# Patient Record
Sex: MALE | Race: WHITE | NOT HISPANIC OR LATINO | Employment: OTHER | URBAN - METROPOLITAN AREA
[De-identification: names, ages, dates, MRNs, and addresses within clinical notes are randomized per-mention and may not be internally consistent; named-entity substitution may affect disease eponyms.]

---

## 2020-03-27 ENCOUNTER — HOSPITAL ENCOUNTER (OUTPATIENT)
Facility: HOSPITAL | Age: 65
Discharge: HOME | End: 2020-03-29
Attending: EMERGENCY MEDICINE | Admitting: STUDENT IN AN ORGANIZED HEALTH CARE EDUCATION/TRAINING PROGRAM
Payer: OTHER GOVERNMENT

## 2020-03-27 DIAGNOSIS — Z91.89 AT RISK FOR LONG QT SYNDROME: ICD-10-CM

## 2020-03-27 DIAGNOSIS — R06.02 SOB (SHORTNESS OF BREATH): ICD-10-CM

## 2020-03-27 DIAGNOSIS — R05.9 COUGH: ICD-10-CM

## 2020-03-27 DIAGNOSIS — Z20.822 SUSPECTED COVID-19 VIRUS INFECTION: Primary | ICD-10-CM

## 2020-03-27 PROBLEM — E11.9 TYPE 2 DIABETES MELLITUS: Status: ACTIVE | Noted: 2020-03-27

## 2020-03-27 PROBLEM — J44.1 COPD EXACERBATION: Status: ACTIVE | Noted: 2020-03-27

## 2020-03-27 PROBLEM — I50.9 CHF (CONGESTIVE HEART FAILURE): Status: ACTIVE | Noted: 2020-03-27

## 2020-03-27 PROBLEM — I10 BENIGN ESSENTIAL HTN: Status: ACTIVE | Noted: 2020-03-27

## 2020-03-27 LAB
ALBUMIN SERPL BCP-MCNC: 4.2 G/DL (ref 3.5–5.2)
ALLENS TEST: ABNORMAL
ALP SERPL-CCNC: 67 U/L (ref 55–135)
ALT SERPL W/O P-5'-P-CCNC: 33 U/L (ref 10–44)
ANION GAP SERPL CALC-SCNC: 15 MMOL/L (ref 8–16)
AST SERPL-CCNC: 36 U/L (ref 10–40)
BACTERIA #/AREA URNS HPF: NEGATIVE /HPF
BASOPHILS # BLD AUTO: 0.06 K/UL (ref 0–0.2)
BASOPHILS NFR BLD: 0.7 % (ref 0–1.9)
BILIRUB SERPL-MCNC: 0.9 MG/DL (ref 0.1–1)
BILIRUB UR QL STRIP: ABNORMAL
BNP SERPL-MCNC: 36 PG/ML (ref 0–99)
BUN SERPL-MCNC: 16 MG/DL (ref 8–23)
CALCIUM SERPL-MCNC: 9.6 MG/DL (ref 8.7–10.5)
CHLORIDE SERPL-SCNC: 101 MMOL/L (ref 95–110)
CK MB SERPL-MCNC: 1.5 NG/ML (ref 0.1–6.5)
CK SERPL-CCNC: 75 U/L (ref 20–200)
CLARITY UR: CLEAR
CO2 SERPL-SCNC: 21 MMOL/L (ref 23–29)
COLOR UR: YELLOW
CREAT SERPL-MCNC: 1.1 MG/DL (ref 0.5–1.4)
CRP SERPL-MCNC: 3.04 MG/DL (ref 0–0.75)
DELSYS: ABNORMAL
DEPRECATED S PYO AG THROAT QL EIA: NEGATIVE
DIFFERENTIAL METHOD: ABNORMAL
EOSINOPHIL # BLD AUTO: 0.1 K/UL (ref 0–0.5)
EOSINOPHIL NFR BLD: 0.8 % (ref 0–8)
ERYTHROCYTE [DISTWIDTH] IN BLOOD BY AUTOMATED COUNT: 12 % (ref 11.5–14.5)
ERYTHROCYTE [SEDIMENTATION RATE] IN BLOOD BY WESTERGREN METHOD: 12 MM/HR (ref 0–10)
ERYTHROCYTE [SEDIMENTATION RATE] IN BLOOD BY WESTERGREN METHOD: 28 MM/H
EST. GFR  (AFRICAN AMERICAN): >60 ML/MIN/1.73 M^2
EST. GFR  (NON AFRICAN AMERICAN): >60 ML/MIN/1.73 M^2
FERRITIN SERPL-MCNC: 186 NG/ML (ref 20–300)
FIO2: 21
GLUCOSE SERPL-MCNC: 228 MG/DL (ref 70–110)
GLUCOSE SERPL-MCNC: 256 MG/DL (ref 70–110)
GLUCOSE SERPL-MCNC: 297 MG/DL (ref 70–110)
GLUCOSE UR QL STRIP: ABNORMAL
HCO3 UR-SCNC: 23.7 MMOL/L (ref 24–28)
HCT VFR BLD AUTO: 45.9 % (ref 40–54)
HCT VFR BLD CALC: 44 %PCV (ref 36–54)
HGB BLD-MCNC: 15.8 G/DL (ref 14–18)
HGB UR QL STRIP: NEGATIVE
HYALINE CASTS #/AREA URNS LPF: 66 /LPF
IMM GRANULOCYTES # BLD AUTO: 0.06 K/UL (ref 0–0.04)
IMM GRANULOCYTES NFR BLD AUTO: 0.7 % (ref 0–0.5)
INFLUENZA A, MOLECULAR: NEGATIVE
INFLUENZA B, MOLECULAR: NEGATIVE
KETONES UR QL STRIP: ABNORMAL
LACTATE SERPL-SCNC: 3.3 MMOL/L (ref 0.5–1.9)
LDH SERPL L TO P-CCNC: 134 U/L (ref 110–260)
LEUKOCYTE ESTERASE UR QL STRIP: NEGATIVE
LYMPHOCYTES # BLD AUTO: 1.5 K/UL (ref 1–4.8)
LYMPHOCYTES NFR BLD: 16.8 % (ref 18–48)
MAGNESIUM SERPL-MCNC: 1.6 MG/DL (ref 1.6–2.6)
MCH RBC QN AUTO: 32 PG (ref 27–31)
MCHC RBC AUTO-ENTMCNC: 34.4 G/DL (ref 32–36)
MCV RBC AUTO: 93 FL (ref 82–98)
MICROSCOPIC COMMENT: ABNORMAL
MODE: ABNORMAL
MONOCYTES # BLD AUTO: 0.8 K/UL (ref 0.3–1)
MONOCYTES NFR BLD: 8.8 % (ref 4–15)
NEUTROPHILS # BLD AUTO: 6.3 K/UL (ref 1.8–7.7)
NEUTROPHILS NFR BLD: 72.2 % (ref 38–73)
NITRITE UR QL STRIP: NEGATIVE
NRBC BLD-RTO: 0 /100 WBC
PCO2 BLDA: 36.4 MMHG (ref 35–45)
PH SMN: 7.42 [PH] (ref 7.35–7.45)
PH UR STRIP: 6 [PH] (ref 5–8)
PLATELET # BLD AUTO: 183 K/UL (ref 150–350)
PMV BLD AUTO: 9.4 FL (ref 9.2–12.9)
PO2 BLDA: 74 MMHG (ref 80–100)
POC BE: -1 MMOL/L
POC IONIZED CALCIUM: 1.23 MMOL/L (ref 1.06–1.42)
POC SATURATED O2: 95 % (ref 95–100)
POC TCO2: 25 MMOL/L (ref 23–27)
POTASSIUM BLD-SCNC: 3.4 MMOL/L (ref 3.5–5.1)
POTASSIUM SERPL-SCNC: 3.5 MMOL/L (ref 3.5–5.1)
PROCALCITONIN SERPL IA-MCNC: 0.08 NG/ML (ref 0–0.5)
PROT SERPL-MCNC: 7.7 G/DL (ref 6–8.4)
PROT UR QL STRIP: ABNORMAL
RBC # BLD AUTO: 4.94 M/UL (ref 4.6–6.2)
RBC #/AREA URNS HPF: 4 /HPF (ref 0–4)
SAMPLE: ABNORMAL
SITE: ABNORMAL
SODIUM BLD-SCNC: 138 MMOL/L (ref 136–145)
SODIUM SERPL-SCNC: 137 MMOL/L (ref 136–145)
SP GR UR STRIP: 1.03 (ref 1–1.03)
SP02: 95
SPECIMEN SOURCE: NORMAL
SQUAMOUS #/AREA URNS HPF: 4 /HPF
TROPONIN I SERPL DL<=0.01 NG/ML-MCNC: <0.03 NG/ML
URN SPEC COLLECT METH UR: ABNORMAL
UROBILINOGEN UR STRIP-ACNC: ABNORMAL EU/DL
WBC # BLD AUTO: 8.71 K/UL (ref 3.9–12.7)
WBC #/AREA URNS HPF: 2 /HPF (ref 0–5)
YEAST URNS QL MICRO: ABNORMAL

## 2020-03-27 PROCEDURE — 84145 PROCALCITONIN (PCT): CPT

## 2020-03-27 PROCEDURE — 84484 ASSAY OF TROPONIN QUANT: CPT

## 2020-03-27 PROCEDURE — G0378 HOSPITAL OBSERVATION PER HR: HCPCS

## 2020-03-27 PROCEDURE — 85025 COMPLETE CBC W/AUTO DIFF WBC: CPT

## 2020-03-27 PROCEDURE — 99900035 HC TECH TIME PER 15 MIN (STAT)

## 2020-03-27 PROCEDURE — 63600175 PHARM REV CODE 636 W HCPCS: Performed by: EMERGENCY MEDICINE

## 2020-03-27 PROCEDURE — 85041 AUTOMATED RBC COUNT: CPT

## 2020-03-27 PROCEDURE — 87880 STREP A ASSAY W/OPTIC: CPT

## 2020-03-27 PROCEDURE — 93005 ELECTROCARDIOGRAM TRACING: CPT | Performed by: INTERNAL MEDICINE

## 2020-03-27 PROCEDURE — 84132 ASSAY OF SERUM POTASSIUM: CPT | Mod: 91

## 2020-03-27 PROCEDURE — 82803 BLOOD GASES ANY COMBINATION: CPT

## 2020-03-27 PROCEDURE — 82330 ASSAY OF CALCIUM: CPT

## 2020-03-27 PROCEDURE — 80053 COMPREHEN METABOLIC PANEL: CPT

## 2020-03-27 PROCEDURE — 83735 ASSAY OF MAGNESIUM: CPT

## 2020-03-27 PROCEDURE — 84295 ASSAY OF SERUM SODIUM: CPT | Mod: 59

## 2020-03-27 PROCEDURE — 83605 ASSAY OF LACTIC ACID: CPT | Mod: 91

## 2020-03-27 PROCEDURE — 82553 CREATINE MB FRACTION: CPT

## 2020-03-27 PROCEDURE — 36415 COLL VENOUS BLD VENIPUNCTURE: CPT

## 2020-03-27 PROCEDURE — 85651 RBC SED RATE NONAUTOMATED: CPT

## 2020-03-27 PROCEDURE — 96360 HYDRATION IV INFUSION INIT: CPT

## 2020-03-27 PROCEDURE — 87040 BLOOD CULTURE FOR BACTERIA: CPT | Mod: 59

## 2020-03-27 PROCEDURE — 86140 C-REACTIVE PROTEIN: CPT

## 2020-03-27 PROCEDURE — 96361 HYDRATE IV INFUSION ADD-ON: CPT

## 2020-03-27 PROCEDURE — 85014 HEMATOCRIT: CPT

## 2020-03-27 PROCEDURE — 87502 INFLUENZA DNA AMP PROBE: CPT

## 2020-03-27 PROCEDURE — 82728 ASSAY OF FERRITIN: CPT

## 2020-03-27 PROCEDURE — 83615 LACTATE (LD) (LDH) ENZYME: CPT

## 2020-03-27 PROCEDURE — 83880 ASSAY OF NATRIURETIC PEPTIDE: CPT

## 2020-03-27 PROCEDURE — 82550 ASSAY OF CK (CPK): CPT

## 2020-03-27 PROCEDURE — 83605 ASSAY OF LACTIC ACID: CPT

## 2020-03-27 PROCEDURE — 36600 WITHDRAWAL OF ARTERIAL BLOOD: CPT

## 2020-03-27 PROCEDURE — 87081 CULTURE SCREEN ONLY: CPT

## 2020-03-27 PROCEDURE — 81001 URINALYSIS AUTO W/SCOPE: CPT

## 2020-03-27 PROCEDURE — 99285 EMERGENCY DEPT VISIT HI MDM: CPT | Mod: 25

## 2020-03-27 RX ORDER — INSULIN ASPART 100 [IU]/ML
0-5 INJECTION, SOLUTION INTRAVENOUS; SUBCUTANEOUS
Status: DISCONTINUED | OUTPATIENT
Start: 2020-03-27 | End: 2020-03-29 | Stop reason: HOSPADM

## 2020-03-27 RX ORDER — SODIUM CHLORIDE 0.9 % (FLUSH) 0.9 %
10 SYRINGE (ML) INJECTION
Status: DISCONTINUED | OUTPATIENT
Start: 2020-03-27 | End: 2020-03-29 | Stop reason: HOSPADM

## 2020-03-27 RX ORDER — LANOLIN ALCOHOL/MO/W.PET/CERES
800 CREAM (GRAM) TOPICAL
Status: DISCONTINUED | OUTPATIENT
Start: 2020-03-27 | End: 2020-03-29 | Stop reason: HOSPADM

## 2020-03-27 RX ORDER — IBUPROFEN 200 MG
24 TABLET ORAL
Status: DISCONTINUED | OUTPATIENT
Start: 2020-03-27 | End: 2020-03-29 | Stop reason: HOSPADM

## 2020-03-27 RX ORDER — SODIUM CHLORIDE, SODIUM LACTATE, POTASSIUM CHLORIDE, CALCIUM CHLORIDE 600; 310; 30; 20 MG/100ML; MG/100ML; MG/100ML; MG/100ML
1000 INJECTION, SOLUTION INTRAVENOUS
Status: COMPLETED | OUTPATIENT
Start: 2020-03-27 | End: 2020-03-27

## 2020-03-27 RX ORDER — METFORMIN HYDROCHLORIDE 1000 MG/1
1000 TABLET ORAL 2 TIMES DAILY WITH MEALS
COMMUNITY
End: 2021-10-04

## 2020-03-27 RX ORDER — AMLODIPINE BESYLATE 5 MG/1
10 TABLET ORAL DAILY
Status: DISCONTINUED | OUTPATIENT
Start: 2020-03-28 | End: 2020-03-29 | Stop reason: HOSPADM

## 2020-03-27 RX ORDER — IBUPROFEN 200 MG
16 TABLET ORAL
Status: DISCONTINUED | OUTPATIENT
Start: 2020-03-27 | End: 2020-03-29 | Stop reason: HOSPADM

## 2020-03-27 RX ORDER — HYDROXYCHLOROQUINE SULFATE 200 MG/1
400 TABLET, FILM COATED ORAL DAILY
Status: DISCONTINUED | OUTPATIENT
Start: 2020-03-29 | End: 2020-03-29 | Stop reason: HOSPADM

## 2020-03-27 RX ORDER — GLUCAGON 1 MG
1 KIT INJECTION
Status: DISCONTINUED | OUTPATIENT
Start: 2020-03-27 | End: 2020-03-29 | Stop reason: HOSPADM

## 2020-03-27 RX ORDER — AMLODIPINE BESYLATE 10 MG/1
10 TABLET ORAL DAILY
Status: ON HOLD | COMMUNITY
End: 2023-05-13 | Stop reason: HOSPADM

## 2020-03-27 RX ORDER — SODIUM CHLORIDE, SODIUM LACTATE, POTASSIUM CHLORIDE, CALCIUM CHLORIDE 600; 310; 30; 20 MG/100ML; MG/100ML; MG/100ML; MG/100ML
INJECTION, SOLUTION INTRAVENOUS CONTINUOUS
Status: DISCONTINUED | OUTPATIENT
Start: 2020-03-27 | End: 2020-03-28

## 2020-03-27 RX ORDER — POTASSIUM CHLORIDE 20 MEQ/15ML
40 SOLUTION ORAL
Status: DISCONTINUED | OUTPATIENT
Start: 2020-03-27 | End: 2020-03-29 | Stop reason: HOSPADM

## 2020-03-27 RX ORDER — AZITHROMYCIN 250 MG/1
500 TABLET, FILM COATED ORAL DAILY
Status: DISCONTINUED | OUTPATIENT
Start: 2020-03-28 | End: 2020-03-29 | Stop reason: HOSPADM

## 2020-03-27 RX ORDER — BENZONATATE 100 MG/1
100 CAPSULE ORAL 3 TIMES DAILY PRN
Status: DISCONTINUED | OUTPATIENT
Start: 2020-03-27 | End: 2020-03-29 | Stop reason: HOSPADM

## 2020-03-27 RX ORDER — SODIUM,POTASSIUM PHOSPHATES 280-250MG
2 POWDER IN PACKET (EA) ORAL
Status: DISCONTINUED | OUTPATIENT
Start: 2020-03-27 | End: 2020-03-29 | Stop reason: HOSPADM

## 2020-03-27 RX ORDER — HYDROXYCHLOROQUINE SULFATE 200 MG/1
400 TABLET, FILM COATED ORAL 2 TIMES DAILY
Status: COMPLETED | OUTPATIENT
Start: 2020-03-27 | End: 2020-03-28

## 2020-03-27 RX ORDER — TALC
6 POWDER (GRAM) TOPICAL NIGHTLY PRN
Status: DISCONTINUED | OUTPATIENT
Start: 2020-03-27 | End: 2020-03-29 | Stop reason: HOSPADM

## 2020-03-27 RX ORDER — GUAIFENESIN 600 MG/1
600 TABLET, EXTENDED RELEASE ORAL 2 TIMES DAILY
Status: DISCONTINUED | OUTPATIENT
Start: 2020-03-27 | End: 2020-03-29 | Stop reason: HOSPADM

## 2020-03-27 RX ORDER — ACETAMINOPHEN 325 MG/1
650 TABLET ORAL EVERY 8 HOURS PRN
Status: DISCONTINUED | OUTPATIENT
Start: 2020-03-27 | End: 2020-03-29 | Stop reason: HOSPADM

## 2020-03-27 RX ORDER — ONDANSETRON 4 MG/1
8 TABLET, ORALLY DISINTEGRATING ORAL EVERY 8 HOURS PRN
Status: DISCONTINUED | OUTPATIENT
Start: 2020-03-27 | End: 2020-03-29 | Stop reason: HOSPADM

## 2020-03-27 RX ORDER — ENOXAPARIN SODIUM 100 MG/ML
40 INJECTION SUBCUTANEOUS EVERY 24 HOURS
Status: DISCONTINUED | OUTPATIENT
Start: 2020-03-27 | End: 2020-03-29 | Stop reason: HOSPADM

## 2020-03-27 RX ORDER — ATORVASTATIN CALCIUM 40 MG/1
40 TABLET, FILM COATED ORAL NIGHTLY
Status: DISCONTINUED | OUTPATIENT
Start: 2020-03-27 | End: 2020-03-29 | Stop reason: HOSPADM

## 2020-03-27 RX ORDER — ACETAMINOPHEN 325 MG/1
650 TABLET ORAL EVERY 4 HOURS PRN
Status: DISCONTINUED | OUTPATIENT
Start: 2020-03-27 | End: 2020-03-29 | Stop reason: HOSPADM

## 2020-03-27 RX ORDER — LOSARTAN POTASSIUM 50 MG/1
50 TABLET ORAL ONCE
Status: DISCONTINUED | OUTPATIENT
Start: 2020-03-27 | End: 2020-03-27

## 2020-03-27 RX ADMIN — SODIUM CHLORIDE, SODIUM LACTATE, POTASSIUM CHLORIDE, AND CALCIUM CHLORIDE 1000 ML: .6; .31; .03; .02 INJECTION, SOLUTION INTRAVENOUS at 07:03

## 2020-03-27 NOTE — ED PROVIDER NOTES
Encounter Date: 3/27/2020       History     Chief Complaint   Patient presents with    Shortness of Breath     Presents with complaint of cough and SOB with chills  Subj. Fever  [facility].  He was tested for COVID 19 at urgent care  They wanted him to come to the hospital yesterday but he did not have anyone to take care of his animals. He is a smoker         Review of patient's allergies indicates:  No Known Allergies  Past Medical History:   Diagnosis Date    CHF (congestive heart failure)     Diabetes mellitus     Emphysema lung     Endocarditis     Hypertension     Stroke      History reviewed. No pertinent surgical history.  History reviewed. No pertinent family history.  Social History     Tobacco Use    Smoking status: Former Smoker     Packs/day: 1.00     Types: Cigarettes     Last attempt to quit: 3/27/2020    Smokeless tobacco: Current User   Substance Use Topics    Alcohol use: Not on file    Drug use: Not on file     Review of Systems   Constitutional: Positive for chills. Negative for fever.   Respiratory: Positive for cough and shortness of breath. Negative for wheezing.    Cardiovascular: Negative for chest pain, palpitations and leg swelling.   Gastrointestinal: Negative for abdominal pain, diarrhea, nausea and vomiting.   Musculoskeletal: Negative for back pain.   Skin: Negative for rash.   Neurological: Negative for weakness.       Physical Exam     Initial Vitals [03/27/20 1840]   BP Pulse Resp Temp SpO2   (!) 194/77 (!) 122 (!) 24 98.9 °F (37.2 °C) (!) 94 %      MAP       --         Physical Exam    Constitutional: He appears well-developed and well-nourished.   HENT:   Mouth/Throat: Oropharynx is clear and moist.   Eyes: Conjunctivae are normal.   Neck: Normal range of motion. Neck supple.   Cardiovascular: Normal rate and regular rhythm.   Pulmonary/Chest: Breath sounds normal. No accessory muscle usage. No tachypnea. No  respiratory distress.   Abdominal: Soft. Bowel sounds are normal.   Musculoskeletal: Normal range of motion.        Right lower leg: He exhibits no edema.        Left lower leg: He exhibits no edema.   Neurological: He is alert and oriented to person, place, and time.   Skin: Skin is warm. Capillary refill takes less than 2 seconds.   Psychiatric: He has a normal mood and affect.         ED Course   Procedures  Labs Reviewed   THROAT SCREEN, RAPID   INFLUENZA A AND B ANTIGEN   POCT INFLUENZA A/B MOLECULAR          Imaging Results    None          Medical Decision Making:   Initial Assessment:   Cough and SOB  Had covid 19 test done yesterday at urgent care  [facility]  ED Management:  Pt was tested for COVID 19 yesterday  Have discussed this pt with DR Lilliam Mars has agreed to admit this pt                                 Clinical Impression:       ICD-10-CM ICD-9-CM   1. SOB (shortness of breath) R06.02 786.05   2. Cough R05 786.2   3. Suspected Covid-19 Virus Infection R68.89                                 Lupis Urrutia NP  03/27/20 6802

## 2020-03-28 LAB
ANION GAP SERPL CALC-SCNC: 8 MMOL/L (ref 8–16)
BASOPHILS # BLD AUTO: 0.05 K/UL (ref 0–0.2)
BASOPHILS NFR BLD: 0.9 % (ref 0–1.9)
BUN SERPL-MCNC: 17 MG/DL (ref 8–23)
CALCIUM SERPL-MCNC: 8.8 MG/DL (ref 8.7–10.5)
CHLORIDE SERPL-SCNC: 104 MMOL/L (ref 95–110)
CO2 SERPL-SCNC: 24 MMOL/L (ref 23–29)
CREAT SERPL-MCNC: 0.9 MG/DL (ref 0.5–1.4)
DIFFERENTIAL METHOD: ABNORMAL
EOSINOPHIL # BLD AUTO: 0.1 K/UL (ref 0–0.5)
EOSINOPHIL NFR BLD: 1.4 % (ref 0–8)
ERYTHROCYTE [DISTWIDTH] IN BLOOD BY AUTOMATED COUNT: 12.1 % (ref 11.5–14.5)
EST. GFR  (AFRICAN AMERICAN): >60 ML/MIN/1.73 M^2
EST. GFR  (NON AFRICAN AMERICAN): >60 ML/MIN/1.73 M^2
GLUCOSE SERPL-MCNC: 248 MG/DL (ref 70–110)
GLUCOSE SERPL-MCNC: 282 MG/DL (ref 70–110)
GLUCOSE SERPL-MCNC: 286 MG/DL (ref 70–110)
GLUCOSE SERPL-MCNC: 346 MG/DL (ref 70–110)
GLUCOSE SERPL-MCNC: 386 MG/DL (ref 70–110)
HCT VFR BLD AUTO: 42.8 % (ref 40–54)
HGB BLD-MCNC: 14.6 G/DL (ref 14–18)
IMM GRANULOCYTES # BLD AUTO: 0.03 K/UL (ref 0–0.04)
IMM GRANULOCYTES NFR BLD AUTO: 0.5 % (ref 0–0.5)
LACTATE SERPL-SCNC: 1.6 MMOL/L (ref 0.5–1.9)
LACTATE SERPL-SCNC: 2.7 MMOL/L (ref 0.5–1.9)
LACTATE SERPL-SCNC: 2.7 MMOL/L (ref 0.5–1.9)
LYMPHOCYTES # BLD AUTO: 1.7 K/UL (ref 1–4.8)
LYMPHOCYTES NFR BLD: 31 % (ref 18–48)
MAGNESIUM SERPL-MCNC: 1.7 MG/DL (ref 1.6–2.6)
MCH RBC QN AUTO: 31.9 PG (ref 27–31)
MCHC RBC AUTO-ENTMCNC: 34.1 G/DL (ref 32–36)
MCV RBC AUTO: 93 FL (ref 82–98)
MONOCYTES # BLD AUTO: 0.6 K/UL (ref 0.3–1)
MONOCYTES NFR BLD: 10.9 % (ref 4–15)
NEUTROPHILS # BLD AUTO: 3.1 K/UL (ref 1.8–7.7)
NEUTROPHILS NFR BLD: 55.3 % (ref 38–73)
NRBC BLD-RTO: 0 /100 WBC
PHOSPHATE SERPL-MCNC: 4.1 MG/DL (ref 2.7–4.5)
PLATELET # BLD AUTO: 140 K/UL (ref 150–350)
PMV BLD AUTO: 9.2 FL (ref 9.2–12.9)
POTASSIUM SERPL-SCNC: 3.2 MMOL/L (ref 3.5–5.1)
PROCALCITONIN SERPL IA-MCNC: <0.05 NG/ML (ref 0–0.5)
RBC # BLD AUTO: 4.58 M/UL (ref 4.6–6.2)
SODIUM SERPL-SCNC: 136 MMOL/L (ref 136–145)
TROPONIN I SERPL DL<=0.01 NG/ML-MCNC: <0.03 NG/ML
WBC # BLD AUTO: 5.52 K/UL (ref 3.9–12.7)

## 2020-03-28 PROCEDURE — 63600175 PHARM REV CODE 636 W HCPCS: Performed by: STUDENT IN AN ORGANIZED HEALTH CARE EDUCATION/TRAINING PROGRAM

## 2020-03-28 PROCEDURE — 25000003 PHARM REV CODE 250: Performed by: INTERNAL MEDICINE

## 2020-03-28 PROCEDURE — 82962 GLUCOSE BLOOD TEST: CPT

## 2020-03-28 PROCEDURE — 96372 THER/PROPH/DIAG INJ SC/IM: CPT | Mod: 59

## 2020-03-28 PROCEDURE — 83605 ASSAY OF LACTIC ACID: CPT

## 2020-03-28 PROCEDURE — 96361 HYDRATE IV INFUSION ADD-ON: CPT

## 2020-03-28 PROCEDURE — 84484 ASSAY OF TROPONIN QUANT: CPT

## 2020-03-28 PROCEDURE — 83735 ASSAY OF MAGNESIUM: CPT

## 2020-03-28 PROCEDURE — 80048 BASIC METABOLIC PNL TOTAL CA: CPT

## 2020-03-28 PROCEDURE — 36415 COLL VENOUS BLD VENIPUNCTURE: CPT

## 2020-03-28 PROCEDURE — 85025 COMPLETE CBC W/AUTO DIFF WBC: CPT

## 2020-03-28 PROCEDURE — 84100 ASSAY OF PHOSPHORUS: CPT

## 2020-03-28 PROCEDURE — 93005 ELECTROCARDIOGRAM TRACING: CPT | Performed by: INTERNAL MEDICINE

## 2020-03-28 PROCEDURE — 63700000 PHARM REV CODE 250 ALT 637 W/O HCPCS: Performed by: STUDENT IN AN ORGANIZED HEALTH CARE EDUCATION/TRAINING PROGRAM

## 2020-03-28 PROCEDURE — 84145 PROCALCITONIN (PCT): CPT

## 2020-03-28 PROCEDURE — 25000003 PHARM REV CODE 250: Performed by: STUDENT IN AN ORGANIZED HEALTH CARE EDUCATION/TRAINING PROGRAM

## 2020-03-28 PROCEDURE — G0378 HOSPITAL OBSERVATION PER HR: HCPCS

## 2020-03-28 RX ORDER — BUTALBITAL, ACETAMINOPHEN AND CAFFEINE 50; 325; 40 MG/1; MG/1; MG/1
1 TABLET ORAL EVERY 4 HOURS PRN
Status: DISCONTINUED | OUTPATIENT
Start: 2020-03-28 | End: 2020-03-29 | Stop reason: HOSPADM

## 2020-03-28 RX ADMIN — ENOXAPARIN SODIUM 40 MG: 100 INJECTION SUBCUTANEOUS at 12:03

## 2020-03-28 RX ADMIN — HYDROXYCHLOROQUINE SULFATE 400 MG: 200 TABLET, FILM COATED ORAL at 12:03

## 2020-03-28 RX ADMIN — GUAIFENESIN 600 MG: 600 TABLET, EXTENDED RELEASE ORAL at 12:03

## 2020-03-28 RX ADMIN — POTASSIUM CHLORIDE 40 MEQ: 20 SOLUTION ORAL at 03:03

## 2020-03-28 RX ADMIN — INSULIN ASPART 5 UNITS: 100 INJECTION, SOLUTION INTRAVENOUS; SUBCUTANEOUS at 09:03

## 2020-03-28 RX ADMIN — POTASSIUM CHLORIDE 40 MEQ: 20 SOLUTION ORAL at 07:03

## 2020-03-28 RX ADMIN — GUAIFENESIN 600 MG: 600 TABLET, EXTENDED RELEASE ORAL at 09:03

## 2020-03-28 RX ADMIN — AZITHROMYCIN 500 MG: 250 TABLET, FILM COATED ORAL at 09:03

## 2020-03-28 RX ADMIN — HYDROXYCHLOROQUINE SULFATE 400 MG: 200 TABLET, FILM COATED ORAL at 09:03

## 2020-03-28 RX ADMIN — ATORVASTATIN CALCIUM 40 MG: 40 TABLET, FILM COATED ORAL at 12:03

## 2020-03-28 RX ADMIN — ATORVASTATIN CALCIUM 40 MG: 40 TABLET, FILM COATED ORAL at 08:03

## 2020-03-28 RX ADMIN — INSULIN ASPART 3 UNITS: 100 INJECTION, SOLUTION INTRAVENOUS; SUBCUTANEOUS at 12:03

## 2020-03-28 RX ADMIN — GUAIFENESIN 600 MG: 600 TABLET, EXTENDED RELEASE ORAL at 08:03

## 2020-03-28 RX ADMIN — MAGNESIUM OXIDE 800 MG: 400 TABLET ORAL at 07:03

## 2020-03-28 RX ADMIN — INSULIN ASPART 1 UNITS: 100 INJECTION, SOLUTION INTRAVENOUS; SUBCUTANEOUS at 12:03

## 2020-03-28 RX ADMIN — AMLODIPINE BESYLATE 10 MG: 5 TABLET ORAL at 09:03

## 2020-03-28 RX ADMIN — POTASSIUM CHLORIDE 40 MEQ: 20 SOLUTION ORAL at 01:03

## 2020-03-28 RX ADMIN — BUTALBITAL, ACETAMINOPHEN AND CAFFEINE 1 TABLET: 50; 325; 40 TABLET ORAL at 09:03

## 2020-03-28 RX ADMIN — INSULIN ASPART 4 UNITS: 100 INJECTION, SOLUTION INTRAVENOUS; SUBCUTANEOUS at 04:03

## 2020-03-28 RX ADMIN — SODIUM CHLORIDE, SODIUM LACTATE, POTASSIUM CHLORIDE, AND CALCIUM CHLORIDE: .6; .31; .03; .02 INJECTION, SOLUTION INTRAVENOUS at 04:03

## 2020-03-28 RX ADMIN — ENOXAPARIN SODIUM 40 MG: 100 INJECTION SUBCUTANEOUS at 05:03

## 2020-03-28 NOTE — H&P
[facility] Medicine  History & Physical    Patient Name: Sampson Holt  MRN: [id number]  Admission Date: 3/27/2020  Attending Physician: Hill Vyas MD   Primary Care Provider: Primary Doctor No         Patient information was obtained from patient, past medical records and ER records.     Subjective:     Principal Problem:Suspected Covid-19 Virus Infection    Chief Complaint:   Chief Complaint   Patient presents with    Shortness of Breath        HPI: 63 yo M smoker with PMH of CHF, endocarditis, COPD presented to ED c/o flu-like symptoms since Sunday (6 days). Pt reports subjective fever/chills, cough, diffuse body aches and fatigue. [facility] Care where he was tested from Covid (yesterday) and sent home to self isolate. He states that today he had worsening symptoms and severe sob.       In the ED  Vital signs:  Tachycardic, tachypneic  Labs: flu negative, strep negative, CRP 3.04, LA 3.3  Troponin: Normal  CXR, personally reviewed, No obvious infiltrates or ground glass opacities      Past Medical History:   Diagnosis Date    CHF (congestive heart failure)     Diabetes mellitus     Emphysema lung     Endocarditis     Hypertension     Stroke        History reviewed. No pertinent surgical history.    Review of patient's allergies indicates:  No Known Allergies    No current facility-administered medications on file prior to encounter.      Current Outpatient Medications on File Prior to Encounter   Medication Sig    amLODIPine (NORVASC) 10 MG tablet Take 10 mg by mouth once daily.    metFORMIN (GLUCOPHAGE) 1000 MG tablet Take 1,000 mg by mouth 2 (two) times daily with meals.     Family History     Reviewed and non-contributory        Tobacco Use    Smoking status: Former Smoker     Packs/day: 1.00     Types: Cigarettes     Last attempt to quit: 3/27/2020    Smokeless tobacco: Current User   Substance and Sexual  Activity    Alcohol use: Not on file    Drug use: Prior IV drug use, stopped 10 years ago    Sexual activity: Not on file     Review of Systems   Constitutional: Positive for chills, fatigue and fever. Negative for activity change.   HENT: Negative for congestion, rhinorrhea and sore throat.    Eyes: Negative for visual disturbance.   Respiratory: Positive for cough and shortness of breath. Negative for chest tightness.    Cardiovascular: Negative for chest pain, palpitations and leg swelling.   Gastrointestinal: Negative for abdominal pain, constipation, diarrhea, nausea and vomiting.   Genitourinary: Negative for dysuria and hematuria.   Musculoskeletal: Positive for myalgias. Negative for arthralgias.   Skin: Negative for rash.   Neurological: Negative for dizziness, light-headedness and headaches.   Psychiatric/Behavioral: Negative for agitation. The patient is not nervous/anxious.      Objective:     Vital Signs (Most Recent):  Temp: 98.9 °F (37.2 °C) (03/27/20 1840)  Pulse: 89 (03/27/20 2201)  Resp: 18 (03/27/20 2204)  BP: (!) 114/57 (03/27/20 2201)  SpO2: (!) 94 % (03/27/20 2201) Vital Signs (24h Range):  Temp:  [98.9 °F (37.2 °C)] 98.9 °F (37.2 °C)  Pulse:  [] 89  Resp:  [15-58] 18  SpO2:  [94 %-97 %] 94 %  BP: (114-194)/() 114/57     Weight: 88.5 kg (195 lb)  Body mass index is 25.73 kg/m².    Physical Exam   Constitutional: He is oriented to person, place, and time. He appears well-developed and well-nourished. He appears distressed.   Ill appearing   HENT:   Head: Normocephalic and atraumatic.   Mouth/Throat: Oropharynx is clear and moist.   Eyes: Pupils are equal, round, and reactive to light. Conjunctivae and EOM are normal.   Neck: Normal range of motion. Neck supple.   Cardiovascular: Normal rate, regular rhythm, normal heart sounds and intact distal pulses.   Pulmonary/Chest: Effort normal. He has rales.   Abdominal: Soft. Bowel sounds are normal. He exhibits no distension. There is no  tenderness.   Musculoskeletal: Normal range of motion. He exhibits no edema, tenderness or deformity.   Neurological: He is alert and oriented to person, place, and time. He has normal reflexes.   Skin: Skin is warm and dry. He is not diaphoretic.   Psychiatric: He has a normal mood and affect. His behavior is normal. Judgment and thought content normal.   Nursing note and vitals reviewed.       Significant Labs:   CBC:   Recent Labs   Lab 03/27/20 1934 03/27/20 2015   WBC 8.71  --    HGB 15.8  --    HCT 45.9 44     --      CMP:   Recent Labs   Lab 03/27/20 1934      K 3.5      CO2 21*   *   BUN 16   CREATININE 1.1   CALCIUM 9.6   PROT 7.7   ALBUMIN 4.2   BILITOT 0.9   ALKPHOS 67   AST 36   ALT 33   ANIONGAP 15   EGFRNONAA >60.0     Lactic Acid:   Recent Labs   Lab 03/27/20 1934   LACTATE 3.3*     All pertinent labs within the past 24 hours have been reviewed.    Significant Imaging: I have reviewed all pertinent imaging results/findings within the past 24 hours.   Imaging Results          X-Ray Chest AP Portable (Final result)  Result time 03/27/20 19:05:44    Final result by Tomas Kan MD (03/27/20 19:05:44)                 Narrative:    REASON: Cough and fever.    TECHNIQUE: Portable chest radiograph.    COMPARISON: None.    FINDINGS:    The cardiomediastinal silhouette is within normal limits in size.The  pulmonary vascular structures are within normal limits. The lungs are  clear. No acute osseous abnormality.    IMPRESSION:    No acute cardiopulmonary process.    Electronically Signed by Tomas Kan on 3/27/2020 7:11 PM                                Assessment/Plan:     * Suspected Covid-19 Virus Infection  Labs: influenza negative, PCT wnl, ALC 1463, CRP 3.04, LA 3.3  COVID swab obtained at Outside urgent care. Follow up results.   Imaging: cxr: clear. Will repeat CXR in AM  Supportive care: supplemental O2 prn, tylenol for fever reduction/myalgia, tessalon perles,  mucinex  No steroids   isolation precautions  Daily ekg  hydroxychloroquine and azithromycin started.   Trending LA  Patient appears dry. Will gently hydrate with 50cc/hr x 10 hours.           Active Hospital Problems    Diagnosis  POA    *Suspected Covid-19 Virus Infection [R68.89]  Yes    Benign essential HTN [I10]  Yes    CHF (congestive heart failure) [I50.9]  Yes    COPD exacerbation [J44.1]  Yes    Type 2 diabetes mellitus [E11.9]  Yes      Resolved Hospital Problems   No resolved problems to display.       VTE Risk Mitigation (From admission, onward)    None             Nicolasa Kan DO  Department of Hospital Medicine   [facility]

## 2020-03-28 NOTE — ASSESSMENT & PLAN NOTE
Labs: influenza negative, PCT wnl, ALC 1463, CRP 3.04, LA 3.3  COVID swab obtained at Outside urgent care. Follow up results.   Imaging: cxr: clear. Will repeat CXR in AM  Supportive care: supplemental O2 prn, tylenol for fever reduction/myalgia, tessalon perles, mucinex  No steroids   isolation precautions  Daily ekg  hydroxychloroquine and azithromycin started.   Trending LA  Patient appears dry. Will gently hydrate with 50cc/hr x 10 hours.

## 2020-03-28 NOTE — PROGRESS NOTES
[facility] Medicine  Progress Note    Patient Name: Sampson Holt  MRN: [id number]  Patient Class: OP- Observation   Admission Date: 3/27/2020  Length of Stay: 0 days  Attending Physician: Inder Ashford DO  Primary Care Provider: Primary Doctor No        Subjective:     Principal Problem:Suspected Covid-19 Virus Infection        HPI:  65 yo M smoker with PMH of CHF, endocarditis, COPD presented to ED c/o flu-like symptoms since Sunday (6 days). Pt reports subjective fever/chills, cough, diffuse body aches and fatigue. [facility] where he was tested from Covid (yesterday) and sent home to self isolate. He states that today he had worsening symptoms and severe sob.       In the ED  Vital signs:  Tachycardic, tachypneic  Labs: flu negative, strep negative, CRP 3.04, LA 3.3  Troponin: Normal  CXR, personally reviewed, No obvious infiltrates or ground glass opacities      Overview/Hospital Course:  No notes on file    Interval History:  Feels better but still  not very good My sugar is high, still coughing, breathing is improved.  No chest pain abdominal pain nausea vomiting     Objective:     Vital Signs (Most Recent):  Temp: 98.4 °F (36.9 °C) (03/28/20 1202)  Pulse: 100 (03/28/20 1202)  Resp: 17 (03/28/20 1202)  BP: (!) 136/90 (03/28/20 1202)  SpO2: 95 % (03/28/20 0325) Vital Signs (24h Range):  Temp:  [97.8 °F (36.6 °C)-98.9 °F (37.2 °C)] 98.4 °F (36.9 °C)  Pulse:  [] 100  Resp:  [15-58] 17  SpO2:  [94 %-97 %] 95 %  BP: (114-194)/() 136/90     Weight: 94.5 kg (208 lb 5.4 oz)  Body mass index is 27.49 kg/m².    Intake/Output Summary (Last 24 hours) at 3/28/2020 1713  Last data filed at 3/28/2020 1300  Gross per 24 hour   Intake 480 ml   Output 700 ml   Net -220 ml      Physical Exam patient appears in no acute distress he is ambulatory in the room  HEENT sclerae nonicteric  Neck supple nontender  Lungs good air  movement  Heart regular tachycardic  Abdomen is nondistended  Extremities no edema  Neuro patient is alert oriented x3 motor exam is nonfocal   no Singh  Psych pleasant cooperative  Significant Labs:   BMP:   Recent Labs   Lab 03/28/20  0320   *      K 3.2*      CO2 24   BUN 17   CREATININE 0.9   CALCIUM 8.8   MG 1.7     CBC:   Recent Labs   Lab 03/27/20  1934 03/27/20 2015 03/28/20  0321   WBC 8.71  --  5.52   HGB 15.8  --  14.6   HCT 45.9 44 42.8     --  140*       Significant Imaging:     Chest x-ray read by me shows probable right lower lobe infiltrate.  Agree with report    Assessment/Plan:   #1 Suspected COVID-19   Swab obtained an outside urgent care  Continue supportive care  Isolation precautions, G6PD  Monitor QT  Continue HCQ, a Zithromaxin  #2 Diabetes type 2-follow sliding scale  #3 History COPD -appears stable  #4 Essential hypertension  #5 Hypokalemia-replace  Check labs in a.m.  #6 Elevated lactic acid now resolved    Will continue plan as outlined above  Possible discharge in the a.m.     CHF (congestive heart failure)      Diabetes mellitus      Emphysema lung      Endocarditis      Hypertension      Stroke        * Suspected Covid-19 Virus Infection  Labs: influenza negative, PCT wnl, ALC 1463, CRP 3.04, LA 3.3  COVID swab obtained at Outside urgent care. Follow up results.   Imaging: cxr: clear. Will repeat CXR in AM  Supportive care: supplemental O2 prn, tylenol for fever reduction/myalgia, tessalon perles, mucinex  No steroids   isolation precautions  Daily ekg  hydroxychloroquine and azithromycin started.   Trending LA  Patient appears dry. Will gently hydrate with 50cc/hr x 10 hours.           VTE Risk Mitigation (From admission, onward)         Ordered     enoxaparin injection 40 mg  Daily      03/27/20 2337     IP VTE HIGH RISK PATIENT  Once      03/27/20 2337                      Inder Ashford DO  Department of Hospital Medicine   [facility]  [facility]

## 2020-03-28 NOTE — HPI
65 yo M smoker with PMH of CHF, endocarditis, COPD presented to ED c/o flu-like symptoms since Sunday (6 days). Pt reports subjective fever/chills, cough, diffuse body aches and fatigue. [facility] Care where he was tested from Covid (yesterday) and sent home to self isolate. He states that today he had worsening symptoms and severe sob.       In the ED  Vital signs:  Tachycardic, tachypneic  Labs: flu negative, strep negative, CRP 3.04, LA 3.3  Troponin: Normal  CXR, personally reviewed, No obvious infiltrates or ground glass opacities

## 2020-03-28 NOTE — SUBJECTIVE & OBJECTIVE
Past Medical History:   Diagnosis Date    CHF (congestive heart failure)     Diabetes mellitus     Emphysema lung     Endocarditis     Hypertension     Stroke        History reviewed. No pertinent surgical history.    Review of patient's allergies indicates:  No Known Allergies    No current facility-administered medications on file prior to encounter.      Current Outpatient Medications on File Prior to Encounter   Medication Sig    amLODIPine (NORVASC) 10 MG tablet Take 10 mg by mouth once daily.    metFORMIN (GLUCOPHAGE) 1000 MG tablet Take 1,000 mg by mouth 2 (two) times daily with meals.     Family History     Reviewed and non-contributory        Tobacco Use    Smoking status: Former Smoker     Packs/day: 1.00     Types: Cigarettes     Last attempt to quit: 3/27/2020    Smokeless tobacco: Current User   Substance and Sexual Activity    Alcohol use: Not on file    Drug use: Prior IV drug use, stopped 10 years ago    Sexual activity: Not on file     Review of Systems   Constitutional: Positive for chills, fatigue and fever. Negative for activity change.   HENT: Negative for congestion, rhinorrhea and sore throat.    Eyes: Negative for visual disturbance.   Respiratory: Positive for cough and shortness of breath. Negative for chest tightness.    Cardiovascular: Negative for chest pain, palpitations and leg swelling.   Gastrointestinal: Negative for abdominal pain, constipation, diarrhea, nausea and vomiting.   Genitourinary: Negative for dysuria and hematuria.   Musculoskeletal: Positive for myalgias. Negative for arthralgias.   Skin: Negative for rash.   Neurological: Negative for dizziness, light-headedness and headaches.   Psychiatric/Behavioral: Negative for agitation. The patient is not nervous/anxious.      Objective:     Vital Signs (Most Recent):  Temp: 98.9 °F (37.2 °C) (03/27/20 1840)  Pulse: 89 (03/27/20 2201)  Resp: 18 (03/27/20 2204)  BP: (!) 114/57 (03/27/20 2201)  SpO2: (!) 94 %  (03/27/20 2201) Vital Signs (24h Range):  Temp:  [98.9 °F (37.2 °C)] 98.9 °F (37.2 °C)  Pulse:  [] 89  Resp:  [15-58] 18  SpO2:  [94 %-97 %] 94 %  BP: (114-194)/() 114/57     Weight: 88.5 kg (195 lb)  Body mass index is 25.73 kg/m².    Physical Exam   Constitutional: He is oriented to person, place, and time. He appears well-developed and well-nourished. He appears distressed.   Ill appearing   HENT:   Head: Normocephalic and atraumatic.   Mouth/Throat: Oropharynx is clear and moist.   Eyes: Pupils are equal, round, and reactive to light. Conjunctivae and EOM are normal.   Neck: Normal range of motion. Neck supple.   Cardiovascular: Normal rate, regular rhythm, normal heart sounds and intact distal pulses.   Pulmonary/Chest: Effort normal. He has rales.   Abdominal: Soft. Bowel sounds are normal. He exhibits no distension. There is no tenderness.   Musculoskeletal: Normal range of motion. He exhibits no edema, tenderness or deformity.   Neurological: He is alert and oriented to person, place, and time. He has normal reflexes.   Skin: Skin is warm and dry. He is not diaphoretic.   Psychiatric: He has a normal mood and affect. His behavior is normal. Judgment and thought content normal.   Nursing note and vitals reviewed.       Significant Labs:   CBC:   Recent Labs   Lab 03/27/20 1934 03/27/20 2015   WBC 8.71  --    HGB 15.8  --    HCT 45.9 44     --      CMP:   Recent Labs   Lab 03/27/20 1934      K 3.5      CO2 21*   *   BUN 16   CREATININE 1.1   CALCIUM 9.6   PROT 7.7   ALBUMIN 4.2   BILITOT 0.9   ALKPHOS 67   AST 36   ALT 33   ANIONGAP 15   EGFRNONAA >60.0     Lactic Acid:   Recent Labs   Lab 03/27/20 1934   LACTATE 3.3*     All pertinent labs within the past 24 hours have been reviewed.    Significant Imaging: I have reviewed all pertinent imaging results/findings within the past 24 hours.   Imaging Results          X-Ray Chest AP Portable (Final result)  Result time  03/27/20 19:05:44    Final result by Tomas Kan MD (03/27/20 19:05:44)                 Narrative:    REASON: Cough and fever.    TECHNIQUE: Portable chest radiograph.    COMPARISON: None.    FINDINGS:    The cardiomediastinal silhouette is within normal limits in size.The  pulmonary vascular structures are within normal limits. The lungs are  clear. No acute osseous abnormality.    IMPRESSION:    No acute cardiopulmonary process.    Electronically Signed by Tomas Kan on 3/27/2020 7:11 PM

## 2020-03-28 NOTE — SUBJECTIVE & OBJECTIVE
Interval History:  Feels better but still  not very good My sugar is high, still coughing, breathing is improved.  No chest pain abdominal pain nausea vomiting     Objective:     Vital Signs (Most Recent):  Temp: 98.4 °F (36.9 °C) (03/28/20 1202)  Pulse: 100 (03/28/20 1202)  Resp: 17 (03/28/20 1202)  BP: (!) 136/90 (03/28/20 1202)  SpO2: 95 % (03/28/20 0325) Vital Signs (24h Range):  Temp:  [97.8 °F (36.6 °C)-98.9 °F (37.2 °C)] 98.4 °F (36.9 °C)  Pulse:  [] 100  Resp:  [15-58] 17  SpO2:  [94 %-97 %] 95 %  BP: (114-194)/() 136/90     Weight: 94.5 kg (208 lb 5.4 oz)  Body mass index is 27.49 kg/m².    Intake/Output Summary (Last 24 hours) at 3/28/2020 1713  Last data filed at 3/28/2020 1300  Gross per 24 hour   Intake 480 ml   Output 700 ml   Net -220 ml      Physical Exam patient appears in no acute distress he is ambulatory in the room  HEENT sclerae nonicteric  Neck supple nontender  Lungs good air movement  Heart regular tachycardic  Abdomen is nondistended  Extremities no edema  Neuro patient is alert oriented x3 motor exam is nonfocal   no Singh  Psych pleasant cooperative  Significant Labs:   BMP:   Recent Labs   Lab 03/28/20  0320   *      K 3.2*      CO2 24   BUN 17   CREATININE 0.9   CALCIUM 8.8   MG 1.7     CBC:   Recent Labs   Lab 03/27/20  1934 03/27/20 2015 03/28/20  0321   WBC 8.71  --  5.52   HGB 15.8  --  14.6   HCT 45.9 44 42.8     --  140*       Significant Imaging:

## 2020-03-28 NOTE — NURSING
Patient stated that he took his home med Metformin 1000mg to get his blood sugar down. I explained to him that he should not take any home meds without us knowing.

## 2020-03-29 VITALS
TEMPERATURE: 98 F | WEIGHT: 210.13 LBS | HEART RATE: 60 BPM | DIASTOLIC BLOOD PRESSURE: 73 MMHG | BODY MASS INDEX: 27.85 KG/M2 | OXYGEN SATURATION: 95 % | SYSTOLIC BLOOD PRESSURE: 118 MMHG | HEIGHT: 73 IN | RESPIRATION RATE: 16 BRPM

## 2020-03-29 LAB
ANION GAP SERPL CALC-SCNC: 8 MMOL/L (ref 8–16)
BACTERIA THROAT CULT: NORMAL
BUN SERPL-MCNC: 15 MG/DL (ref 8–23)
CALCIUM SERPL-MCNC: 9 MG/DL (ref 8.7–10.5)
CHLORIDE SERPL-SCNC: 104 MMOL/L (ref 95–110)
CO2 SERPL-SCNC: 23 MMOL/L (ref 23–29)
CREAT SERPL-MCNC: 0.7 MG/DL (ref 0.5–1.4)
CRP SERPL-MCNC: 1.69 MG/DL (ref 0–0.75)
ERYTHROCYTE [DISTWIDTH] IN BLOOD BY AUTOMATED COUNT: 11.9 % (ref 11.5–14.5)
EST. GFR  (AFRICAN AMERICAN): >60 ML/MIN/1.73 M^2
EST. GFR  (NON AFRICAN AMERICAN): >60 ML/MIN/1.73 M^2
GLUCOSE SERPL-MCNC: 275 MG/DL (ref 70–110)
GLUCOSE SERPL-MCNC: 302 MG/DL (ref 70–110)
GLUCOSE SERPL-MCNC: 320 MG/DL (ref 70–110)
HCT VFR BLD AUTO: 43 % (ref 40–54)
HGB BLD-MCNC: 14.7 G/DL (ref 14–18)
MCH RBC QN AUTO: 31.9 PG (ref 27–31)
MCHC RBC AUTO-ENTMCNC: 34.2 G/DL (ref 32–36)
MCV RBC AUTO: 93 FL (ref 82–98)
PLATELET # BLD AUTO: 154 K/UL (ref 150–350)
PMV BLD AUTO: 9.6 FL (ref 9.2–12.9)
POTASSIUM SERPL-SCNC: 4.2 MMOL/L (ref 3.5–5.1)
RBC # BLD AUTO: 4.61 M/UL (ref 4.6–6.2)
SODIUM SERPL-SCNC: 135 MMOL/L (ref 136–145)
WBC # BLD AUTO: 5.15 K/UL (ref 3.9–12.7)

## 2020-03-29 PROCEDURE — 96372 THER/PROPH/DIAG INJ SC/IM: CPT | Mod: 59

## 2020-03-29 PROCEDURE — 86140 C-REACTIVE PROTEIN: CPT

## 2020-03-29 PROCEDURE — 85027 COMPLETE CBC AUTOMATED: CPT

## 2020-03-29 PROCEDURE — 63700000 PHARM REV CODE 250 ALT 637 W/O HCPCS: Performed by: STUDENT IN AN ORGANIZED HEALTH CARE EDUCATION/TRAINING PROGRAM

## 2020-03-29 PROCEDURE — G0378 HOSPITAL OBSERVATION PER HR: HCPCS

## 2020-03-29 PROCEDURE — 25000003 PHARM REV CODE 250: Performed by: STUDENT IN AN ORGANIZED HEALTH CARE EDUCATION/TRAINING PROGRAM

## 2020-03-29 PROCEDURE — 80048 BASIC METABOLIC PNL TOTAL CA: CPT

## 2020-03-29 PROCEDURE — 94761 N-INVAS EAR/PLS OXIMETRY MLT: CPT

## 2020-03-29 RX ORDER — ATORVASTATIN CALCIUM 40 MG/1
40 TABLET, FILM COATED ORAL NIGHTLY
Qty: 90 TABLET | Refills: 3 | Status: SHIPPED | OUTPATIENT
Start: 2020-03-29 | End: 2021-10-04

## 2020-03-29 RX ORDER — HYDROXYCHLOROQUINE SULFATE 200 MG/1
400 TABLET, FILM COATED ORAL DAILY
Qty: 8 TABLET | Refills: 0
Start: 2020-03-30 | End: 2020-04-03

## 2020-03-29 RX ADMIN — INSULIN ASPART 4 UNITS: 100 INJECTION, SOLUTION INTRAVENOUS; SUBCUTANEOUS at 08:03

## 2020-03-29 RX ADMIN — GUAIFENESIN 600 MG: 600 TABLET, EXTENDED RELEASE ORAL at 09:03

## 2020-03-29 RX ADMIN — AZITHROMYCIN 500 MG: 250 TABLET, FILM COATED ORAL at 09:03

## 2020-03-29 RX ADMIN — HYDROXYCHLOROQUINE SULFATE 400 MG: 200 TABLET, FILM COATED ORAL at 09:03

## 2020-03-29 RX ADMIN — AMLODIPINE BESYLATE 10 MG: 5 TABLET ORAL at 09:03

## 2020-03-29 NOTE — PLAN OF CARE
03/29/20 0755   PRE-TX-O2   O2 Device (Oxygen Therapy) room air   SpO2 95 %   Pulse Oximetry Type Intermittent   $ Pulse Oximetry - Multiple Charge Pulse Oximetry - Multiple   Pulse 71   Resp 15

## 2020-03-29 NOTE — PHYSICIAN QUERY
PT Name: Sampson Holt  MR #: [id number]     Physician Query Form - Documentation Clarification      CDS/: Yvonne Jiménez               Contact information:[phone]    This form is a permanent document in the medical record.     Query Date: March 29, 2020    By submitting this query, we are merely seeking further clarification of documentation. Please utilize your independent clinical judgment when addressing the question(s) below.    The Medical record reflects the following:    Supporting Clinical Findings Location in Medical Record   PO2- 74 L  HCO3- 23.7 L       ABG 3/27   See notes                                                                         Doctor, Please specify diagnosis or diagnoses associated with above clinical findings if applicable below.    Provider Use Only                                                                                                                                 [  ] Clinically Undetermined

## 2020-03-29 NOTE — DISCHARGE SUMMARY
[facility]  Discharge Summary      Patient Name: Sampson Holt  MRN: [id number]  Admission Date: 3/27/2020  Hospital Length of Stay: 0 days  Discharge Date and Time:  03/29/2020 4:07 PM  Attending Physician: Inder Ashford DO   Discharging Provider: Inder Ashford DO  Primary Care Provider: Primary Doctor No      HPI:   65 yo M smoker with PMH of CHF, endocarditis, COPD presented to ED c/o flu-like symptoms since Sunday (6 days). Pt reports subjective fever/chills, cough, diffuse body aches and fatigue. [facility] where he was tested from Covid (yesterday) and sent home to self isolate. He states that today he had worsening symptoms and severe sob.       In the ED  Vital signs:  Tachycardic, tachypneic  Labs: flu negative, strep negative, CRP 3.04, LA 3.3  Troponin: Normal  CXR, personally reviewed, No obvious infiltrates or ground glass opacities      * No surgery found *      Hospital Course:   Patient is a 64-year-old male who was admitted with suspected COVID-19 infection .  Initially he was hypoxic - ABG / PO2  74  Patient did well throughout his hospitalization and at time of discharge he was 95% on room air.  Patient remained afebrile and has had normal WBC.  His procalcitonin level was normal.  His SARS CoV2 test is still pending he will be treated empirically.  Chest x-ray showed atelectasis ? Infiltrate .  QT interval at time of discharge was 0.415  Patient appears medically stable at time of discharge  He was given instructions for suspected COVID-19 infection  Nursing called pharmacy to ensure the patient had hydroxychloroquine at time of discharge  Patient needs to follow-up with a PCP this week  Return to emergency department worse       Consults:   Consults (From admission, onward)        Status Ordering Provider     Inpatient consult to Hospitalist  Once     Provider:  Nicolasa Kan DO     Acknowledged CHIDI CHINO        Discharge diagnoses  #1 Suspected COVID-19   Swab obtained an outside urgent care  #2 Diabetes type 2-  #3 History COPD -appears stable  #4 Essential hypertension  #5 Hypokalemia-  #6 Elevated lactic acid  resolved      Discharged Condition:  Regular rate rhythm  Patient appears in no acute distress  Ambulatory in the room without difficulty  O2 sat 95% on room air  Lungs good air movement coarse  Heart regular rate rhythm  Abdomen nondistended  Neuro patient is alert oriented x3 motor exam is nonfocal    Disposition: Home or Self Care    Follow Up:  Follow-up Information     Primary Doctor No.    Contact information:  Follow-up PCP this week               Patient Instructions:      Diet diabetic     Activity as tolerated       Significant Diagnostic Studies: Labs:   BMP:   Recent Labs   Lab 03/27/20 1934 03/28/20 0320 03/29/20 0421   * 282* 320*    136 135*   K 3.5 3.2* 4.2    104 104   CO2 21* 24 23   BUN 16 17 15   CREATININE 1.1 0.9 0.7   CALCIUM 9.6 8.8 9.0   MG 1.6 1.7  --    , CMP   Recent Labs   Lab 03/27/20 1934 03/28/20 0320 03/29/20 0421    136 135*   K 3.5 3.2* 4.2    104 104   CO2 21* 24 23   * 282* 320*   BUN 16 17 15   CREATININE 1.1 0.9 0.7   CALCIUM 9.6 8.8 9.0   PROT 7.7  --   --    ALBUMIN 4.2  --   --    BILITOT 0.9  --   --    ALKPHOS 67  --   --    AST 36  --   --    ALT 33  --   --    ANIONGAP 15 8 8   ESTGFRAFRICA >60.0 >60.0 >60.0   EGFRNONAA >60.0 >60.0 >60.0    and CBC   Recent Labs   Lab 03/27/20 1934 03/28/20 0321 03/29/20 0421   WBC 8.71  --  5.52 5.15   HGB 15.8  --  14.6 14.7   HCT 45.9   < > 42.8 43.0     --  140* 154    < > = values in this interval not displayed.       Pending Diagnostic Studies:     Procedure Component Value Units Date/Time    G6PD,Quantitative [[id number]] Collected:  03/27/20 1934    Order Status:  Sent Lab Status:  In process Updated:  03/27/20 1950    Specimen:  Blood           Medications:  Reconciled Home Medications:      Medication List      START taking these medications    atorvastatin 40 MG tablet  Commonly known as:  LIPITOR  Take 1 tablet (40 mg total) by mouth every evening.     hydroxychloroquine 200 mg tablet  Commonly known as:  PLAQUENIL  Take 2 tablets (400 mg total) by mouth once daily. for 4 days  Start taking on:  March 30, 2020        CONTINUE taking these medications    amLODIPine 10 MG tablet  Commonly known as:  NORVASC  Take 10 mg by mouth once daily.     metFORMIN 1000 MG tablet  Commonly known as:  GLUCOPHAGE  Take 1,000 mg by mouth 2 (two) times daily with meals.            Indwelling Lines/Drains at time of discharge:   Lines/Drains/Airways     None                 Time spent on the discharge of patient: 24 minutes  Patient was seen and examined on the date of discharge and determined to be suitable for discharge.         Inder Ashford DO  Department of Hospital Medicine  [facility]

## 2020-03-29 NOTE — HOSPITAL COURSE
Patient is a 64-year-old male who was admitted with suspected COVID-19 infection .  Initially he was hypoxic - ABG / PO2  74  Patient did well throughout his hospitalization and at time of discharge he was 95% on room air.  Patient remained afebrile and has had normal WBC.  His procalcitonin level was normal.  His SARS CoV2 test is still pending he will be treated empirically.  Chest x-ray showed atelectasis ? Infiltrate .  QT interval at time of discharge was 0.415  Patient appears medically stable at time of discharge  He was given instructions for suspected COVID-19 infection  Nursing called pharmacy to ensure the patient had hydroxychloroquine at time of discharge  Patient needs to follow-up with a PCP this week  Return to emergency department worse

## 2020-03-29 NOTE — PLAN OF CARE
03/29/20 1626   Final Note   Assessment Type Final Discharge Note   Anticipated Discharge Disposition Home     Pt discharged home this date and discharge orders reviewed.  No SS / CM orders noted at this time.

## 2020-03-29 NOTE — PHYSICIAN QUERY
PT Name: Sampson Holt  MR #: [id number]     Physician Query Form - Documentation Clarification      CDS/: Yvonne Jiménez               Contact information:[phone] or through epic secure [employer] chat    This form is a permanent document in the medical record.     Query Date: March 29, 2020    By submitting this query, we are merely seeking further clarification of documentation. Please utilize your independent clinical judgment when addressing the question(s) below.    The Medical record reflects the following:    Supporting Clinical Findings Location in Medical Record   Small right basilar patchy opacity noted may reflect atelectasis or infiltrate.    THIS WAS NOT NOTED ON THE CHEST XRAY 3/27 Chest xray 3/28            See notes                                                                Doctor, Please specify diagnosis or diagnoses OR possible diagnosis associated with above clinical findings if applicable. Please link to any known or possible illness if applicable.    Provider Use Only                                                                                                                                 [  ] Clinically Undetermined

## 2020-03-30 LAB
G6PD BLD QN: 308 U/10E12 RBC (ref 146–376)
RBC # BLD AUTO: 4.91 X10E6/UL (ref 4.14–5.8)

## 2020-04-01 LAB
BACTERIA BLD CULT: NORMAL
BACTERIA BLD CULT: NORMAL

## 2021-10-04 ENCOUNTER — OFFICE VISIT (OUTPATIENT)
Dept: UROLOGY | Facility: CLINIC | Age: 66
End: 2021-10-04
Payer: OTHER GOVERNMENT

## 2021-10-04 VITALS — DIASTOLIC BLOOD PRESSURE: 83 MMHG | HEART RATE: 102 BPM | SYSTOLIC BLOOD PRESSURE: 136 MMHG

## 2021-10-04 DIAGNOSIS — E11.65 UNCONTROLLED TYPE 2 DIABETES MELLITUS WITH HYPERGLYCEMIA: ICD-10-CM

## 2021-10-04 DIAGNOSIS — R35.0 URINARY FREQUENCY: Primary | ICD-10-CM

## 2021-10-04 DIAGNOSIS — N50.811 PAIN IN RIGHT TESTICLE: ICD-10-CM

## 2021-10-04 LAB — POC RESIDUAL URINE VOLUME: 0 ML (ref 0–100)

## 2021-10-04 PROCEDURE — 51798 US URINE CAPACITY MEASURE: CPT | Mod: PBBFAC,PN | Performed by: NURSE PRACTITIONER

## 2021-10-04 PROCEDURE — 99204 OFFICE O/P NEW MOD 45 MIN: CPT | Mod: S$PBB,,, | Performed by: NURSE PRACTITIONER

## 2021-10-04 PROCEDURE — 99214 OFFICE O/P EST MOD 30 MIN: CPT | Mod: PBBFAC,PN | Performed by: NURSE PRACTITIONER

## 2021-10-04 PROCEDURE — 99999 PR PBB SHADOW E&M-EST. PATIENT-LVL IV: CPT | Mod: PBBFAC,,, | Performed by: NURSE PRACTITIONER

## 2021-10-04 PROCEDURE — 99999 PR PBB SHADOW E&M-EST. PATIENT-LVL IV: ICD-10-PCS | Mod: PBBFAC,,, | Performed by: NURSE PRACTITIONER

## 2021-10-04 PROCEDURE — 99204 PR OFFICE/OUTPT VISIT, NEW, LEVL IV, 45-59 MIN: ICD-10-PCS | Mod: S$PBB,,, | Performed by: NURSE PRACTITIONER

## 2022-07-06 ENCOUNTER — HOSPITAL ENCOUNTER (EMERGENCY)
Facility: HOSPITAL | Age: 67
Discharge: HOME | End: 2022-07-06
Attending: EMERGENCY MEDICINE
Payer: MEDICARE

## 2022-07-06 VITALS
HEIGHT: 73 IN | OXYGEN SATURATION: 93 % | DIASTOLIC BLOOD PRESSURE: 86 MMHG | BODY MASS INDEX: 27.3 KG/M2 | HEART RATE: 76 BPM | RESPIRATION RATE: 18 BRPM | SYSTOLIC BLOOD PRESSURE: 139 MMHG | TEMPERATURE: 99 F | WEIGHT: 206 LBS

## 2022-07-06 DIAGNOSIS — R42 DIZZINESS: ICD-10-CM

## 2022-07-06 DIAGNOSIS — R10.9 ABDOMINAL PAIN, UNSPECIFIED ABDOMINAL LOCATION: Primary | ICD-10-CM

## 2022-07-06 LAB
ALBUMIN SERPL BCP-MCNC: 4.4 G/DL (ref 3.5–5.2)
ALP SERPL-CCNC: 73 U/L (ref 55–135)
ALT SERPL W/O P-5'-P-CCNC: 23 U/L (ref 10–44)
AMYLASE SERPL-CCNC: 39 U/L (ref 20–110)
ANION GAP SERPL CALC-SCNC: 10 MMOL/L (ref 8–16)
APTT PPP: 28.7 SEC (ref 23.3–35.1)
AST SERPL-CCNC: 25 U/L (ref 10–40)
BACTERIA #/AREA URNS HPF: NEGATIVE /HPF
BASOPHILS # BLD AUTO: 0.06 K/UL (ref 0–0.2)
BASOPHILS NFR BLD: 0.9 % (ref 0–1.9)
BILIRUB SERPL-MCNC: 1 MG/DL (ref 0.1–1)
BILIRUB UR QL STRIP: NEGATIVE
BUN SERPL-MCNC: 16 MG/DL (ref 8–23)
CALCIUM SERPL-MCNC: 9.4 MG/DL (ref 8.7–10.5)
CHLORIDE SERPL-SCNC: 103 MMOL/L (ref 95–110)
CK MB SERPL-MCNC: 1.8 NG/ML (ref 0.1–6.5)
CK SERPL-CCNC: 71 U/L (ref 20–200)
CLARITY UR: CLEAR
CO2 SERPL-SCNC: 24 MMOL/L (ref 23–29)
COLOR UR: ABNORMAL
CREAT SERPL-MCNC: 1.2 MG/DL (ref 0.5–1.4)
DIFFERENTIAL METHOD: NORMAL
EOSINOPHIL # BLD AUTO: 0 K/UL (ref 0–0.5)
EOSINOPHIL NFR BLD: 0.6 % (ref 0–8)
ERYTHROCYTE [DISTWIDTH] IN BLOOD BY AUTOMATED COUNT: 12.2 % (ref 11.5–14.5)
EST. GFR  (AFRICAN AMERICAN): >60 ML/MIN/1.73 M^2
EST. GFR  (NON AFRICAN AMERICAN): >60 ML/MIN/1.73 M^2
GLUCOSE SERPL-MCNC: 212 MG/DL (ref 70–110)
GLUCOSE UR QL STRIP: NEGATIVE
HCT VFR BLD AUTO: 46 % (ref 40–54)
HGB BLD-MCNC: 15.7 G/DL (ref 14–18)
HGB UR QL STRIP: NEGATIVE
HYALINE CASTS #/AREA URNS LPF: 15 /LPF
IMM GRANULOCYTES # BLD AUTO: 0.03 K/UL (ref 0–0.04)
IMM GRANULOCYTES NFR BLD AUTO: 0.4 % (ref 0–0.5)
INR PPP: 1.1
KETONES UR QL STRIP: NEGATIVE
LEUKOCYTE ESTERASE UR QL STRIP: NEGATIVE
LIPASE SERPL-CCNC: 31 U/L (ref 4–60)
LYMPHOCYTES # BLD AUTO: 1.8 K/UL (ref 1–4.8)
LYMPHOCYTES NFR BLD: 25.9 % (ref 18–48)
MAGNESIUM SERPL-MCNC: 1.9 MG/DL (ref 1.6–2.6)
MCH RBC QN AUTO: 30.9 PG (ref 27–31)
MCHC RBC AUTO-ENTMCNC: 34.1 G/DL (ref 32–36)
MCV RBC AUTO: 91 FL (ref 82–98)
MICROSCOPIC COMMENT: ABNORMAL
MONOCYTES # BLD AUTO: 0.6 K/UL (ref 0.3–1)
MONOCYTES NFR BLD: 8.4 % (ref 4–15)
NEUTROPHILS # BLD AUTO: 4.5 K/UL (ref 1.8–7.7)
NEUTROPHILS NFR BLD: 63.8 % (ref 38–73)
NITRITE UR QL STRIP: NEGATIVE
NRBC BLD-RTO: 0 /100 WBC
PH UR STRIP: 7 [PH] (ref 5–8)
PLATELET # BLD AUTO: 195 K/UL (ref 150–450)
PMV BLD AUTO: 9.2 FL (ref 9.2–12.9)
POTASSIUM SERPL-SCNC: 3.6 MMOL/L (ref 3.5–5.1)
PROT SERPL-MCNC: 7.6 G/DL (ref 6–8.4)
PROT UR QL STRIP: ABNORMAL
PROTHROMBIN TIME: 13.3 SEC (ref 11.4–13.7)
RBC # BLD AUTO: 5.08 M/UL (ref 4.6–6.2)
RBC #/AREA URNS HPF: 1 /HPF (ref 0–4)
SARS-COV-2 RDRP RESP QL NAA+PROBE: NEGATIVE
SODIUM SERPL-SCNC: 137 MMOL/L (ref 136–145)
SP GR UR STRIP: >1.03 (ref 1–1.03)
SQUAMOUS #/AREA URNS HPF: 1 /HPF
TROPONIN I SERPL DL<=0.01 NG/ML-MCNC: <0.03 NG/ML
URN SPEC COLLECT METH UR: ABNORMAL
UROBILINOGEN UR STRIP-ACNC: NEGATIVE EU/DL
WBC # BLD AUTO: 7.04 K/UL (ref 3.9–12.7)
WBC #/AREA URNS HPF: 1 /HPF (ref 0–5)

## 2022-07-06 PROCEDURE — 85025 COMPLETE CBC W/AUTO DIFF WBC: CPT | Performed by: EMERGENCY MEDICINE

## 2022-07-06 PROCEDURE — 25500020 PHARM REV CODE 255: Performed by: EMERGENCY MEDICINE

## 2022-07-06 PROCEDURE — 87040 BLOOD CULTURE FOR BACTERIA: CPT | Performed by: EMERGENCY MEDICINE

## 2022-07-06 PROCEDURE — 84484 ASSAY OF TROPONIN QUANT: CPT | Performed by: EMERGENCY MEDICINE

## 2022-07-06 PROCEDURE — 83690 ASSAY OF LIPASE: CPT | Performed by: EMERGENCY MEDICINE

## 2022-07-06 PROCEDURE — 82550 ASSAY OF CK (CPK): CPT | Performed by: EMERGENCY MEDICINE

## 2022-07-06 PROCEDURE — 85730 THROMBOPLASTIN TIME PARTIAL: CPT | Performed by: EMERGENCY MEDICINE

## 2022-07-06 PROCEDURE — 83735 ASSAY OF MAGNESIUM: CPT | Performed by: EMERGENCY MEDICINE

## 2022-07-06 PROCEDURE — 85610 PROTHROMBIN TIME: CPT | Performed by: EMERGENCY MEDICINE

## 2022-07-06 PROCEDURE — 82150 ASSAY OF AMYLASE: CPT | Performed by: EMERGENCY MEDICINE

## 2022-07-06 PROCEDURE — 93010 ELECTROCARDIOGRAM REPORT: CPT | Mod: ,,, | Performed by: INTERNAL MEDICINE

## 2022-07-06 PROCEDURE — 93010 EKG 12-LEAD: ICD-10-PCS | Mod: ,,, | Performed by: INTERNAL MEDICINE

## 2022-07-06 PROCEDURE — U0002 COVID-19 LAB TEST NON-CDC: HCPCS | Performed by: EMERGENCY MEDICINE

## 2022-07-06 PROCEDURE — 82553 CREATINE MB FRACTION: CPT | Performed by: EMERGENCY MEDICINE

## 2022-07-06 PROCEDURE — 81001 URINALYSIS AUTO W/SCOPE: CPT | Performed by: EMERGENCY MEDICINE

## 2022-07-06 PROCEDURE — 80053 COMPREHEN METABOLIC PANEL: CPT | Performed by: EMERGENCY MEDICINE

## 2022-07-06 PROCEDURE — 93005 ELECTROCARDIOGRAM TRACING: CPT | Performed by: INTERNAL MEDICINE

## 2022-07-06 PROCEDURE — 99285 EMERGENCY DEPT VISIT HI MDM: CPT | Mod: 25

## 2022-07-06 RX ORDER — MECLIZINE HYDROCHLORIDE 25 MG/1
25 TABLET ORAL 3 TIMES DAILY PRN
Qty: 20 TABLET | Refills: 0 | Status: ON HOLD | OUTPATIENT
Start: 2022-07-06 | End: 2023-05-13 | Stop reason: HOSPADM

## 2022-07-06 RX ORDER — ALBUTEROL SULFATE 90 UG/1
1 AEROSOL, METERED RESPIRATORY (INHALATION) EVERY 4 HOURS PRN
COMMUNITY
Start: 2022-06-08 | End: 2023-10-07

## 2022-07-06 RX ORDER — OMEPRAZOLE 20 MG/1
20 CAPSULE, DELAYED RELEASE ORAL
COMMUNITY
Start: 2022-06-08 | End: 2023-01-09

## 2022-07-06 RX ORDER — ONDANSETRON 4 MG/1
4 TABLET, FILM COATED ORAL EVERY 8 HOURS PRN
Qty: 12 TABLET | Refills: 0 | Status: SHIPPED | OUTPATIENT
Start: 2022-07-06 | End: 2023-01-09

## 2022-07-06 RX ORDER — PREDNISONE 20 MG/1
40 TABLET ORAL
COMMUNITY
Start: 2022-06-08 | End: 2023-01-09

## 2022-07-06 RX ORDER — DOXYCYCLINE HYCLATE 100 MG
1 TABLET ORAL 2 TIMES DAILY
Status: ON HOLD | COMMUNITY
Start: 2022-06-08 | End: 2023-05-13 | Stop reason: HOSPADM

## 2022-07-06 RX ADMIN — IOHEXOL 100 ML: 350 INJECTION, SOLUTION INTRAVENOUS at 02:07

## 2022-07-06 NOTE — ED NOTES
pt d/c to home. ambulatory at d/c. no acute distress noted. verablized understanding of d/c instructions. f/u appts. no questions at this time. rx given x2

## 2022-07-06 NOTE — ED PROVIDER NOTES
Encounter Date: 2022       History     Chief Complaint   Patient presents with    Abdominal Pain     C/o dizziness and spreads from LLQ      Patient with history diabetes.  Patient reports super pubic and right-sided abdominal pain after given himself a and insulin injection 3 weeks ago.  He is unsure if needle broke off in the abdominal wall.  Patient also reports dizziness.  Described as a spinning sensation.  Symptoms are intermittent.  No fever chills.  No chest pain or shortness of breath.  Patient with nausea with no vomiting.        Review of patient's allergies indicates:  No Known Allergies  Past Medical History:   Diagnosis Date    CHF (congestive heart failure)     Diabetes mellitus     Emphysema lung     Endocarditis     Hypertension     Stroke      No past surgical history on file.  No family history on file.  Social History     Tobacco Use    Smoking status: Former Smoker     Packs/day: 1.00     Types: Cigarettes     Quit date: 3/27/2020     Years since quittin.2    Smokeless tobacco: Current User     Review of Systems   Constitutional: Negative for chills and fever.   HENT: Negative for sore throat.    Eyes: Negative for photophobia and visual disturbance.   Respiratory: Negative for shortness of breath.    Cardiovascular: Negative for chest pain.   Gastrointestinal: Positive for abdominal pain and nausea. Negative for vomiting.   Genitourinary: Negative for dysuria.   Musculoskeletal: Negative for joint swelling.   Skin: Negative for rash.   Neurological: Negative for seizures, syncope and headaches.   Psychiatric/Behavioral: Negative for confusion.       Physical Exam     Initial Vitals [22 1246]   BP Pulse Resp Temp SpO2   (!) 120/93 105 19 99.3 °F (37.4 °C) (!) 94 %      MAP       --         Physical Exam    Nursing note and vitals reviewed.  Constitutional: He is not diaphoretic. No distress.   HENT:   Head: Normocephalic and atraumatic.   Eyes: Conjunctivae and EOM are  normal. Pupils are equal, round, and reactive to light.   No nystagmus   Neck:   Normal range of motion.  Cardiovascular: Regular rhythm.   Pulmonary/Chest: Breath sounds normal.   Abdominal: Abdomen is soft. Bowel sounds are normal.   There is some mild suprapubic tenderness with no guarding or rebound.  There is a few small areas of ecchymoses consistent with previous insulin injections but no induration or erythema.   Musculoskeletal:         General: Normal range of motion.      Cervical back: Normal range of motion.     Neurological: He is alert and oriented to person, place, and time. He has normal strength. No cranial nerve deficit or sensory deficit.   Finger-to-nose normal   Skin: No rash noted.   Psychiatric: He has a normal mood and affect.         ED Course   Procedures  Labs Reviewed   COMPREHENSIVE METABOLIC PANEL - Abnormal; Notable for the following components:       Result Value    Glucose 212 (*)     All other components within normal limits   URINALYSIS, REFLEX TO URINE CULTURE - Abnormal; Notable for the following components:    Specific Gravity, UA >1.030 (*)     Protein, UA 1+ (*)     All other components within normal limits    Narrative:     Specimen Source->Urine   URINALYSIS MICROSCOPIC - Abnormal; Notable for the following components:    Hyaline Casts, UA 15 (*)     All other components within normal limits    Narrative:     Specimen Source->Urine   CULTURE, BLOOD   CULTURE, BLOOD   SARS-COV-2 RNA AMPLIFICATION, QUAL   APTT   AMYLASE   CBC W/ AUTO DIFFERENTIAL   LIPASE   MAGNESIUM   PROTIME-INR   TROPONIN I   CK-MB   CK   POCT GLUCOSE MONITORING CONTINUOUS        ECG Results          EKG 12-lead (In process)  Result time 07/06/22 15:08:11    In process by Interface, Lab In [facility] (07/06/22 15:08:11)                 Narrative:    Test Reason : R42,    Vent. Rate : 079 BPM     Atrial Rate : 079 BPM     P-R Int : 202 ms          QRS Dur : 090 ms      QT Int : 392 ms       P-R-T Axes : 000  -17 078 degrees     QTc Int : 449 ms    Normal sinus rhythm  Normal ECG  When compared with ECG of 28-MAR-2020 06:36,  Criteria for Septal infarct are no longer Present    Referred By: [email or web address]   SELF           Confirmed By:                             Imaging Results          CT Abdomen Pelvis With Contrast (Final result)  Result time 07/06/22 15:21:19    Final result by Ezra Gaffney MD (07/06/22 15:21:19)                 Narrative:    CMS MANDATED QUALITY DATA - CT RADIATION  436    All CT scans at this facility utilize dose modulation, iterative reconstruction, and/or weight based dosing when appropriate to reduce radiation dose to as low as reasonably achievable.    CT ABDOMEN PELVIS WITH IV CONTRAST    CLINICAL HISTORY:  66 years Male Abdominal abscess/infection suspected; Abdominal pain    COMPARISON: CT abdomen and pelvis May 20, 2016    FINDINGS: Images through the lower thorax demonstrate mild dependent atelectasis of the lower lobes bilaterally. Bone window images demonstrate no acute or aggressive osseous abnormality. Mild degenerative changes of the spine.    Hepatic steatosis. Several hypodense hepatic lesions, generally stable compared to prior, most likely representing cysts. Gallbladder and biliary tree are unremarkable. Spleen appears normal. Pancreas is unremarkable. No intracranial lesion. Right kidney is unremarkable. There is a cyst at the upper pole of kidney. Nonobstructing 3 mm left midpole renal calculus. Ureters are normal in caliber. Urinary bladder is unremarkable.    Stomach appears normal. No evidence of small bowel obstruction. Normal appendix. No evidence of colitis. Diverticulosis of the distal colon.    No free fluid or free air within the abdomen or pelvis. Aortoiliac atherosclerotic calcification.    IMPRESSION:    No CT evidence of acute pathology involving the abdomen or pelvis.    Diverticulosis of the distal colon.    Hepatic steatosis.    Atherosclerosis and other  incidental findings as above.    Electronically signed by:  Ezra Gaffney MD  7/6/2022 3:21 PM CDT Workstation: [id number]                             CT Head Without Contrast (Final result)  Result time 07/06/22 15:04:51    Final result by Ezra Gaffney MD (07/06/22 15:04:51)                 Narrative:    CMS MANDATED QUALITY DATA - CT RADIATION  436    All CT scans at this facility utilize dose modulation, iterative reconstruction, and/or weight based dosing when appropriate to reduce radiation dose to as low as reasonably achievable.    CT HEAD WITHOUT IV CONTRAST    CLINICAL HISTORY:  66 years Male Dizziness, persistent/recurrent, cardiac or vascular cause suspected    COMPARISON: None    FINDINGS: Negative for acute intracranial hemorrhage, midline shift, or mass effect. Areas of encephalomalacia with surrounding gliosis involving the posterior superior right parietal lobe and posterior superior right frontal lobe. Small area of encephalomalacia with surrounding gliosis and periventricular left parietal white matter. Mild periventricular white matter hypoattenuation consistent with microangiopathic change. Cerebellar hemispheres and brainstem are unremarkable. Atherosclerotic calcification of the intracranial carotid arteries.    No calvarial lesion or fracture. Mastoid air cells are clear. Left maxillary sinus mucosal thickening.    IMPRESSION:    No CT evidence of acute intracranial pathology.    Areas of encephalomalacia and gliosis involving bilateral parietal lobes and posterior superior right frontal lobe.    Left maxillary sinus mucosal thickening.    Electronically signed by:  Ezra Gaffney MD  7/6/2022 3:04 PM CDT Workstation: [id number]                             X-Ray Chest AP Portable (Final result)  Result time 07/06/22 13:41:51    Final result by Tomas Kan MD (07/06/22 13:41:51)                 Narrative:    Reason: Dizziness    FINDINGS:    PA and lateral chest with comparison chest  x-ray March 28, 2020 show normal cardiomediastinal silhouette.  Lungs are clear. Pulmonary vasculature is normal. No acute osseous abnormality.    IMPRESSION:    No acute cardiopulmonary abnormality.    Electronically signed by:  Tomas Kan DO  7/6/2022 1:41 PM CDT Workstation: [id number]                               Medications   iohexoL (OMNIPAQUE 350) injection 100 mL (100 mLs Intravenous Given 7/6/22 6872)     Medical Decision Making:   History:   Old Medical Records: I decided to obtain old medical records.  Clinical Tests:   Lab Tests: Reviewed  Radiological Study: Reviewed  Medical Tests: Reviewed  ED Management:  Patient presents with chief complaint of abdominal pain in area of insulin injection.  Pain is been persistent for 3 weeks.  There is no infectious etiology identified.  No foreign bodies.  Patient also complains of nonspecific dizziness.  Patient with no neurologic deficits.  Possible peripheral vertigo.  Will refer to ENT begin meclizine.                      Clinical Impression:   Final diagnoses:  [R42] Dizziness  [R10.9] Abdominal pain, unspecified abdominal location (Primary)                 Josh Morales MD  07/06/22 1536       Josh Morales MD  07/06/22 1652

## 2022-07-11 LAB
BACTERIA BLD CULT: NORMAL
BACTERIA BLD CULT: NORMAL

## 2022-08-24 ENCOUNTER — OFFICE VISIT (OUTPATIENT)
Dept: ORTHOPEDICS | Facility: CLINIC | Age: 67
End: 2022-08-24
Payer: MEDICARE

## 2022-08-24 VITALS — BODY MASS INDEX: 27.3 KG/M2 | HEIGHT: 73 IN | WEIGHT: 206 LBS

## 2022-08-24 DIAGNOSIS — M65.311 TRIGGER FINGER OF RIGHT THUMB: Primary | ICD-10-CM

## 2022-08-24 PROCEDURE — 1101F PR PT FALLS ASSESS DOC 0-1 FALLS W/OUT INJ PAST YR: ICD-10-PCS | Mod: CPTII,S$GLB,, | Performed by: PHYSICIAN ASSISTANT

## 2022-08-24 PROCEDURE — 3008F PR BODY MASS INDEX (BMI) DOCUMENTED: ICD-10-PCS | Mod: CPTII,S$GLB,, | Performed by: PHYSICIAN ASSISTANT

## 2022-08-24 PROCEDURE — 1159F MED LIST DOCD IN RCRD: CPT | Mod: CPTII,S$GLB,, | Performed by: PHYSICIAN ASSISTANT

## 2022-08-24 PROCEDURE — 99203 PR OFFICE/OUTPT VISIT, NEW, LEVL III, 30-44 MIN: ICD-10-PCS | Mod: 25,S$GLB,, | Performed by: PHYSICIAN ASSISTANT

## 2022-08-24 PROCEDURE — 20550 TENDON SHEATH: ICD-10-PCS | Mod: RT,S$GLB,, | Performed by: PHYSICIAN ASSISTANT

## 2022-08-24 PROCEDURE — 1159F PR MEDICATION LIST DOCUMENTED IN MEDICAL RECORD: ICD-10-PCS | Mod: CPTII,S$GLB,, | Performed by: PHYSICIAN ASSISTANT

## 2022-08-24 PROCEDURE — 1101F PT FALLS ASSESS-DOCD LE1/YR: CPT | Mod: CPTII,S$GLB,, | Performed by: PHYSICIAN ASSISTANT

## 2022-08-24 PROCEDURE — 1125F PR PAIN SEVERITY QUANTIFIED, PAIN PRESENT: ICD-10-PCS | Mod: CPTII,S$GLB,, | Performed by: PHYSICIAN ASSISTANT

## 2022-08-24 PROCEDURE — 1125F AMNT PAIN NOTED PAIN PRSNT: CPT | Mod: CPTII,S$GLB,, | Performed by: PHYSICIAN ASSISTANT

## 2022-08-24 PROCEDURE — 1160F RVW MEDS BY RX/DR IN RCRD: CPT | Mod: CPTII,S$GLB,, | Performed by: PHYSICIAN ASSISTANT

## 2022-08-24 PROCEDURE — 3008F BODY MASS INDEX DOCD: CPT | Mod: CPTII,S$GLB,, | Performed by: PHYSICIAN ASSISTANT

## 2022-08-24 PROCEDURE — 99203 OFFICE O/P NEW LOW 30 MIN: CPT | Mod: 25,S$GLB,, | Performed by: PHYSICIAN ASSISTANT

## 2022-08-24 PROCEDURE — 3288F PR FALLS RISK ASSESSMENT DOCUMENTED: ICD-10-PCS | Mod: CPTII,S$GLB,, | Performed by: PHYSICIAN ASSISTANT

## 2022-08-24 PROCEDURE — 3288F FALL RISK ASSESSMENT DOCD: CPT | Mod: CPTII,S$GLB,, | Performed by: PHYSICIAN ASSISTANT

## 2022-08-24 PROCEDURE — 1160F PR REVIEW ALL MEDS BY PRESCRIBER/CLIN PHARMACIST DOCUMENTED: ICD-10-PCS | Mod: CPTII,S$GLB,, | Performed by: PHYSICIAN ASSISTANT

## 2022-08-24 PROCEDURE — 20550 NJX 1 TENDON SHEATH/LIGAMENT: CPT | Mod: RT,S$GLB,, | Performed by: PHYSICIAN ASSISTANT

## 2022-08-24 RX ORDER — TRIAMCINOLONE ACETONIDE 40 MG/ML
40 INJECTION, SUSPENSION INTRA-ARTICULAR; INTRAMUSCULAR
Status: DISCONTINUED | OUTPATIENT
Start: 2022-08-24 | End: 2022-08-24 | Stop reason: HOSPADM

## 2022-08-24 RX ADMIN — TRIAMCINOLONE ACETONIDE 40 MG: 40 INJECTION, SUSPENSION INTRA-ARTICULAR; INTRAMUSCULAR at 12:08

## 2022-08-24 NOTE — PROCEDURES
Tendon Sheath    Date/Time: 8/24/2022 12:30 PM  Performed by: KEYON Lizarraga  Authorized by: KEYON Lizarraga     Consent Done?:  Yes (Verbal)  Indications:  Pain  Site marked: the procedure site was marked    Timeout: prior to procedure the correct patient, procedure, and site was verified    Prep: patient was prepped and draped in usual sterile fashion      Local anesthetic:  Lidocaine 1% without epinephrine  Location:  Thumb  Site:  R thumb flexor tendon sheath  Ultrasonic guidance for needle placement?: No    Needle size:  25 G  Medications:  40 mg triamcinolone acetonide 40 mg/mL  Patient tolerance:  Patient tolerated the procedure well with no immediate complications

## 2022-08-24 NOTE — PROGRESS NOTES
[facility]  [address]  [place] [address]  Phone: [phone]   [phone]    Patient's PCP: Primary Doctor No  Referring Provider: No ref. provider found    Subjective:      Chief Complaint:   Chief Complaint   Patient presents with    Right Hand - Pain     Right hand/thumb pain that started after an injury about 3 to 4 months ago. Stated that he twisted his thumb. Complains of swelling, burning and popping. Worse in the morning.He is a , and it is very painful to work       Past Medical History:   Diagnosis Date    CHF (congestive heart failure)     Diabetes mellitus     Emphysema lung     Endocarditis     Hypertension     Stroke        History reviewed. No pertinent surgical history.    Current Outpatient Medications   Medication Sig    amLODIPine (NORVASC) 10 MG tablet Take 10 mg by mouth once daily.    insulin glargine,hum.rec.anlog (LANTUS SUBQ) Inject 25 Units into the skin once daily.    meclizine (ANTIVERT) 25 mg tablet Take 1 tablet (25 mg total) by mouth 3 (three) times daily as needed for Dizziness.    omeprazole (PRILOSEC) 20 MG capsule Take 20 mg by mouth.    albuterol (PROVENTIL/VENTOLIN HFA) 90 mcg/actuation inhaler Take by mouth.    doxycycline (VIBRA-TABS) 100 MG tablet Take 1 tablet by mouth 2 (two) times daily.    ondansetron (ZOFRAN) 4 MG tablet Take 1 tablet (4 mg total) by mouth every 8 (eight) hours as needed for Nausea. (Patient not taking: Reported on 2022)    predniSONE (DELTASONE) 20 MG tablet Take 40 mg by mouth.     No current facility-administered medications for this visit.       Review of patient's allergies indicates:  No Known Allergies    History reviewed. No pertinent family history.    Social History     Socioeconomic History    Marital status:    Tobacco Use    Smoking status: Former Smoker     Packs/day: 1.00     Types: Cigarettes     Quit date: 3/27/2020     Years since quittin.4    Smokeless tobacco:  Current User       Prior to meeting with the patient I reviewed the medical chart in [facility]. This included reviewing the previous progress notes from our office, review of the patient's last appointment with their primary care provider, review of any visits to the emergency room, and review of any pain management appointments or procedures.    History of present illness:  66-year-old male who presents to clinic today for evaluation of right thumb pain.  He has a remote history of an injury 3-4 months ago while he was cleaning a big pot that his thumb got caught in the handle and this torqued his thumb.  However he does not complain of pain at the interphalangeal or metacarpophalangeal joint.  His primary pain is over the volar aspect, the area of the A1 pulley and he does have a visible triggering and pain of the right thumb.      Review of Systems:    Constitutional: Negative for chills, fever and weight loss.   HENT: Negative for congestion.    Eyes: Negative for discharge and redness.   Respiratory: Negative for cough and shortness of breath.    Cardiovascular: Negative for chest pain.   Gastrointestinal: Negative for nausea and vomiting.   Musculoskeletal: See HPI.   Skin: Negative for rash.   Neurological: Negative for headaches.   Endo/Heme/Allergies: Does not bruise/bleed easily.   Psychiatric/Behavioral: The patient is not nervous/anxious.    All other systems reviewed and are negative.       Objective:      Physical Examination:    Vital Signs:  There were no vitals filed for this visit.    Body mass index is 27.18 kg/m².    This a well-developed, well nourished patient in no acute distress.  They are alert and oriented and cooperative to examination.     Examination of the right hand, the patient has dry and intact skin.  No ecchymosis erythema is noted, no signs symptoms of infection.  The joints of the hand do not demonstrate any significant hypertrophy to suggest advanced osteoarthritic condition.  The  right thumb in particular, he does have visible triggering with his own range of motion at the IP joint.  He is tender to palpation over the metacarpophalangeal joint in the area of the A1 pulley.  He is able to make a fist, normal strength in the hand.  Able to oppose all the fingers.  No atrophy is noted.  Phalen's test is negative, Tinel's test is negative.  Finkelstein's test is negative.      Pertinent New Results:        XRAY Report / Interpretation:   Outside images of the right hand are reviewed today [facility].  Patient has mild arthritis in the lesser joints of the fingers.  No significant basilar arthritis.  No fractures or other osseous abnormalities.  Visualized soft tissues are normal.          Assessment:       1. Trigger finger of right thumb      Plan:     Trigger finger of right thumb  -     Cancel: X-Ray Hand 3 view Right  -     Tendon Sheath        Follow up if symptoms worsen or fail to improve.    Right trigger thumb, we injected the A1 pulley with lidocaine and triamcinolone.  Sterile technique, patient tolerated well.  Will see if this resolves his symptoms.  If not we discussed trigger finger release.  Follow-up in 6 weeks, or if improved as needed.    [unfilled]  JUSTYN Peterson PA-C    This note was created using Playto voice recognition software that occasionally misinterprets words or phrases.

## 2023-01-30 ENCOUNTER — HOSPITAL ENCOUNTER (EMERGENCY)
Facility: HOSPITAL | Age: 68
Discharge: HOME | End: 2023-01-31
Payer: OTHER GOVERNMENT

## 2023-01-30 VITALS
HEART RATE: 89 BPM | DIASTOLIC BLOOD PRESSURE: 82 MMHG | OXYGEN SATURATION: 94 % | RESPIRATION RATE: 18 BRPM | WEIGHT: 200 LBS | HEIGHT: 73 IN | SYSTOLIC BLOOD PRESSURE: 129 MMHG | TEMPERATURE: 99 F | BODY MASS INDEX: 26.51 KG/M2

## 2023-01-30 DIAGNOSIS — R10.9 ABDOMINAL PAIN, UNSPECIFIED ABDOMINAL LOCATION: ICD-10-CM

## 2023-01-30 DIAGNOSIS — K57.90 DIVERTICULOSIS: ICD-10-CM

## 2023-01-30 DIAGNOSIS — K76.89 HEPATIC CYST: Primary | ICD-10-CM

## 2023-01-30 LAB
ALBUMIN SERPL BCP-MCNC: 4.5 G/DL (ref 3.5–5.2)
ALP SERPL-CCNC: 67 U/L (ref 55–135)
ALT SERPL W/O P-5'-P-CCNC: 20 U/L (ref 10–44)
ANION GAP SERPL CALC-SCNC: 11 MMOL/L (ref 8–16)
AST SERPL-CCNC: 20 U/L (ref 10–40)
BASOPHILS # BLD AUTO: 0.07 K/UL (ref 0–0.2)
BASOPHILS NFR BLD: 0.8 % (ref 0–1.9)
BILIRUB SERPL-MCNC: 1 MG/DL (ref 0.1–1)
BILIRUB UR QL STRIP: NEGATIVE
BUN SERPL-MCNC: 17 MG/DL (ref 8–23)
CALCIUM SERPL-MCNC: 10.4 MG/DL (ref 8.7–10.5)
CHLORIDE SERPL-SCNC: 100 MMOL/L (ref 95–110)
CLARITY UR: CLEAR
CO2 SERPL-SCNC: 25 MMOL/L (ref 23–29)
COLOR UR: YELLOW
CREAT SERPL-MCNC: 0.9 MG/DL (ref 0.5–1.4)
DIFFERENTIAL METHOD: ABNORMAL
EOSINOPHIL # BLD AUTO: 0.1 K/UL (ref 0–0.5)
EOSINOPHIL NFR BLD: 0.6 % (ref 0–8)
ERYTHROCYTE [DISTWIDTH] IN BLOOD BY AUTOMATED COUNT: 12.3 % (ref 11.5–14.5)
EST. GFR  (NO RACE VARIABLE): >60 ML/MIN/1.73 M^2
GLUCOSE SERPL-MCNC: 167 MG/DL (ref 70–110)
GLUCOSE UR QL STRIP: NEGATIVE
HCT VFR BLD AUTO: 49.5 % (ref 40–54)
HGB BLD-MCNC: 16.8 G/DL (ref 14–18)
HGB UR QL STRIP: NEGATIVE
IMM GRANULOCYTES # BLD AUTO: 0.02 K/UL (ref 0–0.04)
IMM GRANULOCYTES NFR BLD AUTO: 0.2 % (ref 0–0.5)
INFLUENZA A, MOLECULAR: NEGATIVE
INFLUENZA B, MOLECULAR: NEGATIVE
KETONES UR QL STRIP: NEGATIVE
LEUKOCYTE ESTERASE UR QL STRIP: NEGATIVE
LIPASE SERPL-CCNC: 30 U/L (ref 4–60)
LYMPHOCYTES # BLD AUTO: 1.7 K/UL (ref 1–4.8)
LYMPHOCYTES NFR BLD: 20.7 % (ref 18–48)
MCH RBC QN AUTO: 32.2 PG (ref 27–31)
MCHC RBC AUTO-ENTMCNC: 33.9 G/DL (ref 32–36)
MCV RBC AUTO: 95 FL (ref 82–98)
MONOCYTES # BLD AUTO: 0.8 K/UL (ref 0.3–1)
MONOCYTES NFR BLD: 9 % (ref 4–15)
NEUTROPHILS # BLD AUTO: 5.7 K/UL (ref 1.8–7.7)
NEUTROPHILS NFR BLD: 68.7 % (ref 38–73)
NITRITE UR QL STRIP: NEGATIVE
NRBC BLD-RTO: 0 /100 WBC
PH UR STRIP: 7 [PH] (ref 5–8)
PLATELET # BLD AUTO: 196 K/UL (ref 150–450)
PMV BLD AUTO: 9 FL (ref 9.2–12.9)
POTASSIUM SERPL-SCNC: 3.8 MMOL/L (ref 3.5–5.1)
PROT SERPL-MCNC: 7.8 G/DL (ref 6–8.4)
PROT UR QL STRIP: ABNORMAL
RBC # BLD AUTO: 5.21 M/UL (ref 4.6–6.2)
SARS-COV-2 RDRP RESP QL NAA+PROBE: NEGATIVE
SODIUM SERPL-SCNC: 136 MMOL/L (ref 136–145)
SP GR UR STRIP: >1.03 (ref 1–1.03)
SPECIMEN SOURCE: NORMAL
URN SPEC COLLECT METH UR: ABNORMAL
UROBILINOGEN UR STRIP-ACNC: NEGATIVE EU/DL
WBC # BLD AUTO: 8.29 K/UL (ref 3.9–12.7)

## 2023-01-30 PROCEDURE — 81003 URINALYSIS AUTO W/O SCOPE: CPT | Performed by: EMERGENCY MEDICINE

## 2023-01-30 PROCEDURE — 99285 EMERGENCY DEPT VISIT HI MDM: CPT | Mod: 25

## 2023-01-30 PROCEDURE — 83690 ASSAY OF LIPASE: CPT | Performed by: EMERGENCY MEDICINE

## 2023-01-30 PROCEDURE — 63600175 PHARM REV CODE 636 W HCPCS: Performed by: STUDENT IN AN ORGANIZED HEALTH CARE EDUCATION/TRAINING PROGRAM

## 2023-01-30 PROCEDURE — 80053 COMPREHEN METABOLIC PANEL: CPT | Performed by: EMERGENCY MEDICINE

## 2023-01-30 PROCEDURE — 87502 INFLUENZA DNA AMP PROBE: CPT | Performed by: STUDENT IN AN ORGANIZED HEALTH CARE EDUCATION/TRAINING PROGRAM

## 2023-01-30 PROCEDURE — U0002 COVID-19 LAB TEST NON-CDC: HCPCS | Performed by: STUDENT IN AN ORGANIZED HEALTH CARE EDUCATION/TRAINING PROGRAM

## 2023-01-30 PROCEDURE — 96374 THER/PROPH/DIAG INJ IV PUSH: CPT

## 2023-01-30 PROCEDURE — 25500020 PHARM REV CODE 255: Performed by: STUDENT IN AN ORGANIZED HEALTH CARE EDUCATION/TRAINING PROGRAM

## 2023-01-30 PROCEDURE — 85025 COMPLETE CBC W/AUTO DIFF WBC: CPT | Performed by: EMERGENCY MEDICINE

## 2023-01-30 PROCEDURE — 96361 HYDRATE IV INFUSION ADD-ON: CPT

## 2023-01-30 PROCEDURE — 96375 TX/PRO/DX INJ NEW DRUG ADDON: CPT

## 2023-01-30 RX ORDER — MORPHINE SULFATE 4 MG/ML
4 INJECTION, SOLUTION INTRAMUSCULAR; INTRAVENOUS
Status: COMPLETED | OUTPATIENT
Start: 2023-01-30 | End: 2023-01-30

## 2023-01-30 RX ORDER — DICYCLOMINE HYDROCHLORIDE 20 MG/1
20 TABLET ORAL 2 TIMES DAILY
Qty: 60 TABLET | Refills: 0 | Status: SHIPPED | OUTPATIENT
Start: 2023-01-30 | End: 2023-03-01

## 2023-01-30 RX ORDER — ONDANSETRON 2 MG/ML
4 INJECTION INTRAMUSCULAR; INTRAVENOUS
Status: COMPLETED | OUTPATIENT
Start: 2023-01-30 | End: 2023-01-30

## 2023-01-30 RX ADMIN — ONDANSETRON 4 MG: 2 INJECTION INTRAMUSCULAR; INTRAVENOUS at 09:01

## 2023-01-30 RX ADMIN — SODIUM CHLORIDE, SODIUM LACTATE, POTASSIUM CHLORIDE, AND CALCIUM CHLORIDE 1000 ML: .6; .31; .03; .02 INJECTION, SOLUTION INTRAVENOUS at 09:01

## 2023-01-30 RX ADMIN — MORPHINE SULFATE 4 MG: 4 INJECTION, SOLUTION INTRAMUSCULAR; INTRAVENOUS at 09:01

## 2023-01-30 RX ADMIN — IOHEXOL 100 ML: 350 INJECTION, SOLUTION INTRAVENOUS at 09:01

## 2023-01-31 NOTE — DISCHARGE INSTRUCTIONS
Please follow-up with gastroenterology for further evaluation of abdominal pain. Return to the emergency department for worsening or concerning symptoms.

## 2023-01-31 NOTE — ED PROVIDER NOTES
Encounter Date: 2023       History     Chief Complaint   Patient presents with    Abdominal Pain     Pt believes an insulin needle is stuck in his stomach x6 months. He states he called senator Francisco Maravilla and he told him he wanted him in the hospital immediately. He states he wants to pain gone and that he's been told for 6 months that nothing is wrong.      HPI    66 yo M with PMH of IDDM, endocarditis, HTN presenting with abdominal pain for the last several months. Describes right sided abdominal pain and right flank pain. Recently he has had nausea and body aches. Endorses hematuria and known left sided kidney stone. Denies fever, chest pain, shortness of breath, vomiting, diarrhea.     He reports that this problem has been worked up multiple times and he has been told all results were negative. [facility]. His last CT A/P here in 2022 was negative. There is no other imaging since that time.     Review of patient's allergies indicates:  No Known Allergies  Past Medical History:   Diagnosis Date    CHF (congestive heart failure)     Diabetes mellitus     Emphysema lung     Endocarditis     Hypertension     Stroke      Past Surgical History:   Procedure Laterality Date    BACK SURGERY      EXCISION OF HYDROCELE      x2    INGUINAL HERNIA REPAIR  1960    INGUINAL HERNIA REPAIR  1960    RHINOPLASTY      SURGICAL REMOVAL OF NODULE OF VOCAL CORD       No family history on file.  Social History     Tobacco Use    Smoking status: Every Day     Packs/day: 1.00     Types: Cigarettes     Last attempt to quit: 3/27/2020     Years since quittin.8    Smokeless tobacco: Former   Substance Use Topics    Alcohol use: Yes     Alcohol/week: 14.0 standard drinks     Types: 14 Shots of liquor per week    Drug use: Yes     Review of Systems   All other systems reviewed and are negative.    Physical Exam     Initial Vitals [23 1813]   BP Pulse Resp Temp SpO2    (!) 138/99 104 16 98.5 °F (36.9 °C) 96 %      MAP       --         Physical Exam    Nursing note and vitals reviewed.  Constitutional: He appears well-developed and well-nourished.   HENT:   Head: Normocephalic and atraumatic.   Eyes: Conjunctivae are normal.   Neck: Neck supple.   Normal range of motion.  Cardiovascular:  Normal rate, regular rhythm, normal heart sounds and intact distal pulses.           No murmur heard.  Pulmonary/Chest: No respiratory distress. He has no wheezes. He has no rhonchi. He has no rales.   Abdominal: Abdomen is soft. He exhibits no distension. There is abdominal tenderness.   Right upper quadrant and suprapubic tenderness  There is no rebound and no guarding.   Musculoskeletal:         General: No edema.      Cervical back: Normal range of motion and neck supple.     Neurological: He is alert and oriented to person, place, and time.   Skin: Skin is warm and dry. Capillary refill takes less than 2 seconds.       ED Course   Procedures  Labs Reviewed   CBC W/ AUTO DIFFERENTIAL - Abnormal; Notable for the following components:       Result Value    MCH 32.2 (*)     MPV 9.0 (*)     All other components within normal limits   COMPREHENSIVE METABOLIC PANEL - Abnormal; Notable for the following components:    Glucose 167 (*)     All other components within normal limits   URINALYSIS, REFLEX TO URINE CULTURE - Abnormal; Notable for the following components:    Specific Gravity, UA >1.030 (*)     Protein, UA Trace (*)     All other components within normal limits    Narrative:     Specimen Source->Urine   LIPASE   SARS-COV-2 RNA AMPLIFICATION, QUAL   INFLUENZA A AND B ANTIGEN    Narrative:     Specimen Source->Nasopharyngeal Swab          Imaging Results              CT Abdomen Pelvis With Contrast (Final result)  Result time 01/30/23 22:18:49      Final result by Naeem Vazquez MD (01/30/23 22:18:49)                   Narrative:    EXAM DESCRIPTION: CT ABDOMEN PELVIS WITH CONTRAST  1/30/2023 10:15 PM CST    CLINICAL HISTORY: 67 years, Male, Abdominal pain, acute, nonlocalized    COMPARISON: None    PROCEDURE:    Contrast-enhanced images of the abdomen and pelvis were performed utilizing 2 mm slice thickness at 2 mm interval reconstruction from the lung bases to the ischial tuberosities after the administration of IV contrast.  In addition multiplanar reformats in the coronal and sagittal plane were obtained and reviewed.  This exam was performed according to our departmental dose-optimization protocol, which includes automated exposure control, adjustment of the mA and/or kV according to patient size and/or use of iterative reconstruction technique.    FINDINGS:    The lung bases demonstrate minimal subpleural chronic changes and/or atelectasis. Minimal coronary artery calcifications.    The liver demonstrated decreased attenuation with presence of a several hepatic cysts with the largest one subcapsular lateral segment left hepatic lobe cyst measuring 3.3 x 3.1 cm on image 52. Otherwise the liver, gallbladder, pancreas, spleen and adrenal glands demonstrate to be unremarkable, no focal lesions are noted.  The kidneys demonstrate normal uptake of contrast media. No evidence for nephrolithiasis and/or hydronephrosis. Small upper pole left renal cyst measuring 1.3 cm on image 70.  Grossly the unopacified stomach, small bowel and large bowel demonstrate to be within normal limits.  There is no evidence for bowel dilatation/or free air.  The appendix is normal. The left site colon is decompressed. There is diverticulosis within the sigmoid colon.  The urinary bladder was partially distended with no gross abnormalities.  The prostate gland is normal.  The aorta demonstrate minimal atheromatous plaque formation extending into the aortic bifurcation.  There is no retroperitoneal lymphadenopathy. There is no ascites. The rest of the soft tissue and bony structures are within normal  limits.    IMPRESSION:  No acute intra-abdominal process.    Sigmoid diverticulosis without evidence of acute diverticulitis.    Hepatic and left renal cysts.    Electronically signed by:  Naeem Vazquez MD  1/30/2023 10:18 PM CST Workstation: [id number]                                     Medications   morphine injection 4 mg (4 mg Intravenous Given 1/30/23 2130)   ondansetron injection 4 mg (4 mg Intravenous Given 1/30/23 2130)   lactated ringers bolus 1,000 mL (0 mLs Intravenous Stopped 1/30/23 2233)   iohexoL (OMNIPAQUE 350) injection 100 mL (100 mLs Intravenous Given 1/30/23 2113)                       HO-IV MDM:    67 y.o. male presenting with complaint of   Chief Complaint   Patient presents with    Abdominal Pain     Pt believes an insulin needle is stuck in his stomach x6 months. He states he called senator Francisco Maravilla and he told him he wanted him in the hospital immediately. He states he wants to pain gone and that he's been told for 6 months that nothing is wrong.         Significant exam findings of   Vitals:    01/30/23 2217   BP: (!) 154/94   Pulse: 81   Resp: 18   Temp:         Differential diagnosis includes nephrolithiasis, pyelonephritis, perinephric abscess, cholecystitis, choledocholithiasis, cholecystitis, electrolyte derangement, anemia, SBO, hepatic disease, appendicitis, hernia    Plan to obtain CBC, CMP, lipase, UA, CT A/P     Laboratory work-up unremarkable. Lipase normal. Hgb wnl without leukocytosis. Covid and flu negative. UA without signs of infection. CT shows diverticulosis and hepatic cysts. Patient to be discharged with bentyl prescription, instructions to follow up with GI, and return precautions.       Kimmy Park MD   [facility]- Emergency Medicine, PGY-4             Clinical Impression:   Final diagnoses:  [K76.89] Hepatic cyst (Primary)  [K57.90] Diverticulosis  [R10.9] Abdominal pain, unspecified abdominal location        ED Disposition Condition    Discharge Stable           ED Prescriptions       Medication Sig Dispense Start Date End Date Auth. Provider    dicyclomine (BENTYL) 20 mg tablet Take 1 tablet (20 mg total) by mouth 2 (two) times daily. 60 tablet 1/30/2023 3/1/2023 Kimmy Park MD          Follow-up Information       Follow up With Specialties Details Why Contact Info Additional Information    [facility] - Emergency Dept Emergency Medicine  As needed, If symptoms worsen [address] [facility] [address]  [phone] 1st floor             Kimmy Park MD  Resident  01/30/23 3097       Kimmy Park MD  Resident  01/30/23 5829

## 2023-02-17 ENCOUNTER — HOSPITAL ENCOUNTER (OUTPATIENT)
Dept: PREADMISSION TESTING | Facility: HOSPITAL | Age: 68
Discharge: HOME | End: 2023-02-17
Attending: INTERNAL MEDICINE
Payer: OTHER GOVERNMENT

## 2023-02-17 VITALS
BODY MASS INDEX: 25.64 KG/M2 | HEART RATE: 84 BPM | TEMPERATURE: 98 F | WEIGHT: 199.81 LBS | OXYGEN SATURATION: 98 % | HEIGHT: 74 IN | SYSTOLIC BLOOD PRESSURE: 149 MMHG | DIASTOLIC BLOOD PRESSURE: 90 MMHG | RESPIRATION RATE: 18 BRPM

## 2023-02-17 DIAGNOSIS — Z01.818 PRE-OP TESTING: Primary | ICD-10-CM

## 2023-02-17 PROCEDURE — 93010 EKG 12-LEAD: ICD-10-PCS | Mod: ,,, | Performed by: INTERNAL MEDICINE

## 2023-02-17 PROCEDURE — 93010 ELECTROCARDIOGRAM REPORT: CPT | Mod: ,,, | Performed by: INTERNAL MEDICINE

## 2023-02-17 PROCEDURE — 93005 ELECTROCARDIOGRAM TRACING: CPT | Performed by: INTERNAL MEDICINE

## 2023-02-17 NOTE — DISCHARGE INSTRUCTIONS
To confirm, Your doctor has instructed you that procedure is scheduled for: Feb. 23    1 Person can come with you the day of surgery     Endoscopy nurse will call the afternoon prior to surgery with your arrival time.      Please  Enter at Garage Park[employer] and come through front entrance.       Check in at registration.     After registration, proceed past gift shop and through glass door ( Outpatient [place]) Check in at the nurses station to the left.     FOLLOW CLEAR LIQUIDS AND COLON PREP AS DIRECTED BY PHYSICIAN      Do not eat or drink anything after midnight the night before your surgery - THIS INCLUDES  WATER, GUM, MINTS AND CANDY.  YOU MAY BRUSH YOUR TEETH BUT DO NOT SWALLOW       TAKE ONLY THESE MEDICATIONS WITH A SMALL SIP OF WATER THE MORNING OF YOUR PROCEDURE: Amlodipine     DO not smoke after midnight before procedure      ONLY if you are diabetic, check your sugar in the morning before your procedure.       Do not take any diabetic medicines or insulin the morning of surgery .     PLEASE NOTE:  The surgery schedule has many variables which may affect the time of your surgery case.  Family members should be available if your surgery time changes.  Plan to be here the day of your procedure between 4-6 hours.      DO NOT TAKE THESE MEDICATIONS 5-7 DAYS PRIOR to your procedure or per your surgeon's request: ASPIRIN, ALEVE, ADVIL, IBUPROFEN,  FRED SELTZER, BC , FISH OIL , VITAMIN E, HERBALS  (May take Tylenol)                                                      IMPORTANT INSTRUCTIONS      Do not smoke, vape or drink alcoholic beverages 24 hours prior to your procedure.  Shower the night before  and sleep in a bed with clean sheets.  Do not sleep with a pet in the bed.       If you wear contact lenses, dentures, hearing aids or glasses, bring a container to put them in during surgery and give to a family member for safe keeping.    Please leave all jewelry, piercing's and valuables at home.   Wear rubber  soled shoes (no flip flops).  ONLY for patients requiring bowel prep, written instructions will be given by your doctor's office.  If your doctor has scheduled you for an overnight stay, bring a small overnight bag with any personal items you need.    Make arrangements in advance for transportation home by a responsible adult.      You must make arrangements for transportation, TAXI'S, UBER'S OR LYFTS ARE NOT ALLOWED.        If you have any questions about these instructions, call (Monday - Friday)  Endoscopy [phone]

## 2023-02-17 NOTE — PRE-PROCEDURE INSTRUCTIONS
Pre op instructions given after review of history and medications. States has info for clear liquids and colon prep  Advised not to smoke DOS.  Advised not to take lantus am of procedure Advised that he can have amlodipine  with sip of water am of procedure

## 2023-02-23 ENCOUNTER — ANESTHESIA (OUTPATIENT)
Dept: SURGERY | Facility: HOSPITAL | Age: 68
End: 2023-02-23
Payer: MEDICARE

## 2023-02-23 ENCOUNTER — ANESTHESIA EVENT (OUTPATIENT)
Dept: SURGERY | Facility: HOSPITAL | Age: 68
End: 2023-02-23
Payer: MEDICARE

## 2023-02-23 ENCOUNTER — HOSPITAL ENCOUNTER (OUTPATIENT)
Facility: HOSPITAL | Age: 68
Discharge: HOME | End: 2023-02-23
Attending: INTERNAL MEDICINE | Admitting: INTERNAL MEDICINE
Payer: MEDICARE

## 2023-02-23 VITALS
SYSTOLIC BLOOD PRESSURE: 134 MMHG | DIASTOLIC BLOOD PRESSURE: 66 MMHG | OXYGEN SATURATION: 98 % | RESPIRATION RATE: 16 BRPM | TEMPERATURE: 99 F | HEART RATE: 76 BPM

## 2023-02-23 VITALS
SYSTOLIC BLOOD PRESSURE: 142 MMHG | RESPIRATION RATE: 16 BRPM | HEART RATE: 68 BPM | DIASTOLIC BLOOD PRESSURE: 82 MMHG | OXYGEN SATURATION: 99 %

## 2023-02-23 DIAGNOSIS — Z12.11 SCREEN FOR COLON CANCER: ICD-10-CM

## 2023-02-23 LAB — GLUCOSE SERPL-MCNC: 132 MG/DL (ref 70–110)

## 2023-02-23 PROCEDURE — 88305 TISSUE EXAM BY PATHOLOGIST: CPT | Mod: TC,59

## 2023-02-23 PROCEDURE — 25000003 PHARM REV CODE 250: Performed by: NURSE ANESTHETIST, CERTIFIED REGISTERED

## 2023-02-23 PROCEDURE — 37000009 HC ANESTHESIA EA ADD 15 MINS: Performed by: INTERNAL MEDICINE

## 2023-02-23 PROCEDURE — 45385 COLONOSCOPY W/LESION REMOVAL: CPT | Performed by: INTERNAL MEDICINE

## 2023-02-23 PROCEDURE — 27201089 HC SNARE, DISP (ANY): Performed by: INTERNAL MEDICINE

## 2023-02-23 PROCEDURE — 37000008 HC ANESTHESIA 1ST 15 MINUTES: Performed by: INTERNAL MEDICINE

## 2023-02-23 PROCEDURE — 27201114 HC TRAP (ANY): Performed by: INTERNAL MEDICINE

## 2023-02-23 PROCEDURE — 25000003 PHARM REV CODE 250: Performed by: INTERNAL MEDICINE

## 2023-02-23 PROCEDURE — 63600175 PHARM REV CODE 636 W HCPCS: Performed by: NURSE ANESTHETIST, CERTIFIED REGISTERED

## 2023-02-23 PROCEDURE — 82962 GLUCOSE BLOOD TEST: CPT | Performed by: INTERNAL MEDICINE

## 2023-02-23 RX ORDER — LIDOCAINE HCL/PF 100 MG/5ML
SYRINGE (ML) INTRAVENOUS
Status: DISCONTINUED | OUTPATIENT
Start: 2023-02-23 | End: 2023-02-23

## 2023-02-23 RX ORDER — PROPOFOL 10 MG/ML
VIAL (ML) INTRAVENOUS
Status: DISCONTINUED | OUTPATIENT
Start: 2023-02-23 | End: 2023-02-23

## 2023-02-23 RX ADMIN — PROPOFOL 20 MG: 10 INJECTION, EMULSION INTRAVENOUS at 07:02

## 2023-02-23 RX ADMIN — PROPOFOL 40 MG: 10 INJECTION, EMULSION INTRAVENOUS at 07:02

## 2023-02-23 RX ADMIN — PROPOFOL 150 MG: 10 INJECTION, EMULSION INTRAVENOUS at 07:02

## 2023-02-23 RX ADMIN — SODIUM CHLORIDE, SODIUM LACTATE, POTASSIUM CHLORIDE, AND CALCIUM CHLORIDE: .6; .31; .03; .02 INJECTION, SOLUTION INTRAVENOUS at 07:02

## 2023-02-23 RX ADMIN — LIDOCAINE HYDROCHLORIDE 20 MG: 20 INJECTION, SOLUTION INTRAVENOUS at 07:02

## 2023-02-23 NOTE — H&P
GASTROENTEROLOGY INPATIENT CONSULT NOTE  Patient Name: Sampson Holt  Patient MRN: [id number]  Patient : 1955    Admit Date: 2023  Service date: 2023    CC: anemia    PCP: Damian Vergara MD    No chief complaint on file.      HPI: Patient is a 67 y.o. male with PMHx  Sampson Hlot is a 67 year old male patient who is seen today for an initial visit. Pt presenting with complaints of chronic persistent moderate to mild right lower and left lower quadrant pain that wraps across his abdomen associated with bloating.  present for 7 months. 2 er visits as below.  labs unremarkable.      eats 1 meal every 2 days.  no desire to eat.  no pain with eating. no food cravings.  smokes marijuana few times per week.  no pain medications    reports taking something for reflux.      lost 6 lbs over 2 months.     drinks 1/5th every 3-4 days.  no h/o pancreatitis.      has one bowel movement daily.  characterized as normal.  did notice a mucus production with defecation at times.    Fam Hx- no ibd, crc, esophageal cancer.            Chart Review      PMH  CVA, COPD, endocarditis, CHF, HTN, DM, inguinal hernia repair, back surgery    ENDO  Colon 2016- poor prep- ischemic colitis.    Imaging  23 CT abd w/ con- normal, tics, liver cysts, kidney cysts   CT abd- normal    LABS  CBC - hgb 16, plt 196    Past Medical History:  Past Medical History:   Diagnosis Date    Abdominal pain     CHF (congestive heart failure)     Diabetes mellitus 2018    Emphysema lung     Endocarditis     Hypertension     Stroke 2011    denies residual        Past Surgical History:  Past Surgical History:   Procedure Laterality Date    BACK SURGERY  1981    EXCISION OF HYDROCELE      x2    INGUINAL HERNIA REPAIR  1960    RHINOPLASTY      SURGICAL REMOVAL OF NODULE OF VOCAL CORD          Home Medications:  Medications Prior to Admission   Medication Sig Dispense Refill Last Dose    albuterol (PROVENTIL/VENTOLIN HFA) 90  mcg/actuation inhaler Inhale 1 puff into the lungs every 4 (four) hours as needed.       amLODIPine (NORVASC) 10 MG tablet Take 10 mg by mouth once daily.       dicyclomine (BENTYL) 20 mg tablet Take 1 tablet (20 mg total) by mouth 2 (two) times daily. (Patient not taking: Reported on 2023) 60 tablet 0     doxycycline (VIBRA-TABS) 100 MG tablet Take 1 tablet by mouth 2 (two) times daily.       insulin glargine,hum.rec.anlog (LANTUS SUBQ) Inject 35 Units into the skin once daily.       meclizine (ANTIVERT) 25 mg tablet Take 1 tablet (25 mg total) by mouth 3 (three) times daily as needed for Dizziness. (Patient not taking: Reported on 2023) 20 tablet 0        Inpatient Medications:        Review of patient's allergies indicates:  No Known Allergies    Social History:   Social History     Occupational History    Not on file   Tobacco Use    Smoking status: Every Day     Packs/day: 1.00     Types: Cigarettes     Last attempt to quit: 3/27/2020     Years since quittin.9    Smokeless tobacco: Former    Tobacco comments:     Advised not to smoke DOS    Substance and Sexual Activity    Alcohol use: Yes     Alcohol/week: 14.0 standard drinks     Types: 14 Shots of liquor per week    Drug use: Yes     Types: Marijuana    Sexual activity: Not on file       Family History:   History reviewed. No pertinent family history.    Review of Systems:  A 10 point review of systems was performed and was normal, except as mentioned in the HPI, including constitutional, HEENT, heme, lymph, cardiovascular, respiratory, gastrointestinal, genitourinary, neurologic, endocrine, psychiatric and musculoskeletal.      OBJECTIVE:    Physical Exam:  24 Hour Vital Sign Ranges: Temp:  [98.1 °F (36.7 °C)] 98.1 °F (36.7 °C)  Pulse:  [77] 77  Resp:  [16] 16  SpO2:  [96 %] 96 %  BP: (139)/(86) 139/86  Most recent vitals: /86 (BP Location: Left arm, Patient Position: Sitting)   Pulse 77   Temp 98.1 °F (36.7 °C) (Oral)   Resp 16    SpO2 96%    GEN: well-developed, well-nourished, awake and alert, non-toxic appearing adult  HEENT: PERRL, sclera anicteric, oral mucosa pink and moist without lesion  NECK: trachea midline; Good ROM  CV: regular rate and rhythm, no murmurs or gallops  RESP: clear to auscultation bilaterally, no wheezes, rhonci or rales  ABD: soft, non-tender, non-distended, normal bowel sounds  EXT: no swelling or edema, 2+ pulses distally  SKIN: no rashes or jaundice  PSYCH: normal affect    Labs:   No results for input(s): WBC, MCV, PLT in the last 72 hours.    Invalid input(s): HGBAU  No results for input(s): NA, K, CL, CO2, BUN, GLU in the last 72 hours.    Invalid input(s): CREA  No results for input(s): ALB in the last 72 hours.    Invalid input(s): ALKP, SGOT, SGPT, TBIL, DBIL, TPRO  No results for input(s): PT, INR, PTT in the last 72 hours.      Radiology Review:  No orders to display         IMPRESSION / RECOMMENDATIONS:  Screening  pt with vague lower abd pain with negative ct x 2 and normal lab work  -avoid etoh  -cscope as next scope.    Cscope    Thank you for this consult.    Jonn Cruz  2/23/2023  7:12 AM

## 2023-02-23 NOTE — TRANSFER OF CARE
Anesthesia Transfer of Care Note    Patient: Sampson Holt    Procedure(s) Performed: Procedure(s) (LRB):  COLONOSCOPY (N/A)    Patient location: PACU    Anesthesia Type: general    Transport from OR: Transported from OR on room air with adequate spontaneous ventilation    Post pain: adequate analgesia    Post assessment: no apparent anesthetic complications and tolerated procedure well    Post vital signs: stable    Level of consciousness: responds to stimulation    Nausea/Vomiting: no nausea/vomiting    Complications: none    Transfer of care protocol was followed      Last vitals:   Visit Vitals  /86 (BP Location: Left arm, Patient Position: Sitting)   Pulse 77   Temp 36.7 °C (98.1 °F) (Oral)   Resp 16   SpO2 96%

## 2023-02-23 NOTE — [employer] PATIENT INSTRUCTIONS
Discharge Summary/Instructions after an Endoscopic Procedure  Patient Name: Sampson Holt  Patient MRN: [id number]  Patient YOB: 1955  Thursday, February 23, 2023  Jonn Cruz MD  RESTRICTIONS:  During your procedure today, you received medications for sedation.  These   medications may affect your judgment, balance and coordination.  Therefore,   for 24 hours, you have the following restrictions:   - DO NOT drive a car, operate machinery, make legal/financial decisions,   sign important papers or drink alcohol.    ACTIVITY:  Today: no heavy lifting, straining or running due to procedural   sedation/anesthesia.  The following day: return to full activity including work.  DIET:  Eat and drink normally unless instructed otherwise.     TREATMENT FOR COMMON SIDE EFFECTS:  - Mild abdominal pain, nausea, belching, bloating or excessive gas:  rest,   eat lightly and use a heating pad.  - Sore Throat: treat with throat lozenges and/or gargle with warm salt   water.  - Because air was used during the procedure, expelling large amounts of air   from your rectum or belching is normal.  - If a bowel prep was taken, you may not have a bowel movement for 1-3 days.    This is normal.  SYMPTOMS TO WATCH FOR AND REPORT TO YOUR PHYSICIAN:  1. Abdominal pain or bloating, other than gas cramps.  2. Chest pain.  3. Back pain.  4. Signs of infection such as: chills or fever occurring within 24 hours   after the procedure.  5. Rectal bleeding, which would show as bright red, maroon, or black stools.   (A tablespoon of blood from the rectum is not serious, especially if   hemorrhoids are present.)  6. Vomiting.  7. Weakness or dizziness.  GO DIRECTLY TO THE NEAREST EMERGENCY ROOM IF YOU HAVE ANY OF THE FOLLOWING:      Difficulty breathing              Chills and/or fever over 101 F   Persistent vomiting and/or vomiting blood   Severe abdominal pain   Severe chest pain   Black, tarry stools   Bleeding- more than one  tablespoon   Any other symptom or condition that you feel may need urgent attention  Your doctor recommends these additional instructions:  If any biopsies were taken, your doctors clinic will contact you in 1 to 2   weeks with any results.  - Patient has a contact number available for emergencies.  The signs and   symptoms of potential delayed complications were discussed with the   patient.  Return to normal activities tomorrow.  Written discharge   instructions were provided to the patient.   - Resume previous diet.   - Continue present medications.   - Await pathology results.   - Repeat colonoscopy in 3 years for surveillance.   - Return to GI clinic (date not yet determined).   - Discharge patient to home (with escort).  For questions, problems or results please call your physician - Jonn Cruz MD at Work:  [phone].  [facility], EMERGENCY ROOM PHONE NUMBER: [phone]  IF A COMPLICATION OR EMERGENCY SITUATION ARISES AND YOU ARE UNABLE TO REACH   YOUR PHYSICIAN - GO DIRECTLY TO THE EMERGENCY ROOM.  MD Jonn Tanner MD  2/23/2023 7:56:47 AM  This report has been verified and signed electronically.  Dear patient,  As a result of recent federal legislation (The Federal Cures Act), you may   receive lab or pathology results from your procedure in your [facility]   account before your physician is able to contact you. Your physician or   their representative will relay the results to you with their   recommendations at their soonest availability.  Thank you,  [employer]

## 2023-02-23 NOTE — ANESTHESIA POSTPROCEDURE EVALUATION
Anesthesia Post Evaluation    Patient: Sampson Holt    Procedure(s) Performed: Procedure(s) (LRB):  COLONOSCOPY (N/A)    Final Anesthesia Type: MAC      Patient location during evaluation: GI PACU  Patient participation: Yes- Able to Participate  Level of consciousness: awake and alert, oriented and awake  Post-procedure vital signs: reviewed and stable  Pain management: adequate  Airway patency: patent  ALEJO mitigation strategies: Extubation and recovery carried out in lateral, semiupright, or other nonsupine position  PONV status at discharge: No PONV  Anesthetic complications: no      Cardiovascular status: blood pressure returned to baseline, hemodynamically stable and stable  Respiratory status: unassisted, spontaneous ventilation and room air  Hydration status: euvolemic  Follow-up not needed.          Vitals Value Taken Time   /82 02/23/23 0748   Temp 36.7 °C (98.1 °F) 02/23/23 0701   Pulse 79 02/23/23 0807   Resp 16 02/23/23 0750   SpO2 97 % 02/23/23 0807   Vitals shown include unvalidated device data.      No case tracking events are documented in the log.      Pain/Tuyet Score: No data recorded

## 2023-03-08 DIAGNOSIS — R10.9 AP (ABDOMINAL PAIN): Primary | ICD-10-CM

## 2023-03-08 DIAGNOSIS — R10.9 ABDOMINAL PAIN: Primary | ICD-10-CM

## 2023-03-09 ENCOUNTER — TELEPHONE (OUTPATIENT)
Dept: PAIN MEDICINE | Facility: CLINIC | Age: 68
End: 2023-03-09
Payer: OTHER GOVERNMENT

## 2023-03-09 NOTE — TELEPHONE ENCOUNTER
----- Message from Cari Chase sent at 3/9/2023 11:06 AM CST -----  Regarding: appointment  Contact: patient  Patient want to speak with a nurse regarding scheduling appointment for Acupuncture, please call   Mobile          [phone]       Case number [id number]

## 2023-03-15 ENCOUNTER — CLINICAL SUPPORT (OUTPATIENT)
Dept: REHABILITATION | Facility: HOSPITAL | Age: 68
End: 2023-03-15
Payer: OTHER GOVERNMENT

## 2023-03-15 DIAGNOSIS — M54.50 CHRONIC BILATERAL LOW BACK PAIN WITHOUT SCIATICA: ICD-10-CM

## 2023-03-15 DIAGNOSIS — G89.29 CHRONIC BILATERAL LOW BACK PAIN WITHOUT SCIATICA: ICD-10-CM

## 2023-03-15 DIAGNOSIS — R10.9 ABDOMINAL PAIN, UNSPECIFIED ABDOMINAL LOCATION: ICD-10-CM

## 2023-03-15 PROCEDURE — 97811 ACUP 1/> W/O ESTIM EA ADD 15: CPT | Mod: PN

## 2023-03-15 PROCEDURE — 97810 ACUP 1/> WO ESTIM 1ST 15 MIN: CPT | Mod: PN

## 2023-03-15 NOTE — PROGRESS NOTES
Acupuncture Evaluation Note     Name: Sampson Holt  [facility] Number: [id number]    Traditional Chinese Medicine (TCM) Diagnosis: Qi Stagnation  Medical Diagnosis:   Encounter Diagnoses   Name Primary?    Chronic bilateral low back pain without sciatica     Abdominal pain, unspecified abdominal location         Evaluation Date: 3/15/2023    Visit #/Visits authorized: 1/ 12     Precautions: Standard / Diabetes    Subjective     Chief Concern: Abdominal pain;   He suspects a broken needle from diabetes self-injection July 2022; pain every since. Now the pain goes all the way around to the R side of the back (can feel like a stabbing pain). MDs have done CT scan and say no needle seen there, and liver and kidney look fine and so on. They believe it is possible two discs in back ruptured and that is causing the abdominal pain. (A disc did rupture in his back in '81. Now MD's suspect perforated discs causing potentially the abdm pain). Quit drinking 10 days ago and has had more energy since, but other symptoms have not yet improved.              Aggravating Factors:   Coughing, moving joy when lying in bed.    Relieving Factors:  rest    Pain Description:     Quality:  Shooting  Severity:  10  Frequency:  when active    Previous Treatments Tried:  Imaging    Very painful to  one place for a very long time, he's a  and when he stands in one place too long the center of back 'kills' with pain.    Quit drinking 10 days ago; prior to that would drink 1 pint of bourbon, vodka, tequila, 'rot gut' cheap alcohol daily    Objective       Tongue:      Body:  swollen   Color:  pink   Coating:  thin,     Pulse:       Left: wiry   Right: wiry    Treatment     Needle Set 1 -     Acupuncture points used:  K3, UB 62, UB 60, GB 43, LI 4, TB 5, Annabel.  [place] In: 13  Needles Out: 13  Needles W/O STIM placed: 12:35pm  Needles W/O STIM removed: 1:15pm      Assessment     After treatment, patient felt no pain of back except very  middle of back.      Patient prognosis is Fair.     Patient will continue to benefit from acupuncture treatment to address the deficits listed in the problem list box on initial evaluation, provide patient family education and to maximize pt's level of independence in the home and community environment.     Patient's spiritual, cultural and educational needs considered and pt agreeable to plan of care and goals.    Plan     Recommend 1 /week for 6 sessions then re-assess.      Recommendations:  Continue to work to R/O broken needle, as that could be the culprit; could also be gastritis from alcohol instead. He thinks it might be cellulitis from cat-scratch on belly (but unseen on inside of belly he thinks). Does have cysts on the liver. Four or five. This could be the culprit.  R quadrant of abdomen is noticeably swollen and he says at times it even becomes hot.     I think an occult blood check would be a good idea.    Education:  Patient is aware of cumulative benefits of acupuncture

## 2023-04-02 ENCOUNTER — NURSE TRIAGE (OUTPATIENT)
Dept: ADMINISTRATIVE | Facility: CLINIC | Age: 68
End: 2023-04-02
Payer: OTHER GOVERNMENT

## 2023-04-03 NOTE — TELEPHONE ENCOUNTER
"Pt states he has been experiencing right sided abdominal pain that wraps around to his back for the past 9 months. He describes the pain as severe and like "someone shot me." He states he woke up "crying and praying" because the pain is unbearable. He verbalizes being dissatisfied with his care and feels that nobody is taking his needs seriously. He states "if y'all don't do anything, then y'all are murderers." Pt repeatedly states he wants the best doctors [place] the [place] on his care team, and he does not feel that is the case at this time. Attempted to  patient and triage symptoms. Pt did not respond well to repeated attempts at counseling and refused to triage symptoms. Pt became verbally aggressive then started crying and ended the call.     Reason for Disposition   Caller hangs up    Additional Information   Negative: Violent behavior, or threatening to physically hurt or kill someone   Negative: [1] Caller is very confused AND [2] no other adult (e.g., friend or family member) available   Negative: Sounds like a life-threatening emergency to the triager   Negative: Patient sounds very sick or weak to the triager   Negative: [1] [place] worried caller AND [2] second call within 4 hours about the same medical problem   Negative: [1] [place] worried caller AND [2] third call within 48 hours about the same medical problem   Negative: [1] [place] worried caller AND [2] can't be reassured by triager   Negative: [1] Caller demands to speak with the PCP AND [2] about sick adult (or sick caller)   Negative: [1] Angry or rude caller AND [2] doesn't respond to 5 minutes of triager counseling AND [3] sick adult (or caller)   Negative: [1] Caller mainly has complaints about past medical care, billing, etc. AND [2] has mild symptoms or is well   Negative: Difficult caller responded to triager counseling   Negative: Caller is requesting health information that triager feels is unethical or illegal   Negative: [1] Caller " continues to be verbally abusive AND [2] after triager sets BOUNDARIES AND [3] Triager ends call    Protocols used: Difficult Call-A-AH

## 2023-04-03 NOTE — TELEPHONE ENCOUNTER
Pt calling with right flank pain that is severe, crying. [facility] but doesn't feel he can wait. He reports he doesn't want to go to the ED because he feels they will just give him pain medication and discharge him. Phone connection issues caused call to drop prior to triage. Another triage RN has picked up the call.    Reason for Disposition   Unable to complete triage due to phone connection issues    Protocols used: No Contact or Duplicate Contact Call-A-AH

## 2023-04-04 ENCOUNTER — CLINICAL SUPPORT (OUTPATIENT)
Dept: REHABILITATION | Facility: HOSPITAL | Age: 68
End: 2023-04-04
Payer: OTHER GOVERNMENT

## 2023-04-04 DIAGNOSIS — M54.50 CHRONIC BILATERAL LOW BACK PAIN WITHOUT SCIATICA: Primary | ICD-10-CM

## 2023-04-04 DIAGNOSIS — G89.29 CHRONIC BILATERAL LOW BACK PAIN WITHOUT SCIATICA: Primary | ICD-10-CM

## 2023-04-04 DIAGNOSIS — R10.30 LOWER ABDOMINAL PAIN: ICD-10-CM

## 2023-04-04 PROCEDURE — 97811 ACUP 1/> W/O ESTIM EA ADD 15: CPT | Mod: GP,PN

## 2023-04-04 PROCEDURE — 97810 ACUP 1/> WO ESTIM 1ST 15 MIN: CPT | Mod: GP,PN

## 2023-04-04 NOTE — PROGRESS NOTES
Acupuncture Evaluation Note     Name: Sampson Holt  [facility] Number: [id number]    Traditional Chinese Medicine (TCM) Diagnosis: Qi Stagnation  Medical Diagnosis:   Encounter Diagnoses   Name Primary?    Chronic bilateral low back pain without sciatica Yes    Lower abdominal pain         Evaluation Date: 4/4/2023    Visit #/Visits authorized: 2/ 12     Precautions: Standard / Diabetes    Subjective     Chief Concern: Losing weight perhaps due to reducing alcohol intake considerably this past month.     Abdominal pain still shooting across abdomen mostly beginning in R lower quadrant but moving laterally to the L lower quadrant; needs [facility]    -cat scratch fever  -divertulosis/diverticulitis  -bacterial or otherwise infection   -cellulitis (had that before years ago)  -appendicitis  -liver cysts (we know there are 5 on liver; diagnosed 4 months ago but he was told they generally don't remove them)  -hernia (has had that before)    Some of these should have shown up in recent CAT scans (last one occurred last week)      Previous: He suspects a broken needle from diabetes self-injection July 2022; pain every since. Now the pain goes all the way around to the R side of the back (can feel like a stabbing pain). MDs have done CT scan and say no needle seen there, and liver and kidney look fine and so on. They believe it is possible two discs in back ruptured and that is causing the abdominal pain. (A disc did rupture in his back in '81. Now MD's suspect perforated discs causing potentially the abdm pain). Quit drinking 10 days ago and has had more energy since, but other symptoms have not yet improved.              Aggravating Factors:   Coughing, moving joy when lying in bed.    Relieving Factors:  rest    Pain Description:     Quality:  Shooting  Severity:  10  Frequency:  when active    Previous Treatments Tried:  Imaging    Very painful to  one place for a very long time, he's a  and when he  stands in one place too long the center of back 'kills' with pain.    Quit drinking 10 days ago; prior to that would drink 1 pint of bourbon, vodka, tequila, 'rot gut' cheap alcohol daily    Objective       Tongue:      Body:  swollen   Color:  pink   Coating:  thin,     Pulse:       Left: wiry   Right: wiry    Treatment     Needle Set 1 -     Acupuncture points used: Sitting; GB 20, GB 20.5, LI 11, LI 11.5, Annabel near knee, UB 62  [place] In: 10  Needles Out: 0  Needles W/O STIM placed: 1:50pm  [place] W/O STIM removed: 2:30pm      Assessment     After treatment, patient felt no pain of back except very middle of back.      Patient prognosis is Fair.     Patient will continue to benefit from acupuncture treatment to address the deficits listed in the problem list box on initial evaluation, provide patient family education and to maximize pt's level of independence in the home and community environment.     Patient's spiritual, cultural and educational needs considered and pt agreeable to plan of care and goals.    Plan     Recommend weekly for 12 sessions with midway re-assess.      Recommendations:  Continue to Rule Out various diagnoses from above list.    I think an occult blood check would be a good idea.    Education:  Patient is aware of cumulative benefits of acupuncture

## 2023-04-18 ENCOUNTER — CLINICAL SUPPORT (OUTPATIENT)
Dept: REHABILITATION | Facility: HOSPITAL | Age: 68
End: 2023-04-18
Payer: OTHER GOVERNMENT

## 2023-04-18 DIAGNOSIS — G89.29 CHRONIC BILATERAL LOW BACK PAIN WITHOUT SCIATICA: ICD-10-CM

## 2023-04-18 DIAGNOSIS — R10.30 LOWER ABDOMINAL PAIN: Primary | ICD-10-CM

## 2023-04-18 DIAGNOSIS — M54.50 CHRONIC BILATERAL LOW BACK PAIN WITHOUT SCIATICA: ICD-10-CM

## 2023-04-18 PROCEDURE — 97811 ACUP 1/> W/O ESTIM EA ADD 15: CPT | Mod: PN

## 2023-04-18 PROCEDURE — 97810 ACUP 1/> WO ESTIM 1ST 15 MIN: CPT | Mod: PN

## 2023-04-20 NOTE — PROGRESS NOTES
Acupuncture Evaluation Note     Name: Sampson Holt  [facility] Number: [id number]    Traditional Chinese Medicine (TCM) Diagnosis: Qi Stagnation  Medical Diagnosis:   Encounter Diagnoses   Name Primary?    Lower abdominal pain Yes    Chronic bilateral low back pain without sciatica         Evaluation Date: 4/20/2023    Visit #/Visits authorized: 3/ 12     Precautions: Standard / Diabetes    Subjective     Chief Concern: Still having sharp internal pains in right abdominal area, including visibly swollen liver. He suspects liver cysts are the cause and I recommend he also rule out pancreatitis.     Losing weight perhaps due to reducing alcohol intake considerably this past month.     Abdominal pain still shooting across abdomen mostly beginning in R lower quadrant but moving laterally to the L lower quadrant; [facility]    -cat scratch fever  -divertulosis/diverticulitis  -bacterial or otherwise infection   -cellulitis (had that before years ago)  -appendicitis  -liver cysts (we know there are 5 on liver; diagnosed 4 months ago but he was told they generally don't remove them)  -hernia (has had that before)  -swollen liver from diabetes or alcohol use  -pancreatitis    Some of these should have shown up in recent CAT scans (last one occurred last week)      Previous: He suspects a broken needle from diabetes self-injection July 2022; pain every since. Now the pain goes all the way around to the R side of the back (can feel like a stabbing pain). MDs have done CT scan and say no needle seen there, and liver and kidney look fine and so on. They believe it is possible two discs in back ruptured and that is causing the abdominal pain. (A disc did rupture in his back in '81. Now MD's suspect perforated discs causing potentially the abdm pain). Quit drinking 10 days ago and has had more energy since, but other symptoms have not yet improved.              Aggravating Factors:   Coughing, moving joy when lying in  bed.    Relieving Factors:  rest    Pain Description:     Quality:  Shooting  Severity:  10  Frequency:  when active    Previous Treatments Tried:  Imaging    Very painful to  one place for a very long time, he's a  and when he stands in one place too long the center of back 'kills' with pain.    'Quit drinking' within the past month or so however he does say he has 1 or 2 drinks daily still.    Tongue:      Body:  swollen   Color:  pink   Coating:  thin,     Pulse:       Left: wiry   Right: wiry    Treatment     Needle Set 1 -     Acupuncture points used: Sitting; GB 20, GB 20.5, LI 11, LI 11.5, Annabel near knee, UB 62  [place] In: 10  Needles Out: 0  Needles W/O STIM placed: 2:30pm  [place] W/O STIM removed: 3pm      Assessment     After treatment, patient felt no pain of back except very middle of back.      Patient prognosis is Fair.     Patient will continue to benefit from acupuncture treatment to address the deficits listed in the problem list box on initial evaluation, provide patient family education and to maximize pt's level of independence in the home and community environment.     Patient's spiritual, cultural and educational needs considered and pt agreeable to plan of care and goals.    Plan     Recommend weekly for 12 sessions with midway re-assess.      Recommendations:  Continue to Rule Out various diagnoses from above list.    I think an occult blood check would be a good idea.    Education:  Patient is aware of cumulative benefits of acupuncture

## 2023-04-25 ENCOUNTER — CLINICAL SUPPORT (OUTPATIENT)
Dept: REHABILITATION | Facility: HOSPITAL | Age: 68
End: 2023-04-25
Payer: OTHER GOVERNMENT

## 2023-04-25 DIAGNOSIS — G89.29 CHRONIC BILATERAL LOW BACK PAIN WITHOUT SCIATICA: ICD-10-CM

## 2023-04-25 DIAGNOSIS — M54.50 CHRONIC BILATERAL LOW BACK PAIN WITHOUT SCIATICA: ICD-10-CM

## 2023-04-25 DIAGNOSIS — R10.30 LOWER ABDOMINAL PAIN: Primary | ICD-10-CM

## 2023-04-25 PROCEDURE — 97811 ACUP 1/> W/O ESTIM EA ADD 15: CPT | Mod: PN

## 2023-04-25 PROCEDURE — 97810 ACUP 1/> WO ESTIM 1ST 15 MIN: CPT | Mod: PN

## 2023-04-27 NOTE — PROGRESS NOTES
Acupuncture Evaluation Note     Name: Sampson Holt  [facility] Number: [id number]    Traditional Chinese Medicine (TCM) Diagnosis: Qi Stagnation  Medical Diagnosis:   Encounter Diagnoses   Name Primary?    Lower abdominal pain Yes    Chronic bilateral low back pain without sciatica         Evaluation Date: 4/25/23    Visit #/Visits authorized: 4/ 12     Precautions: Standard / Diabetes    Subjective     Chief Concern: Suspects pancreatitis and believes continuing to use alcohol is exacerbating it. He says when he quit drinking *entirely* now for 10 days the abdominal pain reduced by fifty percent.    Still having sharp internal pains in right abdominal area, including visibly swollen liver. He suspects liver cysts are the cause and I recommend he also rule out pancreatitis.     Losing weight perhaps due to reducing alcohol intake considerably this past month.     Abdominal pain still shooting across abdomen mostly beginning in R lower quadrant but moving laterally to the L lower quadrant; needs [facility]    -cat scratch fever  -divertulosis/diverticulitis  -bacterial or otherwise infection   -cellulitis (had that before years ago)  -appendicitis  -liver cysts (we know there are 5 on liver; diagnosed 4 months ago but he was told they generally don't remove them)  -hernia (has had that before)  -swollen liver from diabetes or alcohol use  -pancreatitis    Some of these should have shown up in recent CAT scans (last one occurred last week)      Previous: He suspects a broken needle from diabetes self-injection July 2022; pain every since. Now the pain goes all the way around to the R side of the back (can feel like a stabbing pain). MDs have done CT scan and say no needle seen there, and liver and kidney look fine and so on. They believe it is possible two discs in back ruptured and that is causing the abdominal pain. (A disc did rupture in his back in '81. Now MD's suspect perforated discs causing potentially the  abdm pain). Quit drinking 10 days ago and has had more energy since, but other symptoms have not yet improved.              Aggravating Factors:   Coughing, moving joy when lying in bed.    Relieving Factors:  rest    Pain Description:     Quality:  Shooting  Severity:  10  Frequency:  when active    Previous Treatments Tried:  Imaging    Very painful to  one place for a very long time, he's a  and when he stands in one place too long the center of back 'kills' with pain.    'Quit drinking' within the past month or so however he does say he has 1 or 2 drinks daily still.    Tongue:      Body:  swollen   Color:  pink   Coating:  thin,     Pulse:       Left: wiry   Right: wiry    Treatment     Needle Set 1 -     Acupuncture points used: Sitting; GB 20, GB 20.5, LI 11, LI 11.5, Annabel near knee, UB 62  [place] In: 10  Needles Out: 10  Needles W/O STIM placed: 2:30pm  [place] W/O STIM removed: 3pm      Assessment     After treatment, patient felt no pain of back except very middle of back.      Patient prognosis is Fair.     Patient will continue to benefit from acupuncture treatment to address the deficits listed in the problem list box on initial evaluation, provide patient family education and to maximize pt's level of independence in the home and community environment.     Patient's spiritual, cultural and educational needs considered and pt agreeable to plan of care and goals.    Plan     Recommend weekly for 12 sessions with midway re-assess.      Recommendations:  Continue to Rule Out various diagnoses from above list.    I think an occult blood check would be a good idea.    Education:  Patient is aware of cumulative benefits of acupuncture

## 2023-05-02 ENCOUNTER — CLINICAL SUPPORT (OUTPATIENT)
Dept: REHABILITATION | Facility: HOSPITAL | Age: 68
End: 2023-05-02
Payer: OTHER GOVERNMENT

## 2023-05-02 DIAGNOSIS — R10.30 LOWER ABDOMINAL PAIN: Primary | ICD-10-CM

## 2023-05-02 DIAGNOSIS — G89.29 CHRONIC BILATERAL LOW BACK PAIN WITHOUT SCIATICA: ICD-10-CM

## 2023-05-02 DIAGNOSIS — M54.50 CHRONIC BILATERAL LOW BACK PAIN WITHOUT SCIATICA: ICD-10-CM

## 2023-05-02 PROCEDURE — 97810 ACUP 1/> WO ESTIM 1ST 15 MIN: CPT | Mod: PN

## 2023-05-02 PROCEDURE — 97811 ACUP 1/> W/O ESTIM EA ADD 15: CPT | Mod: PN

## 2023-05-04 NOTE — PROGRESS NOTES
Acupuncture Evaluation Note     Name: Sampson OLGUIN [facility] Number: [id number]    Traditional Chinese Medicine (TCM) Diagnosis: Qi Stagnation  Medical Diagnosis:   Encounter Diagnoses   Name Primary?    Lower abdominal pain Yes    Chronic bilateral low back pain without sciatica         Evaluation Date: 5/2/23    Visit #/Visits authorized: 5/ 12     Precautions: Standard / Diabetes    Subjective     Chief Concern: Pain greatly reduced now that he's not drinking at ALL.    History:Suspects pancreatitis and believes continuing to use alcohol is exacerbating it. He says when he quit drinking *entirely* now for 10 days the abdominal pain reduced by fifty percent.    Still having sharp internal pains in right abdominal area, including visibly swollen liver. He suspects liver cysts are the cause and I recommend he also rule out pancreatitis.     Losing weight perhaps due to reducing alcohol intake considerably this past month.     Abdominal pain still shooting across abdomen mostly beginning in R lower quadrant but moving laterally to the L lower quadrant; needs [facility]    -cat scratch fever  -divertulosis/diverticulitis  -bacterial or otherwise infection   -cellulitis (had that before years ago)  -appendicitis  -liver cysts (we know there are 5 on liver; diagnosed 4 months ago but he was told they generally don't remove them)  -hernia (has had that before)  -swollen liver from diabetes or alcohol use  -pancreatitis    Some of these should have shown up in recent CAT scans (last one occurred last week)      Previous: He suspects a broken needle from diabetes self-injection July 2022; pain every since. Now the pain goes all the way around to the R side of the back (can feel like a stabbing pain). MDs have done CT scan and say no needle seen there, and liver and kidney look fine and so on. They believe it is possible two discs in back ruptured and that is causing the abdominal pain. (A disc did rupture in his back  in '81. Now MD's suspect perforated discs causing potentially the abdm pain). Quit drinking 10 days ago and has had more energy since, but other symptoms have not yet improved.              Aggravating Factors:   Coughing, moving joy when lying in bed.    Relieving Factors:  rest    Pain Description:     Quality:  Shooting  Severity:  5  Frequency:  when active    Previous Treatments Tried:  Imaging    Very painful to  one place for a very long time, he's a  and when he stands in one place too long the center of back 'kills' with pain.    'Quit drinking' within the past month or so however he does say he has 1 or 2 drinks daily still.    Tongue:      Body:  swollen   Color:  pink   Coating:  thin,     Pulse:       Left: wiry   Right: wiry    Treatment     Needle Set 1 -     Acupuncture points used: Sitting; GB 20, GB 20.5, LI 11, LI 11.5, Annabel near knee, UB 62  [place] In: 10  Needles Out: 10  Needles W/O STIM placed: 2:30pm  [place] W/O STIM removed: 3pm      Assessment     After treatment, patient felt no pain of back except very middle of back.      Patient prognosis is Fair.     Patient will continue to benefit from acupuncture treatment to address the deficits listed in the problem list box on initial evaluation, provide patient family education and to maximize pt's level of independence in the home and community environment.     Patient's spiritual, cultural and educational needs considered and pt agreeable to plan of care and goals.    Plan     Recommend weekly for 12 sessions with midway re-assess.      Recommendations:  Continue to Rule Out various diagnoses from above list.    I think an occult blood check would be a good idea.    Education:  Patient is aware of cumulative benefits of acupuncture

## 2023-05-09 ENCOUNTER — HOSPITAL ENCOUNTER (INPATIENT)
Facility: HOSPITAL | Age: 68
LOS: 5 days | Discharge: HOME | DRG: 247 | End: 2023-05-14
Attending: EMERGENCY MEDICINE | Admitting: INTERNAL MEDICINE
Payer: OTHER GOVERNMENT

## 2023-05-09 DIAGNOSIS — I21.19 ACUTE ST ELEVATION MYOCARDIAL INFARCTION (STEMI) OF INFERIOR WALL: ICD-10-CM

## 2023-05-09 DIAGNOSIS — R07.9 CHEST PAIN: ICD-10-CM

## 2023-05-09 DIAGNOSIS — I21.3 ST ELEVATION MYOCARDIAL INFARCTION (STEMI), UNSPECIFIED ARTERY: Primary | ICD-10-CM

## 2023-05-09 DIAGNOSIS — Z95.5 STATUS POST INSERTION OF DRUG ELUTING CORONARY ARTERY STENT: ICD-10-CM

## 2023-05-09 DIAGNOSIS — I20.89 ANGINA AT REST: ICD-10-CM

## 2023-05-09 DIAGNOSIS — I21.3 STEMI (ST ELEVATION MYOCARDIAL INFARCTION): ICD-10-CM

## 2023-05-09 DIAGNOSIS — I25.10 CAD (CORONARY ARTERY DISEASE): ICD-10-CM

## 2023-05-09 LAB
ALBUMIN SERPL BCP-MCNC: 4.6 G/DL (ref 3.5–5.2)
ALP SERPL-CCNC: 68 U/L (ref 55–135)
ALT SERPL W/O P-5'-P-CCNC: 13 U/L (ref 10–44)
ANION GAP SERPL CALC-SCNC: 12 MMOL/L (ref 8–16)
APTT PPP: >150 SEC (ref 21–32)
AST SERPL-CCNC: 13 U/L (ref 10–40)
BASOPHILS # BLD AUTO: 0.07 K/UL (ref 0–0.2)
BASOPHILS NFR BLD: 0.8 % (ref 0–1.9)
BILIRUB SERPL-MCNC: 0.7 MG/DL (ref 0.1–1)
BNP SERPL-MCNC: 39 PG/ML (ref 0–99)
BUN SERPL-MCNC: 24 MG/DL (ref 8–23)
CALCIUM SERPL-MCNC: 9.8 MG/DL (ref 8.7–10.5)
CHLORIDE SERPL-SCNC: 105 MMOL/L (ref 95–110)
CO2 SERPL-SCNC: 19 MMOL/L (ref 23–29)
CREAT SERPL-MCNC: 1.4 MG/DL (ref 0.5–1.4)
DIFFERENTIAL METHOD: ABNORMAL
EOSINOPHIL # BLD AUTO: 0.1 K/UL (ref 0–0.5)
EOSINOPHIL NFR BLD: 0.5 % (ref 0–8)
ERYTHROCYTE [DISTWIDTH] IN BLOOD BY AUTOMATED COUNT: 12.5 % (ref 11.5–14.5)
EST. GFR  (NO RACE VARIABLE): 54.7 ML/MIN/1.73 M^2
GLUCOSE SERPL-MCNC: 115 MG/DL (ref 70–110)
GLUCOSE SERPL-MCNC: 166 MG/DL (ref 70–110)
GLUCOSE SERPL-MCNC: 215 MG/DL (ref 70–110)
HCT VFR BLD AUTO: 43.8 % (ref 40–54)
HGB BLD-MCNC: 15.3 G/DL (ref 14–18)
IMM GRANULOCYTES # BLD AUTO: 0.03 K/UL (ref 0–0.04)
IMM GRANULOCYTES NFR BLD AUTO: 0.3 % (ref 0–0.5)
INR PPP: 1 (ref 0.8–1.2)
INR PPP: 1.1 (ref 0.8–1.2)
LYMPHOCYTES # BLD AUTO: 2 K/UL (ref 1–4.8)
LYMPHOCYTES NFR BLD: 21.7 % (ref 18–48)
MAGNESIUM SERPL-MCNC: 1.9 MG/DL (ref 1.6–2.6)
MCH RBC QN AUTO: 32.1 PG (ref 27–31)
MCHC RBC AUTO-ENTMCNC: 34.9 G/DL (ref 32–36)
MCV RBC AUTO: 92 FL (ref 82–98)
MONOCYTES # BLD AUTO: 0.6 K/UL (ref 0.3–1)
MONOCYTES NFR BLD: 6.9 % (ref 4–15)
NEUTROPHILS # BLD AUTO: 6.5 K/UL (ref 1.8–7.7)
NEUTROPHILS NFR BLD: 69.8 % (ref 38–73)
NRBC BLD-RTO: 0 /100 WBC
PLATELET # BLD AUTO: 271 K/UL (ref 150–450)
PMV BLD AUTO: 9.1 FL (ref 9.2–12.9)
POC ACTIVATED CLOTTING TIME K: 227 SEC (ref 74–137)
POC ACTIVATED CLOTTING TIME K: 251 SEC (ref 74–137)
POTASSIUM SERPL-SCNC: 3.9 MMOL/L (ref 3.5–5.1)
PROT SERPL-MCNC: 7.9 G/DL (ref 6–8.4)
PROTHROMBIN TIME: 10.5 SEC (ref 9–12.5)
PROTHROMBIN TIME: 11.4 SEC (ref 9–12.5)
RBC # BLD AUTO: 4.76 M/UL (ref 4.6–6.2)
SAMPLE: ABNORMAL
SAMPLE: ABNORMAL
SODIUM SERPL-SCNC: 136 MMOL/L (ref 136–145)
TROPONIN I SERPL HS-MCNC: 1756.1 PG/ML (ref 0–14.9)
TROPONIN I SERPL HS-MCNC: 22.1 PG/ML (ref 0–14.9)
TROPONIN I SERPL HS-MCNC: 4.2 PG/ML (ref 0–14.9)
WBC # BLD AUTO: 9.32 K/UL (ref 3.9–12.7)

## 2023-05-09 PROCEDURE — 36415 COLL VENOUS BLD VENIPUNCTURE: CPT | Performed by: INTERNAL MEDICINE

## 2023-05-09 PROCEDURE — C1725 CATH, TRANSLUMIN NON-LASER: HCPCS | Performed by: INTERNAL MEDICINE

## 2023-05-09 PROCEDURE — 25500020 PHARM REV CODE 255: Performed by: INTERNAL MEDICINE

## 2023-05-09 PROCEDURE — 93005 ELECTROCARDIOGRAM TRACING: CPT | Performed by: INTERNAL MEDICINE

## 2023-05-09 PROCEDURE — 63600175 PHARM REV CODE 636 W HCPCS: Performed by: INTERNAL MEDICINE

## 2023-05-09 PROCEDURE — 85025 COMPLETE CBC W/AUTO DIFF WBC: CPT | Performed by: EMERGENCY MEDICINE

## 2023-05-09 PROCEDURE — 85610 PROTHROMBIN TIME: CPT | Performed by: INTERNAL MEDICINE

## 2023-05-09 PROCEDURE — 93010 EKG 12-LEAD: ICD-10-PCS | Mod: 59,,, | Performed by: INTERNAL MEDICINE

## 2023-05-09 PROCEDURE — 83880 ASSAY OF NATRIURETIC PEPTIDE: CPT | Performed by: EMERGENCY MEDICINE

## 2023-05-09 PROCEDURE — 96374 THER/PROPH/DIAG INJ IV PUSH: CPT

## 2023-05-09 PROCEDURE — C1753 CATH, INTRAVAS ULTRASOUND: HCPCS | Performed by: INTERNAL MEDICINE

## 2023-05-09 PROCEDURE — C1894 INTRO/SHEATH, NON-LASER: HCPCS | Performed by: INTERNAL MEDICINE

## 2023-05-09 PROCEDURE — 25000003 PHARM REV CODE 250: Performed by: EMERGENCY MEDICINE

## 2023-05-09 PROCEDURE — 92941 PRQ TRLML REVSC TOT OCCL AMI: CPT | Mod: RC,,, | Performed by: INTERNAL MEDICINE

## 2023-05-09 PROCEDURE — 99223 PR INITIAL HOSPITAL CARE,LEVL III: ICD-10-PCS | Mod: 25,,, | Performed by: INTERNAL MEDICINE

## 2023-05-09 PROCEDURE — 20000000 HC ICU ROOM

## 2023-05-09 PROCEDURE — 93454 CORONARY ARTERY ANGIO S&I: CPT | Mod: 26,51,59, | Performed by: INTERNAL MEDICINE

## 2023-05-09 PROCEDURE — 93454 PR CATH PLACE/CORONARY ANGIO, IMG SUPER/INTERP: ICD-10-PCS | Mod: 26,51,59, | Performed by: INTERNAL MEDICINE

## 2023-05-09 PROCEDURE — 84484 ASSAY OF TROPONIN QUANT: CPT | Mod: 91 | Performed by: INTERNAL MEDICINE

## 2023-05-09 PROCEDURE — 99153 MOD SED SAME PHYS/QHP EA: CPT | Performed by: INTERNAL MEDICINE

## 2023-05-09 PROCEDURE — 92978 ENDOLUMINL IVUS OCT C 1ST: CPT | Mod: 26,RC,, | Performed by: INTERNAL MEDICINE

## 2023-05-09 PROCEDURE — 99900031 HC PATIENT EDUCATION (STAT)

## 2023-05-09 PROCEDURE — 25000003 PHARM REV CODE 250: Performed by: INTERNAL MEDICINE

## 2023-05-09 PROCEDURE — 27000221 HC OXYGEN, UP TO 24 HOURS

## 2023-05-09 PROCEDURE — 92941 PR AMI ANY METHOD: ICD-10-PCS | Mod: RC,,, | Performed by: INTERNAL MEDICINE

## 2023-05-09 PROCEDURE — C1769 GUIDE WIRE: HCPCS | Performed by: INTERNAL MEDICINE

## 2023-05-09 PROCEDURE — 99152 PR MOD CONSCIOUS SEDATION, SAME PHYS, 5+ YRS, FIRST 15 MIN: ICD-10-PCS | Mod: ,,, | Performed by: INTERNAL MEDICINE

## 2023-05-09 PROCEDURE — C1874 STENT, COATED/COV W/DEL SYS: HCPCS | Performed by: INTERNAL MEDICINE

## 2023-05-09 PROCEDURE — 99152 MOD SED SAME PHYS/QHP 5/>YRS: CPT | Mod: ,,, | Performed by: INTERNAL MEDICINE

## 2023-05-09 PROCEDURE — A4216 STERILE WATER/SALINE, 10 ML: HCPCS | Performed by: INTERNAL MEDICINE

## 2023-05-09 PROCEDURE — 84484 ASSAY OF TROPONIN QUANT: CPT | Performed by: INTERNAL MEDICINE

## 2023-05-09 PROCEDURE — 80053 COMPREHEN METABOLIC PANEL: CPT | Performed by: EMERGENCY MEDICINE

## 2023-05-09 PROCEDURE — 63600175 PHARM REV CODE 636 W HCPCS

## 2023-05-09 PROCEDURE — 96375 TX/PRO/DX INJ NEW DRUG ADDON: CPT

## 2023-05-09 PROCEDURE — 82962 GLUCOSE BLOOD TEST: CPT

## 2023-05-09 PROCEDURE — 99223 1ST HOSP IP/OBS HIGH 75: CPT | Mod: 25,,, | Performed by: INTERNAL MEDICINE

## 2023-05-09 PROCEDURE — 99291 CRITICAL CARE FIRST HOUR: CPT

## 2023-05-09 PROCEDURE — 84484 ASSAY OF TROPONIN QUANT: CPT | Mod: 91 | Performed by: EMERGENCY MEDICINE

## 2023-05-09 PROCEDURE — 93010 ELECTROCARDIOGRAM REPORT: CPT | Mod: 59,,, | Performed by: INTERNAL MEDICINE

## 2023-05-09 PROCEDURE — 85610 PROTHROMBIN TIME: CPT | Mod: 91 | Performed by: EMERGENCY MEDICINE

## 2023-05-09 PROCEDURE — 93454 CORONARY ARTERY ANGIO S&I: CPT | Mod: XU | Performed by: INTERNAL MEDICINE

## 2023-05-09 PROCEDURE — 85730 THROMBOPLASTIN TIME PARTIAL: CPT | Performed by: INTERNAL MEDICINE

## 2023-05-09 PROCEDURE — 99152 MOD SED SAME PHYS/QHP 5/>YRS: CPT | Performed by: INTERNAL MEDICINE

## 2023-05-09 PROCEDURE — C1887 CATHETER, GUIDING: HCPCS | Performed by: INTERNAL MEDICINE

## 2023-05-09 PROCEDURE — 94761 N-INVAS EAR/PLS OXIMETRY MLT: CPT

## 2023-05-09 PROCEDURE — 92978 PR IVUS, CORONARY, 1ST VESSEL: ICD-10-PCS | Mod: 26,RC,, | Performed by: INTERNAL MEDICINE

## 2023-05-09 PROCEDURE — 83735 ASSAY OF MAGNESIUM: CPT | Performed by: EMERGENCY MEDICINE

## 2023-05-09 PROCEDURE — 92978 ENDOLUMINL IVUS OCT C 1ST: CPT | Performed by: INTERNAL MEDICINE

## 2023-05-09 PROCEDURE — C9606 PERC D-E COR REVASC W AMI S: HCPCS | Mod: RC | Performed by: INTERNAL MEDICINE

## 2023-05-09 DEVICE — STENT ONYXNG35026UX ONYX 3.50X26RX
Type: IMPLANTABLE DEVICE | Site: CORONARY | Status: FUNCTIONAL
Brand: ONYX FRONTIER™

## 2023-05-09 RX ORDER — INSULIN ASPART 100 [IU]/ML
1-10 INJECTION, SOLUTION INTRAVENOUS; SUBCUTANEOUS
Status: DISCONTINUED | OUTPATIENT
Start: 2023-05-09 | End: 2023-05-14 | Stop reason: HOSPADM

## 2023-05-09 RX ORDER — CLOPIDOGREL 300 MG/1
600 TABLET, FILM COATED ORAL
Status: COMPLETED | OUTPATIENT
Start: 2023-05-09 | End: 2023-05-09

## 2023-05-09 RX ORDER — HEPARIN SODIUM 5000 [USP'U]/ML
5000 INJECTION, SOLUTION INTRAVENOUS; SUBCUTANEOUS ONCE
Status: DISCONTINUED | OUTPATIENT
Start: 2023-05-09 | End: 2023-05-13

## 2023-05-09 RX ORDER — TIROFIBAN HYDROCHLORIDE 50 UG/ML
0.15 INJECTION INTRAVENOUS CONTINUOUS
Status: DISCONTINUED | OUTPATIENT
Start: 2023-05-09 | End: 2023-05-09

## 2023-05-09 RX ORDER — IODIXANOL 320 MG/ML
INJECTION, SOLUTION INTRAVASCULAR
Status: DISCONTINUED | OUTPATIENT
Start: 2023-05-09 | End: 2023-05-09 | Stop reason: HOSPADM

## 2023-05-09 RX ORDER — HEPARIN SODIUM 1000 [USP'U]/ML
INJECTION, SOLUTION INTRAVENOUS; SUBCUTANEOUS
Status: DISCONTINUED | OUTPATIENT
Start: 2023-05-09 | End: 2023-05-09 | Stop reason: HOSPADM

## 2023-05-09 RX ORDER — MORPHINE SULFATE 2 MG/ML
INJECTION, SOLUTION INTRAMUSCULAR; INTRAVENOUS
Status: COMPLETED
Start: 2023-05-09 | End: 2023-05-09

## 2023-05-09 RX ORDER — ASPIRIN 81 MG/1
81 TABLET ORAL DAILY
Status: DISCONTINUED | OUTPATIENT
Start: 2023-05-10 | End: 2023-05-14 | Stop reason: HOSPADM

## 2023-05-09 RX ORDER — MORPHINE SULFATE 2 MG/ML
2 INJECTION, SOLUTION INTRAMUSCULAR; INTRAVENOUS
Status: COMPLETED | OUTPATIENT
Start: 2023-05-09 | End: 2023-05-09

## 2023-05-09 RX ORDER — ALBUTEROL SULFATE 90 UG/1
1 AEROSOL, METERED RESPIRATORY (INHALATION) EVERY 4 HOURS PRN
Status: DISCONTINUED | OUTPATIENT
Start: 2023-05-09 | End: 2023-05-09

## 2023-05-09 RX ORDER — CLOPIDOGREL 300 MG/1
TABLET, FILM COATED ORAL CODE/TRAUMA/SEDATION MEDICATION
Status: COMPLETED | OUTPATIENT
Start: 2023-05-09 | End: 2023-05-09

## 2023-05-09 RX ORDER — NITROGLYCERIN 20 MG/100ML
0-400 INJECTION INTRAVENOUS CONTINUOUS
Status: DISCONTINUED | OUTPATIENT
Start: 2023-05-09 | End: 2023-05-11

## 2023-05-09 RX ORDER — GLUCAGON 1 MG
1 KIT INJECTION
Status: DISCONTINUED | OUTPATIENT
Start: 2023-05-09 | End: 2023-05-14 | Stop reason: HOSPADM

## 2023-05-09 RX ORDER — ASPIRIN 325 MG
325 TABLET ORAL
Status: DISCONTINUED | OUTPATIENT
Start: 2023-05-09 | End: 2023-05-09

## 2023-05-09 RX ORDER — MIDAZOLAM HYDROCHLORIDE 1 MG/ML
INJECTION INTRAMUSCULAR; INTRAVENOUS
Status: DISCONTINUED | OUTPATIENT
Start: 2023-05-09 | End: 2023-05-09 | Stop reason: HOSPADM

## 2023-05-09 RX ORDER — IBUPROFEN 200 MG
24 TABLET ORAL
Status: DISCONTINUED | OUTPATIENT
Start: 2023-05-09 | End: 2023-05-14 | Stop reason: HOSPADM

## 2023-05-09 RX ORDER — NITROGLYCERIN 0.4 MG/1
0.4 TABLET SUBLINGUAL
Status: ACTIVE | OUTPATIENT
Start: 2023-05-09 | End: 2023-05-09

## 2023-05-09 RX ORDER — ASPIRIN 325 MG
TABLET ORAL CODE/TRAUMA/SEDATION MEDICATION
Status: COMPLETED | OUTPATIENT
Start: 2023-05-09 | End: 2023-05-09

## 2023-05-09 RX ORDER — ADENOSINE 3 MG/ML
INJECTION, SOLUTION INTRAVENOUS
Status: DISCONTINUED | OUTPATIENT
Start: 2023-05-09 | End: 2023-05-09 | Stop reason: HOSPADM

## 2023-05-09 RX ORDER — MECLIZINE HCL 12.5 MG 12.5 MG/1
25 TABLET ORAL 3 TIMES DAILY PRN
Status: DISCONTINUED | OUTPATIENT
Start: 2023-05-09 | End: 2023-05-14 | Stop reason: HOSPADM

## 2023-05-09 RX ORDER — CLOPIDOGREL BISULFATE 75 MG/1
75 TABLET ORAL DAILY
Status: DISCONTINUED | OUTPATIENT
Start: 2023-05-10 | End: 2023-05-14 | Stop reason: HOSPADM

## 2023-05-09 RX ORDER — ONDANSETRON 2 MG/ML
INJECTION INTRAMUSCULAR; INTRAVENOUS
Status: COMPLETED
Start: 2023-05-09 | End: 2023-05-09

## 2023-05-09 RX ORDER — NITROGLYCERIN 0.4 MG/1
TABLET SUBLINGUAL
Status: DISPENSED
Start: 2023-05-09 | End: 2023-05-09

## 2023-05-09 RX ORDER — SODIUM CHLORIDE 0.9 % (FLUSH) 0.9 %
10 SYRINGE (ML) INJECTION EVERY 12 HOURS
Status: DISCONTINUED | OUTPATIENT
Start: 2023-05-09 | End: 2023-05-14 | Stop reason: HOSPADM

## 2023-05-09 RX ORDER — CARVEDILOL 3.12 MG/1
3.12 TABLET ORAL 2 TIMES DAILY
Status: DISCONTINUED | OUTPATIENT
Start: 2023-05-09 | End: 2023-05-14 | Stop reason: HOSPADM

## 2023-05-09 RX ORDER — NITROGLYCERIN 5 MG/ML
INJECTION, SOLUTION INTRAVENOUS
Status: DISCONTINUED | OUTPATIENT
Start: 2023-05-09 | End: 2023-05-09 | Stop reason: HOSPADM

## 2023-05-09 RX ORDER — ATORVASTATIN CALCIUM 40 MG/1
40 TABLET, FILM COATED ORAL DAILY
Status: DISCONTINUED | OUTPATIENT
Start: 2023-05-09 | End: 2023-05-14 | Stop reason: HOSPADM

## 2023-05-09 RX ORDER — IBUPROFEN 200 MG
16 TABLET ORAL
Status: DISCONTINUED | OUTPATIENT
Start: 2023-05-09 | End: 2023-05-14 | Stop reason: HOSPADM

## 2023-05-09 RX ORDER — FENTANYL CITRATE 50 UG/ML
INJECTION, SOLUTION INTRAMUSCULAR; INTRAVENOUS
Status: DISCONTINUED | OUTPATIENT
Start: 2023-05-09 | End: 2023-05-09 | Stop reason: HOSPADM

## 2023-05-09 RX ORDER — ALBUTEROL SULFATE 0.83 MG/ML
2.5 SOLUTION RESPIRATORY (INHALATION) EVERY 4 HOURS PRN
Status: DISCONTINUED | OUTPATIENT
Start: 2023-05-09 | End: 2023-05-14 | Stop reason: HOSPADM

## 2023-05-09 RX ORDER — ONDANSETRON 2 MG/ML
4 INJECTION INTRAMUSCULAR; INTRAVENOUS
Status: COMPLETED | OUTPATIENT
Start: 2023-05-09 | End: 2023-05-09

## 2023-05-09 RX ORDER — INSULIN GLARGINE 100 [IU]/ML
30 INJECTION, SOLUTION SUBCUTANEOUS NIGHTLY
Status: DISCONTINUED | OUTPATIENT
Start: 2023-05-09 | End: 2023-05-09

## 2023-05-09 RX ORDER — LIDOCAINE HYDROCHLORIDE 10 MG/ML
INJECTION, SOLUTION EPIDURAL; INFILTRATION; INTRACAUDAL; PERINEURAL
Status: DISCONTINUED | OUTPATIENT
Start: 2023-05-09 | End: 2023-05-09 | Stop reason: HOSPADM

## 2023-05-09 RX ADMIN — SODIUM CHLORIDE, PRESERVATIVE FREE 10 ML: 5 INJECTION INTRAVENOUS at 09:05

## 2023-05-09 RX ADMIN — TIROFIBAN 0.15 MCG/KG/MIN: 5 INJECTION, SOLUTION INTRAVENOUS at 01:05

## 2023-05-09 RX ADMIN — ONDANSETRON 4 MG: 2 INJECTION INTRAMUSCULAR; INTRAVENOUS at 11:05

## 2023-05-09 RX ADMIN — CLOPIDOGREL BISULFATE 600 MG: 300 TABLET, FILM COATED ORAL at 11:05

## 2023-05-09 RX ADMIN — ASPIRIN 325 MG ORAL TABLET 325 MG: 325 PILL ORAL at 11:05

## 2023-05-09 RX ADMIN — MORPHINE SULFATE 2 MG: 2 INJECTION, SOLUTION INTRAMUSCULAR; INTRAVENOUS at 11:05

## 2023-05-09 RX ADMIN — INSULIN ASPART 4 UNITS: 100 INJECTION, SOLUTION INTRAVENOUS; SUBCUTANEOUS at 05:05

## 2023-05-09 RX ADMIN — ATORVASTATIN CALCIUM 40 MG: 40 TABLET, FILM COATED ORAL at 09:05

## 2023-05-09 RX ADMIN — CARVEDILOL 3.12 MG: 3.12 TABLET, FILM COATED ORAL at 09:05

## 2023-05-09 RX ADMIN — MORPHINE SULFATE 2 MG: 2 INJECTION, SOLUTION INTRAMUSCULAR; INTRAVENOUS at 12:05

## 2023-05-09 RX ADMIN — NITROGLYCERIN 10 MCG/MIN: 20 INJECTION INTRAVENOUS at 11:05

## 2023-05-09 RX ADMIN — INSULIN DETEMIR 30 UNITS: 100 INJECTION, SOLUTION SUBCUTANEOUS at 09:05

## 2023-05-09 NOTE — H&P
[facility] Medicine  History & Physical    Patient Name: Sampson Holt  MRN: [id number]  Patient Class: IP- Inpatient  Admission Date: 5/9/2023  Attending Physician: Louis Ruggiero MD   Primary Care Provider: Damian Vergara MD         Patient information was obtained from patient and ER records.     Subjective:     Principal Problem:STEMI (ST elevation myocardial infarction)    Chief Complaint:   Chief Complaint   Patient presents with    Chest Pain    Shortness of Breath        HPI: 67-year-old male with a history of CHF, diabetes, hypertension, CVA, endocarditis, COPD presents to the emergency department with chest pain and found to have dynamic EKG changes with STEMI and patient was brought to the cath lab and needed stent in the RCA.  Patient was seen and examined at post procedure in ICU.  And chest pain-free and no shortness of breath.  He also reported that he was losing about more than 10 lb weight loss in previous 3 months and a [facility] and he did [facility] and nothing showed .Also he did CT scan abdomen and did not show pathology except cyst in the liver.  But he was  told most likely the origin of the pain possible for pancreatitis with previous history of alcohol use.        Past Medical History:   Diagnosis Date    Abdominal pain 2022    CHF (congestive heart failure)     Diabetes mellitus 2018    Emphysema lung     Endocarditis 2011    Hypertension     Stroke 2011    denies residual       Past Surgical History:   Procedure Laterality Date    BACK SURGERY  1981    COLONOSCOPY N/A 2/23/2023    Procedure: COLONOSCOPY;  Surgeon: Jonn Cruz MD;  Location: [facility];  Service: Endoscopy;  Laterality: N/A;    EXCISION OF HYDROCELE      x2    INGUINAL HERNIA REPAIR  1960    RHINOPLASTY      SURGICAL REMOVAL OF NODULE OF VOCAL CORD         Review of patient's allergies indicates:  No Known Allergies    No current facility-administered medications on file  prior to encounter.     Current Outpatient Medications on File Prior to Encounter   Medication Sig    albuterol (PROVENTIL/VENTOLIN HFA) 90 mcg/actuation inhaler Inhale 1 puff into the lungs every 4 (four) hours as needed.    amLODIPine (NORVASC) 10 MG tablet Take 10 mg by mouth once daily.    doxycycline (VIBRA-TABS) 100 MG tablet Take 1 tablet by mouth 2 (two) times daily.    insulin glargine,hum.rec.anlog (LANTUS SUBQ) Inject 35 Units into the skin once daily.    meclizine (ANTIVERT) 25 mg tablet Take 1 tablet (25 mg total) by mouth 3 (three) times daily as needed for Dizziness. (Patient not taking: Reported on 1/9/2023)     Family History    None       Tobacco Use    Smoking status: Every Day     Packs/day: 1.00     Types: Cigarettes     Last attempt to quit: 3/27/2020     Years since quitting: 3.1    Smokeless tobacco: Former    Tobacco comments:     Advised not to smoke DOS    Substance and Sexual Activity    Alcohol use: Yes     Alcohol/week: 14.0 standard drinks     Types: 14 Shots of liquor per week    Drug use: Yes     Types: Marijuana    Sexual activity: Not on file     Review of Systems   Constitutional:  Negative for activity change, appetite change and diaphoresis.   HENT:  Negative for congestion, facial swelling and sinus pressure.    Respiratory:  Negative for shortness of breath and wheezing.    Cardiovascular:  Negative for chest pain and palpitations.   Gastrointestinal:  Negative for abdominal distention and abdominal pain.   Genitourinary:  Negative for dysuria.   Skin:  Negative for color change and pallor.   Neurological:  Negative for dizziness, facial asymmetry, speech difficulty and headaches.   Psychiatric/Behavioral:  Negative for agitation and confusion.    All other systems reviewed and are negative.  Objective:     Vital Signs (Most Recent):  Temp: 97.7 °F (36.5 °C) (05/09/23 1315)  Pulse: 75 (05/09/23 1600)  Resp: 17 (05/09/23 1600)  BP: (!) 156/81 (05/09/23 1600)  SpO2: 98 %  (05/09/23 1600) Vital Signs (24h Range):  Temp:  [97.7 °F (36.5 °C)-98.4 °F (36.9 °C)] 97.7 °F (36.5 °C)  Pulse:  [] 75  Resp:  [16-57] 17  SpO2:  [93 %-98 %] 98 %  BP: (130-157)/(69-90) 156/81     Weight: 93.8 kg (206 lb 12.7 oz)  Body mass index is 27.28 kg/m².     Physical Exam  Constitutional:       General: He is not in acute distress.     Appearance: He is well-developed.   HENT:      Head: Normocephalic.   Eyes:      Pupils: Pupils are equal, round, and reactive to light.   Cardiovascular:      Rate and Rhythm: Normal rate and regular rhythm.   Pulmonary:      Effort: Pulmonary effort is normal. No respiratory distress.   Abdominal:      General: Abdomen is flat. There is no distension.      Tenderness: There is no abdominal tenderness.   Musculoskeletal:      Cervical back: Neck supple.   Skin:     Findings: No rash.   Neurological:      Mental Status: He is alert and oriented to person, place, and time.   Psychiatric:         Mood and Affect: Mood normal.            CRANIAL NERVES     CN III, IV, VI   Pupils are equal, round, and reactive to light.     Significant Labs: All pertinent labs within the past 24 hours have been reviewed.  CBC:   Recent Labs   Lab 05/09/23  1107   WBC 9.32   HGB 15.3   HCT 43.8        CMP:   Recent Labs   Lab 05/09/23  1107      K 3.9      CO2 19*   *   BUN 24*   CREATININE 1.4   CALCIUM 9.8   PROT 7.9   ALBUMIN 4.6   BILITOT 0.7   ALKPHOS 68   AST 13   ALT 13   ANIONGAP 12     Cardiac Markers:   Recent Labs   Lab 05/09/23  1107   BNP 39       Significant Imaging: I have reviewed all pertinent imaging results/findings within the past 24 hours.    Assessment/Plan:     Active Hospital Problems    Diagnosis    *STEMI (ST elevation myocardial infarction)    Chronic bilateral low back pain without sciatica    Benign essential HTN    CHF (congestive heart failure)    COPD exacerbation    Type 2 diabetes mellitus       PLAN   S/p RCA stent   Post cath  care   [facility] . But he can follow up as OP   Asa , plavix and statin and BB  a1c and lipid panel   Follow cardiology   COPD appear stable .  Continue tirofiban infusion as per protocol      VTE Risk Mitigation (From admission, onward)           Ordered     heparin (porcine) injection 5,000 Units  Once         05/09/23 1139                               Louis Ruggiero MD  Department of Hospital Medicine  [facility]

## 2023-05-09 NOTE — HPI
67-year-old male with a history of CHF, diabetes, hypertension, CVA, endocarditis, COPD presents to the emergency department with chest pain and found to have dynamic EKG changes with STEMI and patient was brought to the cath lab and needed stent in the RCA.  Patient was seen and examined at post procedure in ICU.  And chest pain-free and no shortness of breath.  He also reported that he was losing about more than 10 lb weight loss in previous 3 months and a [facility] and he did [facility]oscopy and nothing showed .Also he did CT scan abdomen and did not show pathology except cyst in the liver.  But he was  told most likely the origin of the pain possible for pancreatitis with previous history of alcohol use.

## 2023-05-09 NOTE — NURSING
Nurses Note -- 4 Eyes      5/9/2023   2:26 PM      Skin assessed during: Admit      [x] No Altered Skin Integrity Present    []Prevention Measures Documented      [] Yes- Altered Skin Integrity Present or Discovered   [] LDA Added if Not in Epic (Describe Wound)   [] New Altered Skin Integrity was Present on Admit and Documented in LDA   [] Wound Image Taken    Wound Care Consulted? No    Attending Nurse:  Tiffany Holdsworth, RN     Second RN/Staff Member:  Mehnaz Sol

## 2023-05-09 NOTE — SUBJECTIVE & OBJECTIVE
Past Medical History:   Diagnosis Date    Abdominal pain 2022    CHF (congestive heart failure)     Diabetes mellitus 2018    Emphysema lung     Endocarditis 2011    Hypertension     Stroke 2011    denies residual       Past Surgical History:   Procedure Laterality Date    BACK SURGERY  1981    COLONOSCOPY N/A 2/23/2023    Procedure: COLONOSCOPY;  Surgeon: Jonn Cruz MD;  Location: [facility];  Service: Endoscopy;  Laterality: N/A;    EXCISION OF HYDROCELE      x2    INGUINAL HERNIA REPAIR  1960    RHINOPLASTY      SURGICAL REMOVAL OF NODULE OF VOCAL CORD         Review of patient's allergies indicates:  No Known Allergies    No current facility-administered medications on file prior to encounter.     Current Outpatient Medications on File Prior to Encounter   Medication Sig    albuterol (PROVENTIL/VENTOLIN HFA) 90 mcg/actuation inhaler Inhale 1 puff into the lungs every 4 (four) hours as needed.    amLODIPine (NORVASC) 10 MG tablet Take 10 mg by mouth once daily.    doxycycline (VIBRA-TABS) 100 MG tablet Take 1 tablet by mouth 2 (two) times daily.    insulin glargine,hum.rec.anlog (LANTUS SUBQ) Inject 35 Units into the skin once daily.    meclizine (ANTIVERT) 25 mg tablet Take 1 tablet (25 mg total) by mouth 3 (three) times daily as needed for Dizziness. (Patient not taking: Reported on 1/9/2023)     Family History    None       Tobacco Use    Smoking status: Every Day     Packs/day: 1.00     Types: Cigarettes     Last attempt to quit: 3/27/2020     Years since quitting: 3.1    Smokeless tobacco: Former    Tobacco comments:     Advised not to smoke DOS    Substance and Sexual Activity    Alcohol use: Yes     Alcohol/week: 14.0 standard drinks     Types: 14 Shots of liquor per week    Drug use: Yes     Types: Marijuana    Sexual activity: Not on file     Review of Systems   Constitutional:  Negative for activity change, appetite change and diaphoresis.   HENT:  Negative for congestion, facial swelling and sinus  pressure.    Respiratory:  Negative for shortness of breath and wheezing.    Cardiovascular:  Negative for chest pain and palpitations.   Gastrointestinal:  Negative for abdominal distention and abdominal pain.   Genitourinary:  Negative for dysuria.   Skin:  Negative for color change and pallor.   Neurological:  Negative for dizziness, facial asymmetry, speech difficulty and headaches.   Psychiatric/Behavioral:  Negative for agitation and confusion.    All other systems reviewed and are negative.  Objective:     Vital Signs (Most Recent):  Temp: 97.7 °F (36.5 °C) (05/09/23 1315)  Pulse: 75 (05/09/23 1600)  Resp: 17 (05/09/23 1600)  BP: (!) 156/81 (05/09/23 1600)  SpO2: 98 % (05/09/23 1600) Vital Signs (24h Range):  Temp:  [97.7 °F (36.5 °C)-98.4 °F (36.9 °C)] 97.7 °F (36.5 °C)  Pulse:  [] 75  Resp:  [16-57] 17  SpO2:  [93 %-98 %] 98 %  BP: (130-157)/(69-90) 156/81     Weight: 93.8 kg (206 lb 12.7 oz)  Body mass index is 27.28 kg/m².     Physical Exam  Constitutional:       General: He is not in acute distress.     Appearance: He is well-developed.   HENT:      Head: Normocephalic.   Eyes:      Pupils: Pupils are equal, round, and reactive to light.   Cardiovascular:      Rate and Rhythm: Normal rate and regular rhythm.   Pulmonary:      Effort: Pulmonary effort is normal. No respiratory distress.   Abdominal:      General: Abdomen is flat. There is no distension.      Tenderness: There is no abdominal tenderness.   Musculoskeletal:      Cervical back: Neck supple.   Skin:     Findings: No rash.   Neurological:      Mental Status: He is alert and oriented to person, place, and time.   Psychiatric:         Mood and Affect: Mood normal.            CRANIAL NERVES     CN III, IV, VI   Pupils are equal, round, and reactive to light.     Significant Labs: All pertinent labs within the past 24 hours have been reviewed.  CBC:   Recent Labs   Lab 05/09/23  1107   WBC 9.32   HGB 15.3   HCT 43.8        CMP:    Recent Labs   Lab 05/09/23  1107      K 3.9      CO2 19*   *   BUN 24*   CREATININE 1.4   CALCIUM 9.8   PROT 7.9   ALBUMIN 4.6   BILITOT 0.7   ALKPHOS 68   AST 13   ALT 13   ANIONGAP 12     Cardiac Markers:   Recent Labs   Lab 05/09/23  1107   BNP 39       Significant Imaging: I have reviewed all pertinent imaging results/findings within the past 24 hours.

## 2023-05-09 NOTE — PROGRESS NOTES
Automatic Inhaler to Nebulizer Interchange    albuterol (Ventolin, ProAir, or Proventil)  mcg given multiple times per day changed to albuterol 2.5 mg q4h prn SOB  per [facility] Automatic Therapeutic Substitutions Protocol.    Please contact pharmacy at extension [phone] with any questions.     Thank you,   Latesha Galicia

## 2023-05-09 NOTE — ED PROVIDER NOTES
Encounter Date: 5/9/2023       History     Chief Complaint   Patient presents with    Chest Pain    Shortness of Breath     67-year-old male with a history of CHF, diabetes, hypertension, CVA, endocarditis, COPD presents to the emergency department with chest pain.  The patient reports that he developed substernal chest pain that radiates to his jaw and into his back approximately 1 hour prior to arrival.  It is associated with shortness of breath and diaphoresis.  He is never had this pain in the past.  He does smoke cigarettes.  Denies current illicit drug use other than THC.    Review of patient's allergies indicates:  No Known Allergies  Past Medical History:   Diagnosis Date    Abdominal pain 2022    CHF (congestive heart failure)     Diabetes mellitus 2018    Emphysema lung     Endocarditis 2011    Hypertension     Stroke 2011    denies residual     Past Surgical History:   Procedure Laterality Date    BACK SURGERY  1981    COLONOSCOPY N/A 2/23/2023    Procedure: COLONOSCOPY;  Surgeon: Jonn Cruz MD;  Location: [facility];  Service: Endoscopy;  Laterality: N/A;    EXCISION OF HYDROCELE      x2    INGUINAL HERNIA REPAIR  1960    RHINOPLASTY      SURGICAL REMOVAL OF NODULE OF VOCAL CORD       No family history on file.  Social History     Tobacco Use    Smoking status: Every Day     Packs/day: 1.00     Types: Cigarettes     Last attempt to quit: 3/27/2020     Years since quitting: 3.1    Smokeless tobacco: Former    Tobacco comments:     Advised not to smoke DOS    Substance Use Topics    Alcohol use: Yes     Alcohol/week: 14.0 standard drinks     Types: 14 Shots of liquor per week    Drug use: Yes     Types: Marijuana     Review of Systems   Constitutional:  Positive for diaphoresis.   Respiratory:  Positive for shortness of breath.    Cardiovascular:  Positive for chest pain.     Physical Exam     Initial Vitals [05/09/23 1100]   BP Pulse Resp Temp SpO2   130/87 104 20 98.4 °F (36.9 °C) 96 %      MAP        --         Physical Exam    Nursing note and vitals reviewed.  Constitutional: He appears well-developed and well-nourished. He appears distressed.   HENT:   Head: Normocephalic and atraumatic.   Eyes: EOM are normal.   Neck:   Normal range of motion.  Cardiovascular:  Normal rate, regular rhythm, normal heart sounds and intact distal pulses.           No murmur heard.  Pulmonary/Chest: Breath sounds normal. No respiratory distress. He has no wheezes. He has no rales.   Abdominal: Abdomen is soft. He exhibits no distension.   Musculoskeletal:         General: No edema. Normal range of motion.      Cervical back: Normal range of motion.     Neurological: He is alert and oriented to person, place, and time.   Skin: Skin is warm and dry.   Psychiatric: He has a normal mood and affect.       ED Course   Critical Care    Date/Time: 5/9/2023 12:01 PM  Performed by: Beatriz Voss MD  Authorized by: Beatriz Voss MD   Direct patient critical care time: 20 minutes  Additional history critical care time: 5 minutes  Ordering / reviewing critical care time: 10 minutes  Documentation critical care time: 5 minutes  Consulting other physicians critical care time: 10 minutes  Total critical care time (exclusive of procedural time) : 50 minutes  Critical care time was exclusive of separately billable procedures and treating other patients and teaching time.  Critical care was necessary to treat or prevent imminent or life-threatening deterioration of the following conditions: cardiac failure (stemi).  Critical care was time spent personally by me on the following activities: development of treatment plan with patient or surrogate, discussions with consultants, interpretation of cardiac output measurements, evaluation of patient's response to treatment, examination of patient, obtaining history from patient or surrogate, ordering and performing treatments and interventions, ordering and review of laboratory studies,  ordering and review of radiographic studies, pulse oximetry, re-evaluation of patient's condition and review of old charts.      Labs Reviewed   CBC W/ AUTO DIFFERENTIAL - Abnormal; Notable for the following components:       Result Value    MCH 32.1 (*)     MPV 9.1 (*)     All other components within normal limits   COMPREHENSIVE METABOLIC PANEL - Abnormal; Notable for the following components:    CO2 19 (*)     Glucose 166 (*)     BUN 24 (*)     eGFR 54.7 (*)     All other components within normal limits   B-TYPE NATRIURETIC PEPTIDE   TROPONIN I HIGH SENSITIVITY   PROTIME-INR   MAGNESIUM   TROPONIN I HIGH SENSITIVITY   DRUG SCREEN PANEL, URINE EMERGENCY   PROTIME-INR   APTT     EKG Readings: (Independently Interpreted)   Initial Reading: STEMI. Previous EKG: Compared with most recent EKG   EKG is sinus tachycardia rate of 103 beats per minute with no ST elevation or depression, new left bundle-branch block which is a change from prior EKG    Repeat EKG is normal sinus rhythm at a rate of 88 beats per minute with first-degree AV block, ST elevation in inferior leads with reciprocal changes in lateral leads   ECG Results              EKG 12-lead (In process)  Result time 05/09/23 11:35:53      In process by Interface, Lab In [facility] (05/09/23 11:35:53)                   Narrative:    Test Reason : R07.9,    Vent. Rate : 088 BPM     Atrial Rate : 088 BPM     P-R Int : 222 ms          QRS Dur : 086 ms      QT Int : 360 ms       P-R-T Axes : 048 052 077 degrees     QTc Int : 435 ms    Sinus rhythm with 1st degree A-V block  ST elevation consider inferior injury or acute infarct    ACUTE MI / STEMI    Abnormal ECG  When compared with ECG of 09-MAY-2023 11:02,  Left bundle branch block is no longer Present    Referred By: [email or web address]   SELF           Confirmed By:                                      EKG 12-lead (In process)  Result time 05/09/23 11:04:05      In process by Interface, Lab In [facility] (05/09/23 11:04:05)                    Narrative:    Test Reason : R07.9,    Vent. Rate : 103 BPM     Atrial Rate : 103 BPM     P-R Int : 200 ms          QRS Dur : 134 ms      QT Int : 394 ms       P-R-T Axes : -14 -43 091 degrees     QTc Int : 516 ms    Sinus tachycardia  Left axis deviation  Left bundle branch block  Abnormal ECG  When compared with ECG of 17-FEB-2023 08:19,  Left bundle branch block is now Present    Referred By:             Confirmed By:                                   Imaging Results              X-Ray Chest AP Portable (Final result)  Result time 05/09/23 11:42:11      Final result by Sky James MD (05/09/23 11:42:11)                   Narrative:    Chest single view    CLINICAL DATA: Chest pain, shortness of breath    FINDINGS: AP view is compared to July 2022. Heart size is normal. The mediastinum is unremarkable. There is mild interstitial prominence at the lung bases, new compared to the prior exam. No alveolar infiltrates or effusions are identified. Osseous structures are unremarkable.    IMPRESSION:  1. Mild interstitial prominence at the lung bases. Correlate for possible mild interstitial edema.  2. No other significant findings.    Electronically signed by:  Sky James MD  5/9/2023 11:42 AM CDT Workstation: [id number]                                  X-Rays:   Independently Interpreted Readings:   Chest X-Ray: No cardiomegaly, mediastinum within normal limits, mild interstitial edema   Medications   nitroGLYCERIN SL tablet 0.4 mg (has no administration in time range)   nitroGLYCERIN (NITROSTAT) 0.4 MG SL tablet (has no administration in time range)   nitroGLYCERIN in 5 % dextrose 50 mg/250 mL (200 mcg/mL) infusion (has no administration in time range)   aspirin tablet 325 mg (has no administration in time range)   heparin (porcine) injection 5,000 Units (has no administration in time range)   clopidogreL tablet (600 mg Oral Given 5/9/23 1159)   aspirin tablet (325 mg Oral Given 5/9/23 1142)    heparin (PORCINE) bolus from bag (5,000 Units Intravenous Given 5/9/23 1201)   morphine injection 2 mg (2 mg Intravenous Given 5/9/23 1200)   ondansetron injection 4 mg (4 mg Intravenous Given 5/9/23 1145)   clopidogreL tablet 600 mg (600 mg Oral Given 5/9/23 1146)     Medical Decision Making:   ED Management:  67-year-old male presents to the emergency department chest pain. Ddx includes angina, ACS, PE, dissection, PNA, pneumothorax, MSK pain, rib fracture, anxiety, pericardial effusion. A cardiac monitor was ordered secondary to the patient's history of chest pain and to monitor for dysrhythmia.  The cardiac monitor revealed normal sinus rhythm as interpreted by me.    Initial EKG revealed a new left bundle-branch block but no Sgarbossa criteria.  The patient's pain intensified while in the emergency department.  Repeat EKG revealed dynamic changes including inferior ST-elevation MI. interventional Cardiology, Dr. Kessler, was emergently consulted and came to the emergency department.  Patient given aspirin, Plavix, heparin, nitroglycerin, morphine, Zofran and IV fluids with improvement in his pain.  Dr Kessler discussed risks and benefits of angiogram and the patient has consented to emergent catheterization. Cath lab team with Dr Kessler has taken patient to cath lab. [facility] Medicine to admit.      Beatriz Voss MD  Emergency Medicine  05/09/2023 12:02 PM                            Clinical Impression:   Final diagnoses:  [R07.9] Chest pain  [I21.3] ST elevation myocardial infarction (STEMI), unspecified artery (Primary)        ED Disposition Condition    Admit Stable                Beatriz Voss MD  05/09/23 1207

## 2023-05-10 ENCOUNTER — CLINICAL SUPPORT (OUTPATIENT)
Dept: CARDIOLOGY | Facility: HOSPITAL | Age: 68
DRG: 247 | End: 2023-05-10
Attending: INTERNAL MEDICINE
Payer: OTHER GOVERNMENT

## 2023-05-10 VITALS — HEIGHT: 73 IN | WEIGHT: 205 LBS | BODY MASS INDEX: 27.17 KG/M2

## 2023-05-10 PROBLEM — J44.9 COPD (CHRONIC OBSTRUCTIVE PULMONARY DISEASE): Status: ACTIVE | Noted: 2020-03-27

## 2023-05-10 PROBLEM — E11.59 TYPE 2 DIABETES MELLITUS WITH CIRCULATORY DISORDER, WITH LONG-TERM CURRENT USE OF INSULIN: Status: ACTIVE | Noted: 2020-03-27

## 2023-05-10 PROBLEM — F17.210 CIGARETTE SMOKER: Status: ACTIVE | Noted: 2023-05-10

## 2023-05-10 PROBLEM — Z79.4 TYPE 2 DIABETES MELLITUS WITH CIRCULATORY DISORDER, WITH LONG-TERM CURRENT USE OF INSULIN: Status: ACTIVE | Noted: 2020-03-27

## 2023-05-10 LAB
ANION GAP SERPL CALC-SCNC: 8 MMOL/L (ref 8–16)
AORTIC ROOT ANNULUS: 3.6 CM
AORTIC VALVE CUSP SEPERATION: 1.7 CM
AV INDEX (PROSTH): 0.7
AV MEAN GRADIENT: 4 MMHG
AV PEAK GRADIENT: 7 MMHG
AV VALVE AREA: 2.44 CM2
AV VELOCITY RATIO: 0.78
BSA FOR ECHO PROCEDURE: 2.19 M2
BUN SERPL-MCNC: 20 MG/DL (ref 8–23)
CALCIUM SERPL-MCNC: 9.1 MG/DL (ref 8.7–10.5)
CHLORIDE SERPL-SCNC: 107 MMOL/L (ref 95–110)
CHOLEST SERPL-MCNC: 188 MG/DL (ref 120–199)
CHOLEST/HDLC SERPL: 4.2 {RATIO} (ref 2–5)
CO2 SERPL-SCNC: 23 MMOL/L (ref 23–29)
CREAT SERPL-MCNC: 1 MG/DL (ref 0.5–1.4)
CV ECHO LV RWT: 0.59 CM
DOP CALC AO PEAK VEL: 1.28 M/S
DOP CALC AO VTI: 28.7 CM
DOP CALC LVOT AREA: 3.5 CM2
DOP CALC LVOT DIAMETER: 2.1 CM
DOP CALC LVOT PEAK VEL: 1 M/S
DOP CALC LVOT STROKE VOLUME: 69.93 CM3
DOP CALC MV VTI: 27.8 CM
DOP CALCLVOT PEAK VEL VTI: 20.2 CM
E WAVE DECELERATION TIME: 235 MSEC
E/A RATIO: 0.97
E/E' RATIO: 9.22 M/S
ECHO LV POSTERIOR WALL: 1.28 CM (ref 0.6–1.1)
EJECTION FRACTION: 55 %
ERYTHROCYTE [DISTWIDTH] IN BLOOD BY AUTOMATED COUNT: 12.4 % (ref 11.5–14.5)
EST. GFR  (NO RACE VARIABLE): >60 ML/MIN/1.73 M^2
ESTIMATED AVG GLUCOSE: 151 MG/DL (ref 68–131)
ESTIMATED AVG GLUCOSE: 151 MG/DL (ref 68–131)
FRACTIONAL SHORTENING: 30 % (ref 28–44)
GLUCOSE SERPL-MCNC: 188 MG/DL (ref 70–110)
HBA1C MFR BLD: 6.9 % (ref 4.5–6.2)
HBA1C MFR BLD: 6.9 % (ref 4.5–6.2)
HCT VFR BLD AUTO: 42.4 % (ref 40–54)
HDLC SERPL-MCNC: 45 MG/DL (ref 40–75)
HDLC SERPL: 23.9 % (ref 20–50)
HGB BLD-MCNC: 14.3 G/DL (ref 14–18)
INTERVENTRICULAR SEPTUM: 1.18 CM (ref 0.6–1.1)
LDLC SERPL CALC-MCNC: 118.2 MG/DL (ref 63–159)
LEFT ATRIUM VOLUME INDEX MOD: 29 ML/M2
LEFT ATRIUM VOLUME MOD: 63 CM3
LEFT INTERNAL DIMENSION IN SYSTOLE: 3.07 CM (ref 2.1–4)
LEFT VENTRICLE DIASTOLIC VOLUME INDEX: 39.54 ML/M2
LEFT VENTRICLE DIASTOLIC VOLUME: 85.8 ML
LEFT VENTRICLE MASS INDEX: 90 G/M2
LEFT VENTRICLE SYSTOLIC VOLUME INDEX: 17.1 ML/M2
LEFT VENTRICLE SYSTOLIC VOLUME: 37 ML
LEFT VENTRICULAR INTERNAL DIMENSION IN DIASTOLE: 4.36 CM (ref 3.5–6)
LEFT VENTRICULAR MASS: 195.56 G
LV LATERAL E/E' RATIO: 9.22 M/S
LV SEPTAL E/E' RATIO: 9.22 M/S
LVOT MG: 2 MMHG
LVOT MV: 0.64 CM/S
MAGNESIUM SERPL-MCNC: 2.1 MG/DL (ref 1.6–2.6)
MCH RBC QN AUTO: 31.9 PG (ref 27–31)
MCHC RBC AUTO-ENTMCNC: 33.7 G/DL (ref 32–36)
MCV RBC AUTO: 95 FL (ref 82–98)
MV MEAN GRADIENT: 2 MMHG
MV PEAK A VEL: 0.86 M/S
MV PEAK E VEL: 0.83 M/S
MV PEAK GRADIENT: 4 MMHG
MV STENOSIS PRESSURE HALF TIME: 72 MS
MV VALVE AREA BY CONTINUITY EQUATION: 2.52 CM2
MV VALVE AREA P 1/2 METHOD: 3.06 CM2
NONHDLC SERPL-MCNC: 143 MG/DL
OHS LV EJECTION FRACTION SIMPSONS BIPLANE MOD: 6 %
PLATELET # BLD AUTO: 249 K/UL (ref 150–450)
PMV BLD AUTO: 9.2 FL (ref 9.2–12.9)
POTASSIUM SERPL-SCNC: 4.2 MMOL/L (ref 3.5–5.1)
PV MV: 0.74 M/S
PV PEAK VELOCITY: 1 CM/S
RA PRESSURE: 3 MMHG
RBC # BLD AUTO: 4.48 M/UL (ref 4.6–6.2)
RV TISSUE DOPPLER FREE WALL SYSTOLIC VELOCITY 1 (APICAL 4 CHAMBER VIEW): 0.01 CM/S
SINUS: 3.74 CM
SODIUM SERPL-SCNC: 138 MMOL/L (ref 136–145)
TDI LATERAL: 0.09 M/S
TDI SEPTAL: 0.09 M/S
TDI: 0.09 M/S
TRICUSPID ANNULAR PLANE SYSTOLIC EXCURSION: 2.38 CM
TRIGL SERPL-MCNC: 124 MG/DL (ref 30–150)
TROPONIN I SERPL HS-MCNC: 1523.4 PG/ML (ref 0–14.9)
TROPONIN I SERPL HS-MCNC: 1970.7 PG/ML (ref 0–14.9)
TROPONIN I SERPL HS-MCNC: 2940.3 PG/ML (ref 0–14.9)
TSH SERPL DL<=0.005 MIU/L-ACNC: 2.16 UIU/ML (ref 0.34–5.6)
WBC # BLD AUTO: 7.48 K/UL (ref 3.9–12.7)

## 2023-05-10 PROCEDURE — 93010 ELECTROCARDIOGRAM REPORT: CPT | Mod: ,,, | Performed by: INTERNAL MEDICINE

## 2023-05-10 PROCEDURE — 99233 PR SUBSEQUENT HOSPITAL CARE,LEVL III: ICD-10-PCS | Mod: ,,, | Performed by: INTERNAL MEDICINE

## 2023-05-10 PROCEDURE — 99900035 HC TECH TIME PER 15 MIN (STAT)

## 2023-05-10 PROCEDURE — 25000003 PHARM REV CODE 250: Performed by: INTERNAL MEDICINE

## 2023-05-10 PROCEDURE — 93306 TTE W/DOPPLER COMPLETE: CPT | Mod: 26,,, | Performed by: INTERNAL MEDICINE

## 2023-05-10 PROCEDURE — 80048 BASIC METABOLIC PNL TOTAL CA: CPT | Performed by: INTERNAL MEDICINE

## 2023-05-10 PROCEDURE — 84443 ASSAY THYROID STIM HORMONE: CPT | Performed by: INTERNAL MEDICINE

## 2023-05-10 PROCEDURE — 25000003 PHARM REV CODE 250: Performed by: GENERAL PRACTICE

## 2023-05-10 PROCEDURE — 27000221 HC OXYGEN, UP TO 24 HOURS

## 2023-05-10 PROCEDURE — 93005 ELECTROCARDIOGRAM TRACING: CPT | Performed by: INTERNAL MEDICINE

## 2023-05-10 PROCEDURE — 93306 ECHO (CUPID ONLY): ICD-10-PCS | Mod: 26,,, | Performed by: INTERNAL MEDICINE

## 2023-05-10 PROCEDURE — 36415 COLL VENOUS BLD VENIPUNCTURE: CPT | Performed by: INTERNAL MEDICINE

## 2023-05-10 PROCEDURE — 25000003 PHARM REV CODE 250

## 2023-05-10 PROCEDURE — 83735 ASSAY OF MAGNESIUM: CPT | Performed by: INTERNAL MEDICINE

## 2023-05-10 PROCEDURE — 93010 EKG 12-LEAD: ICD-10-PCS | Mod: ,,, | Performed by: INTERNAL MEDICINE

## 2023-05-10 PROCEDURE — 83036 HEMOGLOBIN GLYCOSYLATED A1C: CPT | Performed by: INTERNAL MEDICINE

## 2023-05-10 PROCEDURE — 20000000 HC ICU ROOM

## 2023-05-10 PROCEDURE — 94761 N-INVAS EAR/PLS OXIMETRY MLT: CPT

## 2023-05-10 PROCEDURE — A4216 STERILE WATER/SALINE, 10 ML: HCPCS | Performed by: INTERNAL MEDICINE

## 2023-05-10 PROCEDURE — 99233 SBSQ HOSP IP/OBS HIGH 50: CPT | Mod: ,,, | Performed by: INTERNAL MEDICINE

## 2023-05-10 PROCEDURE — 80061 LIPID PANEL: CPT | Performed by: INTERNAL MEDICINE

## 2023-05-10 PROCEDURE — 93306 TTE W/DOPPLER COMPLETE: CPT

## 2023-05-10 PROCEDURE — 84484 ASSAY OF TROPONIN QUANT: CPT | Mod: 91 | Performed by: INTERNAL MEDICINE

## 2023-05-10 PROCEDURE — 85027 COMPLETE CBC AUTOMATED: CPT | Performed by: INTERNAL MEDICINE

## 2023-05-10 PROCEDURE — 94760 N-INVAS EAR/PLS OXIMETRY 1: CPT

## 2023-05-10 RX ORDER — MUPIROCIN 20 MG/G
OINTMENT TOPICAL 2 TIMES DAILY
Status: DISCONTINUED | OUTPATIENT
Start: 2023-05-10 | End: 2023-05-14 | Stop reason: HOSPADM

## 2023-05-10 RX ORDER — ACETAMINOPHEN 325 MG/1
650 TABLET ORAL EVERY 6 HOURS PRN
Status: DISCONTINUED | OUTPATIENT
Start: 2023-05-10 | End: 2023-05-14 | Stop reason: HOSPADM

## 2023-05-10 RX ORDER — SODIUM CHLORIDE 9 MG/ML
INJECTION, SOLUTION INTRAVENOUS ONCE
Status: CANCELLED | OUTPATIENT
Start: 2023-05-10 | End: 2023-05-10

## 2023-05-10 RX ORDER — DIPHENHYDRAMINE HCL 25 MG
50 CAPSULE ORAL
Status: CANCELLED | OUTPATIENT
Start: 2023-05-10

## 2023-05-10 RX ORDER — CHLORHEXIDINE GLUCONATE ORAL RINSE 1.2 MG/ML
15 SOLUTION DENTAL 2 TIMES DAILY
Status: DISCONTINUED | OUTPATIENT
Start: 2023-05-10 | End: 2023-05-14 | Stop reason: HOSPADM

## 2023-05-10 RX ADMIN — ASPIRIN 81 MG: 81 TABLET, COATED ORAL at 08:05

## 2023-05-10 RX ADMIN — INSULIN DETEMIR 30 UNITS: 100 INJECTION, SOLUTION SUBCUTANEOUS at 09:05

## 2023-05-10 RX ADMIN — CLOPIDOGREL BISULFATE 75 MG: 75 TABLET, FILM COATED ORAL at 08:05

## 2023-05-10 RX ADMIN — SODIUM CHLORIDE, PRESERVATIVE FREE 10 ML: 5 INJECTION INTRAVENOUS at 09:05

## 2023-05-10 RX ADMIN — INSULIN ASPART 4 UNITS: 100 INJECTION, SOLUTION INTRAVENOUS; SUBCUTANEOUS at 11:05

## 2023-05-10 RX ADMIN — CHLORHEXIDINE GLUCONATE 15 ML: 1.2 RINSE ORAL at 09:05

## 2023-05-10 RX ADMIN — MUPIROCIN 1 G: 20 OINTMENT TOPICAL at 08:05

## 2023-05-10 RX ADMIN — CARVEDILOL 3.12 MG: 3.12 TABLET, FILM COATED ORAL at 09:05

## 2023-05-10 RX ADMIN — ATORVASTATIN CALCIUM 40 MG: 40 TABLET, FILM COATED ORAL at 08:05

## 2023-05-10 RX ADMIN — INSULIN ASPART 1 UNITS: 100 INJECTION, SOLUTION INTRAVENOUS; SUBCUTANEOUS at 09:05

## 2023-05-10 RX ADMIN — ACETAMINOPHEN 650 MG: 325 TABLET ORAL at 10:05

## 2023-05-10 RX ADMIN — CHLORHEXIDINE GLUCONATE 15 ML: 1.2 RINSE ORAL at 08:05

## 2023-05-10 RX ADMIN — CARVEDILOL 3.12 MG: 3.12 TABLET, FILM COATED ORAL at 08:05

## 2023-05-10 RX ADMIN — MUPIROCIN 1 G: 20 OINTMENT TOPICAL at 09:05

## 2023-05-10 NOTE — PROGRESS NOTES
[facility]  Cardiology  Consult Note    Patient Name: Sampson Holt  MRN: [id number]  Admission Date: 5/9/2023  Hospital Length of Stay: 1 days  Code Status: Full Code   Attending Provider: Abdoulaye Thibodeaux MD   Consulting Provider: Jennifer Lang NP  Primary Care Physician: Damian Vergara MD  Principal Problem:STEMI (ST elevation myocardial infarction)    Patient information was obtained from patient and ER records.     Consults  Subjective:         5/10/2023    Denies CP  EKG still shows some st elevation In inferior leads.       68 yo male with h/o tobacco abuse, COPD, endocarditis here with acute onset chest pain. 1st EKG showed LBBB, 2nd showed inferior STEMI. Emergently taken to cath lab. PCI of RCA with 1 stent.     Past Medical History:   Diagnosis Date    Abdominal pain 2022    CHF (congestive heart failure)     Diabetes mellitus 2018    Emphysema lung     Endocarditis 2011    Hypertension     Stroke 2011    denies residual       Past Surgical History:   Procedure Laterality Date    BACK SURGERY  1981    COLONOSCOPY N/A 2/23/2023    Procedure: COLONOSCOPY;  Surgeon: Jonn Cruz MD;  Location: [facility];  Service: Endoscopy;  Laterality: N/A;    EXCISION OF HYDROCELE      x2    INGUINAL HERNIA REPAIR  1960    RHINOPLASTY      SURGICAL REMOVAL OF NODULE OF VOCAL CORD         Review of patient's allergies indicates:  No Known Allergies    No current facility-administered medications on file prior to encounter.     Current Outpatient Medications on File Prior to Encounter   Medication Sig    albuterol (PROVENTIL/VENTOLIN HFA) 90 mcg/actuation inhaler Inhale 1 puff into the lungs every 4 (four) hours as needed.    amLODIPine (NORVASC) 10 MG tablet Take 10 mg by mouth once daily.    doxycycline (VIBRA-TABS) 100 MG tablet Take 1 tablet by mouth 2 (two) times daily.    insulin glargine,hum.rec.anlog (LANTUS SUBQ) Inject 35 Units into the skin once daily.    meclizine (ANTIVERT) 25 mg tablet  Take 1 tablet (25 mg total) by mouth 3 (three) times daily as needed for Dizziness. (Patient not taking: Reported on 1/9/2023)     Family History    None       Tobacco Use    Smoking status: Every Day     Packs/day: 1.00     Types: Cigarettes     Last attempt to quit: 3/27/2020     Years since quitting: 3.1    Smokeless tobacco: Former    Tobacco comments:     Advised not to smoke DOS    Substance and Sexual Activity    Alcohol use: Yes     Alcohol/week: 14.0 standard drinks     Types: 14 Shots of liquor per week    Drug use: Yes     Types: Marijuana    Sexual activity: Not on file     Review of Systems   All other systems reviewed and are negative.  Objective:     Vital Signs (Most Recent):  Temp: 97.9 °F (36.6 °C) (05/10/23 0840)  Pulse: 71 (05/10/23 0840)  Resp: 12 (05/10/23 0840)  BP: 120/61 (05/10/23 0840)  SpO2: 97 % (05/10/23 0840) Vital Signs (24h Range):  Temp:  [97.7 °F (36.5 °C)-98.4 °F (36.9 °C)] 97.9 °F (36.6 °C)  Pulse:  [] 71  Resp:  [9-57] 12  SpO2:  [90 %-98 %] 97 %  BP: (112-157)/(55-90) 120/61     Weight: 93.2 kg (205 lb 7.5 oz)  Body mass index is 27.11 kg/m².    SpO2: 97 %         Intake/Output Summary (Last 24 hours) at 5/10/2023 0905  Last data filed at 5/10/2023 0707  Gross per 24 hour   Intake 1640.47 ml   Output 2150 ml   Net -509.53 ml         Lines/Drains/Airways       Airway  Duration                  Airway - Non-Surgical 02/23/23 0710 Other (Comment) 76 days              Peripheral Intravenous Line  Duration                  Peripheral IV - Single Lumen 05/09/23 1106 20 G Right Antecubital <1 day         Peripheral IV - Single Lumen 05/09/23 1140 18 G Anterior;Distal;Left Upper Arm <1 day                    Physical Exam  Vitals reviewed.   Constitutional:       Appearance: Normal appearance.   HENT:      Mouth/Throat:      Mouth: Mucous membranes are moist.   Eyes:      Extraocular Movements: Extraocular movements intact.      Pupils: Pupils are equal, round, and reactive to  light.   Cardiovascular:      Rate and Rhythm: Normal rate and regular rhythm.      Heart sounds: No murmur heard.    No gallop.   Pulmonary:      Effort: Pulmonary effort is normal.      Breath sounds: Normal breath sounds.   Abdominal:      General: Abdomen is flat.      Palpations: Abdomen is soft.   Musculoskeletal:      Cervical back: Normal range of motion.   Skin:     General: Skin is warm and dry.   Neurological:      General: No focal deficit present.      Mental Status: He is alert and oriented to person, place, and time.   Psychiatric:         Mood and Affect: Mood normal.       Significant Labs: BMP:   Recent Labs   Lab 05/09/23  1107 05/10/23  0336   * 188*    138   K 3.9 4.2    107   CO2 19* 23   BUN 24* 20   CREATININE 1.4 1.0   CALCIUM 9.8 9.1   MG 1.9 2.1     , CBC   Recent Labs   Lab 05/09/23  1107 05/10/23  0336   WBC 9.32 7.48   HGB 15.3 14.3   HCT 43.8 42.4    249     , and Troponin No results for input(s): TROPONINI in the last 48 hours.    Significant Imaging:   Assessment and Plan:     Inferior STEMI s/p PCI of mid RCA with 1 drug eluting stent  Severe distal Lcx and mid LAD stenosis  H/o endocarditis, details unknown  Tobacco abuse/ COPD    Recs:  - Aspirin, plavix  - Nitro for post PCI chest pain   - trend troponins  - echo  - Staged PCI of Lcx and LAD TOMORROW  depending upon clinical course/ renal function.   - tobacco cessation advised  - get records of endocarditis      Active Diagnoses:    Diagnosis Date Noted POA    PRINCIPAL PROBLEM:  STEMI (ST elevation myocardial infarction) [I21.3] 05/09/2023 Yes    Chronic bilateral low back pain without sciatica [M54.50, G89.29] 03/15/2023 Yes    Benign essential HTN [I10] 03/27/2020 Yes    CHF (congestive heart failure) [I50.9] 03/27/2020 Yes    COPD exacerbation [J44.1] 03/27/2020 Yes    Type 2 diabetes mellitus [E11.9] 03/27/2020 Yes      Problems Resolved During this Admission:       VTE Risk Mitigation (From  admission, onward)           Ordered     heparin (porcine) injection 5,000 Units  Once         05/09/23 2238                    Thank you for your consult. I will follow-up with patient. Please contact us if you have any additional questions.    Jennifer Lang NP  Cardiology   [facility]

## 2023-05-10 NOTE — CARE UPDATE
05/10/23 0833   Patient Assessment/Suction   Level of Consciousness (AVPU) alert   Respiratory Effort Unlabored   Expansion/Accessory Muscles/Retractions no use of accessory muscles   All Lung Fields Breath Sounds clear   Rhythm/Pattern, Respiratory pattern regular   Cough Frequency infrequent   Cough Type dry   PRE-TX-O2   Device (Oxygen Therapy) nasal cannula   $ Is the patient on Low Flow Oxygen? Yes   Flow (L/min) 2  (decreased from 3l for weaning)   Pulse Oximetry Type Continuous   $ Pulse Oximetry - Single Charge Pulse Oximetry - Single   Aerosol Therapy   $ Aerosol Therapy Charges PRN treatment not required

## 2023-05-10 NOTE — PLAN OF CARE
[facility]  Initial Discharge Assessment       Primary Care Provider: Damian Vergara MD    Admission Diagnosis: ST elevation myocardial infarction (STEMI), unspecified artery [I21.3]    Admission Date: 5/9/2023  Expected Discharge Date:      met with Pt at bedside to complete discharge assessment. Pt AAOx4s. Demographics, PCP, and insurance verified. No home health. No dialysis. Pt reports ability to complete ADLs with the assistance of a glucometer. Pt verbalized plan to discharge home via personal transport. Pt has no other needs to be addressed at this time.     Discharge Barriers Identified: None    Payor: [employer] / Plan: [facility] [employer] [employer] / Product Type: Government /     Extended Emergency Contact Information  Primary Emergency Contact: Angel Klein  Mobile Phone: [phone]  Relation: Friend  Preferred language: English   needed? No  Secondary Emergency Contact: Ben Ba  Mobile Phone: [phone]  Relation: Friend  Preferred language: English   needed? No    Discharge Plan A: Home  Discharge Plan B: Home      [facility] - [facility] [address]  [address] [facility] [address]  Phone: [phone] Fax: [phone]    [facility] [address] [facility] [address] [place] [address]  [address] [place] [address]  Phone: [phone] Fax: [phone]      Initial Assessment (most recent)       Adult Discharge Assessment - 05/10/23 1026          Discharge Assessment    Assessment Type Discharge Planning Assessment     Confirmed/corrected address, phone number and insurance Yes     Confirmed Demographics Correct on Facesheet     Source of Information patient     Does patient/caregiver understand observation status Yes     Communicated DAVID with patient/caregiver Yes     Reason For Admission STEMI (ST elevation myocardial infarction)     People in Home alone     Facility Arrived From: home     Do you expect  to return to your current living situation? Yes     Do you have help at home or someone to help you manage your care at home? No     Prior to hospitilization cognitive status: Alert/Oriented     Current cognitive status: Alert/Oriented     Home Accessibility wheelchair accessible     Home Layout Able to live on 1st floor     Equipment Currently Used at Home glucometer     Readmission within 30 days? No     Patient currently being followed by outpatient case management? No     Do you currently have service(s) that help you manage your care at home? No     Do you take prescription medications? Yes     Do you have prescription coverage? Yes     Coverage Payor:  [facility] CCN OPTUM     Do you have any problems affording any of your prescribed medications? No     Is the patient taking medications as prescribed? yes     Who is going to help you get home at discharge? Self     How do you get to doctors appointments? car, drives self     Are you on dialysis? No     Do you take coumadin? No     Discharge Plan A Home     Discharge Plan B Home     DME Needed Upon Discharge  none     Discharge Plan discussed with: Patient     Discharge Barriers Identified None        Transportation Needs    In the past 12 months, has lack of transportation kept you from medical appointments or from getting medications? No     In the past 12 months, has lack of transportation kept you from meetings, work, or from getting things needed for daily living? No

## 2023-05-10 NOTE — H&P (VIEW-ONLY)
[facility]  Cardiology  Consult Note    Patient Name: Sampson Holt  MRN: [id number]  Admission Date: 5/9/2023  Hospital Length of Stay: 0 days  Code Status: Full Code   Attending Provider: Louis Ruggiero MD   Consulting Provider: Antonio Kessler MD  Primary Care Physician: Damian Vergara MD  Principal Problem:STEMI (ST elevation myocardial infarction)    Patient information was obtained from patient and ER records.     Inpatient consult to Cardiology  Consult performed by: Antonio Kessler MD  Consult ordered by: Antonio Kessler MD  Reason for consult: Inferior STEMI      Subjective:     66 yo male with h/o tobacco abuse, COPD, endocarditis here with acute onset chest pain. 1st EKG showed LBBB, 2nd showed inferior STEMI. Emergently taken to cath lab. PCI of RCA with 1 stent.     Past Medical History:   Diagnosis Date    Abdominal pain 2022    CHF (congestive heart failure)     Diabetes mellitus 2018    Emphysema lung     Endocarditis 2011    Hypertension     Stroke 2011    denies residual       Past Surgical History:   Procedure Laterality Date    BACK SURGERY  1981    COLONOSCOPY N/A 2/23/2023    Procedure: COLONOSCOPY;  Surgeon: Jonn Cruz MD;  Location: [facility];  Service: Endoscopy;  Laterality: N/A;    EXCISION OF HYDROCELE      x2    INGUINAL HERNIA REPAIR  1960    RHINOPLASTY      SURGICAL REMOVAL OF NODULE OF VOCAL CORD         Review of patient's allergies indicates:  No Known Allergies    No current facility-administered medications on file prior to encounter.     Current Outpatient Medications on File Prior to Encounter   Medication Sig    albuterol (PROVENTIL/VENTOLIN HFA) 90 mcg/actuation inhaler Inhale 1 puff into the lungs every 4 (four) hours as needed.    amLODIPine (NORVASC) 10 MG tablet Take 10 mg by mouth once daily.    doxycycline (VIBRA-TABS) 100 MG tablet Take 1 tablet by mouth 2 (two) times daily.    insulin glargine,hum.rec.anlog (LANTUS SUBQ) Inject 35 Units into the skin  once daily.    meclizine (ANTIVERT) 25 mg tablet Take 1 tablet (25 mg total) by mouth 3 (three) times daily as needed for Dizziness. (Patient not taking: Reported on 1/9/2023)     Family History    None       Tobacco Use    Smoking status: Every Day     Packs/day: 1.00     Types: Cigarettes     Last attempt to quit: 3/27/2020     Years since quitting: 3.1    Smokeless tobacco: Former    Tobacco comments:     Advised not to smoke DOS    Substance and Sexual Activity    Alcohol use: Yes     Alcohol/week: 14.0 standard drinks     Types: 14 Shots of liquor per week    Drug use: Yes     Types: Marijuana    Sexual activity: Not on file     Review of Systems   All other systems reviewed and are negative.  Objective:     Vital Signs (Most Recent):  Temp: 97.9 °F (36.6 °C) (05/09/23 2001)  Pulse: 75 (05/09/23 2101)  Resp: 16 (05/09/23 2041)  BP: 129/77 (05/09/23 2101)  SpO2: 96 % (05/09/23 2041) Vital Signs (24h Range):  Temp:  [97.7 °F (36.5 °C)-98.4 °F (36.9 °C)] 97.9 °F (36.6 °C)  Pulse:  [] 75  Resp:  [15-57] 16  SpO2:  [93 %-98 %] 96 %  BP: (117-157)/(62-90) 129/77     Weight: 93.8 kg (206 lb 12.7 oz)  Body mass index is 27.28 kg/m².    SpO2: 96 %         Intake/Output Summary (Last 24 hours) at 5/9/2023 2123  Last data filed at 5/9/2023 1901  Gross per 24 hour   Intake 963.98 ml   Output 1550 ml   Net -586.02 ml       Lines/Drains/Airways       Airway  Duration                  Airway - Non-Surgical 02/23/23 0710 Other (Comment) 75 days              Peripheral Intravenous Line  Duration                  Peripheral IV - Single Lumen 05/09/23 1106 20 G Right Antecubital <1 day         Peripheral IV - Single Lumen 05/09/23 1140 18 G Anterior;Distal;Left Upper Arm <1 day                    Physical Exam  Vitals reviewed.   Constitutional:       Appearance: Normal appearance.   HENT:      Mouth/Throat:      Mouth: Mucous membranes are moist.   Eyes:      Extraocular Movements: Extraocular movements intact.       Pupils: Pupils are equal, round, and reactive to light.   Cardiovascular:      Rate and Rhythm: Normal rate and regular rhythm.      Heart sounds: No murmur heard.    No gallop.   Pulmonary:      Effort: Pulmonary effort is normal.      Breath sounds: Normal breath sounds.   Abdominal:      General: Abdomen is flat.      Palpations: Abdomen is soft.   Musculoskeletal:      Cervical back: Normal range of motion.   Skin:     General: Skin is warm and dry.   Neurological:      General: No focal deficit present.      Mental Status: He is alert and oriented to person, place, and time.   Psychiatric:         Mood and Affect: Mood normal.       Significant Labs: BMP:   Recent Labs   Lab 05/09/23  1107   *      K 3.9      CO2 19*   BUN 24*   CREATININE 1.4   CALCIUM 9.8   MG 1.9   , CBC   Recent Labs   Lab 05/09/23  1107   WBC 9.32   HGB 15.3   HCT 43.8      , and Troponin No results for input(s): TROPONINI in the last 48 hours.    Significant Imaging:   Assessment and Plan:     Inferior STEMI s/p PCI of mid RCA with 1 drug eluting stent  Severe distal Lcx and mid LAD stenosis  H/o endocarditis, details unknown  Tobacco abuse/ COPD    Recs:  - Aspirin, plavix  - Nitro for post PCI chest pain   - trend troponins  - echo  - Staged PCI of Lcx and LAD in 1-2 days depending upon clinical course/ renal function.   - tobacco cessation advised  - get records of endocarditis      Active Diagnoses:    Diagnosis Date Noted POA    PRINCIPAL PROBLEM:  STEMI (ST elevation myocardial infarction) [I21.3] 05/09/2023 Yes    Chronic bilateral low back pain without sciatica [M54.50, G89.29] 03/15/2023 Yes    Benign essential HTN [I10] 03/27/2020 Yes    CHF (congestive heart failure) [I50.9] 03/27/2020 Yes    COPD exacerbation [J44.1] 03/27/2020 Yes    Type 2 diabetes mellitus [E11.9] 03/27/2020 Yes      Problems Resolved During this Admission:       VTE Risk Mitigation (From admission, onward)           Ordered      heparin (porcine) injection 5,000 Units  Once         05/09/23 5548                    Thank you for your consult. I will follow-up with patient. Please contact us if you have any additional questions.    Antonio Kessler MD  Cardiology   [facility]

## 2023-05-10 NOTE — CARE UPDATE
05/09/23 2041   Patient Assessment/Suction   Level of Consciousness (AVPU) alert   Respiratory Effort Unlabored   Expansion/Accessory Muscles/Retractions expansion symmetric   All Lung Fields Breath Sounds clear   Rhythm/Pattern, Respiratory pattern regular;depth regular   Cough Frequency infrequent   Cough Type good;nonproductive   PRE-TX-O2   Device (Oxygen Therapy) nasal cannula   $ Is the patient on Low Flow Oxygen? Yes   Flow (L/min) 2   SpO2 96 %   Pulse Oximetry Type Continuous   $ Pulse Oximetry - Multiple Charge Pulse Oximetry - Multiple   Pulse 75   Resp 16   Education   $ Education DME Oxygen;15 min

## 2023-05-10 NOTE — PLAN OF CARE
Problem: Diabetes Comorbidity  Goal: Blood Glucose Level Within Targeted Range  Outcome: Ongoing, Progressing     Problem: Adult Inpatient Plan of Care  Goal: Plan of Care Review  Outcome: Ongoing, Progressing  Goal: Patient-Specific Goal (Individualized)  Outcome: Ongoing, Progressing  Goal: Absence of Hospital-Acquired Illness or Injury  Outcome: Ongoing, Progressing  Goal: Optimal Comfort and Wellbeing  Outcome: Ongoing, Progressing  Goal: Readiness for Transition of Care  Outcome: Ongoing, Progressing

## 2023-05-10 NOTE — CONSULTS
[facility] hospital  Cardiology  Consult Note    Patient Name: Sampson Holt  MRN: [id number]  Admission Date: 5/9/2023  Hospital Length of Stay: 0 days  Code Status: Full Code   Attending Provider: Louis Ruggiero MD   Consulting Provider: Antonio Kessler MD  Primary Care Physician: Damian Vergara MD  Principal Problem:STEMI (ST elevation myocardial infarction)    Patient information was obtained from patient and ER records.     Inpatient consult to Cardiology  Consult performed by: Antonio Kessler MD  Consult ordered by: Antonio Kessler MD  Reason for consult: Inferior STEMI      Subjective:     66 yo male with h/o tobacco abuse, COPD, endocarditis here with acute onset chest pain. 1st EKG showed LBBB, 2nd showed inferior STEMI. Emergently taken to cath lab. PCI of RCA with 1 stent.     Past Medical History:   Diagnosis Date    Abdominal pain 2022    CHF (congestive heart failure)     Diabetes mellitus 2018    Emphysema lung     Endocarditis 2011    Hypertension     Stroke 2011    denies residual       Past Surgical History:   Procedure Laterality Date    BACK SURGERY  1981    COLONOSCOPY N/A 2/23/2023    Procedure: COLONOSCOPY;  Surgeon: Jonn Cruz MD;  Location: [facility];  Service: Endoscopy;  Laterality: N/A;    EXCISION OF HYDROCELE      x2    INGUINAL HERNIA REPAIR  1960    RHINOPLASTY      SURGICAL REMOVAL OF NODULE OF VOCAL CORD         Review of patient's allergies indicates:  No Known Allergies    No current facility-administered medications on file prior to encounter.     Current Outpatient Medications on File Prior to Encounter   Medication Sig    albuterol (PROVENTIL/VENTOLIN HFA) 90 mcg/actuation inhaler Inhale 1 puff into the lungs every 4 (four) hours as needed.    amLODIPine (NORVASC) 10 MG tablet Take 10 mg by mouth once daily.    doxycycline (VIBRA-TABS) 100 MG tablet Take 1 tablet by mouth 2 (two) times daily.    insulin glargine,hum.rec.anlog (LANTUS SUBQ) Inject 35 Units into the skin  once daily.    meclizine (ANTIVERT) 25 mg tablet Take 1 tablet (25 mg total) by mouth 3 (three) times daily as needed for Dizziness. (Patient not taking: Reported on 1/9/2023)     Family History    None       Tobacco Use    Smoking status: Every Day     Packs/day: 1.00     Types: Cigarettes     Last attempt to quit: 3/27/2020     Years since quitting: 3.1    Smokeless tobacco: Former    Tobacco comments:     Advised not to smoke DOS    Substance and Sexual Activity    Alcohol use: Yes     Alcohol/week: 14.0 standard drinks     Types: 14 Shots of liquor per week    Drug use: Yes     Types: Marijuana    Sexual activity: Not on file     Review of Systems   All other systems reviewed and are negative.  Objective:     Vital Signs (Most Recent):  Temp: 97.9 °F (36.6 °C) (05/09/23 2001)  Pulse: 75 (05/09/23 2101)  Resp: 16 (05/09/23 2041)  BP: 129/77 (05/09/23 2101)  SpO2: 96 % (05/09/23 2041) Vital Signs (24h Range):  Temp:  [97.7 °F (36.5 °C)-98.4 °F (36.9 °C)] 97.9 °F (36.6 °C)  Pulse:  [] 75  Resp:  [15-57] 16  SpO2:  [93 %-98 %] 96 %  BP: (117-157)/(62-90) 129/77     Weight: 93.8 kg (206 lb 12.7 oz)  Body mass index is 27.28 kg/m².    SpO2: 96 %         Intake/Output Summary (Last 24 hours) at 5/9/2023 2123  Last data filed at 5/9/2023 1901  Gross per 24 hour   Intake 963.98 ml   Output 1550 ml   Net -586.02 ml       Lines/Drains/Airways       Airway  Duration                  Airway - Non-Surgical 02/23/23 0710 Other (Comment) 75 days              Peripheral Intravenous Line  Duration                  Peripheral IV - Single Lumen 05/09/23 1106 20 G Right Antecubital <1 day         Peripheral IV - Single Lumen 05/09/23 1140 18 G Anterior;Distal;Left Upper Arm <1 day                    Physical Exam  Vitals reviewed.   Constitutional:       Appearance: Normal appearance.   HENT:      Mouth/Throat:      Mouth: Mucous membranes are moist.   Eyes:      Extraocular Movements: Extraocular movements intact.       Pupils: Pupils are equal, round, and reactive to light.   Cardiovascular:      Rate and Rhythm: Normal rate and regular rhythm.      Heart sounds: No murmur heard.    No gallop.   Pulmonary:      Effort: Pulmonary effort is normal.      Breath sounds: Normal breath sounds.   Abdominal:      General: Abdomen is flat.      Palpations: Abdomen is soft.   Musculoskeletal:      Cervical back: Normal range of motion.   Skin:     General: Skin is warm and dry.   Neurological:      General: No focal deficit present.      Mental Status: He is alert and oriented to person, place, and time.   Psychiatric:         Mood and Affect: Mood normal.       Significant Labs: BMP:   Recent Labs   Lab 05/09/23  1107   *      K 3.9      CO2 19*   BUN 24*   CREATININE 1.4   CALCIUM 9.8   MG 1.9   , CBC   Recent Labs   Lab 05/09/23  1107   WBC 9.32   HGB 15.3   HCT 43.8      , and Troponin No results for input(s): TROPONINI in the last 48 hours.    Significant Imaging:   Assessment and Plan:     Inferior STEMI s/p PCI of mid RCA with 1 drug eluting stent  Severe distal Lcx and mid LAD stenosis  H/o endocarditis, details unknown  Tobacco abuse/ COPD    Recs:  - Aspirin, plavix  - Nitro for post PCI chest pain   - trend troponins  - echo  - Staged PCI of Lcx and LAD in 1-2 days depending upon clinical course/ renal function.   - tobacco cessation advised  - get records of endocarditis      Active Diagnoses:    Diagnosis Date Noted POA    PRINCIPAL PROBLEM:  STEMI (ST elevation myocardial infarction) [I21.3] 05/09/2023 Yes    Chronic bilateral low back pain without sciatica [M54.50, G89.29] 03/15/2023 Yes    Benign essential HTN [I10] 03/27/2020 Yes    CHF (congestive heart failure) [I50.9] 03/27/2020 Yes    COPD exacerbation [J44.1] 03/27/2020 Yes    Type 2 diabetes mellitus [E11.9] 03/27/2020 Yes      Problems Resolved During this Admission:       VTE Risk Mitigation (From admission, onward)           Ordered      heparin (porcine) injection 5,000 Units  Once         05/09/23 3672                    Thank you for your consult. I will follow-up with patient. Please contact us if you have any additional questions.    Antonio Kessler MD  Cardiology   [facility]

## 2023-05-11 DIAGNOSIS — Z95.5 STATUS POST INSERTION OF DRUG ELUTING CORONARY ARTERY STENT: Primary | ICD-10-CM

## 2023-05-11 LAB
ALBUMIN SERPL BCP-MCNC: 3.8 G/DL (ref 3.5–5.2)
ALP SERPL-CCNC: 56 U/L (ref 55–135)
ALT SERPL W/O P-5'-P-CCNC: 13 U/L (ref 10–44)
ANION GAP SERPL CALC-SCNC: 7 MMOL/L (ref 8–16)
AST SERPL-CCNC: 20 U/L (ref 10–40)
BILIRUB SERPL-MCNC: 0.6 MG/DL (ref 0.1–1)
BUN SERPL-MCNC: 18 MG/DL (ref 8–23)
CALCIUM SERPL-MCNC: 9.4 MG/DL (ref 8.7–10.5)
CHLORIDE SERPL-SCNC: 105 MMOL/L (ref 95–110)
CO2 SERPL-SCNC: 23 MMOL/L (ref 23–29)
CREAT SERPL-MCNC: 0.7 MG/DL (ref 0.5–1.4)
ERYTHROCYTE [DISTWIDTH] IN BLOOD BY AUTOMATED COUNT: 12 % (ref 11.5–14.5)
EST. GFR  (NO RACE VARIABLE): >60 ML/MIN/1.73 M^2
GLUCOSE SERPL-MCNC: 106 MG/DL (ref 70–110)
GLUCOSE SERPL-MCNC: 175 MG/DL (ref 70–110)
GLUCOSE SERPL-MCNC: 178 MG/DL (ref 70–110)
GLUCOSE SERPL-MCNC: 190 MG/DL (ref 70–110)
GLUCOSE SERPL-MCNC: 213 MG/DL (ref 70–110)
HCT VFR BLD AUTO: 40.3 % (ref 40–54)
HGB BLD-MCNC: 13.7 G/DL (ref 14–18)
MAGNESIUM SERPL-MCNC: 1.9 MG/DL (ref 1.6–2.6)
MCH RBC QN AUTO: 31.8 PG (ref 27–31)
MCHC RBC AUTO-ENTMCNC: 34 G/DL (ref 32–36)
MCV RBC AUTO: 94 FL (ref 82–98)
PLATELET # BLD AUTO: 225 K/UL (ref 150–450)
PMV BLD AUTO: 9.2 FL (ref 9.2–12.9)
POC ACTIVATED CLOTTING TIME K: 245 SEC (ref 74–137)
POC ACTIVATED CLOTTING TIME K: 269 SEC (ref 74–137)
POC ACTIVATED CLOTTING TIME K: 528 SEC (ref 74–137)
POTASSIUM SERPL-SCNC: 3.8 MMOL/L (ref 3.5–5.1)
PROT SERPL-MCNC: 6.8 G/DL (ref 6–8.4)
RBC # BLD AUTO: 4.31 M/UL (ref 4.6–6.2)
SAMPLE: ABNORMAL
SODIUM SERPL-SCNC: 135 MMOL/L (ref 136–145)
TROPONIN I SERPL HS-MCNC: 1119.7 PG/ML (ref 0–14.9)
WBC # BLD AUTO: 6.87 K/UL (ref 3.9–12.7)

## 2023-05-11 PROCEDURE — C1753 CATH, INTRAVAS ULTRASOUND: HCPCS | Performed by: INTERNAL MEDICINE

## 2023-05-11 PROCEDURE — A4216 STERILE WATER/SALINE, 10 ML: HCPCS | Performed by: INTERNAL MEDICINE

## 2023-05-11 PROCEDURE — 92978 ENDOLUMINL IVUS OCT C 1ST: CPT | Mod: LD | Performed by: INTERNAL MEDICINE

## 2023-05-11 PROCEDURE — 93454 CORONARY ARTERY ANGIO S&I: CPT | Mod: XU | Performed by: INTERNAL MEDICINE

## 2023-05-11 PROCEDURE — C1887 CATHETER, GUIDING: HCPCS | Performed by: INTERNAL MEDICINE

## 2023-05-11 PROCEDURE — 99152 PR MOD CONSCIOUS SEDATION, SAME PHYS, 5+ YRS, FIRST 15 MIN: ICD-10-PCS | Mod: ,,, | Performed by: INTERNAL MEDICINE

## 2023-05-11 PROCEDURE — 80053 COMPREHEN METABOLIC PANEL: CPT | Performed by: INTERNAL MEDICINE

## 2023-05-11 PROCEDURE — 93571 IV DOP VEL&/PRESS C FLO 1ST: CPT | Mod: 26,,, | Performed by: INTERNAL MEDICINE

## 2023-05-11 PROCEDURE — 25500020 PHARM REV CODE 255: Performed by: INTERNAL MEDICINE

## 2023-05-11 PROCEDURE — 63600175 PHARM REV CODE 636 W HCPCS: Performed by: INTERNAL MEDICINE

## 2023-05-11 PROCEDURE — 25000003 PHARM REV CODE 250: Performed by: STUDENT IN AN ORGANIZED HEALTH CARE EDUCATION/TRAINING PROGRAM

## 2023-05-11 PROCEDURE — 99152 MOD SED SAME PHYS/QHP 5/>YRS: CPT | Mod: ,,, | Performed by: INTERNAL MEDICINE

## 2023-05-11 PROCEDURE — 99900035 HC TECH TIME PER 15 MIN (STAT)

## 2023-05-11 PROCEDURE — 25000003 PHARM REV CODE 250: Performed by: INTERNAL MEDICINE

## 2023-05-11 PROCEDURE — C1894 INTRO/SHEATH, NON-LASER: HCPCS | Performed by: INTERNAL MEDICINE

## 2023-05-11 PROCEDURE — 25000003 PHARM REV CODE 250: Performed by: HOSPITALIST

## 2023-05-11 PROCEDURE — 93454 PR CATH PLACE/CORONARY ANGIO, IMG SUPER/INTERP: ICD-10-PCS | Mod: 26,59,51, | Performed by: INTERNAL MEDICINE

## 2023-05-11 PROCEDURE — C9600 PERC DRUG-EL COR STENT SING: HCPCS | Mod: LC | Performed by: INTERNAL MEDICINE

## 2023-05-11 PROCEDURE — 93005 ELECTROCARDIOGRAM TRACING: CPT | Performed by: GENERAL PRACTICE

## 2023-05-11 PROCEDURE — 92978 PR IVUS, CORONARY, 1ST VESSEL: ICD-10-PCS | Mod: 26,,, | Performed by: INTERNAL MEDICINE

## 2023-05-11 PROCEDURE — 83735 ASSAY OF MAGNESIUM: CPT | Performed by: INTERNAL MEDICINE

## 2023-05-11 PROCEDURE — 27000221 HC OXYGEN, UP TO 24 HOURS

## 2023-05-11 PROCEDURE — 92978 ENDOLUMINL IVUS OCT C 1ST: CPT | Mod: 26,,, | Performed by: INTERNAL MEDICINE

## 2023-05-11 PROCEDURE — 93010 ELECTROCARDIOGRAM REPORT: CPT | Mod: ,,, | Performed by: GENERAL PRACTICE

## 2023-05-11 PROCEDURE — 84484 ASSAY OF TROPONIN QUANT: CPT | Performed by: INTERNAL MEDICINE

## 2023-05-11 PROCEDURE — 36415 COLL VENOUS BLD VENIPUNCTURE: CPT | Performed by: INTERNAL MEDICINE

## 2023-05-11 PROCEDURE — C1725 CATH, TRANSLUMIN NON-LASER: HCPCS | Performed by: INTERNAL MEDICINE

## 2023-05-11 PROCEDURE — 94760 N-INVAS EAR/PLS OXIMETRY 1: CPT

## 2023-05-11 PROCEDURE — 93571 PR HEART FLOW RESERV MEASURE,INIT VESSL: ICD-10-PCS | Mod: 26,,, | Performed by: INTERNAL MEDICINE

## 2023-05-11 PROCEDURE — 93010 EKG 12-LEAD: ICD-10-PCS | Mod: ,,, | Performed by: GENERAL PRACTICE

## 2023-05-11 PROCEDURE — C1874 STENT, COATED/COV W/DEL SYS: HCPCS | Performed by: INTERNAL MEDICINE

## 2023-05-11 PROCEDURE — 99153 MOD SED SAME PHYS/QHP EA: CPT | Performed by: INTERNAL MEDICINE

## 2023-05-11 PROCEDURE — 27201423 OPTIME MED/SURG SUP & DEVICES STERILE SUPPLY: Performed by: INTERNAL MEDICINE

## 2023-05-11 PROCEDURE — 93454 CORONARY ARTERY ANGIO S&I: CPT | Mod: 26,59,51, | Performed by: INTERNAL MEDICINE

## 2023-05-11 PROCEDURE — 92928 PRQ TCAT PLMT NTRAC ST 1 LES: CPT | Mod: LC,,, | Performed by: INTERNAL MEDICINE

## 2023-05-11 PROCEDURE — C1769 GUIDE WIRE: HCPCS | Performed by: INTERNAL MEDICINE

## 2023-05-11 PROCEDURE — 25000003 PHARM REV CODE 250

## 2023-05-11 PROCEDURE — 99900031 HC PATIENT EDUCATION (STAT)

## 2023-05-11 PROCEDURE — 85027 COMPLETE CBC AUTOMATED: CPT | Performed by: INTERNAL MEDICINE

## 2023-05-11 PROCEDURE — 99152 MOD SED SAME PHYS/QHP 5/>YRS: CPT | Performed by: INTERNAL MEDICINE

## 2023-05-11 PROCEDURE — 94761 N-INVAS EAR/PLS OXIMETRY MLT: CPT

## 2023-05-11 PROCEDURE — 92928 PR STENT: ICD-10-PCS | Mod: LC,,, | Performed by: INTERNAL MEDICINE

## 2023-05-11 PROCEDURE — 93571 IV DOP VEL&/PRESS C FLO 1ST: CPT | Mod: LC | Performed by: INTERNAL MEDICINE

## 2023-05-11 PROCEDURE — 20000000 HC ICU ROOM

## 2023-05-11 DEVICE — STENT ONYXNG25015UX ONYX 2.50X15RX
Type: IMPLANTABLE DEVICE | Site: CHEST | Status: FUNCTIONAL
Brand: ONYX FRONTIER™

## 2023-05-11 RX ORDER — MIDAZOLAM HYDROCHLORIDE 1 MG/ML
INJECTION INTRAMUSCULAR; INTRAVENOUS
Status: DISCONTINUED | OUTPATIENT
Start: 2023-05-11 | End: 2023-05-11 | Stop reason: HOSPADM

## 2023-05-11 RX ORDER — SODIUM,POTASSIUM PHOSPHATES 280-250MG
2 POWDER IN PACKET (EA) ORAL
Status: DISCONTINUED | OUTPATIENT
Start: 2023-05-11 | End: 2023-05-13

## 2023-05-11 RX ORDER — FENTANYL CITRATE 50 UG/ML
INJECTION, SOLUTION INTRAMUSCULAR; INTRAVENOUS
Status: DISCONTINUED | OUTPATIENT
Start: 2023-05-11 | End: 2023-05-11 | Stop reason: HOSPADM

## 2023-05-11 RX ORDER — LANOLIN ALCOHOL/MO/W.PET/CERES
800 CREAM (GRAM) TOPICAL
Status: DISCONTINUED | OUTPATIENT
Start: 2023-05-11 | End: 2023-05-13

## 2023-05-11 RX ORDER — NITROGLYCERIN 5 MG/ML
INJECTION, SOLUTION INTRAVENOUS
Status: DISCONTINUED | OUTPATIENT
Start: 2023-05-11 | End: 2023-05-11 | Stop reason: HOSPADM

## 2023-05-11 RX ORDER — HEPARIN SODIUM 10000 [USP'U]/ML
INJECTION, SOLUTION INTRAVENOUS; SUBCUTANEOUS
Status: DISCONTINUED | OUTPATIENT
Start: 2023-05-11 | End: 2023-05-11 | Stop reason: HOSPADM

## 2023-05-11 RX ORDER — ADENOSINE 3 MG/ML
INJECTION, SOLUTION INTRAVENOUS
Status: DISCONTINUED | OUTPATIENT
Start: 2023-05-11 | End: 2023-05-11 | Stop reason: HOSPADM

## 2023-05-11 RX ORDER — CLOPIDOGREL BISULFATE 75 MG/1
75 TABLET ORAL DAILY
Qty: 90 TABLET | Refills: 3 | Status: SHIPPED | OUTPATIENT
Start: 2023-05-11 | End: 2023-05-13 | Stop reason: SDUPTHER

## 2023-05-11 RX ORDER — HYDROCODONE BITARTRATE AND ACETAMINOPHEN 10; 325 MG/1; MG/1
1 TABLET ORAL EVERY 6 HOURS PRN
Status: DISCONTINUED | OUTPATIENT
Start: 2023-05-11 | End: 2023-05-14 | Stop reason: HOSPADM

## 2023-05-11 RX ORDER — LOSARTAN POTASSIUM 25 MG/1
25 TABLET ORAL DAILY
Status: DISCONTINUED | OUTPATIENT
Start: 2023-05-11 | End: 2023-05-14 | Stop reason: HOSPADM

## 2023-05-11 RX ADMIN — MUPIROCIN 1 G: 20 OINTMENT TOPICAL at 09:05

## 2023-05-11 RX ADMIN — CARVEDILOL 3.12 MG: 3.12 TABLET, FILM COATED ORAL at 08:05

## 2023-05-11 RX ADMIN — LOSARTAN POTASSIUM 25 MG: 25 TABLET, FILM COATED ORAL at 02:05

## 2023-05-11 RX ADMIN — HYDROCODONE BITARTRATE AND ACETAMINOPHEN 1 TABLET: 10; 325 TABLET ORAL at 05:05

## 2023-05-11 RX ADMIN — CLOPIDOGREL BISULFATE 75 MG: 75 TABLET, FILM COATED ORAL at 08:05

## 2023-05-11 RX ADMIN — INSULIN DETEMIR 30 UNITS: 100 INJECTION, SOLUTION SUBCUTANEOUS at 09:05

## 2023-05-11 RX ADMIN — SODIUM CHLORIDE, PRESERVATIVE FREE 10 ML: 5 INJECTION INTRAVENOUS at 09:05

## 2023-05-11 RX ADMIN — MUPIROCIN 1 G: 20 OINTMENT TOPICAL at 08:05

## 2023-05-11 RX ADMIN — CHLORHEXIDINE GLUCONATE 15 ML: 1.2 RINSE ORAL at 08:05

## 2023-05-11 RX ADMIN — POTASSIUM BICARBONATE 50 MEQ: 977.5 TABLET, EFFERVESCENT ORAL at 09:05

## 2023-05-11 RX ADMIN — CHLORHEXIDINE GLUCONATE 15 ML: 1.2 RINSE ORAL at 09:05

## 2023-05-11 RX ADMIN — ATORVASTATIN CALCIUM 40 MG: 40 TABLET, FILM COATED ORAL at 08:05

## 2023-05-11 RX ADMIN — ASPIRIN 81 MG: 81 TABLET, COATED ORAL at 08:05

## 2023-05-11 RX ADMIN — INSULIN ASPART 1 UNITS: 100 INJECTION, SOLUTION INTRAVENOUS; SUBCUTANEOUS at 09:05

## 2023-05-11 RX ADMIN — CARVEDILOL 3.12 MG: 3.12 TABLET, FILM COATED ORAL at 09:05
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
## 2023-05-11 NOTE — ASSESSMENT & PLAN NOTE
Patient's FSGs are controlled on current medication regimen.  Last A1c reviewed-   Lab Results   Component Value Date    HGBA1C 6.9 (H) 05/10/2023    HGBA1C 6.9 (H) 05/10/2023     Most recent fingerstick glucose reviewed- No results for input(s): POCTGLUCOSE in the last 24 hours.  Current correctional scale  Medium  Maintain anti-hyperglycemic dose as follows-   Antihyperglycemics (From admission, onward)    Start     Stop Route Frequency Ordered    05/09/23 2100  insulin detemir U-100 (Levemir) pen 30 Units         -- SubQ Nightly 05/09/23 1703    05/09/23 1748  insulin aspart U-100 pen 1-10 Units         -- SubQ Before meals & nightly PRN 05/09/23 1648        Hold Oral hypoglycemics while patient is in the hospital.

## 2023-05-11 NOTE — ASSESSMENT & PLAN NOTE
Continue DAPT.  Continue statin.  Continue heart monitor.  Cardiologist performed LHC + PCI.  He plans to intervene on another coronary tomorrow.

## 2023-05-11 NOTE — PROGRESS NOTES
[facility] Medicine  Progress Note    Patient Name: Sampson Holt  MRN: [id number]  Patient Class: IP- Inpatient   Admission Date: 5/9/2023  Length of Stay: 1 days  Attending Physician: Abdoulaye Thibodeaux MD  Primary Care Provider: Damian Vergara MD        Subjective:     Principal Problem:STEMI (ST elevation myocardial infarction)        HPI:  67-year-old male with a history of CHF, diabetes, hypertension, CVA, endocarditis, COPD presents to the emergency department with chest pain and found to have dynamic EKG changes with STEMI and patient was brought to the cath lab and needed stent in the RCA.  Patient was seen and examined at post procedure in ICU.  And chest pain-free and no shortness of breath.  He also reported that he was losing about more than 10 lb weight loss in previous 3 months and a under  [facility] and he did colonoscopy and nothing showed .Also he did CT scan abdomen and did not show pathology except cyst in the liver.  But he was  told most likely the origin of the pain possible for pancreatitis with previous history of alcohol use.        Overview/Hospital Course:  No notes on file    Interval History:  no chest pain.  He underwent LHC yesterday with PCI.  Pt has no new complaints.    Review of Systems   Respiratory:  Negative for shortness of breath.    Cardiovascular:  Negative for leg swelling.   Objective:     Vital Signs (Most Recent):  Temp: 98.5 °F (36.9 °C) (05/10/23 1601)  Pulse: 67 (05/10/23 1701)  Resp: (!) 22 (05/10/23 1701)  BP: (!) 144/80 (05/10/23 1701)  SpO2: 96 % (05/10/23 1701) Vital Signs (24h Range):  Temp:  [97.9 °F (36.6 °C)-98.5 °F (36.9 °C)] 98.5 °F (36.9 °C)  Pulse:  [61-79] 67  Resp:  [9-22] 22  SpO2:  [90 %-98 %] 96 %  BP: (112-149)/(55-80) 144/80     Weight: 93.2 kg (205 lb 7.5 oz)  Body mass index is 27.11 kg/m².    Intake/Output Summary (Last 24 hours) at 5/10/2023 2057  Last data filed at 5/10/2023 1720  Gross per 24 hour    Intake 676.49 ml   Output 1370 ml   Net -693.51 ml         Physical Exam  Vitals reviewed.   Constitutional:       General: He is not in acute distress.     Appearance: He is not diaphoretic.   HENT:      Mouth/Throat:      Mouth: Mucous membranes are moist.   Eyes:      General: No scleral icterus.        Right eye: No discharge.         Left eye: No discharge.   Neck:      Vascular: No JVD.   Cardiovascular:      Rate and Rhythm: Normal rate and regular rhythm.   Pulmonary:      Effort: Pulmonary effort is normal.      Breath sounds: Normal breath sounds.   Abdominal:      General: Bowel sounds are normal. There is no distension.      Palpations: Abdomen is soft.      Tenderness: There is no abdominal tenderness.   Skin:     General: Skin is warm.      Findings: No rash.   Neurological:      Mental Status: He is alert.           Significant Labs: All pertinent labs within the past 24 hours have been reviewed.    Significant Imaging:  no new imaging      Assessment/Plan:      * STEMI (ST elevation myocardial infarction)  Continue DAPT.  Continue statin.  Continue heart monitor.  Cardiologist performed LHC + PCI.  He plans to intervene on another coronary tomorrow.      Cigarette smoker  Dangers of cigarette smoking were reviewed with patient in detail for 10 minutes and patient was encouraged to quit. Nicotine replacement options were discussed. Nicotine replacement options were discussed. Nicotine replacement was not prescribed per patient request.      Chronic bilateral low back pain without sciatica  Noted.  Continue PRN analgesics.    Type 2 diabetes mellitus with circulatory disorder, with long-term current use of insulin  Patient's FSGs are controlled on current medication regimen.  Last A1c reviewed-   Lab Results   Component Value Date    HGBA1C 6.9 (H) 05/10/2023    HGBA1C 6.9 (H) 05/10/2023     Most recent fingerstick glucose reviewed- No results for input(s): POCTGLUCOSE in the last 24 hours.  Current  correctional scale  Medium  Maintain anti-hyperglycemic dose as follows-   Antihyperglycemics (From admission, onward)    Start     Stop Route Frequency Ordered    05/09/23 2100  insulin detemir U-100 (Levemir) pen 30 Units         -- SubQ Nightly 05/09/23 1703    05/09/23 1748  insulin aspart U-100 pen 1-10 Units         -- SubQ Before meals & nightly PRN 05/09/23 1648        Hold Oral hypoglycemics while patient is in the hospital.    COPD (chronic obstructive pulmonary disease)  Stable.  Continue PRN albuterol nebulized.    CHF (congestive heart failure)  Ruled out.  Pt does not have CHF.  BNP low.  Echo report reviewed.    BNP  Recent Labs   Lab 05/09/23  1107   BNP 39         Benign essential HTN  Chronic, controlled.  Latest blood pressure and vitals reviewed-   Temp:  [97.9 °F (36.6 °C)-98.5 °F (36.9 °C)]   Pulse:  [61-79]   Resp:  [9-22]   BP: (112-149)/(55-80)   SpO2:  [91 %-98 %] .   Home meds for hypertension were reviewed and noted below.   Hypertension Medications             amLODIPine (NORVASC) 10 MG tablet Take 10 mg by mouth once daily.          While in the hospital, will manage blood pressure as follows:  Keep amlodipine on hold.  Started on Coreg 3.125 mg BID.    Will utilize p.r.n. blood pressure medication only if patient's blood pressure greater than  180/110 and he develops symptoms such as worsening chest pain or shortness of breath.        VTE Risk Mitigation (From admission, onward)         Ordered     heparin (porcine) injection 5,000 Units  Once         05/09/23 1139                Discharge Planning   DAVID: 5/11/2023     Code Status: Full Code   Is the patient medically ready for discharge?:     Reason for patient still in hospital (select all that apply): Patient trending condition, Treatment and Consult recommendations  Discharge Plan A: Home                  Abdoulaye Thibodeaux MD  Department of Hospital Medicine   [facility]

## 2023-05-11 NOTE — ASSESSMENT & PLAN NOTE
Chronic, controlled.  Latest blood pressure and vitals reviewed-   Temp:  [97.9 °F (36.6 °C)-98.5 °F (36.9 °C)]   Pulse:  [61-79]   Resp:  [9-22]   BP: (112-149)/(55-80)   SpO2:  [91 %-98 %] .   Home meds for hypertension were reviewed and noted below.   Hypertension Medications             amLODIPine (NORVASC) 10 MG tablet Take 10 mg by mouth once daily.          While in the hospital, will manage blood pressure as follows:  Keep amlodipine on hold.  Started on Coreg 3.125 mg BID.    Will utilize p.r.n. blood pressure medication only if patient's blood pressure greater than  180/110 and he develops symptoms such as worsening chest pain or shortness of breath.

## 2023-05-11 NOTE — INTERVAL H&P NOTE
The patient has been examined and the H&P has been reviewed:    I concur with the findings and no changes have occurred since H&P was written.    Procedure risks, benefits and alternative options discussed and understood by patient/family.    Discussed with patient.  Will proceed with staged PCI of the left circumflex and possibly LAD today.  Risks benefits discussed.  Patient verbalized understanding.  He ensure that he will take his dual antiplatelet therapy at home without any problems.      Active Hospital Problems    Diagnosis  POA    *STEMI (ST elevation myocardial infarction) [I21.3]  Yes     Priority: 1 - High    Cigarette smoker [F17.210]  Yes    Chronic bilateral low back pain without sciatica [M54.50, G89.29]  Yes    Benign essential HTN [I10]  Yes    CHF (congestive heart failure) [I50.9]  Yes    COPD (chronic obstructive pulmonary disease) [J44.9]  Yes    Type 2 diabetes mellitus with circulatory disorder, with long-term current use of insulin [E11.59, Z79.4]  Not Applicable      Resolved Hospital Problems   No resolved problems to display.

## 2023-05-11 NOTE — NURSING
Dr. Nielsen notified in regards to pt complaining of a headache. Nitro gtt currently infusing. Orders for tylenol given and to continue the Nitro gtt.

## 2023-05-11 NOTE — PLAN OF CARE
Problem: Diabetes Comorbidity  Goal: Blood Glucose Level Within Targeted Range  Outcome: Ongoing, Progressing     Problem: Adult Inpatient Plan of Care  Goal: Plan of Care Review  Outcome: Ongoing, Progressing  Goal: Patient-Specific Goal (Individualized)  Outcome: Ongoing, Progressing  Goal: Absence of Hospital-Acquired Illness or Injury  Outcome: Ongoing, Progressing  Goal: Optimal Comfort and Wellbeing  Outcome: Ongoing, Progressing

## 2023-05-11 NOTE — CARE UPDATE
05/11/23 0757   Patient Assessment/Suction   Level of Consciousness (AVPU) alert   Respiratory Effort Unlabored   Expansion/Accessory Muscles/Retractions no use of accessory muscles   All Lung Fields Breath Sounds clear   Rhythm/Pattern, Respiratory unlabored   Cough Frequency no cough   PRE-TX-O2   Device (Oxygen Therapy) room air  (2l nc on sb)   SpO2 95 %   Pulse Oximetry Type Continuous   $ Pulse Oximetry - Single Charge Pulse Oximetry - Single   Pulse 61   Resp 20   BP (!) 142/84   Aerosol Therapy   $ Aerosol Therapy Charges PRN treatment not required   Education   $ Education Bronchodilator;15 min

## 2023-05-11 NOTE — PLAN OF CARE
Problem: Oral Intake Inadequate  Goal: Improved Oral Intake  Outcome: Ongoing, Progressing  Intervention: Promote and Optimize Oral Intake  Flowsheets (Taken 5/11/2023 1421)  Oral Nutrition Promotion:   nutritional therapy counseling provided   calorie-dense liquids provided

## 2023-05-11 NOTE — SUBJECTIVE & OBJECTIVE
Interval History:  no chest pain.  He underwent LHC yesterday with PCI.  Pt has no new complaints.    Review of Systems   Respiratory:  Negative for shortness of breath.    Cardiovascular:  Negative for leg swelling.   Objective:     Vital Signs (Most Recent):  Temp: 98.5 °F (36.9 °C) (05/10/23 1601)  Pulse: 67 (05/10/23 1701)  Resp: (!) 22 (05/10/23 1701)  BP: (!) 144/80 (05/10/23 1701)  SpO2: 96 % (05/10/23 1701) Vital Signs (24h Range):  Temp:  [97.9 °F (36.6 °C)-98.5 °F (36.9 °C)] 98.5 °F (36.9 °C)  Pulse:  [61-79] 67  Resp:  [9-22] 22  SpO2:  [90 %-98 %] 96 %  BP: (112-149)/(55-80) 144/80     Weight: 93.2 kg (205 lb 7.5 oz)  Body mass index is 27.11 kg/m².    Intake/Output Summary (Last 24 hours) at 5/10/2023 2057  Last data filed at 5/10/2023 1720  Gross per 24 hour   Intake 676.49 ml   Output 1370 ml   Net -693.51 ml         Physical Exam  Vitals reviewed.   Constitutional:       General: He is not in acute distress.     Appearance: He is not diaphoretic.   HENT:      Mouth/Throat:      Mouth: Mucous membranes are moist.   Eyes:      General: No scleral icterus.        Right eye: No discharge.         Left eye: No discharge.   Neck:      Vascular: No JVD.   Cardiovascular:      Rate and Rhythm: Normal rate and regular rhythm.   Pulmonary:      Effort: Pulmonary effort is normal.      Breath sounds: Normal breath sounds.   Abdominal:      General: Bowel sounds are normal. There is no distension.      Palpations: Abdomen is soft.      Tenderness: There is no abdominal tenderness.   Skin:     General: Skin is warm.      Findings: No rash.   Neurological:      Mental Status: He is alert.           Significant Labs: All pertinent labs within the past 24 hours have been reviewed.    Significant Imaging:  no new imaging

## 2023-05-11 NOTE — ASSESSMENT & PLAN NOTE
Dangers of cigarette smoking were reviewed with patient in detail for 10 minutes and patient was encouraged to quit. Nicotine replacement options were discussed. Nicotine replacement options were discussed. Nicotine replacement was not prescribed per patient request.

## 2023-05-12 LAB
ANION GAP SERPL CALC-SCNC: 5 MMOL/L (ref 8–16)
BUN SERPL-MCNC: 22 MG/DL (ref 8–23)
CALCIUM SERPL-MCNC: 9.2 MG/DL (ref 8.7–10.5)
CHLORIDE SERPL-SCNC: 107 MMOL/L (ref 95–110)
CO2 SERPL-SCNC: 26 MMOL/L (ref 23–29)
CREAT SERPL-MCNC: 0.8 MG/DL (ref 0.5–1.4)
ERYTHROCYTE [DISTWIDTH] IN BLOOD BY AUTOMATED COUNT: 12.1 % (ref 11.5–14.5)
EST. GFR  (NO RACE VARIABLE): >60 ML/MIN/1.73 M^2
GLUCOSE SERPL-MCNC: 107 MG/DL (ref 70–110)
GLUCOSE SERPL-MCNC: 156 MG/DL (ref 70–110)
GLUCOSE SERPL-MCNC: 194 MG/DL (ref 70–110)
HCT VFR BLD AUTO: 41.3 % (ref 40–54)
HGB BLD-MCNC: 14.3 G/DL (ref 14–18)
MAGNESIUM SERPL-MCNC: 2.2 MG/DL (ref 1.6–2.6)
MCH RBC QN AUTO: 32 PG (ref 27–31)
MCHC RBC AUTO-ENTMCNC: 34.6 G/DL (ref 32–36)
MCV RBC AUTO: 92 FL (ref 82–98)
PLATELET # BLD AUTO: 224 K/UL (ref 150–450)
PMV BLD AUTO: 9.4 FL (ref 9.2–12.9)
POTASSIUM SERPL-SCNC: 4.2 MMOL/L (ref 3.5–5.1)
RBC # BLD AUTO: 4.47 M/UL (ref 4.6–6.2)
SODIUM SERPL-SCNC: 138 MMOL/L (ref 136–145)
WBC # BLD AUTO: 6.92 K/UL (ref 3.9–12.7)

## 2023-05-12 PROCEDURE — 21400001 HC TELEMETRY ROOM

## 2023-05-12 PROCEDURE — 93005 ELECTROCARDIOGRAM TRACING: CPT | Performed by: INTERNAL MEDICINE

## 2023-05-12 PROCEDURE — 25000003 PHARM REV CODE 250

## 2023-05-12 PROCEDURE — 94761 N-INVAS EAR/PLS OXIMETRY MLT: CPT

## 2023-05-12 PROCEDURE — 25000003 PHARM REV CODE 250: Performed by: INTERNAL MEDICINE

## 2023-05-12 PROCEDURE — 27000221 HC OXYGEN, UP TO 24 HOURS

## 2023-05-12 PROCEDURE — 99900031 HC PATIENT EDUCATION (STAT)

## 2023-05-12 PROCEDURE — 25000003 PHARM REV CODE 250: Performed by: HOSPITALIST

## 2023-05-12 PROCEDURE — 80048 BASIC METABOLIC PNL TOTAL CA: CPT | Performed by: HOSPITALIST

## 2023-05-12 PROCEDURE — 93010 EKG 12-LEAD: ICD-10-PCS | Mod: ,,, | Performed by: INTERNAL MEDICINE

## 2023-05-12 PROCEDURE — 93010 ELECTROCARDIOGRAM REPORT: CPT | Mod: ,,, | Performed by: INTERNAL MEDICINE

## 2023-05-12 PROCEDURE — 99900035 HC TECH TIME PER 15 MIN (STAT)

## 2023-05-12 PROCEDURE — 36415 COLL VENOUS BLD VENIPUNCTURE: CPT | Performed by: HOSPITALIST

## 2023-05-12 PROCEDURE — 83735 ASSAY OF MAGNESIUM: CPT | Performed by: HOSPITALIST

## 2023-05-12 PROCEDURE — A4216 STERILE WATER/SALINE, 10 ML: HCPCS | Performed by: INTERNAL MEDICINE

## 2023-05-12 PROCEDURE — 85027 COMPLETE CBC AUTOMATED: CPT | Performed by: HOSPITALIST

## 2023-05-12 RX ORDER — CLONAZEPAM 1 MG/1
1 TABLET ORAL NIGHTLY
Status: DISCONTINUED | OUTPATIENT
Start: 2023-05-12 | End: 2023-05-13

## 2023-05-12 RX ADMIN — INSULIN ASPART 1 UNITS: 100 INJECTION, SOLUTION INTRAVENOUS; SUBCUTANEOUS at 08:05

## 2023-05-12 RX ADMIN — MUPIROCIN 1 G: 20 OINTMENT TOPICAL at 08:05

## 2023-05-12 RX ADMIN — CLOPIDOGREL BISULFATE 75 MG: 75 TABLET, FILM COATED ORAL at 08:05

## 2023-05-12 RX ADMIN — SODIUM CHLORIDE, PRESERVATIVE FREE 10 ML: 5 INJECTION INTRAVENOUS at 09:05

## 2023-05-12 RX ADMIN — CARVEDILOL 3.12 MG: 3.12 TABLET, FILM COATED ORAL at 08:05

## 2023-05-12 RX ADMIN — ASPIRIN 81 MG: 81 TABLET, COATED ORAL at 08:05

## 2023-05-12 RX ADMIN — INSULIN DETEMIR 30 UNITS: 100 INJECTION, SOLUTION SUBCUTANEOUS at 08:05

## 2023-05-12 RX ADMIN — CHLORHEXIDINE GLUCONATE 15 ML: 1.2 RINSE ORAL at 08:05

## 2023-05-12 RX ADMIN — HYDROCODONE BITARTRATE AND ACETAMINOPHEN 1 TABLET: 10; 325 TABLET ORAL at 12:05

## 2023-05-12 RX ADMIN — HYDROCODONE BITARTRATE AND ACETAMINOPHEN 1 TABLET: 10; 325 TABLET ORAL at 06:05

## 2023-05-12 RX ADMIN — ATORVASTATIN CALCIUM 40 MG: 40 TABLET, FILM COATED ORAL at 08:05

## 2023-05-12 RX ADMIN — CLONAZEPAM 1 MG: 1 TABLET ORAL at 08:05

## 2023-05-12 RX ADMIN — LOSARTAN POTASSIUM 25 MG: 25 TABLET, FILM COATED ORAL at 08:05

## 2023-05-12 NOTE — SUBJECTIVE & OBJECTIVE
Interval History:  no chest pain.  He is to undergo C today.    Review of Systems   Respiratory:  Negative for shortness of breath.    Cardiovascular:  Negative for leg swelling.   Objective:     Vital Signs (Most Recent):  Temp: 98.4 °F (36.9 °C) (05/11/23 1901)  Pulse: 74 (05/11/23 2020)  Resp: 19 (05/11/23 2020)  BP: 135/71 (05/11/23 1901)  SpO2: 96 % (05/11/23 2020) Vital Signs (24h Range):  Temp:  [98 °F (36.7 °C)-98.9 °F (37.2 °C)] 98.4 °F (36.9 °C)  Pulse:  [56-74] 74  Resp:  [11-28] 19  SpO2:  [92 %-96 %] 96 %  BP: (119-165)/(56-85) 135/71     Weight: 93.2 kg (205 lb 7.5 oz)  Body mass index is 27.05 kg/m².    Intake/Output Summary (Last 24 hours) at 5/11/2023 2112  Last data filed at 5/11/2023 1813  Gross per 24 hour   Intake 870.23 ml   Output 1700 ml   Net -829.77 ml           Physical Exam  Vitals reviewed.   Constitutional:       General: He is not in acute distress.     Appearance: He is not diaphoretic.   HENT:      Mouth/Throat:      Mouth: Mucous membranes are moist.   Eyes:      General: No scleral icterus.        Right eye: No discharge.         Left eye: No discharge.   Neck:      Vascular: No JVD.   Cardiovascular:      Rate and Rhythm: Normal rate and regular rhythm.   Pulmonary:      Effort: Pulmonary effort is normal.      Breath sounds: Normal breath sounds.   Abdominal:      General: Bowel sounds are normal. There is no distension.      Palpations: Abdomen is soft.      Tenderness: There is no abdominal tenderness.   Skin:     General: Skin is warm.      Findings: No rash.   Neurological:      Mental Status: He is alert.           Significant Labs: All pertinent labs within the past 24 hours have been reviewed.    Significant Imaging: I have reviewed all pertinent imaging results/findings within the past 24 hours.

## 2023-05-12 NOTE — ASSESSMENT & PLAN NOTE
Ruled out.  Pt does not have CHF.  BNP low.  Echo report reviewed.    BNP  Recent Labs   Lab 05/09/23  1107   BNP 39

## 2023-05-12 NOTE — RESPIRATORY THERAPY
05/11/23 2020   Patient Assessment/Suction   Level of Consciousness (AVPU) alert   PRE-TX-O2   Device (Oxygen Therapy) nasal cannula   $ Is the patient on Low Flow Oxygen? Yes   Flow (L/min) 4   SpO2 96 %   Pulse Oximetry Type Continuous   $ Pulse Oximetry - Multiple Charge Pulse Oximetry - Multiple   Pulse 74   Resp 19   Education   $ Education 15 min   Respiratory Evaluation   $ Care Plan Tech Time 15 min

## 2023-05-12 NOTE — PROGRESS NOTES
[facility] Medicine  Progress Note    Patient Name: Sampson Holt  MRN: [id number]  Patient Class: IP- Inpatient   Admission Date: 5/9/2023  Length of Stay: 2 days  Attending Physician: Abdoulaye Thibodeaux MD  Primary Care Provider: Damian Vergara MD        Subjective:     Principal Problem:STEMI (ST elevation myocardial infarction)        HPI:  67-year-old male with a history of CHF, diabetes, hypertension, CVA, endocarditis, COPD presents to the emergency department with chest pain and found to have dynamic EKG changes with STEMI and patient was brought to the cath lab and needed stent in the RCA.  Patient was seen and examined at post procedure in ICU.  And chest pain-free and no shortness of breath.  He also reported that he was losing about more than 10 lb weight loss in previous 3 months and a [facility] and he did [facility] and nothing showed .Also he did CT scan abdomen and did not show pathology except cyst in the liver.  But he was  told most likely the origin of the pain possible for pancreatitis with previous history of alcohol use.        Overview/Hospital Course:  No notes on file    Interval History:  no chest pain.  He is to undergo LHC today.    Review of Systems   Respiratory:  Negative for shortness of breath.    Cardiovascular:  Negative for leg swelling.   Objective:     Vital Signs (Most Recent):  Temp: 98.4 °F (36.9 °C) (05/11/23 1901)  Pulse: 74 (05/11/23 2020)  Resp: 19 (05/11/23 2020)  BP: 135/71 (05/11/23 1901)  SpO2: 96 % (05/11/23 2020) Vital Signs (24h Range):  Temp:  [98 °F (36.7 °C)-98.9 °F (37.2 °C)] 98.4 °F (36.9 °C)  Pulse:  [56-74] 74  Resp:  [11-28] 19  SpO2:  [92 %-96 %] 96 %  BP: (119-165)/(56-85) 135/71     Weight: 93.2 kg (205 lb 7.5 oz)  Body mass index is 27.05 kg/m².    Intake/Output Summary (Last 24 hours) at 5/11/2023 2112  Last data filed at 5/11/2023 1813  Gross per 24 hour   Intake 870.23 ml   Output 1700 ml   Net -829.77  ml           Physical Exam  Vitals reviewed.   Constitutional:       General: He is not in acute distress.     Appearance: He is not diaphoretic.   HENT:      Mouth/Throat:      Mouth: Mucous membranes are moist.   Eyes:      General: No scleral icterus.        Right eye: No discharge.         Left eye: No discharge.   Neck:      Vascular: No JVD.   Cardiovascular:      Rate and Rhythm: Normal rate and regular rhythm.   Pulmonary:      Effort: Pulmonary effort is normal.      Breath sounds: Normal breath sounds.   Abdominal:      General: Bowel sounds are normal. There is no distension.      Palpations: Abdomen is soft.      Tenderness: There is no abdominal tenderness.   Skin:     General: Skin is warm.      Findings: No rash.   Neurological:      Mental Status: He is alert.           Significant Labs: All pertinent labs within the past 24 hours have been reviewed.    Significant Imaging: I have reviewed all pertinent imaging results/findings within the past 24 hours.      Assessment/Plan:      * STEMI (ST elevation myocardial infarction)  Continue DAPT.  Continue statin.  Continue heart monitor.  Cardiologist performed LHC + PCI two days ago.  He plans to intervene on another coronary today.      Cigarette smoker  Dangers of cigarette smoking were reviewed with patient in detail for 10 minutes and patient was encouraged to quit. Nicotine replacement options were discussed. Nicotine replacement options were discussed. Nicotine replacement was not prescribed per patient request.      Chronic bilateral low back pain without sciatica  Noted.  Continue PRN analgesics.    Type 2 diabetes mellitus with circulatory disorder, with long-term current use of insulin  Patient's FSGs are controlled on current medication regimen.  Last A1c reviewed-   Lab Results   Component Value Date    HGBA1C 6.9 (H) 05/10/2023    HGBA1C 6.9 (H) 05/10/2023     Most recent fingerstick glucose reviewed- No results for input(s): POCTGLUCOSE in  the last 24 hours.  Current correctional scale  Medium  Maintain anti-hyperglycemic dose as follows-   Antihyperglycemics (From admission, onward)    Start     Stop Route Frequency Ordered    05/09/23 2100  insulin detemir U-100 (Levemir) pen 30 Units         -- SubQ Nightly 05/09/23 1703    05/09/23 1748  insulin aspart U-100 pen 1-10 Units         -- SubQ Before meals & nightly PRN 05/09/23 1648        Hold Oral hypoglycemics while patient is in the hospital.    COPD (chronic obstructive pulmonary disease)  Stable.  Continue PRN albuterol nebulized.    Benign essential HTN  Chronic, controlled.  Latest blood pressure and vitals reviewed-   Temp:  [98 °F (36.7 °C)-98.9 °F (37.2 °C)]   Pulse:  [56-74]   Resp:  [11-28]   BP: (119-165)/(56-85)   SpO2:  [92 %-96 %] .   Home meds for hypertension were reviewed and noted below.   Hypertension Medications             amLODIPine (NORVASC) 10 MG tablet Take 10 mg by mouth once daily.          While in the hospital, will manage blood pressure as follows:  Keep amlodipine on hold.  Started on Coreg 3.125 mg BID and on losartan 25 daily..    Will utilize p.r.n. blood pressure medication only if patient's blood pressure greater than  180/110 and he develops symptoms such as worsening chest pain or shortness of breath.        VTE Risk Mitigation (From admission, onward)         Ordered     heparin (porcine) injection 5,000 Units  Once         05/09/23 1139                Discharge Planning   DAVID: 5/11/2023     Code Status: Full Code   Is the patient medically ready for discharge?:     Reason for patient still in hospital (select all that apply): Patient trending condition, Treatment and Consult recommendations  Discharge Plan A: Home                  Abdoulaye Thibodeaux MD  Department of Hospital Medicine   [facility]

## 2023-05-12 NOTE — ASSESSMENT & PLAN NOTE
Chronic, controlled.  Latest blood pressure and vitals reviewed-   Temp:  [98 °F (36.7 °C)-98.9 °F (37.2 °C)]   Pulse:  [56-74]   Resp:  [11-28]   BP: (119-165)/(56-85)   SpO2:  [92 %-96 %] .   Home meds for hypertension were reviewed and noted below.   Hypertension Medications             amLODIPine (NORVASC) 10 MG tablet Take 10 mg by mouth once daily.          While in the hospital, will manage blood pressure as follows:  Keep amlodipine on hold.  Started on Coreg 3.125 mg BID and on losartan 25 daily..    Will utilize p.r.n. blood pressure medication only if patient's blood pressure greater than  180/110 and he develops symptoms such as worsening chest pain or shortness of breath.

## 2023-05-12 NOTE — ASSESSMENT & PLAN NOTE
Continue DAPT.  Continue statin.  Continue heart monitor.  Cardiologist performed LHC + PCI two days ago.  He plans to intervene on another coronary today.

## 2023-05-12 NOTE — CARE UPDATE
05/12/23 0738   Patient Assessment/Suction   Level of Consciousness (AVPU) alert   Respiratory Effort Unlabored   PRE-TX-O2   Device (Oxygen Therapy) room air   SpO2 (!) 93 %   Pulse Oximetry Type Continuous   $ Pulse Oximetry - Multiple Charge Pulse Oximetry - Multiple   Pulse 72   Resp (!) 31   Aerosol Therapy   $ Aerosol Therapy Charges PRN treatment not required

## 2023-05-13 PROBLEM — I25.10 CAD (CORONARY ARTERY DISEASE): Status: ACTIVE | Noted: 2023-05-13

## 2023-05-13 PROBLEM — Z20.822 SUSPECTED COVID-19 VIRUS INFECTION: Status: RESOLVED | Noted: 2020-03-27 | Resolved: 2023-05-13

## 2023-05-13 PROBLEM — Q44.6 CYSTIC DISEASE OF LIVER: Status: ACTIVE | Noted: 2023-03-15

## 2023-05-13 LAB
ANION GAP SERPL CALC-SCNC: 6 MMOL/L (ref 8–16)
BUN SERPL-MCNC: 20 MG/DL (ref 8–23)
CALCIUM SERPL-MCNC: 9.3 MG/DL (ref 8.7–10.5)
CHLORIDE SERPL-SCNC: 106 MMOL/L (ref 95–110)
CO2 SERPL-SCNC: 25 MMOL/L (ref 23–29)
CREAT SERPL-MCNC: 0.8 MG/DL (ref 0.5–1.4)
ERYTHROCYTE [DISTWIDTH] IN BLOOD BY AUTOMATED COUNT: 12 % (ref 11.5–14.5)
EST. GFR  (NO RACE VARIABLE): >60 ML/MIN/1.73 M^2
GLUCOSE SERPL-MCNC: 181 MG/DL (ref 70–110)
GLUCOSE SERPL-MCNC: 187 MG/DL (ref 70–110)
GLUCOSE SERPL-MCNC: 195 MG/DL (ref 70–110)
GLUCOSE SERPL-MCNC: 309 MG/DL (ref 70–110)
HCT VFR BLD AUTO: 40.4 % (ref 40–54)
HGB BLD-MCNC: 14.1 G/DL (ref 14–18)
MCH RBC QN AUTO: 31.9 PG (ref 27–31)
MCHC RBC AUTO-ENTMCNC: 34.9 G/DL (ref 32–36)
MCV RBC AUTO: 91 FL (ref 82–98)
PLATELET # BLD AUTO: 208 K/UL (ref 150–450)
PMV BLD AUTO: 9.1 FL (ref 9.2–12.9)
POTASSIUM SERPL-SCNC: 4.3 MMOL/L (ref 3.5–5.1)
RBC # BLD AUTO: 4.42 M/UL (ref 4.6–6.2)
SODIUM SERPL-SCNC: 137 MMOL/L (ref 136–145)
WBC # BLD AUTO: 6.98 K/UL (ref 3.9–12.7)

## 2023-05-13 PROCEDURE — 25000003 PHARM REV CODE 250: Performed by: INTERNAL MEDICINE

## 2023-05-13 PROCEDURE — 94799 UNLISTED PULMONARY SVC/PX: CPT

## 2023-05-13 PROCEDURE — 93010 ELECTROCARDIOGRAM REPORT: CPT | Mod: 76,,, | Performed by: INTERNAL MEDICINE

## 2023-05-13 PROCEDURE — 82962 GLUCOSE BLOOD TEST: CPT

## 2023-05-13 PROCEDURE — 97535 SELF CARE MNGMENT TRAINING: CPT

## 2023-05-13 PROCEDURE — 93010 ELECTROCARDIOGRAM REPORT: CPT | Mod: ,,, | Performed by: INTERNAL MEDICINE

## 2023-05-13 PROCEDURE — A4216 STERILE WATER/SALINE, 10 ML: HCPCS | Performed by: INTERNAL MEDICINE

## 2023-05-13 PROCEDURE — 93005 ELECTROCARDIOGRAM TRACING: CPT | Performed by: INTERNAL MEDICINE

## 2023-05-13 PROCEDURE — 21400001 HC TELEMETRY ROOM

## 2023-05-13 PROCEDURE — 25000003 PHARM REV CODE 250

## 2023-05-13 PROCEDURE — 25000003 PHARM REV CODE 250: Performed by: STUDENT IN AN ORGANIZED HEALTH CARE EDUCATION/TRAINING PROGRAM

## 2023-05-13 PROCEDURE — S4991 NICOTINE PATCH NONLEGEND: HCPCS | Performed by: HOSPITALIST

## 2023-05-13 PROCEDURE — 99900035 HC TECH TIME PER 15 MIN (STAT)

## 2023-05-13 PROCEDURE — 25000003 PHARM REV CODE 250: Performed by: HOSPITALIST

## 2023-05-13 PROCEDURE — 94761 N-INVAS EAR/PLS OXIMETRY MLT: CPT

## 2023-05-13 PROCEDURE — 97161 PT EVAL LOW COMPLEX 20 MIN: CPT

## 2023-05-13 PROCEDURE — 97165 OT EVAL LOW COMPLEX 30 MIN: CPT

## 2023-05-13 PROCEDURE — 85027 COMPLETE CBC AUTOMATED: CPT | Performed by: HOSPITALIST

## 2023-05-13 PROCEDURE — 99900031 HC PATIENT EDUCATION (STAT)

## 2023-05-13 PROCEDURE — 36415 COLL VENOUS BLD VENIPUNCTURE: CPT | Performed by: HOSPITALIST

## 2023-05-13 PROCEDURE — 93010 EKG 12-LEAD: ICD-10-PCS | Mod: 76,,, | Performed by: INTERNAL MEDICINE

## 2023-05-13 PROCEDURE — 80048 BASIC METABOLIC PNL TOTAL CA: CPT | Performed by: HOSPITALIST

## 2023-05-13 RX ORDER — ASPIRIN 81 MG/1
81 TABLET ORAL DAILY
Qty: 90 TABLET | Refills: 3 | Status: ON HOLD | OUTPATIENT
Start: 2023-05-14 | End: 2024-03-13 | Stop reason: HOSPADM

## 2023-05-13 RX ORDER — LOSARTAN POTASSIUM 25 MG/1
25 TABLET ORAL DAILY
Qty: 90 TABLET | Refills: 3 | Status: SHIPPED | OUTPATIENT
Start: 2023-05-14 | End: 2023-07-03 | Stop reason: SDUPTHER

## 2023-05-13 RX ORDER — CLOPIDOGREL BISULFATE 75 MG/1
75 TABLET ORAL DAILY
Qty: 90 TABLET | Refills: 3 | Status: SHIPPED | OUTPATIENT
Start: 2023-05-13 | End: 2024-05-12

## 2023-05-13 RX ORDER — CARVEDILOL 3.12 MG/1
3.12 TABLET ORAL 2 TIMES DAILY
Qty: 60 TABLET | Refills: 3 | Status: SHIPPED | OUTPATIENT
Start: 2023-05-13 | End: 2023-07-03 | Stop reason: SDUPTHER

## 2023-05-13 RX ORDER — ATORVASTATIN CALCIUM 40 MG/1
40 TABLET, FILM COATED ORAL DAILY
Qty: 90 TABLET | Refills: 3 | Status: SHIPPED | OUTPATIENT
Start: 2023-05-14 | End: 2024-05-13

## 2023-05-13 RX ORDER — NICOTINE 7MG/24HR
1 PATCH, TRANSDERMAL 24 HOURS TRANSDERMAL DAILY
Status: DISCONTINUED | OUTPATIENT
Start: 2023-05-13 | End: 2023-05-14 | Stop reason: HOSPADM

## 2023-05-13 RX ORDER — CLONAZEPAM 1 MG/1
1 TABLET ORAL NIGHTLY PRN
Status: DISCONTINUED | OUTPATIENT
Start: 2023-05-13 | End: 2023-05-14 | Stop reason: HOSPADM

## 2023-05-13 RX ADMIN — LOSARTAN POTASSIUM 25 MG: 25 TABLET, FILM COATED ORAL at 08:05

## 2023-05-13 RX ADMIN — CHLORHEXIDINE GLUCONATE 15 ML: 1.2 RINSE ORAL at 08:05

## 2023-05-13 RX ADMIN — CARVEDILOL 3.12 MG: 3.12 TABLET, FILM COATED ORAL at 08:05

## 2023-05-13 RX ADMIN — INSULIN ASPART 4 UNITS: 100 INJECTION, SOLUTION INTRAVENOUS; SUBCUTANEOUS at 08:05

## 2023-05-13 RX ADMIN — ASPIRIN 81 MG: 81 TABLET, COATED ORAL at 08:05

## 2023-05-13 RX ADMIN — INSULIN DETEMIR 30 UNITS: 100 INJECTION, SOLUTION SUBCUTANEOUS at 08:05

## 2023-05-13 RX ADMIN — MUPIROCIN 1 G: 20 OINTMENT TOPICAL at 08:05

## 2023-05-13 RX ADMIN — CLOPIDOGREL BISULFATE 75 MG: 75 TABLET, FILM COATED ORAL at 08:05

## 2023-05-13 RX ADMIN — CLONAZEPAM 1 MG: 1 TABLET ORAL at 08:05

## 2023-05-13 RX ADMIN — HYDROCODONE BITARTRATE AND ACETAMINOPHEN 1 TABLET: 10; 325 TABLET ORAL at 02:05

## 2023-05-13 RX ADMIN — SODIUM CHLORIDE, PRESERVATIVE FREE 10 ML: 5 INJECTION INTRAVENOUS at 09:05

## 2023-05-13 RX ADMIN — SODIUM CHLORIDE, PRESERVATIVE FREE 10 ML: 5 INJECTION INTRAVENOUS at 08:05

## 2023-05-13 RX ADMIN — HYDROCODONE BITARTRATE AND ACETAMINOPHEN 1 TABLET: 10; 325 TABLET ORAL at 07:05

## 2023-05-13 RX ADMIN — ATORVASTATIN CALCIUM 40 MG: 40 TABLET, FILM COATED ORAL at 08:05

## 2023-05-13 RX ADMIN — NICOTINE 1 PATCH: 7 PATCH, EXTENDED RELEASE TRANSDERMAL at 08:05

## 2023-05-13 RX ADMIN — HYDROCODONE BITARTRATE AND ACETAMINOPHEN 1 TABLET: 10; 325 TABLET ORAL at 08:05

## 2023-05-13 NOTE — PROGRESS NOTES
[facility] Medicine  Progress Note    Patient Name: Sampson Holt  MRN: [id number]  Patient Class: IP- Inpatient   Admission Date: 5/9/2023  Length of Stay: 3 days  Attending Physician: Abdoulaye Thibodeaux MD  Primary Care Provider: Damian Vergara MD        Subjective:     Principal Problem:STEMI (ST elevation myocardial infarction)        HPI:  67-year-old male with a history of CHF, diabetes, hypertension, CVA, endocarditis, COPD presents to the emergency department with chest pain and found to have dynamic EKG changes with STEMI and patient was brought to the cath lab and needed stent in the RCA.  Patient was seen and examined at post procedure in ICU.  And chest pain-free and no shortness of breath.  He also reported that he was losing about more than 10 lb weight loss in previous 3 months and a [facility] and he did [facility]oscopy and nothing showed .Also he did CT scan abdomen and did not show pathology except cyst in the liver.  But he was  told most likely the origin of the pain possible for pancreatitis with previous history of alcohol use.        Overview/Hospital Course:  Patient was admitted.  He underwent LHC, in which a stent was placed in the RCA.  He was treated with two antiplatelet drugs, beta blocker, and statin.  He was monitored in ICU.  Cardiologist did PCI in a staged fashion.  He performed another LHC and placed a stent in the circumflex.  The LAD had 60% lesion, and no intervention was done on that one.  Pt continued to undergo monitoring.      Interval History:  having some abdominal pain today, which is a chronic problem for him.  He's concerned about a cyst in his liver.   EKG shows about 1 mm ST elevation in multiple leads.  No new complaints.    Review of Systems   Respiratory:  Negative for shortness of breath.    Cardiovascular:  Negative for leg swelling.   Objective:     Vital Signs (Most Recent):  Temp: 98.4 °F (36.9 °C) (05/12/23  1501)  Pulse: 68 (05/12/23 2039)  Resp: 20 (05/12/23 2039)  BP: 133/76 (05/12/23 2039)  SpO2: (!) 94 % (05/12/23 2039) Vital Signs (24h Range):  Temp:  [98.4 °F (36.9 °C)-98.6 °F (37 °C)] 98.4 °F (36.9 °C)  Pulse:  [57-81] 68  Resp:  [13-31] 20  SpO2:  [93 %-97 %] 94 %  BP: (124-154)/(63-85) 133/76     Weight: 93.2 kg (205 lb 7.5 oz)  Body mass index is 27.05 kg/m².    Intake/Output Summary (Last 24 hours) at 5/12/2023 2138  Last data filed at 5/12/2023 1630  Gross per 24 hour   Intake 800 ml   Output 850 ml   Net -50 ml           Physical Exam  Vitals reviewed.   Constitutional:       General: He is not in acute distress.     Appearance: He is not diaphoretic.   HENT:      Mouth/Throat:      Mouth: Mucous membranes are moist.   Eyes:      General: No scleral icterus.        Right eye: No discharge.         Left eye: No discharge.   Neck:      Vascular: No JVD.   Cardiovascular:      Rate and Rhythm: Normal rate and regular rhythm.   Pulmonary:      Effort: Pulmonary effort is normal.      Breath sounds: Normal breath sounds.   Abdominal:      General: Bowel sounds are normal. There is no distension.      Palpations: Abdomen is soft.      Tenderness: There is abdominal tenderness. There is no rebound.   Skin:     General: Skin is warm.      Findings: No rash.   Neurological:      Mental Status: He is alert.           Significant Labs: All pertinent labs within the past 24 hours have been reviewed.    Significant Imaging:  No new imaging      Assessment/Plan:      * STEMI (ST elevation myocardial infarction)  Continue DAPT.  Continue statin.  Continue heart monitor.  Cardiologist performed LHC + PCI in two stages.    First one was the RCA and the second was the circumflex.    Cigarette smoker  Dangers of cigarette smoking were reviewed with patient in detail for 10 minutes and patient was encouraged to quit. Nicotine replacement options were discussed. Nicotine replacement options were discussed. Nicotine replacement  was not prescribed per patient request.      Abdominal pain  Chronic problem.  Prior abdominal imaging showed cyst in left lobe of liver.  Will get new CT to check on its size.      Chronic bilateral low back pain without sciatica  Noted.  Continue PRN analgesics.    Type 2 diabetes mellitus with circulatory disorder, with long-term current use of insulin  Patient's FSGs are controlled on current medication regimen.  Last A1c reviewed-   Lab Results   Component Value Date    HGBA1C 6.9 (H) 05/10/2023    HGBA1C 6.9 (H) 05/10/2023     Current correctional scale  Medium  Maintain anti-hyperglycemic dose as follows-   Antihyperglycemics (From admission, onward)    Start     Stop Route Frequency Ordered    05/09/23 2100  insulin detemir U-100 (Levemir) pen 30 Units         -- SubQ Nightly 05/09/23 1703    05/09/23 1748  insulin aspart U-100 pen 1-10 Units         -- SubQ Before meals & nightly PRN 05/09/23 1648        Hold Oral hypoglycemics while patient is in the hospital.    COPD (chronic obstructive pulmonary disease)  Stable.  Continue PRN albuterol nebulized.    Benign essential HTN  Chronic, controlled.  Latest blood pressure and vitals reviewed-   Temp:  [98.4 °F (36.9 °C)-98.6 °F (37 °C)]   Pulse:  [57-81]   Resp:  [13-31]   BP: (124-154)/(63-85)   SpO2:  [93 %-97 %] .   Home meds for hypertension were reviewed and noted below.   Hypertension Medications             amLODIPine (NORVASC) 10 MG tablet Take 10 mg by mouth once daily.          While in the hospital, will manage blood pressure as follows:  Keep amlodipine on hold.  Started on Coreg 3.125 mg BID and on losartan 25 daily..    Will utilize p.r.n. blood pressure medication only if patient's blood pressure greater than  180/110 and he develops symptoms such as worsening chest pain or shortness of breath.        VTE Risk Mitigation (From admission, onward)         Ordered     heparin (porcine) injection 5,000 Units  Once         05/09/23 7337                 Discharge Planning   DAVID: 5/11/2023     Code Status: Full Code   Is the patient medically ready for discharge?:     Reason for patient still in hospital (select all that apply): Patient trending condition, Imaging and Consult recommendations  Discharge Plan A: Home                  Abdoulaye Thibodeaux MD  Department of Hospital Medicine   [facility]

## 2023-05-13 NOTE — PROGRESS NOTES
"[facility] Medicine  Progress Note    Patient Name: Sampson Holt  MRN: [id number]  Patient Class: IP- Inpatient   Admission Date: 5/9/2023  Length of Stay: 4 days  Attending Physician: Iron Pereira MD  Primary Care Provider: Damian Vergara MD        Subjective:     Principal Problem:STEMI (ST elevation myocardial infarction)        HPI:  67-year-old male with a history of CHF, diabetes, hypertension, CVA, endocarditis, COPD presents to the emergency department with chest pain and found to have dynamic EKG changes with STEMI and patient was brought to the cath lab and needed stent in the RCA.  Patient was seen and examined at post procedure in ICU.  And chest pain-free and no shortness of breath.  He also reported that he was losing about more than 10 lb weight loss in previous 3 months and a [facility] and he did [facility]oscopy and nothing showed .Also he did CT scan abdomen and did not show pathology except cyst in the liver.  But he was  told most likely the origin of the pain possible for pancreatitis with previous history of alcohol use.        Overview/Hospital Course:  Sampson Corona is a 67 year old male with a past medical history of HTN, DM, tobacco use, COPD, and CVA who presented with inferior STEMI as well as LAD and Lcx stenosis. PCI to RCA and Lcx per Cardiology. On DAPT, BB, statin and ARB. He was transferred out of the ICU 5/13. He is pending safe procurement of medications upon discharge as he states his [facility]      Interval History: see "Hospital Course"    Review of Systems   Respiratory:  Negative for shortness of breath.    Cardiovascular:  Negative for chest pain and palpitations.   Gastrointestinal:  Positive for nausea.   Objective:     Vital Signs (Most Recent):  Temp: 97.7 °F (36.5 °C) (05/13/23 1124)  Pulse: 72 (05/13/23 1124)  Resp: 18 (05/13/23 1124)  BP: 118/77 (05/13/23 " 1124)  SpO2: 95 % (05/13/23 1124) Vital Signs (24h Range):  Temp:  [97.7 °F (36.5 °C)-98.6 °F (37 °C)] 97.7 °F (36.5 °C)  Pulse:  [56-73] 72  Resp:  [12-33] 18  SpO2:  [90 %-97 %] 95 %  BP: (110-146)/(64-78) 118/77     Weight: 88.9 kg (196 lb)  Body mass index is 25.86 kg/m².    Intake/Output Summary (Last 24 hours) at 5/13/2023 1536  Last data filed at 5/13/2023 1230  Gross per 24 hour   Intake 420 ml   Output 1002 ml   Net -582 ml         Physical Exam  Vitals and nursing note reviewed.   Constitutional:       General: He is not in acute distress.  HENT:      Head: Normocephalic and atraumatic.      Right Ear: External ear normal.      Left Ear: External ear normal.      Nose: Nose normal.      Mouth/Throat:      Mouth: Mucous membranes are moist.      Pharynx: Oropharynx is clear.   Eyes:      Extraocular Movements: Extraocular movements intact.      Conjunctiva/sclera: Conjunctivae normal.   Cardiovascular:      Rate and Rhythm: Normal rate and regular rhythm.      Pulses: Normal pulses.      Heart sounds: Normal heart sounds.   Pulmonary:      Effort: Pulmonary effort is normal.      Breath sounds: Normal breath sounds.   Abdominal:      General: Bowel sounds are normal.      Palpations: Abdomen is soft.   Musculoskeletal:         General: Normal range of motion.      Cervical back: Normal range of motion and neck supple.   Skin:     General: Skin is warm and dry.   Neurological:      General: No focal deficit present.      Mental Status: He is alert and oriented to person, place, and time. Mental status is at baseline.   Psychiatric:         Mood and Affect: Mood normal.         Behavior: Behavior normal.           Significant Labs: All pertinent labs within the past 24 hours have been reviewed.    Significant Imaging: I have reviewed all pertinent imaging results/findings within the past 24 hours.      Assessment/Plan:      * STEMI (ST elevation myocardial infarction)  -Continue DAPT  -Continue statin  -Beta  blocker and ARB  -Follow up with Cardiology    CAD (coronary artery disease)  -DAPT  -Statin      Cigarette smoker  -Counseled on cessation      Cystic disease of liver  -Refer to IR on discharge for possible drainage    Chronic bilateral low back pain without sciatica  Chronic.    Type 2 diabetes mellitus with circulatory disorder, with long-term current use of insulin  Patient's FSGs are controlled on current medication regimen.  Last A1c reviewed-   Lab Results   Component Value Date    HGBA1C 6.9 (H) 05/10/2023    HGBA1C 6.9 (H) 05/10/2023     Current correctional scale  Medium  Maintain anti-hyperglycemic dose as follows-   Antihyperglycemics (From admission, onward)    Start     Stop Route Frequency Ordered    05/09/23 2100  insulin detemir U-100 (Levemir) pen 30 Units         -- SubQ Nightly 05/09/23 1703    05/09/23 1748  insulin aspart U-100 pen 1-10 Units         -- SubQ Before meals & nightly PRN 05/09/23 1648        Hold Oral hypoglycemics while patient is in the hospital.    COPD (chronic obstructive pulmonary disease)  -Continue home medications    Benign essential HTN  -Coreg  -Losartan        VTE Risk Mitigation (From admission, onward)    None          Discharge Planning   DAVID: 5/13/2023     Code Status: Full Code   Is the patient medically ready for discharge?:     Reason for patient still in hospital (select all that apply): Pending disposition  Discharge Plan A: Home   Discharge Delays: None known at this time              Iron Pereira MD  Department of Hospital Medicine   [facility]

## 2023-05-13 NOTE — NURSING
"Pt returned to room.     Small skin abrasion noted to R hand. Pt stated "I cut it on some glass" Cleansed and bandaged.     Bed alarm on, safety measures addressed. Call light within reach.   "

## 2023-05-13 NOTE — PLAN OF CARE
Problem: Diabetes Comorbidity  Goal: Blood Glucose Level Within Targeted Range  Outcome: Met     Problem: Adult Inpatient Plan of Care  Goal: Plan of Care Review  Outcome: Met  Goal: Patient-Specific Goal (Individualized)  Outcome: Met  Goal: Absence of Hospital-Acquired Illness or Injury  Outcome: Met  Goal: Optimal Comfort and Wellbeing  Outcome: Met  Goal: Readiness for Transition of Care  Outcome: Met     Problem: Oral Intake Inadequate  Goal: Improved Oral Intake  Outcome: Met

## 2023-05-13 NOTE — NURSING
"Pt arrived to room 2519. Pt AAOx4, sitting up in chair.  "sometimes I'm a little dizzy"  Pt safety education completed. Pt instructed to call staff for mobility. Call light within reach.   "

## 2023-05-13 NOTE — SUBJECTIVE & OBJECTIVE
Interval History:  having some abdominal pain today, which is a chronic problem for him.  He's concerned about a cyst in his liver.   EKG shows about 1 mm ST elevation in multiple leads.  No new complaints.    Review of Systems   Respiratory:  Negative for shortness of breath.    Cardiovascular:  Negative for leg swelling.   Objective:     Vital Signs (Most Recent):  Temp: 98.4 °F (36.9 °C) (05/12/23 1501)  Pulse: 68 (05/12/23 2039)  Resp: 20 (05/12/23 2039)  BP: 133/76 (05/12/23 2039)  SpO2: (!) 94 % (05/12/23 2039) Vital Signs (24h Range):  Temp:  [98.4 °F (36.9 °C)-98.6 °F (37 °C)] 98.4 °F (36.9 °C)  Pulse:  [57-81] 68  Resp:  [13-31] 20  SpO2:  [93 %-97 %] 94 %  BP: (124-154)/(63-85) 133/76     Weight: 93.2 kg (205 lb 7.5 oz)  Body mass index is 27.05 kg/m².    Intake/Output Summary (Last 24 hours) at 5/12/2023 2138  Last data filed at 5/12/2023 1630  Gross per 24 hour   Intake 800 ml   Output 850 ml   Net -50 ml           Physical Exam  Vitals reviewed.   Constitutional:       General: He is not in acute distress.     Appearance: He is not diaphoretic.   HENT:      Mouth/Throat:      Mouth: Mucous membranes are moist.   Eyes:      General: No scleral icterus.        Right eye: No discharge.         Left eye: No discharge.   Neck:      Vascular: No JVD.   Cardiovascular:      Rate and Rhythm: Normal rate and regular rhythm.   Pulmonary:      Effort: Pulmonary effort is normal.      Breath sounds: Normal breath sounds.   Abdominal:      General: Bowel sounds are normal. There is no distension.      Palpations: Abdomen is soft.      Tenderness: There is abdominal tenderness. There is no rebound.   Skin:     General: Skin is warm.      Findings: No rash.   Neurological:      Mental Status: He is alert.           Significant Labs: All pertinent labs within the past 24 hours have been reviewed.    Significant Imaging:  No new imaging

## 2023-05-13 NOTE — HOSPITAL COURSE
Sampson Corona is a 67 year old male with a past medical history of HTN, DM, tobacco use, COPD, and CVA who presented with inferior STEMI as well as LAD and Lcx stenosis. PCI to RCA and Lcx per Cardiology. On DAPT, BB, statin and ARB. He was transferred out of the ICU 5/13. He is pending safe procurement of medications upon discharge as he states his [facility]

## 2023-05-13 NOTE — ASSESSMENT & PLAN NOTE
Patient's FSGs are controlled on current medication regimen.  Last A1c reviewed-   Lab Results   Component Value Date    HGBA1C 6.9 (H) 05/10/2023    HGBA1C 6.9 (H) 05/10/2023     Current correctional scale  Medium  Maintain anti-hyperglycemic dose as follows-   Antihyperglycemics (From admission, onward)    Start     Stop Route Frequency Ordered    05/09/23 2100  insulin detemir U-100 (Levemir) pen 30 Units         -- SubQ Nightly 05/09/23 1703    05/09/23 1748  insulin aspart U-100 pen 1-10 Units         -- SubQ Before meals & nightly PRN 05/09/23 1648        Hold Oral hypoglycemics while patient is in the hospital.

## 2023-05-13 NOTE — PT/OT/SLP EVAL
Physical Therapy Evaluation and Discharge Note    Patient Name:  Sampson Holt   MRN:  [id number]    Recommendations:     Discharge Recommendations: home  Discharge Equipment Recommendations: none   Barriers to discharge: None    Assessment:     Sampson Holt is a 67 y.o. male admitted with a medical diagnosis of STEMI (ST elevation myocardial infarction). .  At this time, patient is functioning at their prior level of function and does not require further acute PT services.     Recent Surgery: Procedure(s) (LRB):  Left heart cath (Left)  IVUS, Coronary  Fractional Flow [employer] (FFR), Coronary 2 Days Post-Op    Plan:     During this hospitalization, patient does not require further acute PT services.  Please re-consult if situation changes.      Subjective     Chief Complaint: mild dizziness  Patient/Family Comments/goals: Patient claims she's doing good and being slightly dizzy  Pain/Comfort:  Pain Rating 1: 0/10    Patients cultural, spiritual, Sabianism conflicts given the current situation: no    Living Environment:  Lives alone with ramp  Prior to admission, patients level of function was independent.  Equipment used at home: none.  DME owned (not currently used): none.  Upon discharge, patient will have assistance from friends.    Objective:     Communicated with nurse Agudelo prior to session.  Patient found up in chair with   upon PT entry to room.    General Precautions: Standard,      Orthopedic Precautions:N/A   Braces: N/A  Respiratory Status: Room air    Exams:  Cognitive Exam:  Patient is oriented to Person, Place, Time, and Situation  RUE ROM: WFL  RUE Strength: WFL  LUE ROM: WFL  LUE Strength: WFL  RLE ROM: WFL  RLE Strength: WFL  LLE ROM: WFL  LLE Strength: WFL    Functional Mobility:  Bed Mobility:     Supine to Sit: independence  Sit to Supine: independence  Transfers:     Sit to Stand:  independence with no AD  Bed to Chair: independence with  no AD  using  Step Transfer  Gait: independent  no AD  Balance: Good    AM-PAC 6 CLICK MOBILITY  Total Score:24       Treatment and Education:  Patient seen for initial evaluation. No skilled therapy needed.    AM-PAC 6 CLICK MOBILITY  Total Score:24     Patient left up in chair with call button in reach and nurse Jammie notified.    GOALS:   Multidisciplinary Problems       Physical Therapy Goals       Not on file                    History:     Past Medical History:   Diagnosis Date    Abdominal pain 2022    CHF (congestive heart failure)     Diabetes mellitus 2018    Emphysema lung     Endocarditis 2011    Hypertension     Stroke 2011    denies residual       Past Surgical History:   Procedure Laterality Date    BACK SURGERY  1981    COLONOSCOPY N/A 2/23/2023    Procedure: COLONOSCOPY;  Surgeon: Jonn Cruz MD;  Location: [facility] ENDO;  Service: Endoscopy;  Laterality: N/A;    CORONARY ANGIOGRAPHY N/A 5/9/2023    Procedure: ANGIOGRAM, CORONARY ARTERY;  Surgeon: Antonio Kessler MD;  Location: [facility] CATH/EP LAB;  Service: Cardiology;  Laterality: N/A;    EXCISION OF HYDROCELE      x2    FRACTIONAL FLOW [place] (FFR), CORONARY  5/11/2023    Procedure: Fractional Flow [place] (FFR), Coronary;  Surgeon: Antonio Kessler MD;  Location: [facility] CATH/EP LAB;  Service: Cardiology;;    INGUINAL HERNIA REPAIR  1960    INTRAVASCULAR ULTRASOUND, CORONARY N/A 5/9/2023    Procedure: Ultrasound-coronary;  Surgeon: Antonio Kessler MD;  Location: [facility] CATH/EP LAB;  Service: Cardiology;  Laterality: N/A;    IVUS, CORONARY  5/11/2023    Procedure: IVUS, Coronary;  Surgeon: Antonio Kessler MD;  Location: [facility] CATH/EP LAB;  Service: Cardiology;;    LEFT HEART CATHETERIZATION Left 5/11/2023    Procedure: Left heart cath;  Surgeon: Antonio Kessler MD;  Location: [facility] CATH/EP LAB;  Service: Cardiology;  Laterality: Left;    PERCUTANEOUS TRANSLUMINAL BALLOON ANGIOPLASTY OF CORONARY ARTERY  5/9/2023    Procedure: Angioplasty-coronary;  Surgeon: Antonio Kessler MD;  Location: [facility] CATH/EP LAB;   Service: Cardiology;;    RHINOPLASTY      STENT, DRUG ELUTING, SINGLE VESSEL, CORONARY  5/9/2023    Procedure: Stent, Drug Eluting, Single Vessel, Coronary;  Surgeon: Antonio Kessler MD;  Location: [facility] CATH/EP LAB;  Service: Cardiology;;    SURGICAL REMOVAL OF NODULE OF VOCAL CORD         Time Tracking:     PT Received On: 05/13/23  PT Start Time: 1046     PT Stop Time: 1101  PT Total Time (min): 15 min     Billable Minutes: Evaluation 15      05/13/2023

## 2023-05-13 NOTE — PLAN OF CARE
05/13/23 1348   Final Note   Assessment Type Final Discharge Note   Anticipated Discharge Disposition Home   What phone number can be called within the next 1-3 days to see how you are doing after discharge? [phone]   Post-Acute Status   Discharge Delays None known at this time     Patient cleared for discharge from case management standpoint.      Chart and discharge orders reviewed.  Patient discharged home with no further case management needs.

## 2023-05-13 NOTE — ASSESSMENT & PLAN NOTE
Chronic, controlled.  Latest blood pressure and vitals reviewed-   Temp:  [98.4 °F (36.9 °C)-98.6 °F (37 °C)]   Pulse:  [57-81]   Resp:  [13-31]   BP: (124-154)/(63-85)   SpO2:  [93 %-97 %] .   Home meds for hypertension were reviewed and noted below.   Hypertension Medications             amLODIPine (NORVASC) 10 MG tablet Take 10 mg by mouth once daily.          While in the hospital, will manage blood pressure as follows:  Keep amlodipine on hold.  Started on Coreg 3.125 mg BID and on losartan 25 daily..    Will utilize p.r.n. blood pressure medication only if patient's blood pressure greater than  180/110 and he develops symptoms such as worsening chest pain or shortness of breath.

## 2023-05-13 NOTE — NURSING
Nurses Note -- 4 Eyes      5/13/2023   5:26 PM      Skin assessed during: Transfer      [x] No Altered Skin Integrity Present    [x]Prevention Measures Documented      [] Yes- Altered Skin Integrity Present or Discovered   [] LDA Added if Not in Epic (Describe Wound)   [] New Altered Skin Integrity was Present on Admit and Documented in LDA   [] Wound Image Taken    Wound Care Consulted? No    Attending Nurse:  Jammie Garland RN     Second RN/Staff Member:    Tiffanie Barrera [id number]

## 2023-05-13 NOTE — CARE UPDATE
05/12/23 2039   Patient Assessment/Suction   Level of Consciousness (AVPU) alert   Respiratory Effort Unlabored   Expansion/Accessory Muscles/Retractions expansion symmetric   All Lung Fields Breath Sounds clear   Rhythm/Pattern, Respiratory depth regular;pattern regular   Cough Frequency infrequent   Cough Type good;nonproductive   PRE-TX-O2   Device (Oxygen Therapy) room air   $ Is the patient on Low Flow Oxygen? Yes   Flow (L/min) 4   SpO2 (!) 94 %   Pulse Oximetry Type Continuous   $ Pulse Oximetry - Multiple Charge Pulse Oximetry - Multiple   Pulse 68  (Simultaneous filing. User may not have seen previous data.)   Resp 20   /76   Education   $ Education DME Oxygen;15 min

## 2023-05-13 NOTE — ASSESSMENT & PLAN NOTE
Chronic problem.  Prior abdominal imaging showed cyst in left lobe of liver.  Will get new CT to check on its size.

## 2023-05-13 NOTE — NURSING
Pt had episode of what appeared to be extreme anxiety regarding being unable to get his discharge medications today from [facility].    Pt vomiting,  complaining of chest pain and dizziness. MD notified. EKG unchanged. VSS.     MD advised pt discharge will be cancelled for now until plan can be made for pt to safely get his medications.

## 2023-05-13 NOTE — NURSING
Pt lying in bed in no acute distress. Repositioned pt. See ongoing assessment and charted vital signs in epic. See telemetry strip in chart. Right radial puncture site pink with no hematoma. Pt reports no chest pain. Call light within reach and bed alarm on. Will continue to monitor.

## 2023-05-13 NOTE — PLAN OF CARE
Problem: Diabetes Comorbidity  Goal: Blood Glucose Level Within Targeted Range  Outcome: Ongoing, Progressing     Problem: Adult Inpatient Plan of Care  Goal: Plan of Care Review  Outcome: Ongoing, Progressing  Goal: Patient-Specific Goal (Individualized)  Outcome: Ongoing, Progressing  Goal: Absence of Hospital-Acquired Illness or Injury  Outcome: Ongoing, Progressing  Goal: Optimal Comfort and Wellbeing  Outcome: Ongoing, Progressing  Goal: Readiness for Transition of Care  Outcome: Ongoing, Progressing     Problem: Oral Intake Inadequate  Goal: Improved Oral Intake  Outcome: Ongoing, Progressing

## 2023-05-13 NOTE — DISCHARGE SUMMARY
[facility]  Discharge Summary      Patient Name: Sampson Holt  MRN: [id number]  OSCAR: [id number]  Patient Class: IP- Inpatient  Admission Date: 5/9/2023  Hospital Length of Stay: 4 days  Discharge Date and Time: No discharge date for patient encounter.  Attending Physician: Iron Pereira MD   Discharging Provider: Iron Pereira MD  Primary Care Provider: Damian Vergara MD    Primary Care Team: Networked reference to record PCT     HPI:   67-year-old male with a history of CHF, diabetes, hypertension, CVA, endocarditis, COPD presents to the emergency department with chest pain and found to have dynamic EKG changes with STEMI and patient was brought to the cath lab and needed stent in the RCA.  Patient was seen and examined at post procedure in ICU.  And chest pain-free and no shortness of breath.  He also reported that he was losing about more than 10 lb weight loss in previous 3 months and a under  [facility] and he did colonoscopy and nothing showed .Also he did CT scan abdomen and did not show pathology except cyst in the liver.  But he was  told most likely the origin of the pain possible for pancreatitis with previous history of alcohol use.        Procedure(s) (LRB):  Left heart cath (Left)  IVUS, Coronary  Fractional Flow [place] (FFR), Coronary      Hospital Course:   Sampson Corona is a 67 year old male with a past medical history of HTN, DM, tobacco use, COPD, and CVA who presented with inferior STEMI as well as LAD and Lcx stenosis. PCI to RCA and Lcx per Cardiology. On DAPT, BB, statin and ARB. He was transferred out of the ICU 5/13 and was also discharged that day. He will follow up with his PCP and with Cardiology in the outpatient setting.       Goals of Care Treatment Preferences:  Code Status: Full Code      Consults:   Consults (From admission, onward)        Status Ordering Provider     Inpatient consult to Hospitalist  Once        Provider:  Louis  BRYNN Ruggiero MD    Acknowledged ANTONIO KESSLER     Inpatient consult to Cardiology  Once        Provider:  Antonio Kessler MD    Completed ANTONIO KESSLER          Pulmonary  COPD (chronic obstructive pulmonary disease)  -Continue home medications    Cardiac/Vascular  * STEMI (ST elevation myocardial infarction)  -Continue DAPT  -Continue statin  -Beta blocker and ARB  -Follow up with Cardiology    CAD (coronary artery disease)  -DAPT  -Statin      Benign essential HTN  -Coreg  -Losartan      Endocrine  Type 2 diabetes mellitus with circulatory disorder, with long-term current use of insulin  Patient's FSGs are controlled on current medication regimen.  Last A1c reviewed-   Lab Results   Component Value Date    HGBA1C 6.9 (H) 05/10/2023    HGBA1C 6.9 (H) 05/10/2023     Current correctional scale  Medium  Maintain anti-hyperglycemic dose as follows-   Antihyperglycemics (From admission, onward)    Start     Stop Route Frequency Ordered    05/09/23 2100  insulin detemir U-100 (Levemir) pen 30 Units         -- SubQ Nightly 05/09/23 1703    05/09/23 1748  insulin aspart U-100 pen 1-10 Units         -- SubQ Before meals & nightly PRN 05/09/23 1648        Hold Oral hypoglycemics while patient is in the hospital.    GI  Cystic disease of liver  -Refer to IR for possible drainage    Orthopedic  Chronic bilateral low back pain without sciatica  Chronic.    Other  Cigarette smoker  -Counseled on cessation        Final Active Diagnoses:    Diagnosis Date Noted POA    PRINCIPAL PROBLEM:  STEMI (ST elevation myocardial infarction) [I21.3] 05/09/2023 Yes    CAD (coronary artery disease) [I25.10] 05/13/2023 Yes    Cigarette smoker [F17.210] 05/10/2023 Yes    Chronic bilateral low back pain without sciatica [M54.50, G89.29] 03/15/2023 Yes    Cystic disease of liver [Q44.6] 03/15/2023 Yes    Benign essential HTN [I10] 03/27/2020 Yes    COPD (chronic obstructive pulmonary disease) [J44.9] 03/27/2020 Yes    Type 2 diabetes mellitus  with circulatory disorder, with long-term current use of insulin [E11.59, Z79.4] 03/27/2020 Not Applicable      Problems Resolved During this Admission:       Discharged Condition: stable    Disposition: Home or Self Care    Follow Up:   Follow-up Information     Damian Vergara MD Follow up in 2 week(s).    Specialty: Internal Medicine  Contact information:  [address] [place] [address] [address]  [place] [address]  [phone]             Antonio Kessler MD Follow up in 2 week(s).    Specialties: Interventional Cardiology, Cardiology  Contact information:  [address] [place] [address]  [address]  [place] [address]  [phone]                       Patient Instructions:      Diet diabetic     Notify your health care provider if you experience any of the following:  increased confusion or weakness     Notify your health care provider if you experience any of the following:  persistent dizziness, light-headedness, or visual disturbances     Notify your health care provider if you experience any of the following:  worsening rash     Notify your health care provider if you experience any of the following:  severe persistent headache     Notify your health care provider if you experience any of the following:  difficulty breathing or increased cough     Notify your health care provider if you experience any of the following:  redness, tenderness, or signs of infection (pain, swelling, redness, odor or green/yellow discharge around incision site)     Notify your health care provider if you experience any of the following:  severe uncontrolled pain     Notify your health care provider if you experience any of the following:  persistent nausea and vomiting or diarrhea     Notify your health care provider if you experience any of the following:  temperature >100.4     Activity as tolerated       Significant Diagnostic Studies: Labs: All labs within the past 24 hours have been reviewed    Pending Diagnostic Studies:     None          Medications:  Reconciled Home Medications:      Medication List      START taking these medications    aspirin 81 MG EC tablet  Commonly known as: ECOTRIN  Take 1 tablet (81 mg total) by mouth once daily.  Start taking on: May 14, 2023     atorvastatin 40 MG tablet  Commonly known as: LIPITOR  Take 1 tablet (40 mg total) by mouth once daily.  Start taking on: May 14, 2023     carvediloL 3.125 MG tablet  Commonly known as: COREG  Take 1 tablet (3.125 mg total) by mouth 2 (two) times daily.     clopidogreL 75 mg tablet  Commonly known as: PLAVIX  Take 1 tablet (75 mg total) by mouth once daily.     losartan 25 MG tablet  Commonly known as: COZAAR  Take 1 tablet (25 mg total) by mouth once daily.  Start taking on: May 14, 2023        CONTINUE taking these medications    albuterol 90 mcg/actuation inhaler  Commonly known as: PROVENTIL/VENTOLIN HFA  Inhale 1 puff into the lungs every 4 (four) hours as needed.     LANTUS SUBQ  Inject 35 Units into the skin once daily.        STOP taking these medications    amLODIPine 10 MG tablet  Commonly known as: NORVASC     doxycycline 100 MG tablet  Commonly known as: VIBRA-TABS     meclizine 25 mg tablet  Commonly known as: ANTIVERT            Indwelling Lines/Drains at time of discharge:   Lines/Drains/Airways     Airway  Duration                Airway - Non-Surgical 02/23/23 0710 Other (Comment) 79 days                Time spent on the discharge of patient: 32 minutes         Iron Pereira MD  Department of Hospital Medicine  [facility]

## 2023-05-13 NOTE — ASSESSMENT & PLAN NOTE
Continue DAPT.  Continue statin.  Continue heart monitor.  Cardiologist performed LHC + PCI in two stages.    First one was the RCA and the second was the circumflex.

## 2023-05-13 NOTE — PT/OT/SLP EVAL
Occupational Therapy   Evaluation and Discharge Note    Name: Sampson Holt  MRN: [id number]  Admitting Diagnosis: STEMI (ST elevation myocardial infarction)  Recent Surgery: Procedure(s) (LRB):  Left heart cath (Left)  IVUS, Coronary  Fractional Flow [place] (FFR), Coronary 2 Days Post-Op    Recommendations:     Discharge Recommendations: home  Discharge Equipment Recommendations: none  Barriers to discharge:  None    Assessment:     Sampson Holt is a 67 y.o. male with a medical diagnosis of STEMI (ST elevation myocardial infarction). At this time, patient is functioning at their prior level of function and does not require further acute OT services.     Plan:     During this hospitalization, patient does not require further acute OT services.  Please re-consult if situation changes.    Plan of Care Reviewed with: patient    Subjective     Chief Complaint: None  Patient/Family Comments/goals: To go home    Occupational Profile:  Living Environment: lives in a trailer with ramp to enter.  Previous level of function: independent with ADLs, IADLs, working and driving.  Roles and Routines: primary homemaker  Equipment Used at home: none  Assistance upon Discharge: None    Pain/Comfort:  Pain Rating 1: 0/10  Pain Rating Post-Intervention 1: 0/10    Patients cultural, spiritual, Orthodox conflicts given the current situation: no    Objective:     Communicated with: nurse prior to session.  Patient found up in chair with telemetry, peripheral IV upon OT entry to room.    General Precautions: Standard,  (None)  Orthopedic Precautions: N/A  Braces: N/A  Respiratory Status: Room air     Occupational Performance:    Functional Mobility/Transfers:  Patient completed Sit <> Stand Transfer with independence  with  no assistive device   Patient completed Toilet Transfer Step Transfer technique with independence with  no AD  Functional Mobility: ambulated 25 feet in the hospital room and bathroom independently with no  AD.    Activities of Daily Living:  Grooming: independence to wash hands standing at sink.  Lower Body Dressing: independence to don/doff socks sitting in chair.    Cognitive/Visual Perceptual:  Cognitive/Psychosocial Skills:     -       Oriented to: Person, Place, Time, and Situation   -       Follows Commands/attention:Follows multistep  commands  -       Communication: clear/fluent  -       Memory: No Deficits noted  -       Safety awareness/insight to disability: intact   -       Mood/Affect/Coping skills/emotional control: Cooperative and Pleasant  Visual/Perceptual:      -Intact Acuity    Physical Exam:  Balance:    -       Sitting/Standing: WFL  Upper Extremity Range of Motion:     -       Right Upper Extremity: WFL  -       Left Upper Extremity: WFL  Upper Extremity Strength:    -       Right Upper Extremity: WFL  -       Left Upper Extremity: WFL   Strength:    -       Right Upper Extremity: WFL  -       Left Upper Extremity: WFL  Fine Motor Coordination:    -       Intact    AMPAC 6 Click ADL:  AMPAC Total Score: 24    Treatment & Education:  Patient currently independent with sitting/standing ADL activity with no safety concerns.    Patient left up in chair with all lines intact and call button in reach    GOALS:   Multidisciplinary Problems       Occupational Therapy Goals       Not on file                    History:     Past Medical History:   Diagnosis Date    Abdominal pain 2022    CHF (congestive heart failure)     Diabetes mellitus 2018    Emphysema lung     Endocarditis 2011    Hypertension     Stroke 2011    denies residual         Past Surgical History:   Procedure Laterality Date    BACK SURGERY  1981    COLONOSCOPY N/A 2/23/2023    Procedure: COLONOSCOPY;  Surgeon: Jonn Cruz MD;  Location: [facility] ENDO;  Service: Endoscopy;  Laterality: N/A;    CORONARY ANGIOGRAPHY N/A 5/9/2023    Procedure: ANGIOGRAM, CORONARY ARTERY;  Surgeon: Antonio Kessler MD;  Location: [facility] CATH/EP LAB;  Service:  Cardiology;  Laterality: N/A;    EXCISION OF HYDROCELE      x2    FRACTIONAL FLOW RESERVE (FFR), CORONARY  5/11/2023    Procedure: Fractional Flow [place] (FFR), Coronary;  Surgeon: Antonio Kessler MD;  Location: [facility] CATH/EP LAB;  Service: Cardiology;;    INGUINAL HERNIA REPAIR  1960    INTRAVASCULAR ULTRASOUND, CORONARY N/A 5/9/2023    Procedure: Ultrasound-coronary;  Surgeon: Antonio Kessler MD;  Location: [facility] CATH/EP LAB;  Service: Cardiology;  Laterality: N/A;    IVUS, CORONARY  5/11/2023    Procedure: IVUS, Coronary;  Surgeon: Antonio Kessler MD;  Location: [facility] CATH/EP LAB;  Service: Cardiology;;    LEFT HEART CATHETERIZATION Left 5/11/2023    Procedure: Left heart cath;  Surgeon: Antonio Kessler MD;  Location: [facility] CATH/EP LAB;  Service: Cardiology;  Laterality: Left;    PERCUTANEOUS TRANSLUMINAL BALLOON ANGIOPLASTY OF CORONARY ARTERY  5/9/2023    Procedure: Angioplasty-coronary;  Surgeon: Antonio Kessler MD;  Location: [facility] CATH/EP LAB;  Service: Cardiology;;    RHINOPLASTY      STENT, DRUG ELUTING, SINGLE VESSEL, CORONARY  5/9/2023    Procedure: Stent, Drug Eluting, Single Vessel, Coronary;  Surgeon: Antonio Kessler MD;  Location: [facility] CATH/EP LAB;  Service: Cardiology;;    SURGICAL REMOVAL OF NODULE OF VOCAL CORD         Time Tracking:     OT Date of Treatment: 05/13/23  OT Start Time: 1102  OT Stop Time: 1114  OT Total Time (min): 12 min    Billable Minutes:Evaluation 2  Self Care/Home Management 10    5/13/2023

## 2023-05-13 NOTE — DISCHARGE INSTRUCTIONS
Post Radial Cath Discharge Instructions        Do not take a tub bath or submerge the puncture site in water for 72 hours.   You may cover the site with a Band-Aid. Keep Band-Aid clean and dry at all times.    No lifting over 5 pounds with the arm you puncture site is on for 72 hours.  No strenuous activity such as bowling, tennis, etc for 72 hours  Do not operate lawnmower, motorcycle, chainsaw, or all terrain vehicle for 72 hours.  No blood pressure or tourniquets on affected arm for 72 hours.   The puncture site may be slightly bruised and sore following your procedure.   .     If Bleeding Occurs, DO NOT PANIC  Place 1 or 2 fingers over the puncture site and hold firm pressure to stop bleeding. You may be able to feel your pulse as you hold pressure  Lift you fingers after 5 minutes to see if the bleeding stopped.  Once has stopped, gently wipe the wrist area clean and cover with a bandage.  If the bleeding does not stop after 30 minutes, or if there is a large amount of bleeding or spurting, call 911! Do not drive yourself to the hospital.     Should any of the following occur, Contact your Physician Immediately:  Redness, inflamation, swelling, chills or fever, or discolred drainage at procedure site   Coldness, discoloration, ongoing numbness, severe pain, or swelling. Expect mild tingling of hand and tenderness at the puncture site for up to 3 days. If this persists or other symptoms develop, notify your physician

## 2023-05-13 NOTE — SUBJECTIVE & OBJECTIVE
"Interval History: see "Hospital Course"    Review of Systems   Respiratory:  Negative for shortness of breath.    Cardiovascular:  Negative for chest pain and palpitations.   Gastrointestinal:  Positive for nausea.   Objective:     Vital Signs (Most Recent):  Temp: 97.7 °F (36.5 °C) (05/13/23 1124)  Pulse: 72 (05/13/23 1124)  Resp: 18 (05/13/23 1124)  BP: 118/77 (05/13/23 1124)  SpO2: 95 % (05/13/23 1124) Vital Signs (24h Range):  Temp:  [97.7 °F (36.5 °C)-98.6 °F (37 °C)] 97.7 °F (36.5 °C)  Pulse:  [56-73] 72  Resp:  [12-33] 18  SpO2:  [90 %-97 %] 95 %  BP: (110-146)/(64-78) 118/77     Weight: 88.9 kg (196 lb)  Body mass index is 25.86 kg/m².    Intake/Output Summary (Last 24 hours) at 5/13/2023 1536  Last data filed at 5/13/2023 1230  Gross per 24 hour   Intake 420 ml   Output 1002 ml   Net -582 ml         Physical Exam  Vitals and nursing note reviewed.   Constitutional:       General: He is not in acute distress.  HENT:      Head: Normocephalic and atraumatic.      Right Ear: External ear normal.      Left Ear: External ear normal.      Nose: Nose normal.      Mouth/Throat:      Mouth: Mucous membranes are moist.      Pharynx: Oropharynx is clear.   Eyes:      Extraocular Movements: Extraocular movements intact.      Conjunctiva/sclera: Conjunctivae normal.   Cardiovascular:      Rate and Rhythm: Normal rate and regular rhythm.      Pulses: Normal pulses.      Heart sounds: Normal heart sounds.   Pulmonary:      Effort: Pulmonary effort is normal.      Breath sounds: Normal breath sounds.   Abdominal:      General: Bowel sounds are normal.      Palpations: Abdomen is soft.   Musculoskeletal:         General: Normal range of motion.      Cervical back: Normal range of motion and neck supple.   Skin:     General: Skin is warm and dry.   Neurological:      General: No focal deficit present.      Mental Status: He is alert and oriented to person, place, and time. Mental status is at baseline.   Psychiatric:         " Mood and Affect: Mood normal.         Behavior: Behavior normal.           Significant Labs: All pertinent labs within the past 24 hours have been reviewed.    Significant Imaging: I have reviewed all pertinent imaging results/findings within the past 24 hours.

## 2023-05-14 VITALS
HEART RATE: 73 BPM | TEMPERATURE: 98 F | HEIGHT: 73 IN | BODY MASS INDEX: 26.76 KG/M2 | WEIGHT: 201.94 LBS | SYSTOLIC BLOOD PRESSURE: 118 MMHG | DIASTOLIC BLOOD PRESSURE: 63 MMHG | RESPIRATION RATE: 18 BRPM | OXYGEN SATURATION: 94 %

## 2023-05-14 LAB
ALBUMIN SERPL BCP-MCNC: 3.9 G/DL (ref 3.5–5.2)
ALP SERPL-CCNC: 70 U/L (ref 55–135)
ALT SERPL W/O P-5'-P-CCNC: 16 U/L (ref 10–44)
ANION GAP SERPL CALC-SCNC: 9 MMOL/L (ref 8–16)
AST SERPL-CCNC: 15 U/L (ref 10–40)
BASOPHILS # BLD AUTO: 0.05 K/UL (ref 0–0.2)
BASOPHILS NFR BLD: 0.7 % (ref 0–1.9)
BILIRUB SERPL-MCNC: 0.5 MG/DL (ref 0.1–1)
BUN SERPL-MCNC: 24 MG/DL (ref 8–23)
CALCIUM SERPL-MCNC: 9.5 MG/DL (ref 8.7–10.5)
CHLORIDE SERPL-SCNC: 105 MMOL/L (ref 95–110)
CO2 SERPL-SCNC: 25 MMOL/L (ref 23–29)
CREAT SERPL-MCNC: 0.7 MG/DL (ref 0.5–1.4)
DIFFERENTIAL METHOD: ABNORMAL
EOSINOPHIL # BLD AUTO: 0.2 K/UL (ref 0–0.5)
EOSINOPHIL NFR BLD: 2.4 % (ref 0–8)
ERYTHROCYTE [DISTWIDTH] IN BLOOD BY AUTOMATED COUNT: 12.2 % (ref 11.5–14.5)
EST. GFR  (NO RACE VARIABLE): >60 ML/MIN/1.73 M^2
GLUCOSE SERPL-MCNC: 187 MG/DL (ref 70–110)
GLUCOSE SERPL-MCNC: 213 MG/DL (ref 70–110)
GLUCOSE SERPL-MCNC: 220 MG/DL (ref 70–110)
HCT VFR BLD AUTO: 39.5 % (ref 40–54)
HGB BLD-MCNC: 13.3 G/DL (ref 14–18)
IMM GRANULOCYTES # BLD AUTO: 0.02 K/UL (ref 0–0.04)
IMM GRANULOCYTES NFR BLD AUTO: 0.3 % (ref 0–0.5)
LYMPHOCYTES # BLD AUTO: 2.1 K/UL (ref 1–4.8)
LYMPHOCYTES NFR BLD: 29.1 % (ref 18–48)
MAGNESIUM SERPL-MCNC: 1.9 MG/DL (ref 1.6–2.6)
MCH RBC QN AUTO: 31.5 PG (ref 27–31)
MCHC RBC AUTO-ENTMCNC: 33.7 G/DL (ref 32–36)
MCV RBC AUTO: 94 FL (ref 82–98)
MONOCYTES # BLD AUTO: 0.8 K/UL (ref 0.3–1)
MONOCYTES NFR BLD: 11.3 % (ref 4–15)
NEUTROPHILS # BLD AUTO: 4.1 K/UL (ref 1.8–7.7)
NEUTROPHILS NFR BLD: 56.2 % (ref 38–73)
NRBC BLD-RTO: 0 /100 WBC
PLATELET # BLD AUTO: 220 K/UL (ref 150–450)
PMV BLD AUTO: 9.6 FL (ref 9.2–12.9)
POTASSIUM SERPL-SCNC: 4.2 MMOL/L (ref 3.5–5.1)
PROT SERPL-MCNC: 6.7 G/DL (ref 6–8.4)
RBC # BLD AUTO: 4.22 M/UL (ref 4.6–6.2)
SODIUM SERPL-SCNC: 139 MMOL/L (ref 136–145)
WBC # BLD AUTO: 7.36 K/UL (ref 3.9–12.7)

## 2023-05-14 PROCEDURE — A4216 STERILE WATER/SALINE, 10 ML: HCPCS | Performed by: INTERNAL MEDICINE

## 2023-05-14 PROCEDURE — 85025 COMPLETE CBC W/AUTO DIFF WBC: CPT | Performed by: STUDENT IN AN ORGANIZED HEALTH CARE EDUCATION/TRAINING PROGRAM

## 2023-05-14 PROCEDURE — 25000003 PHARM REV CODE 250

## 2023-05-14 PROCEDURE — 83735 ASSAY OF MAGNESIUM: CPT | Performed by: STUDENT IN AN ORGANIZED HEALTH CARE EDUCATION/TRAINING PROGRAM

## 2023-05-14 PROCEDURE — 25000003 PHARM REV CODE 250: Performed by: HOSPITALIST

## 2023-05-14 PROCEDURE — 80053 COMPREHEN METABOLIC PANEL: CPT | Performed by: STUDENT IN AN ORGANIZED HEALTH CARE EDUCATION/TRAINING PROGRAM

## 2023-05-14 PROCEDURE — 25000003 PHARM REV CODE 250: Performed by: INTERNAL MEDICINE

## 2023-05-14 PROCEDURE — 36415 COLL VENOUS BLD VENIPUNCTURE: CPT | Performed by: STUDENT IN AN ORGANIZED HEALTH CARE EDUCATION/TRAINING PROGRAM

## 2023-05-14 PROCEDURE — S4991 NICOTINE PATCH NONLEGEND: HCPCS | Performed by: HOSPITALIST

## 2023-05-14 RX ORDER — NICOTINE 7MG/24HR
1 PATCH, TRANSDERMAL 24 HOURS TRANSDERMAL DAILY
Qty: 30 PATCH | Refills: 0 | Status: SHIPPED | OUTPATIENT
Start: 2023-05-15 | End: 2023-06-14

## 2023-05-14 RX ADMIN — NICOTINE 1 PATCH: 7 PATCH, EXTENDED RELEASE TRANSDERMAL at 10:05

## 2023-05-14 RX ADMIN — CLOPIDOGREL BISULFATE 75 MG: 75 TABLET, FILM COATED ORAL at 10:05

## 2023-05-14 RX ADMIN — ASPIRIN 81 MG: 81 TABLET, COATED ORAL at 10:05

## 2023-05-14 RX ADMIN — ATORVASTATIN CALCIUM 40 MG: 40 TABLET, FILM COATED ORAL at 10:05

## 2023-05-14 RX ADMIN — SODIUM CHLORIDE, PRESERVATIVE FREE 10 ML: 5 INJECTION INTRAVENOUS at 09:05

## 2023-05-14 RX ADMIN — CARVEDILOL 3.12 MG: 3.12 TABLET, FILM COATED ORAL at 10:05

## 2023-05-14 RX ADMIN — CHLORHEXIDINE GLUCONATE 15 ML: 1.2 RINSE ORAL at 10:05

## 2023-05-14 RX ADMIN — MUPIROCIN 1 G: 20 OINTMENT TOPICAL at 10:05

## 2023-05-14 RX ADMIN — HYDROCODONE BITARTRATE AND ACETAMINOPHEN 1 TABLET: 10; 325 TABLET ORAL at 03:05

## 2023-05-14 RX ADMIN — INSULIN ASPART 4 UNITS: 100 INJECTION, SOLUTION INTRAVENOUS; SUBCUTANEOUS at 10:05

## 2023-05-14 NOTE — NURSING
Pt discharged to home. Pt discharge education completed. Smoking cessation stressed to pt repeatedly as pt has continued smoking throughout stay.  Pt brought downstairs via wheelchair with all belongings.

## 2023-05-14 NOTE — RESPIRATORY THERAPY
05/13/23 1955   Patient Assessment/Suction   Level of Consciousness (AVPU) alert   Respiratory Effort Unlabored   Expansion/Accessory Muscles/Retractions no use of accessory muscles   All Lung Fields Breath Sounds clear   PRE-TX-O2   Device (Oxygen Therapy) room air   Pulse Oximetry Type Intermittent   $ Pulse Oximetry - Multiple Charge Pulse Oximetry - Multiple   Aerosol Therapy   $ Aerosol Therapy Charges PRN treatment not required   Education   $ Education Bronchodilator;15 min   Respiratory Evaluation   $ Care Plan Tech Time 15 min   $ Eval/Re-eval Charges Evaluation

## 2023-05-14 NOTE — PLAN OF CARE
Met with patient at bedside, [facility]. [facility] [place] and is directed to present his prescriptions along with his d/c instructions to the clinic to get his prescriptions filled and schedule follow up with his doctor.     Discharge orders and chart reviewed with no further post-acute discharge needs identified at this time.  At this time, patient is cleared for discharge from Case Management standpoint.        05/14/23 1600   Final Note   Assessment Type Final Discharge Note   Anticipated Discharge Disposition Home   Hospital Resources/Appts/Education Provided   (Patient instructed to make post hospital follow up appointment with their PCP in 7 to 10 days from discharge)   Post-Acute Status   Coverage [facility]   Discharge Delays None known at this time

## 2023-05-19 NOTE — DISCHARGE SUMMARY
[facility]  Discharge Summary  Patient Name: Sampson Holt MRN: [id number]   Patient Class: IP- Inpatient  Length of Stay: 5   Admission Date: 5/9/2023 10:56 AM Attending Physician: Phi Dudley MD   Primary Care Provider: Damian Vergara MD Face-to-Face encounter date: 5/14/23   Chief Complaint: Chest Pain and Shortness of Breath    Date of Discharge: 5/14/23  Discharge Disposition:Home or Self Care   Condition: Stable       Reason for Hospitalization     Active Hospital Problems    Diagnosis    *STEMI (ST elevation myocardial infarction)    CAD (coronary artery disease)    Cigarette smoker    Chronic bilateral low back pain without sciatica    Cystic disease of liver    Benign essential HTN    COPD (chronic obstructive pulmonary disease)    Type 2 diabetes mellitus with circulatory disorder, with long-term current use of insulin         Brief History of Present Illness       HPI:  67-year-old male with a history of CHF, diabetes, hypertension, CVA, endocarditis, COPD presents to the emergency department with chest pain and found to have dynamic EKG changes with STEMI and patient was brought to the cath lab and needed stent in the RCA.  Patient was seen and examined at post procedure in ICU.  And chest pain-free and no shortness of breath.  He also reported that he was losing about more than 10 lb weight loss in previous 3 months and a under  [facility] with [facility] and he did colonoscopy and nothing showed .Also he did CT scan abdomen and did not show pathology except cyst in the liver.  But he was  told most likely the origin of the pain possible for pancreatitis with previous history of alcohol use.           Overview/Hospital Course:  Sampson Corona is a 67 year old male with a past medical history of HTN, DM, tobacco use, COPD, and CVA who presented with inferior STEMI as well as LAD and Lcx stenosis. PCI to RCA and Lcx per Cardiology. On DAPT, BB, statin and ARB. He was transferred out  "of the ICU 5/13. He is pending safe procurement of medications upon discharge as he states his medications are [facility] which will be open 5/15.     For the full HPI please refer to the History & Physical from this admission.    Hospital Course By Problem with Pertinent Findings       STEMI (ST elevation myocardial infarction)  -Continue DAPT  -Continue statin  -Beta blocker and ARB  -Follow up with Cardiology     CAD (coronary artery disease)  -DAPT  -Statin        Cigarette smoker  -Counseled on cessation        Cystic disease of liver  -Refer to IR on discharge for possible drainage     Chronic bilateral low back pain without sciatica  Chronic.     Type 2 diabetes mellitus with circulatory disorder, with long-term current use of insulin  Patient's FSGs are controlled on current medication regimen.  Last A1c reviewed-         Lab Results   Component Value Date     HGBA1C 6.9 (H) 05/10/2023     HGBA1C 6.9 (H) 05/10/2023      Current correctional scale  Medium  Maintain anti-hyperglycemic dose as follows-             Antihyperglycemics (From admission, onward)     Start     Stop Route Frequency Ordered     05/09/23 2100   insulin detemir U-100 (Levemir) pen 30 Units         -- SubQ Nightly 05/09/23 1703     05/09/23 1748   insulin aspart U-100 pen 1-10 Units         -- SubQ Before meals & nightly PRN 05/09/23 1648          Hold Oral hypoglycemics while patient is in the hospital.     COPD (chronic obstructive pulmonary disease)  -Continue home medications     Benign essential HTN  -Coreg  -Losartan         Patient was seen and examined on the date of discharge and determined to be suitable for discharge.    Physical Exam  /63 (BP Location: Right arm, Patient Position: Sitting)   Pulse 73   Temp 97.7 °F (36.5 °C) (Oral)   Resp 18   Ht 6' 1" (1.854 m)   Wt 91.6 kg (201 lb 15.1 oz)   SpO2 (!) 94%   BMI 26.64 kg/m²   Vitals reviewed.    Constitutional: No distress.   HENT: Atraumatic. "   Cardiovascular: Normal rate, regular rhythm.  S1 S2.    Pulmonary/Chest: Effort normal. Clear to auscultation bilaterally. No wheezes.   Abdominal: Soft. Bowel sounds are normal. Exhibits no distension and no mass. No tenderness  Neurological: Alert.   Skin: Skin is warm and dry.     Following labs were Reviewed   No results for input(s): WBC, HGB, HCT, PLT, GLUCOSE, CALCIUM, ALBUMIN, PROT, NA, K, CO2, CL, BUN, CREATININE, ALKPHOS, ALT, AST, BILITOT in the last 24 hours.  No results found for: POCTGLUCOSE     All labs within the past 24 hours have been reviewed    Microbiology Results (last 7 days)       ** No results found for the last 168 hours. **          CT Abdomen Without Contrast   Final Result      X-Ray Chest AP Portable   Final Result      X-Ray Chest AP Portable   Final Result          No results found for this or any previous visit.      Consultants and Procedures   Consultants:  Consults (From admission, onward)          Status Ordering Provider     Inpatient consult to Cardiology  Once        Provider:  Antonio Kessler MD    Completed ANTONIO KESSLER            Procedures:   Procedure(s) (LRB):  Left heart cath (Left)  IVUS, Coronary  Fractional Flow [place] (FFR), Coronary     Discharge Information:   Diet:  Resume Cardiac diet/Diabetic Diet    Physical Activity:  Activity as tolerated    Instructions:  1. Take all medications as prescribed  2. Keep all follow-up appointments  3. Return to the hospital or call your primary care physicians if any worsening symptoms such as chest pain, shortness of breath, bleeding,  intractable pain, fever >101 occur.      Follow-Up Appointments:  Please call your primary care physician to schedule an appointment in 1 week time.     Follow-up Information       Damian Vergara MD Follow up in 2 week(s).    Specialty: Internal Medicine  Contact information:  [address] [place] [address] [address] Melinda [place] [address]  [phone]               Antonio Kessler MD Follow up in 2  week(s).    Specialties: Interventional Cardiology, Cardiology  Contact information:  [address]  [address]  [facility] [address]  [phone]                               Pending laboratory work/Tests to be performed/followed by the Primary care Physician:    The patient was discharged with discharge instructions reviewed in written and verbal form. All questions were answered and prescriptions were provided. The importance of making follow up appointments and compliance of medications has been emphasized. The patient will follow up in 1 week or sooner as needed with the PCP. Tthe patient understands and agrees with the plan. Upon discharge, patient needs to be on following medications.    Discharge Medications:     Medication List        START taking these medications      aspirin 81 MG EC tablet  Commonly known as: ECOTRIN  Take 1 tablet (81 mg total) by mouth once daily.     atorvastatin 40 MG tablet  Commonly known as: LIPITOR  Take 1 tablet (40 mg total) by mouth once daily.     carvediloL 3.125 MG tablet  Commonly known as: COREG  Take 1 tablet (3.125 mg total) by mouth 2 (two) times daily.     clopidogreL 75 mg tablet  Commonly known as: PLAVIX  Take 1 tablet (75 mg total) by mouth once daily.     losartan 25 MG tablet  Commonly known as: COZAAR  Take 1 tablet (25 mg total) by mouth once daily.     nicotine 7 mg/24 hr  Commonly known as: NICODERM CQ  Place 1 patch onto the skin once daily.            CONTINUE taking these medications      albuterol 90 mcg/actuation inhaler  Commonly known as: PROVENTIL/VENTOLIN HFA     LANTUS SUBQ            STOP taking these medications      amLODIPine 10 MG tablet  Commonly known as: NORVASC     doxycycline 100 MG tablet  Commonly known as: VIBRA-TABS     meclizine 25 mg tablet  Commonly known as: ANTIVERT               Where to Get Your Medications        These medications were sent to [facility]  [address], [facility] [address]       Hours: Mon-Fri, 8a-5:30p Phone: [phone]   aspirin 81 MG EC tablet  atorvastatin 40 MG tablet  carvediloL 3.125 MG tablet  clopidogreL 75 mg tablet  losartan 25 MG tablet  nicotine 7 mg/24 hr           I spent 32 minutes preparing the discharge for this patient including reviewing records from previous encounters, preparation of discharge summary, assessing and final examination of the patient, discharge medicine reconciliation, discussing plan of care, follow up and education and prescriptions.       Phi Dudley  [facility] Hospitalist

## 2023-05-22 ENCOUNTER — OFFICE VISIT (OUTPATIENT)
Dept: CARDIOLOGY | Facility: CLINIC | Age: 68
End: 2023-05-22
Payer: OTHER GOVERNMENT

## 2023-05-22 VITALS
HEIGHT: 73 IN | WEIGHT: 195 LBS | OXYGEN SATURATION: 96 % | BODY MASS INDEX: 25.84 KG/M2 | HEART RATE: 77 BPM | DIASTOLIC BLOOD PRESSURE: 86 MMHG | SYSTOLIC BLOOD PRESSURE: 152 MMHG

## 2023-05-22 DIAGNOSIS — I10 BENIGN ESSENTIAL HTN: ICD-10-CM

## 2023-05-22 DIAGNOSIS — I25.10 CORONARY ARTERY DISEASE, UNSPECIFIED VESSEL OR LESION TYPE, UNSPECIFIED WHETHER ANGINA PRESENT, UNSPECIFIED WHETHER NATIVE OR TRANSPLANTED HEART: ICD-10-CM

## 2023-05-22 DIAGNOSIS — I21.11 ST ELEVATION MYOCARDIAL INFARCTION INVOLVING RIGHT CORONARY ARTERY: Primary | ICD-10-CM

## 2023-05-22 DIAGNOSIS — E11.59 TYPE 2 DIABETES MELLITUS WITH OTHER CIRCULATORY COMPLICATION, WITH LONG-TERM CURRENT USE OF INSULIN: ICD-10-CM

## 2023-05-22 DIAGNOSIS — Z79.4 TYPE 2 DIABETES MELLITUS WITH OTHER CIRCULATORY COMPLICATION, WITH LONG-TERM CURRENT USE OF INSULIN: ICD-10-CM

## 2023-05-22 DIAGNOSIS — F17.210 CIGARETTE SMOKER: ICD-10-CM

## 2023-05-22 PROCEDURE — 99214 PR OFFICE/OUTPT VISIT, EST, LEVL IV, 30-39 MIN: ICD-10-PCS | Mod: S$PBB,,,

## 2023-05-22 PROCEDURE — 99214 OFFICE O/P EST MOD 30 MIN: CPT | Mod: S$PBB,,,

## 2023-05-22 PROCEDURE — 99999 PR PBB SHADOW E&M-EST. PATIENT-LVL V: CPT | Mod: PBBFAC,,,

## 2023-05-22 PROCEDURE — 93010 ELECTROCARDIOGRAM REPORT: CPT | Mod: S$PBB,,, | Performed by: GENERAL PRACTICE

## 2023-05-22 PROCEDURE — 99999 PR PBB SHADOW E&M-EST. PATIENT-LVL V: ICD-10-PCS | Mod: PBBFAC,,,

## 2023-05-22 PROCEDURE — 93010 EKG 12-LEAD: ICD-10-PCS | Mod: S$PBB,,, | Performed by: GENERAL PRACTICE

## 2023-05-22 PROCEDURE — 99215 OFFICE O/P EST HI 40 MIN: CPT | Mod: PBBFAC,PN

## 2023-05-22 PROCEDURE — 93005 ELECTROCARDIOGRAM TRACING: CPT | Mod: PBBFAC,PN | Performed by: GENERAL PRACTICE

## 2023-05-22 RX ORDER — ISOSORBIDE MONONITRATE 30 MG/1
30 TABLET, EXTENDED RELEASE ORAL DAILY
Qty: 30 TABLET | Refills: 11 | Status: SHIPPED | OUTPATIENT
Start: 2023-05-22 | End: 2023-05-22 | Stop reason: ALTCHOICE

## 2023-05-22 RX ORDER — ISOSORBIDE MONONITRATE 30 MG/1
30 TABLET, EXTENDED RELEASE ORAL DAILY
Qty: 30 TABLET | Refills: 11 | Status: SHIPPED | OUTPATIENT
Start: 2023-05-22 | End: 2023-07-03 | Stop reason: ALTCHOICE

## 2023-05-22 RX ORDER — ISOSORBIDE MONONITRATE 30 MG/1
30 TABLET, EXTENDED RELEASE ORAL DAILY
Qty: 30 TABLET | Refills: 11 | Status: SHIPPED | OUTPATIENT
Start: 2023-05-22 | End: 2023-05-22 | Stop reason: SDUPTHER

## 2023-05-22 NOTE — ASSESSMENT & PLAN NOTE
Continue DAPT aspirin and plavix daily. Continue atorvastatin 40 mg daily. Low sodium heart healthy diet.

## 2023-05-22 NOTE — PROGRESS NOTES
Subjective:    Patient ID:  Sampson Holt is a 67 y.o. male patient here for evaluation Hospital Follow Up      History of Present Illness:    Patient is here for hospital follow up.  Patient presented to ED with CP and found to have STEMI.  Patient received stent to RCA.  Patient c/o occasional L flank sharp pain when he lays down at night, not associated with exertion.  Not reproducible.  NAD. Denies palpitations, shortness of breath, lightheadedness, dizziness, edema or bleeding.  He is taking his medications as prescribed.          FROM DISCHARGE SUMMARY    HPI:  67-year-old male with a history of CHF, diabetes, hypertension, CVA, endocarditis, COPD presents to the emergency department with chest pain and found to have dynamic EKG changes with STEMI and patient was brought to the cath lab and needed stent in the RCA.  Patient was seen and examined at post procedure in ICU.  And chest pain-free and no shortness of breath.  He also reported that he was losing about more than 10 lb weight loss in previous 3 months and a under  [facility] and he did colonoscopy and nothing showed .Also he did CT scan abdomen and did not show pathology except cyst in the liver.  But he was  told most likely the origin of the pain possible for pancreatitis with previous history of alcohol use.           Overview/Hospital Course:  Sampson Corona is a 67 year old male with a past medical history of HTN, DM, tobacco use, COPD, and CVA who presented with inferior STEMI as well as LAD and Lcx stenosis. PCI to RCA and Lcx per Cardiology. On DAPT, BB, statin and ARB. He was transferred out of the ICU 5/13. He is pending safe procurement of medications upon discharge as he states his medications are [facility] which will be open 5/15.     For the full HPI please refer to the History & Physical from this admission.       STEMI (ST elevation myocardial infarction)  -Continue DAPT  -Continue statin  -Beta blocker  and ARB  -Follow up with Cardiology     CAD (coronary artery disease)  -DAPT  -Statin        Cigarette smoker  -Counseled on cessation        Cystic disease of liver  -Refer to IR on discharge for possible drainage     Chronic bilateral low back pain without sciatica  Chronic.     Type 2 diabetes mellitus with circulatory disorder, with long-term current use of insulin  Patient's FSGs are controlled on current medication regimen.  Last A1c reviewed-          Instructions:  1. Take all medications as prescribed  2. Keep all follow-up appointments  3. Return to the hospital or call your primary care physicians if any worsening symptoms such as chest pain, shortness of breath, bleeding,  intractable pain, fever >101 occur.       Review of patient's allergies indicates:  No Known Allergies    Past Medical History:   Diagnosis Date    Abdominal pain 2022    CHF (congestive heart failure)     Diabetes mellitus 2018    Emphysema lung     Endocarditis 2011    Hypertension     Stroke 2011    denies residual     Past Surgical History:   Procedure Laterality Date    BACK SURGERY  1981    COLONOSCOPY N/A 2/23/2023    Procedure: COLONOSCOPY;  Surgeon: Jonn Cruz MD;  Location: [facility] ENDO;  Service: Endoscopy;  Laterality: N/A;    CORONARY ANGIOGRAPHY N/A 5/9/2023    Procedure: ANGIOGRAM, CORONARY ARTERY;  Surgeon: Antonio Kessler MD;  Location: [facility] CATH/EP LAB;  Service: Cardiology;  Laterality: N/A;    EXCISION OF HYDROCELE      x2    FRACTIONAL FLOW RESERVE (FFR), CORONARY  5/11/2023    Procedure: Fractional Flow [place] (FFR), Coronary;  Surgeon: Antonio Kessler MD;  Location: [facility] CATH/EP LAB;  Service: Cardiology;;    INGUINAL HERNIA REPAIR  1960    INTRAVASCULAR ULTRASOUND, CORONARY N/A 5/9/2023    Procedure: Ultrasound-coronary;  Surgeon: Antonio Kessler MD;  Location: [facility] CATH/EP LAB;  Service: Cardiology;  Laterality: N/A;    IVUS, CORONARY  5/11/2023    Procedure: IVUS, Coronary;  Surgeon: Antonio Kessler MD;   Location: [facility] CATH/EP LAB;  Service: Cardiology;;    LEFT HEART CATHETERIZATION Left 5/11/2023    Procedure: Left heart cath;  Surgeon: Antonio Kessler MD;  Location: [facility] CATH/EP LAB;  Service: Cardiology;  Laterality: Left;    PERCUTANEOUS TRANSLUMINAL BALLOON ANGIOPLASTY OF CORONARY ARTERY  5/9/2023    Procedure: Angioplasty-coronary;  Surgeon: Antonio Kessler MD;  Location: [facility] CATH/EP LAB;  Service: Cardiology;;    RHINOPLASTY      STENT, DRUG ELUTING, SINGLE VESSEL, CORONARY  5/9/2023    Procedure: Stent, Drug Eluting, Single Vessel, Coronary;  Surgeon: Antonio Kessler MD;  Location: [facility] CATH/EP LAB;  Service: Cardiology;;    SURGICAL REMOVAL OF NODULE OF VOCAL CORD       Social History     Tobacco Use    Smoking status: Every Day     Packs/day: 1.00     Types: Cigarettes     Last attempt to quit: 3/27/2020     Years since quitting: 3.1    Smokeless tobacco: Former    Tobacco comments:     Advised not to smoke DOS    Substance Use Topics    Alcohol use: Yes     Alcohol/week: 14.0 standard drinks     Types: 14 Shots of liquor per week    Drug use: Yes     Types: Marijuana        Review of Systems:    As noted in HPI in addition      REVIEW OF SYSTEMS  CARDIOVASCULAR: No recent chest pain, palpitations, arm, neck, or jaw pain  RESPIRATORY: No recent fever, cough chills, SOB or congestion  : No blood in the urine  GI: No Nausea, vomiting, constipation, diarrhea, blood, or reflux.  MUSCULOSKELETAL: No myalgias  NEURO: No lightheadedness or dizziness  EYES: No Double vision, blurry, vision or headache              Objective        Vitals:    05/22/23 1133   BP: (!) 152/86   Pulse: 77       LIPIDS - LAST 2   Lab Results   Component Value Date    CHOL 188 05/10/2023    HDL 45 05/10/2023    LDLCALC 118.2 05/10/2023    TRIG 124 05/10/2023    CHOLHDL 23.9 05/10/2023       CBC - LAST 2  Lab Results   Component Value Date    WBC 7.36 05/14/2023    WBC 6.98 05/13/2023    RBC 4.22 (L) 05/14/2023    RBC 4.42 (L) 05/13/2023     HGB 13.3 (L) 05/14/2023    HGB 14.1 05/13/2023    HCT 39.5 (L) 05/14/2023    HCT 40.4 05/13/2023    MCV 94 05/14/2023    MCV 91 05/13/2023    MCH 31.5 (H) 05/14/2023    MCH 31.9 (H) 05/13/2023    MCHC 33.7 05/14/2023    MCHC 34.9 05/13/2023    RDW 12.2 05/14/2023    RDW 12.0 05/13/2023     05/14/2023     05/13/2023    MPV 9.6 05/14/2023    MPV 9.1 (L) 05/13/2023    GRAN 4.1 05/14/2023    GRAN 56.2 05/14/2023    LYMPH 2.1 05/14/2023    LYMPH 29.1 05/14/2023    MONO 0.8 05/14/2023    MONO 11.3 05/14/2023    BASO 0.05 05/14/2023    BASO 0.07 05/09/2023    NRBC 0 05/14/2023    NRBC 0 05/09/2023       CHEMISTRY & LIVER FUNCTION - LAST 2  Lab Results   Component Value Date     05/14/2023     05/13/2023    K 4.2 05/14/2023    K 4.3 05/13/2023     05/14/2023     05/13/2023    CO2 25 05/14/2023    CO2 25 05/13/2023    ANIONGAP 9 05/14/2023    ANIONGAP 6 (L) 05/13/2023    BUN 24 (H) 05/14/2023    BUN 20 05/13/2023    CREATININE 0.7 05/14/2023    CREATININE 0.8 05/13/2023     (H) 05/14/2023     (H) 05/13/2023    CALCIUM 9.5 05/14/2023    CALCIUM 9.3 05/13/2023    PH 7.422 03/27/2020    MG 1.9 05/14/2023    MG 2.2 05/12/2023    ALBUMIN 3.9 05/14/2023    ALBUMIN 3.8 05/11/2023    PROT 6.7 05/14/2023    PROT 6.8 05/11/2023    ALKPHOS 70 05/14/2023    ALKPHOS 56 05/11/2023    ALT 16 05/14/2023    ALT 13 05/11/2023    AST 15 05/14/2023    AST 20 05/11/2023    BILITOT 0.5 05/14/2023    BILITOT 0.6 05/11/2023        CARDIAC PROFILE - LAST 2  Lab Results   Component Value Date    BNP 39 05/09/2023    BNP 36 03/27/2020    CPK 71 07/06/2022    CPK 75 03/27/2020    CPKMB 1.8 07/06/2022    CPKMB 1.5 03/27/2020     03/27/2020    TROPONINI <0.030 07/06/2022    TROPONINI <0.030 03/28/2020    TROPONINIHS 1119.7 () 05/11/2023    TROPONINIHS 1523.4 () 05/10/2023        COAGULATION - LAST 2  Lab Results   Component Value Date    LABPT 13.3 07/06/2022    INR 1.1 05/09/2023    INR  1.0 05/09/2023    APTT >150.0 (AA) 05/09/2023    APTT 28.7 07/06/2022       ENDOCRINE & PSA - LAST 2  Lab Results   Component Value Date    HGBA1C 6.9 (H) 05/10/2023    HGBA1C 6.9 (H) 05/10/2023    TSH 2.160 05/10/2023    PROCAL <0.05 03/28/2020    PROCAL 0.08 03/27/2020        ECHOCARDIOGRAM RESULTS  Results for orders placed during the hospital encounter of 05/09/23    Echo    Interpretation Summary  · The left ventricle is normal in size with mild concentric hypertrophy and normal systolic function.  · The estimated ejection fraction is 55%.  · Normal left ventricular diastolic function.  · Normal right ventricular size with normal right ventricular systolic function.  · Normal central venous pressure (3 mmHg).      CURRENT/PREVIOUS VISIT EKG  Results for orders placed or performed in visit on 05/22/23   IN OFFICE EKG 12-LEAD (to [facility])    Collection Time: 05/22/23 11:35 AM    Narrative    Test Reason : I21.11,    Vent. Rate : 074 BPM     Atrial Rate : 074 BPM     P-R Int : 218 ms          QRS Dur : 078 ms      QT Int : 388 ms       P-R-T Axes : 006 033 054 degrees     QTc Int : 430 ms    Sinus rhythm with 1st degree A-V block  Low voltage QRS  Cannot rule out Anterior infarct ,age undetermined  Abnormal ECG  When compared with ECG of 13-MAY-2023 21:41,  No significant change was found    Referred By: [email or web address]   SELF           Confirmed By:      No valid procedures specified.   No results found for this or any previous visit.    No valid procedures specified.    PHYSICAL EXAM  CONSTITUTIONAL: Well built, well nourished in no apparent distress  NECK: no carotid bruit, no JVD  LUNGS: CTA  CHEST WALL: no tenderness  HEART: regular rate and rhythm, S1, S2 normal, no murmur, click, rub or gallop   ABDOMEN: soft, non-tender; bowel sounds normal  EXTREMITIES: Extremities normal, no edema, no calf tenderness noted  NEURO: AAO X 3    I HAVE REVIEWED :    The vital signs, nurses notes, and all the pertinent radiology and  labs.        Current Outpatient Medications   Medication Instructions    albuterol (PROVENTIL/VENTOLIN HFA) 90 mcg/actuation inhaler 1 puff, Inhalation, Every 4 hours PRN    aspirin (ECOTRIN) 81 mg, Oral, Daily    atorvastatin (LIPITOR) 40 mg, Oral, Daily    carvediloL (COREG) 3.125 mg, Oral, 2 times daily    clopidogreL (PLAVIX) 75 mg, Oral, Daily    insulin glargine,hum.rec.anlog (LANTUS SUBQ) 35 Units, Subcutaneous, Daily    isosorbide mononitrate (IMDUR) 30 mg, Oral, Daily    losartan (COZAAR) 25 mg, Oral, Daily    nicotine (NICODERM CQ) 7 mg/24 hr 1 patch, Transdermal, Daily          Assessment & Plan     STEMI (ST elevation myocardial infarction)  Continue DAPT aspirin and plavix daily. Continue atorvastatin 40 mg daily. Low sodium heart healthy diet.  Cardiac rehab referral placed.    CAD (coronary artery disease)  Recent STEMI.  Continue low-sodium heart healthy diet.  Continue aspirin 81 mg daily, Plavix 75 mg daily, and atorvastatin 40 mg daily.  We will start on Imdur 30 mg daily.  Continue losartan 25 mg daily and carvedilol 3.125 mg b.i.d..    Type 2 diabetes mellitus with circulatory disorder, with long-term current use of insulin  Managed by PCP.  Last hemoglobin A1c was 6.9.    Cigarette smoker  Strongly encouraged smoking cessation.    Benign essential HTN  Continue carvedilol 3.125 mg b.i.d., losartan 25 mg daily.  We will start patient on Imdur 30 mg daily.  Continue to monitor blood pressure at home daily.  Goal for blood pressure to be 130/80 or less.  2 g low-sodium heart healthy diet.          No follow-ups on file.

## 2023-05-22 NOTE — ASSESSMENT & PLAN NOTE
Recent STEMI.  Continue low-sodium heart healthy diet.  Continue aspirin 81 mg daily, Plavix 75 mg daily, and atorvastatin 40 mg daily.  We will start on Imdur 30 mg daily.  Continue losartan 25 mg daily and carvedilol 3.125 mg b.i.d..

## 2023-05-22 NOTE — ASSESSMENT & PLAN NOTE
Continue carvedilol 3.125 mg b.i.d., losartan 25 mg daily.  We will start patient on Imdur 30 mg daily.  Continue to monitor blood pressure at home daily.  Goal for blood pressure to be 130/80 or less.  2 g low-sodium heart healthy diet.

## 2023-05-23 ENCOUNTER — TELEPHONE (OUTPATIENT)
Dept: CARDIOLOGY | Facility: CLINIC | Age: 68
End: 2023-05-23
Payer: OTHER GOVERNMENT

## 2023-05-23 NOTE — TELEPHONE ENCOUNTER
----- Message from Elmo Perez sent at 5/23/2023  3:46 PM CDT -----  Regarding: Return Call  Contact: Patient  Type:  Patient Returning Call    Who Called:Patient  Who Left Message for Patient:office nurse  Does the patient know what this is regarding?:medication/ bad headaches  Would the patient rather a call back or a response via [facility]? call  Best Call Back Number:[phone]  Additional Information: Please call patient has called three times. Thanks

## 2023-05-23 NOTE — TELEPHONE ENCOUNTER
----- Message from Gwen Mcelroy, Patient Care Assistant sent at 5/23/2023  8:31 AM CDT -----  Regarding: advice  Contact: pt  Type: Needs Medical Advice  Who Called:  pt  Symptoms (please be specific):  headaches   How long has patient had these symptoms:  1 day     Best Call Back Number: [phone]    Additional Information: please call pt to advise. Thanks!

## 2023-05-25 ENCOUNTER — TELEPHONE (OUTPATIENT)
Dept: CARDIOLOGY | Facility: CLINIC | Age: 68
End: 2023-05-25
Payer: OTHER GOVERNMENT

## 2023-05-25 NOTE — TELEPHONE ENCOUNTER
----- Message from George Hammond sent at 5/25/2023  8:21 AM CDT -----  Contact: Self  Type: Needs Medical Advice  Who Called:  Patient  Best Call Back Number: [phone]   Additional Information: Called to speak with office regarding medications. Wanted to follow up with 5/23 call. States he did not getting message

## 2023-06-02 ENCOUNTER — TELEPHONE (OUTPATIENT)
Dept: CARDIAC REHAB | Facility: HOSPITAL | Age: 68
End: 2023-06-02

## 2023-06-02 ENCOUNTER — TELEPHONE (OUTPATIENT)
Dept: CARDIOLOGY | Facility: CLINIC | Age: 68
End: 2023-06-02
Payer: OTHER GOVERNMENT

## 2023-06-02 NOTE — TELEPHONE ENCOUNTER
----- Message from Elmo Perez sent at 6/2/2023  8:50 AM CDT -----  Regarding: Return Call  Contact: Kanwal with [facility]  Type:  Patient Returning Call    Who Called:ADALBERTO Schofield  Who Left Message for Patient:office nurse  Does the patient know what this is regarding?:[facility] instructions  Would the patient rather a call back or a response via [facility]? Call  Best Call Back Number:[phone]  Additional Information: Please call

## 2023-06-02 NOTE — TELEPHONE ENCOUNTER
I called lorna she said she is working on Cardiac rehab for this patient. She states to [facility] might actually [facility] it.

## 2023-06-08 ENCOUNTER — TELEPHONE (OUTPATIENT)
Dept: CARDIOLOGY | Facility: CLINIC | Age: 68
End: 2023-06-08
Payer: OTHER GOVERNMENT

## 2023-06-08 DIAGNOSIS — Z95.5 STATUS POST INSERTION OF DRUG ELUTING CORONARY ARTERY STENT: Primary | ICD-10-CM

## 2023-06-08 DIAGNOSIS — R07.9 CHEST PAIN, UNSPECIFIED TYPE: ICD-10-CM

## 2023-06-08 DIAGNOSIS — R06.09 DOE (DYSPNEA ON EXERTION): ICD-10-CM

## 2023-06-08 NOTE — TELEPHONE ENCOUNTER
----- Message from Elmo Perez sent at 6/8/2023  4:00 PM CDT -----  Regarding: Return Call  Contact: patient  Type:  Patient Returning Call    Who Called:Patient  Who Left Message for Patient:office staff  Does the patient know what this is regarding?:Problem/Concern/congestion  Would the patient rather a call back or a response via [facility]? call  Best Call Back Number:[phone]  Additional Information: Please call patient to advise/schedule

## 2023-06-09 DIAGNOSIS — R07.9 CHEST PAIN, UNSPECIFIED TYPE: Primary | ICD-10-CM

## 2023-06-09 DIAGNOSIS — R06.09 DOE (DYSPNEA ON EXERTION): ICD-10-CM

## 2023-06-09 DIAGNOSIS — I25.10 CORONARY ARTERY DISEASE, UNSPECIFIED VESSEL OR LESION TYPE, UNSPECIFIED WHETHER ANGINA PRESENT, UNSPECIFIED WHETHER NATIVE OR TRANSPLANTED HEART: ICD-10-CM

## 2023-06-12 ENCOUNTER — TELEPHONE (OUTPATIENT)
Dept: CARDIAC REHAB | Facility: HOSPITAL | Age: 68
End: 2023-06-12

## 2023-06-12 ENCOUNTER — TELEPHONE (OUTPATIENT)
Dept: CARDIOLOGY | Facility: CLINIC | Age: 68
End: 2023-06-12
Payer: OTHER GOVERNMENT

## 2023-06-12 NOTE — TELEPHONE ENCOUNTER
----- Message from Matthias Hines sent at 6/12/2023  3:49 PM CDT -----  Type: Need Medical Advice   Who Called: [facility] home health nurse  Best callback number: [phone]  Additional Information: [facility] called with concerns about the above patient, she stated someone should call the patient about the medications he is taking, so he can understand and not over take same medicines Patient may be taking same medication prescribe by [facility] and the [facility], just different dosages. She also stated he seemed a bit confused. Please reach out to patient   Please call to further assist, Thanks

## 2023-06-13 DIAGNOSIS — I25.10 CORONARY ATHEROSCLEROSIS OF NATIVE CORONARY ARTERY: ICD-10-CM

## 2023-06-13 DIAGNOSIS — R07.9 CHEST PAIN, UNSPECIFIED: Primary | ICD-10-CM

## 2023-06-13 DIAGNOSIS — R06.09 DYSPNEA ON EXERTION: ICD-10-CM

## 2023-06-21 ENCOUNTER — TELEPHONE (OUTPATIENT)
Dept: CARDIOLOGY | Facility: HOSPITAL | Age: 68
End: 2023-06-21

## 2023-06-21 NOTE — TELEPHONE ENCOUNTER
Left message on voicemail.     Patient advised, test will be at [facility] ([address]   Will need to register on the first floor at the main entrance.   Patient advised that arrival time is 6:30am.  Patient advised that he may be here about 3.5-4 hours, and may want to bring something to occupy their time, as there will be periods of waiting.    Patient advised, may take his medications prior to testing if you need to.  Patient should HOLD Isosorbide. Advised if he needs to eat to take his medications, please keep it light, like toast and juice.    Patient advised to avoid all caffeine 12 hours prior to testing.  This includes decaf tea and coffee.    Will provide peanut butter crackers for a snack after stress test.  If patient would prefer something else, please bring a snack from home.    Wear comfortable clothing.   No lotions, oils, or powders to the upper chest area. May wear deodorant.    No metal jewelry, buttons, or zippers to the upper body.  Advised to call the office if any questions.     Will send instructions via [employer] as well.

## 2023-06-22 ENCOUNTER — TELEPHONE (OUTPATIENT)
Dept: CARDIAC REHAB | Facility: HOSPITAL | Age: 68
End: 2023-06-22

## 2023-06-22 ENCOUNTER — HOSPITAL ENCOUNTER (OUTPATIENT)
Dept: RADIOLOGY | Facility: HOSPITAL | Age: 68
Discharge: HOME | End: 2023-06-22
Payer: OTHER GOVERNMENT

## 2023-06-22 ENCOUNTER — HOSPITAL ENCOUNTER (OUTPATIENT)
Dept: CARDIOLOGY | Facility: HOSPITAL | Age: 68
Discharge: HOME | End: 2023-06-22
Payer: OTHER GOVERNMENT

## 2023-06-22 DIAGNOSIS — I25.10 CORONARY ATHEROSCLEROSIS OF NATIVE CORONARY ARTERY: ICD-10-CM

## 2023-06-22 DIAGNOSIS — R06.09 DYSPNEA ON EXERTION: ICD-10-CM

## 2023-06-22 DIAGNOSIS — R07.9 CHEST PAIN, UNSPECIFIED: ICD-10-CM

## 2023-06-22 LAB
CV PHARM DOSE: 0.4 MG
CV STRESS BASE HR: 62 BPM
DIASTOLIC BLOOD PRESSURE: 90 MMHG
EJECTION FRACTION- HIGH: 65 %
END DIASTOLIC INDEX-HIGH: 153 ML/M2
END DIASTOLIC INDEX-LOW: 93 ML/M2
END SYSTOLIC INDEX-HIGH: 71 ML/M2
END SYSTOLIC INDEX-LOW: 31 ML/M2
NUC REST DIASTOLIC VOLUME INDEX: 106
NUC REST EJECTION FRACTION: 70
NUC REST SYSTOLIC VOLUME INDEX: 32
NUC STRESS DIASTOLIC VOLUME INDEX: 108
NUC STRESS EJECTION FRACTION: 73 %
NUC STRESS SYSTOLIC VOLUME INDEX: 29
OHS CV CPX 1 MINUTE RECOVERY HEART RATE: 76 BPM
OHS CV CPX 85 PERCENT MAX PREDICTED HEART RATE MALE: 129
OHS CV CPX MAX PREDICTED HEART RATE: 152
OHS CV CPX PATIENT IS FEMALE: 0
OHS CV CPX PATIENT IS MALE: 1
OHS CV CPX PEAK DIASTOLIC BLOOD PRESSURE: 82 MMHG
OHS CV CPX PEAK HEAR RATE: 76 BPM
OHS CV CPX PEAK RATE PRESSURE PRODUCT: NORMAL
OHS CV CPX PEAK SYSTOLIC BLOOD PRESSURE: 155 MMHG
OHS CV CPX PERCENT MAX PREDICTED HEART RATE ACHIEVED: 50
OHS CV CPX RATE PRESSURE PRODUCT PRESENTING: 9796
RETIRED EF AND QEF - SEE NOTES: 53 %
SYSTOLIC BLOOD PRESSURE: 158 MMHG

## 2023-06-22 PROCEDURE — 93017 CV STRESS TEST TRACING ONLY: CPT

## 2023-06-22 PROCEDURE — A9502 TC99M TETROFOSMIN: HCPCS

## 2023-06-22 PROCEDURE — 78452 HT MUSCLE IMAGE SPECT MULT: CPT | Mod: 26,,, | Performed by: INTERNAL MEDICINE

## 2023-06-22 PROCEDURE — 93016 CV STRESS TEST SUPVJ ONLY: CPT | Mod: ,,, | Performed by: INTERNAL MEDICINE

## 2023-06-22 PROCEDURE — 93016 NUCLEAR STRESS - CARDIOLOGY INTERPRETED (CUPID ONLY): ICD-10-PCS | Mod: ,,, | Performed by: INTERNAL MEDICINE

## 2023-06-22 PROCEDURE — 78452 NUCLEAR STRESS - CARDIOLOGY INTERPRETED (CUPID ONLY): ICD-10-PCS | Mod: 26,,, | Performed by: INTERNAL MEDICINE

## 2023-06-22 PROCEDURE — 93018 NUCLEAR STRESS - CARDIOLOGY INTERPRETED (CUPID ONLY): ICD-10-PCS | Mod: ,,, | Performed by: INTERNAL MEDICINE

## 2023-06-22 PROCEDURE — 93018 CV STRESS TEST I&R ONLY: CPT | Mod: ,,, | Performed by: INTERNAL MEDICINE

## 2023-06-22 RX ORDER — REGADENOSON 0.08 MG/ML
0.4 INJECTION, SOLUTION INTRAVENOUS ONCE
Status: COMPLETED | OUTPATIENT
Start: 2023-06-22 | End: 2023-06-22

## 2023-07-02 PROBLEM — E78.5 HYPERLIPIDEMIA: Status: ACTIVE | Noted: 2023-07-02

## 2023-07-02 NOTE — ASSESSMENT & PLAN NOTE
Managed by PCP.  Recommend weight loss.  Continue Lantus.  Closely monitor blood sugars at home.  Last hemoglobin A1c was 6.9.

## 2023-07-02 NOTE — PROGRESS NOTES
Subjective:    Patient ID:  Sampson Holt is a 67 y.o. male patient here for evaluation Follow-up      History of Present Illness:       Patient is here for routine checkup.  Patient had recent STEMI in May.  Mid LAD lesion was 60% stenosed, PCI deferred with recommendations for medical management.  Distal circumflex lesion was 90% and stent was placed.  Patient had recent stent placed two days prior to the RCA without any significant InStent restenosis.  He denies chest pain, palpitations, shortness of breath, lightheadedness, dizziness, jaw neck or arm pain, edema or bleeding.  Euvolemic today in office.  No further anginal equivalent symptoms.  He was compliant with taking his medications as prescribed.  Occasional atypical chest discomfort when he lays on his L side or takes a deep breath.   CP/L arm pain not worsened with exertion.  No anginal equivalent symptoms.  Repeat stress test was performed 6/22/23. Negative for reversible ischemia.  Patient could not tolerate IMDUR due to HA.          Normal myocardial perfusion scan. There is no evidence of myocardial ischemia or infarction.    The gated perfusion images showed an ejection fraction of 70% at rest. The gated perfusion images showed an ejection fraction of 73% post stress. Normal ejection fraction is greater than 53%.    The ECG portion of the study is negative for ischemia.    The patient reported no chest pain during the stress test.    There were no arrhythmias during stress.    CARDIAC CATH     The estimated blood loss was <50 mL.    The Mid LAD lesion was 60% stenosed. The diagnostic FFR was measured at 0.85.  PCI deferred.  Recommend medical management.    The Dist Cx lesion was 90% stenosed with 10% stenosis post-intervention.    A 2.50 x 15MM MARNI FRONTIER RX CORONARY STENT stent was successfully placed at 12 BOGDAN for 12 sec.    Patent recently placed stent in the RCA without any significant InStent restenoses      Review of patient's  allergies indicates:  No Known Allergies    Past Medical History:   Diagnosis Date    Abdominal pain 2022    CHF (congestive heart failure)     Diabetes mellitus 2018    Emphysema lung     Endocarditis 2011    Hypertension     Stroke 2011    denies residual     Past Surgical History:   Procedure Laterality Date    BACK SURGERY  1981    COLONOSCOPY N/A 2/23/2023    Procedure: COLONOSCOPY;  Surgeon: Jonn Cruz MD;  Location: [facility] ENDO;  Service: Endoscopy;  Laterality: N/A;    CORONARY ANGIOGRAPHY N/A 5/9/2023    Procedure: ANGIOGRAM, CORONARY ARTERY;  Surgeon: Antonio Kessler MD;  Location: [facility] CATH/EP LAB;  Service: Cardiology;  Laterality: N/A;    EXCISION OF HYDROCELE      x2    FRACTIONAL FLOW RESERVE (FFR), CORONARY  5/11/2023    Procedure: Fractional Flow Conrad (FFR), Coronary;  Surgeon: Antonio Kessler MD;  Location: [facility] CATH/EP LAB;  Service: Cardiology;;    INGUINAL HERNIA REPAIR  1960    INTRAVASCULAR ULTRASOUND, CORONARY N/A 5/9/2023    Procedure: Ultrasound-coronary;  Surgeon: Antonio Kessler MD;  Location: [facility] CATH/EP LAB;  Service: Cardiology;  Laterality: N/A;    IVUS, CORONARY  5/11/2023    Procedure: IVUS, Coronary;  Surgeon: Antonio Kessler MD;  Location: [facility] CATH/EP LAB;  Service: Cardiology;;    LEFT HEART CATHETERIZATION Left 5/11/2023    Procedure: Left heart cath;  Surgeon: Antonio Kessler MD;  Location: [facility] CATH/EP LAB;  Service: Cardiology;  Laterality: Left;    PERCUTANEOUS TRANSLUMINAL BALLOON ANGIOPLASTY OF CORONARY ARTERY  5/9/2023    Procedure: Angioplasty-coronary;  Surgeon: Antonio Kessler MD;  Location: [facility] CATH/EP LAB;  Service: Cardiology;;    RHINOPLASTY      STENT, DRUG ELUTING, SINGLE VESSEL, CORONARY  5/9/2023    Procedure: Stent, Drug Eluting, Single Vessel, Coronary;  Surgeon: Antonio Kessler MD;  Location: [facility] CATH/EP LAB;  Service: Cardiology;;    SURGICAL REMOVAL OF NODULE OF VOCAL CORD       Social History     Tobacco Use    Smoking status: Every Day     Packs/day:  1.00     Types: Cigarettes     Last attempt to quit: 3/27/2020     Years since quitting: 3.2    Smokeless tobacco: Former    Tobacco comments:     Advised not to smoke DOS    Substance Use Topics    Alcohol use: Yes     Alcohol/week: 14.0 standard drinks     Types: 14 Shots of liquor per week    Drug use: Yes     Types: Marijuana        Review of Systems:    As noted in HPI in addition      REVIEW OF SYSTEMS  CARDIOVASCULAR: No recent chest pain, palpitations, arm, neck, or jaw pain.  + atypical chest pain and L arm pain   RESPIRATORY: No recent fever, cough chills, SOB or congestion  : No blood in the urine  GI: No Nausea, vomiting, constipation, diarrhea, blood, or reflux.  MUSCULOSKELETAL: No myalgias  NEURO: No lightheadedness or dizziness  EYES: No Double vision, blurry, vision or headache              Objective        Vitals:    07/03/23 1058   BP: (!) 150/82   Pulse: 65       LIPIDS - LAST 2   Lab Results   Component Value Date    CHOL 188 05/10/2023    HDL 45 05/10/2023    LDLCALC 118.2 05/10/2023    TRIG 124 05/10/2023    CHOLHDL 23.9 05/10/2023       CBC - LAST 2  Lab Results   Component Value Date    WBC 7.36 05/14/2023    WBC 6.98 05/13/2023    RBC 4.22 (L) 05/14/2023    RBC 4.42 (L) 05/13/2023    HGB 13.3 (L) 05/14/2023    HGB 14.1 05/13/2023    HCT 39.5 (L) 05/14/2023    HCT 40.4 05/13/2023    MCV 94 05/14/2023    MCV 91 05/13/2023    MCH 31.5 (H) 05/14/2023    MCH 31.9 (H) 05/13/2023    MCHC 33.7 05/14/2023    MCHC 34.9 05/13/2023    RDW 12.2 05/14/2023    RDW 12.0 05/13/2023     05/14/2023     05/13/2023    MPV 9.6 05/14/2023    MPV 9.1 (L) 05/13/2023    GRAN 4.1 05/14/2023    GRAN 56.2 05/14/2023    LYMPH 2.1 05/14/2023    LYMPH 29.1 05/14/2023    MONO 0.8 05/14/2023    MONO 11.3 05/14/2023    BASO 0.05 05/14/2023    BASO 0.07 05/09/2023    NRBC 0 05/14/2023    NRBC 0 05/09/2023       CHEMISTRY & LIVER FUNCTION - LAST 2  Lab Results   Component Value Date     05/14/2023    NA  137 05/13/2023    K 4.2 05/14/2023    K 4.3 05/13/2023     05/14/2023     05/13/2023    CO2 25 05/14/2023    CO2 25 05/13/2023    ANIONGAP 9 05/14/2023    ANIONGAP 6 (L) 05/13/2023    BUN 24 (H) 05/14/2023    BUN 20 05/13/2023    CREATININE 0.7 05/14/2023    CREATININE 0.8 05/13/2023     (H) 05/14/2023     (H) 05/13/2023    CALCIUM 9.5 05/14/2023    CALCIUM 9.3 05/13/2023    PH 7.422 03/27/2020    MG 1.9 05/14/2023    MG 2.2 05/12/2023    ALBUMIN 3.9 05/14/2023    ALBUMIN 3.8 05/11/2023    PROT 6.7 05/14/2023    PROT 6.8 05/11/2023    ALKPHOS 70 05/14/2023    ALKPHOS 56 05/11/2023    ALT 16 05/14/2023    ALT 13 05/11/2023    AST 15 05/14/2023    AST 20 05/11/2023    BILITOT 0.5 05/14/2023    BILITOT 0.6 05/11/2023        CARDIAC PROFILE - LAST 2  Lab Results   Component Value Date    BNP 39 05/09/2023    BNP 36 03/27/2020    CPK 71 07/06/2022    CPK 75 03/27/2020    CPKMB 1.8 07/06/2022    CPKMB 1.5 03/27/2020     03/27/2020    TROPONINI <0.030 07/06/2022    TROPONINI <0.030 03/28/2020    TROPONINIHS 1119.7 (HH) 05/11/2023    TROPONINIHS 1523.4 (HH) 05/10/2023        COAGULATION - LAST 2  Lab Results   Component Value Date    LABPT 13.3 07/06/2022    INR 1.1 05/09/2023    INR 1.0 05/09/2023    APTT >150.0 (AA) 05/09/2023    APTT 28.7 07/06/2022       ENDOCRINE & PSA - LAST 2  Lab Results   Component Value Date    HGBA1C 6.9 (H) 05/10/2023    HGBA1C 6.9 (H) 05/10/2023    TSH 2.160 05/10/2023    PROCAL <0.05 03/28/2020    PROCAL 0.08 03/27/2020        ECHOCARDIOGRAM RESULTS  Results for orders placed during the hospital encounter of 05/09/23    Echo    Interpretation Summary  · The left ventricle is normal in size with mild concentric hypertrophy and normal systolic function.  · The estimated ejection fraction is 55%.  · Normal left ventricular diastolic function.  · Normal right ventricular size with normal right ventricular systolic function.  · Normal central venous pressure (3  mmHg).      CURRENT/PREVIOUS VISIT EKG  Results for orders placed or performed in visit on 05/22/23   IN OFFICE EKG 12-LEAD (to [place])    Collection Time: 05/22/23 11:35 AM    Narrative    Test Reason : I21.11,    Vent. Rate : 074 BPM     Atrial Rate : 074 BPM     P-R Int : 218 ms          QRS Dur : 078 ms      QT Int : 388 ms       P-R-T Axes : 006 033 054 degrees     QTc Int : 430 ms    Sinus rhythm with 1st degree A-V block  Low voltage QRS  Cannot rule out Anterior infarct ,age undetermined  Abnormal ECG  When compared with ECG of 13-MAY-2023 21:41,  No significant change was found  Confirmed by Gia TRIMBLE, Harris SWAIN ([id number]) on 5/29/2023 9:48:40 PM    Referred By: [email or web address]   SELF           Confirmed By:Harris Nielsen MD     No valid procedures specified.   Results for orders placed during the hospital encounter of 06/22/23    Nuclear Stress - Cardiology Interpreted    Interpretation Summary    Normal myocardial perfusion scan. There is no evidence of myocardial ischemia or infarction.    The gated perfusion images showed an ejection fraction of 70% at rest. The gated perfusion images showed an ejection fraction of 73% post stress. Normal ejection fraction is greater than 53%.    The ECG portion of the study is negative for ischemia.    The patient reported no chest pain during the stress test.    There were no arrhythmias during stress.    No valid procedures specified.    PHYSICAL EXAM  CONSTITUTIONAL: Well built, well nourished in no apparent distress  NECK: no carotid bruit, no JVD  LUNGS: CTA  CHEST WALL: no tenderness  HEART: regular rate and rhythm, S1, S2 normal, no murmur, click, rub or gallop   ABDOMEN: soft, non-tender; bowel sounds normal;  EXTREMITIES: Extremities normal, no edema, no calf tenderness noted  NEURO: AAO X 3    I HAVE REVIEWED :    The vital signs, nurses notes, and all the pertinent radiology and labs.        Current Outpatient Medications   Medication Instructions    albuterol  (PROVENTIL/VENTOLIN HFA) 90 mcg/actuation inhaler 1 puff, Inhalation, Every 4 hours PRN    aspirin (ECOTRIN) 81 mg, Oral, Daily    atorvastatin (LIPITOR) 40 mg, Oral, Daily    carvediloL (COREG) 3.125 mg, Oral, 2 times daily    clopidogreL (PLAVIX) 75 mg, Oral, Daily    insulin glargine,hum.rec.anlog (LANTUS SUBQ) 35 Units, Subcutaneous, Daily    losartan (COZAAR) 50 mg, Oral, Daily          Assessment & Plan     CAD (coronary artery disease)  Recent STEMI with PCI to distal circumflex and recent stent to the RCA was noted to be patent .  Continue low-sodium heart healthy diet.  Continue aspirin 81 mg daily, Plavix 75 mg daily, and atorvastatin 40 mg daily. Continue losartan 25 mg daily and carvedilol 3.125 mg b.i.d..  Recent stress test negative for RI.       The estimated blood loss was <50 mL.    The Mid LAD lesion was 60% stenosed. The diagnostic FFR was measured at 0.85.  PCI deferred.  Recommend medical management.    The Dist Cx lesion was 90% stenosed with 10% stenosis post-intervention.    A 2.50 x 15MM MARNI FRONTIER RX CORONARY STENT stent was successfully placed at 12 BOGDAN for 12 sec.    Patent recently placed stent in the RCA without any significant InStent restenoses         Benign essential HTN  /82.  Continue carvedilol 3.125 mg daily, and increase losartan 50 mg daily.  Systolic BP less than 140 goal.  Recommend low-sodium heart healthy diet.  Recommend weight loss.  Hold antihypertensives for SBP <100.    COPD (chronic obstructive pulmonary disease)  Stable.  Continue albuterol inhaler as needed.  Patient denies wheezing or any dyspnea.    Type 2 diabetes mellitus with circulatory disorder, with long-term current use of insulin  Managed by PCP.  Recommend weight loss.  Continue Lantus.  Closely monitor blood sugars at home.  Last hemoglobin A1c was 6.9.    Hyperlipidemia  Continue atorvastatin 40 mg daily.  Continue low-sodium heart healthy diet.  Reduce saturated fats.  Recommend weight loss.   Goal for LDL less than 100.  Patient was started on statin therapy after recent discharge from hospital s/p STEMI and PCI.  Recommend repeating lipid panel in approximately 3 months.     Latest Reference Range & Units Most Recent   Cholesterol 120 - 199 mg/dL 188  5/10/23 03:36   HDL 40 - 75 mg/dL 45  5/10/23 03:36   HDL/Cholesterol Ratio 20.0 - 50.0 % 23.9  5/10/23 03:36   LDL Cholesterol External 63.0 - 159.0 mg/dL 118.2  5/10/23 03:36   Non-HDL Cholesterol mg/dL 143  5/10/23 03:36   Total Cholesterol/HDL Ratio 2.0 - 5.0  4.2  5/10/23 03:36   Triglycerides 30 - 150 mg/dL 124  5/10/23 03:36       STEMI (ST elevation myocardial infarction)  Recent STEMI with PCI to RCA & distal Cx.   Continue aspirin 81 mg daily, Plavix 75 mg daily, and atorvastatin 40 mg daily.  Continue carvedilol 3.125 mg b.i.d. and losartan 25 mg daily.  Continue low-sodium heart healthy diet.  We will repeat lipid panel in 3 months.  Goal for LDL to be less than 70.  Normal systolic and diastolic function on recent echocardiogram.  Euvolemic in office.      Atypical CP.  Recent stress test negative for RI.  Discussed with patient that his uncontrolled HTN could also be causing chest discomfort.  Increased losartan to 50 mg.          Cigarette smoker  Strongly encouraged smoking cessation.          Follow up in about 3 months (around 10/3/2023).

## 2023-07-02 NOTE — ASSESSMENT & PLAN NOTE
Recent STEMI with PCI to RCA & distal Cx.   Continue aspirin 81 mg daily, Plavix 75 mg daily, and atorvastatin 40 mg daily.  Continue carvedilol 3.125 mg b.i.d. and losartan 25 mg daily.  Continue low-sodium heart healthy diet.  We will repeat lipid panel in 3 months.  Goal for LDL to be less than 70.  Normal systolic and diastolic function on recent echocardiogram.  Euvolemic in office.      Atypical CP.  Recent stress test negative for RI.  Discussed with patient that his uncontrolled HTN could also be causing chest discomfort.  Increased losartan to 50 mg.

## 2023-07-02 NOTE — ASSESSMENT & PLAN NOTE
/82.  Continue carvedilol 3.125 mg daily, and increase losartan 50 mg daily.  Systolic BP less than 140 goal.  Recommend low-sodium heart healthy diet.  Recommend weight loss.  Hold antihypertensives for SBP <100.

## 2023-07-02 NOTE — ASSESSMENT & PLAN NOTE
Continue atorvastatin 40 mg daily.  Continue low-sodium heart healthy diet.  Reduce saturated fats.  Recommend weight loss.  Goal for LDL less than 100.  Patient was started on statin therapy after recent discharge from hospital s/p STEMI and PCI.  Recommend repeating lipid panel in approximately 3 months.     Latest Reference Range & Units Most Recent   Cholesterol 120 - 199 mg/dL 188  5/10/23 03:36   HDL 40 - 75 mg/dL 45  5/10/23 03:36   HDL/Cholesterol Ratio 20.0 - 50.0 % 23.9  5/10/23 03:36   LDL Cholesterol External 63.0 - 159.0 mg/dL 118.2  5/10/23 03:36   Non-HDL Cholesterol mg/dL 143  5/10/23 03:36   Total Cholesterol/HDL Ratio 2.0 - 5.0  4.2  5/10/23 03:36   Triglycerides 30 - 150 mg/dL 124  5/10/23 03:36

## 2023-07-02 NOTE — ASSESSMENT & PLAN NOTE
Recent STEMI with PCI to distal circumflex and recent stent to the RCA was noted to be patent .  Continue low-sodium heart healthy diet.  Continue aspirin 81 mg daily, Plavix 75 mg daily, and atorvastatin 40 mg daily. Continue losartan 25 mg daily and carvedilol 3.125 mg b.i.d..  Recent stress test negative for RI.       The estimated blood loss was <50 mL.    The Mid LAD lesion was 60% stenosed. The diagnostic FFR was measured at 0.85.  PCI deferred.  Recommend medical management.    The Dist Cx lesion was 90% stenosed with 10% stenosis post-intervention.    A 2.50 x 15MM MARNI FRONTIER RX CORONARY STENT stent was successfully placed at 12 BOGDAN for 12 sec.    Patent recently placed stent in the RCA without any significant InStent restenoses

## 2023-07-03 ENCOUNTER — OFFICE VISIT (OUTPATIENT)
Dept: CARDIOLOGY | Facility: CLINIC | Age: 68
End: 2023-07-03
Payer: OTHER GOVERNMENT

## 2023-07-03 VITALS
BODY MASS INDEX: 26.11 KG/M2 | DIASTOLIC BLOOD PRESSURE: 82 MMHG | WEIGHT: 197 LBS | HEIGHT: 73 IN | SYSTOLIC BLOOD PRESSURE: 150 MMHG | HEART RATE: 65 BPM | OXYGEN SATURATION: 98 %

## 2023-07-03 DIAGNOSIS — Z79.4 TYPE 2 DIABETES MELLITUS WITH OTHER CIRCULATORY COMPLICATION, WITH LONG-TERM CURRENT USE OF INSULIN: ICD-10-CM

## 2023-07-03 DIAGNOSIS — F17.210 CIGARETTE SMOKER: ICD-10-CM

## 2023-07-03 DIAGNOSIS — I25.10 CORONARY ARTERY DISEASE, UNSPECIFIED VESSEL OR LESION TYPE, UNSPECIFIED WHETHER ANGINA PRESENT, UNSPECIFIED WHETHER NATIVE OR TRANSPLANTED HEART: ICD-10-CM

## 2023-07-03 DIAGNOSIS — I21.3 ST ELEVATION MYOCARDIAL INFARCTION (STEMI), UNSPECIFIED ARTERY: ICD-10-CM

## 2023-07-03 DIAGNOSIS — E78.5 HYPERLIPIDEMIA, UNSPECIFIED HYPERLIPIDEMIA TYPE: Primary | ICD-10-CM

## 2023-07-03 DIAGNOSIS — R07.89 ATYPICAL CHEST PAIN: ICD-10-CM

## 2023-07-03 DIAGNOSIS — E11.59 TYPE 2 DIABETES MELLITUS WITH OTHER CIRCULATORY COMPLICATION, WITH LONG-TERM CURRENT USE OF INSULIN: ICD-10-CM

## 2023-07-03 DIAGNOSIS — J44.9 CHRONIC OBSTRUCTIVE PULMONARY DISEASE, UNSPECIFIED COPD TYPE: ICD-10-CM

## 2023-07-03 DIAGNOSIS — I10 BENIGN ESSENTIAL HTN: ICD-10-CM

## 2023-07-03 PROCEDURE — 99214 OFFICE O/P EST MOD 30 MIN: CPT | Mod: S$PBB,,,

## 2023-07-03 PROCEDURE — 99999 PR PBB SHADOW E&M-EST. PATIENT-LVL III: CPT | Mod: PBBFAC,,,

## 2023-07-03 PROCEDURE — 99999 PR PBB SHADOW E&M-EST. PATIENT-LVL III: ICD-10-PCS | Mod: PBBFAC,,,

## 2023-07-03 PROCEDURE — 99213 OFFICE O/P EST LOW 20 MIN: CPT | Mod: PBBFAC,PN

## 2023-07-03 PROCEDURE — 99214 PR OFFICE/OUTPT VISIT, EST, LEVL IV, 30-39 MIN: ICD-10-PCS | Mod: S$PBB,,,

## 2023-07-03 RX ORDER — LOSARTAN POTASSIUM 50 MG/1
50 TABLET ORAL DAILY
Qty: 90 TABLET | Refills: 3 | Status: SHIPPED | OUTPATIENT
Start: 2023-07-03 | End: 2023-07-11

## 2023-07-03 RX ORDER — CARVEDILOL 3.12 MG/1
3.12 TABLET ORAL 2 TIMES DAILY
Qty: 60 TABLET | Refills: 9 | Status: SHIPPED | OUTPATIENT
Start: 2023-07-03 | End: 2024-01-08

## 2023-07-10 ENCOUNTER — HOSPITAL ENCOUNTER (EMERGENCY)
Facility: HOSPITAL | Age: 68
Discharge: HOME | End: 2023-07-10
Attending: EMERGENCY MEDICINE
Payer: MEDICARE

## 2023-07-10 ENCOUNTER — TELEPHONE (OUTPATIENT)
Dept: CARDIOLOGY | Facility: CLINIC | Age: 68
End: 2023-07-10
Payer: OTHER GOVERNMENT

## 2023-07-10 VITALS
DIASTOLIC BLOOD PRESSURE: 100 MMHG | RESPIRATION RATE: 19 BRPM | SYSTOLIC BLOOD PRESSURE: 199 MMHG | OXYGEN SATURATION: 96 % | HEART RATE: 78 BPM | HEIGHT: 73 IN | WEIGHT: 196 LBS | TEMPERATURE: 78 F | BODY MASS INDEX: 25.98 KG/M2

## 2023-07-10 DIAGNOSIS — R07.9 CHEST PAIN: ICD-10-CM

## 2023-07-10 DIAGNOSIS — R31.9 HEMATURIA, UNSPECIFIED TYPE: Primary | ICD-10-CM

## 2023-07-10 DIAGNOSIS — I10 HYPERTENSION, UNSPECIFIED TYPE: ICD-10-CM

## 2023-07-10 LAB
ALBUMIN SERPL BCP-MCNC: 5 G/DL (ref 3.5–5.2)
ALBUMIN SERPL BCP-MCNC: 5 G/DL (ref 3.5–5.2)
ALP SERPL-CCNC: 86 U/L (ref 55–135)
ALP SERPL-CCNC: 86 U/L (ref 55–135)
ALT SERPL W/O P-5'-P-CCNC: 15 U/L (ref 10–44)
ALT SERPL W/O P-5'-P-CCNC: 15 U/L (ref 10–44)
ANION GAP SERPL CALC-SCNC: 8 MMOL/L (ref 8–16)
AST SERPL-CCNC: 21 U/L (ref 10–40)
AST SERPL-CCNC: 21 U/L (ref 10–40)
BASOPHILS # BLD AUTO: 0.06 K/UL (ref 0–0.2)
BASOPHILS NFR BLD: 0.8 % (ref 0–1.9)
BILIRUB DIRECT SERPL-MCNC: 0.3 MG/DL (ref 0.1–0.3)
BILIRUB SERPL-MCNC: 1 MG/DL (ref 0.1–1)
BILIRUB SERPL-MCNC: 1 MG/DL (ref 0.1–1)
BILIRUB UR QL STRIP: NEGATIVE
BNP SERPL-MCNC: 98 PG/ML (ref 0–99)
BUN SERPL-MCNC: 12 MG/DL (ref 8–23)
CALCIUM SERPL-MCNC: 9.7 MG/DL (ref 8.7–10.5)
CHLORIDE SERPL-SCNC: 106 MMOL/L (ref 95–110)
CLARITY UR: CLEAR
CO2 SERPL-SCNC: 25 MMOL/L (ref 23–29)
COLOR UR: YELLOW
CREAT SERPL-MCNC: 0.9 MG/DL (ref 0.5–1.4)
DIFFERENTIAL METHOD: ABNORMAL
EOSINOPHIL # BLD AUTO: 0.1 K/UL (ref 0–0.5)
EOSINOPHIL NFR BLD: 1.2 % (ref 0–8)
ERYTHROCYTE [DISTWIDTH] IN BLOOD BY AUTOMATED COUNT: 13.7 % (ref 11.5–14.5)
EST. GFR  (NO RACE VARIABLE): >60 ML/MIN/1.73 M^2
GLUCOSE SERPL-MCNC: 120 MG/DL (ref 70–110)
GLUCOSE UR QL STRIP: NEGATIVE
HCT VFR BLD AUTO: 44.4 % (ref 40–54)
HGB BLD-MCNC: 15.4 G/DL (ref 14–18)
HGB UR QL STRIP: NEGATIVE
IMM GRANULOCYTES # BLD AUTO: 0.01 K/UL (ref 0–0.04)
IMM GRANULOCYTES NFR BLD AUTO: 0.1 % (ref 0–0.5)
KETONES UR QL STRIP: NEGATIVE
LEUKOCYTE ESTERASE UR QL STRIP: NEGATIVE
LYMPHOCYTES # BLD AUTO: 1.8 K/UL (ref 1–4.8)
LYMPHOCYTES NFR BLD: 23.3 % (ref 18–48)
MAGNESIUM SERPL-MCNC: 2.1 MG/DL (ref 1.6–2.6)
MCH RBC QN AUTO: 32.4 PG (ref 27–31)
MCHC RBC AUTO-ENTMCNC: 34.7 G/DL (ref 32–36)
MCV RBC AUTO: 94 FL (ref 82–98)
MONOCYTES # BLD AUTO: 0.6 K/UL (ref 0.3–1)
MONOCYTES NFR BLD: 8.2 % (ref 4–15)
NEUTROPHILS # BLD AUTO: 5.2 K/UL (ref 1.8–7.7)
NEUTROPHILS NFR BLD: 66.4 % (ref 38–73)
NITRITE UR QL STRIP: NEGATIVE
NRBC BLD-RTO: 0 /100 WBC
PH UR STRIP: 6 [PH] (ref 5–8)
PLATELET # BLD AUTO: 197 K/UL (ref 150–450)
PMV BLD AUTO: 9.2 FL (ref 9.2–12.9)
POTASSIUM SERPL-SCNC: 4.3 MMOL/L (ref 3.5–5.1)
PROT SERPL-MCNC: 8.1 G/DL (ref 6–8.4)
PROT SERPL-MCNC: 8.1 G/DL (ref 6–8.4)
PROT UR QL STRIP: ABNORMAL
RBC # BLD AUTO: 4.75 M/UL (ref 4.6–6.2)
SODIUM SERPL-SCNC: 139 MMOL/L (ref 136–145)
SP GR UR STRIP: 1.02 (ref 1–1.03)
TROPONIN I SERPL HS-MCNC: 5.4 PG/ML (ref 0–14.9)
URN SPEC COLLECT METH UR: ABNORMAL
UROBILINOGEN UR STRIP-ACNC: NEGATIVE EU/DL
WBC # BLD AUTO: 7.8 K/UL (ref 3.9–12.7)

## 2023-07-10 PROCEDURE — 85025 COMPLETE CBC W/AUTO DIFF WBC: CPT

## 2023-07-10 PROCEDURE — 84484 ASSAY OF TROPONIN QUANT: CPT

## 2023-07-10 PROCEDURE — 99284 EMERGENCY DEPT VISIT MOD MDM: CPT | Mod: 25

## 2023-07-10 PROCEDURE — 80053 COMPREHEN METABOLIC PANEL: CPT

## 2023-07-10 PROCEDURE — 81003 URINALYSIS AUTO W/O SCOPE: CPT

## 2023-07-10 PROCEDURE — 83880 ASSAY OF NATRIURETIC PEPTIDE: CPT

## 2023-07-10 PROCEDURE — 83735 ASSAY OF MAGNESIUM: CPT

## 2023-07-10 NOTE — TELEPHONE ENCOUNTER
----- Message from Matthias Hines sent at 7/10/2023 11:35 AM CDT -----  Type: Need Medical Advice   Who Called: Patient  Best callback number: [phone]  Additional Information: Patient called to let the Dr know he is at an appointment and his blood pressure reads 190/90, patient is concerned and would like a callback  Please call to further assist, Thanks

## 2023-07-10 NOTE — TELEPHONE ENCOUNTER
----- Message from Juan Antonio Ruiz sent at 7/10/2023 12:16 PM CDT -----  Type: Needs Medical Advice  Who Called:  Patient  Symptoms (please be specific):  154/92-- BP    Best Call Back Number: [phone]  Additional Information: Pt states that he wanted the office to know that he is going to the ER.    Pt also states that his PCP has increased his Losartan dosage.    Please call to advise

## 2023-07-10 NOTE — TELEPHONE ENCOUNTER
S/w patient and he stated that they checked his BP with the automatic machine patient will have them recheck it manually and let us know. Patient also stated that he is also bleeding internally, I told patient if BP is not down manually that it would be best to go to ED.

## 2023-07-10 NOTE — FIRST PROVIDER EVALUATION
"Medical screening examination initiated.  I have conducted a focused provider triage encounter, findings are as follows:    Brief history of present illness:  hematuria, hypertension. On plavix    Vitals:    07/10/23 1556   BP: (!) 194/113   BP Location: Left arm   Patient Position: Sitting   Pulse: 82   Resp: 19   Temp: 98.1 °F (36.7 °C)   TempSrc: Oral   SpO2: 96%   Weight: 88.9 kg (196 lb)   Height: 6' 1" (1.854 m)       Pertinent physical exam:  hypertension.     Brief workup plan:  cardiac workup    Preliminary workup initiated; this workup will be continued and followed by the physician or advanced practice provider that is assigned to the patient when roomed.  "

## 2023-07-11 ENCOUNTER — TELEPHONE (OUTPATIENT)
Dept: CARDIOLOGY | Facility: CLINIC | Age: 68
End: 2023-07-11
Payer: OTHER GOVERNMENT

## 2023-07-11 DIAGNOSIS — I10 BENIGN ESSENTIAL HTN: Primary | ICD-10-CM

## 2023-07-11 RX ORDER — LOSARTAN POTASSIUM 50 MG/1
100 TABLET ORAL DAILY
Qty: 180 TABLET | Refills: 3 | Status: ON HOLD
Start: 2023-07-11 | End: 2024-03-13 | Stop reason: HOSPADM

## 2023-07-11 NOTE — TELEPHONE ENCOUNTER
----- Message from Rachel Davis, Patient Care Assistant sent at 7/11/2023  9:38 AM CDT -----  Contact: Pt  Type: Needs Medical Advice    Who Called: Pt  Best Call Back Number: [phone]  Inquiry/Question: Pt is calling to advise he was in ER on yesterday due to elevated blood pressures. Pt states he was concerned about changing his medications. Please advise. Thank you~

## 2023-07-11 NOTE — ED PROVIDER NOTES
Encounter Date: 7/10/2023       History     Chief Complaint   Patient presents with    Hematuria     C/o hematuria. Onset x 4 mths. Pt states intermittent     Emergent eval a 67-year-old male with history of CAD with MI CHF endocarditis prior stroke emphysema diabetes hypertension who reports that he has been having hematuria for more than a year and bilateral lower quadrant abdominal pain he also reports history of kidney stones [facility] reports he is never had a cystoscopy performed.  He reports he would not have a cause for why he has this intermittent hematuria.  He reports no pain at the penis but he will have bright red blood from the penis occasionally and last night he woke up with blood from his penis.  He reports abdominal pain is actually improved from usual.  He was seen by his primary today and was told to come to the ER due to blood on a paper towel that he showed his primary.  He was seen by his cardiologist yesterday after having a heart attack last month and is on aspirin and Plavix they report that they are working to get his blood pressure under control they have increased his Cozaar and Coreg yesterday and today he has no chest pain currently but reports he has been having intermittent shortness of breath and intermittent chest pain since the MI.  He reports no difficulty urinating or no urgency frequency dysuria no flank pain    Review of patient's allergies indicates:  No Known Allergies  Past Medical History:   Diagnosis Date    Abdominal pain 2022    CHF (congestive heart failure)     Diabetes mellitus 2018    Emphysema lung     Endocarditis 2011    Hypertension     Stroke 2011    denies residual     Past Surgical History:   Procedure Laterality Date    BACK SURGERY  1981    COLONOSCOPY N/A 2/23/2023    Procedure: COLONOSCOPY;  Surgeon: Jonn Cruz MD;  Location: [facility];  Service: Endoscopy;  Laterality: N/A;    CORONARY ANGIOGRAPHY N/A  5/9/2023    Procedure: ANGIOGRAM, CORONARY ARTERY;  Surgeon: Antonio Kessler MD;  Location: [facility] CATH/EP LAB;  Service: Cardiology;  Laterality: N/A;    EXCISION OF HYDROCELE      x2    FRACTIONAL FLOW RESERVE (FFR), CORONARY  5/11/2023    Procedure: Fractional Flow [place] (FFR), Coronary;  Surgeon: Antonio Kessler MD;  Location: [facility] CATH/EP LAB;  Service: Cardiology;;    INGUINAL HERNIA REPAIR  1960    INTRAVASCULAR ULTRASOUND, CORONARY N/A 5/9/2023    Procedure: Ultrasound-coronary;  Surgeon: Antonio Kessler MD;  Location: [facility] CATH/EP LAB;  Service: Cardiology;  Laterality: N/A;    IVUS, CORONARY  5/11/2023    Procedure: IVUS, Coronary;  Surgeon: Antonio Kessler MD;  Location: [facility] CATH/EP LAB;  Service: Cardiology;;    LEFT HEART CATHETERIZATION Left 5/11/2023    Procedure: Left heart cath;  Surgeon: Antonio Kessler MD;  Location: [facility] CATH/EP LAB;  Service: Cardiology;  Laterality: Left;    PERCUTANEOUS TRANSLUMINAL BALLOON ANGIOPLASTY OF CORONARY ARTERY  5/9/2023    Procedure: Angioplasty-coronary;  Surgeon: Antonio Kessler MD;  Location: [facility] CATH/EP LAB;  Service: Cardiology;;    RHINOPLASTY      STENT, DRUG ELUTING, SINGLE VESSEL, CORONARY  5/9/2023    Procedure: Stent, Drug Eluting, Single Vessel, Coronary;  Surgeon: Antonio Kessler MD;  Location: [facility] CATH/EP LAB;  Service: Cardiology;;    SURGICAL REMOVAL OF NODULE OF VOCAL CORD       History reviewed. No pertinent family history.  Social History     Tobacco Use    Smoking status: Every Day     Packs/day: 1.00     Types: Cigarettes     Last attempt to quit: 3/27/2020     Years since quitting: 3.2    Smokeless tobacco: Former    Tobacco comments:     Advised not to smoke DOS    Substance Use Topics    Alcohol use: Yes     Alcohol/week: 14.0 standard drinks     Types: 14 Shots of liquor per week    Drug use: Yes     Types: Marijuana     Review of Systems   Constitutional:  Negative for activity change, appetite change, chills, diaphoresis, fatigue and fever.    HENT:  Negative for congestion, postnasal drip and rhinorrhea.    Respiratory:  Positive for shortness of breath. Negative for cough, chest tightness and wheezing.    Cardiovascular:  Negative for chest pain and palpitations.   Gastrointestinal:  Positive for abdominal pain. Negative for constipation, diarrhea, nausea and vomiting.   Genitourinary:  Positive for hematuria. Negative for dysuria, frequency and urgency.   Musculoskeletal:  Negative for neck pain and neck stiffness.   Neurological:  Negative for dizziness, weakness, light-headedness, numbness and headaches.   All other systems reviewed and are negative.    Physical Exam     Initial Vitals [07/10/23 1556]   BP Pulse Resp Temp SpO2   (!) 194/113 82 19 98.1 °F (36.7 °C) 96 %      MAP       --         Physical Exam    Nursing note and vitals reviewed.  Constitutional: He appears well-developed and well-nourished. He is not diaphoretic. No distress.   HENT:   Head: Normocephalic and atraumatic.   Right Ear: External ear normal.   Left Ear: External ear normal.   Nose: Nose normal.   Mouth/Throat: Oropharynx is clear and moist.   Eyes: Conjunctivae and EOM are normal. Pupils are equal, round, and reactive to light.   Neck: Neck supple. No tracheal deviation present.   Normal range of motion.  Cardiovascular:  Normal rate, regular rhythm, normal heart sounds and intact distal pulses.     Exam reveals no gallop and no friction rub.       No murmur heard.  Pulmonary/Chest: Breath sounds normal. No stridor. No respiratory distress. He has no wheezes. He has no rhonchi. He has no rales. He exhibits no tenderness.   Abdominal: Abdomen is soft. Bowel sounds are normal. He exhibits no distension and no mass. There is abdominal tenderness.   Mild tenderness There is no rebound and no guarding.   Musculoskeletal:         General: No edema. Normal range of motion.      Cervical back: Normal range of motion and neck supple.     Neurological: He is alert and oriented to  person, place, and time. He has normal strength. No cranial nerve deficit or sensory deficit.   Skin: Skin is warm and dry. No rash noted. No erythema. No pallor.   Psychiatric: He has a normal mood and affect. His behavior is normal. Judgment and thought content normal.       ED Course   Procedures  Labs Reviewed   CBC W/ AUTO DIFFERENTIAL - Abnormal; Notable for the following components:       Result Value    MCH 32.4 (*)     All other components within normal limits   COMPREHENSIVE METABOLIC PANEL - Abnormal; Notable for the following components:    Glucose 120 (*)     All other components within normal limits   URINALYSIS, REFLEX TO URINE CULTURE - Abnormal; Notable for the following components:    Protein, UA Trace (*)     All other components within normal limits    Narrative:     Specimen Source->Urine   MAGNESIUM   TROPONIN I HIGH SENSITIVITY   B-TYPE NATRIURETIC PEPTIDE   HEPATIC FUNCTION PANEL          Imaging Results              X-Ray Chest PA And Lateral (Final result)  Result time 07/10/23 16:30:56   Procedure changed from X-Ray Chest AP Portable     Final result by Amol Salgado Jr., MD (07/10/23 16:30:56)                   Narrative:    CHEST X-RAY 2 VIEWS (AP AND LATERAL)    HISTORY: Chest pain. Hypertension.    COMPARISON: None    FINDINGS:   PA and lateral views the chest were performed without the benefit of previous comparison.  The heart size and pulmonary vascularity are within the range of normal.  There is no significant hilar nor mediastinal process.  The aerated lungs are well expanded and clear.  The right and left CP angles are rather sharp.  The osseous structures show nothing unusual.    IMPRESSION:   Negative chest.      Electronically signed by:  Amol Salgado MD  7/10/2023 4:30 PM CDT Workstation: [id number]                                     Medications - No data to display  Medical Decision Making:   Independently Interpreted Test(s):   I have ordered and independently  interpreted X-rays - see prior notes.  Clinical Tests:   Lab Tests: Ordered and Reviewed  The following lab test(s) were unremarkable: CBC and CMP       <> Summary of Lab: Urine with trace protein no blood  Mag 2.1  Trope negative BNP 98    Radiological Study: Ordered and Reviewed  ED Management:  Emergent eval a 67-year-old male with history of CAD with MI CHF endocarditis prior stroke emphysema diabetes hypertension who reports that he has been having hematuria for more than a year and bilateral lower quadrant abdominal pain he also reports history of kidney stones [facility] reports he is never had a cystoscopy performed.  He reports he would not have a cause for why he has this intermittent hematuria.  He reports no pain at the penis but he will have bright red blood from the penis occasionally and last night he woke up with blood from his penis.  He reports abdominal pain is actually improved from usual.  He was seen by his primary today and was told to come to the ER due to blood on a paper towel that he showed his primary.  He was seen by his cardiologist yesterday after having a heart attack last month and is on aspirin and Plavix they report that they are working to get his blood pressure under control they have increased his Cozaar and Coreg yesterday and today he has no chest pain currently but reports he has been having intermittent shortness of breath and intermittent chest pain since the MI.  He reports no difficulty urinating or no urgency frequency dysuria no flank pain  On physical exam soft abdomen mild tenderness suprapubically no CVA tenderness.  No signs of blood in his underwear or at the penis.  Patient is able to ambulate down the cagle without difficulty is in no distress  MDM    Patient presents for emergent evaluation of acute hematuria and abdominal pain that poses a threat to life and/or bodily function.   Differential diagnosis includes but was  not limited to kidney stone pyelo interstitial cystitis malignancy trauma BPH prostatitis.   In the ED patient found to have acute hematuria chronic abdominal pain.    I ordered labs and personally reviewed them.  Labs significant for see above.      Discharge MDM     Patient was managed in the ED with no medications here  The response to treatment was good patient will be referred to a private urologist since he was not been seen recently and may need a cystoscopy.    Patient was discharged in stable condition.  Detailed return precautions discussed.  Patient was told to follow up with primary care physician or specialist based on their diagnosis  Fay Kaur MD                          Clinical Impression:   Final diagnoses:  [R07.9] Chest pain  [R31.9] Hematuria, unspecified type (Primary)  [I10] Hypertension, unspecified type        ED Disposition Condition    Discharge Stable          ED Prescriptions    None       Follow-up Information       Follow up With Specialties Details Why Contact Info Additional Information    Mamie Sanchez Jr., MD Urology Schedule an appointment as soon as possible for a visit  for further eval of recurrent hematuria [address] [place] [address]   [address]  [facility] [address]  [phone]       [facility] - Emergency Dept Emergency Medicine Go to  If symptoms worsen [address] [facility] [address]  [phone] 1st floor    Damian Vergara MD Internal Medicine Schedule an appointment as soon as possible for a visit   [address] [place] [address] [address]  [facility] [address]  [phone]       Michelle Kelsey NP Cardiology Call  to discuss further BP managment and reeval of chest pain [address] [facility]  [address]  [facility] [address]  [phone]                Fay Kaur MD  07/10/23 8166

## 2023-07-13 NOTE — TELEPHONE ENCOUNTER
Lm for Mr Yanet. That Michelle wanted him to increase Lorsartan to 100mg and to keep a bp pressure log and send us the readings

## 2023-07-28 ENCOUNTER — TELEPHONE (OUTPATIENT)
Dept: CARDIAC REHAB | Facility: HOSPITAL | Age: 68
End: 2023-07-28

## 2023-07-28 NOTE — TELEPHONE ENCOUNTER
CALLED PT 3X TO SCHEDULE ORIENTATION, HE HAS NOT CALLED BACK TO SCHEDULE.  CLOSING REFERRAL, MOVING FILE TO ADVERSE.

## 2023-08-11 ENCOUNTER — NURSE TRIAGE (OUTPATIENT)
Dept: ADMINISTRATIVE | Facility: CLINIC | Age: 68
End: 2023-08-11
Payer: OTHER GOVERNMENT

## 2023-08-11 ENCOUNTER — HOSPITAL ENCOUNTER (OUTPATIENT)
Facility: HOSPITAL | Age: 68
Discharge: HOME | End: 2023-08-13
Attending: EMERGENCY MEDICINE | Admitting: FAMILY MEDICINE
Payer: MEDICARE

## 2023-08-11 DIAGNOSIS — R55 SYNCOPE: ICD-10-CM

## 2023-08-11 DIAGNOSIS — R07.9 CHEST PAIN: ICD-10-CM

## 2023-08-11 DIAGNOSIS — I25.10 CORONARY ARTERY DISEASE, UNSPECIFIED VESSEL OR LESION TYPE, UNSPECIFIED WHETHER ANGINA PRESENT, UNSPECIFIED WHETHER NATIVE OR TRANSPLANTED HEART: Primary | ICD-10-CM

## 2023-08-11 LAB
ALBUMIN SERPL BCP-MCNC: 4.4 G/DL (ref 3.5–5.2)
ALP SERPL-CCNC: 72 U/L (ref 55–135)
ALT SERPL W/O P-5'-P-CCNC: 19 U/L (ref 10–44)
ANION GAP SERPL CALC-SCNC: 9 MMOL/L (ref 8–16)
AST SERPL-CCNC: 20 U/L (ref 10–40)
BACTERIA #/AREA URNS HPF: NEGATIVE /HPF
BASOPHILS # BLD AUTO: 0.07 K/UL (ref 0–0.2)
BASOPHILS NFR BLD: 1.1 % (ref 0–1.9)
BILIRUB SERPL-MCNC: 0.7 MG/DL (ref 0.1–1)
BILIRUB UR QL STRIP: NEGATIVE
BNP SERPL-MCNC: 60 PG/ML (ref 0–99)
BUN SERPL-MCNC: 13 MG/DL (ref 8–23)
CALCIUM SERPL-MCNC: 9.2 MG/DL (ref 8.7–10.5)
CHLORIDE SERPL-SCNC: 108 MMOL/L (ref 95–110)
CLARITY UR: CLEAR
CO2 SERPL-SCNC: 24 MMOL/L (ref 23–29)
COLOR UR: YELLOW
CREAT SERPL-MCNC: 0.7 MG/DL (ref 0.5–1.4)
DIFFERENTIAL METHOD: ABNORMAL
EOSINOPHIL # BLD AUTO: 0.1 K/UL (ref 0–0.5)
EOSINOPHIL NFR BLD: 1.1 % (ref 0–8)
ERYTHROCYTE [DISTWIDTH] IN BLOOD BY AUTOMATED COUNT: 13.4 % (ref 11.5–14.5)
EST. GFR  (NO RACE VARIABLE): >60 ML/MIN/1.73 M^2
ETHANOL SERPL-MCNC: <5 MG/DL
GLUCOSE SERPL-MCNC: 75 MG/DL (ref 70–110)
GLUCOSE UR QL STRIP: NEGATIVE
HCT VFR BLD AUTO: 42.2 % (ref 40–54)
HGB BLD-MCNC: 14.4 G/DL (ref 14–18)
HGB UR QL STRIP: NEGATIVE
HYALINE CASTS #/AREA URNS LPF: 2 /LPF
IMM GRANULOCYTES # BLD AUTO: 0.01 K/UL (ref 0–0.04)
IMM GRANULOCYTES NFR BLD AUTO: 0.2 % (ref 0–0.5)
INFLUENZA A, MOLECULAR: NEGATIVE
INFLUENZA B, MOLECULAR: NEGATIVE
INR PPP: 1 (ref 0.8–1.2)
KETONES UR QL STRIP: NEGATIVE
LEUKOCYTE ESTERASE UR QL STRIP: NEGATIVE
LYMPHOCYTES # BLD AUTO: 1.8 K/UL (ref 1–4.8)
LYMPHOCYTES NFR BLD: 28.3 % (ref 18–48)
MCH RBC QN AUTO: 31.9 PG (ref 27–31)
MCHC RBC AUTO-ENTMCNC: 34.1 G/DL (ref 32–36)
MCV RBC AUTO: 93 FL (ref 82–98)
MICROSCOPIC COMMENT: ABNORMAL
MONOCYTES # BLD AUTO: 0.6 K/UL (ref 0.3–1)
MONOCYTES NFR BLD: 8.5 % (ref 4–15)
NEUTROPHILS # BLD AUTO: 3.9 K/UL (ref 1.8–7.7)
NEUTROPHILS NFR BLD: 60.8 % (ref 38–73)
NITRITE UR QL STRIP: NEGATIVE
NRBC BLD-RTO: 0 /100 WBC
PH UR STRIP: 6 [PH] (ref 5–8)
PLATELET # BLD AUTO: 194 K/UL (ref 150–450)
PMV BLD AUTO: 8.7 FL (ref 9.2–12.9)
POTASSIUM SERPL-SCNC: 3.6 MMOL/L (ref 3.5–5.1)
PROT SERPL-MCNC: 7.5 G/DL (ref 6–8.4)
PROT UR QL STRIP: ABNORMAL
PROTHROMBIN TIME: 11.2 SEC (ref 9–12.5)
RBC # BLD AUTO: 4.52 M/UL (ref 4.6–6.2)
RBC #/AREA URNS HPF: 1 /HPF (ref 0–4)
SARS-COV-2 RDRP RESP QL NAA+PROBE: NEGATIVE
SODIUM SERPL-SCNC: 141 MMOL/L (ref 136–145)
SP GR UR STRIP: 1.02 (ref 1–1.03)
SPECIMEN SOURCE: NORMAL
SQUAMOUS #/AREA URNS HPF: 1 /HPF
TROPONIN I SERPL HS-MCNC: 5.2 PG/ML (ref 0–14.9)
TROPONIN I SERPL HS-MCNC: 6.1 PG/ML (ref 0–14.9)
URN SPEC COLLECT METH UR: ABNORMAL
UROBILINOGEN UR STRIP-ACNC: NEGATIVE EU/DL
WBC # BLD AUTO: 6.44 K/UL (ref 3.9–12.7)
WBC #/AREA URNS HPF: 2 /HPF (ref 0–5)

## 2023-08-11 PROCEDURE — 85610 PROTHROMBIN TIME: CPT

## 2023-08-11 PROCEDURE — G0378 HOSPITAL OBSERVATION PER HR: HCPCS

## 2023-08-11 PROCEDURE — 85025 COMPLETE CBC W/AUTO DIFF WBC: CPT

## 2023-08-11 PROCEDURE — 84484 ASSAY OF TROPONIN QUANT: CPT | Mod: 91 | Performed by: FAMILY MEDICINE

## 2023-08-11 PROCEDURE — 96375 TX/PRO/DX INJ NEW DRUG ADDON: CPT

## 2023-08-11 PROCEDURE — 84484 ASSAY OF TROPONIN QUANT: CPT

## 2023-08-11 PROCEDURE — 84145 PROCALCITONIN (PCT): CPT | Performed by: FAMILY MEDICINE

## 2023-08-11 PROCEDURE — 93010 EKG 12-LEAD: ICD-10-PCS | Mod: ,,, | Performed by: INTERNAL MEDICINE

## 2023-08-11 PROCEDURE — 25000003 PHARM REV CODE 250: Performed by: FAMILY MEDICINE

## 2023-08-11 PROCEDURE — 83880 ASSAY OF NATRIURETIC PEPTIDE: CPT

## 2023-08-11 PROCEDURE — 99285 EMERGENCY DEPT VISIT HI MDM: CPT | Mod: 25

## 2023-08-11 PROCEDURE — 93010 ELECTROCARDIOGRAM REPORT: CPT | Mod: ,,, | Performed by: INTERNAL MEDICINE

## 2023-08-11 PROCEDURE — 93005 ELECTROCARDIOGRAM TRACING: CPT | Performed by: INTERNAL MEDICINE

## 2023-08-11 PROCEDURE — 83036 HEMOGLOBIN GLYCOSYLATED A1C: CPT | Performed by: FAMILY MEDICINE

## 2023-08-11 PROCEDURE — 81001 URINALYSIS AUTO W/SCOPE: CPT | Performed by: FAMILY MEDICINE

## 2023-08-11 PROCEDURE — 25000003 PHARM REV CODE 250: Performed by: EMERGENCY MEDICINE

## 2023-08-11 PROCEDURE — 82077 ASSAY SPEC XCP UR&BREATH IA: CPT | Performed by: FAMILY MEDICINE

## 2023-08-11 PROCEDURE — 87040 BLOOD CULTURE FOR BACTERIA: CPT | Performed by: FAMILY MEDICINE

## 2023-08-11 PROCEDURE — 80053 COMPREHEN METABOLIC PANEL: CPT

## 2023-08-11 PROCEDURE — 63600175 PHARM REV CODE 636 W HCPCS: Performed by: EMERGENCY MEDICINE

## 2023-08-11 PROCEDURE — U0002 COVID-19 LAB TEST NON-CDC: HCPCS | Performed by: FAMILY MEDICINE

## 2023-08-11 PROCEDURE — 36415 COLL VENOUS BLD VENIPUNCTURE: CPT | Performed by: FAMILY MEDICINE

## 2023-08-11 PROCEDURE — 87502 INFLUENZA DNA AMP PROBE: CPT | Performed by: FAMILY MEDICINE

## 2023-08-11 RX ORDER — IPRATROPIUM BROMIDE AND ALBUTEROL SULFATE 2.5; .5 MG/3ML; MG/3ML
3 SOLUTION RESPIRATORY (INHALATION) EVERY 4 HOURS PRN
Status: DISCONTINUED | OUTPATIENT
Start: 2023-08-11 | End: 2023-08-13 | Stop reason: HOSPADM

## 2023-08-11 RX ORDER — ACETAMINOPHEN 325 MG/1
650 TABLET ORAL EVERY 8 HOURS PRN
Status: DISCONTINUED | OUTPATIENT
Start: 2023-08-11 | End: 2023-08-13 | Stop reason: HOSPADM

## 2023-08-11 RX ORDER — CARVEDILOL 3.12 MG/1
3.12 TABLET ORAL 2 TIMES DAILY WITH MEALS
Status: DISCONTINUED | OUTPATIENT
Start: 2023-08-12 | End: 2023-08-13 | Stop reason: HOSPADM

## 2023-08-11 RX ORDER — HYDROCODONE BITARTRATE AND ACETAMINOPHEN 5; 325 MG/1; MG/1
1 TABLET ORAL EVERY 4 HOURS PRN
Status: DISCONTINUED | OUTPATIENT
Start: 2023-08-11 | End: 2023-08-13 | Stop reason: HOSPADM

## 2023-08-11 RX ORDER — SODIUM CHLORIDE 0.9 % (FLUSH) 0.9 %
10 SYRINGE (ML) INJECTION
Status: DISCONTINUED | OUTPATIENT
Start: 2023-08-11 | End: 2023-08-13 | Stop reason: HOSPADM

## 2023-08-11 RX ORDER — TALC
6 POWDER (GRAM) TOPICAL NIGHTLY PRN
Status: DISCONTINUED | OUTPATIENT
Start: 2023-08-11 | End: 2023-08-13 | Stop reason: HOSPADM

## 2023-08-11 RX ORDER — HYDRALAZINE HYDROCHLORIDE 20 MG/ML
10 INJECTION INTRAMUSCULAR; INTRAVENOUS
Status: COMPLETED | OUTPATIENT
Start: 2023-08-11 | End: 2023-08-11

## 2023-08-11 RX ORDER — ASPIRIN 81 MG/1
81 TABLET ORAL DAILY
Status: DISCONTINUED | OUTPATIENT
Start: 2023-08-12 | End: 2023-08-13 | Stop reason: HOSPADM

## 2023-08-11 RX ORDER — LOSARTAN POTASSIUM 50 MG/1
100 TABLET ORAL NIGHTLY
Status: DISCONTINUED | OUTPATIENT
Start: 2023-08-11 | End: 2023-08-13 | Stop reason: HOSPADM

## 2023-08-11 RX ORDER — SIMETHICONE 80 MG
1 TABLET,CHEWABLE ORAL 4 TIMES DAILY PRN
Status: DISCONTINUED | OUTPATIENT
Start: 2023-08-11 | End: 2023-08-13 | Stop reason: HOSPADM

## 2023-08-11 RX ORDER — IBUPROFEN 200 MG
24 TABLET ORAL
Status: DISCONTINUED | OUTPATIENT
Start: 2023-08-11 | End: 2023-08-13 | Stop reason: HOSPADM

## 2023-08-11 RX ORDER — GLUCAGON 1 MG
1 KIT INJECTION
Status: DISCONTINUED | OUTPATIENT
Start: 2023-08-11 | End: 2023-08-13 | Stop reason: HOSPADM

## 2023-08-11 RX ORDER — IBUPROFEN 200 MG
16 TABLET ORAL
Status: DISCONTINUED | OUTPATIENT
Start: 2023-08-11 | End: 2023-08-13 | Stop reason: HOSPADM

## 2023-08-11 RX ORDER — MECLIZINE HCL 12.5 MG 12.5 MG/1
25 TABLET ORAL 3 TIMES DAILY PRN
Status: DISCONTINUED | OUTPATIENT
Start: 2023-08-11 | End: 2023-08-12

## 2023-08-11 RX ORDER — AMOXICILLIN 250 MG
1 CAPSULE ORAL 2 TIMES DAILY PRN
Status: DISCONTINUED | OUTPATIENT
Start: 2023-08-11 | End: 2023-08-13 | Stop reason: HOSPADM

## 2023-08-11 RX ORDER — HYDRALAZINE HYDROCHLORIDE 20 MG/ML
10 INJECTION INTRAMUSCULAR; INTRAVENOUS EVERY 4 HOURS PRN
Status: DISCONTINUED | OUTPATIENT
Start: 2023-08-11 | End: 2023-08-13 | Stop reason: HOSPADM

## 2023-08-11 RX ORDER — POLYETHYLENE GLYCOL 3350 17 G/17G
17 POWDER, FOR SOLUTION ORAL 2 TIMES DAILY PRN
Status: DISCONTINUED | OUTPATIENT
Start: 2023-08-11 | End: 2023-08-13 | Stop reason: HOSPADM

## 2023-08-11 RX ORDER — INSULIN GLARGINE 100 [IU]/ML
15 INJECTION, SOLUTION SUBCUTANEOUS DAILY
Status: DISCONTINUED | OUTPATIENT
Start: 2023-08-12 | End: 2023-08-11

## 2023-08-11 RX ORDER — ONDANSETRON 2 MG/ML
4 INJECTION INTRAMUSCULAR; INTRAVENOUS EVERY 6 HOURS PRN
Status: DISCONTINUED | OUTPATIENT
Start: 2023-08-11 | End: 2023-08-13 | Stop reason: HOSPADM

## 2023-08-11 RX ORDER — NALOXONE HCL 0.4 MG/ML
0.02 VIAL (ML) INJECTION
Status: DISCONTINUED | OUTPATIENT
Start: 2023-08-11 | End: 2023-08-13 | Stop reason: HOSPADM

## 2023-08-11 RX ORDER — MORPHINE SULFATE 4 MG/ML
4 INJECTION, SOLUTION INTRAMUSCULAR; INTRAVENOUS EVERY 4 HOURS PRN
Status: DISCONTINUED | OUTPATIENT
Start: 2023-08-11 | End: 2023-08-13 | Stop reason: HOSPADM

## 2023-08-11 RX ORDER — HYDRALAZINE HYDROCHLORIDE 20 MG/ML
5 INJECTION INTRAMUSCULAR; INTRAVENOUS EVERY 4 HOURS PRN
Status: DISCONTINUED | OUTPATIENT
Start: 2023-08-11 | End: 2023-08-13 | Stop reason: HOSPADM

## 2023-08-11 RX ORDER — CLOPIDOGREL BISULFATE 75 MG/1
75 TABLET ORAL NIGHTLY
Status: DISCONTINUED | OUTPATIENT
Start: 2023-08-11 | End: 2023-08-13 | Stop reason: HOSPADM

## 2023-08-11 RX ORDER — INSULIN ASPART 100 [IU]/ML
1-10 INJECTION, SOLUTION INTRAVENOUS; SUBCUTANEOUS
Status: DISCONTINUED | OUTPATIENT
Start: 2023-08-11 | End: 2023-08-13 | Stop reason: HOSPADM

## 2023-08-11 RX ORDER — ATORVASTATIN CALCIUM 40 MG/1
40 TABLET, FILM COATED ORAL NIGHTLY
Status: DISCONTINUED | OUTPATIENT
Start: 2023-08-12 | End: 2023-08-13

## 2023-08-11 RX ORDER — MECLIZINE HCL 12.5 MG 12.5 MG/1
25 TABLET ORAL
Status: COMPLETED | OUTPATIENT
Start: 2023-08-11 | End: 2023-08-11

## 2023-08-11 RX ADMIN — POTASSIUM BICARBONATE 50 MEQ: 977.5 TABLET, EFFERVESCENT ORAL at 09:08

## 2023-08-11 RX ADMIN — HYDRALAZINE HYDROCHLORIDE 10 MG: 20 INJECTION, SOLUTION INTRAMUSCULAR; INTRAVENOUS at 08:08

## 2023-08-11 RX ADMIN — CLOPIDOGREL BISULFATE 75 MG: 75 TABLET, FILM COATED ORAL at 09:08

## 2023-08-11 RX ADMIN — MECLIZINE HYDROCHLORIDE 25 MG: 12.5 TABLET ORAL at 09:08

## 2023-08-11 RX ADMIN — LOSARTAN POTASSIUM 100 MG: 50 TABLET, FILM COATED ORAL at 09:08

## 2023-08-11 NOTE — TELEPHONE ENCOUNTER
Sampson c/o feeling faint, dizziness &  fatigue.  Reports he began with a headache last week. Reports left arm pain & chest pain earlier today but no longer present. Pt states he is an insulin dependant diabetic, able to stand but feels dizzy when standing & bending. Instructed pt to remain sitting. Advised pt to call  now. Pt v/u and states he would have a family member bring him to the hospital. Triager offered to call 911 for pt but pt refused. Triager advised against pt decision to delay care, informed caller of risk involved & reiterated care advice to pt to call  now. V/u.  Reason for Disposition   Sounds like a life-threatening emergency to the triager    Additional Information   Negative: SEVERE difficulty breathing (e.g., struggling for each breath, speaks in single words)   Negative: Shock suspected (e.g., cold/pale/clammy skin, too weak to stand, low BP, rapid pulse)   Negative: Difficult to awaken or acting confused (e.g., disoriented, slurred speech)    Protocols used: Dizziness-A-[place]

## 2023-08-11 NOTE — PHARMACY MED REC
"    Admission Medication History     The home medication history was taken by Elayne Schaeffer.    You may go to "Admission" then "Reconcile Home Medications" tabs to review and/or act upon these items.     The home medication list has been updated by the Pharmacy department.   Please read ALL comments highlighted in yellow.   Please address this information as you see fit.    Feel free to contact us if you have any questions or require assistance.          Medications listed below were obtained from: Patient/family and Analytic software- [email or web address]  No current facility-administered medications on file prior to encounter.     Current Outpatient Medications on File Prior to Encounter   Medication Sig Dispense Refill    aspirin (ECOTRIN) 81 MG EC tablet Take 1 tablet (81 mg total) by mouth once daily. 90 tablet 3    atorvastatin (LIPITOR) 40 MG tablet Take 1 tablet (40 mg total) by mouth once daily. 90 tablet 3    carvediloL (COREG) 3.125 MG tablet Take 1 tablet (3.125 mg total) by mouth 2 (two) times daily. 60 tablet 9    clopidogreL (PLAVIX) 75 mg tablet Take 1 tablet (75 mg total) by mouth once daily. 90 tablet 3    insulin glargine,hum.rec.anlog (LANTUS SUBQ) Inject 35 Units into the skin once daily.      losartan (COZAAR) 50 MG tablet Take 2 tablets (100 mg total) by mouth once daily. 180 tablet 3    albuterol (PROVENTIL/VENTOLIN HFA) 90 mcg/actuation inhaler Inhale 1 puff into the lungs every 4 (four) hours as needed.           Elayne Schaeffer  [id number]              .          "

## 2023-08-12 ENCOUNTER — CLINICAL SUPPORT (OUTPATIENT)
Dept: CARDIOLOGY | Facility: HOSPITAL | Age: 68
End: 2023-08-12
Attending: FAMILY MEDICINE
Payer: OTHER GOVERNMENT

## 2023-08-12 VITALS — HEIGHT: 73 IN | WEIGHT: 194 LBS | BODY MASS INDEX: 25.71 KG/M2

## 2023-08-12 PROBLEM — R07.9 CHEST PAIN: Status: ACTIVE | Noted: 2023-08-12

## 2023-08-12 LAB
ANION GAP SERPL CALC-SCNC: 9 MMOL/L (ref 8–16)
AORTIC ROOT ANNULUS: 4.3 CM
AORTIC VALVE CUSP SEPERATION: 2.1 CM
ASCENDING AORTA: 3.2 CM
AV INDEX (PROSTH): 0.95
AV MEAN GRADIENT: 3 MMHG
AV PEAK GRADIENT: 6 MMHG
AV VALVE AREA BY VELOCITY RATIO: 3.06 CM²
AV VALVE AREA: 2.97 CM²
AV VELOCITY RATIO: 0.97
BASOPHILS # BLD AUTO: 0.06 K/UL (ref 0–0.2)
BASOPHILS NFR BLD: 1.2 % (ref 0–1.9)
BSA FOR ECHO PROCEDURE: 2.13 M2
BUN SERPL-MCNC: 13 MG/DL (ref 8–23)
CALCIUM SERPL-MCNC: 8.8 MG/DL (ref 8.7–10.5)
CHLORIDE SERPL-SCNC: 104 MMOL/L (ref 95–110)
CO2 SERPL-SCNC: 24 MMOL/L (ref 23–29)
CREAT SERPL-MCNC: 1 MG/DL (ref 0.5–1.4)
CV ECHO LV RWT: 0.38 CM
DIFFERENTIAL METHOD: ABNORMAL
DOP CALC AO PEAK VEL: 1.19 M/S
DOP CALC AO VTI: 24.6 CM
DOP CALC LVOT AREA: 3.1 CM2
DOP CALC LVOT DIAMETER: 2 CM
DOP CALC LVOT PEAK VEL: 1.16 M/S
DOP CALC LVOT STROKE VOLUME: 73.16 CM3
DOP CALC MV VTI: 42.4 CM
DOP CALCLVOT PEAK VEL VTI: 23.3 CM
E WAVE DECELERATION TIME: 299 MSEC
E/A RATIO: 0.92
E/E' RATIO: 12.8 M/S
ECHO LV POSTERIOR WALL: 0.84 CM (ref 0.6–1.1)
EOSINOPHIL # BLD AUTO: 0.1 K/UL (ref 0–0.5)
EOSINOPHIL NFR BLD: 1.8 % (ref 0–8)
ERYTHROCYTE [DISTWIDTH] IN BLOOD BY AUTOMATED COUNT: 13.2 % (ref 11.5–14.5)
EST. GFR  (NO RACE VARIABLE): >60 ML/MIN/1.73 M^2
ESTIMATED AVG GLUCOSE: 126 MG/DL (ref 68–131)
FRACTIONAL SHORTENING: 35 % (ref 28–44)
GLUCOSE SERPL-MCNC: 103 MG/DL (ref 70–110)
GLUCOSE SERPL-MCNC: 108 MG/DL (ref 70–110)
GLUCOSE SERPL-MCNC: 122 MG/DL (ref 70–110)
GLUCOSE SERPL-MCNC: 192 MG/DL (ref 70–110)
GLUCOSE SERPL-MCNC: 208 MG/DL (ref 70–110)
HBA1C MFR BLD: 6 % (ref 4.5–6.2)
HCT VFR BLD AUTO: 40.4 % (ref 40–54)
HGB BLD-MCNC: 13.9 G/DL (ref 14–18)
IMM GRANULOCYTES # BLD AUTO: 0.01 K/UL (ref 0–0.04)
IMM GRANULOCYTES NFR BLD AUTO: 0.2 % (ref 0–0.5)
INTERVENTRICULAR SEPTUM: 1.6 CM (ref 0.6–1.1)
LEFT INTERNAL DIMENSION IN SYSTOLE: 2.92 CM (ref 2.1–4)
LEFT VENTRICLE DIASTOLIC VOLUME INDEX: 42.69 ML/M2
LEFT VENTRICLE DIASTOLIC VOLUME: 90.5 ML
LEFT VENTRICLE MASS INDEX: 94 G/M2
LEFT VENTRICLE SYSTOLIC VOLUME INDEX: 15.5 ML/M2
LEFT VENTRICLE SYSTOLIC VOLUME: 32.8 ML
LEFT VENTRICULAR INTERNAL DIMENSION IN DIASTOLE: 4.46 CM (ref 3.5–6)
LEFT VENTRICULAR MASS: 200.11 G
LV LATERAL E/E' RATIO: 16 M/S
LV SEPTAL E/E' RATIO: 10.67 M/S
LVOT MG: 3 MMHG
LVOT MV: 0.71 CM/S
LYMPHOCYTES # BLD AUTO: 1.5 K/UL (ref 1–4.8)
LYMPHOCYTES NFR BLD: 29.7 % (ref 18–48)
MAGNESIUM SERPL-MCNC: 1.9 MG/DL (ref 1.6–2.6)
MCH RBC QN AUTO: 32.3 PG (ref 27–31)
MCHC RBC AUTO-ENTMCNC: 34.4 G/DL (ref 32–36)
MCV RBC AUTO: 94 FL (ref 82–98)
MONOCYTES # BLD AUTO: 0.5 K/UL (ref 0.3–1)
MONOCYTES NFR BLD: 10.7 % (ref 4–15)
MV MEAN GRADIENT: 3 MMHG
MV PEAK A VEL: 1.04 M/S
MV PEAK E VEL: 0.96 M/S
MV PEAK GRADIENT: 6 MMHG
MV STENOSIS PRESSURE HALF TIME: 107 MS
MV VALVE AREA BY CONTINUITY EQUATION: 1.73 CM2
MV VALVE AREA P 1/2 METHOD: 2.06 CM2
NEUTROPHILS # BLD AUTO: 2.8 K/UL (ref 1.8–7.7)
NEUTROPHILS NFR BLD: 56.4 % (ref 38–73)
NRBC BLD-RTO: 0 /100 WBC
PISA TR MAX VEL: 2.18 M/S
PLATELET # BLD AUTO: 165 K/UL (ref 150–450)
PMV BLD AUTO: 8.9 FL (ref 9.2–12.9)
POTASSIUM SERPL-SCNC: 3.9 MMOL/L (ref 3.5–5.1)
PROCALCITONIN SERPL IA-MCNC: <0.05 NG/ML (ref 0–0.5)
PV MV: 0.74 M/S
PV PEAK GRADIENT: 4 MMHG
PV PEAK VELOCITY: 1.04 M/S
RA PRESSURE ESTIMATED: 3 MMHG
RBC # BLD AUTO: 4.31 M/UL (ref 4.6–6.2)
RV TB RVSP: 5 MMHG
RV TISSUE DOPPLER FREE WALL SYSTOLIC VELOCITY 1 (APICAL 4 CHAMBER VIEW): 15.8 CM/S
SINUS: 4.11 CM
SODIUM SERPL-SCNC: 137 MMOL/L (ref 136–145)
STJ: 3.19 CM
TDI LATERAL: 0.06 M/S
TDI SEPTAL: 0.09 M/S
TDI: 0.08 M/S
TR MAX PG: 19 MMHG
TRICUSPID ANNULAR PLANE SYSTOLIC EXCURSION: 2.79 CM
TROPONIN I SERPL HS-MCNC: 4.5 PG/ML (ref 0–14.9)
TROPONIN I SERPL HS-MCNC: 5.4 PG/ML (ref 0–14.9)
TROPONIN I SERPL HS-MCNC: 5.4 PG/ML (ref 0–14.9)
TROPONIN I SERPL HS-MCNC: 5.8 PG/ML (ref 0–14.9)
TV REST PULMONARY ARTERY PRESSURE: 22 MMHG
WBC # BLD AUTO: 4.88 K/UL (ref 3.9–12.7)
Z-SCORE OF LEFT VENTRICULAR DIMENSION IN END DIASTOLE: -4.07
Z-SCORE OF LEFT VENTRICULAR DIMENSION IN END SYSTOLE: -2.66

## 2023-08-12 PROCEDURE — 94799 UNLISTED PULMONARY SVC/PX: CPT

## 2023-08-12 PROCEDURE — 36415 COLL VENOUS BLD VENIPUNCTURE: CPT | Performed by: FAMILY MEDICINE

## 2023-08-12 PROCEDURE — 96376 TX/PRO/DX INJ SAME DRUG ADON: CPT

## 2023-08-12 PROCEDURE — 80048 BASIC METABOLIC PNL TOTAL CA: CPT | Performed by: FAMILY MEDICINE

## 2023-08-12 PROCEDURE — 93306 TTE W/DOPPLER COMPLETE: CPT

## 2023-08-12 PROCEDURE — 99900031 HC PATIENT EDUCATION (STAT)

## 2023-08-12 PROCEDURE — 63600175 PHARM REV CODE 636 W HCPCS: Performed by: FAMILY MEDICINE

## 2023-08-12 PROCEDURE — 85025 COMPLETE CBC W/AUTO DIFF WBC: CPT | Performed by: FAMILY MEDICINE

## 2023-08-12 PROCEDURE — 25000003 PHARM REV CODE 250: Performed by: FAMILY MEDICINE

## 2023-08-12 PROCEDURE — G0378 HOSPITAL OBSERVATION PER HR: HCPCS

## 2023-08-12 PROCEDURE — 96366 THER/PROPH/DIAG IV INF ADDON: CPT

## 2023-08-12 PROCEDURE — 83735 ASSAY OF MAGNESIUM: CPT | Performed by: FAMILY MEDICINE

## 2023-08-12 PROCEDURE — 25000003 PHARM REV CODE 250: Performed by: HOSPITALIST

## 2023-08-12 PROCEDURE — 84484 ASSAY OF TROPONIN QUANT: CPT | Mod: 91 | Performed by: FAMILY MEDICINE

## 2023-08-12 PROCEDURE — 99900035 HC TECH TIME PER 15 MIN (STAT)

## 2023-08-12 PROCEDURE — 96365 THER/PROPH/DIAG IV INF INIT: CPT | Mod: 59

## 2023-08-12 PROCEDURE — 93306 ECHO (CUPID ONLY): ICD-10-PCS | Mod: 26,,, | Performed by: INTERNAL MEDICINE

## 2023-08-12 PROCEDURE — 93306 TTE W/DOPPLER COMPLETE: CPT | Mod: 26,,, | Performed by: INTERNAL MEDICINE

## 2023-08-12 PROCEDURE — 96372 THER/PROPH/DIAG INJ SC/IM: CPT | Performed by: FAMILY MEDICINE

## 2023-08-12 PROCEDURE — 94761 N-INVAS EAR/PLS OXIMETRY MLT: CPT

## 2023-08-12 RX ORDER — MECLIZINE HCL 12.5 MG 12.5 MG/1
25 TABLET ORAL EVERY 6 HOURS
Status: DISCONTINUED | OUTPATIENT
Start: 2023-08-12 | End: 2023-08-13 | Stop reason: HOSPADM

## 2023-08-12 RX ADMIN — HYDRALAZINE HYDROCHLORIDE 5 MG: 20 INJECTION, SOLUTION INTRAMUSCULAR; INTRAVENOUS at 08:08

## 2023-08-12 RX ADMIN — PIPERACILLIN SODIUM AND TAZOBACTAM SODIUM 3.38 G: 3; .375 INJECTION, POWDER, LYOPHILIZED, FOR SOLUTION INTRAVENOUS at 08:08

## 2023-08-12 RX ADMIN — INSULIN DETEMIR 15 UNITS: 100 INJECTION, SOLUTION SUBCUTANEOUS at 08:08

## 2023-08-12 RX ADMIN — INSULIN ASPART 4 UNITS: 100 INJECTION, SOLUTION INTRAVENOUS; SUBCUTANEOUS at 04:08

## 2023-08-12 RX ADMIN — ATORVASTATIN CALCIUM 40 MG: 40 TABLET, FILM COATED ORAL at 08:08

## 2023-08-12 RX ADMIN — MECLIZINE HYDROCHLORIDE 25 MG: 12.5 TABLET ORAL at 06:08

## 2023-08-12 RX ADMIN — PIPERACILLIN SODIUM AND TAZOBACTAM SODIUM 3.38 G: 3; .375 INJECTION, POWDER, LYOPHILIZED, FOR SOLUTION INTRAVENOUS at 04:08

## 2023-08-12 RX ADMIN — HYDROCODONE BITARTRATE AND ACETAMINOPHEN 1 TABLET: 5; 325 TABLET ORAL at 08:08

## 2023-08-12 RX ADMIN — PIPERACILLIN SODIUM AND TAZOBACTAM SODIUM 3.38 G: 3; .375 INJECTION, POWDER, LYOPHILIZED, FOR SOLUTION INTRAVENOUS at 12:08

## 2023-08-12 RX ADMIN — HYDROCODONE BITARTRATE AND ACETAMINOPHEN 1 TABLET: 5; 325 TABLET ORAL at 10:08

## 2023-08-12 RX ADMIN — LOSARTAN POTASSIUM 100 MG: 50 TABLET, FILM COATED ORAL at 08:08

## 2023-08-12 RX ADMIN — CARVEDILOL 3.12 MG: 3.12 TABLET, FILM COATED ORAL at 04:08

## 2023-08-12 RX ADMIN — MECLIZINE HYDROCHLORIDE 25 MG: 12.5 TABLET ORAL at 12:08

## 2023-08-12 RX ADMIN — HYDRALAZINE HYDROCHLORIDE 5 MG: 20 INJECTION, SOLUTION INTRAMUSCULAR; INTRAVENOUS at 04:08

## 2023-08-12 RX ADMIN — ASPIRIN 81 MG: 81 TABLET, COATED ORAL at 08:08

## 2023-08-12 RX ADMIN — PIPERACILLIN SODIUM AND TAZOBACTAM SODIUM 3.38 G: 3; .375 INJECTION, POWDER, LYOPHILIZED, FOR SOLUTION INTRAVENOUS at 10:08

## 2023-08-12 RX ADMIN — CARVEDILOL 3.12 MG: 3.12 TABLET, FILM COATED ORAL at 08:08

## 2023-08-12 RX ADMIN — CLOPIDOGREL BISULFATE 75 MG: 75 TABLET, FILM COATED ORAL at 08:08

## 2023-08-12 NOTE — H&P
"[facility]   History & Physical     Patient Name: Sampson Holt  MRN: [id number]  Admission Date: 8/11/2023  4:23 PM  Attending Physician: Abby Nieto MD  Face-to-Face encounter date: 08/11/2023 9:29 PM    Patient information was obtained from patient, past medical records, ER physician, and ER records.     HISTORY OF PRESENT ILLNESS:     Sampson Holt is a 67 y.o. White male   With PMH of CAD, HTN,   previous IVDU, previous EtOH abuse,  who presents with syncope.  Occurred in the ER    Pt presented with dizziness  Onset a few days ago  Occurring intermittently  Worsens when he lifts or turns his head  Reports room spinning when this occurs  Currently taking unknown antibiotics for a "sinus infection"  +headache x a few days  +clamminess  +malaise  +nausea, no vomiting  +fatigue  +generalized weakness  Intermittent LLQ abdominal pain  No diarrhea  No constipation  +polyuria / +increased urinary frequency  No dysuria    ER reports pt had syncopal episode in waiting room  Pt lifted his head  Became diaphoretic  Had LOC for approx 20 seconds  No post-ictal confusion  No focal weakness  No tingling or numbness  No CP while in ER    Reports CP earlier today  Located L chest, no radiation  Reports it is NOT similar to previous CP resulting in cardiac stent placements    REVIEW OF SYSTEMS:     All systems reviewed and are negative except as noted per above.    PAST MEDICAL HISTORY:     Past Medical History:   Diagnosis Date    Abdominal pain 2022    CHF (congestive heart failure)     Diabetes mellitus 2018    Emphysema lung     Endocarditis 2011    Hypertension     Stroke 2011    denies residual       PAST SURGICAL HISTORY:     Past Surgical History:   Procedure Laterality Date    BACK SURGERY  1981    COLONOSCOPY N/A 2/23/2023    Procedure: COLONOSCOPY;  Surgeon: Jonn Cruz MD;  Location: [facility];  Service: Endoscopy;  Laterality: N/A;    CORONARY ANGIOGRAPHY N/A 5/9/2023    " Procedure: ANGIOGRAM, CORONARY ARTERY;  Surgeon: Antonio Kessler MD;  Location: [facility] CATH/EP LAB;  Service: Cardiology;  Laterality: N/A;    EXCISION OF HYDROCELE      x2    FRACTIONAL FLOW RESERVE (FFR), CORONARY  5/11/2023    Procedure: Fractional Flow [place] (FFR), Coronary;  Surgeon: Antonio Kessler MD;  Location: [facility] CATH/EP LAB;  Service: Cardiology;;    INGUINAL HERNIA REPAIR  1960    INTRAVASCULAR ULTRASOUND, CORONARY N/A 5/9/2023    Procedure: Ultrasound-coronary;  Surgeon: Antonio Kessler MD;  Location: [facility] CATH/EP LAB;  Service: Cardiology;  Laterality: N/A;    IVUS, CORONARY  5/11/2023    Procedure: IVUS, Coronary;  Surgeon: Antonio Kessler MD;  Location: [facility] CATH/EP LAB;  Service: Cardiology;;    LEFT HEART CATHETERIZATION Left 5/11/2023    Procedure: Left heart cath;  Surgeon: Antonio Kessler MD;  Location: [facility] CATH/EP LAB;  Service: Cardiology;  Laterality: Left;    PERCUTANEOUS TRANSLUMINAL BALLOON ANGIOPLASTY OF CORONARY ARTERY  5/9/2023    Procedure: Angioplasty-coronary;  Surgeon: Antonio Kessler MD;  Location: [facility] CATH/EP LAB;  Service: Cardiology;;    RHINOPLASTY      STENT, DRUG ELUTING, SINGLE VESSEL, CORONARY  5/9/2023    Procedure: Stent, Drug Eluting, Single Vessel, Coronary;  Surgeon: Antonio Kessler MD;  Location: [facility] CATH/EP LAB;  Service: Cardiology;;    SURGICAL REMOVAL OF NODULE OF VOCAL CORD         ALLERGIES:   Patient has no known allergies.    FAMILY HISTORY:   HTN    SOCIAL HISTORY:     Social History     Tobacco Use    Smoking status: Every Day     Current packs/day: 0.00     Types: Cigarettes     Last attempt to quit: 3/27/2020     Years since quitting: 3.3    Smokeless tobacco: Former    Tobacco comments:     Advised not to smoke DOS    Substance Use Topics    Alcohol use: Yes     Alcohol/week: 14.0 standard drinks of alcohol     Types: 14 Shots of liquor per week        Social History     Substance and Sexual Activity   Sexual Activity Not on file        HOME MEDICATIONS:  "    Prior to Admission medications    Medication Sig Start Date End Date Taking? Authorizing Provider   aspirin (ECOTRIN) 81 MG EC tablet Take 1 tablet (81 mg total) by mouth once daily. 5/14/23 5/13/24 Yes Iron Pereira MD   atorvastatin (LIPITOR) 40 MG tablet Take 1 tablet (40 mg total) by mouth once daily. 5/14/23 5/13/24 Yes Iron Pereira MD   carvediloL (COREG) 3.125 MG tablet Take 1 tablet (3.125 mg total) by mouth 2 (two) times daily. 7/3/23 7/2/24 Yes Michelle Kelsey NP   clopidogreL (PLAVIX) 75 mg tablet Take 1 tablet (75 mg total) by mouth once daily. 5/13/23 5/12/24 Yes Iron Pereira MD   insulin glargine,hum.rec.anlog (LANTUS SUBQ) Inject 35 Units into the skin once daily.   Yes Provider, Historical   losartan (COZAAR) 50 MG tablet Take 2 tablets (100 mg total) by mouth once daily. 7/11/23 7/10/24 Yes Michelle Kelsey NP   albuterol (PROVENTIL/VENTOLIN HFA) 90 mcg/actuation inhaler Inhale 1 puff into the lungs every 4 (four) hours as needed. 6/8/22   Provider, Historical       PHYSICAL EXAM:     BP (!) 153/70   Pulse 69   Temp 98.9 °F (37.2 °C) (Oral)   Resp 19   Ht 6' 1" (1.854 m)   Wt 90.7 kg (200 lb)   SpO2 96%   BMI 26.39 kg/m²     Gen: alert, responsive  HEENT:  Eyes - no pallor  External ears with no lesions  Nares patent  Mouth/Throat:  trachea midline  CV: RRR  Lungs: COARSE, (SMOKER)  Abd: +BS, soft, +LLQ TTP WITHOUT REBOUND OR GUARDING, ND  Ext: no atrophy or edema  Skin: warm, dry  Neuro: ALERT, +NYSTAGMUS, NO OTHER FOCAL NEUROLOGICAL DEFICITS   Psych: pleasant     LABS AND IMAGING:     Labs Reviewed   CBC W/ AUTO DIFFERENTIAL - Abnormal; Notable for the following components:       Result Value    RBC 4.52 (*)     MCH 31.9 (*)     MPV 8.7 (*)     All other components within normal limits   COMPREHENSIVE METABOLIC PANEL   B-TYPE NATRIURETIC PEPTIDE   TROPONIN I HIGH SENSITIVITY   TROPONIN I HIGH SENSITIVITY   PROTIME-INR   DRUG SCREEN PANEL, URINE EMERGENCY   HEMOGLOBIN A1C "   ALCOHOL,MEDICAL (ETHANOL)   URINALYSIS MICROSCOPIC   URINALYSIS, REFLEX TO URINE CULTURE       Imaging Results              US Carotid Bilateral (In process)                      CT Head Without Contrast (Final result)  Result time 08/11/23 20:26:53      Final result by Obie Gomez DO (08/11/23 20:26:53)                   Narrative:      EXAM DESCRIPTION: CT HEAD WITHOUT CONTRAST    CLINICAL HISTORY: 67 years Male Headache, chronic, new features or increased frequency    COMPARISON: CT head without contrast dated 7/6/2022    Technique:  Contiguous axial images of the brain were obtained without the administration of intravenous contrast.This exam was performed according to our departmental dose-optimization program which includes use of Automated Exposure Control, adjustment of the mA and/or kV according to patient size and/or use of iterative reconstruction technique.    DLP: 908 mGycm    FINDINGS:    Brain: Multifocal prior infarct in the right frontal and bilateral parietal lobes. No acute intracranial hemorrhage. No extra-axial collection. No mass effect or herniation.  Prominence of the sulci and cisterns. Confluent periventricular and subcortical white matter hypodensity is noted.    Ventricles: Within normal limits in size.    Globes and orbits: No acute abnormality.    Bones: No acute osseous finding    Paranasal sinuses: Paranasal sinuses are clear.    Mastoid air cells: Well pneumatized.    Soft tissues: Within normal limits      IMPRESSION:    1. No acute hemorrhage, hydrocephalus or herniation.    2. Cerebral volume loss, chronic small vessel ischemic changes and multifocal prior infarcts. Consider MRI brain for further evaluation.          Electronically signed by:  Obie Gomez DO  8/11/2023 8:26 PM CDT Workstation: [id number]                                     X-Ray Chest AP Portable (Final result)  Result time 08/11/23 17:25:31      Final result by Ezra Hart MD (08/11/23  17:25:31)                   Narrative:    EXAM DESCRIPTION: XR CHEST AP PORTABLE    CLINICAL HISTORY: 67 years Male, chest pain    COMPARISON: July 10, 2023.    FINDINGS: The heart size is within normal limits and appears to be unchanged. Mild increased interstitial markings are present, predominantly in the lower lobes. No pleural fluid is seen. The osseous structures are unremarkable.    IMPRESSION:  Increased interstitial markings are noted.    Electronically signed by:  Ezra Hart MD  8/11/2023 5:25 PM CDT Workstation: [id number]                                    Labs and images reviewed personally by me.  See my personal assessment/interpretation of results below:    ASSESSMENT & PLAN:   Sampson Holt is a 67 y.o. male admitted for    Active Hospital Problems    Diagnosis  POA    *Syncope [R55]  Yes    CAD (coronary artery disease) [I25.10]  Yes    Benign essential HTN [I10]  Yes    COPD (chronic obstructive pulmonary disease) [J44.9]  Yes    Type 2 diabetes mellitus with circulatory disorder, with long-term current use of insulin [E11.59, Z79.4]  Not Applicable      Resolved Hospital Problems   No resolved problems to display.        Plan    Syncope  Concern for PNA  Concern for BPPV  Uncontrolled HTN  CAD, recent PCI, known LAD disease  - CT head as noted above  - MRI Brain  - neuro checks  - echo, CUS, telemetry, EKG, trending troponin  - infection workup in progress  - empiric zosyn  - follow cultures  - home asa, plavix, statin, BB  - further plan per clinical course    DM2  - SSI    Chronic conditions as noted above/below; home medications reviewed personally by me and restarted as appropriate  Electrolyte derangement:  Trending BMP; Mg; replacement prn  DVT ppx: lovenox   FULL CODE      - The above conditions include an acute and/or chronic illness that poses a threat to life (or bodily function).  - Previous notes/encounters/external records reviewed personally by me.  - Pt's case personally  discussed with another physician:  ER physician    Abby Nieto MD  [facility] Hospitalist  08/11/2023

## 2023-08-12 NOTE — NURSING
Nurses Note -- 4 Eyes      8/12/2023   1:42 AM      Skin assessed during: Admit      [x] No Altered Skin Integrity Present    []Prevention Measures Documented      [] Yes- Altered Skin Integrity Present or Discovered   [] LDA Added if Not in Epic (Describe Wound)   [] New Altered Skin Integrity was Present on Admit and Documented in LDA   [] Wound Image Taken    Wound Care Consulted? No    Attending Nurse:  Jacquelyn Navarro RN/Staff Member:   ALF Larson

## 2023-08-12 NOTE — PROGRESS NOTES
MRI of the brain was done. Patient came in with dizziness and unsteady gait. Symptoms began a few weeks ago. Patient had a syncope episode on yesterday.

## 2023-08-12 NOTE — PLAN OF CARE
[facility]  Initial Discharge Assessment       Primary Care Provider: Damian Vergara MD    Admission Diagnosis: Syncope [R55]    Admission Date: 8/11/2023  Expected Discharge Date:   Assessement completed with pt at bedside. Pt AAOx4s. Demographics, PCP, and insurance verified. No home health. No dialysis. Pt reports ability to complete ADLs with the assistance of a glucometer. Pt verbalized plan to discharge home via personal transport. Pt has no other needs to be addressed at this time.      Transition of Care Barriers: None    Payor: [employer] / Plan: [facility] [employer] / Product Type: Government /     Extended Emergency Contact Information  Primary Emergency Contact: Angel Klein  Mobile Phone: [phone]  Relation: Friend  Preferred language: English   needed? No  Secondary Emergency Contact: Jonn Kraft  Mobile Phone: [phone]  Relation: Brother  Preferred language: English   needed? No    Discharge Plan A: Home  Discharge Plan B: Home      [facility]  [address]  [place] [facility] [address]  Phone: [phone] Fax: [phone]    [facility] - [place], [place] - [address]  [address] [address]  Phone: [phone] Fax: [phone]    [facility] [address] [address]  [address] [place] [address]  Phone: [phone] Fax: [phone]      Initial Assessment (most recent)       Adult Discharge Assessment - 08/12/23 1028          Discharge Assessment    Assessment Type Discharge Planning Assessment     Confirmed/corrected address, phone number and insurance Yes     Confirmed Demographics Correct on Facesheet     Source of Information patient;health record     Reason For Admission Syncope     People in Home alone     Facility Arrived From: Home     Do you expect to return to your current living situation? Yes     Do you have help at home or someone to help you  manage your care at home? Yes     Who are your caregiver(s) and their phone number(s)? Jonn Kraft(Brother) [phone] ,Angel Klein( Friend) [phone]     Prior to hospitilization cognitive status: Alert/Oriented     Current cognitive status: Alert/Oriented     Home Accessibility wheelchair accessible     Home Layout Able to live on 1st floor     Equipment Currently Used at Home none     Readmission within 30 days? No     Patient currently being followed by outpatient case management? No     Do you currently have service(s) that help you manage your care at home? No     Do you take prescription medications? Yes     Do you have prescription coverage? Yes     Coverage [employer] and [employer]     Do you have any problems affording any of your prescribed medications? No     Is the patient taking medications as prescribed? yes     How do you get to doctors appointments? car, drives self     Are you on dialysis? No     Do you take coumadin? No     Discharge Plan A Home     Discharge Plan B Home     DME Needed Upon Discharge  none     Discharge Plan discussed with: Patient     Transition of Care Barriers None

## 2023-08-12 NOTE — ED PROVIDER NOTES
Encounter Date: 8/11/2023       History     Chief Complaint   Patient presents with    Dizziness     Clammy, Dizziness, Headache xseveral days. Cardiac stents placed x5 weeks ago. 2 episodes of chest pains today     This is a 67-year-old male with history of diabetes, previous CVA, CAD with coronary stent placement 2 months ago, presenting with chief complaint of lightheadedness/dizziness.  He says this has been present the last few days.  It was intermittent.  He is associated diaphoresis.  He is had 2 episodes of chest pain today as well.  While sitting awaiting an ED bed nursing witnessed a brief syncopal episode.  She states he became diaphoretic and fell forward.  She caught him and he did not fall.  He was unconscious for less than 20 seconds.  Patient denies any chest pain currently, says he just feels dizzy.        Review of patient's allergies indicates:  No Known Allergies  Past Medical History:   Diagnosis Date    Abdominal pain 2022    CHF (congestive heart failure)     Diabetes mellitus 2018    Emphysema lung     Endocarditis 2011    Hypertension     Stroke 2011    denies residual     Past Surgical History:   Procedure Laterality Date    BACK SURGERY  1981    COLONOSCOPY N/A 2/23/2023    Procedure: COLONOSCOPY;  Surgeon: Jonn Cruz MD;  Location: [facility] ENDO;  Service: Endoscopy;  Laterality: N/A;    CORONARY ANGIOGRAPHY N/A 5/9/2023    Procedure: ANGIOGRAM, CORONARY ARTERY;  Surgeon: Antonio Kessler MD;  Location: [facility] CATH/EP LAB;  Service: Cardiology;  Laterality: N/A;    EXCISION OF HYDROCELE      x2    FRACTIONAL FLOW RESERVE (FFR), CORONARY  5/11/2023    Procedure: Fractional Flow [place] (FFR), Coronary;  Surgeon: Antonio Kessler MD;  Location: [facility] CATH/EP LAB;  Service: Cardiology;;    INGUINAL HERNIA REPAIR  1960    INTRAVASCULAR ULTRASOUND, CORONARY N/A 5/9/2023    Procedure: Ultrasound-coronary;  Surgeon: Antonio Kessler MD;  Location: [facility] CATH/EP LAB;  Service: Cardiology;  Laterality:  N/A;    IVUS, CORONARY  5/11/2023    Procedure: IVUS, Coronary;  Surgeon: Antonio Kessler MD;  Location: [facility] CATH/EP LAB;  Service: Cardiology;;    LEFT HEART CATHETERIZATION Left 5/11/2023    Procedure: Left heart cath;  Surgeon: Antonio Kessler MD;  Location: [facility] CATH/EP LAB;  Service: Cardiology;  Laterality: Left;    PERCUTANEOUS TRANSLUMINAL BALLOON ANGIOPLASTY OF CORONARY ARTERY  5/9/2023    Procedure: Angioplasty-coronary;  Surgeon: Antonio Kessler MD;  Location: [facility] CATH/EP LAB;  Service: Cardiology;;    RHINOPLASTY      STENT, DRUG ELUTING, SINGLE VESSEL, CORONARY  5/9/2023    Procedure: Stent, Drug Eluting, Single Vessel, Coronary;  Surgeon: Antonio Kessler MD;  Location: [facility] CATH/EP LAB;  Service: Cardiology;;    SURGICAL REMOVAL OF NODULE OF VOCAL CORD       No family history on file.  Social History     Tobacco Use    Smoking status: Every Day     Current packs/day: 0.00     Types: Cigarettes     Last attempt to quit: 3/27/2020     Years since quitting: 3.3    Smokeless tobacco: Former    Tobacco comments:     Advised not to smoke DOS    Substance Use Topics    Alcohol use: Yes     Alcohol/week: 14.0 standard drinks of alcohol     Types: 14 Shots of liquor per week    Drug use: Yes     Types: Marijuana     Review of Systems   Cardiovascular:  Positive for chest pain.   Neurological:  Positive for dizziness and syncope.   All other systems reviewed and are negative.      Physical Exam     Initial Vitals [08/11/23 1626]   BP Pulse Resp Temp SpO2   (!) 173/92 66 20 98.9 °F (37.2 °C) 100 %      MAP       --         Physical Exam    Nursing note and vitals reviewed.  Constitutional: He appears well-developed and well-nourished. He is not diaphoretic. No distress.   HENT:   Head: Normocephalic and atraumatic.   Eyes: Conjunctivae are normal.   Neck: Neck supple.   Normal range of motion.  Cardiovascular:  Normal rate.           Pulmonary/Chest: No respiratory distress.   Abdominal: He exhibits no distension.    Musculoskeletal:         General: No edema.      Cervical back: Normal range of motion and neck supple.     Neurological: He is alert. He has normal strength.   Skin: Skin is warm and dry. No rash noted. No erythema.   Psychiatric: He has a normal mood and affect.         ED Course   Procedures  Labs Reviewed   CBC W/ AUTO DIFFERENTIAL - Abnormal; Notable for the following components:       Result Value    RBC 4.52 (*)     MCH 31.9 (*)     MPV 8.7 (*)     All other components within normal limits   COMPREHENSIVE METABOLIC PANEL   B-TYPE NATRIURETIC PEPTIDE   TROPONIN I HIGH SENSITIVITY   TROPONIN I HIGH SENSITIVITY   PROTIME-INR          Imaging Results              CT Head Without Contrast (Final result)  Result time 08/11/23 20:26:53      Final result by Obie Gomez DO (08/11/23 20:26:53)                   Narrative:      EXAM DESCRIPTION: CT HEAD WITHOUT CONTRAST    CLINICAL HISTORY: 67 years Male Headache, chronic, new features or increased frequency    COMPARISON: CT head without contrast dated 7/6/2022    Technique:  Contiguous axial images of the brain were obtained without the administration of intravenous contrast.This exam was performed according to our departmental dose-optimization program which includes use of Automated Exposure Control, adjustment of the mA and/or kV according to patient size and/or use of iterative reconstruction technique.    DLP: 908 mGycm    FINDINGS:    Brain: Multifocal prior infarct in the right frontal and bilateral parietal lobes. No acute intracranial hemorrhage. No extra-axial collection. No mass effect or herniation.  Prominence of the sulci and cisterns. Confluent periventricular and subcortical white matter hypodensity is noted.    Ventricles: Within normal limits in size.    Globes and orbits: No acute abnormality.    Bones: No acute osseous finding    Paranasal sinuses: Paranasal sinuses are clear.    Mastoid air cells: Well pneumatized.    Soft tissues:  Within normal limits      IMPRESSION:    1. No acute hemorrhage, hydrocephalus or herniation.    2. Cerebral volume loss, chronic small vessel ischemic changes and multifocal prior infarcts. Consider MRI brain for further evaluation.          Electronically signed by:  Obiejewel Abrahamemanuel GAMBOA  8/11/2023 8:26 PM CDT Workstation: [id number]                                     X-Ray Chest AP Portable (Final result)  Result time 08/11/23 17:25:31      Final result by Ezra Hart MD (08/11/23 17:25:31)                   Narrative:    EXAM DESCRIPTION: XR CHEST AP PORTABLE    CLINICAL HISTORY: 67 years Male, chest pain    COMPARISON: July 10, 2023.    FINDINGS: The heart size is within normal limits and appears to be unchanged. Mild increased interstitial markings are present, predominantly in the lower lobes. No pleural fluid is seen. The osseous structures are unremarkable.    IMPRESSION:  Increased interstitial markings are noted.    Electronically signed by:  Ezra Hart MD  8/11/2023 5:25 PM CDT Workstation: [id number]                                     Medications   hydrALAZINE injection 10 mg (has no administration in time range)   hydrALAZINE injection 5 mg (has no administration in time range)   potassium bicarbonate disintegrating tablet 50 mEq (has no administration in time range)   hydrALAZINE injection 10 mg (10 mg Intravenous Given 8/11/23 2036)   meclizine tablet 25 mg (25 mg Oral Given 8/11/23 2117)     Medical Decision Making:   Initial Assessment:   67-year-old male with dizziness, intermittent chest pain, syncopal episode.  He has no focal neurologic deficits.  His EKG shows sinus rhythm, no ST elevation.  He does have T-wave inversions which appear new.  His initial troponins normal.  Blood counts, electrolytes, renal function are unremarkable.  CT head was obtained which shows old infarcts but no acute process.  He was given 10 mg hydralazine for his elevated BP.  Repeat troponin remains normal.  I  have discussed with hospitalist, Dr. Nieto, who agrees to admit the patient for further management.                          Clinical Impression:   Final diagnoses:  [R07.9] Chest pain  [R55] Syncope        ED Disposition Condition    Observation                 Eddy López MD  08/11/23 5638

## 2023-08-13 VITALS
BODY MASS INDEX: 25.78 KG/M2 | OXYGEN SATURATION: 98 % | DIASTOLIC BLOOD PRESSURE: 79 MMHG | TEMPERATURE: 98 F | HEART RATE: 75 BPM | SYSTOLIC BLOOD PRESSURE: 158 MMHG | RESPIRATION RATE: 20 BRPM | HEIGHT: 73 IN | WEIGHT: 194.5 LBS

## 2023-08-13 LAB
ANION GAP SERPL CALC-SCNC: 8 MMOL/L (ref 8–16)
BASOPHILS # BLD AUTO: 0.05 K/UL (ref 0–0.2)
BASOPHILS NFR BLD: 1 % (ref 0–1.9)
BUN SERPL-MCNC: 17 MG/DL (ref 8–23)
CALCIUM SERPL-MCNC: 9 MG/DL (ref 8.7–10.5)
CHLORIDE SERPL-SCNC: 104 MMOL/L (ref 95–110)
CO2 SERPL-SCNC: 23 MMOL/L (ref 23–29)
CREAT SERPL-MCNC: 0.8 MG/DL (ref 0.5–1.4)
DIFFERENTIAL METHOD: ABNORMAL
EOSINOPHIL # BLD AUTO: 0.1 K/UL (ref 0–0.5)
EOSINOPHIL NFR BLD: 1.8 % (ref 0–8)
ERYTHROCYTE [DISTWIDTH] IN BLOOD BY AUTOMATED COUNT: 13.2 % (ref 11.5–14.5)
EST. GFR  (NO RACE VARIABLE): >60 ML/MIN/1.73 M^2
GLUCOSE SERPL-MCNC: 106 MG/DL (ref 70–110)
GLUCOSE SERPL-MCNC: 143 MG/DL (ref 70–110)
GLUCOSE SERPL-MCNC: 166 MG/DL (ref 70–110)
GLUCOSE SERPL-MCNC: 171 MG/DL (ref 70–110)
HCT VFR BLD AUTO: 42.2 % (ref 40–54)
HGB BLD-MCNC: 14.3 G/DL (ref 14–18)
IMM GRANULOCYTES # BLD AUTO: 0.01 K/UL (ref 0–0.04)
IMM GRANULOCYTES NFR BLD AUTO: 0.2 % (ref 0–0.5)
LYMPHOCYTES # BLD AUTO: 2.1 K/UL (ref 1–4.8)
LYMPHOCYTES NFR BLD: 41 % (ref 18–48)
MCH RBC QN AUTO: 31.8 PG (ref 27–31)
MCHC RBC AUTO-ENTMCNC: 33.9 G/DL (ref 32–36)
MCV RBC AUTO: 94 FL (ref 82–98)
MONOCYTES # BLD AUTO: 0.5 K/UL (ref 0.3–1)
MONOCYTES NFR BLD: 10.2 % (ref 4–15)
NEUTROPHILS # BLD AUTO: 2.3 K/UL (ref 1.8–7.7)
NEUTROPHILS NFR BLD: 45.8 % (ref 38–73)
NRBC BLD-RTO: 0 /100 WBC
PLATELET # BLD AUTO: 165 K/UL (ref 150–450)
PMV BLD AUTO: 9.1 FL (ref 9.2–12.9)
POTASSIUM SERPL-SCNC: 4.1 MMOL/L (ref 3.5–5.1)
RBC # BLD AUTO: 4.49 M/UL (ref 4.6–6.2)
SODIUM SERPL-SCNC: 135 MMOL/L (ref 136–145)
WBC # BLD AUTO: 5.02 K/UL (ref 3.9–12.7)

## 2023-08-13 PROCEDURE — 99900035 HC TECH TIME PER 15 MIN (STAT)

## 2023-08-13 PROCEDURE — 63600175 PHARM REV CODE 636 W HCPCS: Performed by: FAMILY MEDICINE

## 2023-08-13 PROCEDURE — 96366 THER/PROPH/DIAG IV INF ADDON: CPT

## 2023-08-13 PROCEDURE — 99900031 HC PATIENT EDUCATION (STAT)

## 2023-08-13 PROCEDURE — 80048 BASIC METABOLIC PNL TOTAL CA: CPT | Performed by: FAMILY MEDICINE

## 2023-08-13 PROCEDURE — 99213 OFFICE O/P EST LOW 20 MIN: CPT | Mod: ,,, | Performed by: INTERNAL MEDICINE

## 2023-08-13 PROCEDURE — 94761 N-INVAS EAR/PLS OXIMETRY MLT: CPT

## 2023-08-13 PROCEDURE — 85025 COMPLETE CBC W/AUTO DIFF WBC: CPT | Performed by: FAMILY MEDICINE

## 2023-08-13 PROCEDURE — G0378 HOSPITAL OBSERVATION PER HR: HCPCS

## 2023-08-13 PROCEDURE — 36415 COLL VENOUS BLD VENIPUNCTURE: CPT | Performed by: FAMILY MEDICINE

## 2023-08-13 PROCEDURE — 93010 ELECTROCARDIOGRAM REPORT: CPT | Mod: ,,, | Performed by: INTERNAL MEDICINE

## 2023-08-13 PROCEDURE — 93005 ELECTROCARDIOGRAM TRACING: CPT | Performed by: INTERNAL MEDICINE

## 2023-08-13 PROCEDURE — 93010 EKG 12-LEAD: ICD-10-PCS | Mod: ,,, | Performed by: INTERNAL MEDICINE

## 2023-08-13 PROCEDURE — 99213 PR OFFICE/OUTPT VISIT, EST, LEVL III, 20-29 MIN: ICD-10-PCS | Mod: ,,, | Performed by: INTERNAL MEDICINE

## 2023-08-13 PROCEDURE — 96376 TX/PRO/DX INJ SAME DRUG ADON: CPT

## 2023-08-13 PROCEDURE — 25000003 PHARM REV CODE 250: Performed by: FAMILY MEDICINE

## 2023-08-13 RX ORDER — SODIUM CHLORIDE 9 MG/ML
INJECTION, SOLUTION INTRAVENOUS ONCE
Status: DISCONTINUED | OUTPATIENT
Start: 2023-08-14 | End: 2023-08-13 | Stop reason: HOSPADM

## 2023-08-13 RX ORDER — DIPHENHYDRAMINE HCL 25 MG
50 CAPSULE ORAL
Status: DISCONTINUED | OUTPATIENT
Start: 2023-08-13 | End: 2023-08-13 | Stop reason: HOSPADM

## 2023-08-13 RX ADMIN — ASPIRIN 81 MG: 81 TABLET, COATED ORAL at 08:08

## 2023-08-13 RX ADMIN — CARVEDILOL 3.12 MG: 3.12 TABLET, FILM COATED ORAL at 07:08

## 2023-08-13 RX ADMIN — PIPERACILLIN SODIUM AND TAZOBACTAM SODIUM 3.38 G: 3; .375 INJECTION, POWDER, LYOPHILIZED, FOR SOLUTION INTRAVENOUS at 07:08

## 2023-08-13 RX ADMIN — PIPERACILLIN SODIUM AND TAZOBACTAM SODIUM 3.38 G: 3; .375 INJECTION, POWDER, LYOPHILIZED, FOR SOLUTION INTRAVENOUS at 03:08

## 2023-08-13 RX ADMIN — CARVEDILOL 3.12 MG: 3.12 TABLET, FILM COATED ORAL at 05:08

## 2023-08-13 RX ADMIN — HYDRALAZINE HYDROCHLORIDE 5 MG: 20 INJECTION, SOLUTION INTRAMUSCULAR; INTRAVENOUS at 03:08

## 2023-08-13 RX ADMIN — INSULIN ASPART 2 UNITS: 100 INJECTION, SOLUTION INTRAVENOUS; SUBCUTANEOUS at 11:08

## 2023-08-13 RX ADMIN — HYDROCODONE BITARTRATE AND ACETAMINOPHEN 1 TABLET: 5; 325 TABLET ORAL at 10:08

## 2023-08-13 NOTE — ASSESSMENT & PLAN NOTE
Occurred while waiting for exam room in ED.  Lasted about 20 seconds.  He regained consciousness on his own.  He didn't have chest pain at that time.  However, he's been having intermittent CP at home.  Also has been having dizziness for past few days.  Carotid US negative for significant stenoses.  Echo shows normal LVEF and no aortic stenosis.  Will consult with cardiologist, given that patient had a recent coronary stent and a 60% LAD stenosis which was left alone due to good flow.

## 2023-08-13 NOTE — ASSESSMENT & PLAN NOTE
Continue losartan 100 daily and Coreg 3.125 BID.  Monitor blood pressure.  Adjust treatment if needed.

## 2023-08-13 NOTE — ASSESSMENT & PLAN NOTE
Patient's FSGs are controlled on current medication regimen.  Last A1c reviewed-   Lab Results   Component Value Date    HGBA1C 6.0 08/11/2023     POC Glucose   Date Value Ref Range Status   08/12/2023 208 (H) 70 - 110 Final   08/12/2023 192 (H) 70 - 110 Final   08/12/2023 103 70 - 110 Final     Current correctional scale  Medium  Maintain anti-hyperglycemic dose as follows-   Antihyperglycemics (From admission, onward)    Start     Stop Route Frequency Ordered    08/12/23 2100  insulin detemir U-100 (Levemir) pen 15 Units         -- SubQ Nightly 08/11/23 2301    08/11/23 2222  insulin aspart U-100 pen 1-10 Units         -- SubQ Before meals & nightly PRN 08/11/23 2124        Hold Oral hypoglycemics while patient is in the hospital.

## 2023-08-13 NOTE — PLAN OF CARE
08/13/23 0826   VIEIRA Message   Medicare Outpatient and Observation Notification regarding financial responsibility Given to patient/caregiver;Explained to patient/caregiver;Signed/date by patient/caregiver   Date VIEIRA was signed 08/12/23   Time VIEIRA was signed 1035

## 2023-08-13 NOTE — ASSESSMENT & PLAN NOTE
Possibly from CAD.  There's a 60% mid-LAD stenosis that is being treated pharmacologically.  There was good flow on a recent PTCA.  Will consult cardiology, though, to make sure the pain he's having is not cardiac in nature.

## 2023-08-13 NOTE — HPI
"67 y.o. White male   With PMH of CAD, HTN,   previous IVDU, previous EtOH abuse,  who presents with syncope.  Occurred in the ER     Pt presented with dizziness  Onset a few days ago  Occurring intermittently  Worsens when he lifts or turns his head  Reports room spinning when this occurs  Currently taking unknown antibiotics for a "sinus infection"  +headache x a few days  +clamminess  +malaise  +nausea, no vomiting  +fatigue  +generalized weakness  Intermittent LLQ abdominal pain  No diarrhea  No constipation  +polyuria / +increased urinary frequency  No dysuria     ER reports pt had syncopal episode in waiting room  Pt lifted his head  Became diaphoretic  Had LOC for approx 20 seconds  No post-ictal confusion  No focal weakness  No tingling or numbness  No CP while in ER     Reports CP earlier today  Located L chest, no radiation  Reports it is NOT similar to previous CP resulting in cardiac stent placements  "

## 2023-08-13 NOTE — CARE UPDATE
08/13/23 0750   Patient Assessment/Suction   All Lung Fields Breath Sounds diminished;clear   Rhythm/Pattern, Respiratory unlabored;pattern regular;depth regular   PRE-TX-O2   Device (Oxygen Therapy) room air   SpO2 96 %   Pulse Oximetry Type Intermittent   $ Pulse Oximetry - Multiple Charge Pulse Oximetry - Multiple   Pulse 62   Resp 16   Aerosol Therapy   $ Aerosol Therapy Charges PRN treatment not required   Daily Review of Necessity (SVN) completed   Education   $ Education Bronchodilator;15 min;DME Oxygen  (PFTS /)   Respiratory Evaluation   $ Care Plan Tech Time 15 min

## 2023-08-13 NOTE — NURSING
Pt has been adamant about being discharged all day. Pt refused to do an angiogram with another doctor when given the option do it or get discharged. Pt was seen by cardio Checo and attending Carlos Eduardo. All agreed to discharge pt. When pt received discharged pt stated he needs a work excuse for a week. Notified Dr and an excuse was given until 8/15/2023. Pt got upset and stated he does not feel ready to go home once pt was already wheel chaired downstairs. Nurse gave patient an option to come back upstairs and talk with doctor but pt refused. Pt got up out the wheelchair and walked outside.

## 2023-08-13 NOTE — PROGRESS NOTES
"[facility]  Progress Note    Patient Name: Sampson Holt  MRN: [id number]  Patient Class: OP- Observation   Admission Date: 8/11/2023  Length of Stay: 0 days  Attending Physician: Abdoulaye Thibodeaux MD  Primary Care Provider: Damian Vergara MD        Subjective:     Principal Problem:Syncope        HPI:  67 y.o. White male   With PMH of CAD, HTN,   previous IVDU, previous EtOH abuse,  who presents with syncope.  Occurred in the ER     Pt presented with dizziness  Onset a few days ago  Occurring intermittently  Worsens when he lifts or turns his head  Reports room spinning when this occurs  Currently taking unknown antibiotics for a "sinus infection"  +headache x a few days  +clamminess  +malaise  +nausea, no vomiting  +fatigue  +generalized weakness  Intermittent LLQ abdominal pain  No diarrhea  No constipation  +polyuria / +increased urinary frequency  No dysuria     ER reports pt had syncopal episode in waiting room  Pt lifted his head  Became diaphoretic  Had LOC for approx 20 seconds  No post-ictal confusion  No focal weakness  No tingling or numbness  No CP while in ER     Reports CP earlier today  Located L chest, no radiation  Reports it is NOT similar to previous CP resulting in cardiac stent placements      Overview/Hospital Course:  No notes on file    Interval History:  continues to feel weak and clammy at times.  No chest pain today.  As I spoke to him, he sat on EOB and became dizzy.  Patient brought my attention to the recent coronary stent insertion, which I had noted when reviewing records.    Review of Systems   Constitutional:  Positive for fatigue. Negative for chills and fever.   Respiratory:  Negative for cough, shortness of breath and wheezing.    Cardiovascular:  Negative for chest pain and leg swelling.   Gastrointestinal:  Negative for abdominal pain and nausea.     Objective:     Vital Signs (Most Recent):  Temp: 97.7 °F (36.5 °C) (08/12/23 " 1907)  Pulse: 67 (08/12/23 1907)  Resp: 20 (08/12/23 1907)  BP: (!) 165/98 (08/12/23 1907)  SpO2: 97 % (08/12/23 1907) Vital Signs (24h Range):  Temp:  [97.7 °F (36.5 °C)-98.6 °F (37 °C)] 97.7 °F (36.5 °C)  Pulse:  [56-73] 67  Resp:  [18-20] 20  SpO2:  [93 %-99 %] 97 %  BP: (128-193)/(67-99) 165/98     Weight: 88.2 kg (194 lb 8 oz)  Body mass index is 25.66 kg/m².    Intake/Output Summary (Last 24 hours) at 8/12/2023 2005  Last data filed at 8/12/2023 1839  Gross per 24 hour   Intake 1330 ml   Output 850 ml   Net 480 ml         Physical Exam  Vitals reviewed.   Constitutional:       General: He is not in acute distress.     Appearance: He is ill-appearing. He is not diaphoretic.   HENT:      Mouth/Throat:      Mouth: Mucous membranes are moist.   Eyes:      General: No scleral icterus.        Right eye: No discharge.         Left eye: No discharge.   Neck:      Vascular: No JVD.   Cardiovascular:      Rate and Rhythm: Normal rate and regular rhythm.   Pulmonary:      Effort: Pulmonary effort is normal.      Breath sounds: Normal breath sounds.   Abdominal:      General: Bowel sounds are normal. There is no distension.      Palpations: Abdomen is soft.      Tenderness: There is no abdominal tenderness.   Skin:     General: Skin is warm.      Findings: No rash.   Neurological:      Mental Status: He is alert.             Significant Labs: All pertinent labs within the past 24 hours have been reviewed.    Significant Imaging: I have reviewed all pertinent imaging results/findings within the past 24 hours.      Assessment/Plan:      * Syncope  Occurred while waiting for exam room in ED.  Lasted about 20 seconds.  He regained consciousness on his own.  He didn't have chest pain at that time.  However, he's been having intermittent CP at home.  Also has been having dizziness for past few days.  Carotid US negative for significant stenoses.  Echo shows normal LVEF and no aortic stenosis.  Will consult with cardiologist,  "given that patient had a recent coronary stent and a 60% LAD stenosis which was left alone due to good flow.      Chest pain  Possibly from CAD.  There's a 60% mid-LAD stenosis that is being treated pharmacologically.  There was good flow on a recent PTCA.  Will consult cardiology, though, to make sure the pain he's having is not cardiac in nature.      CAD (coronary artery disease)  See "chest pain"      Type 2 diabetes mellitus with circulatory disorder, with long-term current use of insulin  Patient's FSGs are controlled on current medication regimen.  Last A1c reviewed-   Lab Results   Component Value Date    HGBA1C 6.0 08/11/2023     POC Glucose   Date Value Ref Range Status   08/12/2023 208 (H) 70 - 110 Final   08/12/2023 192 (H) 70 - 110 Final   08/12/2023 103 70 - 110 Final     Current correctional scale  Medium  Maintain anti-hyperglycemic dose as follows-   Antihyperglycemics (From admission, onward)    Start     Stop Route Frequency Ordered    08/12/23 2100  insulin detemir U-100 (Levemir) pen 15 Units         -- SubQ Nightly 08/11/23 2301    08/11/23 2222  insulin aspart U-100 pen 1-10 Units         -- SubQ Before meals & nightly PRN 08/11/23 2124        Hold Oral hypoglycemics while patient is in the hospital.    COPD (chronic obstructive pulmonary disease)  Continue AUBREY/DEMAR PRN.  Stable.    Benign essential HTN  Continue losartan 100 daily and Coreg 3.125 BID.  Monitor blood pressure.  Adjust treatment if needed.      VTE Risk Mitigation (From admission, onward)         Ordered     IP VTE LOW RISK PATIENT  Once         08/11/23 2124     Place sequential compression device  Until discontinued         08/11/23 2124                Discharge Planning   DAVID:      Code Status: Full Code   Is the patient medically ready for discharge?:     Reason for patient still in hospital (select all that apply): Patient trending condition and Consult recommendations  Discharge Plan A: Home                  Abdoulaye F " MD Carlos Eduardo  Department of Hospital Medicine   [facility]

## 2023-08-13 NOTE — SUBJECTIVE & OBJECTIVE
Interval History:  continues to feel weak and clammy at times.  No chest pain today.  As I spoke to him, he sat on EOB and became dizzy.  Patient brought my attention to the recent coronary stent insertion, which I had noted when reviewing records.    Review of Systems   Constitutional:  Positive for fatigue. Negative for chills and fever.   Respiratory:  Negative for cough, shortness of breath and wheezing.    Cardiovascular:  Negative for chest pain and leg swelling.   Gastrointestinal:  Negative for abdominal pain and nausea.     Objective:     Vital Signs (Most Recent):  Temp: 97.7 °F (36.5 °C) (08/12/23 1907)  Pulse: 67 (08/12/23 1907)  Resp: 20 (08/12/23 1907)  BP: (!) 165/98 (08/12/23 1907)  SpO2: 97 % (08/12/23 1907) Vital Signs (24h Range):  Temp:  [97.7 °F (36.5 °C)-98.6 °F (37 °C)] 97.7 °F (36.5 °C)  Pulse:  [56-73] 67  Resp:  [18-20] 20  SpO2:  [93 %-99 %] 97 %  BP: (128-193)/(67-99) 165/98     Weight: 88.2 kg (194 lb 8 oz)  Body mass index is 25.66 kg/m².    Intake/Output Summary (Last 24 hours) at 8/12/2023 2005  Last data filed at 8/12/2023 1839  Gross per 24 hour   Intake 1330 ml   Output 850 ml   Net 480 ml         Physical Exam  Vitals reviewed.   Constitutional:       General: He is not in acute distress.     Appearance: He is ill-appearing. He is not diaphoretic.   HENT:      Mouth/Throat:      Mouth: Mucous membranes are moist.   Eyes:      General: No scleral icterus.        Right eye: No discharge.         Left eye: No discharge.   Neck:      Vascular: No JVD.   Cardiovascular:      Rate and Rhythm: Normal rate and regular rhythm.   Pulmonary:      Effort: Pulmonary effort is normal.      Breath sounds: Normal breath sounds.   Abdominal:      General: Bowel sounds are normal. There is no distension.      Palpations: Abdomen is soft.      Tenderness: There is no abdominal tenderness.   Skin:     General: Skin is warm.      Findings: No rash.   Neurological:      Mental Status: He is alert.              Significant Labs: All pertinent labs within the past 24 hours have been reviewed.    Significant Imaging: I have reviewed all pertinent imaging results/findings within the past 24 hours.

## 2023-08-13 NOTE — RESPIRATORY THERAPY
08/12/23 1950   Patient Assessment/Suction   Level of Consciousness (AVPU) alert   Respiratory Effort Unlabored   Expansion/Accessory Muscles/Retractions no use of accessory muscles   All Lung Fields Breath Sounds clear;diminished   PRE-TX-O2   Device (Oxygen Therapy) room air   SpO2 95 %   Pulse Oximetry Type Intermittent   $ Pulse Oximetry - Multiple Charge Pulse Oximetry - Multiple   Aerosol Therapy   Respiratory Treatment Status (SVN) PRN treatment not required   Education   $ Education Bronchodilator;15 min   Respiratory Evaluation   $ Care Plan Tech Time 15 min   $ Eval/Re-eval Charges Evaluation

## 2023-08-13 NOTE — CONSULTS
[facility]  Cardiology  Consult Note    Patient Name: Sampson Holt  MRN: [id number]  Admission Date: 8/11/2023  Hospital Length of Stay: 0 days  Code Status: Full Code   Attending Provider: Abdoulaye Thibodeaux MD   Consulting Provider: Antonio Kessler MD  Primary Care Physician: Damian Vergara MD  Principal Problem:Syncope    Patient information was obtained from patient and ER records.     Inpatient consult to Cardiology  Consult performed by: Antonio Kessler MD  Consult ordered by: Abduolaye Thibodeaux MD  Reason for consult: Chest pain and dizziness        Subjective:     67-year-old male with coronary artery disease status post PCI to RCA and distal left circumflex earlier this year in May admitted with atypical chest pain, dizziness and an episode of syncope.  Patient reports that for the past few days to weeks he has been tired and feeling dizzy.  Does not report any significant positional dizziness.  Does not provide clear details of the dizziness either.  Reports sometimes dizziness lasts for a long time.  No associated chest pain, shortness of breath or palpitations with the dizziness.  Does report some palpitations couple of days ago.  Also reports moderate shortness of breath which is chronic in nature.  She reports sharp chest pain in the left side of the chest lasting a few minutes and resolves on its own, mostly appears to be at rest.  Troponins negative.  EKG shows anterior T-wave changes.  Recently had a nuclear stress test with normal perfusion imaging.  Reports compliance with medications.    Past Medical History:   Diagnosis Date    Abdominal pain 2022    CHF (congestive heart failure)     Diabetes mellitus 2018    Emphysema lung     Endocarditis 2011    Hypertension     Stroke 2011    denies residual       Past Surgical History:   Procedure Laterality Date    BACK SURGERY  1981    COLONOSCOPY N/A 2/23/2023    Procedure: COLONOSCOPY;  Surgeon: Jonn Cruz MD;  Location: [facility]  yes ENDO;  Service: Endoscopy;  Laterality: N/A;    CORONARY ANGIOGRAPHY N/A 5/9/2023    Procedure: ANGIOGRAM, CORONARY ARTERY;  Surgeon: Antonio Kessler MD;  Location: [facility] CATH/EP LAB;  Service: Cardiology;  Laterality: N/A;    EXCISION OF HYDROCELE      x2    FRACTIONAL FLOW RESERVE (FFR), CORONARY  5/11/2023    Procedure: Fractional Flow [place] (FFR), Coronary;  Surgeon: Antonio Kessler MD;  Location: [facility] CATH/EP LAB;  Service: Cardiology;;    INGUINAL HERNIA REPAIR  1960    INTRAVASCULAR ULTRASOUND, CORONARY N/A 5/9/2023    Procedure: Ultrasound-coronary;  Surgeon: Antonio Kessler MD;  Location: [facility] CATH/EP LAB;  Service: Cardiology;  Laterality: N/A;    IVUS, CORONARY  5/11/2023    Procedure: IVUS, Coronary;  Surgeon: Antonio Kessler MD;  Location: [facility] CATH/EP LAB;  Service: Cardiology;;    LEFT HEART CATHETERIZATION Left 5/11/2023    Procedure: Left heart cath;  Surgeon: Antonio Kessler MD;  Location: [facility] CATH/EP LAB;  Service: Cardiology;  Laterality: Left;    PERCUTANEOUS TRANSLUMINAL BALLOON ANGIOPLASTY OF CORONARY ARTERY  5/9/2023    Procedure: Angioplasty-coronary;  Surgeon: Antonio Kessler MD;  Location: [facility] CATH/EP LAB;  Service: Cardiology;;    RHINOPLASTY      STENT, DRUG ELUTING, SINGLE VESSEL, CORONARY  5/9/2023    Procedure: Stent, Drug Eluting, Single Vessel, Coronary;  Surgeon: Antonio Kessler MD;  Location: [facility] CATH/EP LAB;  Service: Cardiology;;    SURGICAL REMOVAL OF NODULE OF VOCAL CORD         Review of patient's allergies indicates:  No Known Allergies    No current facility-administered medications on file prior to encounter.     Current Outpatient Medications on File Prior to Encounter   Medication Sig    aspirin (ECOTRIN) 81 MG EC tablet Take 1 tablet (81 mg total) by mouth once daily.    atorvastatin (LIPITOR) 40 MG tablet Take 1 tablet (40 mg total) by mouth once daily.    carvediloL (COREG) 3.125 MG tablet Take 1 tablet (3.125 mg total) by mouth 2 (two) times daily.    clopidogreL  (PLAVIX) 75 mg tablet Take 1 tablet (75 mg total) by mouth once daily.    insulin glargine,hum.rec.anlog (LANTUS SUBQ) Inject 35 Units into the skin once daily.    losartan (COZAAR) 50 MG tablet Take 2 tablets (100 mg total) by mouth once daily.    albuterol (PROVENTIL/VENTOLIN HFA) 90 mcg/actuation inhaler Inhale 1 puff into the lungs every 4 (four) hours as needed.     Family History    None       Tobacco Use    Smoking status: Every Day     Current packs/day: 0.00     Types: Cigarettes     Last attempt to quit: 3/27/2020     Years since quitting: 3.3    Smokeless tobacco: Former    Tobacco comments:     Advised not to smoke DOS    Substance and Sexual Activity    Alcohol use: Yes     Alcohol/week: 14.0 standard drinks of alcohol     Types: 14 Shots of liquor per week    Drug use: Yes     Types: Marijuana    Sexual activity: Not on file     Review of Systems   All other systems reviewed and are negative.    Objective:     Vital Signs (Most Recent):  Temp: 97.3 °F (36.3 °C) (08/13/23 0655)  Pulse: 62 (08/13/23 0750)  Resp: 16 (08/13/23 0750)  BP: (!) 150/96 (08/13/23 0725)  SpO2: 96 % (08/13/23 0750) Vital Signs (24h Range):  Temp:  [97.3 °F (36.3 °C)-98.6 °F (37 °C)] 97.3 °F (36.3 °C)  Pulse:  [58-69] 62  Resp:  [16-20] 16  SpO2:  [93 %-97 %] 96 %  BP: (121-178)/(68-99) 150/96     Weight: 88.2 kg (194 lb 8 oz)  Body mass index is 25.66 kg/m².    SpO2: 96 %         Intake/Output Summary (Last 24 hours) at 8/13/2023 0946  Last data filed at 8/13/2023 0800  Gross per 24 hour   Intake 1330 ml   Output 1250 ml   Net 80 ml       Lines/Drains/Airways       Peripheral Intravenous Line  Duration                  Peripheral IV - Single Lumen 08/11/23 1657 18 G Anterior;Right Forearm 1 day                    Physical Exam  Vitals reviewed.   Constitutional:       Appearance: Normal appearance.   HENT:      Mouth/Throat:      Mouth: Mucous membranes are moist.   Eyes:      Extraocular Movements: Extraocular movements intact.  "     Pupils: Pupils are equal, round, and reactive to light.   Cardiovascular:      Rate and Rhythm: Normal rate and regular rhythm.      Heart sounds: No murmur heard.     No gallop.   Pulmonary:      Effort: Pulmonary effort is normal.      Breath sounds: Normal breath sounds.   Abdominal:      General: Abdomen is flat.      Palpations: Abdomen is soft.   Musculoskeletal:      Cervical back: Normal range of motion.   Skin:     General: Skin is warm and dry.   Neurological:      General: No focal deficit present.      Mental Status: He is alert and oriented to person, place, and time.   Psychiatric:         Mood and Affect: Mood normal.         Significant Labs: BMP:   Recent Labs   Lab 08/11/23 1659 08/12/23 0617 08/13/23 0327   GLU 75 108 166*    137 135*   K 3.6 3.9 4.1    104 104   CO2 24 24 23   BUN 13 13 17   CREATININE 0.7 1.0 0.8   CALCIUM 9.2 8.8 9.0   MG  --  1.9  --    , CMP   Recent Labs   Lab 08/11/23 1659 08/12/23 0617 08/13/23 0327    137 135*   K 3.6 3.9 4.1    104 104   CO2 24 24 23   GLU 75 108 166*   BUN 13 13 17   CREATININE 0.7 1.0 0.8   CALCIUM 9.2 8.8 9.0   PROT 7.5  --   --    ALBUMIN 4.4  --   --    BILITOT 0.7  --   --    ALKPHOS 72  --   --    AST 20  --   --    ALT 19  --   --    ANIONGAP 9 9 8   , and Troponin No results for input(s): "TROPONINI" in the last 48 hours.    Significant Imaging: Echocardiogram: 2D echo with color flow doppler: No results found for this or any previous visit. and Transthoracic echo (TTE) complete (Cupid Only):   Results for orders placed or performed during the hospital encounter of 08/11/23   Echo   Result Value Ref Range    BSA 2.13 m2    LVOT stroke volume 73.16 cm3    LVIDd 4.46 3.5 - 6.0 cm    LV Systolic Volume 32.80 mL    LV Systolic Volume Index 15.5 mL/m2    LVIDs 2.92 2.1 - 4.0 cm    LV Diastolic Volume 90.50 mL    LV Diastolic Volume Index 42.69 mL/m2    IVS 1.60 (A) 0.6 - 1.1 cm    LVOT diameter 2.00 cm    LVOT area " 3.1 cm2    FS 35 28 - 44 %    Left Ventricle Relative Wall Thickness 0.38 cm    Posterior Wall 0.84 0.6 - 1.1 cm    LV mass 200.11 g    LV Mass Index 94 g/m2    MV Peak E Edgardo 0.96 m/s    TDI LATERAL 0.06 m/s    TDI SEPTAL 0.09 m/s    E/E' ratio 12.80 m/s    MV Peak A Edgardo 1.04 m/s    TR Max Edgardo 2.18 m/s    E/A ratio 0.92     E wave deceleration time 299.00 msec    LV SEPTAL E/E' RATIO 10.67 m/s    LV LATERAL E/E' RATIO 16.00 m/s    LVOT peak edgardo 1.16 m/s    Left Ventricular Outflow Tract Mean Velocity 0.71 cm/s    Left Ventricular Outflow Tract Mean Gradient 3.00 mmHg    RV S' 15.80 cm/s    TAPSE 2.79 cm    AV mean gradient 3 mmHg    AV peak gradient 6 mmHg    Ao peak edgardo 1.19 m/s    Ao VTI 24.60 cm    LVOT peak VTI 23.30 cm    AV valve area 2.97 cm²    AV Velocity Ratio 0.97     AV index (prosthetic) 0.95     PITER by Velocity Ratio 3.06 cm²    MV mean gradient 3 mmHg    MV peak gradient 6 mmHg    MV stenosis pressure 1/2 time 107.00 ms    MV valve area p 1/2 method 2.06 cm2    MV valve area by continuity eq 1.73 cm2    MV VTI 42.4 cm    Triscuspid Valve Regurgitation Peak Gradient 19 mmHg    PV PEAK VELOCITY 1.04 m/s    PV peak gradient 4 mmHg    Pulmonary Valve Mean Velocity 0.74 m/s    Ao root annulus 4.30 cm    Sinus 4.11 cm    STJ 3.19 cm    Ascending aorta 3.20 cm    Mean e' 0.08 m/s    ZLVIDS -2.66     ZLVIDD -4.07     AORTIC VALVE CUSP SEPERATION 2.10 cm    TV resting pulmonary artery pressure 22 mmHg    RV TB RVSP 5 mmHg    Est. RA pres 3 mmHg    Narrative      Left Ventricle: The left ventricle is normal in size. Mildly increased   wall thickness. Normal wall motion. There is normal systolic function with   a visually estimated ejection fraction of 55 - 60%. There is normal   diastolic function.    Right Ventricle: Normal right ventricular cavity size. Systolic   function is normal.    Aortic Valve: The aortic valve is a trileaflet valve.    Mitral Valve: There is no stenosis. The mean pressure gradient  across   the mitral valve is 3 mmHg at a heart rate of  bpm.    IVC/SVC: Normal venous pressure at 3 mmHg.       Assessment and Plan:     Atypical chest pain  Anterior T-wave changes on EKG  Moderate LAD disease on angiogram in May, FFR negative  Status post PCI of RCA and left circumflex in May 2023  Dizziness  Syncope in the ER  Preserved EF  Hypertension    Plan:  - check orthostatics  - repeat EKG.  Ambulate the patient.  If repeat EKG appears more normal and patient does not have any significant symptoms on ambulation, he can likely be discharged from a cardiovascular standpoint with follow-up in the clinic.  Given EKG changes and he known history of moderate LAD disease in the past, would recommend further outpatient evaluation with an angiogram if patient has no symptoms with ambulation while admitted.  Will schedule the angiogram for August 23rd.  Will also place a request for event monitor to be done in the clinic for further evaluation of the same.  Please call Cardiology once orthostatics, repeat EKG has been done and patient has ambulated for further recommendations.    Active Diagnoses:    Diagnosis Date Noted POA    PRINCIPAL PROBLEM:  Syncope [R55] 08/11/2023 Yes    Chest pain [R07.9] 08/12/2023 Yes    CAD (coronary artery disease) [I25.10] 05/13/2023 Yes    Benign essential HTN [I10] 03/27/2020 Yes    COPD (chronic obstructive pulmonary disease) [J44.9] 03/27/2020 Yes    Type 2 diabetes mellitus with circulatory disorder, with long-term current use of insulin [E11.59, Z79.4] 03/27/2020 Not Applicable      Problems Resolved During this Admission:       VTE Risk Mitigation (From admission, onward)           Ordered     IP VTE LOW RISK PATIENT  Once         08/11/23 2124     Place sequential compression device  Until discontinued         08/11/23 2124                    Thank you for your consult. I will follow-up with patient. Please contact us if you have any additional questions.    Antonio Kessler  MD  Cardiology   [facility]

## 2023-08-14 PROBLEM — I21.3 STEMI (ST ELEVATION MYOCARDIAL INFARCTION): Status: RESOLVED | Noted: 2023-05-09 | Resolved: 2023-08-14

## 2023-08-14 NOTE — HOSPITAL COURSE
Had underwent carotid ultrasound, which showed no significant stenosis.  MRI brain showed no infarction.  Echo showed normal LVEF and normal aortic valve.  Cardiology was asked to evaluate him.  Dr. Kessler stated that it was possible the mid-LAD stenosis, which had been treated medically, was causing him continued chest discomfort and the dizziness.  He offered patient an inpatient PTCA, but pt declined.  Pt was stable for discharge home.  Dr. Kessler said he would set patient up for an outpatient PTCA and a wearable monitor.      Physical Exam  Vitals reviewed.   Constitutional:       General: He is not in acute distress.     Appearance: He is ill-appearing. He is not diaphoretic.   HENT:      Mouth/Throat:      Mouth: Mucous membranes are moist.   Eyes:      General: No scleral icterus.        Right eye: No discharge.         Left eye: No discharge.   Neck:      Vascular: No JVD.   Cardiovascular:      Rate and Rhythm: Normal rate and regular rhythm.   Pulmonary:      Effort: Pulmonary effort is normal.      Breath sounds: Normal breath sounds.

## 2023-08-14 NOTE — DISCHARGE SUMMARY
"[facility]  Discharge Summary      Patient Name: Sampson Holt  MRN: [id number]  OSCAR: [id number]  Patient Class: OP- Observation  Admission Date: 8/11/2023  Hospital Length of Stay: 0 days  Discharge Date and Time: 8/13/2023  5:45 PM  Attending Physician: No att. providers found   Discharging Provider: Abdoulaye Thibodeaux MD  Primary Care Provider: Damian Vergara MD    Primary Care Team: Networked reference to record PCT     HPI:   67 y.o. White male   With PMH of CAD, HTN,   previous IVDU, previous EtOH abuse,  who presents with syncope.  Occurred in the ER     Pt presented with dizziness  Onset a few days ago  Occurring intermittently  Worsens when he lifts or turns his head  Reports room spinning when this occurs  Currently taking unknown antibiotics for a "sinus infection"  +headache x a few days  +clamminess  +malaise  +nausea, no vomiting  +fatigue  +generalized weakness  Intermittent LLQ abdominal pain  No diarrhea  No constipation  +polyuria / +increased urinary frequency  No dysuria     ER reports pt had syncopal episode in waiting room  Pt lifted his head  Became diaphoretic  Had LOC for approx 20 seconds  No post-ictal confusion  No focal weakness  No tingling or numbness  No CP while in ER     Reports CP earlier today  Located L chest, no radiation  Reports it is NOT similar to previous CP resulting in cardiac stent placements      * No surgery found *      Hospital Course:   Had underwent carotid ultrasound, which showed no significant stenosis.  MRI brain showed no infarction.  Echo showed normal LVEF and normal aortic valve.  Cardiology was asked to evaluate him.  Dr. Kessler stated that it was possible the mid-LAD stenosis, which had been treated medically, was causing him continued chest discomfort and the dizziness.  He offered patient an inpatient PTCA, but pt declined.  Pt was stable for discharge home.  Dr. Kessler said he would set patient up for an outpatient PTCA " and a wearable monitor.      Physical Exam  Vitals reviewed.   Constitutional:       General: He is not in acute distress.     Appearance: He is ill-appearing. He is not diaphoretic.   HENT:      Mouth/Throat:      Mouth: Mucous membranes are moist.   Eyes:      General: No scleral icterus.        Right eye: No discharge.         Left eye: No discharge.   Neck:      Vascular: No JVD.   Cardiovascular:      Rate and Rhythm: Normal rate and regular rhythm.   Pulmonary:      Effort: Pulmonary effort is normal.      Breath sounds: Normal breath sounds.        Goals of Care Treatment Preferences:  Code Status: Full Code      Consults:   Consults (From admission, onward)        Status Ordering Provider     Inpatient consult to Cardiology  Once        Provider:  Antonio Kessler MD    Completed GORDON SCHILLING          No new Assessment & Plan notes have been filed under this hospital service since the last note was generated.  Service: Hospital Medicine    Final Active Diagnoses:    Diagnosis Date Noted POA    PRINCIPAL PROBLEM:  Syncope [R55] 08/11/2023 Yes    Chest pain [R07.9] 08/12/2023 Yes    CAD (coronary artery disease) [I25.10] 05/13/2023 Yes    Benign essential HTN [I10] 03/27/2020 Yes    COPD (chronic obstructive pulmonary disease) [J44.9] 03/27/2020 Yes    Type 2 diabetes mellitus with circulatory disorder, with long-term current use of insulin [E11.59, Z79.4] 03/27/2020 Not Applicable      Problems Resolved During this Admission:       Discharged Condition: good    Disposition: Home or Self Care    Follow Up:   Follow-up Information     Antonio Kessler MD Follow up.    Specialties: Interventional Cardiology, Cardiology  Why: his office will call you about going in for the angiogram next week  Contact information:  [address] [place] [address]  [address]  [facility] [address]  [phone]                       Patient Instructions:      Diet Adult Regular     Order Specific Question Answer Comments   Additional  restrictions: Low Chol/Sat Fat      Activity as tolerated       Significant Diagnostic Studies:   See HPI.    Troponin I High Sensitivity   Date Value Ref Range Status   08/12/2023 5.4 0.0 - 14.9 pg/mL Final     Comment:     Troponin results differ between methods. Do not use   results between Troponin methods interchangeably.    Alkaline Phospatase levels above 400 U/L may   cause false positive results.    Access hsTnI should not be used for patients taking   Asfotase hilary (Strensiq).     08/12/2023 4.5 0.0 - 14.9 pg/mL Final     Comment:     Troponin results differ between methods. Do not use   results between Troponin methods interchangeably.    Alkaline Phospatase levels above 400 U/L may   cause false positive results.    Access hsTnI should not be used for patients taking   Asfotase hilary (Strensiq).       BMP  Lab Results   Component Value Date     (L) 08/13/2023    K 4.1 08/13/2023     08/13/2023    CO2 23 08/13/2023    BUN 17 08/13/2023    CREATININE 0.8 08/13/2023    CALCIUM 9.0 08/13/2023    ANIONGAP 8 08/13/2023    EGFRNORACEVR >60.0 08/13/2023         Pending Diagnostic Studies:     None         Medications:  Reconciled Home Medications:      Medication List      CONTINUE taking these medications    albuterol 90 mcg/actuation inhaler  Commonly known as: PROVENTIL/VENTOLIN HFA  Inhale 1 puff into the lungs every 4 (four) hours as needed.     aspirin 81 MG EC tablet  Commonly known as: ECOTRIN  Take 1 tablet (81 mg total) by mouth once daily.     atorvastatin 40 MG tablet  Commonly known as: LIPITOR  Take 1 tablet (40 mg total) by mouth once daily.     carvediloL 3.125 MG tablet  Commonly known as: COREG  Take 1 tablet (3.125 mg total) by mouth 2 (two) times daily.     clopidogreL 75 mg tablet  Commonly known as: PLAVIX  Take 1 tablet (75 mg total) by mouth once daily.     LANTUS SUBQ  Inject 35 Units into the skin once daily.     losartan 50 MG tablet  Commonly known as: COZAAR  Take 2  tablets (100 mg total) by mouth once daily.            Indwelling Lines/Drains at time of discharge:   Lines/Drains/Airways     None                 Time spent on the discharge of patient: 21 minutes         Abdoulaye Thibodeaux MD  Department of Hospital Medicine  [facility]

## 2023-08-17 LAB
BACTERIA BLD CULT: NORMAL
BACTERIA BLD CULT: NORMAL

## 2023-08-28 ENCOUNTER — OFFICE VISIT (OUTPATIENT)
Dept: UROLOGY | Facility: CLINIC | Age: 68
End: 2023-08-28
Payer: OTHER GOVERNMENT

## 2023-08-28 DIAGNOSIS — N50.89 LUMP IN THE TESTICLE: ICD-10-CM

## 2023-08-28 DIAGNOSIS — N40.1 BENIGN PROSTATIC HYPERPLASIA WITH LOWER URINARY TRACT SYMPTOMS, SYMPTOM DETAILS UNSPECIFIED: ICD-10-CM

## 2023-08-28 DIAGNOSIS — R36.1 BLOOD IN SEMEN: Primary | ICD-10-CM

## 2023-08-28 DIAGNOSIS — R31.29 MICROSCOPIC HEMATURIA: ICD-10-CM

## 2023-08-28 LAB
CAOX CRY UR QL COMP ASSIST: NORMAL
MICROSCOPIC COMMENT: NORMAL
POC RESIDUAL URINE VOLUME: 23 ML (ref 0–100)
RBC #/AREA URNS AUTO: 2 /HPF (ref 0–4)
SQUAMOUS #/AREA URNS AUTO: 0 /HPF
WBC #/AREA URNS AUTO: 5 /HPF (ref 0–5)

## 2023-08-28 PROCEDURE — 99999PBSHW PR PBB SHADOW TECHNICAL ONLY FILED TO HB: Mod: PBBFAC,,,

## 2023-08-28 PROCEDURE — 87086 URINE CULTURE/COLONY COUNT: CPT | Performed by: NURSE PRACTITIONER

## 2023-08-28 PROCEDURE — 99999PBSHW POCT BLADDER SCAN: Mod: PBBFAC,,,

## 2023-08-28 PROCEDURE — 99999 PR PBB SHADOW E&M-EST. PATIENT-LVL III: CPT | Mod: PBBFAC,,, | Performed by: NURSE PRACTITIONER

## 2023-08-28 PROCEDURE — 99999PBSHW PR PBB SHADOW TECHNICAL ONLY FILED TO HB: ICD-10-PCS | Mod: PBBFAC,,,

## 2023-08-28 PROCEDURE — 99214 PR OFFICE/OUTPT VISIT, EST, LEVL IV, 30-39 MIN: ICD-10-PCS | Mod: S$PBB,,, | Performed by: NURSE PRACTITIONER

## 2023-08-28 PROCEDURE — 51798 US URINE CAPACITY MEASURE: CPT | Mod: PBBFAC,PO | Performed by: NURSE PRACTITIONER

## 2023-08-28 PROCEDURE — 99214 OFFICE O/P EST MOD 30 MIN: CPT | Mod: S$PBB,,, | Performed by: NURSE PRACTITIONER

## 2023-08-28 PROCEDURE — 99999 PR PBB SHADOW E&M-EST. PATIENT-LVL III: ICD-10-PCS | Mod: PBBFAC,,, | Performed by: NURSE PRACTITIONER

## 2023-08-28 PROCEDURE — 99213 OFFICE O/P EST LOW 20 MIN: CPT | Mod: PBBFAC,PO | Performed by: NURSE PRACTITIONER

## 2023-08-28 PROCEDURE — 81001 URINALYSIS AUTO W/SCOPE: CPT | Performed by: NURSE PRACTITIONER

## 2023-08-28 RX ORDER — TAMSULOSIN HYDROCHLORIDE 0.4 MG/1
0.4 CAPSULE ORAL DAILY
Qty: 90 CAPSULE | Refills: 4 | Status: ON HOLD | OUTPATIENT
Start: 2023-08-28 | End: 2024-03-13 | Stop reason: HOSPADM

## 2023-08-28 NOTE — PROGRESS NOTES
[facility]  Staff: LARRY Cummings    Referring PCP: [facility]    Chief Complaint:  Blood in Semen, Hx of Microhematuria    Subjective:        HPI: Sampson Holt is a 67 y.o. 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    Pt was last evaluated by me in 2021 for right testicle pain and urinary frequency and was lost to f/up at that time.  OV 10/2021:  evaluation of right sided testicle pain x 3-4 years now and increased urinary frequency, more prominently at night time.  Therefore he is here today for further evaluation of his symptoms.     No dysuria  No bladder pain  No gross hematuria  Pt does have hx of kidney stone.     Nocturia-10-15x per night.  Rare occasions where he would have urinary leakage.  +Diabetes-poorly managed, pt states his glucose ranges in 250s-300s.  Pt has never been seen by a Urology group in the past.  But, pt does have hx of hydroceles and hernias    TODAY:  Pt states he has had blood within his semen x one year or longer.  UA in office today showed--pt was unable to urinate upon arrival today.  PVR by bladder scan is 23 mL  No family hx of  cancers  +Tobacco use-cigarettes  [facility] Flomax 0.4 mg daily in the past but pt has not been taking the medication at this time.     Fam Hx:   malignancies: No        Soc Hx:  ++tobacco use;    REVIEW OF SYSTEMS:  A comprehensive 10 system review was performed and is negative except as noted above in HPI    Pt states he recently had +MI 8 weeks ago  PMHx:  Past Medical History:   Diagnosis Date    Abdominal pain 2022    CHF (congestive heart failure)     Diabetes mellitus 2018    Emphysema lung     Endocarditis 2011    Hypertension     Stroke 2011    denies residual     PSHx:  Past Surgical History:   Procedure Laterality Date    BACK SURGERY  1981    COLONOSCOPY N/A  2/23/2023    Procedure: COLONOSCOPY;  Surgeon: Jonn Cruz MD;  Location: [facility] ENDO;  Service: Endoscopy;  Laterality: N/A;    CORONARY ANGIOGRAPHY N/A 5/9/2023    Procedure: ANGIOGRAM, CORONARY ARTERY;  Surgeon: Antonio Kessler MD;  Location: [facility] CATH/EP LAB;  Service: Cardiology;  Laterality: N/A;    EXCISION OF HYDROCELE      x2    FRACTIONAL FLOW RESERVE (FFR), CORONARY  5/11/2023    Procedure: Fractional Flow [place] (FFR), Coronary;  Surgeon: Antonio Kessler MD;  Location: [facility] CATH/EP LAB;  Service: Cardiology;;    INGUINAL HERNIA REPAIR  1960    INTRAVASCULAR ULTRASOUND, CORONARY N/A 5/9/2023    Procedure: Ultrasound-coronary;  Surgeon: Antonio Kessler MD;  Location: [facility] CATH/EP LAB;  Service: Cardiology;  Laterality: N/A;    IVUS, CORONARY  5/11/2023    Procedure: IVUS, Coronary;  Surgeon: Antonio Kessler MD;  Location: [facility] CATH/EP LAB;  Service: Cardiology;;    LEFT HEART CATHETERIZATION Left 5/11/2023    Procedure: Left heart cath;  Surgeon: Antonio Kessler MD;  Location: [facility] CATH/EP LAB;  Service: Cardiology;  Laterality: Left;    PERCUTANEOUS TRANSLUMINAL BALLOON ANGIOPLASTY OF CORONARY ARTERY  5/9/2023    Procedure: Angioplasty-coronary;  Surgeon: Antonio Kessler MD;  Location: [facility] CATH/EP LAB;  Service: Cardiology;;    RHINOPLASTY      STENT, DRUG ELUTING, SINGLE VESSEL, CORONARY  5/9/2023    Procedure: Stent, Drug Eluting, Single Vessel, Coronary;  Surgeon: Antonio Kessler MD;  Location: [facility] CATH/EP LAB;  Service: Cardiology;;    SURGICAL REMOVAL OF NODULE OF VOCAL CORD       Allergies:  Patient has no known allergies.    Medications: reviewed   Objective:   There were no vitals filed for this visit.    General:WDWN in NAD  Eyes: PERRLA, normal conjunctiva  Respiratory: no increased work on breathing, clear to auscultation  Cardiovascular: regular rate and rhythm. No obvious extremity edema.  GI: palpation of masses. No tenderness. No hepatosplenomegaly to palpation.  Musculoskeletal: normal range  of motion of bilateral upper extremities. Normal muscle strength and tone.  Skin: no obvious rashes or lesions. No tightening of skin noted.  Neurologic: CN grossly normal. Normal sensation.   Psychiatric: awake, alert and oriented x 3. Mood and affect normal. Cooperative.     Exam performed by me during OV today:  Inspection of anus normal  No scrotal rashes, cysts or lesions  Epididymis normal in size, no tenderness  Testes normal and size, equal size bilaterally, no masses  Urethral meatus normal without discharge  BRODIE: 35g smooth prostate gland without masses, tenderness. SV not palpable. Normal sphincter tone. No hemhorroids.  Pea size skin tag on right side of outer right buttock.    Assessment:       1. Blood in semen    2. Microscopic hematuria    3. Benign prostatic hyperplasia with lower urinary tract symptoms, symptom details unspecified    4. Lump in the testicle          Plan:     UA Micro and Urine Culture to be processed today due to hx of microhematuria.  Pt needs to begin Flomax 0.4 mg daily if not already at this time.  PSA, Total and Free to be scheduled for annual check.  [facility] do not [facility] that [facility].    Ultrasound of the Scrotum/Testicles to be scheduled.    The following instructions were given to the patient to assist with discomfort:  -Use jockstrap or the best supportive underwear he can get for relief of pressure.  -Alternate ice packs/heating pad to areas.  -Take OTC anti-inflammatories  -Sit in a warm tub of water greater than 15 minutes  -Elevate Scrotal Sac     F/u We will contact this pt after we receive and review pending lab/imaging results in the near future.  Pt verbalized understanding at this time.    Rodrigoner: Active    Robyn Ibarra, LARRY

## 2023-08-29 LAB — BACTERIA UR CULT: NO GROWTH

## 2023-09-11 ENCOUNTER — HOSPITAL ENCOUNTER (OUTPATIENT)
Dept: RADIOLOGY | Facility: HOSPITAL | Age: 68
Discharge: HOME | End: 2023-09-11
Attending: NURSE PRACTITIONER
Payer: MEDICARE

## 2023-09-11 DIAGNOSIS — R36.1 BLOOD IN SEMEN: ICD-10-CM

## 2023-09-11 DIAGNOSIS — N50.89 LUMP IN THE TESTICLE: ICD-10-CM

## 2023-09-11 PROCEDURE — 76870 US EXAM SCROTUM: CPT | Mod: TC

## 2023-10-07 ENCOUNTER — HOSPITAL ENCOUNTER (INPATIENT)
Facility: HOSPITAL | Age: 68
LOS: 2 days | Discharge: HOME | DRG: 287 | End: 2023-10-11
Attending: EMERGENCY MEDICINE | Admitting: INTERNAL MEDICINE
Payer: OTHER GOVERNMENT

## 2023-10-07 DIAGNOSIS — R07.9 CHEST PAIN: ICD-10-CM

## 2023-10-07 DIAGNOSIS — I25.10 CORONARY ARTERY DISEASE, UNSPECIFIED VESSEL OR LESION TYPE, UNSPECIFIED WHETHER ANGINA PRESENT, UNSPECIFIED WHETHER NATIVE OR TRANSPLANTED HEART: Primary | ICD-10-CM

## 2023-10-07 PROBLEM — I44.7 LBBB (LEFT BUNDLE BRANCH BLOCK): Status: ACTIVE | Noted: 2023-10-07

## 2023-10-07 LAB
ALBUMIN SERPL BCP-MCNC: 4.4 G/DL (ref 3.5–5.2)
ALP SERPL-CCNC: 82 U/L (ref 55–135)
ALT SERPL W/O P-5'-P-CCNC: 16 U/L (ref 10–44)
ANION GAP SERPL CALC-SCNC: 14 MMOL/L (ref 8–16)
AST SERPL-CCNC: 20 U/L (ref 10–40)
BASOPHILS # BLD AUTO: 0.05 K/UL (ref 0–0.2)
BASOPHILS NFR BLD: 0.8 % (ref 0–1.9)
BILIRUB SERPL-MCNC: 0.5 MG/DL (ref 0.1–1)
BNP SERPL-MCNC: 49 PG/ML (ref 0–99)
BUN SERPL-MCNC: 15 MG/DL (ref 8–23)
CALCIUM SERPL-MCNC: 9.6 MG/DL (ref 8.7–10.5)
CHLORIDE SERPL-SCNC: 110 MMOL/L (ref 95–110)
CO2 SERPL-SCNC: 20 MMOL/L (ref 23–29)
CREAT SERPL-MCNC: 1 MG/DL (ref 0.5–1.4)
DIFFERENTIAL METHOD: ABNORMAL
EOSINOPHIL # BLD AUTO: 0.1 K/UL (ref 0–0.5)
EOSINOPHIL NFR BLD: 1.2 % (ref 0–8)
ERYTHROCYTE [DISTWIDTH] IN BLOOD BY AUTOMATED COUNT: 13.1 % (ref 11.5–14.5)
EST. GFR  (NO RACE VARIABLE): >60 ML/MIN/1.73 M^2
GLUCOSE SERPL-MCNC: 109 MG/DL (ref 70–110)
GLUCOSE SERPL-MCNC: 153 MG/DL (ref 70–110)
GLUCOSE SERPL-MCNC: 93 MG/DL (ref 70–110)
HCT VFR BLD AUTO: 43.1 % (ref 40–54)
HGB BLD-MCNC: 14.6 G/DL (ref 14–18)
IMM GRANULOCYTES # BLD AUTO: 0.01 K/UL (ref 0–0.04)
IMM GRANULOCYTES NFR BLD AUTO: 0.2 % (ref 0–0.5)
LYMPHOCYTES # BLD AUTO: 1.7 K/UL (ref 1–4.8)
LYMPHOCYTES NFR BLD: 28.3 % (ref 18–48)
MAGNESIUM SERPL-MCNC: 1.9 MG/DL (ref 1.6–2.6)
MCH RBC QN AUTO: 32.2 PG (ref 27–31)
MCHC RBC AUTO-ENTMCNC: 33.9 G/DL (ref 32–36)
MCV RBC AUTO: 95 FL (ref 82–98)
MONOCYTES # BLD AUTO: 0.5 K/UL (ref 0.3–1)
MONOCYTES NFR BLD: 7.9 % (ref 4–15)
NEUTROPHILS # BLD AUTO: 3.7 K/UL (ref 1.8–7.7)
NEUTROPHILS NFR BLD: 61.6 % (ref 38–73)
NRBC BLD-RTO: 0 /100 WBC
PLATELET # BLD AUTO: 171 K/UL (ref 150–450)
PMV BLD AUTO: 9 FL (ref 9.2–12.9)
POTASSIUM SERPL-SCNC: 4.1 MMOL/L (ref 3.5–5.1)
PROT SERPL-MCNC: 7.6 G/DL (ref 6–8.4)
RBC # BLD AUTO: 4.54 M/UL (ref 4.6–6.2)
SODIUM SERPL-SCNC: 144 MMOL/L (ref 136–145)
TROPONIN I SERPL HS-MCNC: 5.1 PG/ML (ref 0–14.9)
TROPONIN I SERPL HS-MCNC: 5.3 PG/ML (ref 0–14.9)
TROPONIN I SERPL HS-MCNC: 5.4 PG/ML (ref 0–14.9)
TROPONIN I SERPL HS-MCNC: 5.4 PG/ML (ref 0–14.9)
WBC # BLD AUTO: 5.94 K/UL (ref 3.9–12.7)

## 2023-10-07 PROCEDURE — 93005 ELECTROCARDIOGRAM TRACING: CPT | Performed by: INTERNAL MEDICINE

## 2023-10-07 PROCEDURE — 25000003 PHARM REV CODE 250: Performed by: EMERGENCY MEDICINE

## 2023-10-07 PROCEDURE — 84484 ASSAY OF TROPONIN QUANT: CPT | Mod: 91 | Performed by: EMERGENCY MEDICINE

## 2023-10-07 PROCEDURE — G0378 HOSPITAL OBSERVATION PER HR: HCPCS

## 2023-10-07 PROCEDURE — 25000242 PHARM REV CODE 250 ALT 637 W/ HCPCS: Performed by: INTERNAL MEDICINE

## 2023-10-07 PROCEDURE — 83880 ASSAY OF NATRIURETIC PEPTIDE: CPT | Performed by: EMERGENCY MEDICINE

## 2023-10-07 PROCEDURE — 93010 EKG 12-LEAD: ICD-10-PCS | Mod: ,,, | Performed by: INTERNAL MEDICINE

## 2023-10-07 PROCEDURE — 93010 ELECTROCARDIOGRAM REPORT: CPT | Mod: ,,, | Performed by: INTERNAL MEDICINE

## 2023-10-07 PROCEDURE — 63600175 PHARM REV CODE 636 W HCPCS: Performed by: EMERGENCY MEDICINE

## 2023-10-07 PROCEDURE — 80053 COMPREHEN METABOLIC PANEL: CPT | Performed by: EMERGENCY MEDICINE

## 2023-10-07 PROCEDURE — 96374 THER/PROPH/DIAG INJ IV PUSH: CPT

## 2023-10-07 PROCEDURE — 25000003 PHARM REV CODE 250: Performed by: INTERNAL MEDICINE

## 2023-10-07 PROCEDURE — 85025 COMPLETE CBC W/AUTO DIFF WBC: CPT | Performed by: EMERGENCY MEDICINE

## 2023-10-07 PROCEDURE — 36415 COLL VENOUS BLD VENIPUNCTURE: CPT | Performed by: INTERNAL MEDICINE

## 2023-10-07 PROCEDURE — 83735 ASSAY OF MAGNESIUM: CPT | Performed by: EMERGENCY MEDICINE

## 2023-10-07 PROCEDURE — 82962 GLUCOSE BLOOD TEST: CPT

## 2023-10-07 PROCEDURE — 84484 ASSAY OF TROPONIN QUANT: CPT | Mod: 91 | Performed by: INTERNAL MEDICINE

## 2023-10-07 PROCEDURE — 99285 EMERGENCY DEPT VISIT HI MDM: CPT | Mod: 25

## 2023-10-07 RX ORDER — CLOPIDOGREL BISULFATE 75 MG/1
75 TABLET ORAL DAILY
Status: DISCONTINUED | OUTPATIENT
Start: 2023-10-07 | End: 2023-10-11 | Stop reason: HOSPADM

## 2023-10-07 RX ORDER — IBUPROFEN 200 MG
16 TABLET ORAL
Status: DISCONTINUED | OUTPATIENT
Start: 2023-10-07 | End: 2023-10-11 | Stop reason: HOSPADM

## 2023-10-07 RX ORDER — GLUCAGON 1 MG
1 KIT INJECTION
Status: DISCONTINUED | OUTPATIENT
Start: 2023-10-07 | End: 2023-10-11 | Stop reason: HOSPADM

## 2023-10-07 RX ORDER — ASPIRIN 325 MG
325 TABLET ORAL
Status: COMPLETED | OUTPATIENT
Start: 2023-10-07 | End: 2023-10-07

## 2023-10-07 RX ORDER — NITROGLYCERIN 0.4 MG/1
0.4 TABLET SUBLINGUAL EVERY 5 MIN PRN
Status: DISCONTINUED | OUTPATIENT
Start: 2023-10-07 | End: 2023-10-11 | Stop reason: HOSPADM

## 2023-10-07 RX ORDER — ASPIRIN 81 MG/1
81 TABLET ORAL DAILY
Status: DISCONTINUED | OUTPATIENT
Start: 2023-10-07 | End: 2023-10-11 | Stop reason: HOSPADM

## 2023-10-07 RX ORDER — SODIUM CHLORIDE 0.9 % (FLUSH) 0.9 %
10 SYRINGE (ML) INJECTION EVERY 12 HOURS PRN
Status: DISCONTINUED | OUTPATIENT
Start: 2023-10-07 | End: 2023-10-11 | Stop reason: HOSPADM

## 2023-10-07 RX ORDER — CARVEDILOL 3.12 MG/1
3.12 TABLET ORAL 2 TIMES DAILY
Status: DISCONTINUED | OUTPATIENT
Start: 2023-10-07 | End: 2023-10-11 | Stop reason: HOSPADM

## 2023-10-07 RX ORDER — MORPHINE SULFATE 2 MG/ML
2 INJECTION, SOLUTION INTRAMUSCULAR; INTRAVENOUS
Status: COMPLETED | OUTPATIENT
Start: 2023-10-07 | End: 2023-10-07

## 2023-10-07 RX ORDER — IBUPROFEN 200 MG
24 TABLET ORAL
Status: DISCONTINUED | OUTPATIENT
Start: 2023-10-07 | End: 2023-10-11 | Stop reason: HOSPADM

## 2023-10-07 RX ORDER — NALOXONE HCL 0.4 MG/ML
0.02 VIAL (ML) INJECTION
Status: DISCONTINUED | OUTPATIENT
Start: 2023-10-07 | End: 2023-10-11 | Stop reason: HOSPADM

## 2023-10-07 RX ORDER — ISOSORBIDE MONONITRATE 30 MG/1
30 TABLET, EXTENDED RELEASE ORAL DAILY
Status: DISCONTINUED | OUTPATIENT
Start: 2023-10-07 | End: 2023-10-10

## 2023-10-07 RX ORDER — ATORVASTATIN CALCIUM 40 MG/1
40 TABLET, FILM COATED ORAL DAILY
Status: DISCONTINUED | OUTPATIENT
Start: 2023-10-07 | End: 2023-10-11 | Stop reason: HOSPADM

## 2023-10-07 RX ORDER — LOSARTAN POTASSIUM 50 MG/1
100 TABLET ORAL DAILY
Status: DISCONTINUED | OUTPATIENT
Start: 2023-10-07 | End: 2023-10-11 | Stop reason: HOSPADM

## 2023-10-07 RX ORDER — TAMSULOSIN HYDROCHLORIDE 0.4 MG/1
0.4 CAPSULE ORAL DAILY
Status: DISCONTINUED | OUTPATIENT
Start: 2023-10-07 | End: 2023-10-11 | Stop reason: HOSPADM

## 2023-10-07 RX ADMIN — ATORVASTATIN CALCIUM 40 MG: 40 TABLET, FILM COATED ORAL at 03:10

## 2023-10-07 RX ADMIN — MORPHINE SULFATE 2 MG: 2 INJECTION, SOLUTION INTRAMUSCULAR; INTRAVENOUS at 10:10

## 2023-10-07 RX ADMIN — CLOPIDOGREL BISULFATE 75 MG: 75 TABLET, FILM COATED ORAL at 03:10

## 2023-10-07 RX ADMIN — TAMSULOSIN HYDROCHLORIDE 0.4 MG: 0.4 CAPSULE ORAL at 03:10

## 2023-10-07 RX ADMIN — NITROGLYCERIN 1 INCH: 20 OINTMENT TOPICAL at 10:10

## 2023-10-07 RX ADMIN — ISOSORBIDE MONONITRATE 30 MG: 30 TABLET, EXTENDED RELEASE ORAL at 03:10

## 2023-10-07 RX ADMIN — LOSARTAN POTASSIUM 100 MG: 50 TABLET, FILM COATED ORAL at 03:10

## 2023-10-07 RX ADMIN — NITROGLYCERIN 0.4 MG: 0.4 TABLET SUBLINGUAL at 05:10

## 2023-10-07 RX ADMIN — ASPIRIN 325 MG ORAL TABLET 325 MG: 325 PILL ORAL at 10:10

## 2023-10-07 NOTE — SUBJECTIVE & OBJECTIVE
Past Medical History:   Diagnosis Date    Abdominal pain 2022    CHF (congestive heart failure)     Diabetes mellitus 2018    Emphysema lung     Endocarditis 2011    Hypertension     Stroke 2011    denies residual       Past Surgical History:   Procedure Laterality Date    BACK SURGERY  1981    COLONOSCOPY N/A 2/23/2023    Procedure: COLONOSCOPY;  Surgeon: Jonn Cruz MD;  Location: [facility] ENDO;  Service: Endoscopy;  Laterality: N/A;    CORONARY ANGIOGRAPHY N/A 5/9/2023    Procedure: ANGIOGRAM, CORONARY ARTERY;  Surgeon: Antonio Kessler MD;  Location: [facility] CATH/EP LAB;  Service: Cardiology;  Laterality: N/A;    EXCISION OF HYDROCELE      x2    FRACTIONAL FLOW RESERVE (FFR), CORONARY  5/11/2023    Procedure: Fractional Flow [place] (FFR), Coronary;  Surgeon: Antonio Kessler MD;  Location: [facility] CATH/EP LAB;  Service: Cardiology;;    INGUINAL HERNIA REPAIR  1960    INTRAVASCULAR ULTRASOUND, CORONARY N/A 5/9/2023    Procedure: Ultrasound-coronary;  Surgeon: Antonio Kessler MD;  Location: [facility] CATH/EP LAB;  Service: Cardiology;  Laterality: N/A;    IVUS, CORONARY  5/11/2023    Procedure: IVUS, Coronary;  Surgeon: Antonio Kessler MD;  Location: [facility] CATH/EP LAB;  Service: Cardiology;;    LEFT HEART CATHETERIZATION Left 5/11/2023    Procedure: Left heart cath;  Surgeon: Antonio Kessler MD;  Location: [facility] CATH/EP LAB;  Service: Cardiology;  Laterality: Left;    PERCUTANEOUS TRANSLUMINAL BALLOON ANGIOPLASTY OF CORONARY ARTERY  5/9/2023    Procedure: Angioplasty-coronary;  Surgeon: Antonio Kessler MD;  Location: [facility] CATH/EP LAB;  Service: Cardiology;;    RHINOPLASTY      STENT, DRUG ELUTING, SINGLE VESSEL, CORONARY  5/9/2023    Procedure: Stent, Drug Eluting, Single Vessel, Coronary;  Surgeon: Antonio Kessler MD;  Location: [facility] CATH/EP LAB;  Service: Cardiology;;    SURGICAL REMOVAL OF NODULE OF VOCAL CORD         Review of patient's allergies indicates:  No Known Allergies    No current facility-administered medications on  file prior to encounter.     Current Outpatient Medications on File Prior to Encounter   Medication Sig    aspirin (ECOTRIN) 81 MG EC tablet Take 1 tablet (81 mg total) by mouth once daily.    atorvastatin (LIPITOR) 40 MG tablet Take 1 tablet (40 mg total) by mouth once daily.    carvediloL (COREG) 3.125 MG tablet Take 1 tablet (3.125 mg total) by mouth 2 (two) times daily.    clopidogreL (PLAVIX) 75 mg tablet Take 1 tablet (75 mg total) by mouth once daily.    insulin glargine,hum.rec.anlog (LANTUS SUBQ) Inject 35 Units into the skin once daily.    losartan (COZAAR) 50 MG tablet Take 2 tablets (100 mg total) by mouth once daily.    tamsulosin (FLOMAX) 0.4 mg Cap Take 1 capsule (0.4 mg total) by mouth once daily. Take in evening.    [DISCONTINUED] albuterol (PROVENTIL/VENTOLIN HFA) 90 mcg/actuation inhaler Inhale 1 puff into the lungs every 4 (four) hours as needed.     Family History    None       Tobacco Use    Smoking status: Every Day     Current packs/day: 0.00     Types: Cigarettes     Last attempt to quit: 3/27/2020     Years since quitting: 3.5    Smokeless tobacco: Former    Tobacco comments:     Advised not to smoke DOS    Substance and Sexual Activity    Alcohol use: Yes     Alcohol/week: 14.0 standard drinks of alcohol     Types: 14 Shots of liquor per week    Drug use: Yes     Types: Marijuana    Sexual activity: Not on file     Review of Systems   Constitutional:  Negative for activity change, appetite change and diaphoresis.   HENT:  Negative for congestion, facial swelling and sinus pressure.    Respiratory:  Negative for shortness of breath and wheezing.    Cardiovascular:  Positive for chest pain. Negative for palpitations.   Gastrointestinal:  Negative for abdominal distention and abdominal pain.   Genitourinary:  Negative for dysuria.   Skin:  Negative for color change and pallor.   Neurological:  Negative for dizziness, facial asymmetry, speech difficulty and headaches.   Psychiatric/Behavioral:   Negative for agitation and confusion.      Objective:     Vital Signs (Most Recent):  Temp: 97.5 °F (36.4 °C) (10/07/23 1031)  Pulse: 64 (10/07/23 1500)  Resp: 15 (10/07/23 1500)  BP: (!) 176/85 (10/07/23 1500)  SpO2: 96 % (10/07/23 1403) Vital Signs (24h Range):  Temp:  [97.5 °F (36.4 °C)] 97.5 °F (36.4 °C)  Pulse:  [64-92] 64  Resp:  [10-24] 15  SpO2:  [94 %-96 %] 96 %  BP: (176-204)/() 176/85     Weight: 88.5 kg (195 lb)  Body mass index is 25.73 kg/m².     Physical Exam  Constitutional:       General: He is not in acute distress.     Appearance: He is well-developed.   HENT:      Head: Normocephalic.   Eyes:      Pupils: Pupils are equal, round, and reactive to light.   Cardiovascular:      Rate and Rhythm: Normal rate and regular rhythm.   Pulmonary:      Effort: Pulmonary effort is normal. No respiratory distress.   Abdominal:      General: Abdomen is flat. There is no distension.      Tenderness: There is no abdominal tenderness.   Musculoskeletal:      Cervical back: Neck supple.   Skin:     General: Skin is warm.      Findings: No rash.   Neurological:      Mental Status: He is alert and oriented to person, place, and time.   Psychiatric:         Mood and Affect: Mood normal.              CRANIAL NERVES     CN III, IV, VI   Pupils are equal, round, and reactive to light.       Significant Labs: All pertinent labs within the past 24 hours have been reviewed.  CBC:   Recent Labs   Lab 10/07/23  1048   WBC 5.94   HGB 14.6   HCT 43.1        CMP:   Recent Labs   Lab 10/07/23  1048      K 4.1      CO2 20*      BUN 15   CREATININE 1.0   CALCIUM 9.6   PROT 7.6   ALBUMIN 4.4   BILITOT 0.5   ALKPHOS 82   AST 20   ALT 16   ANIONGAP 14     Cardiac Markers:   Recent Labs   Lab 10/07/23  1048   BNP 49       Significant Imaging: I have reviewed all pertinent imaging results/findings within the past 24 hours.

## 2023-10-07 NOTE — HPI
67-year-old male with coronary artery disease status post PCI to RCA and distal left circumflex earlier this year in May history of CHF, diabetes, hypertension, CVA, endocarditis, COPD came with CP.  Sarted this morning proximally 30 minutes prior to arrival states he was walking through the kitchen at work when the pain began describes shortness of breath associated nausea.  He had LHC on may and recently admitted with CP and cardiology cleared and planned for outpatient angiogram again and patient report he had done 8 weeks ago  but no report seen in Epic.  He denies any dysuria urgency or frequency , fever , SOB , CP , leg pain currently . EKG with new LBBB obviously . Previous EKG did not show LBBB. He said he had few minor falls in between too.  First troponin neg and CXR neg . Labs OK .

## 2023-10-07 NOTE — ED PROVIDER NOTES
Encounter Date: 10/7/2023       History     Chief Complaint   Patient presents with    Chest Pain     Patient presents emergency department with reported chest pain started this morning proximally 30 minutes prior to arrival states he was walking through the kitchen at work when the pain began describes shortness of breath associated nausea he denies any dysuria urgency or frequency does report some abdominal pain that has been ongoing he has been told that he had diverticulitis that was not treated with antibiotics patient reports that he has been compliant with medications states that he had stents placed 8 weeks ago by Dr. Kessler review of patient's chart reveals central placed in May        Review of patient's allergies indicates:  No Known Allergies  Past Medical History:   Diagnosis Date    Abdominal pain 2022    CHF (congestive heart failure)     Diabetes mellitus 2018    Emphysema lung     Endocarditis 2011    Hypertension     Stroke 2011    denies residual     Past Surgical History:   Procedure Laterality Date    BACK SURGERY  1981    COLONOSCOPY N/A 2/23/2023    Procedure: COLONOSCOPY;  Surgeon: Jonn Cruz MD;  Location: [facility] ENDO;  Service: Endoscopy;  Laterality: N/A;    CORONARY ANGIOGRAPHY N/A 5/9/2023    Procedure: ANGIOGRAM, CORONARY ARTERY;  Surgeon: Antonio Kessler MD;  Location: [facility] CATH/EP LAB;  Service: Cardiology;  Laterality: N/A;    EXCISION OF HYDROCELE      x2    FRACTIONAL FLOW RESERVE (FFR), CORONARY  5/11/2023    Procedure: Fractional Flow [place] (FFR), Coronary;  Surgeon: Antonio Kessler MD;  Location: [facility] CATH/EP LAB;  Service: Cardiology;;    INGUINAL HERNIA REPAIR  1960    INTRAVASCULAR ULTRASOUND, CORONARY N/A 5/9/2023    Procedure: Ultrasound-coronary;  Surgeon: Antonio Kessler MD;  Location: [facility] CATH/EP LAB;  Service: Cardiology;  Laterality: N/A;    IVUS, CORONARY  5/11/2023    Procedure: IVUS, Coronary;  Surgeon: Antonio Kessler MD;  Location: [facility] CATH/EP LAB;  Service:  Cardiology;;    LEFT HEART CATHETERIZATION Left 5/11/2023    Procedure: Left heart cath;  Surgeon: Antonio Kessler MD;  Location: [facility] CATH/EP LAB;  Service: Cardiology;  Laterality: Left;    PERCUTANEOUS TRANSLUMINAL BALLOON ANGIOPLASTY OF CORONARY ARTERY  5/9/2023    Procedure: Angioplasty-coronary;  Surgeon: Antonio Kessler MD;  Location: [facility] CATH/EP LAB;  Service: Cardiology;;    RHINOPLASTY      STENT, DRUG ELUTING, SINGLE VESSEL, CORONARY  5/9/2023    Procedure: Stent, Drug Eluting, Single Vessel, Coronary;  Surgeon: Antonio Kessler MD;  Location: [facility] CATH/EP LAB;  Service: Cardiology;;    SURGICAL REMOVAL OF NODULE OF VOCAL CORD       No family history on file.  Social History     Tobacco Use    Smoking status: Every Day     Current packs/day: 0.00     Types: Cigarettes     Last attempt to quit: 3/27/2020     Years since quitting: 3.5    Smokeless tobacco: Former    Tobacco comments:     Advised not to smoke DOS    Substance Use Topics    Alcohol use: Yes     Alcohol/week: 14.0 standard drinks of alcohol     Types: 14 Shots of liquor per week    Drug use: Yes     Types: Marijuana     Review of Systems   Constitutional:  Negative for chills, diaphoresis and fever.   HENT:  Negative for congestion and rhinorrhea.    Respiratory:  Positive for cough and shortness of breath.    Cardiovascular:  Positive for chest pain.   Gastrointestinal:  Positive for nausea and vomiting.   Genitourinary:  Negative for dysuria.       Physical Exam     Initial Vitals [10/07/23 1031]   BP Pulse Resp Temp SpO2   (!) 177/117 92 (!) 24 97.5 °F (36.4 °C) (!) 94 %      MAP       --         Physical Exam    Constitutional: He appears well-developed and well-nourished. No distress.   HENT:   Head: Normocephalic and atraumatic.   Right Ear: External ear normal.   Left Ear: External ear normal.   Mouth/Throat: Oropharynx is clear and moist.   Eyes: EOM are normal. Pupils are equal, round, and reactive to light.   Neck: Neck supple.   Normal  range of motion.  Cardiovascular:  Normal rate, regular rhythm, S1 normal, S2 normal, normal heart sounds and intact distal pulses.           Pulmonary/Chest: He has rales.   Abdominal: Abdomen is soft. Bowel sounds are normal. There is no abdominal tenderness.   Musculoskeletal:         General: Normal range of motion.      Cervical back: Normal range of motion and neck supple.     Neurological: He is alert and oriented to person, place, and time. GCS score is 15. GCS eye subscore is 4. GCS verbal subscore is 5. GCS motor subscore is 6.   Skin: Skin is warm and dry. Capillary refill takes less than 2 seconds. No rash noted.   Psychiatric: He has a normal mood and affect. His behavior is normal.         ED Course   Procedures  Labs Reviewed   CBC W/ AUTO DIFFERENTIAL - Abnormal; Notable for the following components:       Result Value    RBC 4.54 (*)     MCH 32.2 (*)     MPV 9.0 (*)     All other components within normal limits   COMPREHENSIVE METABOLIC PANEL - Abnormal; Notable for the following components:    CO2 20 (*)     All other components within normal limits   MAGNESIUM   TROPONIN I HIGH SENSITIVITY   TROPONIN I HIGH SENSITIVITY   B-TYPE NATRIURETIC PEPTIDE   TROPONIN I HIGH SENSITIVITY   POCT GLUCOSE   POCT GLUCOSE MONITORING CONTINUOUS        ECG Results              EKG 12-lead (In process)  Result time 10/07/23 12:12:48      In process by Interface, Lab In [place] (10/07/23 12:12:48)                   Narrative:    Test Reason : R07.9,    Vent. Rate : 070 BPM     Atrial Rate : 070 BPM     P-R Int : 216 ms          QRS Dur : 148 ms      QT Int : 448 ms       P-R-T Axes : 013 -40 084 degrees     QTc Int : 483 ms    Sinus rhythm with 1st degree A-V block  Left axis deviation  Left bundle branch block  Abnormal ECG  When compared with ECG of 13-AUG-2023 09:51,  Left bundle branch block is now Present  Borderline criteria for Inferior infarct are no longer Present    Referred By: [email or web address]   SELF            Confirmed By:                                   Imaging Results              X-Ray Chest AP Portable (Final result)  Result time 10/07/23 11:12:57      Final result by Amol Worrell MD (10/07/23 11:12:57)                   Narrative:    CLINICAL HISTORY:  67 years (1955) Male Chest Pain CHEST PAIN    TECHNIQUE:  Portable AP radiograph the chest. One view.    COMPARISON:  Radiographs from August 11, 2023.    FINDINGS:  Unchanged minimal scattered reticular lung markings are seen at both lung bases suggestive of minimal atelectasis/scarring.  No pneumothorax is identified. The heart is normal in size. The aorta appears tortuous, a finding usually associated with either atherosclerosis or systemic hypertension.  Osseous structures show degenerative changes in the spine. The visualized upper abdomen is unremarkable.    IMPRESSION:  No acute cardiac or pulmonary process.                  .            Electronically signed by:  Amol Worrell MD  10/07/2023 11:12 AM CDT Workstation: [id number]                                     Medications   aspirin EC tablet 81 mg (81 mg Oral Not Given 10/7/23 1545)   atorvastatin tablet 40 mg (40 mg Oral Given 10/7/23 1513)   carvediloL tablet 3.125 mg (has no administration in time range)   clopidogreL tablet 75 mg (75 mg Oral Given 10/7/23 1514)   insulin detemir U-100 (Levemir) pen 35 Units (has no administration in time range)   losartan tablet 100 mg (100 mg Oral Given 10/7/23 1513)   tamsulosin 24 hr capsule 0.4 mg (0.4 mg Oral Given 10/7/23 1514)   sodium chloride 0.9% flush 10 mL (has no administration in time range)   naloxone 0.4 mg/mL injection 0.02 mg (has no administration in time range)   glucose chewable tablet 16 g (has no administration in time range)   glucose chewable tablet 24 g (has no administration in time range)   dextrose 50% injection 12.5 g (has no administration in time range)   dextrose 50% injection 25 g (has no administration in time  range)   glucagon (human recombinant) injection 1 mg (has no administration in time range)   nitroGLYCERIN SL tablet 0.4 mg (has no administration in time range)   isosorbide mononitrate 24 hr tablet 30 mg (30 mg Oral Given 10/7/23 1514)   aspirin tablet 325 mg (325 mg Oral Given 10/7/23 1056)   nitroGLYCERIN 2% TD oint ointment 1 inch (1 inch Topical (Top) Given 10/7/23 1056)   morphine injection 2 mg (2 mg Intravenous Given 10/7/23 1056)     Medical Decision Making  Patient's EKG shows left bundle-branch box which appears new from previous EKGs most recent in August I discussed patient's findings with  asked that we obtain troponin troponins x2 were negative BNP is normal patient received aspirin nitroglycerin and morphine in the emergency department with improvement in his pain I have discussed patient's findings with Dr. Ruggiero who will evaluate patient in the emergency department for admission    Amount and/or Complexity of Data Reviewed  Labs: ordered.  Radiology: ordered.    Risk  OTC drugs.  Prescription drug management.                               Clinical Impression:   Final diagnoses:  [R07.9] Chest pain        ED Disposition Condition    Observation                 Antolin Wagner MD  10/07/23 3109

## 2023-10-07 NOTE — H&P
[facility] - Emergency Dept  Hospital Medicine  History & Physical    Patient Name: Sampson Holt  MRN: [id number]  Patient Class: OP- Observation  Admission Date: 10/7/2023  Attending Physician: Louis Ruggiero MD   Primary Care Provider: Damian Vergara MD         Patient information was obtained from patient and ER records.     Subjective:     Principal Problem:Chest pain    Chief Complaint:   Chief Complaint   Patient presents with    Chest Pain        HPI: 67-year-old male with coronary artery disease status post PCI to RCA and distal left circumflex earlier this year in May history of CHF, diabetes, hypertension, CVA, endocarditis, COPD came with CP.  Sarted this morning proximally 30 minutes prior to arrival states he was walking through the kitchen at work when the pain began describes shortness of breath associated nausea.  He had LHC on may and recently admitted with CP and cardiology cleared and planned for outpatient angiogram again and patient report he had done 8 weeks ago  but no report seen in Epic.  He denies any dysuria urgency or frequency , fever , SOB , CP , leg pain currently . EKG with new LBBB obviously . Previous EKG did not show LBBB. He said he had few minor falls in between too.  First troponin neg and CXR neg . Labs OK .      Past Medical History:   Diagnosis Date    Abdominal pain 2022    CHF (congestive heart failure)     Diabetes mellitus 2018    Emphysema lung     Endocarditis 2011    Hypertension     Stroke 2011    denies residual       Past Surgical History:   Procedure Laterality Date    BACK SURGERY  1981    COLONOSCOPY N/A 2/23/2023    Procedure: COLONOSCOPY;  Surgeon: Jonn Cruz MD;  Location: [facility] ENDO;  Service: Endoscopy;  Laterality: N/A;    CORONARY ANGIOGRAPHY N/A 5/9/2023    Procedure: ANGIOGRAM, CORONARY ARTERY;  Surgeon: Antonio Kessler MD;  Location: [facility] CATH/EP LAB;  Service: Cardiology;  Laterality: N/A;    EXCISION OF HYDROCELE       x2    FRACTIONAL FLOW RESERVE (FFR), CORONARY  5/11/2023    Procedure: Fractional Flow [place] (FFR), Coronary;  Surgeon: Antonio Kessler MD;  Location: [facility] CATH/EP LAB;  Service: Cardiology;;    INGUINAL HERNIA REPAIR  1960    INTRAVASCULAR ULTRASOUND, CORONARY N/A 5/9/2023    Procedure: Ultrasound-coronary;  Surgeon: Antonio Kessler MD;  Location: [facility] CATH/EP LAB;  Service: Cardiology;  Laterality: N/A;    IVUS, CORONARY  5/11/2023    Procedure: IVUS, Coronary;  Surgeon: Antonio Kessler MD;  Location: [facility] CATH/EP LAB;  Service: Cardiology;;    LEFT HEART CATHETERIZATION Left 5/11/2023    Procedure: Left heart cath;  Surgeon: Antonio Kessler MD;  Location: [facility] CATH/EP LAB;  Service: Cardiology;  Laterality: Left;    PERCUTANEOUS TRANSLUMINAL BALLOON ANGIOPLASTY OF CORONARY ARTERY  5/9/2023    Procedure: Angioplasty-coronary;  Surgeon: Antonio Kessler MD;  Location: [facility] CATH/EP LAB;  Service: Cardiology;;    RHINOPLASTY      STENT, DRUG ELUTING, SINGLE VESSEL, CORONARY  5/9/2023    Procedure: Stent, Drug Eluting, Single Vessel, Coronary;  Surgeon: Antonio Kessler MD;  Location: [facility] CATH/EP LAB;  Service: Cardiology;;    SURGICAL REMOVAL OF NODULE OF VOCAL CORD         Review of patient's allergies indicates:  No Known Allergies    No current facility-administered medications on file prior to encounter.     Current Outpatient Medications on File Prior to Encounter   Medication Sig    aspirin (ECOTRIN) 81 MG EC tablet Take 1 tablet (81 mg total) by mouth once daily.    atorvastatin (LIPITOR) 40 MG tablet Take 1 tablet (40 mg total) by mouth once daily.    carvediloL (COREG) 3.125 MG tablet Take 1 tablet (3.125 mg total) by mouth 2 (two) times daily.    clopidogreL (PLAVIX) 75 mg tablet Take 1 tablet (75 mg total) by mouth once daily.    insulin glargine,hum.rec.anlog (LANTUS SUBQ) Inject 35 Units into the skin once daily.    losartan (COZAAR) 50 MG tablet Take 2 tablets (100 mg total) by mouth once  daily.    tamsulosin (FLOMAX) 0.4 mg Cap Take 1 capsule (0.4 mg total) by mouth once daily. Take in evening.    [DISCONTINUED] albuterol (PROVENTIL/VENTOLIN HFA) 90 mcg/actuation inhaler Inhale 1 puff into the lungs every 4 (four) hours as needed.     Family History    None       Tobacco Use    Smoking status: Every Day     Current packs/day: 0.00     Types: Cigarettes     Last attempt to quit: 3/27/2020     Years since quitting: 3.5    Smokeless tobacco: Former    Tobacco comments:     Advised not to smoke DOS    Substance and Sexual Activity    Alcohol use: Yes     Alcohol/week: 14.0 standard drinks of alcohol     Types: 14 Shots of liquor per week    Drug use: Yes     Types: Marijuana    Sexual activity: Not on file     Review of Systems   Constitutional:  Negative for activity change, appetite change and diaphoresis.   HENT:  Negative for congestion, facial swelling and sinus pressure.    Respiratory:  Negative for shortness of breath and wheezing.    Cardiovascular:  Positive for chest pain. Negative for palpitations.   Gastrointestinal:  Negative for abdominal distention and abdominal pain.   Genitourinary:  Negative for dysuria.   Skin:  Negative for color change and pallor.   Neurological:  Negative for dizziness, facial asymmetry, speech difficulty and headaches.   Psychiatric/Behavioral:  Negative for agitation and confusion.      Objective:     Vital Signs (Most Recent):  Temp: 97.5 °F (36.4 °C) (10/07/23 1031)  Pulse: 64 (10/07/23 1500)  Resp: 15 (10/07/23 1500)  BP: (!) 176/85 (10/07/23 1500)  SpO2: 96 % (10/07/23 1403) Vital Signs (24h Range):  Temp:  [97.5 °F (36.4 °C)] 97.5 °F (36.4 °C)  Pulse:  [64-92] 64  Resp:  [10-24] 15  SpO2:  [94 %-96 %] 96 %  BP: (176-204)/() 176/85     Weight: 88.5 kg (195 lb)  Body mass index is 25.73 kg/m².     Physical Exam  Constitutional:       General: He is not in acute distress.     Appearance: He is well-developed.   HENT:      Head: Normocephalic.    Eyes:      Pupils: Pupils are equal, round, and reactive to light.   Cardiovascular:      Rate and Rhythm: Normal rate and regular rhythm.   Pulmonary:      Effort: Pulmonary effort is normal. No respiratory distress.   Abdominal:      General: Abdomen is flat. There is no distension.      Tenderness: There is no abdominal tenderness.   Musculoskeletal:      Cervical back: Neck supple.   Skin:     General: Skin is warm.      Findings: No rash.   Neurological:      Mental Status: He is alert and oriented to person, place, and time.   Psychiatric:         Mood and Affect: Mood normal.              CRANIAL NERVES     CN III, IV, VI   Pupils are equal, round, and reactive to light.       Significant Labs: All pertinent labs within the past 24 hours have been reviewed.  CBC:   Recent Labs   Lab 10/07/23  1048   WBC 5.94   HGB 14.6   HCT 43.1        CMP:   Recent Labs   Lab 10/07/23  1048      K 4.1      CO2 20*      BUN 15   CREATININE 1.0   CALCIUM 9.6   PROT 7.6   ALBUMIN 4.4   BILITOT 0.5   ALKPHOS 82   AST 20   ALT 16   ANIONGAP 14     Cardiac Markers:   Recent Labs   Lab 10/07/23  1048   BNP 49       Significant Imaging: I have reviewed all pertinent imaging results/findings within the past 24 hours.    Assessment/Plan:     Active Hospital Problems    Diagnosis    *Chest pain    LBBB (left bundle branch block)    Hyperlipidemia    CAD (coronary artery disease)    Cigarette smoker    Chronic bilateral low back pain without sciatica    Cystic disease of liver    Benign essential HTN    COPD (chronic obstructive pulmonary disease)    Type 2 diabetes mellitus with circulatory disorder, with long-term current use of insulin     PLAN  Repeat ECHO for wall motion since new LBBB and cannot see ischemic changes   Add imdur   NGT PRN . Patient felt better nitropatch in ER   Cardiology consult given complex story . May need repeat angio? Or it was done ?  ACS medications , asa , plavix ,  statin , BB , and patient report compliance   Trend cardiac enz  May need ranexa .        VTE Risk Mitigation (From admission, onward)         Ordered     IP VTE LOW RISK PATIENT  Once         10/07/23 1442     Place sequential compression device  Until discontinued         10/07/23 1442                   On 10/07/2023, patient should be placed in hospital observation services under my care.        Louis Ruggiero MD  Department of Hospital Medicine  [facility] - Emergency Dept

## 2023-10-07 NOTE — Clinical Note
The site was marked. The site was prepped with ChloraPrep. The site was clipped. The patient was draped.

## 2023-10-08 ENCOUNTER — CLINICAL SUPPORT (OUTPATIENT)
Dept: CARDIOLOGY | Facility: HOSPITAL | Age: 68
DRG: 287 | End: 2023-10-08
Attending: INTERNAL MEDICINE
Payer: OTHER GOVERNMENT

## 2023-10-08 LAB
ANION GAP SERPL CALC-SCNC: 8 MMOL/L (ref 8–16)
BSA FOR ECHO PROCEDURE: 2.16 M2
BUN SERPL-MCNC: 19 MG/DL (ref 8–23)
CALCIUM SERPL-MCNC: 9.3 MG/DL (ref 8.7–10.5)
CHLORIDE SERPL-SCNC: 110 MMOL/L (ref 95–110)
CO2 SERPL-SCNC: 22 MMOL/L (ref 23–29)
CREAT SERPL-MCNC: 0.8 MG/DL (ref 0.5–1.4)
CV ECHO LV RWT: 0.45 CM
ECHO LV POSTERIOR WALL: 1.08 CM (ref 0.6–1.1)
EJECTION FRACTION: 60 %
EST. GFR  (NO RACE VARIABLE): >60 ML/MIN/1.73 M^2
FRACTIONAL SHORTENING: 33 % (ref 28–44)
GLUCOSE SERPL-MCNC: 155 MG/DL (ref 70–110)
GLUCOSE SERPL-MCNC: 155 MG/DL (ref 70–110)
GLUCOSE SERPL-MCNC: 165 MG/DL (ref 70–110)
GLUCOSE SERPL-MCNC: 166 MG/DL (ref 70–110)
GLUCOSE SERPL-MCNC: 186 MG/DL (ref 70–110)
GLUCOSE SERPL-MCNC: 197 MG/DL (ref 70–110)
GLUCOSE SERPL-MCNC: 199 MG/DL (ref 70–110)
INTERVENTRICULAR SEPTUM: 1.52 CM (ref 0.6–1.1)
LEFT INTERNAL DIMENSION IN SYSTOLE: 3.22 CM (ref 2.1–4)
LEFT VENTRICLE DIASTOLIC VOLUME INDEX: 50.23 ML/M2
LEFT VENTRICLE DIASTOLIC VOLUME: 108 ML
LEFT VENTRICLE MASS INDEX: 115 G/M2
LEFT VENTRICLE SYSTOLIC VOLUME INDEX: 19.3 ML/M2
LEFT VENTRICLE SYSTOLIC VOLUME: 41.6 ML
LEFT VENTRICULAR INTERNAL DIMENSION IN DIASTOLE: 4.81 CM (ref 3.5–6)
LEFT VENTRICULAR MASS: 246.53 G
OHS LV EJECTION FRACTION SIMPSONS BIPLANE MOD: 58 %
POTASSIUM SERPL-SCNC: 4.2 MMOL/L (ref 3.5–5.1)
RA PRESSURE ESTIMATED: 3 MMHG
SODIUM SERPL-SCNC: 140 MMOL/L (ref 136–145)
Z-SCORE OF LEFT VENTRICULAR DIMENSION IN END DIASTOLE: -3.72
Z-SCORE OF LEFT VENTRICULAR DIMENSION IN END SYSTOLE: -2.2

## 2023-10-08 PROCEDURE — 25000003 PHARM REV CODE 250

## 2023-10-08 PROCEDURE — G0378 HOSPITAL OBSERVATION PER HR: HCPCS

## 2023-10-08 PROCEDURE — 36415 COLL VENOUS BLD VENIPUNCTURE: CPT | Performed by: INTERNAL MEDICINE

## 2023-10-08 PROCEDURE — 94761 N-INVAS EAR/PLS OXIMETRY MLT: CPT

## 2023-10-08 PROCEDURE — 80048 BASIC METABOLIC PNL TOTAL CA: CPT | Performed by: INTERNAL MEDICINE

## 2023-10-08 PROCEDURE — 82962 GLUCOSE BLOOD TEST: CPT

## 2023-10-08 PROCEDURE — 25000003 PHARM REV CODE 250: Performed by: INTERNAL MEDICINE

## 2023-10-08 PROCEDURE — 25000003 PHARM REV CODE 250: Performed by: STUDENT IN AN ORGANIZED HEALTH CARE EDUCATION/TRAINING PROGRAM

## 2023-10-08 PROCEDURE — 93308 TTE F-UP OR LMTD: CPT | Mod: 26,,, | Performed by: INTERNAL MEDICINE

## 2023-10-08 PROCEDURE — 99213 OFFICE O/P EST LOW 20 MIN: CPT | Mod: ,,, | Performed by: INTERNAL MEDICINE

## 2023-10-08 PROCEDURE — 93308 TTE F-UP OR LMTD: CPT

## 2023-10-08 PROCEDURE — 96372 THER/PROPH/DIAG INJ SC/IM: CPT | Performed by: INTERNAL MEDICINE

## 2023-10-08 PROCEDURE — 93308 ECHO (CUPID ONLY): ICD-10-PCS | Mod: 26,,, | Performed by: INTERNAL MEDICINE

## 2023-10-08 PROCEDURE — 99213 PR OFFICE/OUTPT VISIT, EST, LEVL III, 20-29 MIN: ICD-10-PCS | Mod: ,,, | Performed by: INTERNAL MEDICINE

## 2023-10-08 RX ORDER — SODIUM CHLORIDE 0.9 % (FLUSH) 0.9 %
2 SYRINGE (ML) INJECTION
Status: DISCONTINUED | OUTPATIENT
Start: 2023-10-08 | End: 2023-10-11 | Stop reason: HOSPADM

## 2023-10-08 RX ORDER — ACETAMINOPHEN 325 MG/1
650 TABLET ORAL EVERY 4 HOURS PRN
Status: DISCONTINUED | OUTPATIENT
Start: 2023-10-08 | End: 2023-10-11 | Stop reason: HOSPADM

## 2023-10-08 RX ORDER — DIPHENHYDRAMINE HCL 25 MG
50 CAPSULE ORAL
Status: DISCONTINUED | OUTPATIENT
Start: 2023-10-08 | End: 2023-10-10 | Stop reason: HOSPADM

## 2023-10-08 RX ORDER — BUTALBITAL, ACETAMINOPHEN AND CAFFEINE 50; 325; 40 MG/1; MG/1; MG/1
1 TABLET ORAL EVERY 4 HOURS PRN
Status: DISCONTINUED | OUTPATIENT
Start: 2023-10-08 | End: 2023-10-11 | Stop reason: HOSPADM

## 2023-10-08 RX ORDER — SODIUM CHLORIDE 9 MG/ML
INJECTION, SOLUTION INTRAVENOUS ONCE
Status: COMPLETED | OUTPATIENT
Start: 2023-10-08 | End: 2023-10-08

## 2023-10-08 RX ADMIN — CARVEDILOL 3.12 MG: 3.12 TABLET, FILM COATED ORAL at 09:10

## 2023-10-08 RX ADMIN — ACETAMINOPHEN 650 MG: 325 TABLET ORAL at 03:10

## 2023-10-08 RX ADMIN — CLOPIDOGREL BISULFATE 75 MG: 75 TABLET, FILM COATED ORAL at 09:10

## 2023-10-08 RX ADMIN — SODIUM CHLORIDE: 0.9 INJECTION, SOLUTION INTRAVENOUS at 09:10

## 2023-10-08 RX ADMIN — TAMSULOSIN HYDROCHLORIDE 0.4 MG: 0.4 CAPSULE ORAL at 09:10

## 2023-10-08 RX ADMIN — ATORVASTATIN CALCIUM 40 MG: 40 TABLET, FILM COATED ORAL at 09:10

## 2023-10-08 RX ADMIN — INSULIN DETEMIR 35 UNITS: 100 INJECTION, SOLUTION SUBCUTANEOUS at 09:10

## 2023-10-08 RX ADMIN — CARVEDILOL 3.12 MG: 3.12 TABLET, FILM COATED ORAL at 08:10

## 2023-10-08 RX ADMIN — ASPIRIN 81 MG: 81 TABLET, COATED ORAL at 09:10

## 2023-10-08 RX ADMIN — BUTALBITAL, ACETAMINOPHEN AND CAFFEINE 1 TABLET: 325; 50; 40 TABLET ORAL at 07:10

## 2023-10-08 RX ADMIN — ISOSORBIDE MONONITRATE 30 MG: 30 TABLET, EXTENDED RELEASE ORAL at 09:10

## 2023-10-08 RX ADMIN — LOSARTAN POTASSIUM 100 MG: 50 TABLET, FILM COATED ORAL at 09:10

## 2023-10-08 NOTE — PLAN OF CARE
10/08/23 1345   Patient Assessment/Suction   Level of Consciousness (AVPU) alert   Respiratory Effort Unlabored   PRE-TX-O2   Device (Oxygen Therapy) room air   SpO2 95 %   Pulse Oximetry Type Intermittent   $ Pulse Oximetry - Multiple Charge Pulse Oximetry - Multiple   Pulse 67   Resp 18

## 2023-10-08 NOTE — PROGRESS NOTES
[facility] Medicine  Progress Note    Patient Name: Sampson Holt  MRN: [id number]  Patient Class: OP- Observation   Admission Date: 10/7/2023  Length of Stay: 0 days  Attending Physician: Louis Ruggiero MD  Primary Care Provider: Damian Vergara MD        Subjective:     Principal Problem:Chest pain        HPI:  67-year-old male with coronary artery disease status post PCI to RCA and distal left circumflex earlier this year in May history of CHF, diabetes, hypertension, CVA, endocarditis, COPD came with CP.  Sarted this morning proximally 30 minutes prior to arrival states he was walking through the kitchen at work when the pain began describes shortness of breath associated nausea.  He had C on may and recently admitted with CP and cardiology cleared and planned for outpatient angiogram again and patient report he had done 8 weeks ago  but no report seen in Epic.  He denies any dysuria urgency or frequency , fever , SOB , CP , leg pain currently . EKG with new LBBB obviously . Previous EKG did not show LBBB. He said he had few minor falls in between too.  First troponin neg and CXR neg . Labs OK .      Overview/Hospital Course:  He is CP free . But had some last night . Trop neg. Mentioned that he recently had diverticulitis . Abdomen is benign .  Repeat ECHO pending . Cardiac eval pending         Interval History:   As per subjective     Review of Systems  Objective:     Vital Signs (Most Recent):  Temp: 98.3 °F (36.8 °C) (10/08/23 1117)  Pulse: 67 (10/08/23 1345)  Resp: 18 (10/08/23 1345)  BP: (!) 134/95 (10/08/23 1117)  SpO2: 95 % (10/08/23 1345) Vital Signs (24h Range):  Temp:  [97.6 °F (36.4 °C)-98.3 °F (36.8 °C)] 98.3 °F (36.8 °C)  Pulse:  [62-86] 67  Resp:  [14-18] 18  SpO2:  [92 %-95 %] 95 %  BP: (108-153)/(56-95) 134/95     Weight: 90.2 kg (198 lb 14.4 oz)  Body mass index is 26.24 kg/m².    Intake/Output Summary (Last 24 hours) at 10/8/2023 1533  Last data filed at 10/8/2023  1117  Gross per 24 hour   Intake 240 ml   Output 250 ml   Net -10 ml         Physical Exam  Constitutional:       General: He is not in acute distress.     Appearance: He is well-developed.   HENT:      Head: Normocephalic.   Eyes:      Pupils: Pupils are equal, round, and reactive to light.   Cardiovascular:      Rate and Rhythm: Normal rate and regular rhythm.   Pulmonary:      Effort: Pulmonary effort is normal. No respiratory distress.   Abdominal:      General: Abdomen is flat. There is no distension.   Musculoskeletal:         General: Normal range of motion.      Cervical back: Neck supple.   Skin:     General: Skin is warm.      Findings: No rash.   Neurological:      Mental Status: He is alert and oriented to person, place, and time.   Psychiatric:         Mood and Affect: Mood normal.             Significant Labs: All pertinent labs within the past 24 hours have been reviewed.  BMP:   Recent Labs   Lab 10/07/23  1048 10/08/23  0634    165*    140   K 4.1 4.2    110   CO2 20* 22*   BUN 15 19   CREATININE 1.0 0.8   CALCIUM 9.6 9.3   MG 1.9  --      CBC:   Recent Labs   Lab 10/07/23  1048   WBC 5.94   HGB 14.6   HCT 43.1        CMP:   Recent Labs   Lab 10/07/23  1048 10/08/23  0634    140   K 4.1 4.2    110   CO2 20* 22*    165*   BUN 15 19   CREATININE 1.0 0.8   CALCIUM 9.6 9.3   PROT 7.6  --    ALBUMIN 4.4  --    BILITOT 0.5  --    ALKPHOS 82  --    AST 20  --    ALT 16  --    ANIONGAP 14 8       Significant Imaging: I have reviewed all pertinent imaging results/findings within the past 24 hours.      Assessment/Plan:     Active Hospital Problems    Diagnosis    *Chest pain    LBBB (left bundle branch block)    Hyperlipidemia    CAD (coronary artery disease)    Cigarette smoker    Chronic bilateral low back pain without sciatica    Cystic disease of liver    Benign essential HTN    COPD (chronic obstructive pulmonary disease)    Type 2 diabetes mellitus  with circulatory disorder, with long-term current use of insulin     PLAN  Follow  ECHO for wall motion abnormalitis    imdur   NGT PRN . Patient felt better nitropatch in ER   Cardiology consult given complex story . May need repeat angio? Or it was done ?  Cannot find in the system . Patient was saying they went through the wrist   ACS medications , asa , plavix , statin , BB , and patient report compliance   Trend cardiac enz  May need ranexa .  Pending cardiac eval and possible DC today Vs tomorrow      VTE Risk Mitigation (From admission, onward)         Ordered     IP VTE LOW RISK PATIENT  Once         10/07/23 1442     Place sequential compression device  Until discontinued         10/07/23 1442                Discharge Planning   DAVID:      Code Status: Full Code   Is the patient medically ready for discharge?:     Reason for patient still in hospital (select all that apply): Treatment  Discharge Plan A: Home                  Louis Ruggiero MD  Department of Hospital Medicine   [facility]

## 2023-10-08 NOTE — CONSULTS
[facility]  Department of Cardiology  Consult Note      PATIENT NAME: Sampson Holt  MRN: [id number]  TODAY'S DATE: 10/08/2023  ADMIT DATE: 10/7/2023                          CONSULT REQUESTED BY: Louis Ruggiero MD    SUBJECTIVE     PRINCIPAL PROBLEM: Chest pain      REASON FOR CONSULT:    Chest Pain      HPI:    Patient is a 67-year-old male with past medical history of CAD s/p PCI to RCA and distal left circumflex earlier this year in May, CHF, hypertension, diabetes, CVA, endocarditis, COPD who presented to the ED with complaints of chest pain with associated shortness of breath and nausea that started yesterday morning 30 minutes prior to arrival.  H&H 14.6/43.1, K 4.2, creatinine 0.8, magnesium 1.9, BNP 49, troponin HS 5.1.  EKG sinus rhythm with first-degree AV block, left axis deviation, left bundle-branch block (new).  Patient had a recent nuclear stress test on 06/22/2023 that was negative for reversible ischemia.  Last echocardiogram was on 08/12/2023 which showed EF of 55-60% and normal diastolic function.  Repeat echocardiogram this admission is pending.  Patient was acutely hypertensive upon admission with systolic BP is in the 170s to 200s.  Blood pressures have stabilized this a.m..      Patient was just discharged on 08/13/2023 for atypical chest pain, dizziness and episode of syncope.  Patient was seen by Cardiology and cleared for discharge with close follow up in the clinic and possible angiogram outpatient on August 23rd.  However this was not performed.  Patient states that nobody called him to set this up.        FROM H&P  HPI: 67-year-old male with coronary artery disease status post PCI to RCA and distal left circumflex earlier this year in May history of CHF, diabetes, hypertension, CVA, endocarditis, COPD came with CP.  Sarted this morning proximally 30 minutes prior to arrival states he was walking through the kitchen at work when the pain began describes shortness of breath  associated nausea.  He had LHC on may and recently admitted with CP and cardiology cleared and planned for outpatient angiogram again and patient report he had done 8 weeks ago  but no report seen in Epic.  He denies any dysuria urgency or frequency , fever , SOB , CP , leg pain currently . EKG with new LBBB obviously . Previous EKG did not show LBBB. He said he had few minor falls in between too.  First troponin neg and CXR neg . Labs OK .        Review of patient's allergies indicates:  No Known Allergies    Past Medical History:   Diagnosis Date    Abdominal pain 2022    CHF (congestive heart failure)     Diabetes mellitus 2018    Emphysema lung     Endocarditis 2011    Hypertension     Stroke 2011    denies residual     Past Surgical History:   Procedure Laterality Date    BACK SURGERY  1981    COLONOSCOPY N/A 2/23/2023    Procedure: COLONOSCOPY;  Surgeon: Jonn Cruz MD;  Location: [facility] ENDO;  Service: Endoscopy;  Laterality: N/A;    CORONARY ANGIOGRAPHY N/A 5/9/2023    Procedure: ANGIOGRAM, CORONARY ARTERY;  Surgeon: Antonio Kessler MD;  Location: [facility] CATH/EP LAB;  Service: Cardiology;  Laterality: N/A;    EXCISION OF HYDROCELE      x2    FRACTIONAL FLOW RESERVE (FFR), CORONARY  5/11/2023    Procedure: Fractional Flow Hubbell (FFR), Coronary;  Surgeon: Antonio Kessler MD;  Location: [facility] CATH/EP LAB;  Service: Cardiology;;    INGUINAL HERNIA REPAIR  1960    INTRAVASCULAR ULTRASOUND, CORONARY N/A 5/9/2023    Procedure: Ultrasound-coronary;  Surgeon: Antonio Kessler MD;  Location: [facility] CATH/EP LAB;  Service: Cardiology;  Laterality: N/A;    IVUS, CORONARY  5/11/2023    Procedure: IVUS, Coronary;  Surgeon: Antonio Kessler MD;  Location: [facility] CATH/EP LAB;  Service: Cardiology;;    LEFT HEART CATHETERIZATION Left 5/11/2023    Procedure: Left heart cath;  Surgeon: Antonio Kessler MD;  Location: [facility] CATH/EP LAB;  Service: Cardiology;  Laterality: Left;    PERCUTANEOUS TRANSLUMINAL BALLOON ANGIOPLASTY OF CORONARY  ARTERY  5/9/2023    Procedure: Angioplasty-coronary;  Surgeon: Antonio Kessler MD;  Location: [facility] CATH/EP LAB;  Service: Cardiology;;    RHINOPLASTY      STENT, DRUG ELUTING, SINGLE VESSEL, CORONARY  5/9/2023    Procedure: Stent, Drug Eluting, Single Vessel, Coronary;  Surgeon: Antonio Kessler MD;  Location: [facility] CATH/EP LAB;  Service: Cardiology;;    SURGICAL REMOVAL OF NODULE OF VOCAL CORD       Social History     Tobacco Use    Smoking status: Every Day     Current packs/day: 0.00     Types: Cigarettes     Last attempt to quit: 3/27/2020     Years since quitting: 3.5    Smokeless tobacco: Former    Tobacco comments:     Advised not to smoke DOS    Substance Use Topics    Alcohol use: Yes     Alcohol/week: 14.0 standard drinks of alcohol     Types: 14 Shots of liquor per week    Drug use: Yes     Types: Marijuana        REVIEW OF SYSTEMS    As mentioned in HPI    OBJECTIVE     VITAL SIGNS (Most Recent)  Temp: 98.1 °F (36.7 °C) (10/08/23 0710)  Pulse: 62 (10/08/23 0710)  Resp: 18 (10/08/23 0710)  BP: (!) 153/83 (10/08/23 0710)  SpO2: (!) 93 % (10/08/23 0710)    VENTILATION STATUS  Resp: 18 (10/08/23 0710)  SpO2: (!) 93 % (10/08/23 0710)           I & O (Last 24H):  Intake/Output Summary (Last 24 hours) at 10/8/2023 1009  Last data filed at 10/8/2023 0421  Gross per 24 hour   Intake --   Output 250 ml   Net -250 ml       WEIGHTS  Wt Readings from Last 3 Encounters:   10/08/23 0421 90.2 kg (198 lb 14.4 oz)   10/07/23 1948 90.2 kg (198 lb 14.4 oz)   10/07/23 1031 88.5 kg (195 lb)   08/11/23 2300 88.2 kg (194 lb 8 oz)   08/11/23 1626 90.7 kg (200 lb)   08/12/23 1113 88 kg (194 lb)       PHYSICAL EXAM    CONSTITUTIONAL: NAD  HEENT: Normocephalic. No pallor  NECK: no JVD  LUNGS: CTA b/l  HEART: regular rate and rhythm, S1, S2 normal, no murmur   ABDOMEN: soft, non-tender, bowel sounds normal  EXTREMITIES: No edema  SKIN: No rash  NEURO: AAO X 3  PSYCH: normal affect      HOME MEDICATIONS:  No current facility-administered  "medications on file prior to encounter.     Current Outpatient Medications on File Prior to Encounter   Medication Sig Dispense Refill    aspirin (ECOTRIN) 81 MG EC tablet Take 1 tablet (81 mg total) by mouth once daily. 90 tablet 3    atorvastatin (LIPITOR) 40 MG tablet Take 1 tablet (40 mg total) by mouth once daily. 90 tablet 3    carvediloL (COREG) 3.125 MG tablet Take 1 tablet (3.125 mg total) by mouth 2 (two) times daily. 60 tablet 9    clopidogreL (PLAVIX) 75 mg tablet Take 1 tablet (75 mg total) by mouth once daily. 90 tablet 3    insulin glargine,hum.rec.anlog (LANTUS SUBQ) Inject 35 Units into the skin once daily.      losartan (COZAAR) 50 MG tablet Take 2 tablets (100 mg total) by mouth once daily. 180 tablet 3    tamsulosin (FLOMAX) 0.4 mg Cap Take 1 capsule (0.4 mg total) by mouth once daily. Take in evening. 90 capsule 4       SCHEDULED MEDS:   aspirin  81 mg Oral Daily    atorvastatin  40 mg Oral Daily    carvediloL  3.125 mg Oral BID    clopidogreL  75 mg Oral Daily    insulin detemir U-100 (Levemir)  35 Units Subcutaneous QHS    isosorbide mononitrate  30 mg Oral Daily    losartan  100 mg Oral Daily    tamsulosin  0.4 mg Oral Daily       CONTINUOUS INFUSIONS:    PRN MEDS:dextrose 50%, dextrose 50%, glucagon (human recombinant), glucose, glucose, naloxone, nitroGLYCERIN, sodium chloride 0.9%    LABS AND DIAGNOSTICS     CBC LAST 3 DAYS  Recent Labs   Lab 10/07/23  1048   WBC 5.94   RBC 4.54*   HGB 14.6   HCT 43.1   MCV 95   MCH 32.2*   MCHC 33.9   RDW 13.1      MPV 9.0*   GRAN 61.6  3.7   LYMPH 28.3  1.7   MONO 7.9  0.5   BASO 0.05   NRBC 0       COAGULATION LAST 3 DAYS  No results for input(s): "LABPT", "INR", "APTT" in the last 168 hours.    CHEMISTRY LAST 3 DAYS  Recent Labs   Lab 10/07/23  1048 10/08/23  0634    140   K 4.1 4.2    110   CO2 20* 22*   ANIONGAP 14 8   BUN 15 19   CREATININE 1.0 0.8    165*   CALCIUM 9.6 9.3   MG 1.9  --    ALBUMIN 4.4  --    PROT 7.6  -- " "   ALKPHOS 82  --    ALT 16  --    AST 20  --    BILITOT 0.5  --        CARDIAC PROFILE LAST 3 DAYS  Recent Labs   Lab 10/07/23  1048 10/07/23  1248 10/07/23  1650 10/07/23  2014   BNP 49  --   --   --    TROPONINIHS 5.4 5.4 5.3 5.1       ENDOCRINE LAST 3 DAYS  No results for input(s): "TSH", "PROCAL" in the last 168 hours.    LAST ARTERIAL BLOOD GAS  ABG  No results for input(s): "PH", "PO2", "PCO2", "HCO3", "BE" in the last 168 hours.    LAST 7 DAYS MICROBIOLOGY   Microbiology Results (last 7 days)       ** No results found for the last 168 hours. **            MOST RECENT IMAGING  X-Ray Chest AP Portable  CLINICAL HISTORY:  67 years (1955) Male Chest Pain CHEST PAIN    TECHNIQUE:  Portable AP radiograph the chest. One view.    COMPARISON:  Radiographs from August 11, 2023.    FINDINGS:  Unchanged minimal scattered reticular lung markings are seen at both lung bases suggestive of minimal atelectasis/scarring.  No pneumothorax is identified. The heart is normal in size. The aorta appears tortuous, a finding usually associated with either atherosclerosis or systemic hypertension.  Osseous structures show degenerative changes in the spine. The visualized upper abdomen is unremarkable.    IMPRESSION:  No acute cardiac or pulmonary process.    .    Electronically signed by:  Amol Worrell MD  10/07/2023 11:12 AM CDT Workstation: [id number]      ECHOCARDIOGRAM RESULTS (last 5)  Results for orders placed during the hospital encounter of 08/11/23    Echo    Interpretation Summary    Left Ventricle: The left ventricle is normal in size. Mildly increased wall thickness. Normal wall motion. There is normal systolic function with a visually estimated ejection fraction of 55 - 60%. There is normal diastolic function.    Right Ventricle: Normal right ventricular cavity size. Systolic function is normal.    Aortic Valve: The aortic valve is a trileaflet valve.    Mitral Valve: There is no stenosis. The mean pressure " gradient across the mitral valve is 3 mmHg at a heart rate of  bpm.    IVC/SVC: Normal venous pressure at 3 mmHg.      Results for orders placed during the hospital encounter of 05/09/23    Echo    Interpretation Summary  · The left ventricle is normal in size with mild concentric hypertrophy and normal systolic function.  · The estimated ejection fraction is 55%.  · Normal left ventricular diastolic function.  · Normal right ventricular size with normal right ventricular systolic function.  · Normal central venous pressure (3 mmHg).      CURRENT/PREVIOUS VISIT EKG  Results for orders placed or performed during the hospital encounter of 10/07/23   EKG 12-lead    Collection Time: 10/07/23 10:33 AM    Narrative    Test Reason : R07.9,    Vent. Rate : 070 BPM     Atrial Rate : 070 BPM     P-R Int : 216 ms          QRS Dur : 148 ms      QT Int : 448 ms       P-R-T Axes : 013 -40 084 degrees     QTc Int : 483 ms    Sinus rhythm with 1st degree A-V block  Left axis deviation  Left bundle branch block  Abnormal ECG  When compared with ECG of 13-AUG-2023 09:51,  Left bundle branch block is now Present  Borderline criteria for Inferior infarct are no longer Present    Referred By: [email or web address]   SELF           Confirmed By:            ASSESSMENT/PLAN:     Active Hospital Problems    Diagnosis    *Chest pain    LBBB (left bundle branch block)    Hyperlipidemia    CAD (coronary artery disease)    Cigarette smoker    Chronic bilateral low back pain without sciatica    Cystic disease of liver    Benign essential HTN    COPD (chronic obstructive pulmonary disease)    Type 2 diabetes mellitus with circulatory disorder, with long-term current use of insulin       ASSESSMENT & PLAN:     Chest pain  Dyspnea  New left bundle-branch block  CAD s/p PCI  Hypertension  Hyperlipidemia  Diabetes mellitus  COPD      RECOMMENDATIONS:    Patient presented to the ED with complaints chest pain, shortness of breath and associated nausea that started  approximately 30 minutes prior to arrival.  New left bundle-branch block noted on EKG.  No acute ischemic changes.  Troponin HS negative.  Patient had a recent admission in August where he was supposed to have a outpatient angiogram on 08/23/2023.  This was not performed.    Recommend further evaluation of coronary blockages with angiogram.  We will plan for angiogram next a.m. with Dr. Ryan.  Discussed with patient the risks and benefits of the procedure including, but not limited to, the following 1:1000 risk of Heart attack, stroke and death with 3-5% Risk of bleeding, vessel damage, and the need for emergent CABG Surgery. All questions have been answered to patient's satisfaction. Informed consent obtained.  Will keep nothing by mouth post midnight and proceed with the coronary angiography possible PCI in the morning.   Continue aspirin Plavix and statin therapy.  Continue carvedilol 3.125 mg b.i.d., Imdur 30 mg daily, losartan 100 mg daily.  Repeat echocardiogram pending.  Further recommendations to follow.  We will continue to follow.      Michelle Kelsey NP  Department of Cardiology  Date of Service: 10/08/2023      I have personally interviewed and examined the patient, I have reviewed the Nurse Practitioner's history and physical, assessment, and plan. I have personally evaluated the patient at bedside and agree with the findings and made appropriate changes as necessary in recommendations.  Patient had nonobstructive mid-LAD lesion on the previous catheterization.  Now presented with chest pain.  Has left bundle-branch block on EKG which is new since prior EKG.  NPO after midnight for cardiac catheterization in a.m.    Dieudonne Ryan MD  Department of Cardiology  [facility]  10/8/23

## 2023-10-08 NOTE — HOSPITAL COURSE
67-year-old male with coronary artery disease status post PCI to RCA and distal left circumflex earlier this year in May history of CHF, diabetes, hypertension, CVA, endocarditis, COPD came with CP.  He had LHC on may and recently admitted with CP and cardiology cleared and planned for outpatient angiogram again but it was never arranged .  No back again with same CP and LHC was done and no new intervention needed ( please reference the cath report ) and later CP free and DC in stable condition .  He was coughing and cough up small amount of one time and stopped. He was DC with doxy and advised to hold plavix if another episode or large amount of blood .  Cardio appointment given .

## 2023-10-08 NOTE — NURSING
.Nurses Note -- 4 Eyes      10/8/2023   1:11 AM      Skin assessed during: Admit      [x] No Altered Skin Integrity Present    []Prevention Measures Documented      [] Yes- Altered Skin Integrity Present or Discovered   [] LDA Added if Not in Epic (Describe Wound)   [] New Altered Skin Integrity was Present on Admit and Documented in LDA   [] Wound Image Taken    Wound Care Consulted? No    Attending Nurse:  Nathalie Navarro RN/Staff Member:  [id number]
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
## 2023-10-08 NOTE — SUBJECTIVE & OBJECTIVE
Interval History:   As per subjective     Review of Systems  Objective:     Vital Signs (Most Recent):  Temp: 98.3 °F (36.8 °C) (10/08/23 1117)  Pulse: 67 (10/08/23 1345)  Resp: 18 (10/08/23 1345)  BP: (!) 134/95 (10/08/23 1117)  SpO2: 95 % (10/08/23 1345) Vital Signs (24h Range):  Temp:  [97.6 °F (36.4 °C)-98.3 °F (36.8 °C)] 98.3 °F (36.8 °C)  Pulse:  [62-86] 67  Resp:  [14-18] 18  SpO2:  [92 %-95 %] 95 %  BP: (108-153)/(56-95) 134/95     Weight: 90.2 kg (198 lb 14.4 oz)  Body mass index is 26.24 kg/m².    Intake/Output Summary (Last 24 hours) at 10/8/2023 1533  Last data filed at 10/8/2023 1117  Gross per 24 hour   Intake 240 ml   Output 250 ml   Net -10 ml         Physical Exam  Constitutional:       General: He is not in acute distress.     Appearance: He is well-developed.   HENT:      Head: Normocephalic.   Eyes:      Pupils: Pupils are equal, round, and reactive to light.   Cardiovascular:      Rate and Rhythm: Normal rate and regular rhythm.   Pulmonary:      Effort: Pulmonary effort is normal. No respiratory distress.   Abdominal:      General: Abdomen is flat. There is no distension.   Musculoskeletal:         General: Normal range of motion.      Cervical back: Neck supple.   Skin:     General: Skin is warm.      Findings: No rash.   Neurological:      Mental Status: He is alert and oriented to person, place, and time.   Psychiatric:         Mood and Affect: Mood normal.             Significant Labs: All pertinent labs within the past 24 hours have been reviewed.  BMP:   Recent Labs   Lab 10/07/23  1048 10/08/23  0634    165*    140   K 4.1 4.2    110   CO2 20* 22*   BUN 15 19   CREATININE 1.0 0.8   CALCIUM 9.6 9.3   MG 1.9  --      CBC:   Recent Labs   Lab 10/07/23  1048   WBC 5.94   HGB 14.6   HCT 43.1        CMP:   Recent Labs   Lab 10/07/23  1048 10/08/23  0634    140   K 4.1 4.2    110   CO2 20* 22*    165*   BUN 15 19   CREATININE 1.0 0.8   CALCIUM 9.6  9.3   PROT 7.6  --    ALBUMIN 4.4  --    BILITOT 0.5  --    ALKPHOS 82  --    AST 20  --    ALT 16  --    ANIONGAP 14 8       Significant Imaging: I have reviewed all pertinent imaging results/findings within the past 24 hours.

## 2023-10-09 ENCOUNTER — CLINICAL SUPPORT (OUTPATIENT)
Dept: SMOKING CESSATION | Facility: CLINIC | Age: 68
End: 2023-10-09
Payer: COMMERCIAL

## 2023-10-09 DIAGNOSIS — F17.200 NICOTINE DEPENDENCE: Primary | ICD-10-CM

## 2023-10-09 LAB
ANION GAP SERPL CALC-SCNC: 5 MMOL/L (ref 8–16)
APTT PPP: 28.1 SEC (ref 21–32)
BASOPHILS # BLD AUTO: 0.04 K/UL (ref 0–0.2)
BASOPHILS NFR BLD: 0.8 % (ref 0–1.9)
BUN SERPL-MCNC: 16 MG/DL (ref 8–23)
CALCIUM SERPL-MCNC: 9 MG/DL (ref 8.7–10.5)
CHLORIDE SERPL-SCNC: 108 MMOL/L (ref 95–110)
CO2 SERPL-SCNC: 26 MMOL/L (ref 23–29)
CREAT SERPL-MCNC: 0.7 MG/DL (ref 0.5–1.4)
DIFFERENTIAL METHOD: ABNORMAL
EOSINOPHIL # BLD AUTO: 0.1 K/UL (ref 0–0.5)
EOSINOPHIL NFR BLD: 1.9 % (ref 0–8)
ERYTHROCYTE [DISTWIDTH] IN BLOOD BY AUTOMATED COUNT: 12.9 % (ref 11.5–14.5)
EST. GFR  (NO RACE VARIABLE): >60 ML/MIN/1.73 M^2
GLUCOSE SERPL-MCNC: 148 MG/DL (ref 70–110)
GLUCOSE SERPL-MCNC: 168 MG/DL (ref 70–110)
GLUCOSE SERPL-MCNC: 194 MG/DL (ref 70–110)
GLUCOSE SERPL-MCNC: 232 MG/DL (ref 70–110)
HCT VFR BLD AUTO: 38 % (ref 40–54)
HGB BLD-MCNC: 12.8 G/DL (ref 14–18)
IMM GRANULOCYTES # BLD AUTO: 0.01 K/UL (ref 0–0.04)
IMM GRANULOCYTES NFR BLD AUTO: 0.2 % (ref 0–0.5)
INR PPP: 1 (ref 0.8–1.2)
LYMPHOCYTES # BLD AUTO: 1.7 K/UL (ref 1–4.8)
LYMPHOCYTES NFR BLD: 36 % (ref 18–48)
MCH RBC QN AUTO: 32.2 PG (ref 27–31)
MCHC RBC AUTO-ENTMCNC: 33.7 G/DL (ref 32–36)
MCV RBC AUTO: 96 FL (ref 82–98)
MONOCYTES # BLD AUTO: 0.4 K/UL (ref 0.3–1)
MONOCYTES NFR BLD: 8.8 % (ref 4–15)
NEUTROPHILS # BLD AUTO: 2.5 K/UL (ref 1.8–7.7)
NEUTROPHILS NFR BLD: 52.3 % (ref 38–73)
NRBC BLD-RTO: 0 /100 WBC
PLATELET # BLD AUTO: 161 K/UL (ref 150–450)
PMV BLD AUTO: 9.8 FL (ref 9.2–12.9)
POTASSIUM SERPL-SCNC: 3.8 MMOL/L (ref 3.5–5.1)
PROTHROMBIN TIME: 11 SEC (ref 9–12.5)
RBC # BLD AUTO: 3.98 M/UL (ref 4.6–6.2)
SODIUM SERPL-SCNC: 139 MMOL/L (ref 136–145)
WBC # BLD AUTO: 4.75 K/UL (ref 3.9–12.7)

## 2023-10-09 PROCEDURE — 99406 BEHAV CHNG SMOKING 3-10 MIN: CPT

## 2023-10-09 PROCEDURE — 25000003 PHARM REV CODE 250: Performed by: STUDENT IN AN ORGANIZED HEALTH CARE EDUCATION/TRAINING PROGRAM

## 2023-10-09 PROCEDURE — 85610 PROTHROMBIN TIME: CPT

## 2023-10-09 PROCEDURE — 94799 UNLISTED PULMONARY SVC/PX: CPT

## 2023-10-09 PROCEDURE — 25000003 PHARM REV CODE 250: Performed by: NURSE PRACTITIONER

## 2023-10-09 PROCEDURE — 25000003 PHARM REV CODE 250: Performed by: INTERNAL MEDICINE

## 2023-10-09 PROCEDURE — 94761 N-INVAS EAR/PLS OXIMETRY MLT: CPT

## 2023-10-09 PROCEDURE — 99232 PR SUBSEQUENT HOSPITAL CARE,LEVL II: ICD-10-PCS | Mod: ,,, | Performed by: INTERNAL MEDICINE

## 2023-10-09 PROCEDURE — 99407 BEHAV CHNG SMOKING > 10 MIN: CPT | Mod: S$GLB,,, | Performed by: INTERNAL MEDICINE

## 2023-10-09 PROCEDURE — 99232 SBSQ HOSP IP/OBS MODERATE 35: CPT | Mod: ,,, | Performed by: INTERNAL MEDICINE

## 2023-10-09 PROCEDURE — 21400001 HC TELEMETRY ROOM

## 2023-10-09 PROCEDURE — 85730 THROMBOPLASTIN TIME PARTIAL: CPT

## 2023-10-09 PROCEDURE — 99407 PR TOBACCO USE CESSATION INTENSIVE >10 MINUTES: ICD-10-PCS | Mod: S$GLB,,, | Performed by: INTERNAL MEDICINE

## 2023-10-09 PROCEDURE — 80048 BASIC METABOLIC PNL TOTAL CA: CPT | Performed by: INTERNAL MEDICINE

## 2023-10-09 PROCEDURE — 85025 COMPLETE CBC W/AUTO DIFF WBC: CPT

## 2023-10-09 PROCEDURE — 99900035 HC TECH TIME PER 15 MIN (STAT)

## 2023-10-09 RX ORDER — SODIUM CHLORIDE 9 MG/ML
INJECTION, SOLUTION INTRAVENOUS CONTINUOUS
Status: DISCONTINUED | OUTPATIENT
Start: 2023-10-10 | End: 2023-10-11

## 2023-10-09 RX ADMIN — ATORVASTATIN CALCIUM 40 MG: 40 TABLET, FILM COATED ORAL at 08:10

## 2023-10-09 RX ADMIN — CLOPIDOGREL BISULFATE 75 MG: 75 TABLET, FILM COATED ORAL at 08:10

## 2023-10-09 RX ADMIN — CARVEDILOL 3.12 MG: 3.12 TABLET, FILM COATED ORAL at 09:10

## 2023-10-09 RX ADMIN — INSULIN DETEMIR 35 UNITS: 100 INJECTION, SOLUTION SUBCUTANEOUS at 09:10

## 2023-10-09 RX ADMIN — BUTALBITAL, ACETAMINOPHEN AND CAFFEINE 1 TABLET: 325; 50; 40 TABLET ORAL at 05:10

## 2023-10-09 RX ADMIN — SODIUM CHLORIDE: 0.9 INJECTION, SOLUTION INTRAVENOUS at 11:10

## 2023-10-09 RX ADMIN — TAMSULOSIN HYDROCHLORIDE 0.4 MG: 0.4 CAPSULE ORAL at 08:10

## 2023-10-09 RX ADMIN — ASPIRIN 81 MG: 81 TABLET, COATED ORAL at 08:10

## 2023-10-09 RX ADMIN — ISOSORBIDE MONONITRATE 30 MG: 30 TABLET, EXTENDED RELEASE ORAL at 08:10

## 2023-10-09 RX ADMIN — CARVEDILOL 3.12 MG: 3.12 TABLET, FILM COATED ORAL at 08:10

## 2023-10-09 RX ADMIN — LOSARTAN POTASSIUM 100 MG: 50 TABLET, FILM COATED ORAL at 08:10

## 2023-10-09 NOTE — SUBJECTIVE & OBJECTIVE
"Interval History:     Review of Systems  Objective:     Vital Signs (Most Recent):  Temp: 97.7 °F (36.5 °C) (10/09/23 1057)  Pulse: (!) 52 (10/09/23 1057)  Resp: 18 (10/09/23 1057)  BP: (!) 141/82 (10/09/23 1057)  SpO2: 95 % (10/09/23 1057) Vital Signs (24h Range):  Temp:  [97.7 °F (36.5 °C)-98.6 °F (37 °C)] 97.7 °F (36.5 °C)  Pulse:  [52-66] 52  Resp:  [17-18] 18  SpO2:  [93 %-96 %] 95 %  BP: (133-197)/(82-98) 141/82     Weight: 90.9 kg (200 lb 6.4 oz)  Body mass index is 26.44 kg/m².    Intake/Output Summary (Last 24 hours) at 10/9/2023 1447  Last data filed at 10/9/2023 1425  Gross per 24 hour   Intake 600 ml   Output 1550 ml   Net -950 ml         Physical Exam  Constitutional:       General: He is not in acute distress.     Appearance: He is well-developed.   HENT:      Head: Normocephalic.   Eyes:      Pupils: Pupils are equal, round, and reactive to light.   Cardiovascular:      Rate and Rhythm: Normal rate and regular rhythm.      Pulses: Normal pulses.   Pulmonary:      Effort: Pulmonary effort is normal. No respiratory distress.   Abdominal:      General: Abdomen is flat. There is no distension.      Tenderness: There is no abdominal tenderness.   Musculoskeletal:         General: Normal range of motion.      Cervical back: Neck supple.   Skin:     General: Skin is warm.      Findings: No rash.   Neurological:      Mental Status: He is alert and oriented to person, place, and time.   Psychiatric:         Mood and Affect: Mood normal.             Significant Labs: All pertinent labs within the past 24 hours have been reviewed.  CMP:   Recent Labs   Lab 10/08/23  0634 10/09/23  0320    139   K 4.2 3.8    108   CO2 22* 26   * 168*   BUN 19 16   CREATININE 0.8 0.7   CALCIUM 9.3 9.0   ANIONGAP 8 5*     Cardiac Markers: No results for input(s): "CKMB", "MYOGLOBIN", "BNP", "TROPISTAT" in the last 48 hours.  Coagulation:   Recent Labs   Lab 10/09/23  0320   INR 1.0   APTT 28.1       Significant " Imaging: I have reviewed all pertinent imaging results/findings within the past 24 hours.

## 2023-10-09 NOTE — PROGRESS NOTES
[facility]  Department of Cardiology  Progress Note      PATIENT NAME: Sampson Holt  MRN: [id number]  TODAY'S DATE: 10/09/2023  ADMIT DATE: 10/7/2023                          CONSULT REQUESTED BY: Louis Ruggiero MD    SUBJECTIVE     PRINCIPAL PROBLEM: Chest pain      REASON FOR CONSULT:    Chest Pain    INTERVAL HISTORY:  10/9/23  Ambulatory in room  NPO for cath  Had episode of CP today  SR to Sinus hakan on tele      HPI:    Patient is a 67-year-old male with past medical history of CAD s/p PCI to RCA and distal left circumflex earlier this year in May, CHF, hypertension, diabetes, CVA, endocarditis, COPD who presented to the ED with complaints of chest pain with associated shortness of breath and nausea that started yesterday morning 30 minutes prior to arrival.  H&H 14.6/43.1, K 4.2, creatinine 0.8, magnesium 1.9, BNP 49, troponin HS 5.1.  EKG sinus rhythm with first-degree AV block, left axis deviation, left bundle-branch block (new).  Patient had a recent nuclear stress test on 06/22/2023 that was negative for reversible ischemia.  Last echocardiogram was on 08/12/2023 which showed EF of 55-60% and normal diastolic function.  Repeat echocardiogram this admission is pending.  Patient was acutely hypertensive upon admission with systolic BP is in the 170s to 200s.  Blood pressures have stabilized this a.m..      Patient was just discharged on 08/13/2023 for atypical chest pain, dizziness and episode of syncope.  Patient was seen by Cardiology and cleared for discharge with close follow up in the clinic and possible angiogram outpatient on August 23rd.  However this was not performed.  Patient states that nobody called him to set this up.        FROM H&P  HPI: 67-year-old male with coronary artery disease status post PCI to RCA and distal left circumflex earlier this year in May history of CHF, diabetes, hypertension, CVA, endocarditis, COPD came with CP.  Sarted this morning proximally 30 minutes  prior to arrival states he was walking through the kitchen at work when the pain began describes shortness of breath associated nausea.  He had LHC on may and recently admitted with CP and cardiology cleared and planned for outpatient angiogram again and patient report he had done 8 weeks ago  but no report seen in Epic.  He denies any dysuria urgency or frequency , fever , SOB , CP , leg pain currently . EKG with new LBBB obviously . Previous EKG did not show LBBB. He said he had few minor falls in between too.  First troponin neg and CXR neg . Labs OK .        Review of patient's allergies indicates:  No Known Allergies    Past Medical History:   Diagnosis Date    Abdominal pain 2022    CHF (congestive heart failure)     Diabetes mellitus 2018    Emphysema lung     Endocarditis 2011    Hypertension     Stroke 2011    denies residual     Past Surgical History:   Procedure Laterality Date    BACK SURGERY  1981    COLONOSCOPY N/A 2/23/2023    Procedure: COLONOSCOPY;  Surgeon: Jonn Cruz MD;  Location: [facility] ENDO;  Service: Endoscopy;  Laterality: N/A;    CORONARY ANGIOGRAPHY N/A 5/9/2023    Procedure: ANGIOGRAM, CORONARY ARTERY;  Surgeon: Antonio Kessler MD;  Location: [facility] CATH/EP LAB;  Service: Cardiology;  Laterality: N/A;    EXCISION OF HYDROCELE      x2    FRACTIONAL FLOW RESERVE (FFR), CORONARY  5/11/2023    Procedure: Fractional Flow [place] (FFR), Coronary;  Surgeon: Antonio Kessler MD;  Location: [facility] CATH/EP LAB;  Service: Cardiology;;    INGUINAL HERNIA REPAIR  1960    INTRAVASCULAR ULTRASOUND, CORONARY N/A 5/9/2023    Procedure: Ultrasound-coronary;  Surgeon: Antonio Kessler MD;  Location: [facility] CATH/EP LAB;  Service: Cardiology;  Laterality: N/A;    IVUS, CORONARY  5/11/2023    Procedure: IVUS, Coronary;  Surgeon: Antonio Kessler MD;  Location: [facility] CATH/EP LAB;  Service: Cardiology;;    LEFT HEART CATHETERIZATION Left 5/11/2023    Procedure: Left heart cath;  Surgeon: Antonio Kessler MD;  Location: [facility]  CATH/EP LAB;  Service: Cardiology;  Laterality: Left;    PERCUTANEOUS TRANSLUMINAL BALLOON ANGIOPLASTY OF CORONARY ARTERY  5/9/2023    Procedure: Angioplasty-coronary;  Surgeon: Antonio Kessler MD;  Location: [facility] CATH/EP LAB;  Service: Cardiology;;    RHINOPLASTY      STENT, DRUG ELUTING, SINGLE VESSEL, CORONARY  5/9/2023    Procedure: Stent, Drug Eluting, Single Vessel, Coronary;  Surgeon: Antonio Kessler MD;  Location: [facility] CATH/EP LAB;  Service: Cardiology;;    SURGICAL REMOVAL OF NODULE OF VOCAL CORD       Social History     Tobacco Use    Smoking status: Every Day     Types: Cigarettes    Smokeless tobacco: Former    Tobacco comments:     Pt ready to quit smoking and states he can on his own. Seen 10/9/23 for inpatient smoking cessation. [employer].   Substance Use Topics    Alcohol use: Yes     Alcohol/week: 14.0 standard drinks of alcohol     Types: 14 Shots of liquor per week    Drug use: Yes     Types: Marijuana        REVIEW OF SYSTEMS    As mentioned per interval history    OBJECTIVE     VITAL SIGNS (Most Recent)  Temp: 97.7 °F (36.5 °C) (10/09/23 1057)  Pulse: (!) 52 (10/09/23 1057)  Resp: 18 (10/09/23 1057)  BP: (!) 141/82 (10/09/23 1057)  SpO2: 95 % (10/09/23 1057)    VENTILATION STATUS  Resp: 18 (10/09/23 1057)  SpO2: 95 % (10/09/23 1057)           I & O (Last 24H):  Intake/Output Summary (Last 24 hours) at 10/9/2023 1510  Last data filed at 10/9/2023 1425  Gross per 24 hour   Intake 600 ml   Output 1550 ml   Net -950 ml       WEIGHTS  Wt Readings from Last 3 Encounters:   10/09/23 0333 90.9 kg (200 lb 6.4 oz)   10/08/23 0421 90.2 kg (198 lb 14.4 oz)   10/07/23 1948 90.2 kg (198 lb 14.4 oz)   10/07/23 1031 88.5 kg (195 lb)   08/11/23 2300 88.2 kg (194 lb 8 oz)   08/11/23 1626 90.7 kg (200 lb)   08/12/23 1113 88 kg (194 lb)       PHYSICAL EXAM    CONSTITUTIONAL: NAD  HEENT: Normocephalic. No pallor  NECK: no JVD  LUNGS: CTA b/l  HEART: regular rate and rhythm, S1, S2 normal, no murmur    ABDOMEN: soft, non-tender, bowel sounds normal  EXTREMITIES: No edema  SKIN: No rash  NEURO: AAO X 3  PSYCH: normal affect      HOME MEDICATIONS:  No current facility-administered medications on file prior to encounter.     Current Outpatient Medications on File Prior to Encounter   Medication Sig Dispense Refill    aspirin (ECOTRIN) 81 MG EC tablet Take 1 tablet (81 mg total) by mouth once daily. 90 tablet 3    atorvastatin (LIPITOR) 40 MG tablet Take 1 tablet (40 mg total) by mouth once daily. 90 tablet 3    carvediloL (COREG) 3.125 MG tablet Take 1 tablet (3.125 mg total) by mouth 2 (two) times daily. 60 tablet 9    clopidogreL (PLAVIX) 75 mg tablet Take 1 tablet (75 mg total) by mouth once daily. 90 tablet 3    insulin glargine,hum.rec.anlog (LANTUS SUBQ) Inject 35 Units into the skin once daily.      losartan (COZAAR) 50 MG tablet Take 2 tablets (100 mg total) by mouth once daily. 180 tablet 3    tamsulosin (FLOMAX) 0.4 mg Cap Take 1 capsule (0.4 mg total) by mouth once daily. Take in evening. 90 capsule 4       SCHEDULED MEDS:   aspirin  81 mg Oral Daily    atorvastatin  40 mg Oral Daily    carvediloL  3.125 mg Oral BID    clopidogreL  75 mg Oral Daily    insulin detemir U-100 (Levemir)  35 Units Subcutaneous QHS    isosorbide mononitrate  30 mg Oral Daily    losartan  100 mg Oral Daily    tamsulosin  0.4 mg Oral Daily       CONTINUOUS INFUSIONS:    PRN MEDS:acetaminophen, butalbital-acetaminophen-caffeine -40 mg, dextrose 50%, dextrose 50%, diphenhydrAMINE, glucagon (human recombinant), glucose, glucose, naloxone, nitroGLYCERIN, sodium chloride 0.9%, sodium chloride 0.9%    LABS AND DIAGNOSTICS     CBC LAST 3 DAYS  Recent Labs   Lab 10/07/23  1048 10/09/23  0321   WBC 5.94 4.75   RBC 4.54* 3.98*   HGB 14.6 12.8*   HCT 43.1 38.0*   MCV 95 96   MCH 32.2* 32.2*   MCHC 33.9 33.7   RDW 13.1 12.9    161   MPV 9.0* 9.8   GRAN 61.6  3.7 52.3  2.5   LYMPH 28.3  1.7 36.0  1.7   MONO 7.9  0.5 8.8   "0.4   BASO 0.05 0.04   NRBC 0 0       COAGULATION LAST 3 DAYS  Recent Labs   Lab 10/09/23  0320   INR 1.0   APTT 28.1       CHEMISTRY LAST 3 DAYS  Recent Labs   Lab 10/07/23  1048 10/08/23  0634 10/09/23  0320    140 139   K 4.1 4.2 3.8    110 108   CO2 20* 22* 26   ANIONGAP 14 8 5*   BUN 15 19 16   CREATININE 1.0 0.8 0.7    165* 168*   CALCIUM 9.6 9.3 9.0   MG 1.9  --   --    ALBUMIN 4.4  --   --    PROT 7.6  --   --    ALKPHOS 82  --   --    ALT 16  --   --    AST 20  --   --    BILITOT 0.5  --   --        CARDIAC PROFILE LAST 3 DAYS  Recent Labs   Lab 10/07/23  1048 10/07/23  1248 10/07/23  1650 10/07/23  2014   BNP 49  --   --   --    TROPONINIHS 5.4 5.4 5.3 5.1       ENDOCRINE LAST 3 DAYS  No results for input(s): "TSH", "PROCAL" in the last 168 hours.    LAST ARTERIAL BLOOD GAS  ABG  No results for input(s): "PH", "PO2", "PCO2", "HCO3", "BE" in the last 168 hours.    LAST 7 DAYS MICROBIOLOGY   Microbiology Results (last 7 days)       ** No results found for the last 168 hours. **            MOST RECENT IMAGING  Echo    Left Ventricle: The left ventricle is normal in size. Mildly increased   wall thickness. There is concentric remodeling. Septal motion is normal.   There is normal systolic function. Ejection fraction by visual   approximation is 60%.    Right Ventricle: Normal right ventricular cavity size. Wall thickness   is normal. Right ventricle wall motion  is normal. Systolic function is   normal.    Mitral Valve: There is mild posterior mitral annular calcification   present.    IVC/SVC: Normal venous pressure at 3 mmHg.    A limited echo was performed using limited 2D and color flow Doppler      ECHOCARDIOGRAM RESULTS (last 5)  Results for orders placed during the hospital encounter of 08/11/23    Echo    Interpretation Summary    Left Ventricle: The left ventricle is normal in size. Mildly increased wall thickness. Normal wall motion. There is normal systolic function with a " visually estimated ejection fraction of 55 - 60%. There is normal diastolic function.    Right Ventricle: Normal right ventricular cavity size. Systolic function is normal.    Aortic Valve: The aortic valve is a trileaflet valve.    Mitral Valve: There is no stenosis. The mean pressure gradient across the mitral valve is 3 mmHg at a heart rate of  bpm.    IVC/SVC: Normal venous pressure at 3 mmHg.      Results for orders placed during the hospital encounter of 05/09/23    Echo    Interpretation Summary  · The left ventricle is normal in size with mild concentric hypertrophy and normal systolic function.  · The estimated ejection fraction is 55%.  · Normal left ventricular diastolic function.  · Normal right ventricular size with normal right ventricular systolic function.  · Normal central venous pressure (3 mmHg).      CURRENT/PREVIOUS VISIT EKG  Results for orders placed or performed during the hospital encounter of 10/07/23   EKG 12-lead    Collection Time: 10/07/23 10:33 AM    Narrative    Test Reason : R07.9,    Vent. Rate : 070 BPM     Atrial Rate : 070 BPM     P-R Int : 216 ms          QRS Dur : 148 ms      QT Int : 448 ms       P-R-T Axes : 013 -40 084 degrees     QTc Int : 483 ms    Sinus rhythm with 1st degree A-V block  Left axis deviation  Left bundle branch block  Abnormal ECG  When compared with ECG of 13-AUG-2023 09:51,  Left bundle branch block is now Present  Borderline criteria for Inferior infarct are no longer Present  Confirmed by Kevin Cruz MD ([id number]) on 10/9/2023 5:35:18 AM    Referred By: [email or web address]   SELF           Confirmed By:Kevin Cruz MD           ASSESSMENT/PLAN:     Active Hospital Problems    Diagnosis    *Chest pain    LBBB (left bundle branch block)    Hyperlipidemia    CAD (coronary artery disease)    Cigarette smoker    Chronic bilateral low back pain without sciatica    Cystic disease of liver    Benign essential HTN    COPD (chronic obstructive pulmonary disease)     Type 2 diabetes mellitus with circulatory disorder, with long-term current use of insulin       ASSESSMENT & PLAN:     Chest pain  Dyspnea  New left bundle-branch block  CAD s/p PCI  Hypertension  Hyperlipidemia  Diabetes mellitus  COPD      RECOMMENDATIONS:    Patient presented to the ED with complaints chest pain, shortness of breath and associated nausea that started approximately 30 minutes prior to arrival.  New left bundle-branch block noted on EKG.  No acute ischemic changes.  Troponin HS negative.  Patient had a recent admission in August where he was supposed to have a outpatient angiogram on 08/23/2023.  This was not performed.    Recommend further evaluation of coronary blockages with angiogram.  We will plan for angiogram in a.m. with Dr. Ryan.  Discussed with patient the risks and benefits of the procedure including, but not limited to, the following 1:1000 risk of Heart attack, stroke and death with 3-5% Risk of bleeding, vessel damage, and the need for emergent CABG Surgery. All questions have been answered to patient's satisfaction. Informed consent obtained.  Will keep nothing by mouth post midnight and proceed with the coronary angiography possible PCI in the morning.   Continue aspirin Plavix and statin therapy.  Continue carvedilol 3.125 mg b.i.d., Imdur 30 mg daily, losartan 100 mg daily.  Repeat echocardiogram normal with 60% efx  Stop IVFs for now and resume at midnight      Lauren Alford NP  Department of Cardiology  Date of Service: 10/09/2023      I have personally interviewed and examined the patient, I have reviewed the Nurse Practitioner's history and physical, assessment, and plan. I have personally evaluated the patient at bedside and agree with the findings and made appropriate changes as necessary in recommendations.      Dieudonne Ryan MD  Department of Cardiology  [facility]  10/9/23

## 2023-10-09 NOTE — PROGRESS NOTES
[facility] Medicine  Progress Note    Patient Name: Sampson Holt  MRN: [id number]  Patient Class: OP- Observation   Admission Date: 10/7/2023  Length of Stay: 0 days  Attending Physician: Louis Ruggiero MD  Primary Care Provider: Damian Vergara MD        Subjective:     Principal Problem:Chest pain        HPI:  67-year-old male with coronary artery disease status post PCI to RCA and distal left circumflex earlier this year in May history of CHF, diabetes, hypertension, CVA, endocarditis, COPD came with CP.  Sarted this morning proximally 30 minutes prior to arrival states he was walking through the kitchen at work when the pain began describes shortness of breath associated nausea.  He had C on may and recently admitted with CP and cardiology cleared and planned for outpatient angiogram again and patient report he had done 8 weeks ago  but no report seen in Epic.  He denies any dysuria urgency or frequency , fever , SOB , CP , leg pain currently . EKG with new LBBB obviously . Previous EKG did not show LBBB. He said he had few minor falls in between too.  First troponin neg and CXR neg . Labs OK .      Overview/Hospital Course:  He is CP free . But had some last night . Trop neg. Mentioned that he recently had diverticulitis . Abdomen is benign .  Repeat ECHO pending . Cardiac eval pending     10/9  Seen in am . Cardio decided [facility] and going today   Had CP last night       Interval History:     Review of Systems  Objective:     Vital Signs (Most Recent):  Temp: 97.7 °F (36.5 °C) (10/09/23 1057)  Pulse: (!) 52 (10/09/23 1057)  Resp: 18 (10/09/23 1057)  BP: (!) 141/82 (10/09/23 1057)  SpO2: 95 % (10/09/23 1057) Vital Signs (24h Range):  Temp:  [97.7 °F (36.5 °C)-98.6 °F (37 °C)] 97.7 °F (36.5 °C)  Pulse:  [52-66] 52  Resp:  [17-18] 18  SpO2:  [93 %-96 %] 95 %  BP: (133-197)/(82-98) 141/82     Weight: 90.9 kg (200 lb 6.4 oz)  Body mass index is 26.44 kg/m².    Intake/Output Summary (Last  "24 hours) at 10/9/2023 1447  Last data filed at 10/9/2023 1425  Gross per 24 hour   Intake 600 ml   Output 1550 ml   Net -950 ml         Physical Exam  Constitutional:       General: He is not in acute distress.     Appearance: He is well-developed.   HENT:      Head: Normocephalic.   Eyes:      Pupils: Pupils are equal, round, and reactive to light.   Cardiovascular:      Rate and Rhythm: Normal rate and regular rhythm.      Pulses: Normal pulses.   Pulmonary:      Effort: Pulmonary effort is normal. No respiratory distress.   Abdominal:      General: Abdomen is flat. There is no distension.      Tenderness: There is no abdominal tenderness.   Musculoskeletal:         General: Normal range of motion.      Cervical back: Neck supple.   Skin:     General: Skin is warm.      Findings: No rash.   Neurological:      Mental Status: He is alert and oriented to person, place, and time.   Psychiatric:         Mood and Affect: Mood normal.             Significant Labs: All pertinent labs within the past 24 hours have been reviewed.  CMP:   Recent Labs   Lab 10/08/23  0634 10/09/23  0320    139   K 4.2 3.8    108   CO2 22* 26   * 168*   BUN 19 16   CREATININE 0.8 0.7   CALCIUM 9.3 9.0   ANIONGAP 8 5*     Cardiac Markers: No results for input(s): "CKMB", "MYOGLOBIN", "BNP", "TROPISTAT" in the last 48 hours.  Coagulation:   Recent Labs   Lab 10/09/23  0320   INR 1.0   APTT 28.1       Significant Imaging: I have reviewed all pertinent imaging results/findings within the past 24 hours.      Assessment/Plan:      Active Hospital Problems    Diagnosis    *Chest pain    LBBB (left bundle branch block)    Hyperlipidemia    CAD (coronary artery disease)    Cigarette smoker    Chronic bilateral low back pain without sciatica    Cystic disease of liver    Benign essential HTN    COPD (chronic obstructive pulmonary disease)    Type 2 diabetes mellitus with circulatory disorder, with long-term current use of " insulin     PLAN    Noted   ECHO and no  wall motion abnormalitis    imdur   NGT PRN .  Follow [facility] report   ACS medications , asa , plavix , statin , BB , and patient report compliance   Trend cardiac enz  May need ranexa .  Pending disposition pending [facility] report         VTE Risk Mitigation (From admission, onward)         Ordered     IP VTE LOW RISK PATIENT  Once         10/07/23 1442     Place sequential compression device  Until discontinued         10/07/23 1442                Discharge Planning   DAVID: 10/9/2023     Code Status: Full Code   Is the patient medically ready for discharge?:     Reason for patient still in hospital (select all that apply): Treatment  Discharge Plan A: Home                  Louis Ruggiero MD  Department of Hospital Medicine   [facility]

## 2023-10-09 NOTE — PROGRESS NOTES
10/09/23 0900   Tobacco Cessation Intervention   Do you use any type of tobacco product? Yes   Are you interested in quitting use of tobacco products? Ready to quit   Are you interested in Nicotine Replacement for symptom relief? No   $ Smoking Cessation Charges Smoking Cessation - Intermediate (Non-CTTS)

## 2023-10-10 LAB
ANION GAP SERPL CALC-SCNC: 6 MMOL/L (ref 8–16)
BUN SERPL-MCNC: 13 MG/DL (ref 8–23)
CALCIUM SERPL-MCNC: 9.1 MG/DL (ref 8.7–10.5)
CHLORIDE SERPL-SCNC: 107 MMOL/L (ref 95–110)
CO2 SERPL-SCNC: 26 MMOL/L (ref 23–29)
CREAT SERPL-MCNC: 0.7 MG/DL (ref 0.5–1.4)
EST. GFR  (NO RACE VARIABLE): >60 ML/MIN/1.73 M^2
GLUCOSE SERPL-MCNC: 135 MG/DL (ref 70–110)
GLUCOSE SERPL-MCNC: 139 MG/DL (ref 70–110)
GLUCOSE SERPL-MCNC: 172 MG/DL (ref 70–110)
GLUCOSE SERPL-MCNC: 195 MG/DL (ref 70–110)
POC ACTIVATED CLOTTING TIME K: 275 SEC (ref 74–137)
POTASSIUM SERPL-SCNC: 3.8 MMOL/L (ref 3.5–5.1)
SAMPLE: ABNORMAL
SODIUM SERPL-SCNC: 139 MMOL/L (ref 136–145)

## 2023-10-10 PROCEDURE — C1894 INTRO/SHEATH, NON-LASER: HCPCS | Performed by: INTERNAL MEDICINE

## 2023-10-10 PROCEDURE — 25000003 PHARM REV CODE 250: Performed by: INTERNAL MEDICINE

## 2023-10-10 PROCEDURE — 63600175 PHARM REV CODE 636 W HCPCS: Performed by: INTERNAL MEDICINE

## 2023-10-10 PROCEDURE — 93458 PR CATH PLACE/CORON ANGIO, IMG SUPER/INTERP,W LEFT HEART VENTRICULOGRAPHY: ICD-10-PCS | Mod: 26,,, | Performed by: INTERNAL MEDICINE

## 2023-10-10 PROCEDURE — 25000003 PHARM REV CODE 250: Performed by: NURSE PRACTITIONER

## 2023-10-10 PROCEDURE — C1769 GUIDE WIRE: HCPCS | Performed by: INTERNAL MEDICINE

## 2023-10-10 PROCEDURE — 93458 L HRT ARTERY/VENTRICLE ANGIO: CPT | Mod: 26,,, | Performed by: INTERNAL MEDICINE

## 2023-10-10 PROCEDURE — 36415 COLL VENOUS BLD VENIPUNCTURE: CPT | Performed by: INTERNAL MEDICINE

## 2023-10-10 PROCEDURE — 93571 PR HEART FLOW RESERV MEASURE,INIT VESSL: ICD-10-PCS | Mod: 26,52,LD, | Performed by: INTERNAL MEDICINE

## 2023-10-10 PROCEDURE — 99153 MOD SED SAME PHYS/QHP EA: CPT | Performed by: INTERNAL MEDICINE

## 2023-10-10 PROCEDURE — 27201423 OPTIME MED/SURG SUP & DEVICES STERILE SUPPLY: Performed by: INTERNAL MEDICINE

## 2023-10-10 PROCEDURE — 25500020 PHARM REV CODE 255: Performed by: INTERNAL MEDICINE

## 2023-10-10 PROCEDURE — C1887 CATHETER, GUIDING: HCPCS | Performed by: INTERNAL MEDICINE

## 2023-10-10 PROCEDURE — 99152 MOD SED SAME PHYS/QHP 5/>YRS: CPT | Mod: ,,, | Performed by: INTERNAL MEDICINE

## 2023-10-10 PROCEDURE — 99152 MOD SED SAME PHYS/QHP 5/>YRS: CPT | Performed by: INTERNAL MEDICINE

## 2023-10-10 PROCEDURE — 80048 BASIC METABOLIC PNL TOTAL CA: CPT | Performed by: INTERNAL MEDICINE

## 2023-10-10 PROCEDURE — 82962 GLUCOSE BLOOD TEST: CPT

## 2023-10-10 PROCEDURE — 99152 PR MOD CONSCIOUS SEDATION, SAME PHYS, 5+ YRS, FIRST 15 MIN: ICD-10-PCS | Mod: ,,, | Performed by: INTERNAL MEDICINE

## 2023-10-10 PROCEDURE — 93458 L HRT ARTERY/VENTRICLE ANGIO: CPT | Performed by: INTERNAL MEDICINE

## 2023-10-10 PROCEDURE — 21400001 HC TELEMETRY ROOM

## 2023-10-10 PROCEDURE — 93571 IV DOP VEL&/PRESS C FLO 1ST: CPT | Mod: 26,52,LD, | Performed by: INTERNAL MEDICINE

## 2023-10-10 PROCEDURE — 93799 UNLISTED CV SVC/PROCEDURE: CPT | Performed by: INTERNAL MEDICINE

## 2023-10-10 PROCEDURE — 63600175 PHARM REV CODE 636 W HCPCS: Performed by: STUDENT IN AN ORGANIZED HEALTH CARE EDUCATION/TRAINING PROGRAM

## 2023-10-10 RX ORDER — HEPARIN SODIUM 10000 [USP'U]/ML
INJECTION, SOLUTION INTRAVENOUS; SUBCUTANEOUS CODE/TRAUMA/SEDATION MEDICATION
Status: DISCONTINUED | OUTPATIENT
Start: 2023-10-10 | End: 2023-10-11 | Stop reason: HOSPADM

## 2023-10-10 RX ORDER — LIDOCAINE HYDROCHLORIDE 10 MG/ML
INJECTION, SOLUTION EPIDURAL; INFILTRATION; INTRACAUDAL; PERINEURAL CODE/TRAUMA/SEDATION MEDICATION
Status: DISCONTINUED | OUTPATIENT
Start: 2023-10-10 | End: 2023-10-11 | Stop reason: HOSPADM

## 2023-10-10 RX ORDER — MIDAZOLAM HYDROCHLORIDE 1 MG/ML
INJECTION INTRAMUSCULAR; INTRAVENOUS CODE/TRAUMA/SEDATION MEDICATION
Status: DISCONTINUED | OUTPATIENT
Start: 2023-10-10 | End: 2023-10-11 | Stop reason: HOSPADM

## 2023-10-10 RX ORDER — SODIUM CHLORIDE 9 MG/ML
INJECTION, SOLUTION INTRAVENOUS CONTINUOUS
Status: ACTIVE | OUTPATIENT
Start: 2023-10-10 | End: 2023-10-10

## 2023-10-10 RX ORDER — ISOSORBIDE MONONITRATE 60 MG/1
60 TABLET, EXTENDED RELEASE ORAL DAILY
Status: DISCONTINUED | OUTPATIENT
Start: 2023-10-11 | End: 2023-10-11 | Stop reason: HOSPADM

## 2023-10-10 RX ORDER — HYDRALAZINE HYDROCHLORIDE 20 MG/ML
10 INJECTION INTRAMUSCULAR; INTRAVENOUS EVERY 4 HOURS PRN
Status: DISCONTINUED | OUTPATIENT
Start: 2023-10-10 | End: 2023-10-11 | Stop reason: HOSPADM

## 2023-10-10 RX ORDER — FENTANYL CITRATE 50 UG/ML
INJECTION, SOLUTION INTRAMUSCULAR; INTRAVENOUS CODE/TRAUMA/SEDATION MEDICATION
Status: DISCONTINUED | OUTPATIENT
Start: 2023-10-10 | End: 2023-10-11 | Stop reason: HOSPADM

## 2023-10-10 RX ADMIN — ISOSORBIDE MONONITRATE 30 MG: 30 TABLET, EXTENDED RELEASE ORAL at 08:10

## 2023-10-10 RX ADMIN — ACETAMINOPHEN 650 MG: 325 TABLET ORAL at 08:10

## 2023-10-10 RX ADMIN — HYDRALAZINE HYDROCHLORIDE 10 MG: 20 INJECTION INTRAMUSCULAR; INTRAVENOUS at 04:10

## 2023-10-10 RX ADMIN — ATORVASTATIN CALCIUM 40 MG: 40 TABLET, FILM COATED ORAL at 08:10

## 2023-10-10 RX ADMIN — TAMSULOSIN HYDROCHLORIDE 0.4 MG: 0.4 CAPSULE ORAL at 08:10

## 2023-10-10 RX ADMIN — HYDRALAZINE HYDROCHLORIDE 10 MG: 20 INJECTION INTRAMUSCULAR; INTRAVENOUS at 11:10

## 2023-10-10 RX ADMIN — ASPIRIN 81 MG: 81 TABLET, COATED ORAL at 08:10

## 2023-10-10 RX ADMIN — CLOPIDOGREL BISULFATE 75 MG: 75 TABLET, FILM COATED ORAL at 08:10

## 2023-10-10 RX ADMIN — LOSARTAN POTASSIUM 100 MG: 50 TABLET, FILM COATED ORAL at 08:10

## 2023-10-10 RX ADMIN — SODIUM CHLORIDE: 0.9 INJECTION, SOLUTION INTRAVENOUS at 09:10

## 2023-10-10 RX ADMIN — INSULIN DETEMIR 35 UNITS: 100 INJECTION, SOLUTION SUBCUTANEOUS at 08:10

## 2023-10-10 RX ADMIN — CARVEDILOL 3.12 MG: 3.12 TABLET, FILM COATED ORAL at 08:10

## 2023-10-10 NOTE — NURSING
0938 patient off the floor for procedure.     1351 report received from [facility]. Awaiting patient.     1408 patient arrived back to room. Patient awake and alert. No acute distress noted. Cath site soft and benign without any complications noted. Post cath instructions provided. Safety precautions in place. Will continue to monitor.

## 2023-10-10 NOTE — SUBJECTIVE & OBJECTIVE
"Interval History:     Review of Systems  Objective:     Vital Signs (Most Recent):  Temp: 97.7 °F (36.5 °C) (10/10/23 0703)  Pulse: (!) 59 (10/10/23 1200)  Resp: 16 (10/10/23 1200)  BP: 137/72 (10/10/23 1200)  SpO2: (!) 93 % (10/10/23 1200) Vital Signs (24h Range):  Temp:  [97.7 °F (36.5 °C)-98.1 °F (36.7 °C)] 97.7 °F (36.5 °C)  Pulse:  [57-82] 59  Resp:  [11-20] 16  SpO2:  [91 %-97 %] 93 %  BP: (108-201)/() 137/72     Weight: 90.9 kg (200 lb 6.4 oz)  Body mass index is 26.44 kg/m².    Intake/Output Summary (Last 24 hours) at 10/10/2023 1333  Last data filed at 10/10/2023 0926  Gross per 24 hour   Intake --   Output 3250 ml   Net -3250 ml         Physical Exam  Constitutional:       General: He is not in acute distress.     Appearance: He is well-developed.   HENT:      Head: Normocephalic.   Eyes:      Pupils: Pupils are equal, round, and reactive to light.   Cardiovascular:      Rate and Rhythm: Normal rate and regular rhythm.   Pulmonary:      Effort: Pulmonary effort is normal. No respiratory distress.   Abdominal:      General: There is no distension.      Tenderness: There is no abdominal tenderness.   Musculoskeletal:         General: Normal range of motion.      Cervical back: Neck supple.   Skin:     Findings: No rash.   Neurological:      Mental Status: He is alert and oriented to person, place, and time.   Psychiatric:         Mood and Affect: Mood normal.             Significant Labs: All pertinent labs within the past 24 hours have been reviewed.  CMP:   Recent Labs   Lab 10/09/23  0320 10/10/23  0320    139   K 3.8 3.8    107   CO2 26 26   * 139*   BUN 16 13   CREATININE 0.7 0.7   CALCIUM 9.0 9.1   ANIONGAP 5* 6*     Cardiac Markers: No results for input(s): "CKMB", "MYOGLOBIN", "BNP", "TROPISTAT" in the last 48 hours.    Significant Imaging: I have reviewed all pertinent imaging results/findings within the past 24 hours.  "

## 2023-10-10 NOTE — PLAN OF CARE
Problem: Adult Inpatient Plan of Care  Goal: Plan of Care Review  10/10/2023 1555 by Marisa Garza RN  Outcome: Ongoing, Progressing  10/10/2023 1554 by Marisa Garza RN  Outcome: Ongoing, Progressing  Goal: Patient-Specific Goal (Individualized)  10/10/2023 1555 by Marisa Garza RN  Outcome: Ongoing, Progressing  10/10/2023 1554 by Marisa Garza RN  Outcome: Ongoing, Progressing  Goal: Absence of Hospital-Acquired Illness or Injury  10/10/2023 1555 by Marisa Garza RN  Outcome: Ongoing, Progressing  10/10/2023 1554 by Marisa Garza RN  Outcome: Ongoing, Progressing  Goal: Optimal Comfort and Wellbeing  10/10/2023 1555 by Marisa Garza RN  Outcome: Ongoing, Progressing  10/10/2023 1554 by Marisa Garza RN  Outcome: Ongoing, Progressing  Goal: Readiness for Transition of Care  10/10/2023 1555 by Marisa Garza RN  Outcome: Ongoing, Progressing  10/10/2023 1554 by Marisa Garza RN  Outcome: Ongoing, Progressing     Problem: Diabetes Comorbidity  Goal: Blood Glucose Level Within Targeted Range  10/10/2023 1555 by Marisa Garza RN  Outcome: Ongoing, Progressing  10/10/2023 1554 by Marisa Garza RN  Outcome: Ongoing, Progressing     Problem: Fall Injury Risk  Goal: Absence of Fall and Fall-Related Injury  Outcome: Ongoing, Progressing     Problem: Arrhythmia/Dysrhythmia (Cardiac Catheterization)  Goal: Stable Heart Rate and Rhythm  Outcome: Ongoing, Progressing     Problem: Bleeding (Cardiac Catheterization)  Goal: Absence of Bleeding  Outcome: Ongoing, Progressing     Problem: Pain (Cardiac Catheterization)  Goal: Acceptable Pain Control  Outcome: Ongoing, Progressing     Problem: Vascular Access Protection (Cardiac Catheterization)  Goal: Absence of Vascular Access Complication  Outcome: Ongoing, Progressing

## 2023-10-10 NOTE — PROGRESS NOTES
[facility] Medicine  Progress Note    Patient Name: Sampson Holt  MRN: [id number]  Patient Class: IP- Inpatient   Admission Date: 10/7/2023  Length of Stay: 1 days  Attending Physician: Louis Ruggiero MD  Primary Care Provider: Damian Vergara MD        Subjective:     Principal Problem:Chest pain        HPI:  67-year-old male with coronary artery disease status post PCI to RCA and distal left circumflex earlier this year in May history of CHF, diabetes, hypertension, CVA, endocarditis, COPD came with CP.  Sarted this morning proximally 30 minutes prior to arrival states he was walking through the kitchen at work when the pain began describes shortness of breath associated nausea.  He had LHC on may and recently admitted with CP and cardiology cleared and planned for outpatient angiogram again and patient report he had done 8 weeks ago  but no report seen in Epic.  He denies any dysuria urgency or frequency , fever , SOB , CP , leg pain currently . EKG with new LBBB obviously . Previous EKG did not show LBBB. He said he had few minor falls in between too.  First troponin neg and CXR neg . Labs OK .      Overview/Hospital Course:  He is CP free . But had some last night . Trop neg. Mentioned that he recently had diverticulitis . Abdomen is benign .  Repeat ECHO pending . Cardiac eval pending     10/9  Seen in am . Cardio decided LHC and going today   Had CP last night     10/10  Seen in am and  later gone for [facility]. Findings d/w dr Ryan. No stent needed . LAD lesion       Interval History:     Review of Systems  Objective:     Vital Signs (Most Recent):  Temp: 97.7 °F (36.5 °C) (10/10/23 0703)  Pulse: (!) 59 (10/10/23 1200)  Resp: 16 (10/10/23 1200)  BP: 137/72 (10/10/23 1200)  SpO2: (!) 93 % (10/10/23 1200) Vital Signs (24h Range):  Temp:  [97.7 °F (36.5 °C)-98.1 °F (36.7 °C)] 97.7 °F (36.5 °C)  Pulse:  [57-82] 59  Resp:  [11-20] 16  SpO2:  [91 %-97 %] 93 %  BP: (108-201)/() 137/72  "    Weight: 90.9 kg (200 lb 6.4 oz)  Body mass index is 26.44 kg/m².    Intake/Output Summary (Last 24 hours) at 10/10/2023 1333  Last data filed at 10/10/2023 0926  Gross per 24 hour   Intake --   Output 3250 ml   Net -3250 ml         Physical Exam  Constitutional:       General: He is not in acute distress.     Appearance: He is well-developed.   HENT:      Head: Normocephalic.   Eyes:      Pupils: Pupils are equal, round, and reactive to light.   Cardiovascular:      Rate and Rhythm: Normal rate and regular rhythm.   Pulmonary:      Effort: Pulmonary effort is normal. No respiratory distress.   Abdominal:      General: There is no distension.      Tenderness: There is no abdominal tenderness.   Musculoskeletal:         General: Normal range of motion.      Cervical back: Neck supple.   Skin:     Findings: No rash.   Neurological:      Mental Status: He is alert and oriented to person, place, and time.   Psychiatric:         Mood and Affect: Mood normal.             Significant Labs: All pertinent labs within the past 24 hours have been reviewed.  CMP:   Recent Labs   Lab 10/09/23  0320 10/10/23  0320    139   K 3.8 3.8    107   CO2 26 26   * 139*   BUN 16 13   CREATININE 0.7 0.7   CALCIUM 9.0 9.1   ANIONGAP 5* 6*     Cardiac Markers: No results for input(s): "CKMB", "MYOGLOBIN", "BNP", "TROPISTAT" in the last 48 hours.    Significant Imaging: I have reviewed all pertinent imaging results/findings within the past 24 hours.      Assessment/Plan:      Active Hospital Problems    Diagnosis    *Chest pain    LBBB (left bundle branch block)    Hyperlipidemia    CAD (coronary artery disease)    Cigarette smoker    Chronic bilateral low back pain without sciatica    Cystic disease of liver    Benign essential HTN    COPD (chronic obstructive pulmonary disease)    Type 2 diabetes mellitus with circulatory disorder, with long-term current use of insulin     PLAN     S/p LHC and no stent needed " . Appear pain from residual lesion from LAD     imdur dose increased   NGT PRN .  Follow  [facility] full report   ACS medications , asa , plavix , statin , BB , and patient report compliance   Trend cardiac enz  May need ranexa .  DC today or tomorrow           VTE Risk Mitigation (From admission, onward)         Ordered     heparin (porcine) injection  Code/trauma/sedation medication         10/10/23 1036     IP VTE LOW RISK PATIENT  Once         10/07/23 1442     Place sequential compression device  Until discontinued         10/07/23 1442                Discharge Planning   DAVID: 10/11/2023     Code Status: Full Code   Is the patient medically ready for discharge?:     Reason for patient still in hospital (select all that apply): Treatment  Discharge Plan A: Home                  Louis Ruggiero MD  Department of Hospital Medicine   [facility]

## 2023-10-11 VITALS
TEMPERATURE: 98 F | DIASTOLIC BLOOD PRESSURE: 82 MMHG | WEIGHT: 200.38 LBS | HEART RATE: 68 BPM | OXYGEN SATURATION: 95 % | BODY MASS INDEX: 26.56 KG/M2 | RESPIRATION RATE: 18 BRPM | HEIGHT: 73 IN | SYSTOLIC BLOOD PRESSURE: 126 MMHG

## 2023-10-11 LAB
ANION GAP SERPL CALC-SCNC: 8 MMOL/L (ref 8–16)
BUN SERPL-MCNC: 13 MG/DL (ref 8–23)
CALCIUM SERPL-MCNC: 9.2 MG/DL (ref 8.7–10.5)
CHLORIDE SERPL-SCNC: 107 MMOL/L (ref 95–110)
CO2 SERPL-SCNC: 24 MMOL/L (ref 23–29)
CREAT SERPL-MCNC: 0.8 MG/DL (ref 0.5–1.4)
EST. GFR  (NO RACE VARIABLE): >60 ML/MIN/1.73 M^2
GLUCOSE SERPL-MCNC: 121 MG/DL (ref 70–110)
GLUCOSE SERPL-MCNC: 163 MG/DL (ref 70–110)
GLUCOSE SERPL-MCNC: 168 MG/DL (ref 70–110)
GLUCOSE SERPL-MCNC: 171 MG/DL (ref 70–110)
POTASSIUM SERPL-SCNC: 3.7 MMOL/L (ref 3.5–5.1)
SODIUM SERPL-SCNC: 139 MMOL/L (ref 136–145)

## 2023-10-11 PROCEDURE — 25000003 PHARM REV CODE 250: Performed by: INTERNAL MEDICINE

## 2023-10-11 PROCEDURE — 99232 PR SUBSEQUENT HOSPITAL CARE,LEVL II: ICD-10-PCS | Mod: ,,, | Performed by: INTERNAL MEDICINE

## 2023-10-11 PROCEDURE — 25000003 PHARM REV CODE 250: Performed by: STUDENT IN AN ORGANIZED HEALTH CARE EDUCATION/TRAINING PROGRAM

## 2023-10-11 PROCEDURE — 36415 COLL VENOUS BLD VENIPUNCTURE: CPT | Performed by: INTERNAL MEDICINE

## 2023-10-11 PROCEDURE — 94761 N-INVAS EAR/PLS OXIMETRY MLT: CPT

## 2023-10-11 PROCEDURE — 80048 BASIC METABOLIC PNL TOTAL CA: CPT | Performed by: INTERNAL MEDICINE

## 2023-10-11 PROCEDURE — 99232 SBSQ HOSP IP/OBS MODERATE 35: CPT | Mod: ,,, | Performed by: INTERNAL MEDICINE

## 2023-10-11 RX ORDER — DOXYCYCLINE 100 MG/1
100 CAPSULE ORAL EVERY 12 HOURS
Qty: 14 CAPSULE | Refills: 0 | Status: SHIPPED | OUTPATIENT
Start: 2023-10-11 | End: 2023-12-04

## 2023-10-11 RX ORDER — DOXYCYCLINE 100 MG/1
100 CAPSULE ORAL EVERY 12 HOURS
Status: DISCONTINUED | OUTPATIENT
Start: 2023-10-11 | End: 2023-10-11 | Stop reason: HOSPADM

## 2023-10-11 RX ADMIN — ATORVASTATIN CALCIUM 40 MG: 40 TABLET, FILM COATED ORAL at 10:10

## 2023-10-11 RX ADMIN — DOXYCYCLINE HYCLATE 100 MG: 100 CAPSULE ORAL at 03:10

## 2023-10-11 RX ADMIN — ISOSORBIDE MONONITRATE 60 MG: 60 TABLET, EXTENDED RELEASE ORAL at 10:10

## 2023-10-11 RX ADMIN — BUTALBITAL, ACETAMINOPHEN AND CAFFEINE 1 TABLET: 325; 50; 40 TABLET ORAL at 10:10

## 2023-10-11 RX ADMIN — CARVEDILOL 3.12 MG: 3.12 TABLET, FILM COATED ORAL at 10:10

## 2023-10-11 RX ADMIN — CLOPIDOGREL BISULFATE 75 MG: 75 TABLET, FILM COATED ORAL at 09:10

## 2023-10-11 RX ADMIN — LOSARTAN POTASSIUM 100 MG: 50 TABLET, FILM COATED ORAL at 10:10

## 2023-10-11 RX ADMIN — ASPIRIN 81 MG: 81 TABLET, COATED ORAL at 09:10

## 2023-10-11 RX ADMIN — TAMSULOSIN HYDROCHLORIDE 0.4 MG: 0.4 CAPSULE ORAL at 10:10

## 2023-10-11 NOTE — DISCHARGE INSTRUCTIONS
Follow up with Primary care Post Radial Cath Discharge Instructions  Keep puncture site covered with current bandage for 24 hours.  You may shower in 24 hours. Do not take a tub bath or submerge the puncture site in water for 72 hours.   You may cover the site with a Band-Aid. Keep Band-Aid clean and dry at all times.    No lifting over 5 pounds with the arm you puncture site is on for 72 hours.  No strenuous activity such as bowling, tennis, etc for 72 hours  Do not operate lawnmower, motorcycle, chainsaw, or all terrain vehicle for 72 hours.  No blood pressure or tourniquets on affected arm for 72 hours.   The puncture site may be slightly bruised and sore following your procedure.   Drink 6-8 glasses of clear liquids during the next 24 hours to flush the dye out.     If Bleeding Occurs, DO NOT PANIC  Place 1 or 2 fingers over the puncture site and hold firm pressure to stop bleeding. You may be able to feel your pulse as you hold pressure  Lift you fingers after 5 minutes to see if the bleeding stopped.  Once has stopped, gently wipe the wrist area clean and cover with a bandage.  If the bleeding does not stop after 30 minutes, or if there is a large amount of bleeding or spurting, call 911! Do not drive yourself to the hospital.     Should any of the following occur, Contact your Physician Immediately:  Redness, inflamation, swelling, chills or fever, or discolred drainage at procedure site   Coldness, discoloration, ongoing numbness, severe pain, or swelling. Expect mild tingling of hand and tenderness at the puncture site for up to 3 days. If this persists or other symptoms develop, notify your physician

## 2023-10-11 NOTE — PROGRESS NOTES
[facility] Medicine  Progress Note    Patient Name: Sampson Holt  MRN: [id number]  Patient Class: IP- Inpatient   Admission Date: 10/7/2023  Length of Stay: 2 days  Attending Physician: Louis Ruggiero MD  Primary Care Provider: Damian Vergara MD        Subjective:     Principal Problem:Chest pain        HPI:  67-year-old male with coronary artery disease status post PCI to RCA and distal left circumflex earlier this year in May history of CHF, diabetes, hypertension, CVA, endocarditis, COPD came with CP.  Sarted this morning proximally 30 minutes prior to arrival states he was walking through the kitchen at work when the pain began describes shortness of breath associated nausea.  He had LHC on may and recently admitted with CP and cardiology cleared and planned for outpatient angiogram again and patient report he had done 8 weeks ago  but no report seen in Epic.  He denies any dysuria urgency or frequency , fever , SOB , CP , leg pain currently . EKG with new LBBB obviously . Previous EKG did not show LBBB. He said he had few minor falls in between too.  First troponin neg and CXR neg . Labs OK .      Overview/Hospital Course:  He is CP free . But had some last night . Trop neg. Mentioned that he recently had diverticulitis . Abdomen is benign .  Repeat ECHO pending . Cardiac eval pending     10/9  Seen in am . Cardio decided LHC and going today   Had CP last night     10/10  Seen in am and  later gone for LHC. Findings d/w dr Rosenberg. No stent needed . LAD lesion     10/11  Had small amount of blood comes out with cough . Patient said not feeling good and want to stay .  LHC finding d/w dr rosenberg      Interval History:     Review of Systems  Objective:     Vital Signs (Most Recent):  Temp: 99 °F (37.2 °C) (10/11/23 1119)  Pulse: 69 (10/11/23 1119)  Resp: 18 (10/11/23 1119)  BP: (!) 170/80 (10/11/23 1119)  SpO2: (!) 94 % (10/11/23 1119) Vital Signs (24h Range):  Temp:  [97.6 °F (36.4  "°C)-99 °F (37.2 °C)] 99 °F (37.2 °C)  Pulse:  [63-82] 69  Resp:  [16-21] 18  SpO2:  [92 %-98 %] 94 %  BP: (128-196)/(66-95) 170/80     Weight: 90.9 kg (200 lb 6.4 oz)  Body mass index is 26.44 kg/m².    Intake/Output Summary (Last 24 hours) at 10/11/2023 1328  Last data filed at 10/11/2023 1045  Gross per 24 hour   Intake 1320 ml   Output 2150 ml   Net -830 ml         Physical Exam  Constitutional:       General: He is not in acute distress.     Appearance: He is well-developed.   HENT:      Head: Normocephalic.   Eyes:      Pupils: Pupils are equal, round, and reactive to light.   Cardiovascular:      Rate and Rhythm: Normal rate and regular rhythm.      Pulses: Normal pulses.   Pulmonary:      Effort: No respiratory distress.   Abdominal:      General: Abdomen is flat. There is no distension.      Tenderness: There is no abdominal tenderness.   Musculoskeletal:      Cervical back: Neck supple.   Skin:     General: Skin is warm.      Findings: No rash.   Neurological:      Mental Status: He is alert and oriented to person, place, and time.   Psychiatric:         Mood and Affect: Mood normal.             Significant Labs: All pertinent labs within the past 24 hours have been reviewed.  CMP:   Recent Labs   Lab 10/10/23  0320 10/11/23  0320    139   K 3.8 3.7    107   CO2 26 24   * 163*   BUN 13 13   CREATININE 0.7 0.8   CALCIUM 9.1 9.2   ANIONGAP 6* 8     Cardiac Markers: No results for input(s): "CKMB", "MYOGLOBIN", "BNP", "TROPISTAT" in the last 48 hours.  Coagulation: No results for input(s): "PT", "INR", "APTT" in the last 48 hours.    Significant Imaging: I have reviewed all pertinent imaging results/findings within the past 24 hours.      Assessment/Plan:      Active Hospital Problems    Diagnosis    *Chest pain    LBBB (left bundle branch block)    Hyperlipidemia    CAD (coronary artery disease)    Cigarette smoker    Chronic bilateral low back pain without sciatica    Cystic disease " of liver    Benign essential HTN    COPD (chronic obstructive pulmonary disease)    Type 2 diabetes mellitus with circulatory disorder, with long-term current use of insulin         PLAN     S/p LHC and no stent needed . Appear pain from residual lesion from LAD    Small amount of blood clot comes out and observation   May need to hold plavix if more episode    imdur dose increased   NGT PRN .  Follow  [facility] full report   ACS medications , asa , plavix , statin , BB , and patient report compliance   Trend cardiac enz  DC today or tomorrow     VTE Risk Mitigation (From admission, onward)         Ordered     heparin (porcine) injection  Code/trauma/sedation medication         10/10/23 1036     IP VTE LOW RISK PATIENT  Once         10/07/23 1442     Place sequential compression device  Until discontinued         10/07/23 1442                Discharge Planning   DAVID: 10/11/2023     Code Status: Full Code   Is the patient medically ready for discharge?:     Reason for patient still in hospital (select all that apply): Treatment  Discharge Plan A: Home                  Louis Ruggiero MD  Department of Hospital Medicine   [facility]

## 2023-10-11 NOTE — PROGRESS NOTES
[facility]  Department of Cardiology  Progress Note      PATIENT NAME: Sampson Holt  MRN: [id number]  TODAY'S DATE: 10/11/2023  ADMIT DATE: 10/7/2023                          CONSULT REQUESTED BY: Louis Ruggiero MD    SUBJECTIVE     PRINCIPAL PROBLEM: Chest pain      REASON FOR CONSULT:    Chest Pain    INTERVAL HISTORY:  10/11/23  Pt resting in bed comfortably in flat position  Coughed up small blood tinged mucus  Denies chest pain or shortness of breath  Rt radial cath site w/o complications        10/9/23  Ambulatory in room  NPO for cath  Had episode of CP today  SR to Sinus hakan on tele      HPI:    Patient is a 67-year-old male with past medical history of CAD s/p PCI to RCA and distal left circumflex earlier this year in May, CHF, hypertension, diabetes, CVA, endocarditis, COPD who presented to the ED with complaints of chest pain with associated shortness of breath and nausea that started yesterday morning 30 minutes prior to arrival.  H&H 14.6/43.1, K 4.2, creatinine 0.8, magnesium 1.9, BNP 49, troponin HS 5.1.  EKG sinus rhythm with first-degree AV block, left axis deviation, left bundle-branch block (new).  Patient had a recent nuclear stress test on 06/22/2023 that was negative for reversible ischemia.  Last echocardiogram was on 08/12/2023 which showed EF of 55-60% and normal diastolic function.  Repeat echocardiogram this admission is pending.  Patient was acutely hypertensive upon admission with systolic BP is in the 170s to 200s.  Blood pressures have stabilized this a.m..      Patient was just discharged on 08/13/2023 for atypical chest pain, dizziness and episode of syncope.  Patient was seen by Cardiology and cleared for discharge with close follow up in the clinic and possible angiogram outpatient on August 23rd.  However this was not performed.  Patient states that nobody called him to set this up.        FROM H&P  HPI: 67-year-old male with coronary artery disease status post  PCI to RCA and distal left circumflex earlier this year in May history of CHF, diabetes, hypertension, CVA, endocarditis, COPD came with CP.  Sarted this morning proximally 30 minutes prior to arrival states he was walking through the kitchen at work when the pain began describes shortness of breath associated nausea.  He had LHC on may and recently admitted with CP and cardiology cleared and planned for outpatient angiogram again and patient report he had done 8 weeks ago  but no report seen in Epic.  He denies any dysuria urgency or frequency , fever , SOB , CP , leg pain currently . EKG with new LBBB obviously . Previous EKG did not show LBBB. He said he had few minor falls in between too.  First troponin neg and CXR neg . Labs OK .        Review of patient's allergies indicates:  No Known Allergies    Past Medical History:   Diagnosis Date    Abdominal pain 2022    CHF (congestive heart failure)     Diabetes mellitus 2018    Emphysema lung     Endocarditis 2011    Hypertension     Stroke 2011    denies residual     Past Surgical History:   Procedure Laterality Date    BACK SURGERY  1981    COLONOSCOPY N/A 2/23/2023    Procedure: COLONOSCOPY;  Surgeon: Jonn Cruz MD;  Location: [facility] ENDO;  Service: Endoscopy;  Laterality: N/A;    CORONARY ANGIOGRAPHY N/A 5/9/2023    Procedure: ANGIOGRAM, CORONARY ARTERY;  Surgeon: Antonio Kessler MD;  Location: [facility] CATH/EP LAB;  Service: Cardiology;  Laterality: N/A;    EXCISION OF HYDROCELE      x2    FRACTIONAL FLOW RESERVE (FFR), CORONARY  5/11/2023    Procedure: Fractional Flow [place] (FFR), Coronary;  Surgeon: Antonio Kessler MD;  Location: [facility] CATH/EP LAB;  Service: Cardiology;;    INGUINAL HERNIA REPAIR  1960    INTRAVASCULAR ULTRASOUND, CORONARY N/A 5/9/2023    Procedure: Ultrasound-coronary;  Surgeon: Antonio Kessler MD;  Location: [facility] CATH/EP LAB;  Service: Cardiology;  Laterality: N/A;    IVUS, CORONARY  5/11/2023    Procedure: IVUS, Coronary;  Surgeon: Antonio  MD Checo;  Location: [facility] CATH/EP LAB;  Service: Cardiology;;    LEFT HEART CATHETERIZATION Left 5/11/2023    Procedure: Left heart cath;  Surgeon: Antonio Kessler MD;  Location: [facility] CATH/EP LAB;  Service: Cardiology;  Laterality: Left;    PERCUTANEOUS TRANSLUMINAL BALLOON ANGIOPLASTY OF CORONARY ARTERY  5/9/2023    Procedure: Angioplasty-coronary;  Surgeon: Antonio Kessler MD;  Location: [facility] CATH/EP LAB;  Service: Cardiology;;    RHINOPLASTY      STENT, DRUG ELUTING, SINGLE VESSEL, CORONARY  5/9/2023    Procedure: Stent, Drug Eluting, Single Vessel, Coronary;  Surgeon: Antonio Kessler MD;  Location: [facility] CATH/EP LAB;  Service: Cardiology;;    SURGICAL REMOVAL OF NODULE OF VOCAL CORD       Social History     Tobacco Use    Smoking status: Every Day     Types: Cigarettes    Smokeless tobacco: Former    Tobacco comments:     Pt ready to quit smoking and states he can on his own. Seen 10/9/23 for inpatient smoking cessation. [employer].   Substance Use Topics    Alcohol use: Yes     Alcohol/week: 14.0 standard drinks of alcohol     Types: 14 Shots of liquor per week    Drug use: Yes     Types: Marijuana        REVIEW OF SYSTEMS    As mentioned per interval history    OBJECTIVE     VITAL SIGNS (Most Recent)  Temp: 99 °F (37.2 °C) (10/11/23 1119)  Pulse: 69 (10/11/23 1119)  Resp: 18 (10/11/23 1119)  BP: (!) 170/80 (10/11/23 1119)  SpO2: (!) 94 % (10/11/23 1119)    VENTILATION STATUS  Resp: 18 (10/11/23 1119)  SpO2: (!) 94 % (10/11/23 1119)           I & O (Last 24H):  Intake/Output Summary (Last 24 hours) at 10/11/2023 1240  Last data filed at 10/11/2023 1045  Gross per 24 hour   Intake 1320 ml   Output 2150 ml   Net -830 ml         WEIGHTS  Wt Readings from Last 3 Encounters:   10/09/23 0333 90.9 kg (200 lb 6.4 oz)   10/08/23 0421 90.2 kg (198 lb 14.4 oz)   10/07/23 1948 90.2 kg (198 lb 14.4 oz)   10/07/23 1031 88.5 kg (195 lb)   08/11/23 2300 88.2 kg (194 lb 8 oz)   08/11/23 1626 90.7 kg (200 lb)   08/12/23  1113 88 kg (194 lb)       PHYSICAL EXAM    CONSTITUTIONAL: NAD  LUNGS: productive cough w/ blood tinged mucus  EXTREMITIES right radial cath site soft, no swelling or bleeding  NEURO: AAO X 3  PSYCH: normal affect     HOME MEDICATIONS:  No current facility-administered medications on file prior to encounter.     Current Outpatient Medications on File Prior to Encounter   Medication Sig Dispense Refill    aspirin (ECOTRIN) 81 MG EC tablet Take 1 tablet (81 mg total) by mouth once daily. 90 tablet 3    atorvastatin (LIPITOR) 40 MG tablet Take 1 tablet (40 mg total) by mouth once daily. 90 tablet 3    carvediloL (COREG) 3.125 MG tablet Take 1 tablet (3.125 mg total) by mouth 2 (two) times daily. 60 tablet 9    clopidogreL (PLAVIX) 75 mg tablet Take 1 tablet (75 mg total) by mouth once daily. 90 tablet 3    insulin glargine,hum.rec.anlog (LANTUS SUBQ) Inject 35 Units into the skin once daily.      losartan (COZAAR) 50 MG tablet Take 2 tablets (100 mg total) by mouth once daily. 180 tablet 3    tamsulosin (FLOMAX) 0.4 mg Cap Take 1 capsule (0.4 mg total) by mouth once daily. Take in evening. 90 capsule 4       SCHEDULED MEDS:   aspirin  81 mg Oral Daily    atorvastatin  40 mg Oral Daily    carvediloL  3.125 mg Oral BID    clopidogreL  75 mg Oral Daily    insulin detemir U-100 (Levemir)  35 Units Subcutaneous QHS    isosorbide mononitrate  60 mg Oral Daily    losartan  100 mg Oral Daily    tamsulosin  0.4 mg Oral Daily       CONTINUOUS INFUSIONS:   sodium chloride 0.9% 75 mL/hr at 10/10/23 0922    sodium chloride 0.9% 300 mL (10/10/23 1016)       PRN MEDS:acetaminophen, butalbital-acetaminophen-caffeine -40 mg, dextrose 50%, dextrose 50%, fentaNYL, glucagon (human recombinant), glucose, glucose, heparin (porcine), hydrALAZINE, iohexoL, LIDOcaine (PF) 10 mg/ml (1%), midazolam, naloxone, nitroGLYCERIN, sodium chloride 0.9%, sodium chloride 0.9%, sodium chloride 0.9%    LABS AND DIAGNOSTICS     CBC LAST 3 DAYS  Recent  "Labs   Lab 10/07/23  1048 10/09/23  0321   WBC 5.94 4.75   RBC 4.54* 3.98*   HGB 14.6 12.8*   HCT 43.1 38.0*   MCV 95 96   MCH 32.2* 32.2*   MCHC 33.9 33.7   RDW 13.1 12.9    161   MPV 9.0* 9.8   GRAN 61.6  3.7 52.3  2.5   LYMPH 28.3  1.7 36.0  1.7   MONO 7.9  0.5 8.8  0.4   BASO 0.05 0.04   NRBC 0 0         COAGULATION LAST 3 DAYS  Recent Labs   Lab 10/09/23  0320   INR 1.0   APTT 28.1         CHEMISTRY LAST 3 DAYS  Recent Labs   Lab 10/07/23  1048 10/08/23  0634 10/09/23  0320 10/10/23  0320 10/11/23  0320      < > 139 139 139   K 4.1   < > 3.8 3.8 3.7      < > 108 107 107   CO2 20*   < > 26 26 24   ANIONGAP 14   < > 5* 6* 8   BUN 15   < > 16 13 13   CREATININE 1.0   < > 0.7 0.7 0.8      < > 168* 139* 163*   CALCIUM 9.6   < > 9.0 9.1 9.2   MG 1.9  --   --   --   --    ALBUMIN 4.4  --   --   --   --    PROT 7.6  --   --   --   --    ALKPHOS 82  --   --   --   --    ALT 16  --   --   --   --    AST 20  --   --   --   --    BILITOT 0.5  --   --   --   --     < > = values in this interval not displayed.         CARDIAC PROFILE LAST 3 DAYS  Recent Labs   Lab 10/07/23  1048 10/07/23  1248 10/07/23  1650 10/07/23  2014   BNP 49  --   --   --    TROPONINIHS 5.4 5.4 5.3 5.1         ENDOCRINE LAST 3 DAYS  No results for input(s): "TSH", "PROCAL" in the last 168 hours.    LAST ARTERIAL BLOOD GAS  ABG  No results for input(s): "PH", "PO2", "PCO2", "HCO3", "BE" in the last 168 hours.    LAST 7 DAYS MICROBIOLOGY   Microbiology Results (last 7 days)       ** No results found for the last 168 hours. **            MOST RECENT IMAGING  Echo    Left Ventricle: The left ventricle is normal in size. Mildly increased   wall thickness. There is concentric remodeling. Septal motion is normal.   There is normal systolic function. Ejection fraction by visual   approximation is 60%.    Right Ventricle: Normal right ventricular cavity size. Wall thickness   is normal. Right ventricle wall motion  is normal. " Systolic function is   normal.    Mitral Valve: There is mild posterior mitral annular calcification   present.    IVC/SVC: Normal venous pressure at 3 mmHg.    A limited echo was performed using limited 2D and color flow Doppler      ECHOCARDIOGRAM RESULTS (last 5)  Results for orders placed during the hospital encounter of 08/11/23    Echo    Interpretation Summary    Left Ventricle: The left ventricle is normal in size. Mildly increased wall thickness. Normal wall motion. There is normal systolic function with a visually estimated ejection fraction of 55 - 60%. There is normal diastolic function.    Right Ventricle: Normal right ventricular cavity size. Systolic function is normal.    Aortic Valve: The aortic valve is a trileaflet valve.    Mitral Valve: There is no stenosis. The mean pressure gradient across the mitral valve is 3 mmHg at a heart rate of  bpm.    IVC/SVC: Normal venous pressure at 3 mmHg.      Results for orders placed during the hospital encounter of 05/09/23    Echo    Interpretation Summary  · The left ventricle is normal in size with mild concentric hypertrophy and normal systolic function.  · The estimated ejection fraction is 55%.  · Normal left ventricular diastolic function.  · Normal right ventricular size with normal right ventricular systolic function.  · Normal central venous pressure (3 mmHg).      CURRENT/PREVIOUS VISIT EKG  Results for orders placed or performed during the hospital encounter of 10/07/23   EKG 12-lead    Collection Time: 10/07/23 10:33 AM    Narrative    Test Reason : R07.9,    Vent. Rate : 070 BPM     Atrial Rate : 070 BPM     P-R Int : 216 ms          QRS Dur : 148 ms      QT Int : 448 ms       P-R-T Axes : 013 -40 084 degrees     QTc Int : 483 ms    Sinus rhythm with 1st degree A-V block  Left axis deviation  Left bundle branch block  Abnormal ECG  When compared with ECG of 13-AUG-2023 09:51,  Left bundle branch block is now Present  Borderline criteria for  Inferior infarct are no longer Present  Confirmed by Kevin Cruz MD ([id number]) on 10/9/2023 5:35:18 AM    Referred By: [email or web address]   SELF           Confirmed By:Kevin Cruz MD           ASSESSMENT/PLAN:     Active Hospital Problems    Diagnosis    *Chest pain    LBBB (left bundle branch block)    Hyperlipidemia    CAD (coronary artery disease)    Cigarette smoker    Chronic bilateral low back pain without sciatica    Cystic disease of liver    Benign essential HTN    COPD (chronic obstructive pulmonary disease)    Type 2 diabetes mellitus with circulatory disorder, with long-term current use of insulin       ASSESSMENT & PLAN:     Chest pain  Dyspnea  New left bundle-branch block  CAD s/p PCI  Hypertension  Hyperlipidemia  Diabetes mellitus  COPD      RECOMMENDATIONS:    Patient presented to the ED with complaints chest pain, shortness of breath and associated nausea that started approximately 30 minutes prior to arrival.  New left bundle-branch block noted on EKG.  No acute ischemic changes.  Troponin HS negative    S/p angiography. No intervention required (see cath report). Continue medical management.      Continue aspirin 81 mg daily,  Plavix and statin therapy.    Continue carvedilol 3.125 mg b.i.d., and losartan 100 mg daily.    Increased Imdur to 60 mg daily    Repeat echocardiogram was normal with 60% efx    7. OK to GA home from cardiology standpoint and have patient follow up outpatient in clinic in 1-2 weeks.      Lauren Alford NP  Department of Cardiology  Date of Service: 10/11/2023      I have personally interviewed and examined the patient, I have reviewed the Nurse Practitioner's history and physical, assessment, and plan. I have personally evaluated the patient at bedside and agree with the findings and made appropriate changes as necessary in recommendations.  Patent stents in RCA and Lcx on angio. Mid Lad lesion was hemodynamically not significant on iFR. Increased imdur dose. Continue  DAPT. Out pt f/u.     Dieudonne Ryan MD  Department of Cardiology  [facility]  10/11/23

## 2023-10-11 NOTE — DISCHARGE SUMMARY
[facility] Medicine  Discharge Summary      Patient Name: Sampson Holt  MRN: [id number]  OSCAR: [id number]  Patient Class: IP- Inpatient  Admission Date: 10/7/2023  Hospital Length of Stay: 2 days  Discharge Date and Time:  10/11/2023 5:10 PM  Attending Physician: Louis Ruggiero MD   Discharging Provider: Louis Ruggiero MD  Primary Care Provider: Damian Vergara MD    Primary Care Team: Networked reference to record PCT     HPI:   67-year-old male with coronary artery disease status post PCI to RCA and distal left circumflex earlier this year in May history of CHF, diabetes, hypertension, CVA, endocarditis, COPD came with CP.  Sarted this morning proximally 30 minutes prior to arrival states he was walking through the kitchen at work when the pain began describes shortness of breath associated nausea.  He had LHC on may and recently admitted with CP and cardiology cleared and planned for outpatient angiogram again and patient report he had done 8 weeks ago  but no report seen in Epic.  He denies any dysuria urgency or frequency , fever , SOB , CP , leg pain currently . EKG with new LBBB obviously . Previous EKG did not show LBBB. He said he had few minor falls in between too.  First troponin neg and CXR neg . Labs OK .      Procedure(s) (LRB):  Angiogram, Coronary, with Left Heart Cath (N/A)  Instantaneous Wave-Free Ratio (IFR) (N/A)      Hospital Course:   67-year-old male with coronary artery disease status post PCI to RCA and distal left circumflex earlier this year in May history of CHF, diabetes, hypertension, CVA, endocarditis, COPD came with CP.  He had LHC on may and recently admitted with CP and cardiology cleared and planned for outpatient angiogram again but it was never arranged .  No back again with same CP and LHC was done and no new intervention needed ( please reference the cath report ) and later CP free and DC in stable condition .  He was coughing and cough up small  amount of one time and stopped. He was DC with doxy and advised to hold plavix if another episode or large amount of blood .  Cardio appointment given .         Goals of Care Treatment Preferences:  Code Status: Full Code      Consults:   Consults (From admission, onward)        Status Ordering Provider     Inpatient consult to Cardiology  Once        Provider:  Dieudonne Ryan MD    Completed MISTY RAMOS          No new Assessment & Plan notes have been filed under this hospital service since the last note was generated.  Service: Hospital Medicine    Final Active Diagnoses:    Diagnosis Date Noted POA    PRINCIPAL PROBLEM:  Chest pain [R07.9] 08/12/2023 Yes    LBBB (left bundle branch block) [I44.7] 10/07/2023 Unknown    Hyperlipidemia [E78.5] 07/02/2023 Yes    CAD (coronary artery disease) [I25.10] 05/13/2023 Yes    Cigarette smoker [F17.210] 05/10/2023 Yes    Chronic bilateral low back pain without sciatica [M54.50, G89.29] 03/15/2023 Yes    Cystic disease of liver [Q44.6] 03/15/2023 Yes    Benign essential HTN [I10] 03/27/2020 Yes    COPD (chronic obstructive pulmonary disease) [J44.9] 03/27/2020 Yes    Type 2 diabetes mellitus with circulatory disorder, with long-term current use of insulin [E11.59, Z79.4] 03/27/2020 Not Applicable      Problems Resolved During this Admission:       Discharged Condition: good    Disposition: Home or Self Care    Follow Up:   Follow-up Information     Damian Vergara MD. Call in 1 week(s).    Specialty: Internal Medicine  Contact information:  [address] [place] [address] [address]  [place] [address]  [phone]             Dieudonne Ryan MD Follow up in 1 month(s).    Specialty: Cardiology  Contact information:  [address] [place] [address]  [address]  [place] [address]  [phone]                       Patient Instructions:      Diet Cardiac     Activity as tolerated       Significant Diagnostic Studies: Labs:   CMP   Recent Labs   Lab 10/10/23  4020  "10/11/23  0320    139   K 3.8 3.7    107   CO2 26 24   * 163*   BUN 13 13   CREATININE 0.7 0.8   CALCIUM 9.1 9.2   ANIONGAP 6* 8    and CBC No results for input(s): "WBC", "HGB", "HCT", "PLT" in the last 48 hours.    Pending Diagnostic Studies:     None         Medications:  Reconciled Home Medications:      Medication List      START taking these medications    doxycycline 100 MG Cap  Commonly known as: VIBRAMYCIN  Take 1 capsule (100 mg total) by mouth every 12 (twelve) hours.        CONTINUE taking these medications    aspirin 81 MG EC tablet  Commonly known as: ECOTRIN  Take 1 tablet (81 mg total) by mouth once daily.     atorvastatin 40 MG tablet  Commonly known as: LIPITOR  Take 1 tablet (40 mg total) by mouth once daily.     carvediloL 3.125 MG tablet  Commonly known as: COREG  Take 1 tablet (3.125 mg total) by mouth 2 (two) times daily.     clopidogreL 75 mg tablet  Commonly known as: PLAVIX  Take 1 tablet (75 mg total) by mouth once daily.     LANTUS SUBQ  Inject 35 Units into the skin once daily.     losartan 50 MG tablet  Commonly known as: COZAAR  Take 2 tablets (100 mg total) by mouth once daily.     tamsulosin 0.4 mg Cap  Commonly known as: FLOMAX  Take 1 capsule (0.4 mg total) by mouth once daily. Take in evening.            Indwelling Lines/Drains at time of discharge:   Lines/Drains/Airways     None               General: Patient resting comfortably in no acute distress. Appears as stated age. Calm  Eyes: EOM intact. No conjunctivae injection. No scleral icterus.  ENT: Hearing grossly intact. No discharge from ears. No nasal discharge.   CVS: RRR. No LE edema BL.  Lungs: CTA BL, no wheezing or crackles. Good breath sounds. No accessory muscle use. No acute respiratory distress  Neuro: non focal , Follows commands. Responds appropriatelyTime spent on the discharge of patient: 22 minutes         Louis Ruggiero MD  Department of Hospital Medicine  [facility]  "

## 2023-10-11 NOTE — PROGRESS NOTES
"Formerly [facility], [facility]  Adult Nutrition   Progress Note (Initial Assessment)     SUMMARY     Recommendations  Continue current cardiac diet as tolerated and encourage intake.    Goals:   Pt to continue to meet his energy and protein needs.    Nutrition Goal Status: goal met    Communication of RD Recs: reviewed with RN    Dietitian Rounds Brief  LOS: Pt tells me he is eating 100% of his cardiac diet and declines an ONS at this time. His LBM was on 10/9, his skin is intact and labs have been reviewed. Will continue to follow prn.    Diet order:   Current Diet Order: Cardiac      Evaluation of Received Nutrient/Fluid Intake  Energy Calories Required: meeting needs  Protein Required: meeting needs  Fluid Required: meeting needs  Tolerance: tolerating     % Intake of Estimated Energy Needs: 75 - 100 %  % Meal Intake: 75 - 100 %      Intake/Output Summary (Last 24 hours) at 10/11/2023 1523  Last data filed at 10/11/2023 1045  Gross per 24 hour   Intake 1320 ml   Output 2150 ml   Net -830 ml        Anthropometrics  Temp: 99 °F (37.2 °C)  Height Method: Stated  Height: 6' 1" (185.4 cm)  Height (inches): 73 in  Weight Method: Bed Scale  Weight: 90.9 kg (200 lb 6.4 oz)  Weight (lb): 200.4 lb  Ideal Body Weight (IBW), Male: 184 lb  % Ideal Body Weight, Male (lb): 108.1 %  BMI (Calculated): 26.4  BMI Grade: 25 - 29.9 - overweight       Estimated/Assessed Needs  Weight Used For Calorie Calculations: 90.9 kg (200 lb 6.4 oz)  Energy Calorie Requirements (kcal): 9132-0787 kcals/day (20-25 kcals/kg ABW)  Energy Need Method: Kcal/kg  Protein Requirements: 101-126 g/day (1.2-1.5 g/kg IBW)  Weight Used For Protein Calculations: 84 kg (185 lb 3 oz)     Estimated Fluid Requirement Method: RDA Method  RDA Method (mL): 1818       Reason for Assessment  Reason For Assessment: length of stay  Diagnosis: other (see comments), cardiac disease (Chest Pain)  Relevant Medical History: Stroke, Emphysema lung, Endocarditis, CHF (congestive " heart failure), Hypertension, Diabetes mellitus, Abdominal pain, COPD and HLD    Nutrition/Diet History  Food Allergies: NKFA  Factors Affecting Nutritional Intake: None identified at this time    Nutrition Risk Screen  Nutrition Risk Screen: no indicators present     MST Score: 0  Have you recently lost weight without trying?: No  Weight loss score: 0  Have you been eating poorly because of a decreased appetite?: No  Appetite score: 0       Weight History:  Wt Readings from Last 5 Encounters:   10/09/23 90.9 kg (200 lb 6.4 oz)   08/11/23 88.2 kg (194 lb 8 oz)   08/12/23 88 kg (194 lb)   07/10/23 88.9 kg (196 lb)   07/03/23 89.4 kg (197 lb)        Lab/Procedures/Meds: Pertinent Labs/Meds Reviewed    Medications:Pertinent Medications Reviewed  Scheduled Meds:   aspirin  81 mg Oral Daily    atorvastatin  40 mg Oral Daily    carvediloL  3.125 mg Oral BID    clopidogreL  75 mg Oral Daily    doxycycline  100 mg Oral Q12H    insulin detemir U-100 (Levemir)  35 Units Subcutaneous QHS    isosorbide mononitrate  60 mg Oral Daily    losartan  100 mg Oral Daily    tamsulosin  0.4 mg Oral Daily     Continuous Infusions:   sodium chloride 0.9% 300 mL (10/10/23 1016)     PRN Meds:.acetaminophen, butalbital-acetaminophen-caffeine -40 mg, dextrose 50%, dextrose 50%, fentaNYL, glucagon (human recombinant), glucose, glucose, heparin (porcine), hydrALAZINE, iohexoL, LIDOcaine (PF) 10 mg/ml (1%), midazolam, naloxone, nitroGLYCERIN, sodium chloride 0.9%, sodium chloride 0.9%, sodium chloride 0.9%    Labs: Pertinent Labs Reviewed  Clinical Chemistry:  Recent Labs   Lab 10/07/23  1048 10/08/23  0634 10/11/23  0320      < > 139   K 4.1   < > 3.7      < > 107   CO2 20*   < > 24      < > 163*   BUN 15   < > 13   CREATININE 1.0   < > 0.8   CALCIUM 9.6   < > 9.2   PROT 7.6  --   --    ALBUMIN 4.4  --   --    BILITOT 0.5  --   --    ALKPHOS 82  --   --    AST 20  --   --    ALT 16  --   --    ANIONGAP 14   < > 8   MG  1.9  --   --     < > = values in this interval not displayed.     CBC:   Recent Labs   Lab 10/09/23  0321   WBC 4.75   RBC 3.98*   HGB 12.8*   HCT 38.0*      MCV 96   MCH 32.2*   MCHC 33.7     Cardiac Profile:  Recent Labs   Lab 10/07/23  1048   BNP 49     Monitor and Evaluation  Food and Nutrient Intake: food and beverage intake, energy intake  Food and Nutrient Adminstration: diet order  Knowledge/Beliefs/Attitudes: food and nutrition knowledge/skill, beliefs and attitudes  Physical Activity and Function: nutrition-related ADLs and IADLs, factors affecting access to physical activity  Anthropometric Measurements: weight, weight change, body mass index  Biochemical Data, Medical Tests and Procedures: lipid profile, inflammatory profile, glucose/endocrine profile, gastrointestinal profile, electrolyte and renal panel  Nutrition-Focused Physical Findings: overall appearance     Nutrition Risk  Level of Risk/Frequency of Follow-up: low     Nutrition Follow-Up  RD Follow-up?: Yes      Lety Banks RD 10/11/2023 3:23 PM

## 2023-10-11 NOTE — SUBJECTIVE & OBJECTIVE
"Interval History:     Review of Systems  Objective:     Vital Signs (Most Recent):  Temp: 99 °F (37.2 °C) (10/11/23 1119)  Pulse: 69 (10/11/23 1119)  Resp: 18 (10/11/23 1119)  BP: (!) 170/80 (10/11/23 1119)  SpO2: (!) 94 % (10/11/23 1119) Vital Signs (24h Range):  Temp:  [97.6 °F (36.4 °C)-99 °F (37.2 °C)] 99 °F (37.2 °C)  Pulse:  [63-82] 69  Resp:  [16-21] 18  SpO2:  [92 %-98 %] 94 %  BP: (128-196)/(66-95) 170/80     Weight: 90.9 kg (200 lb 6.4 oz)  Body mass index is 26.44 kg/m².    Intake/Output Summary (Last 24 hours) at 10/11/2023 1328  Last data filed at 10/11/2023 1045  Gross per 24 hour   Intake 1320 ml   Output 2150 ml   Net -830 ml         Physical Exam  Constitutional:       General: He is not in acute distress.     Appearance: He is well-developed.   HENT:      Head: Normocephalic.   Eyes:      Pupils: Pupils are equal, round, and reactive to light.   Cardiovascular:      Rate and Rhythm: Normal rate and regular rhythm.      Pulses: Normal pulses.   Pulmonary:      Effort: No respiratory distress.   Abdominal:      General: Abdomen is flat. There is no distension.      Tenderness: There is no abdominal tenderness.   Musculoskeletal:      Cervical back: Neck supple.   Skin:     General: Skin is warm.      Findings: No rash.   Neurological:      Mental Status: He is alert and oriented to person, place, and time.   Psychiatric:         Mood and Affect: Mood normal.             Significant Labs: All pertinent labs within the past 24 hours have been reviewed.  CMP:   Recent Labs   Lab 10/10/23  0320 10/11/23  0320    139   K 3.8 3.7    107   CO2 26 24   * 163*   BUN 13 13   CREATININE 0.7 0.8   CALCIUM 9.1 9.2   ANIONGAP 6* 8     Cardiac Markers: No results for input(s): "CKMB", "MYOGLOBIN", "BNP", "TROPISTAT" in the last 48 hours.  Coagulation: No results for input(s): "PT", "INR", "APTT" in the last 48 hours.    Significant Imaging: I have reviewed all pertinent imaging results/findings " within the past 24 hours.

## 2023-10-11 NOTE — PLAN OF CARE
Problem: Adult Inpatient Plan of Care  Goal: Plan of Care Review  Outcome: Ongoing, Progressing  Goal: Patient-Specific Goal (Individualized)  Outcome: Ongoing, Progressing     Problem: Diabetes Comorbidity  Goal: Blood Glucose Level Within Targeted Range  Outcome: Ongoing, Progressing     Problem: Fall Injury Risk  Goal: Absence of Fall and Fall-Related Injury  Outcome: Ongoing, Progressing     Problem: Arrhythmia/Dysrhythmia (Cardiac Catheterization)  Goal: Stable Heart Rate and Rhythm  Outcome: Ongoing, Progressing     Problem: Pain (Cardiac Catheterization)  Goal: Acceptable Pain Control  Outcome: Ongoing, Progressing

## 2023-10-13 NOTE — PLAN OF CARE
Per chart review, Pt cleared for discharge from case management standpoint.    Chart and discharge orders reviewed.  Patient discharged home with no further case management needs.   10/13/23 1438   Final Note   Assessment Type Final Discharge Note   Anticipated Discharge Disposition Home   Post-Acute Status   Discharge Delays None known at this time

## 2023-10-17 ENCOUNTER — TELEPHONE (OUTPATIENT)
Dept: OTOLARYNGOLOGY | Facility: CLINIC | Age: 68
End: 2023-10-17
Payer: OTHER GOVERNMENT

## 2023-10-17 NOTE — TELEPHONE ENCOUNTER
----- Message from Tessa Venegas sent at 10/17/2023  3:06 PM CDT -----  Type:  Appointment Request    Name of Caller:[facility]  When is the first available appointment?No access  Symptoms:referral   Would the patient rather a call back or a response via [facility]? Call back   Best Call Back Number:[phone]  Additional Information: Patients [facility] representative called on behalf of the patient. Patient indicates he would like to schedule at the [facility] location as it is closer to him. Patient indicates he would like to schedule for the soonest appointment available. Patient is being referred to the department for reoccurring nose bleeds. Please call back with further assistance.

## 2023-10-17 NOTE — TELEPHONE ENCOUNTER
Called pt back. Pt states that at this time, he is doing well and does not want to schedule ENT appointment. Pt states that he was having sinus issues, but antibiotics cleared it up. Pt states that if his sinuses start to bother him in the future, he will call back to schedule. States that he has a lot of other marty issues right now. Thanks, Charity

## 2023-10-30 ENCOUNTER — TELEPHONE (OUTPATIENT)
Dept: CARDIOLOGY | Facility: CLINIC | Age: 68
End: 2023-10-30
Payer: OTHER GOVERNMENT

## 2023-10-30 ENCOUNTER — TELEPHONE (OUTPATIENT)
Dept: PHARMACY | Facility: HOSPITAL | Age: 68
End: 2023-10-30

## 2023-10-30 NOTE — TELEPHONE ENCOUNTER
----- Message from Wendy Keys, Patient Care Assistant sent at 10/30/2023 11:09 AM CDT -----  Contact: self  Pt needs a stent f/u  appt . [phone]  thanks

## 2023-10-30 NOTE — TELEPHONE ENCOUNTER
[facility]  Transition of Care Assessment    Admit Date  10/7/23   CSN Number    [id number]   Discharge Date    10/11/23   Preferred Pharmacies      [facility]  [address]  [facility] [address]  Phone: [phone] Fax: [phone]    [facility] - [place], [place] - [address]  [address]  [facility] [address]  Phone: [phone] Fax: [phone]    [facility] - [place], [place] - [address]  [address] [facility] [address]  Phone: [phone] Fax: [phone]     Drug Card Received?      Yes []     No [x]      Allergies   Review of patient's allergies indicates:  No Known Allergies      Telephone Medication Assessment Questions     How are you doing with your medications?    Doing good. Staying on top of his prescriptions and taking them regularly.     Have you experienced any side effects and/or noticed any difference after taking your medications?    No     Do you know the purpose of each of your medications and how to take them?    Yes     Do you need assistance taking your medications?    No     Are you able to afford your medications?    Yes-Insurance covers them all.     Have you picked up your medications from the pharmacy?    Yes      Telephone Physician Follow-Up Questions     Have you seen your doctor?    No-Said he had an appt scheduled, but could not make it so said he is going to make one as soon as he got off the phone.      Notes from your doctor visit (if applicable):         Have you had any medication changes?         Have you picked up the new prescription from the pharmacy and started taking it?         My new medications are (if applicable):         Are you experiencing any new side effects?         Do you have a follow-up appointment to see your doctor?    No     Do you have transportation to your appointment?    No. Has trouble with transportation, but has [facility] insurance so they can get him a ride to  [place], but they leave at 12 and he can't get an appt till 11, so would have a hard time getting back to [place].      If needed, do you have someone to accompany you to your appointment?    Yes     Is there anything you feel you need help with at this time?    Just transportation and help getting a scale.      Additional Notes     Patient said he has ups and downs. Struggling with stressors of life. Needs a follow-up appt.        The patient was previously educated on how to take their medication; including dosing, frequency, and side-effects.

## 2023-11-06 ENCOUNTER — TELEPHONE (OUTPATIENT)
Dept: CARDIOLOGY | Facility: CLINIC | Age: 68
End: 2023-11-06
Payer: OTHER GOVERNMENT

## 2023-11-06 NOTE — TELEPHONE ENCOUNTER
I called and spoke to this patient . I made him an appointment 01/08/2024, at 8:40 am with Dr Kessler

## 2023-11-06 NOTE — TELEPHONE ENCOUNTER
----- Message from Elmo Perez sent at 11/6/2023  3:20 PM CST -----  Regarding: appointment  Contact: patient  Type:  Sooner Apoointment Request    Caller is requesting a sooner appointment.  Caller declined first available appointment listed below.  Caller will not accept being placed on the waitlist and is requesting a message be sent to doctor.  Name of Caller:patient  When is the first available appointment?unable to schedule  Symptoms:hospital f/u/ MD Kessler seen patient in hospital  Would the patient rather a call back or a response via [facility]? Please call to schedule/ 3rd request  Best Call Back Number:[phone]  Additional Information: Referral sent

## 2023-11-07 DIAGNOSIS — Z01.89 RADIOLOGICAL EXAMINATION, NOT ELSEWHERE CLASSIFIED: Primary | ICD-10-CM

## 2023-11-13 ENCOUNTER — HOSPITAL ENCOUNTER (OUTPATIENT)
Dept: RADIOLOGY | Facility: HOSPITAL | Age: 68
Discharge: HOME | End: 2023-11-13
Attending: INTERNAL MEDICINE
Payer: MEDICARE

## 2023-11-13 DIAGNOSIS — Z01.89 RADIOLOGICAL EXAMINATION, NOT ELSEWHERE CLASSIFIED: ICD-10-CM

## 2023-11-13 PROCEDURE — 71250 CT THORAX DX C-: CPT | Mod: TC

## 2023-11-21 DIAGNOSIS — Z01.89 RADIOLOGICAL EXAMINATION, NOT ELSEWHERE CLASSIFIED: Primary | ICD-10-CM

## 2023-11-28 DIAGNOSIS — Z01.89 RADIOLOGICAL EXAMINATION, NOT ELSEWHERE CLASSIFIED: Primary | ICD-10-CM

## 2023-12-04 ENCOUNTER — TELEPHONE (OUTPATIENT)
Dept: CARDIOLOGY | Facility: CLINIC | Age: 68
End: 2023-12-04
Payer: OTHER GOVERNMENT

## 2023-12-04 ENCOUNTER — OFFICE VISIT (OUTPATIENT)
Dept: UROLOGY | Facility: CLINIC | Age: 68
End: 2023-12-04
Payer: OTHER GOVERNMENT

## 2023-12-04 VITALS
HEART RATE: 66 BPM | DIASTOLIC BLOOD PRESSURE: 108 MMHG | WEIGHT: 200.38 LBS | HEIGHT: 73 IN | BODY MASS INDEX: 26.56 KG/M2 | SYSTOLIC BLOOD PRESSURE: 207 MMHG

## 2023-12-04 DIAGNOSIS — R35.1 NOCTURIA: ICD-10-CM

## 2023-12-04 DIAGNOSIS — I10 HYPERTENSION, UNSPECIFIED TYPE: ICD-10-CM

## 2023-12-04 DIAGNOSIS — N28.1 RENAL CYST, LEFT: ICD-10-CM

## 2023-12-04 DIAGNOSIS — N20.0 KIDNEY STONE ON LEFT SIDE: Primary | ICD-10-CM

## 2023-12-04 DIAGNOSIS — N39.41 URGE INCONTINENCE OF URINE: ICD-10-CM

## 2023-12-04 DIAGNOSIS — R36.1 HEMATOSPERMIA: ICD-10-CM

## 2023-12-04 LAB
BILIRUBIN, UA POC OHS: NEGATIVE
BLOOD, UA POC OHS: NEGATIVE
CLARITY, UA POC OHS: CLEAR
COLOR, UA POC OHS: YELLOW
GLUCOSE, UA POC OHS: NEGATIVE
KETONES, UA POC OHS: NEGATIVE
LEUKOCYTES, UA POC OHS: ABNORMAL
NITRITE, UA POC OHS: NEGATIVE
PH, UA POC OHS: 7
PROTEIN, UA POC OHS: 30
SPECIFIC GRAVITY, UA POC OHS: 1.01
UROBILINOGEN, UA POC OHS: 0.2

## 2023-12-04 PROCEDURE — 99215 OFFICE O/P EST HI 40 MIN: CPT | Mod: S$PBB,,, | Performed by: UROLOGY

## 2023-12-04 PROCEDURE — 99999PBSHW POCT URINALYSIS(INSTRUMENT): ICD-10-PCS | Mod: PBBFAC,,,

## 2023-12-04 PROCEDURE — 99999 PR PBB SHADOW E&M-EST. PATIENT-LVL IV: ICD-10-PCS | Mod: PBBFAC,,, | Performed by: UROLOGY

## 2023-12-04 PROCEDURE — 99999PBSHW POCT URINALYSIS(INSTRUMENT): Mod: PBBFAC,,,

## 2023-12-04 PROCEDURE — 81003 URINALYSIS AUTO W/O SCOPE: CPT | Mod: PBBFAC,PO | Performed by: UROLOGY

## 2023-12-04 PROCEDURE — 99215 PR OFFICE/OUTPT VISIT, EST, LEVL V, 40-54 MIN: ICD-10-PCS | Mod: S$PBB,,, | Performed by: UROLOGY

## 2023-12-04 PROCEDURE — 99999 PR PBB SHADOW E&M-EST. PATIENT-LVL IV: CPT | Mod: PBBFAC,,, | Performed by: UROLOGY

## 2023-12-04 PROCEDURE — 99214 OFFICE O/P EST MOD 30 MIN: CPT | Mod: PBBFAC,PO | Performed by: UROLOGY

## 2023-12-04 RX ORDER — ISOSORBIDE MONONITRATE 30 MG/1
TABLET, EXTENDED RELEASE ORAL
COMMUNITY
Start: 2023-05-22

## 2023-12-04 RX ORDER — MIRABEGRON 50 MG/1
50 TABLET, FILM COATED, EXTENDED RELEASE ORAL EVERY MORNING
Qty: 90 TABLET | Refills: 0 | Status: SHIPPED | OUTPATIENT
Start: 2023-12-04 | End: 2024-02-05 | Stop reason: SDUPTHER

## 2023-12-04 RX ORDER — AMLODIPINE BESYLATE 5 MG/1
5 TABLET ORAL DAILY
Qty: 30 TABLET | Refills: 11 | Status: SHIPPED | OUTPATIENT
Start: 2023-12-04 | End: 2024-12-03

## 2023-12-04 RX ORDER — ROSUVASTATIN CALCIUM 10 MG/1
5 TABLET, COATED ORAL
COMMUNITY
Start: 2023-03-20 | End: 2024-01-08

## 2023-12-04 NOTE — PROGRESS NOTES
[facility], formerly known [facility]  Group MD's:Hanna/Lizbet/Daniel/Doris/Yaz  Group NP's: Julienne Prakash    PCP: Damian Vergara MD  Date of Service: 12/04/2023  Today's note written by: MD Hanna  [facility]: [facility]    Subjective:       HPI: Sampson Holt is a 68 y.o. 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    Initial consult by Rachel in CLINIC on 10/4/21 for testicle pain and frequency:  seen 2021 for right testicle pain and urinary frequency and was lost to f/up at that time  evaluation of right sided testicle pain x 3-4 years now and increased urinary frequency, more prominently at night time.  Therefore he is here today for further evaluation of his symptoms.  No dysuria  No bladder pain  No gross hematuria  Pt does have hx of kidney stone.  Nocturia-10-15x per night.  Rare occasions where he would have urinary leakage.  +Diabetes-poorly managed, pt states his glucose ranges in 250s-300s.  Pt has never been seen by a Urology group in the past.  But, pt does have hx of hydroceles and hernias    Interval history by tiffani in CLINIC on 08/23/23 for hematospermia:  Pt states he has had blood within his semen x one year or longer.  UA in office today showed--pt was unable to urinate upon arrival today.  PVR by bladder scan is 23 mL  No family hx of  cancers  +Tobacco use-cigarettes  [facility] prescribed him Flomax 0.4 mg daily in the past but pt has not been taking the medication at this time.   Exam performed by me during OV today:  Inspection of anus normal  No scrotal rashes, cysts or lesions  Epididymis normal in size, no tenderness  Testes normal and size, equal size bilaterally, no masses  Urethral meatus normal without discharge  BRODIE: 35g smooth prostate gland without masses, tenderness. SV  not palpable. Normal sphincter tone. No hemhorroids.  Pea size skin tag on right side of outer right buttock.  Pt states he recently had +MI 8 weeks ago    UA Micro and Urine Culture to be processed today due to hx of microhematuria.  Pt needs to begin Flomax 0.4 mg daily if not already at this time.  PSA, Total and Free to be scheduled for annual check.  I do not see that the [facility] has done this.    Ultrasound of the Scrotum/Testicles to be scheduled    Interval history by me in CLINIC on 12/4/23 for nocturia, UUI:   Ctap with 1/30/23        Ctrss 5/12/23: LMP 5mm stone      Us scrotum 9/11/23: There are multiple right epididymal cysts largest measuring 10 mm. (H/o b hydrocele and b hernias)    Ct chest wo 11/13/23 screening done for his history of tobacco use.  Found to have a left upper pole cyst versus mass that was previously seen on the CT chest with contrast from January.  Review shows that the cyst is slightly larger.  Recommend further evaluation with ultrasound.  No family history of kidney cancer    Hematospermia- he says it resolved on its own over the past few months  Nocturia and urge incontinence:  He says his primary complaint remains waking up 7-8 times a night.  Review of his previous notes show that was 10-15 times a night.  He does admit that he might have some obstructive sleep apnea because he wakes up short of breath in the middle the night.  Has not had an official sleep study.  During the day he only urinates every few hours.  Drinks to [place] and sodas before bedtime.  Urge incontinence: He has urge incontinence both day and night.  Do not does not wear any pads.  Urge with just drops day and night.  Does have a history of 5 mini strokes, all within a week last in 2011  Other pertinent urologic hx: States he has 3 to 4 x in his life. Last one 10+ years ago. Says he has had MH since the 80s. He's had 2 hydrocele repairs  Found to have a kidney stone in his left kidney seen on CT this year.   Left mid pole 4 mm.  He is never passed a kidney stone before.  He is on Plavix  Blood pressure today is 207/107.  Has a history of an MI with stents in May.  Recent angiogram in September by Dr. Kessler was told it was normal.  However continues to have occasional palpitations and chest pain.  Has follow up in January.         REVIEW OF SYSTEMS:  A comprehensive 10 system review was performed and is negative except as noted above in HPI    Urine history: family history of kidney, bladder or prostate cancer:No, personal or family history of kidney stones: yes,tobacco use: Yes - , anticoagulation: Yes - plavix (stens from MI 2023).  12/4/23 Small wbc/30 prot  8/23/23 Ng, void: 2 rbc/5 wbc  8/11/23 Tr prot  7/10/23 Neg  1/30/23 Neg  7/6/22  Neg  3/27/20 neg    PSA History: no fam hx of prostate cancer  9/11/23  0.2      Lab Results   Component Value Date    CREATININE 0.8 10/11/2023       Allergies:  Patient has no known allergies.    Medications: reviewed   Objective:     Vitals:    12/04/23 0817   BP: (!) 207/108   Pulse: 66         Assessment:       Sampson Holt is a 68 y.o. male    1. Kidney stone on left side    2. Renal cyst, left    3. Hematospermia    4. Hypertension, unspecified type            Plan:     For his urge incontinence, daytime and nighttime he can try myrbetriq 50 mg once daily.  Needs to try for at least 3-4 weeks to see if there is any improvement if there is not he can discontinue after 4 weeks.  Monitor blood pressure every morning and if it rises then discontinue.  Discontinue if no improvement in urgency after 3 weeks  Hematospermia: Resolved  Kidney stone on left side:  If he starts to have left flank pain radiating to his front that is not tender to touch and associated with any blood in the urine should call us for further evaluation a sap or ER if he has severe pain.   They nocturia/waking up at night: Likely related to bourbon then Coke drinking before bedtime and possibly undiagnosed  obstructive sleep apnea.  First step drinking bourbon and Coke at least 3-4 hours before bedtime or at all honestly especially if he has diabetes and heart disease.  If he continues to wake up at night and it is bothersome could ask Cardiology for sleep study to evaluate him for obstructive sleep apnea.  Myrbetriq may help with the urgency but likely will not help with the nighttime waking up because of the above issues  Can let us know the results of the ultrasound of his kidney.  Review of his CT abdomen pelvis with contrast did show that his cyst in his left upper pole was a little larger than it was before.  Can confirmed with ultrasound it is not settled although on CT abdomen and pelvis with contrast look like it was not.  Sent message to Michelle Michaels and staff:Hi this patient's blood pressure is 207/108 today.  he is taking his blood pressure medicine like he is supposed to.  He says he had an angiogram a month and half ago was told it was normal.  Still has a occasional palpitations and chest pain.  Has a follow up with Dr. Kessler in January.  Wanted to see if he needed to be seen sooner    Follow-up with urology nurse practitioner in 1 month to 2 months to see if myrbetriq helping with overactive bladder, check AUA symptom score and PVR  Follow up with x-ray and ultrasound in 6 months to monitor left renal stone    [facility]: Active    Jennifer Ferreira,

## 2023-12-04 NOTE — TELEPHONE ENCOUNTER
----- Message from Antonio Kessler MD sent at 12/4/2023 10:58 AM CST -----  Regarding: RE: does he need closer fu?  Recommend to go to ER if having symptoms with elevated BP. Follow up with primary care ASAP.    ----- Message -----  From: Jennifer Ferreira MD  Sent: 12/4/2023   8:54 AM CST  To: Antonio Kessler MD; Michelle Kelsey NP; #  Subject: does he need closer fu?                          Hi this patient's blood pressure is 207/108 today.  he is taking his blood pressure medicine like he is supposed to.  He says he had an angiogram a month and half ago was told it was normal.  Still has a occasional palpitations and chest pain.  Has a follow up with Dr. Kessler in January.  Wanted to see if he needed to be seen sooner

## 2023-12-04 NOTE — PATIENT INSTRUCTIONS
Sampson Holt is a 68 y.o. male    1. Kidney stone on left side    2. Renal cyst, left    3. Hematospermia    4. Hypertension, unspecified type            Plan:     For his urge incontinence, daytime and nighttime he can try myrbetriq 50 mg once daily.  Needs to try for at least 3-4 weeks to see if there is any improvement if there is not he can discontinue after 4 weeks.  Monitor blood pressure every morning and if it rises then discontinue.  Discontinue if no improvement in urgency after 3 weeks  Hematospermia: Resolved  Kidney stone on left side:  If he starts to have left flank pain radiating to his front that is not tender to touch and associated with any blood in the urine should call us for further evaluation a sap or ER if he has severe pain.   They nocturia/waking up at night: Likely related to bourbon then Coke drinking before bedtime and possibly undiagnosed obstructive sleep apnea.  First step drinking bourbon and Coke at least 3-4 hours before bedtime or at all honestly especially if he has diabetes and heart disease.  If he continues to wake up at night and it is bothersome could ask Cardiology for sleep study to evaluate him for obstructive sleep apnea.  Myrbetriq may help with the urgency but likely will not help with the nighttime waking up because of the above issues  Can let us know the results of the ultrasound of his kidney.  Review of his CT abdomen pelvis with contrast did show that his cyst in his left upper pole was a little larger than it was before.  Can confirmed with ultrasound it is not settled although on CT abdomen and pelvis with contrast look like it was not.  Sent message to , Michelle Kelsey and staff:Hi this patient's blood pressure is 207/108 today.  he is taking his blood pressure medicine like he is supposed to.  He says he had an angiogram a month and half ago was told it was normal.  Still has a occasional palpitations and chest pain.  Has a follow up with Dr. Kessler in  January.  Wanted to see if he needed to be seen sooner    Follow-up with urology nurse practitioner in 1 month to 2 months to see if myrbetriq helping with overactive bladder, check AUA symptom score and PVR  Follow up with x-ray and ultrasound in 6 months to monitor left renal stone

## 2023-12-05 ENCOUNTER — TELEPHONE (OUTPATIENT)
Dept: CARDIOLOGY | Facility: CLINIC | Age: 68
End: 2023-12-05
Payer: OTHER GOVERNMENT

## 2023-12-05 NOTE — TELEPHONE ENCOUNTER
----- Message from Michelle Kelsey NP sent at 12/4/2023  5:45 PM CST -----  Regarding: RE: does he need closer fu?  Okay, can you make sure that he has a nurse visit next week at least?  He needs to check BP TID x 1 week and bring log to clinic.  Thank you  ----- Message -----  From: Beatriz Izquierdo MA  Sent: 12/4/2023   3:50 PM CST  To: Michelle Kelsey NP  Subject: RE: does he need closer fu?                      I do not have anything sooner with NP or Doctor.  ----- Message -----  From: Michelle Kelsey NP  Sent: 12/4/2023  12:45 PM CST  To: Beatriz Izquierdo MA  Subject: FW: does he need closer fu?                      Can we get patient in sooner for uncontrolled HTN?  I sent amlodipine 5 mg daily to [facility] for him to  and start today.  If his BP remains uncontrolled, he should go to the ED for further management and evaluation.  Thank you    Michelle Kelsey NP     ----- Message -----  From: Jennifer Ferreira MD  Sent: 12/4/2023   8:54 AM CST  To: Antonio Kessler MD; Michelle Kelsey NP; #  Subject: does he need closer fu?                          Hi this patient's blood pressure is 207/108 today.  he is taking his blood pressure medicine like he is supposed to.  He says he had an angiogram a month and half ago was told it was normal.  Still has a occasional palpitations and chest pain.  Has a follow up with Dr. Kessler in January.  Wanted to see if he needed to be seen sooner

## 2023-12-11 ENCOUNTER — HOSPITAL ENCOUNTER (OUTPATIENT)
Dept: RADIOLOGY | Facility: HOSPITAL | Age: 68
Discharge: HOME | End: 2023-12-11
Attending: FAMILY MEDICINE
Payer: MEDICARE

## 2023-12-11 ENCOUNTER — HOSPITAL ENCOUNTER (OUTPATIENT)
Dept: RADIOLOGY | Facility: HOSPITAL | Age: 68
Discharge: HOME | End: 2023-12-11
Attending: FAMILY MEDICINE
Payer: OTHER GOVERNMENT

## 2023-12-11 DIAGNOSIS — Z01.89 RADIOLOGICAL EXAMINATION, NOT ELSEWHERE CLASSIFIED: ICD-10-CM

## 2023-12-11 PROCEDURE — 76770 US EXAM ABDO BACK WALL COMP: CPT | Mod: TC,PO,59

## 2023-12-11 PROCEDURE — 93975 VASCULAR STUDY: CPT | Mod: TC,PO

## 2023-12-13 ENCOUNTER — CLINICAL SUPPORT (OUTPATIENT)
Dept: CARDIOLOGY | Facility: CLINIC | Age: 68
End: 2023-12-13
Payer: OTHER GOVERNMENT

## 2023-12-13 ENCOUNTER — TELEPHONE (OUTPATIENT)
Dept: CARDIOLOGY | Facility: CLINIC | Age: 68
End: 2023-12-13

## 2023-12-13 VITALS
SYSTOLIC BLOOD PRESSURE: 148 MMHG | OXYGEN SATURATION: 96 % | BODY MASS INDEX: 26.44 KG/M2 | WEIGHT: 199.5 LBS | DIASTOLIC BLOOD PRESSURE: 80 MMHG | HEIGHT: 73 IN | HEART RATE: 65 BPM

## 2023-12-13 DIAGNOSIS — I10 BENIGN ESSENTIAL HTN: Primary | ICD-10-CM

## 2023-12-13 PROCEDURE — 99999 PR PBB SHADOW E&M-EST. PATIENT-LVL III: CPT | Mod: PBBFAC,,,

## 2023-12-13 PROCEDURE — 99999 PR PBB SHADOW E&M-EST. PATIENT-LVL III: ICD-10-PCS | Mod: PBBFAC,,,

## 2023-12-13 PROCEDURE — 99213 OFFICE O/P EST LOW 20 MIN: CPT | Mod: PBBFAC,PN

## 2023-12-13 NOTE — PROGRESS NOTES
Patient is here for a blood pressure check . His blood pressure is 148/80 today . He will continue to monitor his blood pressure at home . He didn't bring his blood pressure machine , so that I could not check it .

## 2023-12-13 NOTE — TELEPHONE ENCOUNTER
Hello     This patient came in for a nurse visit , blood pressure check today . His blood pressure was 148/80 . He states he feels good .  Please advise

## 2024-01-04 ENCOUNTER — HOSPITAL ENCOUNTER (EMERGENCY)
Facility: HOSPITAL | Age: 69
Discharge: HOME | End: 2024-01-04
Attending: EMERGENCY MEDICINE
Payer: MEDICARE

## 2024-01-04 VITALS
HEART RATE: 82 BPM | OXYGEN SATURATION: 97 % | WEIGHT: 200 LBS | TEMPERATURE: 99 F | BODY MASS INDEX: 26.51 KG/M2 | SYSTOLIC BLOOD PRESSURE: 156 MMHG | RESPIRATION RATE: 16 BRPM | HEIGHT: 73 IN | DIASTOLIC BLOOD PRESSURE: 100 MMHG

## 2024-01-04 DIAGNOSIS — B96.89 BACTERIAL SINUSITIS: Primary | ICD-10-CM

## 2024-01-04 DIAGNOSIS — R05.9 COUGH: ICD-10-CM

## 2024-01-04 DIAGNOSIS — R53.1 WEAKNESS: ICD-10-CM

## 2024-01-04 DIAGNOSIS — J32.9 BACTERIAL SINUSITIS: Primary | ICD-10-CM

## 2024-01-04 DIAGNOSIS — J06.9 VIRAL UPPER RESPIRATORY ILLNESS: ICD-10-CM

## 2024-01-04 LAB
ALBUMIN SERPL BCP-MCNC: 4.6 G/DL (ref 3.5–5.2)
ALP SERPL-CCNC: 97 U/L (ref 55–135)
ALT SERPL W/O P-5'-P-CCNC: 12 U/L (ref 10–44)
ANION GAP SERPL CALC-SCNC: 10 MMOL/L (ref 8–16)
AST SERPL-CCNC: 17 U/L (ref 10–40)
BASOPHILS # BLD AUTO: 0.06 K/UL (ref 0–0.2)
BASOPHILS NFR BLD: 0.9 % (ref 0–1.9)
BILIRUB SERPL-MCNC: 0.7 MG/DL (ref 0.1–1)
BNP SERPL-MCNC: 119 PG/ML (ref 0–99)
BUN SERPL-MCNC: 10 MG/DL (ref 8–23)
CALCIUM SERPL-MCNC: 9.5 MG/DL (ref 8.7–10.5)
CHLORIDE SERPL-SCNC: 106 MMOL/L (ref 95–110)
CO2 SERPL-SCNC: 28 MMOL/L (ref 23–29)
CREAT SERPL-MCNC: 0.7 MG/DL (ref 0.5–1.4)
DIFFERENTIAL METHOD BLD: ABNORMAL
EOSINOPHIL # BLD AUTO: 0.1 K/UL (ref 0–0.5)
EOSINOPHIL NFR BLD: 1 % (ref 0–8)
ERYTHROCYTE [DISTWIDTH] IN BLOOD BY AUTOMATED COUNT: 13.8 % (ref 11.5–14.5)
EST. GFR  (NO RACE VARIABLE): >60 ML/MIN/1.73 M^2
GLUCOSE SERPL-MCNC: 84 MG/DL (ref 70–110)
HCT VFR BLD AUTO: 44.6 % (ref 40–54)
HGB BLD-MCNC: 15.2 G/DL (ref 14–18)
IMM GRANULOCYTES # BLD AUTO: 0.03 K/UL (ref 0–0.04)
IMM GRANULOCYTES NFR BLD AUTO: 0.4 % (ref 0–0.5)
INFLUENZA A, MOLECULAR: NEGATIVE
INFLUENZA B, MOLECULAR: NEGATIVE
LYMPHOCYTES # BLD AUTO: 1.7 K/UL (ref 1–4.8)
LYMPHOCYTES NFR BLD: 24.7 % (ref 18–48)
MAGNESIUM SERPL-MCNC: 1.8 MG/DL (ref 1.6–2.6)
MCH RBC QN AUTO: 32.4 PG (ref 27–31)
MCHC RBC AUTO-ENTMCNC: 34.1 G/DL (ref 32–36)
MCV RBC AUTO: 95 FL (ref 82–98)
MONOCYTES # BLD AUTO: 0.5 K/UL (ref 0.3–1)
MONOCYTES NFR BLD: 8 % (ref 4–15)
NEUTROPHILS # BLD AUTO: 4.4 K/UL (ref 1.8–7.7)
NEUTROPHILS NFR BLD: 65 % (ref 38–73)
NRBC BLD-RTO: 0 /100 WBC
PLATELET # BLD AUTO: 188 K/UL (ref 150–450)
PMV BLD AUTO: 8.8 FL (ref 9.2–12.9)
POTASSIUM SERPL-SCNC: 3.9 MMOL/L (ref 3.5–5.1)
PROT SERPL-MCNC: 7.7 G/DL (ref 6–8.4)
RBC # BLD AUTO: 4.69 M/UL (ref 4.6–6.2)
SARS-COV-2 RDRP RESP QL NAA+PROBE: NEGATIVE
SODIUM SERPL-SCNC: 144 MMOL/L (ref 136–145)
SPECIMEN SOURCE: NORMAL
TROPONIN I SERPL HS-MCNC: 8.8 PG/ML (ref 0–14.9)
WBC # BLD AUTO: 6.75 K/UL (ref 3.9–12.7)

## 2024-01-04 PROCEDURE — 87502 INFLUENZA DNA AMP PROBE: CPT | Performed by: NURSE PRACTITIONER

## 2024-01-04 PROCEDURE — 99285 EMERGENCY DEPT VISIT HI MDM: CPT | Mod: 25

## 2024-01-04 PROCEDURE — 83735 ASSAY OF MAGNESIUM: CPT | Performed by: NURSE PRACTITIONER

## 2024-01-04 PROCEDURE — 93005 ELECTROCARDIOGRAM TRACING: CPT | Performed by: INTERNAL MEDICINE

## 2024-01-04 PROCEDURE — U0002 COVID-19 LAB TEST NON-CDC: HCPCS | Performed by: NURSE PRACTITIONER

## 2024-01-04 PROCEDURE — 85025 COMPLETE CBC W/AUTO DIFF WBC: CPT | Performed by: NURSE PRACTITIONER

## 2024-01-04 PROCEDURE — 80053 COMPREHEN METABOLIC PANEL: CPT | Performed by: NURSE PRACTITIONER

## 2024-01-04 PROCEDURE — 83880 ASSAY OF NATRIURETIC PEPTIDE: CPT | Performed by: NURSE PRACTITIONER

## 2024-01-04 PROCEDURE — 93010 ELECTROCARDIOGRAM REPORT: CPT | Mod: ,,, | Performed by: INTERNAL MEDICINE

## 2024-01-04 PROCEDURE — 84484 ASSAY OF TROPONIN QUANT: CPT | Performed by: NURSE PRACTITIONER

## 2024-01-04 RX ORDER — AMOXICILLIN AND CLAVULANATE POTASSIUM 875; 125 MG/1; MG/1
1 TABLET, FILM COATED ORAL 2 TIMES DAILY
Qty: 14 TABLET | Refills: 0 | Status: SHIPPED | OUTPATIENT
Start: 2024-01-04 | End: 2024-02-05

## 2024-01-04 NOTE — FIRST PROVIDER EVALUATION
"Medical screening examination initiated.  I have conducted a focused provider triage encounter, findings are as follows:    Brief history of present illness:  Presents with blood pressure 217/114.  Patient reports he has taken his blood pressure medicine today.  He states that sometimes he has to double up on his blood pressure medication.  He denies chest pain but reports shortness of breath that is chronic since he has emphysema.  He feels like he has the flu according to him.  He reports cough chills and fatigue.    Vitals:    01/04/24 1318   BP: (!) 217/114   BP Location: Left arm   Patient Position: Sitting   Pulse: 84   Resp: 18   Temp: 98.6 °F (37 °C)   TempSrc: Oral   SpO2: 96%   Weight: 90.7 kg (200 lb)   Height: 6' 1" (1.854 m)       Pertinent physical exam:  Heart rate regular.  Lungs are clear to auscultation.    Brief workup plan:  Cardiac workup influenza and COVID.    Preliminary workup initiated; this workup will be continued and followed by the physician or advanced practice provider that is assigned to the patient when roomed.  "

## 2024-01-04 NOTE — ED PROVIDER NOTES
Encounter Date: 1/4/2024       History     Chief Complaint   Patient presents with    Cough    Chills    Fatigue     X 3 days     Sampson Holt is a 68 year old male with pmh CHF, DM, HTN, stroke presenting to the ED with a 3 day history of fatigue, chills, cough, congestion. He has had no chest pain or SOB. He also denies urinary symptoms. He states he has had sinus pressure/congestion for two weeks which is consistent with previous sinus infections. He denies lower extremity edema.       Review of patient's allergies indicates:  No Known Allergies  Past Medical History:   Diagnosis Date    Abdominal pain 2022    CHF (congestive heart failure)     Diabetes mellitus 2018    Emphysema lung     Endocarditis 2011    Hypertension     Stroke 2011    denies residual     Past Surgical History:   Procedure Laterality Date    ANGIOGRAM, CORONARY, WITH LEFT HEART CATHETERIZATION N/A 10/10/2023    Procedure: Angiogram, Coronary, with Left Heart Cath;  Surgeon: Dieudonne Ryan MD;  Location: [facility] CATH/EP LAB;  Service: Cardiology;  Laterality: N/A;    BACK SURGERY  1981    COLONOSCOPY N/A 2/23/2023    Procedure: COLONOSCOPY;  Surgeon: Jonn Cruz MD;  Location: [facility] ENDO;  Service: Endoscopy;  Laterality: N/A;    CORONARY ANGIOGRAPHY N/A 5/9/2023    Procedure: ANGIOGRAM, CORONARY ARTERY;  Surgeon: Antonio Kessler MD;  Location: [facility] CATH/EP LAB;  Service: Cardiology;  Laterality: N/A;    EXCISION OF HYDROCELE      x2    FRACTIONAL FLOW RESERVE (FFR), CORONARY  5/11/2023    Procedure: Fractional Flow [place] (FFR), Coronary;  Surgeon: Antonio Kessler MD;  Location: [facility] CATH/EP LAB;  Service: Cardiology;;    INGUINAL HERNIA REPAIR  1960    INSTANTANEOUS WAVE-FREE RATIO (IFR) N/A 10/10/2023    Procedure: Instantaneous Wave-Free Ratio (IFR);  Surgeon: Dieudonne Ryan MD;  Location: [facility] CATH/EP LAB;  Service: Cardiology;  Laterality: N/A;    INTRAVASCULAR ULTRASOUND, CORONARY N/A 5/9/2023    Procedure:  Ultrasound-coronary;  Surgeon: Antonio Kessler MD;  Location: [facility] CATH/EP LAB;  Service: Cardiology;  Laterality: N/A;    IVUS, CORONARY  5/11/2023    Procedure: IVUS, Coronary;  Surgeon: Antonio Kessler MD;  Location: [facility] CATH/EP LAB;  Service: Cardiology;;    LEFT HEART CATHETERIZATION Left 5/11/2023    Procedure: Left heart cath;  Surgeon: Antonio Kessler MD;  Location: [facility] CATH/EP LAB;  Service: Cardiology;  Laterality: Left;    PERCUTANEOUS TRANSLUMINAL BALLOON ANGIOPLASTY OF CORONARY ARTERY  5/9/2023    Procedure: Angioplasty-coronary;  Surgeon: Antonio Kessler MD;  Location: [facility] CATH/EP LAB;  Service: Cardiology;;    RHINOPLASTY      STENT, DRUG ELUTING, SINGLE VESSEL, CORONARY  5/9/2023    Procedure: Stent, Drug Eluting, Single Vessel, Coronary;  Surgeon: Antonio Kessler MD;  Location: [facility] CATH/EP LAB;  Service: Cardiology;;    SURGICAL REMOVAL OF NODULE OF VOCAL CORD       No family history on file.  Social History     Tobacco Use    Smoking status: Every Day     Types: Cigarettes    Smokeless tobacco: Former    Tobacco comments:     Pt ready to quit smoking and states he can on his own. Seen 10/9/23 for inpatient smoking cessation. [employer].   Substance Use Topics    Alcohol use: Yes     Alcohol/week: 14.0 standard drinks of alcohol     Types: 14 Shots of liquor per week    Drug use: Yes     Types: Marijuana     Review of Systems   Constitutional:  Positive for chills and fatigue. Negative for fever.   HENT:  Positive for congestion, sinus pressure and sinus pain. Negative for sore throat.    Respiratory:  Positive for cough. Negative for chest tightness and shortness of breath.    Cardiovascular:  Negative for chest pain and leg swelling.   Gastrointestinal:  Negative for nausea.   Genitourinary:  Negative for dysuria and frequency.   Musculoskeletal:  Negative for back pain.   Skin:  Negative for rash.   Neurological:  Negative for weakness.   Hematological:  Does not bruise/bleed easily.        Physical Exam     Initial Vitals [01/04/24 1318]   BP Pulse Resp Temp SpO2   (!) 217/114 84 18 98.6 °F (37 °C) 96 %      MAP       --         Physical Exam    Nursing note and vitals reviewed.  Constitutional: He appears well-developed and well-nourished. He is not diaphoretic. No distress.   HENT:   Head: Normocephalic and atraumatic.   Mouth/Throat: Oropharynx is clear and moist.   Eyes: Conjunctivae are normal.   Neck: Neck supple.   Cardiovascular:  Normal rate, regular rhythm, normal heart sounds and intact distal pulses.     Exam reveals no gallop and no friction rub.       No murmur heard.  Pulmonary/Chest: Breath sounds normal. He has no wheezes. He has no rhonchi. He has no rales.   Abdominal: Abdomen is soft. He exhibits no distension. There is no abdominal tenderness.   Musculoskeletal:         General: Normal range of motion.      Cervical back: Neck supple.     Neurological: He is alert and oriented to person, place, and time.   Skin: No rash noted. No erythema.         ED Course   Procedures  Labs Reviewed   B-TYPE NATRIURETIC PEPTIDE - Abnormal; Notable for the following components:       Result Value     (*)     All other components within normal limits   CBC W/ AUTO DIFFERENTIAL - Abnormal; Notable for the following components:    MCH 32.4 (*)     MPV 8.8 (*)     All other components within normal limits   INFLUENZA A AND B ANTIGEN    Narrative:     Specimen Source->Nasopharyngeal Swab   SARS-COV-2 RNA AMPLIFICATION, QUAL   COMPREHENSIVE METABOLIC PANEL   MAGNESIUM   TROPONIN I HIGH SENSITIVITY          Imaging Results              X-Ray Chest PA And Lateral (Final result)  Result time 01/04/24 14:07:01      Final result by Ezra Gafnfey MD (01/04/24 14:07:01)                   Narrative:    XR CHEST 2 VIEWS    CLINICAL HISTORY:  68 years Male cough, chills, fatigue    COMPARISON: October 7, 2023    FINDINGS: Cardiomediastinal silhouette is within normal limits. Lungs are mildly  "hyperexpanded with no airspace consolidation. No pleural effusion or pneumothorax. No acute osseous abnormality.    IMPRESSION: No acute pulmonary process.    Electronically signed by:  Ezra Gaffney MD  01/04/2024 02:07 PM CST Workstation: [id number]                                     Medications - No data to display  Medical Decision Making  This is an urgent evaluation of a 68 year old male presenting to the ED with upper respiratory symptoms. He has had no chest pain and reports no change in his baseline SOB stating "I always feel SOB".   Labs ordered and reviewed with results as follows:    Latest Reference Range & Units 01/04/24 14:30  WBC 3.90 - 12.70 K/uL 6.75  RBC 4.60 - 6.20 M/uL 4.69  Hemoglobin 14.0 - 18.0 g/dL 15.2  Hematocrit 40.0 - 54.0 % 44.6   Latest Reference Range & Units 01/04/24 14:11 01/04/24 14:30  BNP 0 - 99 pg/mL  119 (H)  Troponin I High Sensitivity 0.0 - 14.9 pg/mL  8.8  Flu A & B Source  Nasal swab   Influenza A, Molecular Negative  Negative   Influenza B, Molecular Negative  Negative   SARS-CoV-2 RNA, Amplification, Qual Negative  Negative     CXR ordered and reviewed with radiology report as follows: no acute cardiopulmonary process.  EKG as follows: Sinus rhythm with 1st degree A-V block Left axis deviation Left bundle branch block Abnormal ECG When compared with ECG of 07-OCT-2023 10:33, No significant change was found    He has had sinus pain/pressure x 2 weeks so will treat for bacterial sinusitis with close PCP and cardiology follow up. Strict ED return precautions discussed and he verbalized understanding. Based on my clinical evaluation, I do not appreciate any immediate, emergent, or life threatening condition or etiology that warrants additional workup today and feel that the patient can be discharged with close follow up care.         Amount and/or Complexity of Data Reviewed  Labs: ordered. Decision-making details documented in ED Course.  Radiology: ordered. " Decision-making details documented in ED Course.  ECG/medicine tests: ordered. Decision-making details documented in ED Course.    Risk  Prescription drug management.                                      Clinical Impression:  Final diagnoses:  [R05.9] Cough  [R53.1] Weakness  [J32.9, B96.89] Bacterial sinusitis (Primary)  [J06.9] Viral upper respiratory illness                 Maria Esther Lopez NP  01/05/24 1136

## 2024-01-04 NOTE — Clinical Note
"Sampson Lange" Yanet was seen and treated in our emergency department on 1/4/2024.  He may return to work on 01/06/2024.       If you have any questions or concerns, please don't hesitate to call.      Maria Esther Lopez NP"

## 2024-01-08 ENCOUNTER — OFFICE VISIT (OUTPATIENT)
Dept: CARDIOLOGY | Facility: CLINIC | Age: 69
End: 2024-01-08
Payer: OTHER GOVERNMENT

## 2024-01-08 ENCOUNTER — LAB VISIT (OUTPATIENT)
Dept: LAB | Facility: HOSPITAL | Age: 69
End: 2024-01-08
Attending: INTERNAL MEDICINE
Payer: MEDICARE

## 2024-01-08 VITALS
SYSTOLIC BLOOD PRESSURE: 158 MMHG | WEIGHT: 201.94 LBS | DIASTOLIC BLOOD PRESSURE: 84 MMHG | HEART RATE: 95 BPM | OXYGEN SATURATION: 97 % | HEIGHT: 73 IN | BODY MASS INDEX: 26.76 KG/M2

## 2024-01-08 DIAGNOSIS — R06.02 SHORTNESS OF BREATH: ICD-10-CM

## 2024-01-08 DIAGNOSIS — R79.89 ELEVATED BRAIN NATRIURETIC PEPTIDE (BNP) LEVEL: ICD-10-CM

## 2024-01-08 DIAGNOSIS — R68.89 FLU-LIKE SYMPTOMS: ICD-10-CM

## 2024-01-08 DIAGNOSIS — R07.9 CHEST PAIN, UNSPECIFIED TYPE: ICD-10-CM

## 2024-01-08 DIAGNOSIS — I44.7 LBBB (LEFT BUNDLE BRANCH BLOCK): ICD-10-CM

## 2024-01-08 DIAGNOSIS — I25.10 CORONARY ARTERY DISEASE INVOLVING NATIVE CORONARY ARTERY OF NATIVE HEART WITHOUT ANGINA PECTORIS: Primary | ICD-10-CM

## 2024-01-08 DIAGNOSIS — J44.9 CHRONIC OBSTRUCTIVE PULMONARY DISEASE, UNSPECIFIED COPD TYPE: ICD-10-CM

## 2024-01-08 DIAGNOSIS — T46.6X5A MYALGIA DUE TO STATIN: ICD-10-CM

## 2024-01-08 DIAGNOSIS — I25.2 HISTORY OF ST ELEVATION MYOCARDIAL INFARCTION (STEMI): ICD-10-CM

## 2024-01-08 DIAGNOSIS — Z95.5 STATUS POST INSERTION OF DRUG ELUTING CORONARY ARTERY STENT: ICD-10-CM

## 2024-01-08 DIAGNOSIS — M79.10 MYALGIA DUE TO STATIN: ICD-10-CM

## 2024-01-08 LAB
BNP SERPL-MCNC: 89 PG/ML (ref 0–99)
TROPONIN I SERPL HS-MCNC: 7.7 PG/ML (ref 0–14.9)

## 2024-01-08 PROCEDURE — 99215 OFFICE O/P EST HI 40 MIN: CPT | Mod: S$GLB,,, | Performed by: INTERNAL MEDICINE

## 2024-01-08 PROCEDURE — 36415 COLL VENOUS BLD VENIPUNCTURE: CPT | Performed by: INTERNAL MEDICINE

## 2024-01-08 PROCEDURE — 93000 ELECTROCARDIOGRAM COMPLETE: CPT | Mod: S$GLB,,, | Performed by: GENERAL PRACTICE

## 2024-01-08 PROCEDURE — 83880 ASSAY OF NATRIURETIC PEPTIDE: CPT | Performed by: INTERNAL MEDICINE

## 2024-01-08 PROCEDURE — 84484 ASSAY OF TROPONIN QUANT: CPT | Performed by: INTERNAL MEDICINE

## 2024-01-08 PROCEDURE — 99214 OFFICE O/P EST MOD 30 MIN: CPT | Mod: PBBFAC,PN | Performed by: INTERNAL MEDICINE

## 2024-01-08 PROCEDURE — 99999 PR PBB SHADOW E&M-EST. PATIENT-LVL IV: CPT | Mod: PBBFAC,,, | Performed by: INTERNAL MEDICINE

## 2024-01-08 PROCEDURE — 93005 ELECTROCARDIOGRAM TRACING: CPT | Mod: PBBFAC,PN | Performed by: GENERAL PRACTICE

## 2024-01-08 RX ORDER — METOPROLOL SUCCINATE 25 MG/1
25 TABLET, EXTENDED RELEASE ORAL 2 TIMES DAILY
Qty: 180 TABLET | Refills: 3 | Status: SHIPPED | OUTPATIENT
Start: 2024-01-08 | End: 2024-01-22

## 2024-01-08 RX ORDER — EPINEPHRINE 0.22MG
200 AEROSOL WITH ADAPTER (ML) INHALATION DAILY
Qty: 180 CAPSULE | Refills: 3 | Status: SHIPPED | OUTPATIENT
Start: 2024-01-08 | End: 2025-01-07

## 2024-01-08 NOTE — PROGRESS NOTES
[facility]    Subjective:     Patient ID:  Sampson Holt is a 68 y.o. male patient here for evaluation Hypertension (Follow up high blood pressure. Was in the hospital last week . )      HPI:  68-year-old male here for follow-up.  He had STEMI earlier in the year status post PCI and on medical management for residual LAD and left circumflex stenosis.  Recently in the ER for atypical chest pain and had troponin negative blood blood pressure was elevated and discharged home.  Follows up today.  Reports flu-like symptoms with congestion in the no.  Reports shortness of breath.  Blood pressure was elevated in the ER.  BNP was mildly elevated in the ER, LVEDP was normal on recent angiogram in October.    Review of Systems   All other systems reviewed and are negative.       Past Medical History:   Diagnosis Date    Abdominal pain 2022    CHF (congestive heart failure)     Diabetes mellitus 2018    Emphysema lung     Endocarditis 2011    Hypertension     Stroke 2011    denies residual       Past Surgical History:   Procedure Laterality Date    ANGIOGRAM, CORONARY, WITH LEFT HEART CATHETERIZATION N/A 10/10/2023    Procedure: Angiogram, Coronary, with Left Heart Cath;  Surgeon: Dieudonne Ryan MD;  Location: [facility] CATH/EP LAB;  Service: Cardiology;  Laterality: N/A;    BACK SURGERY  1981    COLONOSCOPY N/A 2/23/2023    Procedure: COLONOSCOPY;  Surgeon: Jonn Cruz MD;  Location: [facility] ENDO;  Service: Endoscopy;  Laterality: N/A;    CORONARY ANGIOGRAPHY N/A 5/9/2023    Procedure: ANGIOGRAM, CORONARY ARTERY;  Surgeon: Antonio Kessler MD;  Location: [facility] CATH/EP LAB;  Service: Cardiology;  Laterality: N/A;    EXCISION OF HYDROCELE      x2    FRACTIONAL FLOW RESERVE (FFR), CORONARY  5/11/2023    Procedure: Fractional Flow Atkinson (FFR), Coronary;  Surgeon: Antonio Kessler MD;  Location: [facility] CATH/EP LAB;  Service: Cardiology;;    INGUINAL HERNIA REPAIR  1960     INSTANTANEOUS WAVE-FREE RATIO (IFR) N/A 10/10/2023    Procedure: Instantaneous Wave-Free Ratio (IFR);  Surgeon: Dieudonne Ryan MD;  Location: [facility] CATH/EP LAB;  Service: Cardiology;  Laterality: N/A;    INTRAVASCULAR ULTRASOUND, CORONARY N/A 5/9/2023    Procedure: Ultrasound-coronary;  Surgeon: Antonio Kessler MD;  Location: [facility] CATH/EP LAB;  Service: Cardiology;  Laterality: N/A;    IVUS, CORONARY  5/11/2023    Procedure: IVUS, Coronary;  Surgeon: Antonio Kessler MD;  Location: [facility] CATH/EP LAB;  Service: Cardiology;;    LEFT HEART CATHETERIZATION Left 5/11/2023    Procedure: Left heart cath;  Surgeon: Antonio Kessler MD;  Location: [facility] CATH/EP LAB;  Service: Cardiology;  Laterality: Left;    PERCUTANEOUS TRANSLUMINAL BALLOON ANGIOPLASTY OF CORONARY ARTERY  5/9/2023    Procedure: Angioplasty-coronary;  Surgeon: Antonio Kessler MD;  Location: [facility] CATH/EP LAB;  Service: Cardiology;;    RHINOPLASTY      STENT, DRUG ELUTING, SINGLE VESSEL, CORONARY  5/9/2023    Procedure: Stent, Drug Eluting, Single Vessel, Coronary;  Surgeon: Antonio Kessler MD;  Location: [facility] CATH/EP LAB;  Service: Cardiology;;    SURGICAL REMOVAL OF NODULE OF VOCAL CORD         No family history on file.    Social History     Socioeconomic History    Marital status: Legally    Tobacco Use    Smoking status: Every Day     Types: Cigarettes    Smokeless tobacco: Former    Tobacco comments:     Pt ready to quit smoking and states he can on his own. Seen 10/9/23 for inpatient smoking cessation. [employer].   Substance and Sexual Activity    Alcohol use: Yes     Alcohol/week: 14.0 standard drinks of alcohol     Types: 14 Shots of liquor per week    Drug use: Yes     Types: Marijuana     Social Determinants of Health     Transportation Needs: No Transportation Needs (5/10/2023)    PRAPARE - Transportation     Lack of Transportation (Medical): No     Lack of Transportation (Non-Medical): No       Current Outpatient Medications    Medication Sig Dispense Refill    amLODIPine (NORVASC) 5 MG tablet Take 1 tablet (5 mg total) by mouth once daily. 30 tablet 11    amoxicillin-clavulanate 875-125mg (AUGMENTIN) 875-125 mg per tablet Take 1 tablet by mouth 2 (two) times daily. for 7 days 14 tablet 0    aspirin (ECOTRIN) 81 MG EC tablet Take 1 tablet (81 mg total) by mouth once daily. 90 tablet 3    atorvastatin (LIPITOR) 40 MG tablet Take 1 tablet (40 mg total) by mouth once daily. 90 tablet 3    clopidogreL (PLAVIX) 75 mg tablet Take 1 tablet (75 mg total) by mouth once daily. 90 tablet 3    insulin glargine,hum.rec.anlog (LANTUS SUBQ) Inject 35 Units into the skin once daily.      isosorbide mononitrate (IMDUR) 30 MG 24 hr tablet       losartan (COZAAR) 50 MG tablet Take 2 tablets (100 mg total) by mouth once daily. 180 tablet 3    mirabegron (MYRBETRIQ) 50 mg Tb24 Take 1 tablet (50 mg total) by mouth every morning. Take one every morning for overactive bladder 90 tablet 0    coenzyme Q10 100 mg capsule Take 2 capsules (200 mg total) by mouth once daily. 180 capsule 3    metoprolol succinate (TOPROL-XL) 25 MG 24 hr tablet Take 1 tablet (25 mg total) by mouth 2 (two) times daily. 180 tablet 3    tamsulosin (FLOMAX) 0.4 mg Cap Take 1 capsule (0.4 mg total) by mouth once daily. Take in evening. 90 capsule 4     No current facility-administered medications for this visit.       Review of patient's allergies indicates:  No Known Allergies      Objective:        Vitals:    01/08/24 0859   BP: (!) 158/84   Pulse: 95       Physical Exam  Vitals reviewed.   HENT:      Mouth/Throat:      Mouth: Mucous membranes are moist.   Eyes:      Extraocular Movements: Extraocular movements intact.      Pupils: Pupils are equal, round, and reactive to light.   Cardiovascular:      Rate and Rhythm: Normal rate and regular rhythm.      Pulses: Normal pulses.      Heart sounds: Normal heart sounds. No murmur heard.     No gallop.   Pulmonary:      Effort: Pulmonary  effort is normal.      Breath sounds: Normal breath sounds. No wheezing.   Abdominal:      General: Bowel sounds are normal.      Palpations: Abdomen is soft.   Musculoskeletal:         General: Normal range of motion.      Cervical back: Normal range of motion.   Skin:     General: Skin is warm and dry.   Neurological:      General: No focal deficit present.      Mental Status: He is alert and oriented to person, place, and time.   Psychiatric:         Mood and Affect: Mood normal.         LIPIDS - LAST 2   Lab Results   Component Value Date    CHOL 188 05/10/2023    HDL 45 05/10/2023    LDLCALC 118.2 05/10/2023    TRIG 124 05/10/2023    CHOLHDL 23.9 05/10/2023       CBC - LAST 2  Lab Results   Component Value Date    WBC 6.75 01/04/2024    WBC 4.75 10/09/2023    RBC 4.69 01/04/2024    RBC 3.98 (L) 10/09/2023    HGB 15.2 01/04/2024    HGB 12.8 (L) 10/09/2023    HCT 44.6 01/04/2024    HCT 38.0 (L) 10/09/2023    MCV 95 01/04/2024    MCV 96 10/09/2023    MCH 32.4 (H) 01/04/2024    MCH 32.2 (H) 10/09/2023    MCHC 34.1 01/04/2024    MCHC 33.7 10/09/2023    RDW 13.8 01/04/2024    RDW 12.9 10/09/2023     01/04/2024     10/09/2023    MPV 8.8 (L) 01/04/2024    MPV 9.8 10/09/2023    GRAN 4.4 01/04/2024    GRAN 65.0 01/04/2024    LYMPH 1.7 01/04/2024    LYMPH 24.7 01/04/2024    MONO 0.5 01/04/2024    MONO 8.0 01/04/2024    BASO 0.06 01/04/2024    BASO 0.04 10/09/2023    NRBC 0 01/04/2024    NRBC 0 10/09/2023       CHEMISTRY & LIVER FUNCTION - LAST 2  Lab Results   Component Value Date     01/04/2024     10/11/2023    K 3.9 01/04/2024    K 3.7 10/11/2023     01/04/2024     10/11/2023    CO2 28 01/04/2024    CO2 24 10/11/2023    ANIONGAP 10 01/04/2024    ANIONGAP 8 10/11/2023    BUN 10 01/04/2024    BUN 13 10/11/2023    CREATININE 0.7 01/04/2024    CREATININE 0.8 10/11/2023    GLU 84 01/04/2024     (H) 10/11/2023    CALCIUM 9.5 01/04/2024    CALCIUM 9.2 10/11/2023    PH 7.422  03/27/2020    MG 1.8 01/04/2024    MG 1.9 10/07/2023    ALBUMIN 4.6 01/04/2024    ALBUMIN 4.4 10/07/2023    PROT 7.7 01/04/2024    PROT 7.6 10/07/2023    ALKPHOS 97 01/04/2024    ALKPHOS 82 10/07/2023    ALT 12 01/04/2024    ALT 16 10/07/2023    AST 17 01/04/2024    AST 20 10/07/2023    BILITOT 0.7 01/04/2024    BILITOT 0.5 10/07/2023        CARDIAC PROFILE - LAST 2  Lab Results   Component Value Date     (H) 01/04/2024    BNP 49 10/07/2023    CPK 71 07/06/2022    CPK 75 03/27/2020    CPKMB 1.8 07/06/2022    CPKMB 1.5 03/27/2020     03/27/2020    TROPONINI <0.030 07/06/2022    TROPONINI <0.030 03/28/2020        COAGULATION - LAST 2  Lab Results   Component Value Date    LABPT 13.3 07/06/2022    INR 1.0 10/09/2023    INR 1.0 08/11/2023    APTT 28.1 10/09/2023    APTT >150.0 (AA) 05/09/2023       ENDOCRINE & PSA - LAST 2  Lab Results   Component Value Date    HGBA1C 6.0 08/11/2023    HGBA1C 6.9 (H) 05/10/2023    HGBA1C 6.9 (H) 05/10/2023    TSH 2.160 05/10/2023    PROCAL <0.05 08/11/2023    PROCAL <0.05 03/28/2020        ECHOCARDIOGRAM RESULTS  Results for orders placed during the hospital encounter of 10/07/23    Echo    Interpretation Summary    Left Ventricle: The left ventricle is normal in size. Mildly increased wall thickness. There is concentric remodeling. Septal motion is normal. There is normal systolic function. Ejection fraction by visual approximation is 60%.    Right Ventricle: Normal right ventricular cavity size. Wall thickness is normal. Right ventricle wall motion  is normal. Systolic function is normal.    Mitral Valve: There is mild posterior mitral annular calcification present.    IVC/SVC: Normal venous pressure at 3 mmHg.    A limited echo was performed using limited 2D and color flow Doppler      CURRENT/PREVIOUS VISIT EKG  Results for orders placed or performed during the hospital encounter of 01/04/24   EKG 12-lead    Collection Time: 01/04/24  2:07 PM    Narrative    Test  Reason : R53.1,    Vent. Rate : 077 BPM     Atrial Rate : 077 BPM     P-R Int : 216 ms          QRS Dur : 148 ms      QT Int : 442 ms       P-R-T Axes : 063 -61 094 degrees     QTc Int : 500 ms    Sinus rhythm with 1st degree A-V block  Left axis deviation  Left bundle branch block  Abnormal ECG  When compared with ECG of 07-OCT-2023 10:33,  No significant change was found  Confirmed by Kevin Cruz MD ([id number]) on 1/7/2024 2:30:24 PM    Referred By: HARDEEP   SELF           Confirmed By:Kevin Cruz MD     No valid procedures specified.   Results for orders placed during the hospital encounter of 06/22/23    Nuclear Stress - Cardiology Interpreted    Interpretation Summary    Normal myocardial perfusion scan. There is no evidence of myocardial ischemia or infarction.    The gated perfusion images showed an ejection fraction of 70% at rest. The gated perfusion images showed an ejection fraction of 73% post stress. Normal ejection fraction is greater than 53%.    The ECG portion of the study is negative for ischemia.    The patient reported no chest pain during the stress test.    There were no arrhythmias during stress.    No valid procedures specified.        Assessment:       1. Coronary artery disease involving native coronary artery of native heart without angina pectoris    2. History of ST elevation myocardial infarction (STEMI)    3. Status post insertion of drug eluting coronary artery stent    4. Chest pain, unspecified type    5. LBBB (left bundle branch block)    6. Shortness of breath    7. Elevated brain natriuretic peptide (BNP) level    8. Flu-like symptoms    9. Myalgia due to statin    10. Chronic obstructive pulmonary disease, unspecified COPD type           Plan:       Coronary artery disease involving native coronary artery of native heart without angina pectoris  -     IN OFFICE EKG 12-LEAD (to Muse)  -     metoprolol succinate (TOPROL-XL) 25 MG 24 hr tablet; Take 1 tablet (25 mg total)  by mouth 2 (two) times daily.  Dispense: 180 tablet; Refill: 3    History of ST elevation myocardial infarction (STEMI)    Status post insertion of drug eluting coronary artery stent    Chest pain, unspecified type  -     IN OFFICE EKG 12-LEAD (to Muse)  -     TROPONIN I; Future; Expected date: 01/08/2024    LBBB (left bundle branch block)    Shortness of breath  -     B-TYPE NATRIURETIC PEPTIDE; Future; Expected date: 01/08/2024  -     TROPONIN I; Future; Expected date: 01/08/2024    Elevated brain natriuretic peptide (BNP) level  -     B-TYPE NATRIURETIC PEPTIDE; Future; Expected date: 01/08/2024    Flu-like symptoms    Myalgia due to statin  -     coenzyme Q10 100 mg capsule; Take 2 capsules (200 mg total) by mouth once daily.  Dispense: 180 capsule; Refill: 3    Chronic obstructive pulmonary disease, unspecified COPD type      Given patient's history and current symptoms, patient informed that he should go to ER for further evaluation and consider getting admitted until he feels better given his risk factors.  Patient would like to defer that and understands the risks.  He wants to get outpatient evaluation.  Will get a BNP and a troponin done.  Changes carvedilol given his new diagnosis of COPD and change it to metoprolol.  Follow-up in 2 weeks for further management.  Continue with dual antiplatelet therapy.  Patient on 2 statins as per the medication list, patient unsure of his list, patient informed to bring his medications next time to the clinic, most likely he is on atorvastatin, will stop the rosuvastatin in the list.  Informed him to increase goal Q10 dose if needed for myalgias improve will and to stop statin if he does not get any benefit myalgias even with using Co Q10.  Patient instructed to reach out to his primary care or his pulmonologist for further evaluation of COPD and his flu-like symptoms.      Follow up in 2 weeks (on 1/22/2024) for f/u HTN, Trop, BNP.          MD Walter Graysonll  [facility] [place] [facility]

## 2024-01-16 ENCOUNTER — TELEPHONE (OUTPATIENT)
Dept: CARDIOLOGY | Facility: CLINIC | Age: 69
End: 2024-01-16
Payer: MEDICARE

## 2024-01-16 NOTE — TELEPHONE ENCOUNTER
Hey     This patient has an appointment with you on 01/22/2024    The called stating you changed his carvedilol to metoprolol . He is stating he would like to change back . He felt better on the carvedilol . Says having some discomfort on his side , chest right discomfort. ?

## 2024-01-16 NOTE — TELEPHONE ENCOUNTER
----- Message from Wendy Keys, Patient Care Assistant sent at 1/12/2024  4:49 PM CST -----  Contact: self  Pt is calling to speak w/ a nurse regarding the med metoprolol succinate (TOPROL-XL) 25 MG 24 hr tablet.   [phone]  Thanks

## 2024-01-17 ENCOUNTER — TELEPHONE (OUTPATIENT)
Dept: CARDIOLOGY | Facility: CLINIC | Age: 69
End: 2024-01-17
Payer: MEDICARE

## 2024-01-17 NOTE — TELEPHONE ENCOUNTER
Hello       I hope this helps you . Keeping you in my prayers . Mr Braden please reach out to   [facility] .     [address] [address], [place] [id number] [phone]      Ary Mendoza

## 2024-01-17 NOTE — TELEPHONE ENCOUNTER
I tried to call this patient. His voice mail is not set up     Dr Kessler sent a message . It is okay for patient to start back on his carvedilol.

## 2024-01-17 NOTE — TELEPHONE ENCOUNTER
----- Message from Sahara Mariscal sent at 1/17/2024  4:00 PM CST -----  Type:  Patient Returning Call    Who Called:pt     Who Left Message for Patient:Ary Spencer    Does the patient know what this is regarding?:yes     Would the patient rather a call back or a response via [facility]? Callback     Best Call Back Number: [phone]      Additional Information: please advise thank you

## 2024-01-22 ENCOUNTER — OFFICE VISIT (OUTPATIENT)
Dept: CARDIOLOGY | Facility: CLINIC | Age: 69
End: 2024-01-22
Payer: MEDICARE

## 2024-01-22 VITALS
SYSTOLIC BLOOD PRESSURE: 140 MMHG | HEIGHT: 73 IN | WEIGHT: 201.75 LBS | BODY MASS INDEX: 26.74 KG/M2 | HEART RATE: 76 BPM | DIASTOLIC BLOOD PRESSURE: 82 MMHG | OXYGEN SATURATION: 98 %

## 2024-01-22 DIAGNOSIS — I25.10 CORONARY ARTERY DISEASE INVOLVING NATIVE CORONARY ARTERY OF NATIVE HEART WITHOUT ANGINA PECTORIS: Primary | ICD-10-CM

## 2024-01-22 DIAGNOSIS — R20.0 NUMBNESS IN BOTH LEGS: ICD-10-CM

## 2024-01-22 DIAGNOSIS — Z95.5 STATUS POST INSERTION OF DRUG ELUTING CORONARY ARTERY STENT: ICD-10-CM

## 2024-01-22 DIAGNOSIS — I44.7 LBBB (LEFT BUNDLE BRANCH BLOCK): ICD-10-CM

## 2024-01-22 DIAGNOSIS — E78.2 MIXED HYPERLIPIDEMIA: ICD-10-CM

## 2024-01-22 DIAGNOSIS — I25.2 HISTORY OF ST ELEVATION MYOCARDIAL INFARCTION (STEMI): ICD-10-CM

## 2024-01-22 DIAGNOSIS — R06.83 SNORING: ICD-10-CM

## 2024-01-22 PROCEDURE — 99214 OFFICE O/P EST MOD 30 MIN: CPT | Mod: S$GLB,,, | Performed by: INTERNAL MEDICINE

## 2024-01-22 PROCEDURE — 99999 PR PBB SHADOW E&M-EST. PATIENT-LVL V: CPT | Mod: PBBFAC,,, | Performed by: INTERNAL MEDICINE

## 2024-01-22 PROCEDURE — 93000 ELECTROCARDIOGRAM COMPLETE: CPT | Mod: S$GLB,,, | Performed by: GENERAL PRACTICE

## 2024-01-22 RX ORDER — CARVEDILOL 3.12 MG/1
3.12 TABLET ORAL 2 TIMES DAILY WITH MEALS
Status: ON HOLD | COMMUNITY
End: 2024-03-13

## 2024-01-22 RX ORDER — NITROGLYCERIN 0.4 MG/1
0.4 TABLET SUBLINGUAL EVERY 5 MIN PRN
Qty: 25 TABLET | Refills: 5 | Status: SHIPPED | OUTPATIENT
Start: 2024-01-22 | End: 2025-01-21

## 2024-01-22 NOTE — PROGRESS NOTES
[facility]    Subjective:     Patient ID:  Sampson Holt is a 68 y.o. male patient here for evaluation Hospital Follow Up (On 01/04/2024 was in [facility] for irregular heart rate . )      HPI:  68-year-old male with history of STEMI status post PCI here for follow-up.  Doing well.  Occasional atypical chest pain sharp in nature.  Reports fogginess when he gets up in the morning but no specific dizziness or lightheadedness on standing up.  Blood pressure is better controlled this visit.    Review of Systems   All other systems reviewed and are negative.       Past Medical History:   Diagnosis Date    Abdominal pain 2022    CHF (congestive heart failure)     Diabetes mellitus 2018    Emphysema lung     Endocarditis 2011    Hypertension     Stroke 2011    denies residual       Past Surgical History:   Procedure Laterality Date    ANGIOGRAM, CORONARY, WITH LEFT HEART CATHETERIZATION N/A 10/10/2023    Procedure: Angiogram, Coronary, with Left Heart Cath;  Surgeon: Dieudonne Ryan MD;  Location: [facility] CATH/EP LAB;  Service: Cardiology;  Laterality: N/A;    BACK SURGERY  1981    COLONOSCOPY N/A 2/23/2023    Procedure: COLONOSCOPY;  Surgeon: Jonn Cruz MD;  Location: [facility] ENDO;  Service: Endoscopy;  Laterality: N/A;    CORONARY ANGIOGRAPHY N/A 5/9/2023    Procedure: ANGIOGRAM, CORONARY ARTERY;  Surgeon: Antonio Kessler MD;  Location: [facility] CATH/EP LAB;  Service: Cardiology;  Laterality: N/A;    EXCISION OF HYDROCELE      x2    FRACTIONAL FLOW RESERVE (FFR), CORONARY  5/11/2023    Procedure: Fractional Flow [place] (FFR), Coronary;  Surgeon: Antonio Kessler MD;  Location: [facility] CATH/EP LAB;  Service: Cardiology;;    INGUINAL HERNIA REPAIR  1960    INSTANTANEOUS WAVE-FREE RATIO (IFR) N/A 10/10/2023    Procedure: Instantaneous Wave-Free Ratio (IFR);  Surgeon: Dieudonne Ryan MD;  Location: [facility] CATH/EP LAB;  Service: Cardiology;  Laterality: N/A;     INTRAVASCULAR ULTRASOUND, CORONARY N/A 5/9/2023    Procedure: Ultrasound-coronary;  Surgeon: Antonio Kessler MD;  Location: [facility] CATH/EP LAB;  Service: Cardiology;  Laterality: N/A;    IVUS, CORONARY  5/11/2023    Procedure: IVUS, Coronary;  Surgeon: Antonio Kessler MD;  Location: [facility] CATH/EP LAB;  Service: Cardiology;;    LEFT HEART CATHETERIZATION Left 5/11/2023    Procedure: Left heart cath;  Surgeon: Antonio Kessler MD;  Location: [facility] CATH/EP LAB;  Service: Cardiology;  Laterality: Left;    PERCUTANEOUS TRANSLUMINAL BALLOON ANGIOPLASTY OF CORONARY ARTERY  5/9/2023    Procedure: Angioplasty-coronary;  Surgeon: Antonio Kessler MD;  Location: [facility] CATH/EP LAB;  Service: Cardiology;;    RHINOPLASTY      STENT, DRUG ELUTING, SINGLE VESSEL, CORONARY  5/9/2023    Procedure: Stent, Drug Eluting, Single Vessel, Coronary;  Surgeon: Antonio Kessler MD;  Location: [facility] CATH/EP LAB;  Service: Cardiology;;    SURGICAL REMOVAL OF NODULE OF VOCAL CORD         No family history on file.    Social History     Socioeconomic History    Marital status: Legally    Tobacco Use    Smoking status: Every Day     Types: Cigarettes    Smokeless tobacco: Former    Tobacco comments:     Pt ready to quit smoking and states he can on his own. Seen 10/9/23 for inpatient smoking cessation. [employer].   Substance and Sexual Activity    Alcohol use: Yes     Alcohol/week: 14.0 standard drinks of alcohol     Types: 14 Shots of liquor per week    Drug use: Yes     Types: Marijuana     Social Determinants of Health     Transportation Needs: No Transportation Needs (5/10/2023)    PRAPARE - Transportation     Lack of Transportation (Medical): No     Lack of Transportation (Non-Medical): No       Current Outpatient Medications   Medication Sig Dispense Refill    amLODIPine (NORVASC) 5 MG tablet Take 1 tablet (5 mg total) by mouth once daily. 30 tablet 11    aspirin (ECOTRIN) 81 MG EC tablet Take 1 tablet (81 mg total) by mouth once  daily. 90 tablet 3    atorvastatin (LIPITOR) 40 MG tablet Take 1 tablet (40 mg total) by mouth once daily. 90 tablet 3    carvediloL (COREG) 3.125 MG tablet Take 3.125 mg by mouth 2 (two) times daily with meals.      clopidogreL (PLAVIX) 75 mg tablet Take 1 tablet (75 mg total) by mouth once daily. 90 tablet 3    coenzyme Q10 100 mg capsule Take 2 capsules (200 mg total) by mouth once daily. 180 capsule 3    insulin glargine,hum.rec.anlog (LANTUS SUBQ) Inject 35 Units into the skin once daily.      isosorbide mononitrate (IMDUR) 30 MG 24 hr tablet       losartan (COZAAR) 50 MG tablet Take 2 tablets (100 mg total) by mouth once daily. 180 tablet 3    mirabegron (MYRBETRIQ) 50 mg Tb24 Take 1 tablet (50 mg total) by mouth every morning. Take one every morning for overactive bladder 90 tablet 0    nitroGLYCERIN (NITROSTAT) 0.4 MG SL tablet Place 1 tablet (0.4 mg total) under the tongue every 5 (five) minutes as needed for Chest pain. 10 tablet 5    tamsulosin (FLOMAX) 0.4 mg Cap Take 1 capsule (0.4 mg total) by mouth once daily. Take in evening. 90 capsule 4     No current facility-administered medications for this visit.       Review of patient's allergies indicates:  No Known Allergies      Objective:        Vitals:    01/22/24 1315   BP: (!) 140/82   Pulse: 76       Physical Exam  Vitals reviewed.   Constitutional:       Appearance: Normal appearance.   HENT:      Mouth/Throat:      Mouth: Mucous membranes are moist.   Eyes:      Extraocular Movements: Extraocular movements intact.      Pupils: Pupils are equal, round, and reactive to light.   Cardiovascular:      Rate and Rhythm: Normal rate and regular rhythm.      Pulses: Normal pulses.      Heart sounds: Normal heart sounds. No murmur heard.     No gallop.   Pulmonary:      Effort: Pulmonary effort is normal.      Breath sounds: Normal breath sounds.   Abdominal:      General: Bowel sounds are normal.      Palpations: Abdomen is soft.   Musculoskeletal:          General: Normal range of motion.      Cervical back: Normal range of motion.   Skin:     General: Skin is warm and dry.   Neurological:      General: No focal deficit present.      Mental Status: He is alert and oriented to person, place, and time.   Psychiatric:         Mood and Affect: Mood normal.         LIPIDS - LAST 2   Lab Results   Component Value Date    CHOL 188 05/10/2023    HDL 45 05/10/2023    LDLCALC 118.2 05/10/2023    TRIG 124 05/10/2023    CHOLHDL 23.9 05/10/2023       CBC - LAST 2  Lab Results   Component Value Date    WBC 6.75 01/04/2024    WBC 4.75 10/09/2023    RBC 4.69 01/04/2024    RBC 3.98 (L) 10/09/2023    HGB 15.2 01/04/2024    HGB 12.8 (L) 10/09/2023    HCT 44.6 01/04/2024    HCT 38.0 (L) 10/09/2023    MCV 95 01/04/2024    MCV 96 10/09/2023    MCH 32.4 (H) 01/04/2024    MCH 32.2 (H) 10/09/2023    MCHC 34.1 01/04/2024    MCHC 33.7 10/09/2023    RDW 13.8 01/04/2024    RDW 12.9 10/09/2023     01/04/2024     10/09/2023    MPV 8.8 (L) 01/04/2024    MPV 9.8 10/09/2023    GRAN 4.4 01/04/2024    GRAN 65.0 01/04/2024    LYMPH 1.7 01/04/2024    LYMPH 24.7 01/04/2024    MONO 0.5 01/04/2024    MONO 8.0 01/04/2024    BASO 0.06 01/04/2024    BASO 0.04 10/09/2023    NRBC 0 01/04/2024    NRBC 0 10/09/2023       CHEMISTRY & LIVER FUNCTION - LAST 2  Lab Results   Component Value Date     01/04/2024     10/11/2023    K 3.9 01/04/2024    K 3.7 10/11/2023     01/04/2024     10/11/2023    CO2 28 01/04/2024    CO2 24 10/11/2023    ANIONGAP 10 01/04/2024    ANIONGAP 8 10/11/2023    BUN 10 01/04/2024    BUN 13 10/11/2023    CREATININE 0.7 01/04/2024    CREATININE 0.8 10/11/2023    GLU 84 01/04/2024     (H) 10/11/2023    CALCIUM 9.5 01/04/2024    CALCIUM 9.2 10/11/2023    PH 7.422 03/27/2020    MG 1.8 01/04/2024    MG 1.9 10/07/2023    ALBUMIN 4.6 01/04/2024    ALBUMIN 4.4 10/07/2023    PROT 7.7 01/04/2024    PROT 7.6 10/07/2023    ALKPHOS 97 01/04/2024    ALKPHOS 82  10/07/2023    ALT 12 01/04/2024    ALT 16 10/07/2023    AST 17 01/04/2024    AST 20 10/07/2023    BILITOT 0.7 01/04/2024    BILITOT 0.5 10/07/2023        CARDIAC PROFILE - LAST 2  Lab Results   Component Value Date    BNP 89 01/08/2024     (H) 01/04/2024    CPK 71 07/06/2022    CPK 75 03/27/2020    CPKMB 1.8 07/06/2022    CPKMB 1.5 03/27/2020     03/27/2020    TROPONINI <0.030 07/06/2022    TROPONINI <0.030 03/28/2020        COAGULATION - LAST 2  Lab Results   Component Value Date    LABPT 13.3 07/06/2022    INR 1.0 10/09/2023    INR 1.0 08/11/2023    APTT 28.1 10/09/2023    APTT >150.0 (AA) 05/09/2023       ENDOCRINE & PSA - LAST 2  Lab Results   Component Value Date    HGBA1C 6.0 08/11/2023    HGBA1C 6.9 (H) 05/10/2023    HGBA1C 6.9 (H) 05/10/2023    TSH 2.160 05/10/2023    PROCAL <0.05 08/11/2023    PROCAL <0.05 03/28/2020        ECHOCARDIOGRAM RESULTS  Results for orders placed during the hospital encounter of 10/07/23    Echo    Interpretation Summary    Left Ventricle: The left ventricle is normal in size. Mildly increased wall thickness. There is concentric remodeling. Septal motion is normal. There is normal systolic function. Ejection fraction by visual approximation is 60%.    Right Ventricle: Normal right ventricular cavity size. Wall thickness is normal. Right ventricle wall motion  is normal. Systolic function is normal.    Mitral Valve: There is mild posterior mitral annular calcification present.    IVC/SVC: Normal venous pressure at 3 mmHg.    A limited echo was performed using limited 2D and color flow Doppler      CURRENT/PREVIOUS VISIT EKG  Results for orders placed or performed in visit on 01/08/24   IN OFFICE EKG 12-LEAD (to [place])    Collection Time: 01/08/24  8:59 AM    Narrative    Test Reason : I25.10,R07.9,    Vent. Rate : 097 BPM     Atrial Rate : 097 BPM     P-R Int : 168 ms          QRS Dur : 140 ms      QT Int : 396 ms       P-R-T Axes : 000 -72 091 degrees     QTc Int :  502 ms    Normal sinus rhythm  Left axis deviation  Right bundle branch block  Anteroseptal infarct ,age undetermined  Abnormal ECG  When compared with ECG of 04-JAN-2024 14:07,  SD interval has decreased  Right bundle branch block has replaced Left bundle branch block  Anteroseptal infarct is now Present  Confirmed by Gia TRIMBLE, Harris SWAIN ([id number]) on 1/15/2024 9:50:59 PM    Referred By:             Confirmed By:Harris Nielsen MD     No valid procedures specified.   Results for orders placed during the hospital encounter of 06/22/23    Nuclear Stress - Cardiology Interpreted    Interpretation Summary    Normal myocardial perfusion scan. There is no evidence of myocardial ischemia or infarction.    The gated perfusion images showed an ejection fraction of 70% at rest. The gated perfusion images showed an ejection fraction of 73% post stress. Normal ejection fraction is greater than 53%.    The ECG portion of the study is negative for ischemia.    The patient reported no chest pain during the stress test.    There were no arrhythmias during stress.    No valid procedures specified.        Assessment:       1. Coronary artery disease involving native coronary artery of native heart without angina pectoris    2. Status post insertion of drug eluting coronary artery stent    3. History of ST elevation myocardial infarction (STEMI)    4. LBBB (left bundle branch block)    5. Mixed hyperlipidemia    6. Numbness in both legs    7. Snoring           Plan:       Coronary artery disease involving native coronary artery of native heart without angina pectoris  -     IN OFFICE EKG 12-LEAD (to [place])    Status post insertion of drug eluting coronary artery stent    History of ST elevation myocardial infarction (STEMI)    LBBB (left bundle branch block)    Mixed hyperlipidemia  -     Lipid Panel; Future; Expected date: 01/22/2024    Numbness in both legs  -     Radiology US Lower Extremity Arteries Bilateral; Future; Expected date:  01/22/2024    Snoring  -     Ambulatory referral/consult to Pulmonology; Future; Expected date: 01/29/2024    Other orders  -     nitroGLYCERIN (NITROSTAT) 0.4 MG SL tablet; Place 1 tablet (0.4 mg total) under the tongue every 5 (five) minutes as needed for Chest pain.  Dispense: 10 tablet; Refill: 5    Hypertension is better controlled, will not uptitrate therapy due to fogginess reported.  Get evaluated pulmonology to rule out sleep apnea as a cause.  Reports numbness in legs, get ultrasound lower extremities.  Advised tobacco cessation.  Continue with aspirin, Plavix, statin.  Repeat lipid panel to assess LDL.  Advised moderate physical exertion 30 minutes 5 days a week and Mediterranean   diet.    Follow up in about 8 weeks (around 3/18/2024) for f/u US legs, lipid panel.          MD [facility] [place] [facility]

## 2024-01-29 ENCOUNTER — HOSPITAL ENCOUNTER (OUTPATIENT)
Dept: RADIOLOGY | Facility: HOSPITAL | Age: 69
Discharge: HOME | End: 2024-01-29
Attending: INTERNAL MEDICINE
Payer: MEDICARE

## 2024-01-29 ENCOUNTER — OFFICE VISIT (OUTPATIENT)
Dept: PULMONOLOGY | Facility: CLINIC | Age: 69
End: 2024-01-29
Payer: MEDICARE

## 2024-01-29 VITALS
OXYGEN SATURATION: 94 % | SYSTOLIC BLOOD PRESSURE: 180 MMHG | DIASTOLIC BLOOD PRESSURE: 96 MMHG | HEART RATE: 83 BPM | BODY MASS INDEX: 26.7 KG/M2 | WEIGHT: 202.38 LBS

## 2024-01-29 DIAGNOSIS — R41.89 BRAIN FOG: ICD-10-CM

## 2024-01-29 DIAGNOSIS — R20.0 NUMBNESS IN BOTH LEGS: ICD-10-CM

## 2024-01-29 DIAGNOSIS — R06.83 SNORING: ICD-10-CM

## 2024-01-29 DIAGNOSIS — R06.00 DYSPNEA, UNSPECIFIED TYPE: ICD-10-CM

## 2024-01-29 DIAGNOSIS — R53.82 CHRONIC FATIGUE: ICD-10-CM

## 2024-01-29 DIAGNOSIS — I10 HYPERTENSION, UNSPECIFIED TYPE: Primary | ICD-10-CM

## 2024-01-29 PROCEDURE — 99203 OFFICE O/P NEW LOW 30 MIN: CPT | Mod: S$GLB,,, | Performed by: NURSE PRACTITIONER

## 2024-01-29 PROCEDURE — 93925 LOWER EXTREMITY STUDY: CPT | Mod: TC

## 2024-01-29 RX ORDER — ROSUVASTATIN CALCIUM 10 MG/1
10 TABLET, COATED ORAL DAILY
Status: ON HOLD | COMMUNITY
End: 2024-03-13 | Stop reason: HOSPADM

## 2024-01-29 RX ORDER — METOPROLOL TARTRATE 25 MG/1
25 TABLET, FILM COATED ORAL 2 TIMES DAILY
Status: ON HOLD | COMMUNITY
End: 2024-03-13 | Stop reason: HOSPADM

## 2024-01-29 RX ORDER — ALBUTEROL SULFATE 90 UG/1
2 AEROSOL, METERED RESPIRATORY (INHALATION) EVERY 6 HOURS PRN
Qty: 18 G | Refills: 6 | Status: SHIPPED | OUTPATIENT
Start: 2024-01-29

## 2024-01-29 NOTE — PROGRESS NOTES
SUBJECTIVE:    Patient ID: Sampson Holt is a 68 y.o. male.    Chief Complaint: New Patient (New Patient/Referred by Dr. Kessler for ALEJO)    HPI  Patient here today to be evaluated for ALEJO by his cardiologist.  He has a history of MI with coronary artery stents and HTN.  He is chronically fatigued, has brain fog every morning and occasional palpitations.  He is short of breath with exertion, states he has emphysema. He uses Albuterol as needed, does smoke occasionally.  He had a CT of his chest in November which showed severe upper lobe predominant emphysema.   Past Medical History:   Diagnosis Date    Abdominal pain 2022    CHF (congestive heart failure)     Diabetes mellitus 2018    Emphysema lung     Endocarditis 2011    Hypertension     Stroke 2011    denies residual     Past Surgical History:   Procedure Laterality Date    ANGIOGRAM, CORONARY, WITH LEFT HEART CATHETERIZATION N/A 10/10/2023    Procedure: Angiogram, Coronary, with Left Heart Cath;  Surgeon: Dieudonne Ryan MD;  Location: [facility] CATH/EP LAB;  Service: Cardiology;  Laterality: N/A;    BACK SURGERY  1981    COLONOSCOPY N/A 2/23/2023    Procedure: COLONOSCOPY;  Surgeon: Jonn Cruz MD;  Location: [facility] ENDO;  Service: Endoscopy;  Laterality: N/A;    CORONARY ANGIOGRAPHY N/A 5/9/2023    Procedure: ANGIOGRAM, CORONARY ARTERY;  Surgeon: Antonio Kessler MD;  Location: [facility] CATH/EP LAB;  Service: Cardiology;  Laterality: N/A;    EXCISION OF HYDROCELE      x2    FRACTIONAL FLOW RESERVE (FFR), CORONARY  5/11/2023    Procedure: Fractional Flow [place] (FFR), Coronary;  Surgeon: Antonio Kessler MD;  Location: [facility] CATH/EP LAB;  Service: Cardiology;;    INGUINAL HERNIA REPAIR  1960    INSTANTANEOUS WAVE-FREE RATIO (IFR) N/A 10/10/2023    Procedure: Instantaneous Wave-Free Ratio (IFR);  Surgeon: Dieudonne Ryan MD;  Location: [facility] CATH/EP LAB;  Service: Cardiology;  Laterality: N/A;    INTRAVASCULAR ULTRASOUND, CORONARY N/A 5/9/2023    Procedure:  Ultrasound-coronary;  Surgeon: Antonio Kessler MD;  Location: [facility] CATH/EP LAB;  Service: Cardiology;  Laterality: N/A;    IVUS, CORONARY  5/11/2023    Procedure: IVUS, Coronary;  Surgeon: Antonio Kessler MD;  Location: [facility] CATH/EP LAB;  Service: Cardiology;;    LEFT HEART CATHETERIZATION Left 5/11/2023    Procedure: Left heart cath;  Surgeon: Antonio Kessler MD;  Location: [facility] CATH/EP LAB;  Service: Cardiology;  Laterality: Left;    PERCUTANEOUS TRANSLUMINAL BALLOON ANGIOPLASTY OF CORONARY ARTERY  5/9/2023    Procedure: Angioplasty-coronary;  Surgeon: Antonio Kessler MD;  Location: [facility] CATH/EP LAB;  Service: Cardiology;;    RHINOPLASTY      STENT, DRUG ELUTING, SINGLE VESSEL, CORONARY  5/9/2023    Procedure: Stent, Drug Eluting, Single Vessel, Coronary;  Surgeon: Antonio Kessler MD;  Location: [facility] CATH/EP LAB;  Service: Cardiology;;    SURGICAL REMOVAL OF NODULE OF VOCAL CORD       No family history on file.     Social History:   Marital Status: Legally   Occupation: Data Unavailable  Alcohol History:  reports current alcohol use of about 14.0 standard drinks of alcohol per week.  Tobacco History:  reports that he has been smoking cigarettes. He has quit using smokeless tobacco.  Drug History:  reports current drug use. Drug: Marijuana.    Review of patient's allergies indicates:  No Known Allergies    Current Outpatient Medications   Medication Sig Dispense Refill    aspirin (ECOTRIN) 81 MG EC tablet Take 1 tablet (81 mg total) by mouth once daily. 90 tablet 3    carvediloL (COREG) 3.125 MG tablet Take 3.125 mg by mouth 2 (two) times daily with meals.      clopidogreL (PLAVIX) 75 mg tablet Take 1 tablet (75 mg total) by mouth once daily. 90 tablet 3    insulin glargine,hum.rec.anlog (LANTUS SUBQ) Inject 35 Units into the skin once daily.      metoprolol tartrate (LOPRESSOR) 25 MG tablet Take 25 mg by mouth 2 (two) times daily.      rosuvastatin (CRESTOR) 10 MG tablet Take 10 mg by mouth once daily.       albuterol (PROVENTIL/VENTOLIN HFA) 90 mcg/actuation inhaler Inhale 2 puffs into the lungs every 6 (six) hours as needed for Wheezing. Rescue 18 g 6    amLODIPine (NORVASC) 5 MG tablet Take 1 tablet (5 mg total) by mouth once daily. 30 tablet 11    atorvastatin (LIPITOR) 40 MG tablet Take 1 tablet (40 mg total) by mouth once daily. 90 tablet 3    coenzyme Q10 100 mg capsule Take 2 capsules (200 mg total) by mouth once daily. (Patient not taking: Reported on 1/29/2024) 180 capsule 3    isosorbide mononitrate (IMDUR) 30 MG 24 hr tablet       losartan (COZAAR) 50 MG tablet Take 2 tablets (100 mg total) by mouth once daily. (Patient not taking: Reported on 1/29/2024) 180 tablet 3    mirabegron (MYRBETRIQ) 50 mg Tb24 Take 1 tablet (50 mg total) by mouth every morning. Take one every morning for overactive bladder (Patient not taking: Reported on 1/29/2024) 90 tablet 0    nitroGLYCERIN (NITROSTAT) 0.4 MG SL tablet Place 1 tablet (0.4 mg total) under the tongue every 5 (five) minutes as needed for Chest pain. 25 tablet 5    tamsulosin (FLOMAX) 0.4 mg Cap Take 1 capsule (0.4 mg total) by mouth once daily. Take in evening. (Patient not taking: Reported on 1/29/2024) 90 capsule 4     No current facility-administered medications for this visit.     11/2023 Ct chest   IMPRESSION:  1. No suspicious pulmonary nodules or masses.  2. Severe upper lobe predominant pulmonary emphysema.  3. Aortic and coronary arterial calcifications.  4. Left upper renal cortex hypodensity, nonspecific, potentially cyst. Further evaluation with renal ultrasound is recommended to help exclude possibility of solid renal neoplasm    Review of Systems  General: Feeling Well. Always tired   Eyes: Vision is good.  ENT:  chronic sinus congestion   Heart:: history of MI in may with 2 stents   Lungs: always short of breath but still sings   GI: No Nausea, vomiting, constipation, diarrhea, or reflux.  : No dysuria, hesitancy, or nocturia.  Musculoskeletal: No  joint pain or myalgias.  Skin: No lesions or rashes.  Neuro: brain fog in the mornings   Lymph: No edema or adenopathy.  Psych: No anxiety or depression.  Endo: No weight change.    OBJECTIVE:      BP (!) 180/96 (BP Location: Right arm, Patient Position: Sitting, BP Method: Medium (Manual))   Pulse 83   Wt 91.8 kg (202 lb 6.4 oz)   SpO2 (!) 94%   BMI 26.70 kg/m²     Physical Exam  GENERAL: Older patient in no distress.  HEENT: Pupils equal and reactive. Extraocular movements intact. Nose intact.      Pharynx moist. [place] 4  NECK: Supple.  14 inches   HEART: Regular rate and rhythm. No murmur or gallop auscultated.  LUNGS: Clear to auscultation and percussion. Lung excursion symmetrical. No change in fremitus. No adventitial noises.  ABDOMEN: Bowel sounds present. Non-tender, no masses palpated.  EXTREMITIES: Normal muscle tone and joint movement, no cyanosis or clubbing.   LYMPHATICS: No adenopathy palpated, no edema.  SKIN: Dry, intact, no lesions.   NEURO: Cranial nerves II-XII intact. Motor strength 5/5 bilaterally, upper and lower extremities.  PSYCH: Appropriate affect.    Assessment:       1. Hypertension, unspecified type    2. Snoring    3. Brain fog    4. Chronic fatigue    5. Dyspnea, unspecified type        [place] is 4  Plan:          PFT  Split night sleep study   Refill albuterol  Will call with results   Follow up in about 3 months (around 4/29/2024).

## 2024-02-05 ENCOUNTER — OFFICE VISIT (OUTPATIENT)
Dept: UROLOGY | Facility: CLINIC | Age: 69
End: 2024-02-05
Payer: OTHER GOVERNMENT

## 2024-02-05 DIAGNOSIS — N39.41 URGE INCONTINENCE OF URINE: Primary | ICD-10-CM

## 2024-02-05 DIAGNOSIS — R35.1 NOCTURIA: ICD-10-CM

## 2024-02-05 LAB
BILIRUBIN, UA POC OHS: NEGATIVE
BLOOD, UA POC OHS: ABNORMAL
CLARITY, UA POC OHS: CLEAR
COLOR, UA POC OHS: YELLOW
GLUCOSE, UA POC OHS: NEGATIVE
KETONES, UA POC OHS: NEGATIVE
LEUKOCYTES, UA POC OHS: NEGATIVE
NITRITE, UA POC OHS: NEGATIVE
PH, UA POC OHS: 6
POC RESIDUAL URINE VOLUME: 0 ML (ref 0–100)
PROTEIN, UA POC OHS: ABNORMAL
SPECIFIC GRAVITY, UA POC OHS: 1.01
UROBILINOGEN, UA POC OHS: 0.2

## 2024-02-05 PROCEDURE — 81003 URINALYSIS AUTO W/O SCOPE: CPT | Mod: QW,S$GLB,, | Performed by: NURSE PRACTITIONER

## 2024-02-05 PROCEDURE — 51798 US URINE CAPACITY MEASURE: CPT | Mod: PBBFAC,PO | Performed by: NURSE PRACTITIONER

## 2024-02-05 PROCEDURE — 81003 URINALYSIS AUTO W/O SCOPE: CPT | Mod: PBBFAC,PO | Performed by: NURSE PRACTITIONER

## 2024-02-05 PROCEDURE — 99213 OFFICE O/P EST LOW 20 MIN: CPT | Mod: PBBFAC,PO | Performed by: NURSE PRACTITIONER

## 2024-02-05 PROCEDURE — 81003 URINALYSIS AUTO W/O SCOPE: CPT | Mod: PBBFAC | Performed by: NURSE PRACTITIONER

## 2024-02-05 PROCEDURE — 51798 US URINE CAPACITY MEASURE: CPT | Mod: PBBFAC | Performed by: NURSE PRACTITIONER

## 2024-02-05 PROCEDURE — 99213 OFFICE O/P EST LOW 20 MIN: CPT | Mod: S$GLB,,, | Performed by: NURSE PRACTITIONER

## 2024-02-05 PROCEDURE — 51798 US URINE CAPACITY MEASURE: CPT | Mod: S$GLB,,, | Performed by: NURSE PRACTITIONER

## 2024-02-05 PROCEDURE — 99999 PR PBB SHADOW E&M-EST. PATIENT-LVL III: CPT | Mod: PBBFAC,,, | Performed by: NURSE PRACTITIONER

## 2024-02-05 RX ORDER — MIRABEGRON 50 MG/1
50 TABLET, FILM COATED, EXTENDED RELEASE ORAL EVERY MORNING
Qty: 90 TABLET | Refills: 3 | Status: SHIPPED | OUTPATIENT
Start: 2024-02-05 | End: 2025-02-04

## 2024-02-05 NOTE — PROGRESS NOTES
[facility] Note  Staff: ABRAHAN Cummings-C    PCP:LAURA Vergara.  Urologist:  MOISE Ferreira    Chief Complaint: F/UP-Urge Incontinence    Subjective:        HPI: Sampson Holt is a 68 y.o. male presents today for routine recheck with new medication (Myrbetriq) from last ov with MD at this time.    The pt was last evaluated by Dr. Ferreira on 12/4/23 for urge incontinence.    MD Notes:   Initial consult by Rachel in CLINIC on 10/4/21 for testicle pain and frequency:  seen 2021 for right testicle pain and urinary frequency and was lost to f/up at that time  evaluation of right sided testicle pain x 3-4 years now and increased urinary frequency, more prominently at night time.  Therefore he is here today for further evaluation of his symptoms.  No dysuria  No bladder pain  No gross hematuria  Pt does have hx of kidney stone.  Nocturia-10-15x per night.  Rare occasions where he would have urinary leakage.  +Diabetes-poorly managed, pt states his glucose ranges in 250s-300s.  Pt has never been seen by a Urology group in the past.  But, pt does have hx of hydroceles and hernias     Interval history by tiffani in CLINIC on 08/23/23 for hematospermia:  Pt states he has had blood within his semen x one year or longer.  UA in office today showed--pt was unable to urinate upon arrival today.  PVR by bladder scan is 23 mL  No family hx of  cancers  +Tobacco use-cigarettes  [facility] 0.4 mg daily in the past but pt has not been taking the medication at this time.   Exam performed by me during OV today:  Inspection of anus normal  No scrotal rashes, cysts or lesions  Epididymis normal in size, no tenderness  Testes normal and size, equal size bilaterally, no masses  Urethral meatus normal without discharge  BRODIE: 35g smooth prostate gland without masses, tenderness. SV not palpable. Normal sphincter tone. No hemhorroids.  Pea size skin tag on right side of outer right  buttock.  Pt states he recently had +MI 8 weeks ago     UA Micro and Urine Culture to be processed today due to hx of microhematuria.  Pt needs to begin Flomax 0.4 mg daily if not already at this time.  PSA, Total and Free to be scheduled for annual check.  [facility]     Ultrasound of the Scrotum/Testicles to be scheduled     Interval history by me in CLINIC on 12/4/23 for nocturia, UUI:   Ctap with 1/30/23          Ctrss 5/12/23: LMP 5mm stone       Us scrotum 9/11/23: There are multiple right epididymal cysts largest measuring 10 mm. (H/o b hydrocele and b hernias)     Ct chest wo 11/13/23 screening done for his history of tobacco use.  Found to have a left upper pole cyst versus mass that was previously seen on the CT chest with contrast from January.  Review shows that the cyst is slightly larger.  Recommend further evaluation with ultrasound.  No family history of kidney cancer    Hematospermia- he says it resolved on its own over the past few months  Nocturia and urge incontinence:  He says his primary complaint remains waking up 7-8 times a night.  Review of his previous notes show that was 10-15 times a night.  He does admit that he might have some obstructive sleep apnea because he wakes up short of breath in the middle the night.  Has not had an official sleep study.  During the day he only urinates every few hours.  Drinks to [place] and sodas before bedtime.  Urge incontinence: He has urge incontinence both day and night.  Do not does not wear any pads.  Urge with just drops day and night.  Does have a history of 5 mini strokes, all within a week last in 2011  Other pertinent urologic hx: States he has 3 to 4 x in his life. Last one 10+ years ago. Says he has had MH since the 80s. He's had 2 hydrocele repairs  Found to have a kidney stone in his left kidney seen on CT this year.  Left mid pole 4 mm.  He is never passed a kidney stone before.  He is on Plavix  Blood pressure  today is 207/107.  Has a history of an MI with stents in May.  Recent angiogram in September by Dr. Kessler was told it was normal.  However continues to have occasional palpitations and chest pain.  Has follow up in January.     OV 24:  UA today showed trace protein, trace-intact blood otherwise negative findings.  PVR is 0 mL  *Pt is unable to confirm today that he started Myrbetriq 50 mg daily as prescribed after last ov with MD.  Pt denies gross hematuria and dysuria.    AUA SS Today:  20 with QOL at Mixed  Feeling of ICBE: 2  Frequency:4  Intermittency:3  Urgency:4  Weak urine stream:2  Strainin  Nocturia:4    Urine history: family history of kidney, bladder or prostate cancer:No, personal or family history of kidney stones: yes,tobacco use: Yes - , anticoagulation: Yes - plavix (stens from MI ).  24  Trace protein/trace-intact blood  23            Small wbc/30 prot  23            Ng, void: 2 rbc/5 wbc  23            Tr prot  7/10/23            Neg  23            Neg  22              Neg  3/27/20            neg     PSA History: no fam hx of prostate cancer  23                        0.2     REVIEW OF SYSTEMS:  A comprehensive 10 system review was performed and is negative except as noted above in HPI    PMHx:  Past Medical History:   Diagnosis Date    Abdominal pain     CHF (congestive heart failure)     Diabetes mellitus     Emphysema lung     Endocarditis     Hypertension     Stroke     denies residual     PSHx:  Past Surgical History:   Procedure Laterality Date    ANGIOGRAM, CORONARY, WITH LEFT HEART CATHETERIZATION N/A 10/10/2023    Procedure: Angiogram, Coronary, with Left Heart Cath;  Surgeon: Dieudonne Ryan MD;  Location: [facility] CATH/EP LAB;  Service: Cardiology;  Laterality: N/A;    BACK SURGERY      COLONOSCOPY N/A 2023    Procedure: COLONOSCOPY;  Surgeon: Jonn Cruz MD;  Location: [facility] ENDO;  Service: Endoscopy;  Laterality:  N/A;    CORONARY ANGIOGRAPHY N/A 5/9/2023    Procedure: ANGIOGRAM, CORONARY ARTERY;  Surgeon: Antonio Kessler MD;  Location: [facility] CATH/EP LAB;  Service: Cardiology;  Laterality: N/A;    EXCISION OF HYDROCELE      x2    FRACTIONAL FLOW RESERVE (FFR), CORONARY  5/11/2023    Procedure: Fractional Flow [place] (FFR), Coronary;  Surgeon: Antonio Kessler MD;  Location: [facility] CATH/EP LAB;  Service: Cardiology;;    INGUINAL HERNIA REPAIR  1960    INSTANTANEOUS WAVE-FREE RATIO (IFR) N/A 10/10/2023    Procedure: Instantaneous Wave-Free Ratio (IFR);  Surgeon: Dieudonne Ryan MD;  Location: [facility] CATH/EP LAB;  Service: Cardiology;  Laterality: N/A;    INTRAVASCULAR ULTRASOUND, CORONARY N/A 5/9/2023    Procedure: Ultrasound-coronary;  Surgeon: Antonio Kessler MD;  Location: [facility] CATH/EP LAB;  Service: Cardiology;  Laterality: N/A;    IVUS, CORONARY  5/11/2023    Procedure: IVUS, Coronary;  Surgeon: Antonoi Kessler MD;  Location: [facility] CATH/EP LAB;  Service: Cardiology;;    LEFT HEART CATHETERIZATION Left 5/11/2023    Procedure: Left heart cath;  Surgeon: Antonio Kessler MD;  Location: [facility] CATH/EP LAB;  Service: Cardiology;  Laterality: Left;    PERCUTANEOUS TRANSLUMINAL BALLOON ANGIOPLASTY OF CORONARY ARTERY  5/9/2023    Procedure: Angioplasty-coronary;  Surgeon: Antonio Kessler MD;  Location: [facility] CATH/EP LAB;  Service: Cardiology;;    RHINOPLASTY      STENT, DRUG ELUTING, SINGLE VESSEL, CORONARY  5/9/2023    Procedure: Stent, Drug Eluting, Single Vessel, Coronary;  Surgeon: Antonio Kessler MD;  Location: [facility] CATH/EP LAB;  Service: Cardiology;;    SURGICAL REMOVAL OF NODULE OF VOCAL CORD       Allergies:  Patient has no known allergies.    Medications: reviewed     Objective:   There were no vitals filed for this visit.    General:WDWN in NAD  Eyes: PERRLA, normal conjunctiva  Respiratory: no increased work on breathing, clear to auscultation  Cardiovascular: regular rate and rhythm. No obvious extremity edema.  GI: palpation of  masses. No tenderness. No hepatosplenomegaly to palpation.  Musculoskeletal: normal range of motion of bilateral upper extremities. Normal muscle strength and tone.  Skin: no obvious rashes or lesions. No tightening of skin noted.  Neurologic: CN grossly normal. Normal sensation.   Psychiatric: awake, alert and oriented x 3. Mood and affect normal. Cooperative.    Assessment:       1. Urge incontinence of urine    2. Nocturia          Plan:     Myrbetriq 50 mg one tablet daily represcribed to pt during ov today.  From the looks of AUA SS survey, he has not started the medication.  The med prescribed to pt today as trial to see if med improves pt's current LUTS.  Benefits, risks and side affects were thoroughly explained to pt today in office with all questions answered.    F/u as scheduled in six months with imaging and OV with MD in June of 2024.  Pt verbalized understanding at this time.      Robyn Ibarra, SAIMAC

## 2024-02-27 ENCOUNTER — PROCEDURE VISIT (OUTPATIENT)
Dept: SLEEP MEDICINE | Facility: HOSPITAL | Age: 69
End: 2024-02-27
Attending: NURSE PRACTITIONER
Payer: MEDICARE

## 2024-02-27 DIAGNOSIS — I10 HYPERTENSION, UNSPECIFIED TYPE: ICD-10-CM

## 2024-02-27 DIAGNOSIS — R41.89 BRAIN FOG: ICD-10-CM

## 2024-02-27 DIAGNOSIS — R06.83 SNORING: ICD-10-CM

## 2024-02-27 DIAGNOSIS — R53.82 CHRONIC FATIGUE: ICD-10-CM

## 2024-02-27 PROCEDURE — 95810 POLYSOM 6/> YRS 4/> PARAM: CPT

## 2024-02-29 ENCOUNTER — TELEPHONE (OUTPATIENT)
Dept: PULMONOLOGY | Facility: CLINIC | Age: 69
End: 2024-02-29

## 2024-02-29 ENCOUNTER — TELEPHONE (OUTPATIENT)
Dept: CARDIOLOGY | Facility: CLINIC | Age: 69
End: 2024-02-29
Payer: MEDICARE

## 2024-02-29 DIAGNOSIS — G47.34 NOCTURNAL HYPOXEMIA: ICD-10-CM

## 2024-02-29 DIAGNOSIS — J44.9 CHRONIC OBSTRUCTIVE PULMONARY DISEASE, UNSPECIFIED COPD TYPE: Primary | ICD-10-CM

## 2024-02-29 NOTE — TELEPHONE ENCOUNTER
Sleep study with no signficant ALEJO. He is nocturnally hypoxemic. 1 liter was applied but still stayed less then 88% a good part of the night. Will order nocturnal oxygen at 2 liters. He has not had PFT yet, will also order a 6 minute walk to see if needs oxygen with ambulation.

## 2024-02-29 NOTE — TELEPHONE ENCOUNTER
----- Message from Matthias Hines sent at 2/29/2024  4:06 PM CST -----  Type: Need Medical Advice   Who Called: patient   Best callback number: [phone]  Additional Information: patient has concerns with his BP being 233/133, he asked for a callback to discuss  Please call to further assist, Thanks.

## 2024-03-01 NOTE — TELEPHONE ENCOUNTER
Called and left message on phone number listed home. As the first one is unable to leave a voicemail.  I will put order in for PFT again as he has not had it yet either.

## 2024-03-04 ENCOUNTER — HOSPITAL ENCOUNTER (EMERGENCY)
Facility: HOSPITAL | Age: 69
Discharge: HOME | End: 2024-03-04
Attending: EMERGENCY MEDICINE
Payer: OTHER GOVERNMENT

## 2024-03-04 VITALS
WEIGHT: 200 LBS | TEMPERATURE: 99 F | HEART RATE: 77 BPM | OXYGEN SATURATION: 97 % | DIASTOLIC BLOOD PRESSURE: 89 MMHG | BODY MASS INDEX: 26.51 KG/M2 | SYSTOLIC BLOOD PRESSURE: 153 MMHG | RESPIRATION RATE: 20 BRPM | HEIGHT: 73 IN

## 2024-03-04 DIAGNOSIS — T78.40XA ALLERGIC REACTION, INITIAL ENCOUNTER: Primary | ICD-10-CM

## 2024-03-04 LAB
ALBUMIN SERPL BCP-MCNC: 4.5 G/DL (ref 3.5–5.2)
ALP SERPL-CCNC: 100 U/L (ref 55–135)
ALT SERPL W/O P-5'-P-CCNC: 14 U/L (ref 10–44)
ANION GAP SERPL CALC-SCNC: 7 MMOL/L (ref 8–16)
AST SERPL-CCNC: 19 U/L (ref 10–40)
BASOPHILS # BLD AUTO: 0.03 K/UL (ref 0–0.2)
BASOPHILS NFR BLD: 0.5 % (ref 0–1.9)
BILIRUB SERPL-MCNC: 0.6 MG/DL (ref 0.1–1)
BUN SERPL-MCNC: 11 MG/DL (ref 8–23)
CALCIUM SERPL-MCNC: 9.4 MG/DL (ref 8.7–10.5)
CHLORIDE SERPL-SCNC: 104 MMOL/L (ref 95–110)
CO2 SERPL-SCNC: 32 MMOL/L (ref 23–29)
CREAT SERPL-MCNC: 0.9 MG/DL (ref 0.5–1.4)
DIFFERENTIAL METHOD BLD: ABNORMAL
EOSINOPHIL # BLD AUTO: 0 K/UL (ref 0–0.5)
EOSINOPHIL NFR BLD: 0.6 % (ref 0–8)
ERYTHROCYTE [DISTWIDTH] IN BLOOD BY AUTOMATED COUNT: 13.3 % (ref 11.5–14.5)
EST. GFR  (NO RACE VARIABLE): >60 ML/MIN/1.73 M^2
GLUCOSE SERPL-MCNC: 190 MG/DL (ref 70–110)
HCT VFR BLD AUTO: 50.9 % (ref 40–54)
HGB BLD-MCNC: 17 G/DL (ref 14–18)
IMM GRANULOCYTES # BLD AUTO: 0.01 K/UL (ref 0–0.04)
IMM GRANULOCYTES NFR BLD AUTO: 0.2 % (ref 0–0.5)
LYMPHOCYTES # BLD AUTO: 2.2 K/UL (ref 1–4.8)
LYMPHOCYTES NFR BLD: 35.8 % (ref 18–48)
MCH RBC QN AUTO: 33.1 PG (ref 27–31)
MCHC RBC AUTO-ENTMCNC: 33.4 G/DL (ref 32–36)
MCV RBC AUTO: 99 FL (ref 82–98)
MONOCYTES # BLD AUTO: 0.3 K/UL (ref 0.3–1)
MONOCYTES NFR BLD: 5.4 % (ref 4–15)
NEUTROPHILS # BLD AUTO: 3.6 K/UL (ref 1.8–7.7)
NEUTROPHILS NFR BLD: 57.5 % (ref 38–73)
NRBC BLD-RTO: 0 /100 WBC
PLATELET # BLD AUTO: 280 K/UL (ref 150–450)
PMV BLD AUTO: 8.7 FL (ref 9.2–12.9)
POTASSIUM SERPL-SCNC: 4 MMOL/L (ref 3.5–5.1)
PROT SERPL-MCNC: 7.8 G/DL (ref 6–8.4)
RBC # BLD AUTO: 5.14 M/UL (ref 4.6–6.2)
SODIUM SERPL-SCNC: 143 MMOL/L (ref 136–145)
WBC # BLD AUTO: 6.26 K/UL (ref 3.9–12.7)

## 2024-03-04 PROCEDURE — 96361 HYDRATE IV INFUSION ADD-ON: CPT

## 2024-03-04 PROCEDURE — 99285 EMERGENCY DEPT VISIT HI MDM: CPT | Mod: 25

## 2024-03-04 PROCEDURE — 96375 TX/PRO/DX INJ NEW DRUG ADDON: CPT

## 2024-03-04 PROCEDURE — 25000003 PHARM REV CODE 250: Performed by: EMERGENCY MEDICINE

## 2024-03-04 PROCEDURE — 85025 COMPLETE CBC W/AUTO DIFF WBC: CPT | Performed by: EMERGENCY MEDICINE

## 2024-03-04 PROCEDURE — 96372 THER/PROPH/DIAG INJ SC/IM: CPT | Mod: 59 | Performed by: EMERGENCY MEDICINE

## 2024-03-04 PROCEDURE — 63600175 PHARM REV CODE 636 W HCPCS: Performed by: EMERGENCY MEDICINE

## 2024-03-04 PROCEDURE — 80053 COMPREHEN METABOLIC PANEL: CPT | Performed by: EMERGENCY MEDICINE

## 2024-03-04 PROCEDURE — 96374 THER/PROPH/DIAG INJ IV PUSH: CPT

## 2024-03-04 RX ORDER — FAMOTIDINE 10 MG/ML
20 INJECTION INTRAVENOUS
Status: COMPLETED | OUTPATIENT
Start: 2024-03-04 | End: 2024-03-04

## 2024-03-04 RX ORDER — METHYLPREDNISOLONE SOD SUCC 125 MG
125 VIAL (EA) INJECTION
Status: COMPLETED | OUTPATIENT
Start: 2024-03-04 | End: 2024-03-04

## 2024-03-04 RX ORDER — EPINEPHRINE 0.3 MG/.3ML
0.3 INJECTION SUBCUTANEOUS
Status: COMPLETED | OUTPATIENT
Start: 2024-03-04 | End: 2024-03-04

## 2024-03-04 RX ORDER — DIPHENHYDRAMINE HYDROCHLORIDE 50 MG/ML
50 INJECTION INTRAMUSCULAR; INTRAVENOUS
Status: COMPLETED | OUTPATIENT
Start: 2024-03-04 | End: 2024-03-04

## 2024-03-04 RX ORDER — LEVOFLOXACIN 500 MG/1
500 TABLET, FILM COATED ORAL DAILY
Qty: 10 TABLET | Refills: 0 | Status: ON HOLD | OUTPATIENT
Start: 2024-03-04 | End: 2024-03-13 | Stop reason: HOSPADM

## 2024-03-04 RX ORDER — ONDANSETRON HYDROCHLORIDE 2 MG/ML
4 INJECTION, SOLUTION INTRAVENOUS
Status: COMPLETED | OUTPATIENT
Start: 2024-03-04 | End: 2024-03-04

## 2024-03-04 RX ORDER — ONDANSETRON 4 MG/1
4 TABLET, FILM COATED ORAL EVERY 8 HOURS PRN
Qty: 12 TABLET | Refills: 0 | Status: SHIPPED | OUTPATIENT
Start: 2024-03-04

## 2024-03-04 RX ADMIN — EPINEPHRINE 0.3 MG: 0.3 INJECTION INTRAMUSCULAR at 12:03

## 2024-03-04 RX ADMIN — ONDANSETRON 4 MG: 2 INJECTION INTRAMUSCULAR; INTRAVENOUS at 01:03

## 2024-03-04 RX ADMIN — SODIUM CHLORIDE 500 ML: 0.9 INJECTION, SOLUTION INTRAVENOUS at 12:03

## 2024-03-04 RX ADMIN — DIPHENHYDRAMINE HYDROCHLORIDE 50 MG: 50 INJECTION, SOLUTION INTRAMUSCULAR; INTRAVENOUS at 12:03

## 2024-03-04 RX ADMIN — METHYLPREDNISOLONE SODIUM SUCCINATE 125 MG: 125 INJECTION, POWDER, FOR SOLUTION INTRAMUSCULAR; INTRAVENOUS at 12:03

## 2024-03-04 RX ADMIN — FAMOTIDINE 20 MG: 10 INJECTION, SOLUTION INTRAVENOUS at 12:03

## 2024-03-04 NOTE — DISCHARGE INSTRUCTIONS
Continue Medrol Dosepak.  Stop Augmentin  Take Zyrtec over-the-counter twice a day for the next 5 days.  You can take Benadryl for breakthrough itching.  Take over-the-counter Pepcid every day for the next 7 days.  Return immediately for any worsening symptoms, oral swelling, shortness of breath, or any other problems.

## 2024-03-04 NOTE — ED PROVIDER NOTES
"Encounter Date: 3/4/2024       History     Chief Complaint   Patient presents with    Allergic Reaction     Reports " everything feels swollen" pt states " I started taking a new medication for sinus, antibiotics" noted redness/swelling to face     Patient with a history of bacterial sinusitis with recent CT scan of the face.  Patient started Medrol Dosepak and Augmentin this morning.  About 90 minutes after taking 1st dose patient with diffuse rash itching and facial swelling.  Also with nausea vomiting here.  No similar allergic reaction in the past.        Review of patient's allergies indicates:   Allergen Reactions    Amoxicillin Shortness Of Breath and Edema     Past Medical History:   Diagnosis Date    Abdominal pain 2022    CHF (congestive heart failure)     Diabetes mellitus 2018    Emphysema lung     Endocarditis 2011    Hypertension     Stroke 2011    denies residual     Past Surgical History:   Procedure Laterality Date    ANGIOGRAM, CORONARY, WITH LEFT HEART CATHETERIZATION N/A 10/10/2023    Procedure: Angiogram, Coronary, with Left Heart Cath;  Surgeon: Dieudonne Ryan MD;  Location: [facility] CATH/EP LAB;  Service: Cardiology;  Laterality: N/A;    BACK SURGERY  1981    COLONOSCOPY N/A 2/23/2023    Procedure: COLONOSCOPY;  Surgeon: Jonn Cruz MD;  Location: [facility] ENDO;  Service: Endoscopy;  Laterality: N/A;    CORONARY ANGIOGRAPHY N/A 5/9/2023    Procedure: ANGIOGRAM, CORONARY ARTERY;  Surgeon: Antonio Kessler MD;  Location: [facility] CATH/EP LAB;  Service: Cardiology;  Laterality: N/A;    EXCISION OF HYDROCELE      x2    FRACTIONAL FLOW RESERVE (FFR), CORONARY  5/11/2023    Procedure: Fractional Flow [place] (FFR), Coronary;  Surgeon: Antonio Kessler MD;  Location: [facility] CATH/EP LAB;  Service: Cardiology;;    INGUINAL HERNIA REPAIR  1960    INSTANTANEOUS WAVE-FREE RATIO (IFR) N/A 10/10/2023    Procedure: Instantaneous Wave-Free Ratio (IFR);  Surgeon: Dieudonne Ryan MD;  Location: [facility] CATH/EP " LAB;  Service: Cardiology;  Laterality: N/A;    INTRAVASCULAR ULTRASOUND, CORONARY N/A 5/9/2023    Procedure: Ultrasound-coronary;  Surgeon: Antonio Kessler MD;  Location: [facility] CATH/EP LAB;  Service: Cardiology;  Laterality: N/A;    IVUS, CORONARY  5/11/2023    Procedure: IVUS, Coronary;  Surgeon: Antonio Kessler MD;  Location: [facility] CATH/EP LAB;  Service: Cardiology;;    LEFT HEART CATHETERIZATION Left 5/11/2023    Procedure: Left heart cath;  Surgeon: Antonio Kessler MD;  Location: [facility] CATH/EP LAB;  Service: Cardiology;  Laterality: Left;    PERCUTANEOUS TRANSLUMINAL BALLOON ANGIOPLASTY OF CORONARY ARTERY  5/9/2023    Procedure: Angioplasty-coronary;  Surgeon: Antonio Kessler MD;  Location: [facility] CATH/EP LAB;  Service: Cardiology;;    RHINOPLASTY      STENT, DRUG ELUTING, SINGLE VESSEL, CORONARY  5/9/2023    Procedure: Stent, Drug Eluting, Single Vessel, Coronary;  Surgeon: Antnoio Kessler MD;  Location: [facility] CATH/EP LAB;  Service: Cardiology;;    SURGICAL REMOVAL OF NODULE OF VOCAL CORD       No family history on file.  Social History     Tobacco Use    Smoking status: Every Day     Types: Cigarettes    Smokeless tobacco: Former    Tobacco comments:     Pt ready to quit smoking and states he can on his own. Seen 10/9/23 for inpatient smoking cessation. [employer].     Occasionally   Substance Use Topics    Alcohol use: Yes     Alcohol/week: 14.0 standard drinks of alcohol     Types: 14 Shots of liquor per week    Drug use: Yes     Types: Marijuana     Review of Systems   Constitutional:  Negative for chills and fever.   HENT:  Negative for sore throat.    Eyes:  Negative for photophobia and visual disturbance.   Respiratory:  Negative for cough.    Cardiovascular:  Negative for chest pain.   Gastrointestinal:  Positive for nausea and vomiting. Negative for abdominal pain.   Genitourinary:  Negative for dysuria.   Musculoskeletal:  Negative for joint swelling.   Skin:  Positive for rash.   Neurological:   Negative for weakness and headaches.   Psychiatric/Behavioral:  Negative for confusion.        Physical Exam     Initial Vitals   BP Pulse Resp Temp SpO2   03/04/24 1215 03/04/24 1210 03/04/24 1210 -- 03/04/24 1210   93/62 (!) 115 (!) 22  95 %      MAP       --                Physical Exam    Nursing note and vitals reviewed.  Constitutional: He is not diaphoretic. No distress.   HENT:   Bilateral symmetric periorbital edema.  No lip swelling.  No pharyngeal tongue swelling.  No stridor.   Eyes: Conjunctivae are normal.   Neck:   Normal range of motion.  Cardiovascular:  Regular rhythm.           Pulmonary/Chest: Breath sounds normal.   Abdominal: Abdomen is soft. There is no abdominal tenderness.   Musculoskeletal:         General: Normal range of motion.      Cervical back: Normal range of motion.     Neurological: He is alert. He has normal strength. No cranial nerve deficit or sensory deficit.   Skin: No rash noted.   There is diffuse urticarial rash   Psychiatric:   Alert, anxious         ED Course   Critical Care    Date/Time: 3/4/2024 1:52 PM    Performed by: Josh Morales MD  Authorized by: Josh Morales MD  Direct patient critical care time: 15 minutes  Additional history critical care time: 5 minutes  Ordering / reviewing critical care time: 5 minutes  Documentation critical care time: 5 minutes  Consulting other physicians critical care time: 5 minutes  Total critical care time (exclusive of procedural time) : 35 minutes        Labs Reviewed   CBC W/ AUTO DIFFERENTIAL - Abnormal; Notable for the following components:       Result Value    MCV 99 (*)     MCH 33.1 (*)     MPV 8.7 (*)     All other components within normal limits   COMPREHENSIVE METABOLIC PANEL - Abnormal; Notable for the following components:    CO2 32 (*)     Glucose 190 (*)     Anion Gap 7 (*)     All other components within normal limits          Imaging Results    None          Medications   EPINEPHrine (EPIPEN) 0.3 mg/0.3  mL pen injection 0.3 mg (0.3 mg Intramuscular Given 3/4/24 1215)   diphenhydrAMINE injection 50 mg (50 mg Intravenous Given 3/4/24 1216)   methylPREDNISolone sodium succinate injection 125 mg (125 mg Intravenous Given 3/4/24 1216)   famotidine (PF) injection 20 mg (20 mg Intravenous Given 3/4/24 1216)   ondansetron injection 4 mg (4 mg Intravenous Given 3/4/24 1348)   sodium chloride 0.9% bolus 500 mL 500 mL (500 mLs Intravenous New Bag 3/4/24 1230)     Medical Decision Making  Patient presents with allergic reaction.  Presumably secondary to Augmentin.  CBC and CMP are unremarkable.  Here patient monitored for 2 hours a medications for anaphylaxis severe allergic reaction.  Patient reexamined.  Rashes resolved.  Nausea vomiting resolved.  Patient with improving periorbital edema.  Patient continues no oral swelling.  Patient is stable for outpatient treatment.  Will discharge with strict return precautions.  Patient will continue all Dosepak.  Patient is very concerned about antibiotics.  Will change antibiotics to Levaquin.    Amount and/or Complexity of Data Reviewed  Labs: ordered. Decision-making details documented in ED Course.    Risk  Prescription drug management.                                      Clinical Impression:  Final diagnoses:  [T78.40XA] Allergic reaction, initial encounter (Primary)          ED Disposition Condition    Discharge Stable          ED Prescriptions       Medication Sig Dispense Start Date End Date Auth. Provider    ondansetron (ZOFRAN) 4 MG tablet Take 1 tablet (4 mg total) by mouth every 8 (eight) hours as needed for Nausea. 12 tablet 3/4/2024 -- Josh Morales MD          Follow-up Information    None          Josh Morales MD  03/04/24 8010

## 2024-03-06 ENCOUNTER — TELEPHONE (OUTPATIENT)
Dept: CARDIOLOGY | Facility: CLINIC | Age: 69
End: 2024-03-06
Payer: MEDICARE

## 2024-03-06 NOTE — TELEPHONE ENCOUNTER
----- Message from George Hammond sent at 3/6/2024  3:09 PM CST -----  Contact: Imani/Jean Paul [facility]  Type: Needs Medical Advice  Who Called:  Imani/Jean Paul    Best Call Back Number: [phone]   Additional Information: Saw pt for AWV, Bp elevated 182/92 asymptomatic, seemed confused abt meds he needs to be taking. Requesting to call pt to advise medications, since he isn't sure which he should be taking, and adjust bp meds

## 2024-03-06 NOTE — TELEPHONE ENCOUNTER
I called this patient . He sinuses are aggrivating him. His blood does come down at times .   He is worried all the time . So he states that's why his blood pressure is high .   He is having a pulmonary function test .  .   He will continue to monitor his b/p , and watch salt intake . He feels okay , now will go to Er is symptoms change .

## 2024-03-07 ENCOUNTER — TELEPHONE (OUTPATIENT)
Dept: PULMONOLOGY | Facility: CLINIC | Age: 69
End: 2024-03-07

## 2024-03-07 NOTE — TELEPHONE ENCOUNTER
O2 ORDER was put in on 2/29/24 to [facility] I was contacted today by [facility] stating they spoke with pt and he pushed the set up until 3/12/24

## 2024-03-08 ENCOUNTER — HOSPITAL ENCOUNTER (INPATIENT)
Facility: HOSPITAL | Age: 69
LOS: 5 days | Discharge: NURSING, REHAB OR LONG-TERM CARE | DRG: 482 | End: 2024-03-13
Attending: EMERGENCY MEDICINE | Admitting: STUDENT IN AN ORGANIZED HEALTH CARE EDUCATION/TRAINING PROGRAM
Payer: MEDICARE

## 2024-03-08 DIAGNOSIS — R07.9 CHEST PAIN: ICD-10-CM
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
## 2024-03-08 DIAGNOSIS — F10.10 ETOH ABUSE: ICD-10-CM

## 2024-03-08 DIAGNOSIS — Z01.818 PRE-OP EVALUATION: ICD-10-CM

## 2024-03-08 DIAGNOSIS — S72.142A CLOSED INTERTROCHANTERIC FRACTURE OF HIP, LEFT, INITIAL ENCOUNTER: Primary | ICD-10-CM

## 2024-03-08 DIAGNOSIS — I10 BENIGN ESSENTIAL HTN: ICD-10-CM

## 2024-03-08 DIAGNOSIS — E11.59 TYPE 2 DIABETES MELLITUS WITH OTHER CIRCULATORY COMPLICATION, WITH LONG-TERM CURRENT USE OF INSULIN: ICD-10-CM

## 2024-03-08 DIAGNOSIS — Z79.4 TYPE 2 DIABETES MELLITUS WITH OTHER CIRCULATORY COMPLICATION, WITH LONG-TERM CURRENT USE OF INSULIN: ICD-10-CM

## 2024-03-08 PROBLEM — S72.002A FRACTURE OF LEFT HIP: Status: ACTIVE | Noted: 2024-03-08

## 2024-03-08 LAB
ABO + RH BLD: NORMAL
ALBUMIN SERPL BCP-MCNC: 4.1 G/DL (ref 3.5–5.2)
ALP SERPL-CCNC: 72 U/L (ref 55–135)
ALT SERPL W/O P-5'-P-CCNC: 25 U/L (ref 10–44)
ANION GAP SERPL CALC-SCNC: 12 MMOL/L (ref 8–16)
AST SERPL-CCNC: 24 U/L (ref 10–40)
BASOPHILS # BLD AUTO: 0.04 K/UL (ref 0–0.2)
BASOPHILS NFR BLD: 0.4 % (ref 0–1.9)
BILIRUB SERPL-MCNC: 0.6 MG/DL (ref 0.1–1)
BLD GP AB SCN CELLS X3 SERPL QL: NORMAL
BUN SERPL-MCNC: 23 MG/DL (ref 8–23)
CALCIUM SERPL-MCNC: 9.4 MG/DL (ref 8.7–10.5)
CHLORIDE SERPL-SCNC: 106 MMOL/L (ref 95–110)
CO2 SERPL-SCNC: 23 MMOL/L (ref 23–29)
CREAT SERPL-MCNC: 0.9 MG/DL (ref 0.5–1.4)
DIFFERENTIAL METHOD BLD: ABNORMAL
EOSINOPHIL # BLD AUTO: 0 K/UL (ref 0–0.5)
EOSINOPHIL NFR BLD: 0.4 % (ref 0–8)
ERYTHROCYTE [DISTWIDTH] IN BLOOD BY AUTOMATED COUNT: 12.9 % (ref 11.5–14.5)
EST. GFR  (NO RACE VARIABLE): >60 ML/MIN/1.73 M^2
ESTIMATED AVG GLUCOSE: 128 MG/DL (ref 68–131)
GLUCOSE SERPL-MCNC: 72 MG/DL (ref 70–110)
HBA1C MFR BLD: 6.1 % (ref 4.5–6.2)
HCT VFR BLD AUTO: 41.6 % (ref 40–54)
HGB BLD-MCNC: 14.4 G/DL (ref 14–18)
IMM GRANULOCYTES # BLD AUTO: 0.06 K/UL (ref 0–0.04)
IMM GRANULOCYTES NFR BLD AUTO: 0.6 % (ref 0–0.5)
LYMPHOCYTES # BLD AUTO: 2 K/UL (ref 1–4.8)
LYMPHOCYTES NFR BLD: 19.1 % (ref 18–48)
MAGNESIUM SERPL-MCNC: 2 MG/DL (ref 1.6–2.6)
MCH RBC QN AUTO: 33.4 PG (ref 27–31)
MCHC RBC AUTO-ENTMCNC: 34.6 G/DL (ref 32–36)
MCV RBC AUTO: 97 FL (ref 82–98)
MONOCYTES # BLD AUTO: 0.9 K/UL (ref 0.3–1)
MONOCYTES NFR BLD: 8.6 % (ref 4–15)
NEUTROPHILS # BLD AUTO: 7.3 K/UL (ref 1.8–7.7)
NEUTROPHILS NFR BLD: 70.9 % (ref 38–73)
NRBC BLD-RTO: 0 /100 WBC
PLATELET # BLD AUTO: 215 K/UL (ref 150–450)
PMV BLD AUTO: 8.6 FL (ref 9.2–12.9)
POCT GLUCOSE: 162 MG/DL (ref 70–110)
POCT GLUCOSE: 172 MG/DL (ref 70–110)
POCT GLUCOSE: 64 MG/DL (ref 70–110)
POTASSIUM SERPL-SCNC: 3.5 MMOL/L (ref 3.5–5.1)
PROT SERPL-MCNC: 7.2 G/DL (ref 6–8.4)
RBC # BLD AUTO: 4.31 M/UL (ref 4.6–6.2)
SODIUM SERPL-SCNC: 141 MMOL/L (ref 136–145)
SPECIMEN OUTDATE: NORMAL
WBC # BLD AUTO: 10.33 K/UL (ref 3.9–12.7)

## 2024-03-08 PROCEDURE — 85025 COMPLETE CBC W/AUTO DIFF WBC: CPT | Performed by: EMERGENCY MEDICINE

## 2024-03-08 PROCEDURE — 63600175 PHARM REV CODE 636 W HCPCS

## 2024-03-08 PROCEDURE — 96375 TX/PRO/DX INJ NEW DRUG ADDON: CPT

## 2024-03-08 PROCEDURE — 63600175 PHARM REV CODE 636 W HCPCS: Mod: JZ,JG

## 2024-03-08 PROCEDURE — 27000221 HC OXYGEN, UP TO 24 HOURS

## 2024-03-08 PROCEDURE — 99900031 HC PATIENT EDUCATION (STAT)

## 2024-03-08 PROCEDURE — 63600175 PHARM REV CODE 636 W HCPCS: Performed by: NURSE PRACTITIONER

## 2024-03-08 PROCEDURE — 83735 ASSAY OF MAGNESIUM: CPT

## 2024-03-08 PROCEDURE — 96376 TX/PRO/DX INJ SAME DRUG ADON: CPT

## 2024-03-08 PROCEDURE — 94760 N-INVAS EAR/PLS OXIMETRY 1: CPT

## 2024-03-08 PROCEDURE — 63600175 PHARM REV CODE 636 W HCPCS: Performed by: EMERGENCY MEDICINE

## 2024-03-08 PROCEDURE — 99285 EMERGENCY DEPT VISIT HI MDM: CPT | Mod: 25

## 2024-03-08 PROCEDURE — 82962 GLUCOSE BLOOD TEST: CPT

## 2024-03-08 PROCEDURE — 11000001 HC ACUTE MED/SURG PRIVATE ROOM

## 2024-03-08 PROCEDURE — 36415 COLL VENOUS BLD VENIPUNCTURE: CPT | Performed by: EMERGENCY MEDICINE

## 2024-03-08 PROCEDURE — 86901 BLOOD TYPING SEROLOGIC RH(D): CPT

## 2024-03-08 PROCEDURE — 80053 COMPREHEN METABOLIC PANEL: CPT | Performed by: EMERGENCY MEDICINE

## 2024-03-08 PROCEDURE — 25000003 PHARM REV CODE 250

## 2024-03-08 PROCEDURE — 96374 THER/PROPH/DIAG INJ IV PUSH: CPT

## 2024-03-08 PROCEDURE — 93005 ELECTROCARDIOGRAM TRACING: CPT

## 2024-03-08 PROCEDURE — 93010 ELECTROCARDIOGRAM REPORT: CPT | Mod: ,,, | Performed by: INTERNAL MEDICINE

## 2024-03-08 PROCEDURE — 83036 HEMOGLOBIN GLYCOSYLATED A1C: CPT

## 2024-03-08 PROCEDURE — 36415 COLL VENOUS BLD VENIPUNCTURE: CPT

## 2024-03-08 RX ORDER — INSULIN ASPART 100 [IU]/ML
0-10 INJECTION, SOLUTION INTRAVENOUS; SUBCUTANEOUS
Status: DISCONTINUED | OUTPATIENT
Start: 2024-03-08 | End: 2024-03-13 | Stop reason: HOSPADM

## 2024-03-08 RX ORDER — HYDROMORPHONE HYDROCHLORIDE 1 MG/ML
1 INJECTION, SOLUTION INTRAMUSCULAR; INTRAVENOUS; SUBCUTANEOUS
Status: COMPLETED | OUTPATIENT
Start: 2024-03-08 | End: 2024-03-08

## 2024-03-08 RX ORDER — SIMETHICONE 80 MG
1 TABLET,CHEWABLE ORAL 4 TIMES DAILY PRN
Status: DISCONTINUED | OUTPATIENT
Start: 2024-03-08 | End: 2024-03-13 | Stop reason: HOSPADM

## 2024-03-08 RX ORDER — OXYCODONE HYDROCHLORIDE 5 MG/1
5 TABLET ORAL EVERY 6 HOURS PRN
Status: DISCONTINUED | OUTPATIENT
Start: 2024-03-08 | End: 2024-03-10

## 2024-03-08 RX ORDER — MORPHINE SULFATE 2 MG/ML
2 INJECTION, SOLUTION INTRAMUSCULAR; INTRAVENOUS EVERY 4 HOURS PRN
Status: DISCONTINUED | OUTPATIENT
Start: 2024-03-08 | End: 2024-03-08

## 2024-03-08 RX ORDER — IBUPROFEN 200 MG
24 TABLET ORAL
Status: DISCONTINUED | OUTPATIENT
Start: 2024-03-08 | End: 2024-03-13 | Stop reason: HOSPADM

## 2024-03-08 RX ORDER — LANOLIN ALCOHOL/MO/W.PET/CERES
800 CREAM (GRAM) TOPICAL
Status: DISCONTINUED | OUTPATIENT
Start: 2024-03-08 | End: 2024-03-13 | Stop reason: HOSPADM

## 2024-03-08 RX ORDER — SODIUM CHLORIDE 0.9 % (FLUSH) 0.9 %
10 SYRINGE (ML) INJECTION EVERY 12 HOURS PRN
Status: DISCONTINUED | OUTPATIENT
Start: 2024-03-08 | End: 2024-03-09

## 2024-03-08 RX ORDER — ACETAMINOPHEN 325 MG/1
650 TABLET ORAL EVERY 4 HOURS PRN
Status: DISCONTINUED | OUTPATIENT
Start: 2024-03-08 | End: 2024-03-13 | Stop reason: HOSPADM

## 2024-03-08 RX ORDER — PROCHLORPERAZINE EDISYLATE 5 MG/ML
5 INJECTION INTRAMUSCULAR; INTRAVENOUS EVERY 6 HOURS PRN
Status: DISCONTINUED | OUTPATIENT
Start: 2024-03-08 | End: 2024-03-13 | Stop reason: HOSPADM

## 2024-03-08 RX ORDER — SODIUM CHLORIDE 0.9 % (FLUSH) 0.9 %
10 SYRINGE (ML) INJECTION
Status: DISCONTINUED | OUTPATIENT
Start: 2024-03-08 | End: 2024-03-13 | Stop reason: HOSPADM

## 2024-03-08 RX ORDER — IPRATROPIUM BROMIDE AND ALBUTEROL SULFATE 2.5; .5 MG/3ML; MG/3ML
3 SOLUTION RESPIRATORY (INHALATION) EVERY 6 HOURS PRN
Status: DISCONTINUED | OUTPATIENT
Start: 2024-03-08 | End: 2024-03-13 | Stop reason: HOSPADM

## 2024-03-08 RX ORDER — METOPROLOL TARTRATE 25 MG/1
25 TABLET, FILM COATED ORAL 2 TIMES DAILY
Status: DISCONTINUED | OUTPATIENT
Start: 2024-03-08 | End: 2024-03-09

## 2024-03-08 RX ORDER — IBUPROFEN 200 MG
16 TABLET ORAL
Status: DISCONTINUED | OUTPATIENT
Start: 2024-03-08 | End: 2024-03-13 | Stop reason: HOSPADM

## 2024-03-08 RX ORDER — NALOXONE HCL 0.4 MG/ML
0.02 VIAL (ML) INJECTION
Status: DISCONTINUED | OUTPATIENT
Start: 2024-03-08 | End: 2024-03-13 | Stop reason: HOSPADM

## 2024-03-08 RX ORDER — TALC
6 POWDER (GRAM) TOPICAL NIGHTLY PRN
Status: DISCONTINUED | OUTPATIENT
Start: 2024-03-08 | End: 2024-03-13 | Stop reason: HOSPADM

## 2024-03-08 RX ORDER — GLUCAGON 1 MG
1 KIT INJECTION
Status: DISCONTINUED | OUTPATIENT
Start: 2024-03-08 | End: 2024-03-13 | Stop reason: HOSPADM

## 2024-03-08 RX ORDER — ONDANSETRON HYDROCHLORIDE 2 MG/ML
8 INJECTION, SOLUTION INTRAVENOUS EVERY 8 HOURS PRN
Status: DISCONTINUED | OUTPATIENT
Start: 2024-03-08 | End: 2024-03-13 | Stop reason: HOSPADM

## 2024-03-08 RX ORDER — HYDROMORPHONE HYDROCHLORIDE 1 MG/ML
1 INJECTION, SOLUTION INTRAMUSCULAR; INTRAVENOUS; SUBCUTANEOUS ONCE
Status: COMPLETED | OUTPATIENT
Start: 2024-03-08 | End: 2024-03-08

## 2024-03-08 RX ORDER — MUPIROCIN 20 MG/G
OINTMENT TOPICAL
Status: CANCELLED | OUTPATIENT
Start: 2024-03-08

## 2024-03-08 RX ORDER — ATORVASTATIN CALCIUM 40 MG/1
40 TABLET, FILM COATED ORAL DAILY
Status: DISCONTINUED | OUTPATIENT
Start: 2024-03-09 | End: 2024-03-13 | Stop reason: HOSPADM

## 2024-03-08 RX ORDER — FAMOTIDINE 20 MG/1
20 TABLET, FILM COATED ORAL 2 TIMES DAILY
Status: DISCONTINUED | OUTPATIENT
Start: 2024-03-08 | End: 2024-03-13 | Stop reason: HOSPADM

## 2024-03-08 RX ORDER — AMOXICILLIN 250 MG
1 CAPSULE ORAL 2 TIMES DAILY
Status: DISCONTINUED | OUTPATIENT
Start: 2024-03-08 | End: 2024-03-13 | Stop reason: HOSPADM

## 2024-03-08 RX ORDER — MORPHINE SULFATE 4 MG/ML
4 INJECTION, SOLUTION INTRAMUSCULAR; INTRAVENOUS EVERY 4 HOURS PRN
Status: DISCONTINUED | OUTPATIENT
Start: 2024-03-08 | End: 2024-03-13 | Stop reason: HOSPADM

## 2024-03-08 RX ORDER — OXYCODONE HYDROCHLORIDE 5 MG/1
10 TABLET ORAL EVERY 6 HOURS PRN
Status: DISCONTINUED | OUTPATIENT
Start: 2024-03-08 | End: 2024-03-10

## 2024-03-08 RX ORDER — ALUMINUM HYDROXIDE, MAGNESIUM HYDROXIDE, AND SIMETHICONE 1200; 120; 1200 MG/30ML; MG/30ML; MG/30ML
30 SUSPENSION ORAL 4 TIMES DAILY PRN
Status: DISCONTINUED | OUTPATIENT
Start: 2024-03-08 | End: 2024-03-13 | Stop reason: HOSPADM

## 2024-03-08 RX ORDER — SODIUM CHLORIDE 0.9 % (FLUSH) 0.9 %
10 SYRINGE (ML) INJECTION
Status: CANCELLED | OUTPATIENT
Start: 2024-03-08

## 2024-03-08 RX ADMIN — HYDROMORPHONE HYDROCHLORIDE 1 MG: 1 INJECTION, SOLUTION INTRAMUSCULAR; INTRAVENOUS; SUBCUTANEOUS at 04:03

## 2024-03-08 RX ADMIN — MORPHINE SULFATE 4 MG: 4 INJECTION, SOLUTION INTRAMUSCULAR; INTRAVENOUS at 08:03

## 2024-03-08 RX ADMIN — HYDROMORPHONE HYDROCHLORIDE 1 MG: 1 INJECTION, SOLUTION INTRAMUSCULAR; INTRAVENOUS; SUBCUTANEOUS at 05:03

## 2024-03-08 RX ADMIN — HYDROMORPHONE HYDROCHLORIDE 1 MG: 1 INJECTION, SOLUTION INTRAMUSCULAR; INTRAVENOUS; SUBCUTANEOUS at 10:03

## 2024-03-08 RX ADMIN — OXYCODONE HYDROCHLORIDE 10 MG: 10 TABLET ORAL at 07:03

## 2024-03-08 NOTE — ED PROVIDER NOTES
Encounter Date: 3/8/2024       History     Chief Complaint   Patient presents with    Fall     Pt brought to ED via EMS after a fall at work.  Pt complains of left leg and hip pain.       68-year-old male with a past medical history diabetes mellitus, emphysema, hypertension, and heart disease presents for evaluation of left hip pain.  The patient reports that he tripped over a box, landing on his left hip just prior to arrival.  He denies any other injuries, including head injury, loss of consciousness, neck pain, nausea/vomiting, back pain, chest pain, or abdominal pain.  His pain is worse with movement and palpation of the left hip.  There are no alleviating factors.  EMS reports that they treated him prior to arrival with some IV fentanyl, without improvement in his pain.  The patient is on Plavix and an aspirin daily.      Review of patient's allergies indicates:   Allergen Reactions    Amoxicillin Shortness Of Breath and Edema     Past Medical History:   Diagnosis Date    Abdominal pain 2022    CHF (congestive heart failure)     Diabetes mellitus 2018    Emphysema lung     Endocarditis 2011    Hypertension     Stroke 2011    denies residual     Past Surgical History:   Procedure Laterality Date    ANGIOGRAM, CORONARY, WITH LEFT HEART CATHETERIZATION N/A 10/10/2023    Procedure: Angiogram, Coronary, with Left Heart Cath;  Surgeon: Dieudonne Ryan MD;  Location: [facility] CATH/EP LAB;  Service: Cardiology;  Laterality: N/A;    BACK SURGERY  1981    COLONOSCOPY N/A 2/23/2023    Procedure: COLONOSCOPY;  Surgeon: Jonn Cruz MD;  Location: [facility] ENDO;  Service: Endoscopy;  Laterality: N/A;    CORONARY ANGIOGRAPHY N/A 5/9/2023    Procedure: ANGIOGRAM, CORONARY ARTERY;  Surgeon: Antonio Kessler MD;  Location: [facility] CATH/EP LAB;  Service: Cardiology;  Laterality: N/A;    EXCISION OF HYDROCELE      x2    FRACTIONAL FLOW RESERVE (FFR), CORONARY  5/11/2023    Procedure: Fractional Flow [place] (FFR), Coronary;   Surgeon: Antonio Kessler MD;  Location: [facility] CATH/EP LAB;  Service: Cardiology;;    INGUINAL HERNIA REPAIR  1960    INSTANTANEOUS WAVE-FREE RATIO (IFR) N/A 10/10/2023    Procedure: Instantaneous Wave-Free Ratio (IFR);  Surgeon: Dieudonne Ryan MD;  Location: [facility] CATH/EP LAB;  Service: Cardiology;  Laterality: N/A;    INTRAVASCULAR ULTRASOUND, CORONARY N/A 5/9/2023    Procedure: Ultrasound-coronary;  Surgeon: Antonio Kessler MD;  Location: [facility] CATH/EP LAB;  Service: Cardiology;  Laterality: N/A;    IVUS, CORONARY  5/11/2023    Procedure: IVUS, Coronary;  Surgeon: Antonio Kessler MD;  Location: [facility] CATH/EP LAB;  Service: Cardiology;;    LEFT HEART CATHETERIZATION Left 5/11/2023    Procedure: Left heart cath;  Surgeon: Antonio Kessler MD;  Location: [facility] CATH/EP LAB;  Service: Cardiology;  Laterality: Left;    PERCUTANEOUS TRANSLUMINAL BALLOON ANGIOPLASTY OF CORONARY ARTERY  5/9/2023    Procedure: Angioplasty-coronary;  Surgeon: Antonio Kessler MD;  Location: [facility] CATH/EP LAB;  Service: Cardiology;;    RHINOPLASTY      STENT, DRUG ELUTING, SINGLE VESSEL, CORONARY  5/9/2023    Procedure: Stent, Drug Eluting, Single Vessel, Coronary;  Surgeon: Antonio Kessler MD;  Location: [facility] CATH/EP LAB;  Service: Cardiology;;    SURGICAL REMOVAL OF NODULE OF VOCAL CORD       No family history on file.  Social History     Tobacco Use    Smoking status: Every Day     Types: Cigarettes    Smokeless tobacco: Former    Tobacco comments:     Pt ready to quit smoking and states he can on his own. Seen 10/9/23 for inpatient smoking cessation. [employer] Member.     Occasionally   Substance Use Topics    Alcohol use: Yes     Alcohol/week: 14.0 standard drinks of alcohol     Types: 14 Shots of liquor per week    Drug use: Yes     Types: Marijuana     Review of Systems   Constitutional:  Negative for chills, diaphoresis, fatigue and fever.   HENT:  Negative for congestion and rhinorrhea.    Respiratory:  Negative for cough and shortness  of breath.    Cardiovascular:  Negative for chest pain.   Gastrointestinal:  Negative for abdominal pain, diarrhea, nausea and vomiting.   Genitourinary:  Negative for dysuria, frequency and testicular pain.   Musculoskeletal:  Positive for arthralgias. Negative for gait problem.   Skin:  Negative for color change.   Neurological:  Negative for dizziness and numbness.   Psychiatric/Behavioral:  Negative for agitation and confusion.        Physical Exam     Initial Vitals [03/08/24 1533]   BP Pulse Resp Temp SpO2   (!) 142/100 85 17 98.9 °F (37.2 °C) 96 %      MAP       --         Physical Exam    Nursing note and vitals reviewed.  Constitutional: He appears well-developed and well-nourished.   HENT:   Head: Normocephalic and atraumatic.   Eyes: EOM are normal. Pupils are equal, round, and reactive to light.   Neck: Neck supple.   Cardiovascular:  Normal rate and regular rhythm.           Pulmonary/Chest: Breath sounds normal.   Abdominal: Abdomen is soft. Bowel sounds are normal. He exhibits no distension. There is no abdominal tenderness. There is no rebound.   Musculoskeletal:         General: Tenderness present. Normal range of motion.      Cervical back: Neck supple.      Comments: Tenderness to palpation on the left lateral hip.  Unable to completely straighten the left leg.  Painful rotation of the left hip noted.  2+ left dorsalis pedis pulse noted.  No midline spinal tenderness or step-offs noted.     Neurological: He is alert and oriented to person, place, and time.   Skin: Skin is warm and dry.   Psychiatric: He has a normal mood and affect.         ED Course   Procedures  Labs Reviewed   CBC W/ AUTO DIFFERENTIAL - Abnormal; Notable for the following components:       Result Value    RBC 4.31 (*)     MCH 33.4 (*)     MPV 8.6 (*)     Immature Granulocytes 0.6 (*)     Immature Grans (Abs) 0.06 (*)     All other components within normal limits   POCT GLUCOSE - Abnormal; Notable for the following components:     POCT Glucose 64 (*)     All other components within normal limits   POCT GLUCOSE - Abnormal; Notable for the following components:    POCT Glucose 162 (*)     All other components within normal limits   COMPREHENSIVE METABOLIC PANEL   MAGNESIUM   HEMOGLOBIN A1C   URINALYSIS, REFLEX TO URINE CULTURE   TYPE & SCREEN     EKG Readings: (Independently Interpreted)   Initial Reading: No STEMI. Rhythm: Normal Sinus Rhythm. Heart Rate: 91. Ectopy: No Ectopy. Conduction: Normal. ST Segments: Normal ST Segments. T Waves: Normal. Axis: Left Axis Deviation. Clinical Impression: Normal Sinus Rhythm with LBBB       Imaging Results              X-Ray Chest 1 View (In process)                      X-Ray Hip 2 or 3 views Left (with Pelvis when performed) (Final result)  Result time 03/08/24 17:37:08      Final result by Rylee Kearney MD (03/08/24 17:37:08)                   Impression:      Intertrochanteric left femoral fracture.  Please see above discussion.      Electronically signed by: Rylee Kearney MD  Date:    03/08/2024  Time:    17:37               Narrative:    EXAMINATION:  XR FEMUR 2 VIEW LEFT; XR HIP WITH PELVIS WHEN PERFORMED, 2 OR 3 VIEWS LEFT    CLINICAL HISTORY:  Unspecified fall, initial encounter    TECHNIQUE:  AP and lateral views of the left femur were performed.    COMPARISON:  AP pelvis, AP left hip and cross-table lateral left hip were obtained    FINDINGS:  There is an acute, minimally displaced with very slight diastasis, intertrochanteric left femoral fracture.    A short segment of the mid femur is not visualized in 2 projections, seen on the AP but not the lateral view.  As visualized no other fractures seen.    No dislocation.                                       X-Ray Femur AP/LAT Left (Final result)  Result time 03/08/24 17:37:08      Final result by Rylee Kearney MD (03/08/24 17:37:08)                   Impression:      Intertrochanteric left femoral fracture.  Please see above  discussion.      Electronically signed by: Rylee Kearney MD  Date:    03/08/2024  Time:    17:37               Narrative:    EXAMINATION:  XR FEMUR 2 VIEW LEFT; XR HIP WITH PELVIS WHEN PERFORMED, 2 OR 3 VIEWS LEFT    CLINICAL HISTORY:  Unspecified fall, initial encounter    TECHNIQUE:  AP and lateral views of the left femur were performed.    COMPARISON:  AP pelvis, AP left hip and cross-table lateral left hip were obtained    FINDINGS:  There is an acute, minimally displaced with very slight diastasis, intertrochanteric left femoral fracture.    A short segment of the mid femur is not visualized in 2 projections, seen on the AP but not the lateral view.  As visualized no other fractures seen.    No dislocation.                                       Medications   sodium chloride 0.9% flush 10 mL (has no administration in time range)   naloxone 0.4 mg/mL injection 0.02 mg (has no administration in time range)   glucose chewable tablet 16 g (has no administration in time range)   glucose chewable tablet 24 g (has no administration in time range)   glucagon (human recombinant) injection 1 mg (has no administration in time range)   acetaminophen tablet 650 mg (has no administration in time range)   oxyCODONE immediate release tablet 5 mg (has no administration in time range)   oxyCODONE immediate release tablet Tab 10 mg (10 mg Oral Given 3/8/24 1929)   senna-docusate 8.6-50 mg per tablet 1 tablet (has no administration in time range)   ondansetron injection 8 mg (has no administration in time range)   prochlorperazine injection Soln 5 mg (has no administration in time range)   simethicone chewable tablet 80 mg (has no administration in time range)   aluminum-magnesium hydroxide-simethicone 200-200-20 mg/5 mL suspension 30 mL (has no administration in time range)   insulin aspart U-100 pen 0-10 Units (has no administration in time range)   albuterol-ipratropium 2.5 mg-0.5 mg/3 mL nebulizer solution 3 mL (has no  administration in time range)   melatonin tablet 6 mg (has no administration in time range)   potassium bicarbonate disintegrating tablet 50 mEq (has no administration in time range)   potassium bicarbonate disintegrating tablet 35 mEq (has no administration in time range)   potassium bicarbonate disintegrating tablet 60 mEq (has no administration in time range)   magnesium oxide tablet 800 mg (has no administration in time range)   magnesium oxide tablet 800 mg (has no administration in time range)   dextrose 10% bolus 125 mL 125 mL (has no administration in time range)   dextrose 10% bolus 250 mL 250 mL (has no administration in time range)   famotidine tablet 20 mg (has no administration in time range)   morphine injection 4 mg (has no administration in time range)   HYDROmorphone injection 1 mg (1 mg Intravenous Given 3/8/24 1626)   HYDROmorphone injection 1 mg (1 mg Intravenous Given 3/8/24 1755)     Medical Decision Making  68-year-old male presents with hip pain status post injury.    Initial differential diagnosis included but not limited to fracture, dislocation, and contusion.    Amount and/or Complexity of Data Reviewed  Labs: ordered.  Radiology: ordered.    Risk  Prescription drug management.  Decision regarding hospitalization.  Risk Details: The patient was emergently evaluated in the emergency department, his evaluation was significant for an older male with hip tenderness.  The patient's x-rays were significant for a left intertrochanteric hip fracture per my independent interpretation.  The patient was treated here with IV pain medication.  The case was discussed with the orthopedic physician on-call, Dr. Hdez.  He asked for the patient to be kept NPO after midnight for possible surgery tomorrow.  I have drawn preoperative labs in this patient.  The patient was discussed with the APC on call for the hospitalist service.  She has accepted the patient for admission.                                       Clinical Impression:  Final diagnoses:  [W19.XXXA] Fall  [S72.206A] Closed intertrochanteric fracture of hip, left, initial encounter (Primary)          ED Disposition Condition    Admit Stable                Porfirio Barrett MD  03/08/24 1809       Porfirio Barrett MD  03/08/24 204

## 2024-03-09 ENCOUNTER — ANESTHESIA (OUTPATIENT)
Dept: SURGERY | Facility: HOSPITAL | Age: 69
DRG: 482 | End: 2024-03-09
Payer: MEDICARE

## 2024-03-09 ENCOUNTER — ANESTHESIA EVENT (OUTPATIENT)
Dept: SURGERY | Facility: HOSPITAL | Age: 69
DRG: 482 | End: 2024-03-09
Payer: MEDICARE

## 2024-03-09 PROBLEM — I10 HYPERTENSION: Status: ACTIVE | Noted: 2024-02-02

## 2024-03-09 PROBLEM — F32.A DEPRESSION, UNSPECIFIED: Status: ACTIVE | Noted: 2024-02-02

## 2024-03-09 PROBLEM — F12.20 TETRAHYDROCANNABINOL (THC) DEPENDENCE: Status: ACTIVE | Noted: 2024-03-09

## 2024-03-09 PROBLEM — S72.142A CLOSED INTERTROCHANTERIC FRACTURE OF HIP, LEFT, INITIAL ENCOUNTER: Status: ACTIVE | Noted: 2024-03-09

## 2024-03-09 PROBLEM — N40.1 BENIGN PROSTATIC HYPERPLASIA WITH LOWER URINARY TRACT SYMPTOMS: Status: ACTIVE | Noted: 2024-02-02

## 2024-03-09 PROBLEM — Z72.0 TOBACCO USER: Status: ACTIVE | Noted: 2024-03-09

## 2024-03-09 PROBLEM — R55 SYNCOPE: Status: RESOLVED | Noted: 2023-08-11 | Resolved: 2024-03-09

## 2024-03-09 PROBLEM — I63.9 CEREBROVASCULAR ACCIDENT: Status: ACTIVE | Noted: 2024-02-02

## 2024-03-09 PROBLEM — R07.9 CHEST PAIN: Status: RESOLVED | Noted: 2023-08-12 | Resolved: 2024-03-09

## 2024-03-09 PROBLEM — I10 HYPERTENSION: Status: RESOLVED | Noted: 2024-02-02 | Resolved: 2024-03-09

## 2024-03-09 PROBLEM — F10.10 ETOH ABUSE: Status: ACTIVE | Noted: 2024-03-09

## 2024-03-09 PROBLEM — Z72.0 TOBACCO USER: Status: RESOLVED | Noted: 2024-03-09 | Resolved: 2024-03-09

## 2024-03-09 LAB
ALBUMIN SERPL BCP-MCNC: 3.7 G/DL (ref 3.5–5.2)
ALP SERPL-CCNC: 71 U/L (ref 55–135)
ALT SERPL W/O P-5'-P-CCNC: 22 U/L (ref 10–44)
ANION GAP SERPL CALC-SCNC: 11 MMOL/L (ref 8–16)
APTT PPP: 27.8 SEC (ref 21–32)
AST SERPL-CCNC: 18 U/L (ref 10–40)
BASOPHILS # BLD AUTO: 0.05 K/UL (ref 0–0.2)
BASOPHILS NFR BLD: 0.6 % (ref 0–1.9)
BILIRUB SERPL-MCNC: 0.5 MG/DL (ref 0.1–1)
BUN SERPL-MCNC: 23 MG/DL (ref 8–23)
CALCIUM SERPL-MCNC: 8.9 MG/DL (ref 8.7–10.5)
CHLORIDE SERPL-SCNC: 102 MMOL/L (ref 95–110)
CO2 SERPL-SCNC: 24 MMOL/L (ref 23–29)
CREAT SERPL-MCNC: 0.9 MG/DL (ref 0.5–1.4)
DIFFERENTIAL METHOD BLD: ABNORMAL
EOSINOPHIL # BLD AUTO: 0 K/UL (ref 0–0.5)
EOSINOPHIL NFR BLD: 0.4 % (ref 0–8)
ERYTHROCYTE [DISTWIDTH] IN BLOOD BY AUTOMATED COUNT: 13.1 % (ref 11.5–14.5)
EST. GFR  (NO RACE VARIABLE): >60 ML/MIN/1.73 M^2
GLUCOSE SERPL-MCNC: 159 MG/DL (ref 70–110)
HCT VFR BLD AUTO: 38.6 % (ref 40–54)
HGB BLD-MCNC: 13.2 G/DL (ref 14–18)
IMM GRANULOCYTES # BLD AUTO: 0.09 K/UL (ref 0–0.04)
IMM GRANULOCYTES NFR BLD AUTO: 1 % (ref 0–0.5)
INR PPP: 1.1 (ref 0.8–1.2)
LYMPHOCYTES # BLD AUTO: 1.5 K/UL (ref 1–4.8)
LYMPHOCYTES NFR BLD: 16.9 % (ref 18–48)
MCH RBC QN AUTO: 33.7 PG (ref 27–31)
MCHC RBC AUTO-ENTMCNC: 34.2 G/DL (ref 32–36)
MCV RBC AUTO: 99 FL (ref 82–98)
MONOCYTES # BLD AUTO: 0.9 K/UL (ref 0.3–1)
MONOCYTES NFR BLD: 10.3 % (ref 4–15)
NEUTROPHILS # BLD AUTO: 6.4 K/UL (ref 1.8–7.7)
NEUTROPHILS NFR BLD: 70.8 % (ref 38–73)
NRBC BLD-RTO: 0 /100 WBC
PLATELET # BLD AUTO: 179 K/UL (ref 150–450)
PMV BLD AUTO: 9 FL (ref 9.2–12.9)
POCT GLUCOSE: 100 MG/DL (ref 70–110)
POCT GLUCOSE: 246 MG/DL (ref 70–110)
POCT GLUCOSE: 349 MG/DL (ref 70–110)
POTASSIUM SERPL-SCNC: 3.8 MMOL/L (ref 3.5–5.1)
PROT SERPL-MCNC: 6.6 G/DL (ref 6–8.4)
PROTHROMBIN TIME: 11.3 SEC (ref 9–12.5)
RBC # BLD AUTO: 3.92 M/UL (ref 4.6–6.2)
SODIUM SERPL-SCNC: 137 MMOL/L (ref 136–145)
WBC # BLD AUTO: 9 K/UL (ref 3.9–12.7)

## 2024-03-09 PROCEDURE — C1713 ANCHOR/SCREW BN/BN,TIS/BN: HCPCS | Performed by: ORTHOPAEDIC SURGERY

## 2024-03-09 PROCEDURE — 25000003 PHARM REV CODE 250: Performed by: ANESTHESIOLOGY

## 2024-03-09 PROCEDURE — 37000008 HC ANESTHESIA 1ST 15 MINUTES: Performed by: ORTHOPAEDIC SURGERY

## 2024-03-09 PROCEDURE — 25000003 PHARM REV CODE 250: Performed by: ORTHOPAEDIC SURGERY

## 2024-03-09 PROCEDURE — 63600175 PHARM REV CODE 636 W HCPCS: Performed by: ORTHOPAEDIC SURGERY

## 2024-03-09 PROCEDURE — 99900035 HC TECH TIME PER 15 MIN (STAT)

## 2024-03-09 PROCEDURE — 85025 COMPLETE CBC W/AUTO DIFF WBC: CPT

## 2024-03-09 PROCEDURE — 99900031 HC PATIENT EDUCATION (STAT)

## 2024-03-09 PROCEDURE — 36000711: Performed by: ORTHOPAEDIC SURGERY

## 2024-03-09 PROCEDURE — 25000003 PHARM REV CODE 250: Performed by: NURSE ANESTHETIST, CERTIFIED REGISTERED

## 2024-03-09 PROCEDURE — 99223 1ST HOSP IP/OBS HIGH 75: CPT | Mod: 57,,, | Performed by: ORTHOPAEDIC SURGERY

## 2024-03-09 PROCEDURE — 11000001 HC ACUTE MED/SURG PRIVATE ROOM

## 2024-03-09 PROCEDURE — 63600175 PHARM REV CODE 636 W HCPCS: Performed by: NURSE PRACTITIONER

## 2024-03-09 PROCEDURE — 36415 COLL VENOUS BLD VENIPUNCTURE: CPT

## 2024-03-09 PROCEDURE — 63600175 PHARM REV CODE 636 W HCPCS: Performed by: ANESTHESIOLOGY

## 2024-03-09 PROCEDURE — 36000710: Performed by: ORTHOPAEDIC SURGERY

## 2024-03-09 PROCEDURE — 25000003 PHARM REV CODE 250: Performed by: NURSE PRACTITIONER

## 2024-03-09 PROCEDURE — 71000033 HC RECOVERY, INTIAL HOUR: Performed by: ORTHOPAEDIC SURGERY

## 2024-03-09 PROCEDURE — 27201423 OPTIME MED/SURG SUP & DEVICES STERILE SUPPLY: Performed by: ORTHOPAEDIC SURGERY

## 2024-03-09 PROCEDURE — 63600175 PHARM REV CODE 636 W HCPCS: Mod: JZ,JG

## 2024-03-09 PROCEDURE — 85730 THROMBOPLASTIN TIME PARTIAL: CPT

## 2024-03-09 PROCEDURE — 94761 N-INVAS EAR/PLS OXIMETRY MLT: CPT

## 2024-03-09 PROCEDURE — 0QH736Z INSERTION OF INTRAMEDULLARY INTERNAL FIXATION DEVICE INTO LEFT UPPER FEMUR, PERCUTANEOUS APPROACH: ICD-10-PCS | Performed by: ORTHOPAEDIC SURGERY

## 2024-03-09 PROCEDURE — 27200651 HC AIRWAY, LMA: Performed by: ANESTHESIOLOGY

## 2024-03-09 PROCEDURE — 63600175 PHARM REV CODE 636 W HCPCS: Mod: JZ,JG | Performed by: ORTHOPAEDIC SURGERY

## 2024-03-09 PROCEDURE — D9220A PRA ANESTHESIA: Mod: ANES,,, | Performed by: ANESTHESIOLOGY

## 2024-03-09 PROCEDURE — 63600175 PHARM REV CODE 636 W HCPCS: Performed by: NURSE ANESTHETIST, CERTIFIED REGISTERED

## 2024-03-09 PROCEDURE — D9220A PRA ANESTHESIA: Mod: CRNA,,, | Performed by: NURSE ANESTHETIST, CERTIFIED REGISTERED

## 2024-03-09 PROCEDURE — 25000003 PHARM REV CODE 250

## 2024-03-09 PROCEDURE — S4991 NICOTINE PATCH NONLEGEND: HCPCS | Performed by: NURSE PRACTITIONER

## 2024-03-09 PROCEDURE — 80053 COMPREHEN METABOLIC PANEL: CPT

## 2024-03-09 PROCEDURE — 27245 TREAT THIGH FRACTURE: CPT | Mod: LT,,, | Performed by: ORTHOPAEDIC SURGERY

## 2024-03-09 PROCEDURE — 37000009 HC ANESTHESIA EA ADD 15 MINS: Performed by: ORTHOPAEDIC SURGERY

## 2024-03-09 PROCEDURE — 94799 UNLISTED PULMONARY SVC/PX: CPT | Mod: XB

## 2024-03-09 PROCEDURE — 85610 PROTHROMBIN TIME: CPT

## 2024-03-09 PROCEDURE — C1769 GUIDE WIRE: HCPCS | Performed by: ORTHOPAEDIC SURGERY

## 2024-03-09 DEVICE — NAIL IM CANN 130 DEG 11X170: Type: IMPLANTABLE DEVICE | Site: LEG | Status: FUNCTIONAL

## 2024-03-09 DEVICE — SCREW XL25 IM NAIL 38X5MM: Type: IMPLANTABLE DEVICE | Site: LEG | Status: FUNCTIONAL

## 2024-03-09 DEVICE — SCREW TFN ADVANCE 100MM: Type: IMPLANTABLE DEVICE | Site: LEG | Status: FUNCTIONAL

## 2024-03-09 RX ORDER — HYDROMORPHONE HYDROCHLORIDE 2 MG/ML
0.2 INJECTION, SOLUTION INTRAMUSCULAR; INTRAVENOUS; SUBCUTANEOUS EVERY 5 MIN PRN
Status: DISCONTINUED | OUTPATIENT
Start: 2024-03-09 | End: 2024-03-09

## 2024-03-09 RX ORDER — DIPHENHYDRAMINE HCL 25 MG
25 CAPSULE ORAL EVERY 6 HOURS PRN
Status: DISCONTINUED | OUTPATIENT
Start: 2024-03-09 | End: 2024-03-13 | Stop reason: HOSPADM

## 2024-03-09 RX ORDER — HYDRALAZINE HYDROCHLORIDE 20 MG/ML
10 INJECTION INTRAMUSCULAR; INTRAVENOUS EVERY 6 HOURS PRN
Status: DISCONTINUED | OUTPATIENT
Start: 2024-03-09 | End: 2024-03-13 | Stop reason: HOSPADM

## 2024-03-09 RX ORDER — HYDROMORPHONE HYDROCHLORIDE 1 MG/ML
1 INJECTION, SOLUTION INTRAMUSCULAR; INTRAVENOUS; SUBCUTANEOUS ONCE
Status: COMPLETED | OUTPATIENT
Start: 2024-03-09 | End: 2024-03-09

## 2024-03-09 RX ORDER — NAPROXEN SODIUM 220 MG/1
81 TABLET, FILM COATED ORAL DAILY
Status: DISCONTINUED | OUTPATIENT
Start: 2024-03-09 | End: 2024-03-09

## 2024-03-09 RX ORDER — ONDANSETRON HYDROCHLORIDE 2 MG/ML
4 INJECTION, SOLUTION INTRAVENOUS DAILY PRN
Status: DISCONTINUED | OUTPATIENT
Start: 2024-03-09 | End: 2024-03-09

## 2024-03-09 RX ORDER — FENTANYL CITRATE 50 UG/ML
INJECTION, SOLUTION INTRAMUSCULAR; INTRAVENOUS
Status: DISCONTINUED | OUTPATIENT
Start: 2024-03-09 | End: 2024-03-09

## 2024-03-09 RX ORDER — DROPERIDOL 2.5 MG/ML
0.62 INJECTION, SOLUTION INTRAMUSCULAR; INTRAVENOUS ONCE AS NEEDED
Status: DISCONTINUED | OUTPATIENT
Start: 2024-03-09 | End: 2024-03-09

## 2024-03-09 RX ORDER — SODIUM CHLORIDE 0.9 % (FLUSH) 0.9 %
10 SYRINGE (ML) INJECTION
Status: DISCONTINUED | OUTPATIENT
Start: 2024-03-09 | End: 2024-03-13 | Stop reason: HOSPADM

## 2024-03-09 RX ORDER — PROPOFOL 10 MG/ML
VIAL (ML) INTRAVENOUS
Status: DISCONTINUED | OUTPATIENT
Start: 2024-03-09 | End: 2024-03-09

## 2024-03-09 RX ORDER — NAPROXEN SODIUM 220 MG/1
81 TABLET, FILM COATED ORAL 2 TIMES DAILY
Status: DISCONTINUED | OUTPATIENT
Start: 2024-03-10 | End: 2024-03-13 | Stop reason: HOSPADM

## 2024-03-09 RX ORDER — MIDAZOLAM HYDROCHLORIDE 1 MG/ML
INJECTION INTRAMUSCULAR; INTRAVENOUS
Status: DISCONTINUED | OUTPATIENT
Start: 2024-03-09 | End: 2024-03-09

## 2024-03-09 RX ORDER — FOLIC ACID 1 MG/1
1 TABLET ORAL DAILY
Status: DISCONTINUED | OUTPATIENT
Start: 2024-03-09 | End: 2024-03-13 | Stop reason: HOSPADM

## 2024-03-09 RX ORDER — CLINDAMYCIN PHOSPHATE 900 MG/50ML
INJECTION, SOLUTION INTRAVENOUS
Status: DISCONTINUED | OUTPATIENT
Start: 2024-03-09 | End: 2024-03-09

## 2024-03-09 RX ORDER — OXYCODONE HYDROCHLORIDE 5 MG/1
5 TABLET ORAL
Status: DISCONTINUED | OUTPATIENT
Start: 2024-03-09 | End: 2024-03-09

## 2024-03-09 RX ORDER — ONDANSETRON HYDROCHLORIDE 2 MG/ML
INJECTION, SOLUTION INTRAMUSCULAR; INTRAVENOUS
Status: DISCONTINUED | OUTPATIENT
Start: 2024-03-09 | End: 2024-03-09

## 2024-03-09 RX ORDER — DIAZEPAM 5 MG/1
10 TABLET ORAL ONCE
Status: COMPLETED | OUTPATIENT
Start: 2024-03-09 | End: 2024-03-10

## 2024-03-09 RX ORDER — PHENYLEPHRINE HYDROCHLORIDE 10 MG/ML
INJECTION INTRAVENOUS
Status: DISCONTINUED | OUTPATIENT
Start: 2024-03-09 | End: 2024-03-09

## 2024-03-09 RX ORDER — LIDOCAINE HYDROCHLORIDE 20 MG/ML
INJECTION INTRAVENOUS
Status: DISCONTINUED | OUTPATIENT
Start: 2024-03-09 | End: 2024-03-09

## 2024-03-09 RX ORDER — ACETAMINOPHEN 10 MG/ML
INJECTION, SOLUTION INTRAVENOUS
Status: DISCONTINUED | OUTPATIENT
Start: 2024-03-09 | End: 2024-03-09

## 2024-03-09 RX ORDER — ISOSORBIDE MONONITRATE 30 MG/1
30 TABLET, EXTENDED RELEASE ORAL DAILY
Status: DISCONTINUED | OUTPATIENT
Start: 2024-03-09 | End: 2024-03-13 | Stop reason: HOSPADM

## 2024-03-09 RX ORDER — DEXAMETHASONE SODIUM PHOSPHATE 4 MG/ML
INJECTION, SOLUTION INTRA-ARTICULAR; INTRALESIONAL; INTRAMUSCULAR; INTRAVENOUS; SOFT TISSUE
Status: DISCONTINUED | OUTPATIENT
Start: 2024-03-09 | End: 2024-03-09

## 2024-03-09 RX ORDER — IBUPROFEN 200 MG
1 TABLET ORAL DAILY
Status: DISCONTINUED | OUTPATIENT
Start: 2024-03-09 | End: 2024-03-13 | Stop reason: HOSPADM

## 2024-03-09 RX ORDER — SODIUM CHLORIDE 0.9 % (FLUSH) 0.9 %
3 SYRINGE (ML) INJECTION
Status: DISCONTINUED | OUTPATIENT
Start: 2024-03-09 | End: 2024-03-09

## 2024-03-09 RX ORDER — THIAMINE HCL 100 MG
100 TABLET ORAL DAILY
Status: DISCONTINUED | OUTPATIENT
Start: 2024-03-09 | End: 2024-03-13 | Stop reason: HOSPADM

## 2024-03-09 RX ORDER — CARVEDILOL 3.12 MG/1
3.12 TABLET ORAL 2 TIMES DAILY
Status: DISCONTINUED | OUTPATIENT
Start: 2024-03-09 | End: 2024-03-11

## 2024-03-09 RX ORDER — FENTANYL CITRATE 50 UG/ML
25 INJECTION, SOLUTION INTRAMUSCULAR; INTRAVENOUS EVERY 5 MIN PRN
Status: DISCONTINUED | OUTPATIENT
Start: 2024-03-09 | End: 2024-03-09

## 2024-03-09 RX ADMIN — OXYCODONE HYDROCHLORIDE 10 MG: 10 TABLET ORAL at 03:03

## 2024-03-09 RX ADMIN — ISOSORBIDE MONONITRATE 30 MG: 30 TABLET, EXTENDED RELEASE ORAL at 03:03

## 2024-03-09 RX ADMIN — PHENYLEPHRINE HYDROCHLORIDE 100 MCG: 10 INJECTION INTRAVENOUS at 11:03

## 2024-03-09 RX ADMIN — MORPHINE SULFATE 4 MG: 4 INJECTION, SOLUTION INTRAMUSCULAR; INTRAVENOUS at 06:03

## 2024-03-09 RX ADMIN — HYDROMORPHONE HYDROCHLORIDE 0.2 MG: 2 INJECTION INTRAMUSCULAR; INTRAVENOUS; SUBCUTANEOUS at 12:03

## 2024-03-09 RX ADMIN — GLYCOPYRROLATE 0.2 MG: 0.2 INJECTION, SOLUTION INTRAMUSCULAR; INTRAVITREAL at 11:03

## 2024-03-09 RX ADMIN — HYDROMORPHONE HYDROCHLORIDE 0.2 MG: 2 INJECTION INTRAMUSCULAR; INTRAVENOUS; SUBCUTANEOUS at 01:03

## 2024-03-09 RX ADMIN — OXYCODONE 5 MG: 5 TABLET ORAL at 12:03

## 2024-03-09 RX ADMIN — DIPHENHYDRAMINE HYDROCHLORIDE 25 MG: 25 CAPSULE ORAL at 06:03

## 2024-03-09 RX ADMIN — DEXAMETHASONE SODIUM PHOSPHATE 4 MG: 4 INJECTION, SOLUTION INTRA-ARTICULAR; INTRALESIONAL; INTRAMUSCULAR; INTRAVENOUS; SOFT TISSUE at 11:03

## 2024-03-09 RX ADMIN — MORPHINE SULFATE 4 MG: 4 INJECTION, SOLUTION INTRAMUSCULAR; INTRAVENOUS at 04:03

## 2024-03-09 RX ADMIN — VANCOMYCIN HYDROCHLORIDE 1500 MG: 1.5 INJECTION, POWDER, LYOPHILIZED, FOR SOLUTION INTRAVENOUS at 04:03

## 2024-03-09 RX ADMIN — SODIUM CHLORIDE, SODIUM GLUCONATE, SODIUM ACETATE, POTASSIUM CHLORIDE, MAGNESIUM CHLORIDE, SODIUM PHOSPHATE, DIBASIC, AND POTASSIUM PHOSPHATE: .53; .5; .37; .037; .03; .012; .00082 INJECTION, SOLUTION INTRAVENOUS at 12:03

## 2024-03-09 RX ADMIN — PHENYLEPHRINE HYDROCHLORIDE 100 MCG: 10 INJECTION INTRAVENOUS at 12:03

## 2024-03-09 RX ADMIN — Medication 1 PATCH: at 10:03

## 2024-03-09 RX ADMIN — CARVEDILOL 3.12 MG: 3.12 TABLET, FILM COATED ORAL at 03:03

## 2024-03-09 RX ADMIN — OXYCODONE HYDROCHLORIDE 10 MG: 10 TABLET ORAL at 10:03

## 2024-03-09 RX ADMIN — ATORVASTATIN CALCIUM 40 MG: 40 TABLET, FILM COATED ORAL at 03:03

## 2024-03-09 RX ADMIN — FENTANYL CITRATE 50 MCG: 50 INJECTION, SOLUTION INTRAMUSCULAR; INTRAVENOUS at 11:03

## 2024-03-09 RX ADMIN — ACETAMINOPHEN 1000 MG: 10 INJECTION, SOLUTION INTRAVENOUS at 11:03

## 2024-03-09 RX ADMIN — CARVEDILOL 3.12 MG: 3.12 TABLET, FILM COATED ORAL at 10:03

## 2024-03-09 RX ADMIN — INSULIN ASPART 4 UNITS: 100 INJECTION, SOLUTION INTRAVENOUS; SUBCUTANEOUS at 05:03

## 2024-03-09 RX ADMIN — LIDOCAINE HYDROCHLORIDE 75 MG: 20 INJECTION, SOLUTION INTRAVENOUS at 11:03

## 2024-03-09 RX ADMIN — FAMOTIDINE 20 MG: 20 TABLET, FILM COATED ORAL at 03:03

## 2024-03-09 RX ADMIN — THERA TABS 1 TABLET: TAB at 03:03

## 2024-03-09 RX ADMIN — FOLIC ACID 1 MG: 1 TABLET ORAL at 03:03

## 2024-03-09 RX ADMIN — INSULIN ASPART 4 UNITS: 100 INJECTION, SOLUTION INTRAVENOUS; SUBCUTANEOUS at 10:03

## 2024-03-09 RX ADMIN — MORPHINE SULFATE 4 MG: 4 INJECTION, SOLUTION INTRAMUSCULAR; INTRAVENOUS at 11:03

## 2024-03-09 RX ADMIN — PROPOFOL 150 MG: 10 INJECTION, EMULSION INTRAVENOUS at 11:03

## 2024-03-09 RX ADMIN — CLINDAMYCIN PHOSPHATE 900 MG: 18 INJECTION, SOLUTION INTRAVENOUS at 11:03

## 2024-03-09 RX ADMIN — FAMOTIDINE 20 MG: 20 TABLET, FILM COATED ORAL at 10:03

## 2024-03-09 RX ADMIN — ASPIRIN 81 MG CHEWABLE TABLET 81 MG: 81 TABLET CHEWABLE at 03:03

## 2024-03-09 RX ADMIN — SODIUM CHLORIDE, SODIUM GLUCONATE, SODIUM ACETATE, POTASSIUM CHLORIDE, MAGNESIUM CHLORIDE, SODIUM PHOSPHATE, DIBASIC, AND POTASSIUM PHOSPHATE: .53; .5; .37; .037; .03; .012; .00082 INJECTION, SOLUTION INTRAVENOUS at 11:03

## 2024-03-09 RX ADMIN — OXYCODONE HYDROCHLORIDE 10 MG: 10 TABLET ORAL at 01:03

## 2024-03-09 RX ADMIN — ONDANSETRON 4 MG: 2 INJECTION INTRAMUSCULAR; INTRAVENOUS at 11:03

## 2024-03-09 RX ADMIN — FENTANYL CITRATE 25 MCG: 50 INJECTION, SOLUTION INTRAMUSCULAR; INTRAVENOUS at 11:03

## 2024-03-09 RX ADMIN — THIAMINE HCL TAB 100 MG 100 MG: 100 TAB at 03:03

## 2024-03-09 RX ADMIN — HYDROMORPHONE HYDROCHLORIDE 1 MG: 1 INJECTION, SOLUTION INTRAMUSCULAR; INTRAVENOUS; SUBCUTANEOUS at 06:03

## 2024-03-09 RX ADMIN — FENTANYL CITRATE 25 MCG: 50 INJECTION, SOLUTION INTRAMUSCULAR; INTRAVENOUS at 12:03

## 2024-03-09 RX ADMIN — DOCUSATE SODIUM AND SENNOSIDES 1 TABLET: 8.6; 5 TABLET, FILM COATED ORAL at 03:03

## 2024-03-09 RX ADMIN — MIDAZOLAM HYDROCHLORIDE 2 MG: 1 INJECTION, SOLUTION INTRAMUSCULAR; INTRAVENOUS at 11:03

## 2024-03-09 NOTE — ANESTHESIA PREPROCEDURE EVALUATION
03/09/2024  Sampson Holt is a 68 y.o., male.      Pre-op Assessment    I have reviewed the Patient Summary Reports.     I have reviewed the Nursing Notes. I have reviewed the NPO Status.   I have reviewed the Medications.     Review of Systems  Anesthesia Hx:  No problems with previous Anesthesia                Social:  Smoker, Alcohol Use       Cardiovascular:     Hypertension  Past MI CAD   CABG/stent Dysrhythmias   CHF    hyperlipidemia    Angiogram 10/23 -   Patent stents x 2  No further intervention  EF - 60%    Coronary Artery Disease:               Congestive Heart Failure (CHF)                Hypertension     Disorder of Cardiac Rhythm     Pulmonary:   COPD         Chronic Obstructive Pulmonary Disease (COPD):                      Hepatic/GI:      Liver Disease,         Liver Disease        Neurological:   CVA                       CVA - Cerebrovasular Accident                 Endocrine:  Diabetes    Diabetes                          Physical Exam  General: Well nourished    Airway:  Mallampati: II   Mouth Opening: Normal  TM Distance: Normal  Neck ROM: Normal ROM        Anesthesia Plan  Type of Anesthesia, risks & benefits discussed:    Anesthesia Type: Gen ETT, Gen Supraglottic Airway  Intra-op Monitoring Plan: Standard ASA Monitors  Post Op Pain Control Plan: multimodal analgesia  Induction:  IV  Airway Plan: Video  Informed Consent: Informed consent signed with the Patient and all parties understand the risks and agree with anesthesia plan.  All questions answered.   ASA Score: 3 Emergent    Ready For Surgery From Anesthesia Perspective.     .

## 2024-03-09 NOTE — PT/OT/SLP PROGRESS
Occupational Therapy      Patient Name:  Sampson Holt   MRN:  [id number]    Patient not seen today secondary to Other (Comment) (Pt with femur fracture and to have surgery today. Will require new OT orders post op as appropriate.).    3/9/2024

## 2024-03-09 NOTE — HOSPITAL COURSE
Sampson Holt is a 68 year old male with a past medical history of HTN, DM, COPD, HLD, tobacco use, EtOH abuse, THC use, CAD and BPH who presented with a left hip fracture after a fall. Orthopedic Surgery took the patient to the OR 3/9 for IMN placement. He is on ASA 81 BID. PT/OT has been consulted.  Patient accepted to skilled nursing.

## 2024-03-09 NOTE — CARE UPDATE
03/09/24 0905   Tobacco Cessation Intervention   Do you use any type of tobacco product? No   Respiratory Evaluation   $ Care Plan Tech Time 15 min   Evaluation For New Orders   Admitting Diagnosis Hip Fracture   Home Oxygen   Has Home Oxygen? No   Oxygen Care Plan   Oxygen Care Plan Per Protocol   SPO2 Goal (%) 92% non-cardiac   Bronchodilator Care Plan   Bronchodilator Care Plan Aerosol   Aerosol Meds w/ frequency Albuterol - Ipratropium (DUO-NEB) 0.5mg-3mg(2.5ml) 3ml Nebulizer Solution2.5mg Q 6Hr   Rationale Shortness of breath (increased WOB)   Atelectasis Care Plan   Atelectasis Care Plan Incentive Spiromentry   Frequency TID   I.S. Goal (ml) 2200 ml   Rationale Other   Airway Clearance Care Plan   Rationale No rationale found

## 2024-03-09 NOTE — ASSESSMENT & PLAN NOTE
Patient with known CAD s/p stent placement, which is controlled   Will hold ASA and Plavix  to be resumed when medically appropriate after surgery   monitor for S/Sx of angina/ACS.   Continue to monitor on telemetry.

## 2024-03-09 NOTE — CARE UPDATE
03/09/24 0715   Patient Assessment/Suction   Level of Consciousness (AVPU) alert   Respiratory Effort Normal;Unlabored   Expansion/Accessory Muscles/Retractions no use of accessory muscles;no retractions;expansion symmetric   All Lung Fields Breath Sounds diminished   Rhythm/Pattern, Respiratory unlabored;pattern regular;depth regular;no shortness of breath reported   PRE-TX-O2   Device (Oxygen Therapy) room air   SpO2 (!) 91 %  (patient refused O2 at this time)   Pulse Oximetry Type Intermittent   $ Pulse Oximetry - Multiple Charge Pulse Oximetry - Multiple   Pulse 70   Resp 18   Positioning Right side   Aerosol Therapy   $ Aerosol Therapy Charges PRN treatment not required   Education   $ Education Bronchodilator;15 min

## 2024-03-09 NOTE — PROGRESS NOTES
"[facility] Medicine  Progress Note    Patient Name: Sampson Holt  MRN: [id number]  Patient Class: IP- Inpatient   Admission Date: 3/8/2024  Length of Stay: 1 days  Attending Physician: Iron Pereira MD  Primary Care Provider: Damian Vergara MD        Subjective:     Principal Problem:Closed intertrochanteric fracture of hip, left, initial encounter        HPI:  Sampson Holt is a 68 year old male with a previous medical history of CHF, DM II with long term use of insulin, Emphysema, Endocarditis, CAD with stent placement on daily ASA and plavix, Stroke, HLD, and hypertension who presents for left hip pain after trip and fall. Patient reports he was carrying gumbo and tripped over a box landing on his left side. Denies striking head or LOC. Initial ED imaging showed Intertrochanteric left femoral fracture for which Dr. Hdez was consulted and reports probably procedure tomorrow and to keep patient NPO at midnight . CBC and CMP unremarkable. Upon evaluation patient noted to still be having severe left hip pain so an additional one time dose of IV dilaudid was ordered. +2 bilateral pedal pulses. Patient unable to assist in full medication reconciliation but does endorse he is compliant with taking his aspirin and plavix daily last taken on 3/8/24.  Patient to be admitted by hospital medicine for further evaluation and management.     Overview/Hospital Course:  Sampson Holt is a 68 year old male with a past medical history of HTN, DM, COPD, HLD, tobacco use, EtOH abuse, THC use, CAD and BPH who presented with a left hip fracture after a fall. Orthopedic Surgery took the patient to the OR 3/9 for IMN placement. He is on ASA 81 BID. PT/OT has been consulted.    Interval History: see "Hospital Course"    Review of Systems   Musculoskeletal:  Positive for arthralgias, gait problem and myalgias.   Skin:  Positive for wound.     Objective:     Vital Signs (Most Recent):  Temp: 97.8 " °F (36.6 °C) (03/09/24 1535)  Pulse: 86 (03/09/24 1535)  Resp: 18 (03/09/24 1547)  BP: 118/61 (03/09/24 1535)  SpO2: (!) 93 % (03/09/24 1535) Vital Signs (24h Range):  Temp:  [97.3 °F (36.3 °C)-98.7 °F (37.1 °C)] 97.8 °F (36.6 °C)  Pulse:  [68-95] 86  Resp:  [10-20] 18  SpO2:  [90 %-99 %] 93 %  BP: (116-195)/(59-94) 118/61     Weight: 92.2 kg (203 lb 4.2 oz)  Body mass index is 26.82 kg/m².    Intake/Output Summary (Last 24 hours) at 3/9/2024 1658  Last data filed at 3/9/2024 1242  Gross per 24 hour   Intake 1150 ml   Output 20 ml   Net 1130 ml         Physical Exam  Vitals and nursing note reviewed.   Constitutional:       General: He is not in acute distress.  HENT:      Head: Normocephalic and atraumatic.      Right Ear: External ear normal.      Left Ear: External ear normal.      Nose: Nose normal.      Mouth/Throat:      Mouth: Mucous membranes are moist.      Pharynx: Oropharynx is clear.   Eyes:      Extraocular Movements: Extraocular movements intact.      Conjunctiva/sclera: Conjunctivae normal.   Cardiovascular:      Rate and Rhythm: Normal rate and regular rhythm.      Pulses: Normal pulses.      Heart sounds: Normal heart sounds.   Pulmonary:      Effort: Pulmonary effort is normal.      Breath sounds: Normal breath sounds.   Abdominal:      General: Bowel sounds are normal.      Palpations: Abdomen is soft.   Musculoskeletal:      Cervical back: Normal range of motion and neck supple.      Comments: L hip dressing clean, dry and intact.   Skin:     General: Skin is warm and dry.   Neurological:      Motor: Weakness present.   Psychiatric:         Behavior: Behavior normal.             Significant Labs: All pertinent labs within the past 24 hours have been reviewed.    Significant Imaging: I have reviewed all pertinent imaging results/findings within the past 24 hours.    Assessment/Plan:      * Closed intertrochanteric fracture of hip, left, initial encounter  -IMN 3/9 per Dr. Hdez  -Fall  precautions  -PT/OT  -PRN analgesics  -ASA 81 BID  -Incentive spirometry      Tetrahydrocannabinol (THC) dependence  -Patient to be counseled on cessation      ETOH abuse  -Thiamine, folic acid and MVI  -CIWA Q4H  -Patient to be counseled on cessation      Hyperlipidemia  -Statin      CAD (coronary artery disease)  -ASA  -Statin  -Coreg  -Telemetry    Cigarette smoker  -Patient to be counseled on cessation          Type 2 diabetes mellitus with circulatory disorder, with long-term current use of insulin  Patient's FSGs are controlled on current medication regimen. He reports he takes 35 units of Lantus subQ BID  Last A1c reviewed-   Lab Results   Component Value Date    HGBA1C 6.1 03/08/2024     Most recent fingerstick glucose reviewed-   Recent Labs   Lab 03/08/24  1749 03/08/24  1921 03/08/24  2040 03/09/24  0748   POCTGLUCOSE 64* 162* 172* 100       Current correctional scale  Medium  Maintain anti-hyperglycemic dose as follows-   Antihyperglycemics (From admission, onward)      Start     Stop Route Frequency Ordered    03/08/24 1822  insulin aspart U-100 pen 0-10 Units         -- SubQ Before meals & nightly PRN 03/08/24 1731          Hold Oral hypoglycemics while patient is in the hospital.       COPD (chronic obstructive pulmonary disease)  -PRN Duonebs    Benign essential HTN  -Coreg  -Nitrate   -Continue to monitor      VTE Risk Mitigation (From admission, onward)           Ordered     IP VTE LOW RISK PATIENT  Once         03/08/24 1731     Place sequential compression device  Until discontinued         03/08/24 1731                    Discharge Planning   DAVID:      Code Status: Full Code   Is the patient medically ready for discharge?:     Reason for patient still in hospital (select all that apply): Patient trending condition and PT / OT recommendations                     Iron Pereira MD  Department of Hospital Medicine   Former[facility] - Med/Surg

## 2024-03-09 NOTE — ASSESSMENT & PLAN NOTE
Dangers of cigarette smoking were reviewed with patient in detail for 10 minutes and patient was encouraged to quit. Nicotine replacement options were discussed.  Nicotine replacement was not prescribed per patient request. Patient reports that he cannot use nicotine patches because they cause nightmares. Reports that only po valium helps with nicotine cravings.

## 2024-03-09 NOTE — OP NOTE
OPERATIVE NOTE    Date of Surgery:  03/09/2024     Preoperative Diagnosis: Left intertrochanteric femur fracture  Postoperative Diagnosis: Left intertrochanteric femur fracture    Procedure:   Left hip intramedullary nailing     Surgeon:   Tarun Hdez M.D.    ASSISTANT:    Karlene Khanna _ [facility]     She was present and scrubbed for the entirety of the procedure. She was required for patient positioning, retraction, insertion of implants and closure. Without her I would have been unable to safely perform the case due to only one scrub tech available.     Anesthesia:   General    EBL:    75 cc    Implants:   Synthes Short TFN size 11, 100 mm lag screw     Specimens:   None    Findings:   Stable 2 part IT fx    Dispo:    To PACU extubated/stable      Indications for Procedure:    The patient is a 68 y.o. male who sustained a ground level fall resulting in a left intertrochanteric femur fracture.  We are proceeding to the operating room for cephalomedullary nail fixation to improve function and pain.  The risks, benefits and alternatives to surgery were discussed with the patient and family at length prior to going to the operating room.  Informed consent was obtained for fixation.  We specifically discussed the risks of surgery including malunion, nonunion, cut-out, failure of fixation, as well as anesthetic risks and the 30% mortality risk within one year of surgery.The patient was optimized by Internal Medicine prior to going to the operating room.     Procedure in Detail:    The patient was identified in the preoperative holding area and the site was marked. Patient was wheeled into the operating room and general endotracheal anesthesia was induced on the patient's hospital bed.  Preoperative antibiotics were administered.  The patient was placed onto the [place] fracture table with all bony prominences well padded and both lower extremities in traction boots.  A time-out was undertaken to confirm patient, side,  site, surgery, surgeon and the administration of preoperative antibiotics.  All agreed and we proceeded.    Closed reduction maneuvers were performed on the table gaining good alignment.  The left hip and lower extremity were prepped and draped in sterile fashion.  A starting incision was made proximal to the greater trochanter and the guidewire for the entry reamer was placed in the appropriate position.  Entry reamer was then used. The size 11 short TFN was then inserted into the canal and impacted into the appropriate position using fluoroscopic guidance. An incision was then made distal and lateral and the IT band incised. The cannula was then inserted on the lateral cortex and the guidewire placed center-center into the femoral head and this was checked on the AP and lateral view to verify position. This was measured at 100. A step reamer was then used and the size 100mm lag screw was then inserted into the head gaining good compression through the insertion handle.      Attention was then turned distally and a single distal interlocking screw was placed without difficulty through the guide.  Final X-rays were then taken to ensure reduction and stability. The wounds were then copiously irrigated with normal saline solution and the wounds closed with 0 Vicryl suture in the fascia, and all wounds closed with 2-0 Vicryl suture in the subcutaneous tissue, followed by staples. Sterile dressings were applied.    All instrument and sponge counts were reported correct at the end of the case.  There were no complications.  The patient was returned to the hospital bed, extubated, awakened and taken to the recovery room in stable condition.    Disposition:     Transfer back to floor for medical management   Initiate Immediate physical and occupational therapy.  Weight bearing status: WBAT  Multimodal pain management limiting narcotics.  Anticoagulation x1 month.    Follow up in my clinic in 2-3 weeks with X-rays of the  hip and removal of joyce       Tarun Hdez MD

## 2024-03-09 NOTE — HPI
Sampson Holt is a 68 year old male with a previous medical history of CHF, DM II with long term use of insulin, Emphysema, Endocarditis, CAD with stent placement on daily ASA and plavix, Stroke, HLD, and hypertension who presents for left hip pain after trip and fall. Patient reports he was carrying gumbo and tripped over a box landing on his left side. Denies striking head or LOC. Initial ED imaging showed Intertrochanteric left femoral fracture for which Dr. Hdez was consulted and reports probably procedure tomorrow and to keep patient NPO at midnight . CBC and CMP unremarkable. Upon evaluation patient noted to still be having severe left hip pain so an additional one time dose of IV dilaudid was ordered. +2 bilateral pedal pulses. Patient unable to assist in full medication reconciliation but does endorse he is compliant with taking his aspirin and plavix daily last taken on 3/8/24.  Patient to be admitted by hospital medicine for further evaluation and management.

## 2024-03-09 NOTE — ANESTHESIA PROCEDURE NOTES
Intubation    Date/Time: 3/9/2024 11:20 AM    Performed by: Naeem Centeno CRNA  Authorized by: Hermes Kamara MD    Intubation:     Induction:  Intravenous    Intubated:  Postinduction    Mask Ventilation:  Easy mask    Attempts:  1    Attempted By:  CRNA    Difficult Airway Encountered?: No      Complications:  None    Airway Device:  Supraglottic airway/LMA    Airway Device Size:  4.0    Style/Cuff Inflation:  Cuffed (inflated to minimal occlusive pressure)    Secured at:  The lips    Placement Verified By:  Capnometry    Complicating Factors:  None

## 2024-03-09 NOTE — ANESTHESIA POSTPROCEDURE EVALUATION
Anesthesia Post Evaluation    Patient: Sampson Holt    Procedure(s) Performed: Procedure(s) (LRB):  INSERTION, INTRAMEDULLARY HUI, FEMUR (Left)    Final Anesthesia Type: general      Patient location during evaluation: PACU  Patient participation: Yes- Able to Participate  Level of consciousness: awake  Post-procedure vital signs: reviewed and stable  Pain management: adequate  Airway patency: patent    PONV status at discharge: No PONV  Anesthetic complications: no      Cardiovascular status: blood pressure returned to baseline  Respiratory status: unassisted  Hydration status: euvolemic  Follow-up not needed.              Vitals Value Taken Time   /83 03/09/24 1330   Temp 36.7 °C (98.1 °F) 03/09/24 1330   Pulse 83 03/09/24 1330   Resp 15 03/09/24 1330   SpO2 98 % 03/09/24 1330         Event Time   Out of Recovery 03/09/2024 13:25:00         Pain/Tuyet Score: Pain Rating Prior to Med Admin: 6 (3/9/2024  1:05 PM)  Pain Rating Post Med Admin: 4 (3/9/2024  1:15 PM)  Tuyet Score: 8 (3/9/2024  1:15 PM)

## 2024-03-09 NOTE — CONSULTS
Patient ID: Sampson Holt is a 68 y.o. male    Chief Complaint: left hip pain     History of Present Illness:    Sampson Holt is a pleasant 68 y.o. male who presented to the ED with a chief complaint of left hip pain. This occurred after a fall from standing height. Patient was taken to the ED and X-rays revealed a left intertrochanteric femur fx. He was then admitted to the medicine service for optimization with orthopedic consultation.     Social Status: lives alone  Ambulatory Status: community ambulator  Pertinent Surgical/Medical Hx: recent MI on plavix, CHF, DM, CVA     PAST MEDICAL HISTORY:   Past Medical History:   Diagnosis Date    Abdominal pain 2022    CHF (congestive heart failure)     Diabetes mellitus 2018    Emphysema lung     Endocarditis 2011    Hypertension     Stroke 2011    denies residual     PAST SURGICAL HISTORY:   Past Surgical History:   Procedure Laterality Date    ANGIOGRAM, CORONARY, WITH LEFT HEART CATHETERIZATION N/A 10/10/2023    Procedure: Angiogram, Coronary, with Left Heart Cath;  Surgeon: Dieudonne Ryan MD;  Location: [facility] CATH/EP LAB;  Service: Cardiology;  Laterality: N/A;    BACK SURGERY  1981    COLONOSCOPY N/A 2/23/2023    Procedure: COLONOSCOPY;  Surgeon: Jonn Cruz MD;  Location: [facility] ENDO;  Service: Endoscopy;  Laterality: N/A;    CORONARY ANGIOGRAPHY N/A 5/9/2023    Procedure: ANGIOGRAM, CORONARY ARTERY;  Surgeon: Antonio Kessler MD;  Location: [facility] CATH/EP LAB;  Service: Cardiology;  Laterality: N/A;    EXCISION OF HYDROCELE      x2    FRACTIONAL FLOW RESERVE (FFR), CORONARY  5/11/2023    Procedure: Fractional Flow [place] (FFR), Coronary;  Surgeon: Antonio Kessler MD;  Location: [facility] CATH/EP LAB;  Service: Cardiology;;    INGUINAL HERNIA REPAIR  1960    INSTANTANEOUS WAVE-FREE RATIO (IFR) N/A 10/10/2023    Procedure: Instantaneous Wave-Free Ratio (IFR);  Surgeon: Dieudonne Ryan MD;  Location: [facility] CATH/EP LAB;  Service: Cardiology;  Laterality:  N/A;    INTRAVASCULAR ULTRASOUND, CORONARY N/A 5/9/2023    Procedure: Ultrasound-coronary;  Surgeon: Antonio Kessler MD;  Location: [facility] CATH/EP LAB;  Service: Cardiology;  Laterality: N/A;    IVUS, CORONARY  5/11/2023    Procedure: IVUS, Coronary;  Surgeon: Antonio Kessler MD;  Location: [facility] CATH/EP LAB;  Service: Cardiology;;    LEFT HEART CATHETERIZATION Left 5/11/2023    Procedure: Left heart cath;  Surgeon: Antonio Kessler MD;  Location: [facility] CATH/EP LAB;  Service: Cardiology;  Laterality: Left;    PERCUTANEOUS TRANSLUMINAL BALLOON ANGIOPLASTY OF CORONARY ARTERY  5/9/2023    Procedure: Angioplasty-coronary;  Surgeon: Antonio Kessler MD;  Location: [facility] CATH/EP LAB;  Service: Cardiology;;    RHINOPLASTY      STENT, DRUG ELUTING, SINGLE VESSEL, CORONARY  5/9/2023    Procedure: Stent, Drug Eluting, Single Vessel, Coronary;  Surgeon: Antonio Kessler MD;  Location: [facility] CATH/EP LAB;  Service: Cardiology;;    SURGICAL REMOVAL OF NODULE OF VOCAL CORD       FAMILY HISTORY: No family history on file.  SOCIAL HISTORY:   Social History     Occupational History    Not on file   Tobacco Use    Smoking status: Every Day     Types: Cigarettes    Smokeless tobacco: Former    Tobacco comments:     Pt ready to quit smoking and states he can on his own. Seen 10/9/23 for inpatient smoking cessation. [employer] Member.     Occasionally   Substance and Sexual Activity    Alcohol use: Yes     Alcohol/week: 14.0 standard drinks of alcohol     Types: 14 Shots of liquor per week    Drug use: Yes     Types: Marijuana    Sexual activity: Not on file        MEDICATIONS: Reviewed per Epic   ALLERGIES:   Review of patient's allergies indicates:   Allergen Reactions    Amoxicillin Shortness Of Breath and Edema       Review of Systems:  Constitutional: no fever or chills  Eyes: no visual changes  ENT: no nasal congestion or sore throat  Respiratory: no cough or shortness of breath  Cardiovascular: no chest pain  Gastrointestinal: no nausea or  vomiting, tolerating diet  Musculoskeletal: pain and soreness  All others negative        Physical Exam     Vitals:    24 0955   BP: (!) 194/94   Pulse: 88   Resp: 18   Temp: 97.3 °F (36.3 °C)     Alert and oriented to person, place and time. No acute distress. Well-groomed, not ill appearing. Pupils round and reactive, normal respiratory effort, no audible wheezing.     Hip Exam    Global tenderness to palpation of the left hip  + Logroll  Leg held in shortened/externally rotated position  No evidence of open fracture, no masses/lesions/surgical scars  Neurovascularly intact: intact peroneal, tibial, sciatic nerve function   Palpable distal pulses  Compartments soft and compressible  Warm well perfused extremity, brisk cap refill to toes       Imagin view left Hip X-rays ordered/reviewed by me showing 2 part intertrochanteric femur fx       Assessment    68 y.o. male with   Left intertrochanteric femur fx    Plan    --Admit to Internal Medicine service for pre-operative clearance and medical evaluation.   --To OR today for operative fixation of left hip with intramedullary nail  --Written and verbal consent obtained from patient  --Pre-operative labs and imaging reviewed by me   --Bed rest, mann, NPO     Risks and complications were discussed including but not limited to the risks of anesthetic complications, infection, wound healing complications, non-union, mal-union, hardware failure, pain, stiffness, DVT, pulmonary embolism, perioperative medical risks (cardiac, pulmonary, renal, neurologic), and death among others were discussed. No guarantees were made and the patient and family elect to proceed. They fully understand the reported 30% mortality risk within the first year of surgery.

## 2024-03-09 NOTE — TRANSFER OF CARE
"Anesthesia Transfer of Care Note    Patient: Sampson Holt    Procedure(s) Performed: Procedure(s) (LRB):  INSERTION, INTRAMEDULLARY HUI, FEMUR (Left)    Patient location: PACU    Anesthesia Type: general    Transport from OR: Transported from OR on 2-3 L/min O2 by NC with adequate spontaneous ventilation    Post pain: adequate analgesia    Post assessment: no apparent anesthetic complications and tolerated procedure well    Post vital signs: stable    Level of consciousness: sedated and responds to stimulation    Nausea/Vomiting: no nausea/vomiting    Complications: none    Transfer of care protocol was followed      Last vitals: Visit Vitals  BP (!) 195/94   Pulse 78   Temp 36.3 °C (97.3 °F) (Temporal)   Resp 18   Ht 6' 1" (1.854 m)   Wt 92.2 kg (203 lb 4.2 oz)   SpO2 97%   BMI 26.82 kg/m²     "

## 2024-03-09 NOTE — ASSESSMENT & PLAN NOTE
Patient's FSGs are controlled on current medication regimen. He reports he takes 35 units of Lantus subQ BID  Last A1c reviewed-   Lab Results   Component Value Date    HGBA1C 6.1 03/08/2024     Most recent fingerstick glucose reviewed-   Recent Labs   Lab 03/08/24  1749 03/08/24  1921 03/08/24  2040 03/09/24  0748   POCTGLUCOSE 64* 162* 172* 100       Current correctional scale  Medium  Maintain anti-hyperglycemic dose as follows-   Antihyperglycemics (From admission, onward)    Start     Stop Route Frequency Ordered    03/08/24 1822  insulin aspart U-100 pen 0-10 Units         -- SubQ Before meals & nightly PRN 03/08/24 1731        Hold Oral hypoglycemics while patient is in the hospital.

## 2024-03-09 NOTE — NURSING
Nurses Note -- 4 Eyes      3/8/2024   8:37 PM      Skin assessed during: Admit      [x] No Altered Skin Integrity Present    []Prevention Measures Documented      [] Yes- Altered Skin Integrity Present or Discovered   [] LDA Added if Not in Epic (Describe Wound)   [] New Altered Skin Integrity was Present on Admit and Documented in LDA   [] Wound Image Taken    Wound Care Consulted? No    Attending Nurse:  ALF Blackmon    Second RN/Staff Member:   Joby Parra RN

## 2024-03-09 NOTE — NURSING
Pt did not allow this RN or PCT to perform CHG wipe down on back. Allowed CHG wipe down on rest of body.   Pt unaware of PTA medications and pt stated he does not have anyone that can bring in a medication list or give information on home pharmacy at this time.    Pain an issue overnight. 1 time dose of IV dilaudid ordered x2 per ABRAHAN Khan. PRN PO benadryl ordered after pt c/o of itching after receiving IV morphine.

## 2024-03-09 NOTE — BRIEF OP NOTE
[facility] Med/Surg  Brief Operative Note    SUMMARY     Surgery Date: 3/9/2024     Surgeon(s) and Role:     * Tarun Hdez MD - Primary    Assisting Surgeon: None    Pre-op Diagnosis:  Closed intertrochanteric fracture of hip, left, initial encounter [S72.142A]    Post-op Diagnosis:  Post-Op Diagnosis Codes:     * Closed intertrochanteric fracture of hip, left, initial encounter [S72.142A]    Procedure(s) (LRB):  INSERTION, INTRAMEDULLARY HUI, FEMUR (Left)    Anesthesia: General    Implants:  Implant Name Type Inv. Item Serial No.  Lot No. LRB No. Used Action   SCREW TFN ADVANCE 100MM - [id number]  SCREW TFN ADVANCE 100MM  [employer] [id number] Left 1 Implanted   NAIL IM HUMAIRA 130 DEG [id number] - [id number]  NAIL IM HUMAIRA 130 DEG [id number]  [employer] [id number] Left 1 Implanted   SCREW XL25 IM NAIL 38X5MM - [id number]  SCREW XL25 IM NAIL 38X5MM  [employer] [id number] Left 1 Implanted       Operative Findings: left 2 part IT fx    Estimated Blood Loss: * No values recorded between 3/9/2024 11:45 AM and 3/9/2024 12:16 PM *    Estimated Blood Loss has been documented.         Specimens:   Specimen (24h ago, onward)      None            [id number]

## 2024-03-09 NOTE — ASSESSMENT & PLAN NOTE
Patient's FSGs are controlled on current medication regimen. He reports he takes 35 units of Lantus subQ BID  Last A1c reviewed-   Lab Results   Component Value Date    HGBA1C 6.0 08/11/2023     Most recent fingerstick glucose reviewed-   Recent Labs   Lab 03/08/24  1749   POCTGLUCOSE 64*     Current correctional scale  Medium  Maintain anti-hyperglycemic dose as follows-   Antihyperglycemics (From admission, onward)      Start     Stop Route Frequency Ordered    03/08/24 1822  insulin aspart U-100 pen 0-10 Units         -- SubQ Before meals & nightly PRN 03/08/24 1731          Hold Oral hypoglycemics while patient is in the hospital.

## 2024-03-09 NOTE — PT/OT/SLP PROGRESS
Physical Therapy      Patient Name:  Sampson Holt   MRN:  [id number]    Patient not seen today secondary to Bedrest, Therapist assessment, Other (Comment) (pt with new hip fracture, no ortho notes. will need new consult after surgery.). Will follow-up post op when new orders received from ortho.

## 2024-03-09 NOTE — ASSESSMENT & PLAN NOTE
-IMN 3/9 per Dr. Hdez  -Fall precautions  -PT/OT  -PRN analgesics  -ASA 81 BID  -Incentive spirometry

## 2024-03-09 NOTE — ASSESSMENT & PLAN NOTE
intertrochanteric left femoral fracture, +2 pedal pulses      -Orthopedic Surgery consulted  -NPO at midnight  -Aspirin and Plavix are currently on hold: please restart when medically appropriate  -Neurovascular checks  -Type and screen  -Coagulation studies ordered  -CBC, CMP, daily  -Magnesium checked upon admission  -Chest xray  -EKG  -Urinalysis  -Pain and nausea control  -bed rest with fall precautions

## 2024-03-09 NOTE — SUBJECTIVE & OBJECTIVE
"Interval History: see "Hospital Course"    Review of Systems   Musculoskeletal:  Positive for arthralgias, gait problem and myalgias.   Skin:  Positive for wound.     Objective:     Vital Signs (Most Recent):  Temp: 97.8 °F (36.6 °C) (03/09/24 1535)  Pulse: 86 (03/09/24 1535)  Resp: 18 (03/09/24 1547)  BP: 118/61 (03/09/24 1535)  SpO2: (!) 93 % (03/09/24 1535) Vital Signs (24h Range):  Temp:  [97.3 °F (36.3 °C)-98.7 °F (37.1 °C)] 97.8 °F (36.6 °C)  Pulse:  [68-95] 86  Resp:  [10-20] 18  SpO2:  [90 %-99 %] 93 %  BP: (116-195)/(59-94) 118/61     Weight: 92.2 kg (203 lb 4.2 oz)  Body mass index is 26.82 kg/m².    Intake/Output Summary (Last 24 hours) at 3/9/2024 1658  Last data filed at 3/9/2024 1242  Gross per 24 hour   Intake 1150 ml   Output 20 ml   Net 1130 ml         Physical Exam  Vitals and nursing note reviewed.   Constitutional:       General: He is not in acute distress.  HENT:      Head: Normocephalic and atraumatic.      Right Ear: External ear normal.      Left Ear: External ear normal.      Nose: Nose normal.      Mouth/Throat:      Mouth: Mucous membranes are moist.      Pharynx: Oropharynx is clear.   Eyes:      Extraocular Movements: Extraocular movements intact.      Conjunctiva/sclera: Conjunctivae normal.   Cardiovascular:      Rate and Rhythm: Normal rate and regular rhythm.      Pulses: Normal pulses.      Heart sounds: Normal heart sounds.   Pulmonary:      Effort: Pulmonary effort is normal.      Breath sounds: Normal breath sounds.   Abdominal:      General: Bowel sounds are normal.      Palpations: Abdomen is soft.   Musculoskeletal:      Cervical back: Normal range of motion and neck supple.      Comments: L hip dressing clean, dry and intact.   Skin:     General: Skin is warm and dry.   Neurological:      Motor: Weakness present.   Psychiatric:         Behavior: Behavior normal.             Significant Labs: All pertinent labs within the past 24 hours have been reviewed.    Significant " Imaging: I have reviewed all pertinent imaging results/findings within the past 24 hours.

## 2024-03-09 NOTE — H&P
[facility]  History & Physical    Patient Name: Sampson Holt  MRN: [id number]  Patient Class: IP- Inpatient  Admission Date: 3/8/2024  Attending Physician: Iron Pereira MD   Primary Care Provider: Damian Vergara MD         Patient information was obtained from patient, past medical records, and ER records.     Subjective:     Principal Problem:Fracture of left hip    Chief Complaint:   Chief Complaint   Patient presents with    Fall     Pt brought to ED via EMS after a fall at work.  Pt complains of left leg and hip pain.          HPI: Sampson Holt is a 68 year old male with a previous medical history of CHF, DM II with long term use of insulin, Emphysema, Endocarditis, CAD with stent placement on daily ASA and plavix, Stroke, HLD, and hypertension who presents for left hip pain after trip and fall. Patient reports he was carrying gumbo and tripped over a box landing on his left side. Denies striking head or LOC. Initial ED imaging showed Intertrochanteric left femoral fracture for which Dr. Hdez was consulted and reports probably procedure tomorrow and to keep patient NPO at midnight . CBC and CMP unremarkable. Upon evaluation patient noted to still be having severe left hip pain so an additional one time dose of IV dilaudid was ordered. +2 bilateral pedal pulses. Patient unable to assist in full medication reconciliation but does endorse he is compliant with taking his aspirin and plavix daily last taken on 3/8/24.  Patient to be admitted by hospital medicine for further evaluation and management.     Past Medical History:   Diagnosis Date    Abdominal pain 2022    CHF (congestive heart failure)     Diabetes mellitus 2018    Emphysema lung     Endocarditis 2011    Hypertension     Stroke 2011    denies residual       Past Surgical History:   Procedure Laterality Date    ANGIOGRAM, CORONARY, WITH LEFT HEART CATHETERIZATION N/A 10/10/2023    Procedure: Angiogram,  Coronary, with Left Heart Cath;  Surgeon: Dieudonne Ryan MD;  Location: [facility] CATH/EP LAB;  Service: Cardiology;  Laterality: N/A;    BACK SURGERY  1981    COLONOSCOPY N/A 2/23/2023    Procedure: COLONOSCOPY;  Surgeon: Jonn Cruz MD;  Location: [facility] ENDO;  Service: Endoscopy;  Laterality: N/A;    CORONARY ANGIOGRAPHY N/A 5/9/2023    Procedure: ANGIOGRAM, CORONARY ARTERY;  Surgeon: Antonio Kessler MD;  Location: [facility] CATH/EP LAB;  Service: Cardiology;  Laterality: N/A;    EXCISION OF HYDROCELE      x2    FRACTIONAL FLOW RESERVE (FFR), CORONARY  5/11/2023    Procedure: Fractional Flow [place] (FFR), Coronary;  Surgeon: Antonio Kessler MD;  Location: [facility] CATH/EP LAB;  Service: Cardiology;;    INGUINAL HERNIA REPAIR  1960    INSTANTANEOUS WAVE-FREE RATIO (IFR) N/A 10/10/2023    Procedure: Instantaneous Wave-Free Ratio (IFR);  Surgeon: Dieudonne Ryan MD;  Location: [facility] CATH/EP LAB;  Service: Cardiology;  Laterality: N/A;    INTRAVASCULAR ULTRASOUND, CORONARY N/A 5/9/2023    Procedure: Ultrasound-coronary;  Surgeon: Antonio Kessler MD;  Location: [facility] CATH/EP LAB;  Service: Cardiology;  Laterality: N/A;    IVUS, CORONARY  5/11/2023    Procedure: IVUS, Coronary;  Surgeon: Antonio Kessler MD;  Location: [facility] CATH/EP LAB;  Service: Cardiology;;    LEFT HEART CATHETERIZATION Left 5/11/2023    Procedure: Left heart cath;  Surgeon: Antonio Kessler MD;  Location: [facility] CATH/EP LAB;  Service: Cardiology;  Laterality: Left;    PERCUTANEOUS TRANSLUMINAL BALLOON ANGIOPLASTY OF CORONARY ARTERY  5/9/2023    Procedure: Angioplasty-coronary;  Surgeon: Antonio Kessler MD;  Location: [facility] CATH/EP LAB;  Service: Cardiology;;    RHINOPLASTY      STENT, DRUG ELUTING, SINGLE VESSEL, CORONARY  5/9/2023    Procedure: Stent, Drug Eluting, Single Vessel, Coronary;  Surgeon: Antonio Kessler MD;  Location: [facility] CATH/EP LAB;  Service: Cardiology;;    SURGICAL REMOVAL OF NODULE OF VOCAL CORD         Review of patient's allergies  indicates:   Allergen Reactions    Amoxicillin Shortness Of Breath and Edema       No current facility-administered medications on file prior to encounter.     Current Outpatient Medications on File Prior to Encounter   Medication Sig    albuterol (PROVENTIL/VENTOLIN HFA) 90 mcg/actuation inhaler Inhale 2 puffs into the lungs every 6 (six) hours as needed for Wheezing. Rescue (Patient not taking: Reported on 2/5/2024)    amLODIPine (NORVASC) 5 MG tablet Take 1 tablet (5 mg total) by mouth once daily. (Patient not taking: Reported on 2/5/2024)    aspirin (ECOTRIN) 81 MG EC tablet Take 1 tablet (81 mg total) by mouth once daily.    atorvastatin (LIPITOR) 40 MG tablet Take 1 tablet (40 mg total) by mouth once daily.    carvediloL (COREG) 3.125 MG tablet Take 3.125 mg by mouth 2 (two) times daily with meals.    clopidogreL (PLAVIX) 75 mg tablet Take 1 tablet (75 mg total) by mouth once daily.    coenzyme Q10 100 mg capsule Take 2 capsules (200 mg total) by mouth once daily. (Patient not taking: Reported on 1/29/2024)    insulin glargine,hum.rec.anlog (LANTUS SUBQ) Inject 35 Units into the skin once daily.    isosorbide mononitrate (IMDUR) 30 MG 24 hr tablet     levoFLOXacin (LEVAQUIN) 500 MG tablet Take 1 tablet (500 mg total) by mouth once daily.    losartan (COZAAR) 50 MG tablet Take 2 tablets (100 mg total) by mouth once daily. (Patient not taking: Reported on 1/29/2024)    metoprolol tartrate (LOPRESSOR) 25 MG tablet Take 25 mg by mouth 2 (two) times daily.    mirabegron (MYRBETRIQ) 50 mg Tb24 Take 1 tablet (50 mg total) by mouth every morning. Take one every morning for overactive bladder    nitroGLYCERIN (NITROSTAT) 0.4 MG SL tablet Place 1 tablet (0.4 mg total) under the tongue every 5 (five) minutes as needed for Chest pain.    ondansetron (ZOFRAN) 4 MG tablet Take 1 tablet (4 mg total) by mouth every 8 (eight) hours as needed for Nausea.    rosuvastatin (CRESTOR) 10 MG tablet Take 10 mg by mouth once daily.     tamsulosin (FLOMAX) 0.4 mg Cap Take 1 capsule (0.4 mg total) by mouth once daily. Take in evening. (Patient not taking: Reported on 1/29/2024)     Family History    None       Tobacco Use    Smoking status: Every Day     Types: Cigarettes    Smokeless tobacco: Former    Tobacco comments:     Pt ready to quit smoking and states he can on his own. Seen 10/9/23 for inpatient smoking cessation. [employer].     Occasionally   Substance and Sexual Activity    Alcohol use: Yes     Alcohol/week: 14.0 standard drinks of alcohol     Types: 14 Shots of liquor per week    Drug use: Yes     Types: Marijuana    Sexual activity: Not on file     Review of Systems   HENT:  Positive for sinus pressure.    Genitourinary:  Positive for frequency.   Musculoskeletal:  Positive for arthralgias and gait problem.   All other systems reviewed and are negative.    Objective:     Vital Signs (Most Recent):  Temp: 98.9 °F (37.2 °C) (03/08/24 1533)  Pulse: 86 (03/08/24 1747)  Resp: 18 (03/08/24 1755)  BP: (!) 160/94 (03/08/24 1703)  SpO2: 96 % (03/08/24 1747) Vital Signs (24h Range):  Temp:  [98.9 °F (37.2 °C)] 98.9 °F (37.2 °C)  Pulse:  [85-93] 86  Resp:  [17-20] 18  SpO2:  [95 %-96 %] 96 %  BP: (142-181)/() 160/94     Weight: 99.8 kg (220 lb)  Body mass index is 29.03 kg/m².     Physical Exam  Vitals reviewed.   Constitutional:       Appearance: Normal appearance.   HENT:      Head: Normocephalic.      Mouth/Throat:      Mouth: Mucous membranes are dry.      Pharynx: Oropharynx is clear.   Eyes:      Extraocular Movements: Extraocular movements intact.      Pupils: Pupils are equal, round, and reactive to light.   Cardiovascular:      Rate and Rhythm: Regular rhythm. Tachycardia present.      Pulses: Normal pulses.           Radial pulses are 2+ on the right side and 2+ on the left side.        Dorsalis pedis pulses are 2+ on the right side and 2+ on the left side.      Heart sounds: Normal heart sounds.   Pulmonary:       Effort: Pulmonary effort is normal.      Breath sounds: Normal breath sounds.   Abdominal:      General: Abdomen is protuberant. Bowel sounds are normal.      Palpations: Abdomen is soft.      Tenderness: There is no abdominal tenderness.   Musculoskeletal:         General: Tenderness present.      Cervical back: Normal range of motion and neck supple.      Right lower leg: No edema.      Left lower leg: No edema.   Skin:     General: Skin is warm and dry.      Capillary Refill: Capillary refill takes less than 2 seconds.   Neurological:      General: No focal deficit present.      Mental Status: He is alert and oriented to person, place, and time. Mental status is at baseline.      GCS: GCS eye subscore is 4. GCS verbal subscore is 5. GCS motor subscore is 6.   Psychiatric:         Attention and Perception: Attention and perception normal.         Mood and Affect: Mood is anxious.         Behavior: Behavior is cooperative.         Thought Content: Thought content normal.         Judgment: Judgment normal.              CRANIAL NERVES     CN III, IV, VI   Pupils are equal, round, and reactive to light.       Significant Labs: All pertinent labs within the past 24 hours have been reviewed.  CBC:   Recent Labs   Lab 03/08/24  1702   WBC 10.33   HGB 14.4   HCT 41.6        CMP:   Recent Labs   Lab 03/08/24  1702      K 3.5      CO2 23   GLU 72   BUN 23   CREATININE 0.9   CALCIUM 9.4   PROT 7.2   ALBUMIN 4.1   BILITOT 0.6   ALKPHOS 72   AST 24   ALT 25   ANIONGAP 12       Significant Imaging: I have reviewed all pertinent imaging results/findings within the past 24 hours.  Narrative & Impression  EXAMINATION:  XR FEMUR 2 VIEW LEFT; XR HIP WITH PELVIS WHEN PERFORMED, 2 OR 3 VIEWS LEFT     CLINICAL HISTORY:  Unspecified fall, initial encounter     TECHNIQUE:  AP and lateral views of the left femur were performed.     COMPARISON:  AP pelvis, AP left hip and cross-table lateral left hip were obtained      FINDINGS:  There is an acute, minimally displaced with very slight diastasis, intertrochanteric left femoral fracture.     A short segment of the mid femur is not visualized in 2 projections, seen on the AP but not the lateral view.  As visualized no other fractures seen.     No dislocation.     Impression:     Intertrochanteric left femoral fracture.  Please see above discussion.        Electronically signed by: Rylee Kearney MD  Date:                                            03/08/2024  Time:                                           17:37      Narrative & Impression  EXAMINATION:  XR FEMUR 2 VIEW LEFT; XR HIP WITH PELVIS WHEN PERFORMED, 2 OR 3 VIEWS LEFT     CLINICAL HISTORY:  Unspecified fall, initial encounter     TECHNIQUE:  AP and lateral views of the left femur were performed.     COMPARISON:  AP pelvis, AP left hip and cross-table lateral left hip were obtained     FINDINGS:  There is an acute, minimally displaced with very slight diastasis, intertrochanteric left femoral fracture.     A short segment of the mid femur is not visualized in 2 projections, seen on the AP but not the lateral view.  As visualized no other fractures seen.     No dislocation.     Impression:     Intertrochanteric left femoral fracture.  Please see above discussion.        Electronically signed by: Rylee Kearney MD  Date:                                            03/08/2024  Time:                                           17:37    Assessment/Plan:     *Displaced left intertrochanteric left femoral fracture      intertrochanteric left femoral fracture, +2 pedal pulses      -Orthopedic Surgery consulted  -NPO at midnight  -Aspirin and Plavix are currently on hold: please restart when medically appropriate  -Neurovascular checks  -Type and screen  -Coagulation studies ordered  -CBC, CMP, daily  -Magnesium checked upon admission  -Chest xray  -EKG  -Urinalysis  -Pain and nausea control  -bed rest with fall precautions        CAD  (coronary artery disease)  Patient with known CAD s/p stent placement, which is controlled   Will hold ASA and Plavix  to be resumed when medically appropriate after surgery   monitor for S/Sx of angina/ACS.   Continue to monitor on telemetry.     Benign essential HTN  Chronic, uncontrolled per patient. Patient unable to provide list of medications nor does he know current medications. Per note by Dr. Kessler 1/8/24 patient currently on metoprolol 25mg BID  for blood pressure control. Latest blood pressure and vitals reviewed-     Temp:  [98.9 °F (37.2 °C)]   Pulse:  [85-93]   Resp:  [17-20]   BP: (142-181)/()   SpO2:  [95 %-96 %] .   Home meds for hypertension were reviewed and noted below.   Hypertension Medications                                   metoprolol tartrate (LOPRESSOR) 25 MG tablet Take 25 mg by mouth 2 (two) times daily.                 While in the hospital, will manage blood pressure as follows; Adjust home antihypertensive regimen as follows- continue metoprolol BID.     Will utilize p.r.n. blood pressure medication only if patient's blood pressure greater than 160/100 and he develops symptoms such as worsening chest pain or shortness of breath.    Type 2 diabetes mellitus with circulatory disorder, with long-term current use of insulin  Patient's FSGs are controlled on current medication regimen. He reports he takes 35 units of Lantus subQ BID  Last A1c reviewed-   Lab Results   Component Value Date    HGBA1C 6.0 08/11/2023     Most recent fingerstick glucose reviewed-   Recent Labs   Lab 03/08/24  1749   POCTGLUCOSE 64*     Current correctional scale  Medium  Maintain anti-hyperglycemic dose as follows-   Antihyperglycemics (From admission, onward)      Start     Stop Route Frequency Ordered    03/08/24 1822  insulin aspart U-100 pen 0-10 Units         -- SubQ Before meals & nightly PRN 03/08/24 1731          Hold Oral hypoglycemics while patient is in the hospital.       COPD (chronic  obstructive pulmonary disease)  Patient's COPD is controlled currently.  Patient is currently off COPD Pathway. Continue scheduled inhalers Supplemental oxygen and monitor respiratory status closely.    Hyperlipidemia  Chronic condition noted. Continue statin      Cigarette smoker  Dangers of cigarette smoking were reviewed with patient in detail for 10 minutes and patient was encouraged to quit. Nicotine replacement options were discussed.  Nicotine replacement was not prescribed per patient request. Patient reports that he cannot use nicotine patches because they cause nightmares. Reports that only po valium helps with nicotine cravings.               VTE Risk Mitigation (From admission, onward)           Ordered     IP VTE LOW RISK PATIENT  Once         03/08/24 1731     Place sequential compression device  Until discontinued         03/08/24 1731                                    Temitope Chan NP  Department of Hospital Medicine  [facility]

## 2024-03-09 NOTE — ASSESSMENT & PLAN NOTE
Patient's COPD is controlled currently.  Patient is currently off COPD Pathway. Continue scheduled inhalers Supplemental oxygen and monitor respiratory status closely.

## 2024-03-09 NOTE — ASSESSMENT & PLAN NOTE
Chronic, uncontrolled per patient. Patient unable to provide list of medications nor does he know current medications. Per note by Dr. Kessler 1/8/24 patient currently on metoprolol 25mg BID  for blood pressure control. Latest blood pressure and vitals reviewed-     Temp:  [98.9 °F (37.2 °C)]   Pulse:  [85-93]   Resp:  [17-20]   BP: (142-181)/()   SpO2:  [95 %-96 %] .   Home meds for hypertension were reviewed and noted below.   Hypertension Medications                                   metoprolol tartrate (LOPRESSOR) 25 MG tablet Take 25 mg by mouth 2 (two) times daily.                 While in the hospital, will manage blood pressure as follows; Adjust home antihypertensive regimen as follows- continue metoprolol BID.     Will utilize p.r.n. blood pressure medication only if patient's blood pressure greater than 160/100 and he develops symptoms such as worsening chest pain or shortness of breath.

## 2024-03-10 PROBLEM — J01.90 ACUTE SINUSITIS: Status: ACTIVE | Noted: 2024-03-10

## 2024-03-10 LAB
ALBUMIN SERPL BCP-MCNC: 3.3 G/DL (ref 3.5–5.2)
ALP SERPL-CCNC: 58 U/L (ref 55–135)
ALT SERPL W/O P-5'-P-CCNC: 19 U/L (ref 10–44)
ANION GAP SERPL CALC-SCNC: 9 MMOL/L (ref 8–16)
AST SERPL-CCNC: 11 U/L (ref 10–40)
BASOPHILS # BLD AUTO: 0.02 K/UL (ref 0–0.2)
BASOPHILS NFR BLD: 0.2 % (ref 0–1.9)
BILIRUB SERPL-MCNC: 0.8 MG/DL (ref 0.1–1)
BUN SERPL-MCNC: 22 MG/DL (ref 8–23)
CALCIUM SERPL-MCNC: 8.4 MG/DL (ref 8.7–10.5)
CHLORIDE SERPL-SCNC: 101 MMOL/L (ref 95–110)
CO2 SERPL-SCNC: 23 MMOL/L (ref 23–29)
CREAT SERPL-MCNC: 1 MG/DL (ref 0.5–1.4)
DIFFERENTIAL METHOD BLD: ABNORMAL
EOSINOPHIL # BLD AUTO: 0 K/UL (ref 0–0.5)
EOSINOPHIL NFR BLD: 0 % (ref 0–8)
ERYTHROCYTE [DISTWIDTH] IN BLOOD BY AUTOMATED COUNT: 12.9 % (ref 11.5–14.5)
EST. GFR  (NO RACE VARIABLE): >60 ML/MIN/1.73 M^2
GLUCOSE SERPL-MCNC: 304 MG/DL (ref 70–110)
HCT VFR BLD AUTO: 30.8 % (ref 40–54)
HGB BLD-MCNC: 10.6 G/DL (ref 14–18)
IMM GRANULOCYTES # BLD AUTO: 0.06 K/UL (ref 0–0.04)
IMM GRANULOCYTES NFR BLD AUTO: 0.7 % (ref 0–0.5)
LYMPHOCYTES # BLD AUTO: 0.7 K/UL (ref 1–4.8)
LYMPHOCYTES NFR BLD: 8.1 % (ref 18–48)
MCH RBC QN AUTO: 34.2 PG (ref 27–31)
MCHC RBC AUTO-ENTMCNC: 34.4 G/DL (ref 32–36)
MCV RBC AUTO: 99 FL (ref 82–98)
MONOCYTES # BLD AUTO: 0.8 K/UL (ref 0.3–1)
MONOCYTES NFR BLD: 8.9 % (ref 4–15)
NEUTROPHILS # BLD AUTO: 7.6 K/UL (ref 1.8–7.7)
NEUTROPHILS NFR BLD: 82.1 % (ref 38–73)
NRBC BLD-RTO: 0 /100 WBC
PLATELET # BLD AUTO: 179 K/UL (ref 150–450)
PMV BLD AUTO: 9.3 FL (ref 9.2–12.9)
POCT GLUCOSE: 175 MG/DL (ref 70–110)
POCT GLUCOSE: 188 MG/DL (ref 70–110)
POCT GLUCOSE: 209 MG/DL (ref 70–110)
POCT GLUCOSE: 261 MG/DL (ref 70–110)
POTASSIUM SERPL-SCNC: 4.5 MMOL/L (ref 3.5–5.1)
PROT SERPL-MCNC: 5.9 G/DL (ref 6–8.4)
RBC # BLD AUTO: 3.1 M/UL (ref 4.6–6.2)
SODIUM SERPL-SCNC: 133 MMOL/L (ref 136–145)
WBC # BLD AUTO: 9.19 K/UL (ref 3.9–12.7)

## 2024-03-10 PROCEDURE — 27000221 HC OXYGEN, UP TO 24 HOURS

## 2024-03-10 PROCEDURE — 85025 COMPLETE CBC W/AUTO DIFF WBC: CPT | Performed by: ORTHOPAEDIC SURGERY

## 2024-03-10 PROCEDURE — 80053 COMPREHEN METABOLIC PANEL: CPT | Performed by: ORTHOPAEDIC SURGERY

## 2024-03-10 PROCEDURE — 99900031 HC PATIENT EDUCATION (STAT)

## 2024-03-10 PROCEDURE — 94799 UNLISTED PULMONARY SVC/PX: CPT | Mod: XB

## 2024-03-10 PROCEDURE — 25000003 PHARM REV CODE 250: Performed by: NURSE PRACTITIONER

## 2024-03-10 PROCEDURE — 97530 THERAPEUTIC ACTIVITIES: CPT

## 2024-03-10 PROCEDURE — 94761 N-INVAS EAR/PLS OXIMETRY MLT: CPT

## 2024-03-10 PROCEDURE — 25000003 PHARM REV CODE 250: Performed by: ORTHOPAEDIC SURGERY

## 2024-03-10 PROCEDURE — 97162 PT EVAL MOD COMPLEX 30 MIN: CPT

## 2024-03-10 PROCEDURE — 63600175 PHARM REV CODE 636 W HCPCS: Mod: JZ,JG | Performed by: ORTHOPAEDIC SURGERY

## 2024-03-10 PROCEDURE — 63600175 PHARM REV CODE 636 W HCPCS: Performed by: INTERNAL MEDICINE

## 2024-03-10 PROCEDURE — 11000001 HC ACUTE MED/SURG PRIVATE ROOM

## 2024-03-10 PROCEDURE — 36415 COLL VENOUS BLD VENIPUNCTURE: CPT | Performed by: ORTHOPAEDIC SURGERY

## 2024-03-10 PROCEDURE — 25000003 PHARM REV CODE 250: Performed by: INTERNAL MEDICINE

## 2024-03-10 PROCEDURE — 25000003 PHARM REV CODE 250: Performed by: STUDENT IN AN ORGANIZED HEALTH CARE EDUCATION/TRAINING PROGRAM

## 2024-03-10 PROCEDURE — 99900035 HC TECH TIME PER 15 MIN (STAT)

## 2024-03-10 RX ORDER — MORPHINE SULFATE ORAL SOLUTION 10 MG/5ML
30 SOLUTION ORAL ONCE
Status: COMPLETED | OUTPATIENT
Start: 2024-03-10 | End: 2024-03-10

## 2024-03-10 RX ORDER — METHOCARBAMOL 500 MG/1
500 TABLET, FILM COATED ORAL EVERY 6 HOURS
Status: DISCONTINUED | OUTPATIENT
Start: 2024-03-10 | End: 2024-03-13

## 2024-03-10 RX ORDER — HYDROXYZINE HYDROCHLORIDE 25 MG/1
50 TABLET, FILM COATED ORAL 3 TIMES DAILY PRN
Status: DISCONTINUED | OUTPATIENT
Start: 2024-03-10 | End: 2024-03-13 | Stop reason: HOSPADM

## 2024-03-10 RX ORDER — IBUPROFEN 200 MG
1 TABLET ORAL DAILY PRN
Status: DISCONTINUED | OUTPATIENT
Start: 2024-03-10 | End: 2024-03-13 | Stop reason: HOSPADM

## 2024-03-10 RX ORDER — DEXAMETHASONE 4 MG/1
4 TABLET ORAL DAILY
Status: COMPLETED | OUTPATIENT
Start: 2024-03-11 | End: 2024-03-11

## 2024-03-10 RX ORDER — MORPHINE SULFATE ORAL SOLUTION 10 MG/5ML
30 SOLUTION ORAL EVERY 4 HOURS PRN
Status: DISCONTINUED | OUTPATIENT
Start: 2024-03-10 | End: 2024-03-13 | Stop reason: HOSPADM

## 2024-03-10 RX ORDER — LEVOFLOXACIN 250 MG/1
500 TABLET ORAL DAILY
Status: COMPLETED | OUTPATIENT
Start: 2024-03-10 | End: 2024-03-13

## 2024-03-10 RX ORDER — DEXAMETHASONE 4 MG/1
4 TABLET ORAL EVERY 12 HOURS
Status: COMPLETED | OUTPATIENT
Start: 2024-03-10 | End: 2024-03-10

## 2024-03-10 RX ORDER — ACETAMINOPHEN 325 MG/1
650 TABLET ORAL EVERY 6 HOURS
Status: DISCONTINUED | OUTPATIENT
Start: 2024-03-10 | End: 2024-03-13 | Stop reason: HOSPADM

## 2024-03-10 RX ADMIN — DEXAMETHASONE 4 MG: 4 TABLET ORAL at 12:03

## 2024-03-10 RX ADMIN — OXYCODONE HYDROCHLORIDE 10 MG: 10 TABLET ORAL at 06:03

## 2024-03-10 RX ADMIN — METHOCARBAMOL 500 MG: 500 TABLET ORAL at 05:03

## 2024-03-10 RX ADMIN — FAMOTIDINE 20 MG: 20 TABLET, FILM COATED ORAL at 08:03

## 2024-03-10 RX ADMIN — HYDROXYZINE HYDROCHLORIDE 50 MG: 25 TABLET, FILM COATED ORAL at 03:03

## 2024-03-10 RX ADMIN — ACETAMINOPHEN 650 MG: 325 TABLET ORAL at 05:03

## 2024-03-10 RX ADMIN — INSULIN ASPART 2 UNITS: 100 INJECTION, SOLUTION INTRAVENOUS; SUBCUTANEOUS at 08:03

## 2024-03-10 RX ADMIN — ASPIRIN 81 MG CHEWABLE TABLET 81 MG: 81 TABLET CHEWABLE at 08:03

## 2024-03-10 RX ADMIN — DEXAMETHASONE 4 MG: 4 TABLET ORAL at 08:03

## 2024-03-10 RX ADMIN — INSULIN DETEMIR 25 UNITS: 100 INJECTION, SOLUTION SUBCUTANEOUS at 08:03

## 2024-03-10 RX ADMIN — ACETAMINOPHEN 650 MG: 325 TABLET ORAL at 11:03

## 2024-03-10 RX ADMIN — FOLIC ACID 1 MG: 1 TABLET ORAL at 07:03

## 2024-03-10 RX ADMIN — CARVEDILOL 3.12 MG: 3.12 TABLET, FILM COATED ORAL at 07:03

## 2024-03-10 RX ADMIN — FAMOTIDINE 20 MG: 20 TABLET, FILM COATED ORAL at 07:03

## 2024-03-10 RX ADMIN — INSULIN ASPART 2 UNITS: 100 INJECTION, SOLUTION INTRAVENOUS; SUBCUTANEOUS at 05:03

## 2024-03-10 RX ADMIN — MORPHINE SULFATE 4 MG: 4 INJECTION, SOLUTION INTRAMUSCULAR; INTRAVENOUS at 04:03

## 2024-03-10 RX ADMIN — LEVOFLOXACIN 500 MG: 250 TABLET, FILM COATED ORAL at 11:03

## 2024-03-10 RX ADMIN — INSULIN ASPART 6 UNITS: 100 INJECTION, SOLUTION INTRAVENOUS; SUBCUTANEOUS at 07:03

## 2024-03-10 RX ADMIN — THERA TABS 1 TABLET: TAB at 07:03

## 2024-03-10 RX ADMIN — INSULIN DETEMIR 25 UNITS: 100 INJECTION, SOLUTION SUBCUTANEOUS at 07:03

## 2024-03-10 RX ADMIN — MORPHINE SULFATE 30 MG: 10 SOLUTION ORAL at 08:03

## 2024-03-10 RX ADMIN — MORPHINE SULFATE 30 MG: 10 SOLUTION ORAL at 12:03

## 2024-03-10 RX ADMIN — DIAZEPAM 10 MG: 5 TABLET ORAL at 12:03

## 2024-03-10 RX ADMIN — ISOSORBIDE MONONITRATE 30 MG: 30 TABLET, EXTENDED RELEASE ORAL at 07:03

## 2024-03-10 RX ADMIN — INSULIN ASPART 2 UNITS: 100 INJECTION, SOLUTION INTRAVENOUS; SUBCUTANEOUS at 11:03

## 2024-03-10 RX ADMIN — MORPHINE SULFATE 4 MG: 4 INJECTION, SOLUTION INTRAMUSCULAR; INTRAVENOUS at 11:03

## 2024-03-10 RX ADMIN — THIAMINE HCL TAB 100 MG 100 MG: 100 TAB at 07:03

## 2024-03-10 RX ADMIN — ATORVASTATIN CALCIUM 40 MG: 40 TABLET, FILM COATED ORAL at 07:03

## 2024-03-10 RX ADMIN — METHOCARBAMOL 500 MG: 500 TABLET ORAL at 11:03

## 2024-03-10 RX ADMIN — CARVEDILOL 3.12 MG: 3.12 TABLET, FILM COATED ORAL at 08:03

## 2024-03-10 NOTE — PROGRESS NOTES
formerly [facility] Medicine  Telemedicine Progress Note    Patient Name: Sampson Holt  MRN: [id number]  Patient Class: IP- Inpatient   Admission Date: 3/8/2024  Length of Stay: 2 days  Attending Physician: Sheila Malhotra MD  Primary Care Provider: Damian Vergara MD          Subjective:     Principal Problem:Closed intertrochanteric fracture of hip, left, initial encounter        HPI:  Sampson Holt is a 68 year old male with a previous medical history of CHF, DM II with long term use of insulin, Emphysema, Endocarditis, CAD with stent placement on daily ASA and plavix, Stroke, HLD, and hypertension who presents for left hip pain after trip and fall. Patient reports he was carrying gumbo and tripped over a box landing on his left side. Denies striking head or LOC. Initial ED imaging showed Intertrochanteric left femoral fracture for which Dr. Hdez was consulted and reports probably procedure tomorrow and to keep patient NPO at midnight . CBC and CMP unremarkable. Upon evaluation patient noted to still be having severe left hip pain so an additional one time dose of IV dilaudid was ordered. +2 bilateral pedal pulses. Patient unable to assist in full medication reconciliation but does endorse he is compliant with taking his aspirin and plavix daily last taken on 3/8/24.  Patient to be admitted by hospital medicine for further evaluation and management.     Overview/Hospital Course:  Sampson Holt is a 68 year old male with a past medical history of HTN, DM, COPD, HLD, tobacco use, EtOH abuse, THC use, CAD and BPH who presented with a left hip fracture after a fall. Orthopedic Surgery took the patient to the OR 3/9 for IMN placement. He is on ASA 81 BID. PT/OT has been consulted.      This follow-up encounter was provided through telemedicine to address  Closed intertrochanteric fracture of hip, left, initial encounter present on admission.  The patient location is: [facility]/[address]  admitted 3/8/2024  3:33 PM.      Interval History/Overnight Events:   Clinical record since admit reviewed.    Patient is able to provide adequate history.    Pain uncontrolled and he cannot move leg due to pain -- oxycodone and norco ineffective - he has required 30 mg oxycodone for pain control previously; morphine/dilaudid IV effective but only for less than hour; takes BID lantus insulin at home  - elevated glucoses with scheduled levemir started; awaiting PT/OT assessment; patient was completing course for sinusitis with medrol dose pack and levaquin    Review of Systems   Constitutional:  Positive for activity change and fatigue. Negative for appetite change and fever.   Respiratory:  Negative for cough and shortness of breath.    Gastrointestinal:  Negative for vomiting.   Musculoskeletal:  Positive for arthralgias and myalgias.          I have reviewed the following on 03/10/2024:    Data  Details     [x]   Lab results reviewed   WBC 9; Hgb 10.6; Na 133; AST/ALT nl; Cr 1; HgbA1c 6.1    []   Micro reports reviewed      []   Pathology reports reviewed      []   Imaging reports reviewed      []   Cardiology Procedure reports reviewed      []   Non- records/CareEverywhere notes reviewed      []  Tests/studies orders placed or verified       []  Independently viewed/assessed       []  03/10/2024 Discussion of:        Inpatient Medications reviewed and prescribed for management of current problems:  Scheduled Meds:   acetaminophen  650 mg Oral Q6H    aspirin  81 mg Oral BID    atorvastatin  40 mg Oral Daily    carvediloL  3.125 mg Oral BID    dexAMETHasone  4 mg Oral Q12H    Followed by    [START ON 3/11/2024] dexAMETHasone  4 mg Oral Daily    famotidine  20 mg Oral BID    folic acid  1 mg Oral Daily    isosorbide mononitrate  30 mg Oral Daily    levoFLOXacin  500 mg Oral Daily    methocarbamoL  500 mg Oral Q6H    multivitamin  1 tablet Oral Daily    nicotine  1 patch Transdermal Daily    senna-docusate 8.6-50  mg  1 tablet Oral BID    thiamine  100 mg Oral Daily     Continuous Infusions:  PRN Meds:.acetaminophen, albuterol-ipratropium, aluminum-magnesium hydroxide-simethicone, dextrose 10%, dextrose 10%, diphenhydrAMINE, glucagon (human recombinant), glucose, glucose, hydrALAZINE, insulin aspart U-100, magnesium oxide, magnesium oxide, melatonin, morphine, naloxone, ondansetron, oxyCODONE, oxyCODONE, potassium bicarbonate, potassium bicarbonate, potassium bicarbonate, prochlorperazine, simethicone, sodium chloride 0.9%, sodium chloride 0.9%      Objective:     Temp:  [97.6 °F (36.4 °C)-97.8 °F (36.6 °C)] 97.7 °F (36.5 °C)  Pulse:  [86-99] 99  Resp:  [15-20] 20  SpO2:  [91 %-97 %] 94 %  BP: (100-152)/(56-83) 133/83      Intake/Output Summary (Last 24 hours) at 3/10/2024 1337  Last data filed at 3/10/2024 0645  Gross per 24 hour   Intake 1022.84 ml   Output 910 ml   Net 112.84 ml        Body mass index is 26.82 kg/m².    Physical Exam  Vitals and nursing note reviewed.   Constitutional:       General: He is not in acute distress.     Appearance: Normal appearance. He is normal weight. He is not ill-appearing.   HENT:      Head: Normocephalic and atraumatic.   Cardiovascular:      Rate and Rhythm: Normal rate.   Pulmonary:      Effort: Pulmonary effort is normal. No accessory muscle usage or respiratory distress.   Neurological:      General: No focal deficit present.      Mental Status: He is alert and oriented to person, place, and time.      Cranial Nerves: No cranial nerve deficit.      Motor: No weakness.   Psychiatric:         Attention and Perception: Attention normal.         Mood and Affect: Affect is labile.         Speech: Speech normal.         Behavior: Behavior is cooperative.          Labs: All labs within the last 24 hours were reviewed.   Recent Results (from the past 24 hour(s))   POCT glucose    Collection Time: 03/09/24  5:03 PM   Result Value Ref Range    POCT Glucose 246 (H) 70 - 110 mg/dL   POCT  glucose    Collection Time: 03/09/24 10:22 PM   Result Value Ref Range    POCT Glucose 349 (H) 70 - 110 mg/dL   CBC with Automated Differential    Collection Time: 03/10/24  4:15 AM   Result Value Ref Range    WBC 9.19 3.90 - 12.70 K/uL    RBC 3.10 (L) 4.60 - 6.20 M/uL    Hemoglobin 10.6 (L) 14.0 - 18.0 g/dL    Hematocrit 30.8 (L) 40.0 - 54.0 %    MCV 99 (H) 82 - 98 fL    MCH 34.2 (H) 27.0 - 31.0 pg    MCHC 34.4 32.0 - 36.0 g/dL    RDW 12.9 11.5 - 14.5 %    Platelets 179 150 - 450 K/uL    MPV 9.3 9.2 - 12.9 fL    Immature Granulocytes 0.7 (H) 0.0 - 0.5 %    Gran # (ANC) 7.6 1.8 - 7.7 K/uL    Immature Grans (Abs) 0.06 (H) 0.00 - 0.04 K/uL    Lymph # 0.7 (L) 1.0 - 4.8 K/uL    Mono # 0.8 0.3 - 1.0 K/uL    Eos # 0.0 0.0 - 0.5 K/uL    Baso # 0.02 0.00 - 0.20 K/uL    nRBC 0 0 /100 WBC    Gran % 82.1 (H) 38.0 - 73.0 %    Lymph % 8.1 (L) 18.0 - 48.0 %    Mono % 8.9 4.0 - 15.0 %    Eosinophil % 0.0 0.0 - 8.0 %    Basophil % 0.2 0.0 - 1.9 %    Differential Method Automated    Comprehensive Metabolic Panel (CMP)    Collection Time: 03/10/24  4:15 AM   Result Value Ref Range    Sodium 133 (L) 136 - 145 mmol/L    Potassium 4.5 3.5 - 5.1 mmol/L    Chloride 101 95 - 110 mmol/L    CO2 23 23 - 29 mmol/L    Glucose 304 (H) 70 - 110 mg/dL    BUN 22 8 - 23 mg/dL    Creatinine 1.0 0.5 - 1.4 mg/dL    Calcium 8.4 (L) 8.7 - 10.5 mg/dL    Total Protein 5.9 (L) 6.0 - 8.4 g/dL    Albumin 3.3 (L) 3.5 - 5.2 g/dL    Total Bilirubin 0.8 0.1 - 1.0 mg/dL    Alkaline Phosphatase 58 55 - 135 U/L    AST 11 10 - 40 U/L    ALT 19 10 - 44 U/L    eGFR >60 >60 mL/min/1.73 m^2    Anion Gap 9 8 - 16 mmol/L   POCT glucose    Collection Time: 03/10/24  6:24 AM   Result Value Ref Range    POCT Glucose 261 (H) 70 - 110 mg/dL   POCT glucose    Collection Time: 03/10/24 11:39 AM   Result Value Ref Range    POCT Glucose 175 (H) 70 - 110 mg/dL        Lab Results   Component Value Date    [id number] Negative 01/04/2024       Recent Labs   Lab 03/08/24  4461  "03/09/24  0342 03/10/24  0415   WBC 10.33 9.00 9.19   LYMPH 19.1  2.0 16.9*  1.5 8.1*  0.7*   HGB 14.4 13.2* 10.6*   HCT 41.6 38.6* 30.8*    179 179     Recent Labs   Lab 03/08/24  1702 03/09/24  0342 03/10/24  0415    137 133*   K 3.5 3.8 4.5    102 101   CO2 23 24 23   BUN 23 23 22   CREATININE 0.9 0.9 1.0   GLU 72 159* 304*   CALCIUM 9.4 8.9 8.4*   MG 2.0  --   --      Recent Labs   Lab 03/08/24  1702 03/09/24  0342 03/10/24  0415   ALKPHOS 72 71 58   ALT 25 22 19   AST 24 18 11   ALBUMIN 4.1 3.7 3.3*   PROT 7.2 6.6 5.9*   BILITOT 0.6 0.5 0.8   INR  --  1.1  --         No results for input(s): "DDIMER", "FERRITIN", "CRP", "LDH", "BNP", "TROPONINI", "CPK" in the last 72 hours.    Invalid input(s): "PROCALCITONIN"        Microbiology: All microbiology updates for the past 24 hours have been reviewed.  Microbiology Results (last 7 days)       ** No results found for the last 168 hours. **              Imaging All imaging within the last 24 hours was reviewed.       Results for orders placed during the hospital encounter of 10/07/23    Echo    Interpretation Summary    Left Ventricle: The left ventricle is normal in size. Mildly increased wall thickness. There is concentric remodeling. Septal motion is normal. There is normal systolic function. Ejection fraction by visual approximation is 60%.    Right Ventricle: Normal right ventricular cavity size. Wall thickness is normal. Right ventricle wall motion  is normal. Systolic function is normal.    Mitral Valve: There is mild posterior mitral annular calcification present.    IVC/SVC: Normal venous pressure at 3 mmHg.    A limited echo was performed using limited 2D and color flow Doppler      SURG FL Surgery Fluoro Usage  See OP Notes for results.     IMPRESSION: See OP Notes for results.     This procedure was auto-finalized by: Virtual Radiologist  X-Ray Chest 1 View  Narrative: EXAMINATION:  XR CHEST 1 VIEW    CLINICAL HISTORY:  pre " op;    TECHNIQUE:  Single frontal view of the chest was performed.    COMPARISON:  01/04/2024    FINDINGS:  The heart is not enlarged.  The lungs are well expanded and clear.  The costophrenic sulci are sharp.  Impression: No acute cardiopulmonary disease    Electronically signed by: Rylee Kearney MD  Date:    03/09/2024  Time:    09:09            Assessment/Plan:      * Closed intertrochanteric fracture of hip, left, initial encounter  -s/p IMN on 3/9 per Dr. Hdez  -Fall precautions  -PT/OT  -PRN analgesics  -ASA 81 BID  -Incentive spirometry      Acute sinusitis  Being treated with levaquin and medrol dose pack prior to admit  -recent hives due to amoxicillin  -ordered completion of 2 days of Medrol Dosepak and 4 days of Levaquin  -ok to continue nasal saline from home      Tetrahydrocannabinol (THC) dependence  -Patient to be counseled on cessation      ETOH abuse  -Thiamine, folic acid and MVI  -CIWA Q4H  -Patient to be counseled on cessation      Hyperlipidemia  Continue statin therapy      CAD (coronary artery disease)  -hx of drug eluting stent in 5/2023  -ASA  -Statin  -Coreg  -Telemetry  -resume plavix once cleared by surgeon    Cigarette smoker  -Patient counseled on cessation  -referral to cessation program if amenable          Type 2 diabetes mellitus with circulatory disorder, with long-term current use of insulin  Patient's FSGs are controlled on current medication regimen. He reports he takes 35 units of Lantus subQ BID  Last A1c reviewed-   Lab Results   Component Value Date    HGBA1C 6.1 03/08/2024     Most recent fingerstick glucose reviewed-   Recent Labs   Lab 03/09/24  1703 03/09/24  2222 03/10/24  0624 03/10/24  1139   POCTGLUCOSE 246* 349* 261* 175*       Current correctional scale  Medium  Increase anti-hyperglycemic dose as follows-   Antihyperglycemics (From admission, onward)      Start     Stop Route Frequency Ordered    03/10/24 2100  insulin detemir U-100 (Levemir) pen 25 Units          -- SubQ 2 times daily 03/10/24 1353    03/08/24 1822  insulin aspart U-100 pen 0-10 Units         -- SubQ Before meals & nightly PRN 03/08/24 1731          Hold Oral hypoglycemics while patient is in the hospital.       COPD (chronic obstructive pulmonary disease)  -continue PRN Duonebs  -monitor for symptoms    Benign essential HTN  -Chronic, controlled. Latest blood pressure and vitals reviewed-     Temp:  [97.6 °F (36.4 °C)-97.8 °F (36.6 °C)]   Pulse:  [86-99]   Resp:  [15-20]   BP: (100-152)/(56-83)   SpO2:  [91 %-97 %] .   Home meds for hypertension were reviewed and noted below.   Hypertension Medications                carvediloL (COREG) 3.125 MG tablet Take 3.125 mg by mouth 2 (two) times daily with meals.        losartan (COZAAR) 50 MG tablet Take 2 tablets (100 mg total) by mouth once daily.     nitroGLYCERIN (NITROSTAT) 0.4 MG SL tablet Place 1 tablet (0.4 mg total) under the tongue every 5 (five) minutes as needed for Chest pain.            While in the hospital, will manage blood pressure as follows; Continue home antihypertensive regimen - not taking imdur, amlodipine or metoprolol    Will utilize p.r.n. blood pressure medication only if patient's blood pressure greater than 180/110 and he develops symptoms such as worsening chest pain or shortness of breath.      VTE Risk Mitigation (From admission, onward)           Ordered     IP VTE LOW RISK PATIENT  Once         03/08/24 1731     Place sequential compression device  Until discontinued         03/08/24 1731                    High Risk Conditions:  Patient is currently receiving parenteral controlled substances: Morphine        I have completed this tele-visit with the assistance of a telepresenter.    The attending portion of this evaluation, treatment, and documentation was performed per Sheila Malhotra MD via Telemedicine AudioVisual using the secure [employer] software platform with 2 way audio/video. The provider was located off-site and the  patient is located in the hospital. The aforementioned video software was utilized to document the relevant history and physical exam    Sheila Malhotra MD  Department of Hospital Medicine   [facility]/Surg

## 2024-03-10 NOTE — PLAN OF CARE
Plan of care reviewed with patient, verbalized understanding. Continuous cardiac monitoring in place. IV intact and patent, no s/s infection or infiltration noted to site. ABX given as ordered. Meds given per MAR. Ambulated to bathroom with standby assist. Pain managed with PRN medication. BG monitored and covered per sliding scale orders. IS at bedside and encouraged use. NAD noted. Purposeful q2hr rounding done. Safety maintained with side rails up x3, bed wheels locked, bed in lowest position, bed alarm set, slip resistant socks maintained, and call light with in reach of patient. Patient educated to call for assistance when needed, verbalized understanding. Patient remains free from falls. No further needs expressed at this time. Will continue to monitor.     Problem: Adult Inpatient Plan of Care  Goal: Plan of Care Review  Outcome: Ongoing, Progressing  Goal: Patient-Specific Goal (Individualized)  Outcome: Ongoing, Progressing  Goal: Absence of Hospital-Acquired Illness or Injury  Outcome: Ongoing, Progressing  Goal: Optimal Comfort and Wellbeing  Outcome: Ongoing, Progressing  Goal: Readiness for Transition of Care  Outcome: Ongoing, Progressing     Problem: Diabetes Comorbidity  Goal: Blood Glucose Level Within Targeted Range  Outcome: Ongoing, Progressing     Problem: Fall Injury Risk  Goal: Absence of Fall and Fall-Related Injury  Outcome: Ongoing, Progressing     Problem: Skin Injury Risk Increased  Goal: Skin Health and Integrity  Outcome: Ongoing, Progressing

## 2024-03-10 NOTE — ASSESSMENT & PLAN NOTE
-hx of drug eluting stent in 5/2023  -ASA  -Statin  -Coreg  -Telemetry  -resume plavix once cleared by surgeon

## 2024-03-10 NOTE — ASSESSMENT & PLAN NOTE
Being treated with levaquin and medrol dose pack prior to admit  -recent hives due to amoxicillin  -ordered completion of 2 days of Medrol Dosepak (completed) and 4 days of Levaquin  -ok to continue nasal saline from home

## 2024-03-10 NOTE — ASSESSMENT & PLAN NOTE
-Chronic, controlled. Latest blood pressure and vitals reviewed-     Temp:  [97.6 °F (36.4 °C)-97.8 °F (36.6 °C)]   Pulse:  [86-99]   Resp:  [15-20]   BP: (100-152)/(56-83)   SpO2:  [91 %-97 %] .   Home meds for hypertension were reviewed and noted below.   Hypertension Medications                carvediloL (COREG) 3.125 MG tablet Take 3.125 mg by mouth 2 (two) times daily with meals.        losartan (COZAAR) 50 MG tablet Take 2 tablets (100 mg total) by mouth once daily.     nitroGLYCERIN (NITROSTAT) 0.4 MG SL tablet Place 1 tablet (0.4 mg total) under the tongue every 5 (five) minutes as needed for Chest pain.            While in the hospital, will manage blood pressure as follows; Continue home antihypertensive regimen - not taking imdur, amlodipine or metoprolol    Will utilize p.r.n. blood pressure medication only if patient's blood pressure greater than 180/110 and he develops symptoms such as worsening chest pain or shortness of breath.

## 2024-03-10 NOTE — PLAN OF CARE
Problem: Adult Inpatient Plan of Care  Goal: Plan of Care Review  Outcome: Ongoing, Progressing     Problem: Adult Inpatient Plan of Care  Goal: Absence of Hospital-Acquired Illness or Injury  Outcome: Ongoing, Progressing     Problem: Adult Inpatient Plan of Care  Goal: Optimal Comfort and Wellbeing  Outcome: Ongoing, Progressing     Problem: Diabetes Comorbidity  Goal: Blood Glucose Level Within Targeted Range  Outcome: Ongoing, Progressing     Problem: Fall Injury Risk  Goal: Absence of Fall and Fall-Related Injury  Outcome: Ongoing, Progressing     Problem: Fall Injury Risk  Goal: Absence of Fall and Fall-Related Injury  Outcome: Ongoing, Progressing     Problem: Skin Injury Risk Increased  Goal: Skin Health and Integrity  Outcome: Ongoing, Progressing

## 2024-03-10 NOTE — NURSING
"Upon entering patient room to administer night medications, patient started getting angry stating "I take plavix and aspirin everyday and havent gotten either of them and I dont know why. I have asked for pain medicine way earlier and still havent gotten anything." Educated patient on how his plavix was held for surgery do to risk of excessive post surgical blood loss. Explained to pt he received IV morphine at shift change and was not due for any pain medication at this time and also explained that his BP was 100/56 at 7:30pm and with BP that low we have to be cautious with administering pain medication. Before I could finishing stating I would message NP with his concerns, patient cut me off, then rolled his eyes and stated "well I can get my BP up fast then." Karina Ellington NP notified.   "

## 2024-03-10 NOTE — PLAN OF CARE
[facility] - Med/Surg  Initial Discharge Assessment       Primary Care Provider: Damian Vergara MD    Admission Diagnosis: Fall [W19.XXXA]  Closed intertrochanteric fracture of hip, left, initial encounter [S72.142A]    Admission Date: 3/8/2024  Expected Discharge Date:     Transition of Care Barriers: None    [facility] [employer] [facility] / Plan: [employer] / Product Type: Medicare Advantage /     Extended Emergency Contact Information  Primary Emergency Contact: Angel Klein  Mobile Phone: [phone]  Relation: Friend  Preferred language: English   needed? No  Secondary Emergency Contact: Jonn Kraft  Mobile Phone: [phone]  Relation: Brother  Preferred language: English   needed? No    Discharge Plan A: Home  Discharge Plan B: Skilled Nursing Facility      [facility]  [address]  [facility] [address]  Phone: [phone] Fax: [phone]    [facility] - [facility] [address]  [address] [facility] [address]  Phone: [phone] Fax: [phone]    [facility] - [facility] [address] [facility]  [address] [facility] [place] [address]  Phone: [phone] Fax: [phone]    DC assessment completed at bedside, information on facesheet verified. Lives alone. Plans to drive self home. Explained he may need a ride home and pt states he will have backup plan. PCP is Jai. Pharm is [facility]. Denies coumadin, HH, HD, DME. May need RW. Pt states he cannot have HH where he lives when offered. Insurance verified. Denies POA. Denies recent admission. CM following for DC planning.       Initial Assessment (most recent)       Adult Discharge Assessment - 03/10/24 1527          Discharge Assessment    Assessment Type Discharge Planning Assessment     Confirmed/corrected address, phone number and insurance Yes     Confirmed Demographics Correct on Facesheet     Source of Information  patient     Communicated DAVID with patient/caregiver Yes     People in Home alone     Facility Arrived From: home     Do you expect to return to your current living situation? Yes     Do you have help at home or someone to help you manage your care at home? No     Prior to hospitilization cognitive status: Alert/Oriented     Current cognitive status: Alert/Oriented     Equipment Currently Used at Home none     Readmission within 30 days? No     Patient currently being followed by outpatient case management? No     Do you currently have service(s) that help you manage your care at home? No     Do you take prescription medications? Yes     Do you have prescription coverage? Yes     Do you have any problems affording any of your prescribed medications? No     Is the patient taking medications as prescribed? yes     Who is going to help you get home at discharge? plans to drive self home?     How do you get to doctors appointments? car, drives self     Are you on dialysis? No     Do you take coumadin? No     Discharge Plan A Home     Discharge Plan B Skilled Nursing Facility     DME Needed Upon Discharge  walker, rolling     Discharge Plan discussed with: Patient     Transition of Care Barriers None

## 2024-03-10 NOTE — PLAN OF CARE
Problem: Adult Inpatient Plan of Care  Goal: Plan of Care Review  Outcome: Ongoing, Progressing     Problem: Adult Inpatient Plan of Care  Goal: Absence of Hospital-Acquired Illness or Injury  Outcome: Ongoing, Progressing     Problem: Diabetes Comorbidity  Goal: Blood Glucose Level Within Targeted Range  Outcome: Ongoing, Progressing     Problem: Fall Injury Risk  Goal: Absence of Fall and Fall-Related Injury  Outcome: Ongoing, Progressing     Problem: Skin Injury Risk Increased  Goal: Skin Health and Integrity  Outcome: Ongoing, Progressing

## 2024-03-10 NOTE — ASSESSMENT & PLAN NOTE
Patient's FSGs are controlled on current medication regimen. He reports he takes 35 units of Lantus subQ BID  Last A1c reviewed-   Lab Results   Component Value Date    HGBA1C 6.1 03/08/2024     Most recent fingerstick glucose reviewed-   Recent Labs   Lab 03/09/24  1703 03/09/24  2222 03/10/24  0624 03/10/24  1139   POCTGLUCOSE 246* 349* 261* 175*       Current correctional scale  Medium  Increase anti-hyperglycemic dose as follows-   Antihyperglycemics (From admission, onward)      Start     Stop Route Frequency Ordered    03/10/24 2100  insulin detemir U-100 (Levemir) pen 25 Units         -- SubQ 2 times daily 03/10/24 1353    03/08/24 1822  insulin aspart U-100 pen 0-10 Units         -- SubQ Before meals & nightly PRN 03/08/24 1731          Hold Oral hypoglycemics while patient is in the hospital.

## 2024-03-10 NOTE — SUBJECTIVE & OBJECTIVE
This follow-up encounter was provided through telemedicine to address  Closed intertrochanteric fracture of hip, left, initial encounter present on admission.  The patient location is: 315[address] A admitted 3/8/2024  3:33 PM.      Interval History/Overnight Events:   Clinical record since admit reviewed.    Patient is able to provide adequate history.    Pain uncontrolled and he cannot move leg due to pain -- oxycodone and norco ineffective - he has required 30 mg oxycodone for pain control previously; morphine/dilaudid IV effective but only for less than hour; takes BID lantus insulin at home  - elevated glucoses with scheduled levemir started; awaiting PT/OT assessment; patient was completing course for sinusitis with medrol dose pack and levaquin    Review of Systems   Constitutional:  Positive for activity change and fatigue. Negative for appetite change and fever.   Respiratory:  Negative for cough and shortness of breath.    Gastrointestinal:  Negative for vomiting.   Musculoskeletal:  Positive for arthralgias and myalgias.          I have reviewed the following on 03/10/2024:    Data  Details     [x]   Lab results reviewed   WBC 9; Hgb 10.6; Na 133; AST/ALT nl; Cr 1; HgbA1c 6.1    []   Micro reports reviewed      []   Pathology reports reviewed      []   Imaging reports reviewed      []   Cardiology Procedure reports reviewed      []   Non- records/CareEverywhere notes reviewed      []  Tests/studies orders placed or verified       []  Independently viewed/assessed       []  03/10/2024 Discussion of:        Inpatient Medications reviewed and prescribed for management of current problems:  Scheduled Meds:   acetaminophen  650 mg Oral Q6H    aspirin  81 mg Oral BID    atorvastatin  40 mg Oral Daily    carvediloL  3.125 mg Oral BID    dexAMETHasone  4 mg Oral Q12H    Followed by    [START ON 3/11/2024] dexAMETHasone  4 mg Oral Daily    famotidine  20 mg Oral BID    folic acid  1 mg Oral Daily    isosorbide  mononitrate  30 mg Oral Daily    levoFLOXacin  500 mg Oral Daily    methocarbamoL  500 mg Oral Q6H    multivitamin  1 tablet Oral Daily    nicotine  1 patch Transdermal Daily    senna-docusate 8.6-50 mg  1 tablet Oral BID    thiamine  100 mg Oral Daily     Continuous Infusions:  PRN Meds:.acetaminophen, albuterol-ipratropium, aluminum-magnesium hydroxide-simethicone, dextrose 10%, dextrose 10%, diphenhydrAMINE, glucagon (human recombinant), glucose, glucose, hydrALAZINE, insulin aspart U-100, magnesium oxide, magnesium oxide, melatonin, morphine, naloxone, ondansetron, oxyCODONE, oxyCODONE, potassium bicarbonate, potassium bicarbonate, potassium bicarbonate, prochlorperazine, simethicone, sodium chloride 0.9%, sodium chloride 0.9%      Objective:     Temp:  [97.6 °F (36.4 °C)-97.8 °F (36.6 °C)] 97.7 °F (36.5 °C)  Pulse:  [86-99] 99  Resp:  [15-20] 20  SpO2:  [91 %-97 %] 94 %  BP: (100-152)/(56-83) 133/83      Intake/Output Summary (Last 24 hours) at 3/10/2024 1337  Last data filed at 3/10/2024 0645  Gross per 24 hour   Intake 1022.84 ml   Output 910 ml   Net 112.84 ml        Body mass index is 26.82 kg/m².    Physical Exam  Vitals and nursing note reviewed.   Constitutional:       General: He is not in acute distress.     Appearance: Normal appearance. He is normal weight. He is not ill-appearing.   HENT:      Head: Normocephalic and atraumatic.   Cardiovascular:      Rate and Rhythm: Normal rate.   Pulmonary:      Effort: Pulmonary effort is normal. No accessory muscle usage or respiratory distress.   Neurological:      General: No focal deficit present.      Mental Status: He is alert and oriented to person, place, and time.      Cranial Nerves: No cranial nerve deficit.      Motor: No weakness.   Psychiatric:         Attention and Perception: Attention normal.         Mood and Affect: Affect is labile.         Speech: Speech normal.         Behavior: Behavior is cooperative.          Labs: All labs within the  last 24 hours were reviewed.   Recent Results (from the past 24 hour(s))   POCT glucose    Collection Time: 03/09/24  5:03 PM   Result Value Ref Range    POCT Glucose 246 (H) 70 - 110 mg/dL   POCT glucose    Collection Time: 03/09/24 10:22 PM   Result Value Ref Range    POCT Glucose 349 (H) 70 - 110 mg/dL   CBC with Automated Differential    Collection Time: 03/10/24  4:15 AM   Result Value Ref Range    WBC 9.19 3.90 - 12.70 K/uL    RBC 3.10 (L) 4.60 - 6.20 M/uL    Hemoglobin 10.6 (L) 14.0 - 18.0 g/dL    Hematocrit 30.8 (L) 40.0 - 54.0 %    MCV 99 (H) 82 - 98 fL    MCH 34.2 (H) 27.0 - 31.0 pg    MCHC 34.4 32.0 - 36.0 g/dL    RDW 12.9 11.5 - 14.5 %    Platelets 179 150 - 450 K/uL    MPV 9.3 9.2 - 12.9 fL    Immature Granulocytes 0.7 (H) 0.0 - 0.5 %    Gran # (ANC) 7.6 1.8 - 7.7 K/uL    Immature Grans (Abs) 0.06 (H) 0.00 - 0.04 K/uL    Lymph # 0.7 (L) 1.0 - 4.8 K/uL    Mono # 0.8 0.3 - 1.0 K/uL    Eos # 0.0 0.0 - 0.5 K/uL    Baso # 0.02 0.00 - 0.20 K/uL    nRBC 0 0 /100 WBC    Gran % 82.1 (H) 38.0 - 73.0 %    Lymph % 8.1 (L) 18.0 - 48.0 %    Mono % 8.9 4.0 - 15.0 %    Eosinophil % 0.0 0.0 - 8.0 %    Basophil % 0.2 0.0 - 1.9 %    Differential Method Automated    Comprehensive Metabolic Panel (CMP)    Collection Time: 03/10/24  4:15 AM   Result Value Ref Range    Sodium 133 (L) 136 - 145 mmol/L    Potassium 4.5 3.5 - 5.1 mmol/L    Chloride 101 95 - 110 mmol/L    CO2 23 23 - 29 mmol/L    Glucose 304 (H) 70 - 110 mg/dL    BUN 22 8 - 23 mg/dL    Creatinine 1.0 0.5 - 1.4 mg/dL    Calcium 8.4 (L) 8.7 - 10.5 mg/dL    Total Protein 5.9 (L) 6.0 - 8.4 g/dL    Albumin 3.3 (L) 3.5 - 5.2 g/dL    Total Bilirubin 0.8 0.1 - 1.0 mg/dL    Alkaline Phosphatase 58 55 - 135 U/L    AST 11 10 - 40 U/L    ALT 19 10 - 44 U/L    eGFR >60 >60 mL/min/1.73 m^2    Anion Gap 9 8 - 16 mmol/L   POCT glucose    Collection Time: 03/10/24  6:24 AM   Result Value Ref Range    POCT Glucose 261 (H) 70 - 110 mg/dL   POCT glucose    Collection Time: 03/10/24  "11:39 AM   Result Value Ref Range    POCT Glucose 175 (H) 70 - 110 mg/dL        Lab Results   Component Value Date    [id number] Negative 01/04/2024       Recent Labs   Lab 03/08/24  1702 03/09/24  0342 03/10/24  0415   WBC 10.33 9.00 9.19   LYMPH 19.1  2.0 16.9*  1.5 8.1*  0.7*   HGB 14.4 13.2* 10.6*   HCT 41.6 38.6* 30.8*    179 179     Recent Labs   Lab 03/08/24  1702 03/09/24  0342 03/10/24  0415    137 133*   K 3.5 3.8 4.5    102 101   CO2 23 24 23   BUN 23 23 22   CREATININE 0.9 0.9 1.0   GLU 72 159* 304*   CALCIUM 9.4 8.9 8.4*   MG 2.0  --   --      Recent Labs   Lab 03/08/24  1702 03/09/24  0342 03/10/24  0415   ALKPHOS 72 71 58   ALT 25 22 19   AST 24 18 11   ALBUMIN 4.1 3.7 3.3*   PROT 7.2 6.6 5.9*   BILITOT 0.6 0.5 0.8   INR  --  1.1  --         No results for input(s): "DDIMER", "FERRITIN", "CRP", "LDH", "BNP", "TROPONINI", "CPK" in the last 72 hours.    Invalid input(s): "PROCALCITONIN"        Microbiology: All microbiology updates for the past 24 hours have been reviewed.  Microbiology Results (last 7 days)       ** No results found for the last 168 hours. **              Imaging All imaging within the last 24 hours was reviewed.       Results for orders placed during the hospital encounter of 10/07/23    Echo    Interpretation Summary    Left Ventricle: The left ventricle is normal in size. Mildly increased wall thickness. There is concentric remodeling. Septal motion is normal. There is normal systolic function. Ejection fraction by visual approximation is 60%.    Right Ventricle: Normal right ventricular cavity size. Wall thickness is normal. Right ventricle wall motion  is normal. Systolic function is normal.    Mitral Valve: There is mild posterior mitral annular calcification present.    IVC/SVC: Normal venous pressure at 3 mmHg.    A limited echo was performed using limited 2D and color flow Doppler      SURG FL Surgery Fluoro Usage  See OP Notes for results. "     IMPRESSION: See OP Notes for results.     This procedure was auto-finalized by: Virtual Radiologist  X-Ray Chest 1 View  Narrative: EXAMINATION:  XR CHEST 1 VIEW    CLINICAL HISTORY:  pre op;    TECHNIQUE:  Single frontal view of the chest was performed.    COMPARISON:  01/04/2024    FINDINGS:  The heart is not enlarged.  The lungs are well expanded and clear.  The costophrenic sulci are sharp.  Impression: No acute cardiopulmonary disease    Electronically signed by: Rylee Kearney MD  Date:    03/09/2024  Time:    09:09

## 2024-03-10 NOTE — PT/OT/SLP EVAL
"Physical Therapy Evaluation    Patient Name:  Sampson Holt   MRN:  [id number]    Recommendations:     Discharge Recommendations: High Intensity Therapy (high vs low intensity pending progress)   Discharge Equipment Recommendations: to be determined by next level of care   Barriers to discharge: Decreased caregiver support and medical status, increased assist needed for safe mobility    Assessment:     Sampson Holt is a 68 y.o. male admitted with a medical diagnosis of Closed intertrochanteric fracture of hip, left, initial encounter.  He presents with the following impairments/functional limitations: weakness, impaired endurance, impaired sensation, impaired functional mobility, gait instability, impaired self care skills, impaired balance, decreased lower extremity function, pain, decreased ROM, edema, impaired cardiopulmonary response to activity, orthopedic precautions.  Arrived to pts room with pt agitated and noted to be attempting OOB but unable to perform activity, RN at bedside reviewing safety and mobility protocols.  Pt very agitated and perseverating on needing to smoke and wants to get motorized wc and go home.  He is A & Ox4 and agreeable to PT with some encouragement but apprehensive regarding anticipated pain due to already high pain at baseline/rest.  He requires maxA for STS, modA for sup<>sit, attempted gait with RW but unsuccessful, pt states unable to bear weight on L LE. Continue to encourage mobility with assist.  Pt refusing placement/rehab following discharge and "just wants to go home", if discharged home he will need wc and bsc. The pt has a mobility limitation that significantly impairs his ability to participate in one or more mobility related activities of daily living (MRADL's) such as toileting, feeding, dressing, grooming, and bathing in customary locations  in the home.  The mobility limitation cannot be sufficiently resolved by the use of a cane or walker. The use of a manual " "wheelchair will significantly improve the patients ability to participate in MRADLs and the patient will use it on a regular basis in the home. Pt and family have expressed willingness to use a manual wheelchair in the home and the caregiver is available, willing, and able to provide assistance with the wheelchair when needed. Unable to fit wc through doors of home and will need BSC for toileting. However primary recommendation remains high intensity rehab as home is unsafe due to living alone without caregiver assist and severe mobility limitations.    Rehab Prognosis: Fair; patient would benefit from acute skilled PT services to address these deficits and reach maximum level of function.    Recent Surgery: Procedure(s) (LRB):  INSERTION, INTRAMEDULLARY HUI, FEMUR (Left) 1 Day Post-Op    Plan:     During this hospitalization, patient to be seen BID to address the identified rehab impairments via gait training, therapeutic activities, therapeutic exercises, neuromuscular re-education and progress toward the following goals:    Plan of Care Expires:  04/10/24    Subjective     Chief Complaint: L hip pain with all movement/positions, pt states "everyone is aggravating me, you dont know what its like, I dont want everyone telling me what to do and having all these wires"  Patient/Family Comments/goals: "I just wanna go outside and smoke"  Pain/Comfort:  Pain Rating 1: 10/10  Pain Addressed 1: Pre-medicate for activity, Reposition, Distraction, Cessation of Activity, Nurse notified  Pain Rating Post-Intervention 1: 10/10    Patients cultural, spiritual, Sikh conflicts given the current situation:      Living Environment:  Pt lives alone in mobile home with ramp to enter  Prior to admission, patients level of function was independent.  Equipment used at home: none.  DME owned (not currently used): none.  Upon discharge, patient will have assistance from TBD.    Objective:     Communicated with RNRussell prior to " session.  Patient found  attempting to get OOB with RN at bedside  with telemetry, peripheral IV, bed alarm  upon PT entry to room.    General Precautions: Standard, fall  Orthopedic Precautions:LLE weight bearing as tolerated   Braces:    Respiratory Status: Room air    Exams:  Cognitive Exam:  Patient is oriented to Person, Place, Time, Situation, and very agitated and anxious, fidgeting movements and restless.  Sensation:    -       Impaired  neuropathy bilat feet and light/touch    RLE ROM: WFL  RLE Strength: WFL  LLE ROM: pt holds hip guarded in slight hip flexion, L knee flexion  LLE Strength: 2/5hip and knee, 3/5 ankle    Functional Mobility:  Bed Mobility:     Supine to Sit: minimum assistance  Sit to Supine: moderate assistance  Transfers:     Sit to Stand:  maximal assistance with rolling walker  Gait: attempted single step forward but states too much pain, encouraged facilitated weight shift but pt refused due to being apprehensive about pain.      AM-PAC 6 CLICK MOBILITY  Total Score:10       Treatment & Education:  Pt was educated on the following: call light use, importance of OOB activity and functional mobility to negate the negative effects of prolonged bed rest during this hospitalization, safe transfers/ambulation and discharge planning recommendations/options.     Patient left HOB elevated with all lines intact, call button in reach, bed alarm on, and RN notified. Encouraged Pt to sit up in chair but pt adamantly refused.    GOALS:   Multidisciplinary Problems       Physical Therapy Goals          Problem: Physical Therapy    Goal Priority Disciplines Outcome Goal Variances Interventions   Physical Therapy Goal     PT, PT/OT Ongoing, Progressing     Description: 1. Supine to sit with Stand-by Assistance  2. Sit to stand transfer with Stand-by Assistance  3.. Bed to chair transfer with Supervision using Rolling Walker, WBAT LLE  4. Gait  x 100 feet with Minimal Assistance using Rolling Walker,  WBAT L LE                           History:     Past Medical History:   Diagnosis Date    Abdominal pain 2022    CHF (congestive heart failure)     Diabetes mellitus 2018    Emphysema lung     Endocarditis 2011    Hypertension     Stroke 2011    denies residual       Past Surgical History:   Procedure Laterality Date    ANGIOGRAM, CORONARY, WITH LEFT HEART CATHETERIZATION N/A 10/10/2023    Procedure: Angiogram, Coronary, with Left Heart Cath;  Surgeon: Dieudonne Ryan MD;  Location: [facility] CATH/EP LAB;  Service: Cardiology;  Laterality: N/A;    BACK SURGERY  1981    COLONOSCOPY N/A 2/23/2023    Procedure: COLONOSCOPY;  Surgeon: Jonn Cruz MD;  Location: [facility] ENDO;  Service: Endoscopy;  Laterality: N/A;    CORONARY ANGIOGRAPHY N/A 5/9/2023    Procedure: ANGIOGRAM, CORONARY ARTERY;  Surgeon: Antonio Kessler MD;  Location: [facility] CATH/EP LAB;  Service: Cardiology;  Laterality: N/A;    EXCISION OF HYDROCELE      x2    FRACTIONAL FLOW RESERVE (FFR), CORONARY  5/11/2023    Procedure: Fractional Flow [place] (FFR), Coronary;  Surgeon: Antonio Kessler MD;  Location: [facility] CATH/EP LAB;  Service: Cardiology;;    INGUINAL HERNIA REPAIR  1960    INSTANTANEOUS WAVE-FREE RATIO (IFR) N/A 10/10/2023    Procedure: Instantaneous Wave-Free Ratio (IFR);  Surgeon: Dieudonne Ryan MD;  Location: [facility] CATH/EP LAB;  Service: Cardiology;  Laterality: N/A;    INTRAVASCULAR ULTRASOUND, CORONARY N/A 5/9/2023    Procedure: Ultrasound-coronary;  Surgeon: Antonio Kessler MD;  Location: [facility] CATH/EP LAB;  Service: Cardiology;  Laterality: N/A;    IVUS, CORONARY  5/11/2023    Procedure: IVUS, Coronary;  Surgeon: Antonio Kessler MD;  Location: [facility] CATH/EP LAB;  Service: Cardiology;;    LEFT HEART CATHETERIZATION Left 5/11/2023    Procedure: Left heart cath;  Surgeon: Antonio Kessler MD;  Location: [facility] CATH/EP LAB;  Service: Cardiology;  Laterality: Left;    PERCUTANEOUS TRANSLUMINAL BALLOON ANGIOPLASTY OF CORONARY ARTERY  5/9/2023     Procedure: Angioplasty-coronary;  Surgeon: Antonio Kessler MD;  Location: [facility] CATH/EP LAB;  Service: Cardiology;;    RHINOPLASTY      STENT, DRUG ELUTING, SINGLE VESSEL, CORONARY  5/9/2023    Procedure: Stent, Drug Eluting, Single Vessel, Coronary;  Surgeon: Antonio Kessler MD;  Location: [facility] CATH/EP LAB;  Service: Cardiology;;    SURGICAL REMOVAL OF NODULE OF VOCAL CORD         Time Tracking:     PT Received On: 03/10/24  PT Start Time: 1422     PT Stop Time: 1453  PT Total Time (min): 31 min     Billable Minutes: Evaluation 8 and Therapeutic Activity 23      03/10/2024

## 2024-03-10 NOTE — PLAN OF CARE
Problem: Physical Therapy  Goal: Physical Therapy Goal  Description: 1. Supine to sit with Stand-by Assistance  2. Sit to stand transfer with Stand-by Assistance  3.. Bed to chair transfer with Supervision using Rolling Walker, WBAT LLE  4. Gait  x 100 feet with Minimal Assistance using Rolling Walker, WBAT L LE      Outcome: Ongoing, Progressing

## 2024-03-10 NOTE — CONSULTS
Thank you for your consult to [facility]. We have reviewed the patient chart. This patient does meet criteria for [facility] service at this time.  Will assume care on 03/10/24 at 7AM.    Sheila Malhotra MD

## 2024-03-10 NOTE — NURSING
"Before returning to patient room to notify him of NP's orders, this nurse was notified by PCT of patient getting very irritable ripping cardiac monitor off, and telling her "I dont care what the hell anyone says, I am going to do what I want to do and get my own damn self up in the morning to smoke a damn cigarette." Patient was caught during day shift with a cigarette in his hand by day nurse. Karina Ellington, NP notified. Orders given  "

## 2024-03-10 NOTE — CARE UPDATE
03/10/24 0752   Patient Assessment/Suction   Level of Consciousness (AVPU) alert   Respiratory Effort Normal;Unlabored   Expansion/Accessory Muscles/Retractions no use of accessory muscles;no retractions;expansion symmetric   All Lung Fields Breath Sounds diminished   Rhythm/Pattern, Respiratory no shortness of breath reported;depth regular;pattern regular;unlabored   Cough Frequency no cough   PRE-TX-O2   Device (Oxygen Therapy) nasal cannula   $ Is the patient on Low Flow Oxygen? Yes   Flow (L/min) 2  (home use)   SpO2 97 %   Pulse Oximetry Type Intermittent   $ Pulse Oximetry - Multiple Charge Pulse Oximetry - Multiple   Pulse 88   Resp 18   Positioning HOB elevated 45 degrees   Aerosol Therapy   $ Aerosol Therapy Charges PRN treatment not required   Incentive Spirometer   $ Incentive Spirometer Charges done with encouragement   Incentive Spirometer Predicted Level (mL) 2200   Administration (IS) mouthpiece utilized   Number of Repetitions (IS) 10   Level Incentive Spirometer (mL) 2500   Patient Tolerance (IS) good;no adverse signs/symptoms present   Education   $ Education Bronchodilator;15 min

## 2024-03-10 NOTE — ASSESSMENT & PLAN NOTE
-s/p IMN on 3/9 per Dr. Hdez  -Fall precautions  -PT/OT  -PRN analgesics  -ASA 81 BID  -Incentive spirometry

## 2024-03-11 LAB
ALBUMIN SERPL BCP-MCNC: 3.4 G/DL (ref 3.5–5.2)
ALP SERPL-CCNC: 65 U/L (ref 55–135)
ALT SERPL W/O P-5'-P-CCNC: 20 U/L (ref 10–44)
ANION GAP SERPL CALC-SCNC: 9 MMOL/L (ref 8–16)
AST SERPL-CCNC: 16 U/L (ref 10–40)
BASOPHILS # BLD AUTO: 0 K/UL (ref 0–0.2)
BASOPHILS NFR BLD: 0 % (ref 0–1.9)
BILIRUB SERPL-MCNC: 0.5 MG/DL (ref 0.1–1)
BUN SERPL-MCNC: 22 MG/DL (ref 8–23)
CALCIUM SERPL-MCNC: 8.9 MG/DL (ref 8.7–10.5)
CHLORIDE SERPL-SCNC: 101 MMOL/L (ref 95–110)
CO2 SERPL-SCNC: 25 MMOL/L (ref 23–29)
CREAT SERPL-MCNC: 1 MG/DL (ref 0.5–1.4)
DIFFERENTIAL METHOD BLD: ABNORMAL
EOSINOPHIL # BLD AUTO: 0 K/UL (ref 0–0.5)
EOSINOPHIL NFR BLD: 0 % (ref 0–8)
ERYTHROCYTE [DISTWIDTH] IN BLOOD BY AUTOMATED COUNT: 13.1 % (ref 11.5–14.5)
EST. GFR  (NO RACE VARIABLE): >60 ML/MIN/1.73 M^2
GLUCOSE SERPL-MCNC: 298 MG/DL (ref 70–110)
HCT VFR BLD AUTO: 31.1 % (ref 40–54)
HGB BLD-MCNC: 10.6 G/DL (ref 14–18)
IMM GRANULOCYTES # BLD AUTO: 0.07 K/UL (ref 0–0.04)
IMM GRANULOCYTES NFR BLD AUTO: 0.9 % (ref 0–0.5)
LYMPHOCYTES # BLD AUTO: 0.4 K/UL (ref 1–4.8)
LYMPHOCYTES NFR BLD: 4.9 % (ref 18–48)
MCH RBC QN AUTO: 34.1 PG (ref 27–31)
MCHC RBC AUTO-ENTMCNC: 34.1 G/DL (ref 32–36)
MCV RBC AUTO: 100 FL (ref 82–98)
MONOCYTES # BLD AUTO: 0.3 K/UL (ref 0.3–1)
MONOCYTES NFR BLD: 4.2 % (ref 4–15)
NEUTROPHILS # BLD AUTO: 7.3 K/UL (ref 1.8–7.7)
NEUTROPHILS NFR BLD: 90 % (ref 38–73)
NRBC BLD-RTO: 0 /100 WBC
PLATELET # BLD AUTO: 170 K/UL (ref 150–450)
PMV BLD AUTO: 9.6 FL (ref 9.2–12.9)
POCT GLUCOSE: 239 MG/DL (ref 70–110)
POCT GLUCOSE: 247 MG/DL (ref 70–110)
POCT GLUCOSE: 288 MG/DL (ref 70–110)
POTASSIUM SERPL-SCNC: 5.1 MMOL/L (ref 3.5–5.1)
PROT SERPL-MCNC: 6.5 G/DL (ref 6–8.4)
RBC # BLD AUTO: 3.11 M/UL (ref 4.6–6.2)
SODIUM SERPL-SCNC: 135 MMOL/L (ref 136–145)
WBC # BLD AUTO: 8.09 K/UL (ref 3.9–12.7)

## 2024-03-11 PROCEDURE — 25000003 PHARM REV CODE 250: Performed by: INTERNAL MEDICINE

## 2024-03-11 PROCEDURE — 36415 COLL VENOUS BLD VENIPUNCTURE: CPT | Performed by: ORTHOPAEDIC SURGERY

## 2024-03-11 PROCEDURE — 99900035 HC TECH TIME PER 15 MIN (STAT)

## 2024-03-11 PROCEDURE — 27000221 HC OXYGEN, UP TO 24 HOURS

## 2024-03-11 PROCEDURE — 97530 THERAPEUTIC ACTIVITIES: CPT

## 2024-03-11 PROCEDURE — 63600175 PHARM REV CODE 636 W HCPCS: Mod: JZ,JG | Performed by: ORTHOPAEDIC SURGERY

## 2024-03-11 PROCEDURE — 94761 N-INVAS EAR/PLS OXIMETRY MLT: CPT

## 2024-03-11 PROCEDURE — 25000003 PHARM REV CODE 250: Performed by: STUDENT IN AN ORGANIZED HEALTH CARE EDUCATION/TRAINING PROGRAM

## 2024-03-11 PROCEDURE — 11000001 HC ACUTE MED/SURG PRIVATE ROOM

## 2024-03-11 PROCEDURE — 85025 COMPLETE CBC W/AUTO DIFF WBC: CPT | Performed by: ORTHOPAEDIC SURGERY

## 2024-03-11 PROCEDURE — 63600175 PHARM REV CODE 636 W HCPCS: Performed by: INTERNAL MEDICINE

## 2024-03-11 PROCEDURE — 25000003 PHARM REV CODE 250: Performed by: ORTHOPAEDIC SURGERY

## 2024-03-11 PROCEDURE — 80053 COMPREHEN METABOLIC PANEL: CPT | Performed by: ORTHOPAEDIC SURGERY

## 2024-03-11 PROCEDURE — 94799 UNLISTED PULMONARY SVC/PX: CPT | Mod: XB

## 2024-03-11 RX ORDER — CARVEDILOL 6.25 MG/1
6.25 TABLET ORAL 2 TIMES DAILY
Status: DISCONTINUED | OUTPATIENT
Start: 2024-03-11 | End: 2024-03-13 | Stop reason: HOSPADM

## 2024-03-11 RX ORDER — CLOPIDOGREL BISULFATE 75 MG/1
75 TABLET ORAL DAILY
Status: DISCONTINUED | OUTPATIENT
Start: 2024-03-12 | End: 2024-03-13 | Stop reason: HOSPADM

## 2024-03-11 RX ORDER — AMLODIPINE BESYLATE 5 MG/1
5 TABLET ORAL DAILY
Status: DISCONTINUED | OUTPATIENT
Start: 2024-03-11 | End: 2024-03-13 | Stop reason: HOSPADM

## 2024-03-11 RX ADMIN — MORPHINE SULFATE 30 MG: 10 SOLUTION ORAL at 08:03

## 2024-03-11 RX ADMIN — DEXAMETHASONE 4 MG: 4 TABLET ORAL at 09:03

## 2024-03-11 RX ADMIN — LEVOFLOXACIN 500 MG: 250 TABLET, FILM COATED ORAL at 09:03

## 2024-03-11 RX ADMIN — ATORVASTATIN CALCIUM 40 MG: 40 TABLET, FILM COATED ORAL at 09:03

## 2024-03-11 RX ADMIN — CARVEDILOL 3.12 MG: 3.12 TABLET, FILM COATED ORAL at 09:03

## 2024-03-11 RX ADMIN — AMLODIPINE BESYLATE 5 MG: 5 TABLET ORAL at 12:03

## 2024-03-11 RX ADMIN — ISOSORBIDE MONONITRATE 30 MG: 30 TABLET, EXTENDED RELEASE ORAL at 09:03

## 2024-03-11 RX ADMIN — MORPHINE SULFATE 30 MG: 10 SOLUTION ORAL at 09:03

## 2024-03-11 RX ADMIN — FOLIC ACID 1 MG: 1 TABLET ORAL at 09:03

## 2024-03-11 RX ADMIN — INSULIN DETEMIR 25 UNITS: 100 INJECTION, SOLUTION SUBCUTANEOUS at 09:03

## 2024-03-11 RX ADMIN — ACETAMINOPHEN 650 MG: 325 TABLET ORAL at 11:03

## 2024-03-11 RX ADMIN — DOCUSATE SODIUM AND SENNOSIDES 1 TABLET: 8.6; 5 TABLET, FILM COATED ORAL at 09:03

## 2024-03-11 RX ADMIN — MORPHINE SULFATE 30 MG: 10 SOLUTION ORAL at 03:03

## 2024-03-11 RX ADMIN — DOCUSATE SODIUM AND SENNOSIDES 1 TABLET: 8.6; 5 TABLET, FILM COATED ORAL at 08:03

## 2024-03-11 RX ADMIN — THIAMINE HCL TAB 100 MG 100 MG: 100 TAB at 09:03

## 2024-03-11 RX ADMIN — MORPHINE SULFATE 4 MG: 4 INJECTION, SOLUTION INTRAMUSCULAR; INTRAVENOUS at 12:03

## 2024-03-11 RX ADMIN — MORPHINE SULFATE 4 MG: 4 INJECTION, SOLUTION INTRAMUSCULAR; INTRAVENOUS at 11:03

## 2024-03-11 RX ADMIN — METHOCARBAMOL 500 MG: 500 TABLET ORAL at 05:03

## 2024-03-11 RX ADMIN — INSULIN ASPART 6 UNITS: 100 INJECTION, SOLUTION INTRAVENOUS; SUBCUTANEOUS at 07:03

## 2024-03-11 RX ADMIN — ASPIRIN 81 MG CHEWABLE TABLET 81 MG: 81 TABLET CHEWABLE at 09:03

## 2024-03-11 RX ADMIN — FAMOTIDINE 20 MG: 20 TABLET, FILM COATED ORAL at 08:03

## 2024-03-11 RX ADMIN — CARVEDILOL 6.25 MG: 6.25 TABLET, FILM COATED ORAL at 08:03

## 2024-03-11 RX ADMIN — METHOCARBAMOL 500 MG: 500 TABLET ORAL at 11:03

## 2024-03-11 RX ADMIN — ACETAMINOPHEN 650 MG: 325 TABLET ORAL at 05:03

## 2024-03-11 RX ADMIN — THERA TABS 1 TABLET: TAB at 09:03

## 2024-03-11 RX ADMIN — ASPIRIN 81 MG CHEWABLE TABLET 81 MG: 81 TABLET CHEWABLE at 08:03

## 2024-03-11 RX ADMIN — MORPHINE SULFATE 4 MG: 4 INJECTION, SOLUTION INTRAMUSCULAR; INTRAVENOUS at 05:03

## 2024-03-11 RX ADMIN — INSULIN ASPART 2 UNITS: 100 INJECTION, SOLUTION INTRAVENOUS; SUBCUTANEOUS at 10:03

## 2024-03-11 RX ADMIN — INSULIN ASPART 4 UNITS: 100 INJECTION, SOLUTION INTRAVENOUS; SUBCUTANEOUS at 11:03

## 2024-03-11 RX ADMIN — FAMOTIDINE 20 MG: 20 TABLET, FILM COATED ORAL at 09:03

## 2024-03-11 NOTE — PLAN OF CARE
Problem: Adult Inpatient Plan of Care  Goal: Plan of Care Review  Outcome: Ongoing, Progressing   Supine with HOB up 40. POC and medications reviewed with pt. Verbalized understanding. Saline loc in right wrist with DDI. No redness  or swelling at site. Surgical dsg intact to left hip without drainage  Call light in reach. Bed in lowest position with brakes locked. Will continue to monitor.   Problem: Adult Inpatient Plan of Care  Goal: Optimal Comfort and Wellbeing  Outcome: Ongoing, Progressing   Q 2 hourly rounds made through out shift and PRN meds given per MD order.   Problem: Diabetes Comorbidity  Goal: Blood Glucose Level Within Targeted Range  Outcome: Ongoing, Progressing   CBGs monitored through out shift and insulin given per MD order.   Problem: Adult Inpatient Plan of Care  Goal: Patient-Specific Goal (Individualized)  Outcome: Ongoing, Progressing   Surgical dsg to left hip intact and without drainage.

## 2024-03-11 NOTE — PT/OT/SLP PROGRESS
Physical Therapy Treatment    Patient Name:  Sampson Holt   MRN:  [id number]    Recommendations:     Discharge Recommendations: High Intensity Therapy  Discharge Equipment Recommendations: wheelchair, walker, rolling  Barriers to discharge: Decreased caregiver support    Assessment:     Sampson Holt is a 68 y.o. male admitted with a medical diagnosis of Closed intertrochanteric fracture of hip, left, initial encounter.  He presents with the following impairments/functional limitations: weakness, impaired endurance, impaired sensation, impaired functional mobility, gait instability, impaired self care skills, impaired balance, decreased lower extremity function, pain, decreased ROM, edema, impaired cardiopulmonary response to activity, orthopedic precautions .    Pt seen seated EOB. Pt educated on HEP. Pt requiring min assist to stand with Rw and took 4 steps with lots of cueing and education for safety.  Pt concerned about his 9 cats at home that has not been fed x 4 days. Pt requesting wheelchair as it is difficult for him to walk at this time.    The pt has a mobility limitation that significantly impairs his ability to participate in one or more mobility related activities of daily living (MRADL's) such as toileting, feeding, dressing, grooming, and bathing in customary locations  in the home.  The mobility limitation cannot be sufficiently resolved by the use of a cane or walker. The use of a manual wheelchair will significantly improve the patients ability to participate in MRADLs and the patient will use it on a regular basis in the home. Pt have expressed willingness to use a manual wheelchair in the home.      Rehab Prognosis: Fair; patient would benefit from acute skilled PT services to address these deficits and reach maximum level of function.    Recent Surgery: Procedure(s) (LRB):  INSERTION, INTRAMEDULLARY HUI, FEMUR (Left) 2 Days Post-Op    Plan:     During this hospitalization, patient to be seen  BID to address the identified rehab impairments via gait training, therapeutic activities, therapeutic exercises, neuromuscular re-education and progress toward the following goals:    Plan of Care Expires:  04/10/24    Subjective   Stated that his car is downstairs and will drive home  Chief Complaint: wanting to smoke  Patient/Family Comments/goals: get home  Pain/Comfort:  Pain Rating 1:  (not rated)  Location - Side 1: Left  Location 1: hip  Pain Addressed 1: Pre-medicate for activity, Reposition, Cessation of Activity, Nurse notified      Objective:     Communicated with nurse Fay prior to session.  Patient found sitting edge of bed with   upon PT entry to room.     General Precautions: Standard, fall  Orthopedic Precautions: LLE weight bearing as tolerated  Braces: N/A  Respiratory Status: Room air     Functional Mobility:  Bed Mobility:     Sit to Supine: minimum assistance  Transfers:     Sit to Stand:  minimum assistance with rolling walker  Gait: 4steps with RW min assist with RW - limited by pain      AM-PAC 6 CLICK MOBILITY  Turning over in bed (including adjusting bedclothes, sheets and blankets)?: 3  Sitting down on and standing up from a chair with arms (e.g., wheelchair, bedside commode, etc.): 3  Moving from lying on back to sitting on the side of the bed?: 3  Moving to and from a bed to a chair (including a wheelchair)?: 1  Need to walk in hospital room?: 1  Climbing 3-5 steps with a railing?: 1  Basic Mobility Total Score: 12       Treatment & Education:  Patient was educated on the importance of OOB activity and functional mobility to negate negative effects of prolonged bed rest during hospitalization, safe transfers and ambulation, and D/C planning   Thera ex with AP,GS  Stood with RW and few steps with RW. Lots of cueing for safety    Patient left left sidelying with all lines intact, call button in reach, and nurse Fay notified..    GOALS:   Multidisciplinary Problems       Physical Therapy  Goals          Problem: Physical Therapy    Goal Priority Disciplines Outcome Goal Variances Interventions   Physical Therapy Goal     PT, PT/OT Ongoing, Progressing     Description: 1. Supine to sit with Stand-by Assistance  2. Sit to stand transfer with Stand-by Assistance  3.. Bed to chair transfer with Supervision using Rolling Walker, WBAT LLE  4. Gait  x 100 feet with Minimal Assistance using Rolling Walker, WBAT L LE                           Time Tracking:     PT Received On: 03/11/24  PT Start Time: 1318     PT Stop Time: 1340  PT Total Time (min): 22 min     Billable Minutes: Therapeutic Activity 22    Treatment Type: Treatment  PT/PTA: PT     Number of PTA visits since last PT visit: 0     03/11/2024

## 2024-03-11 NOTE — ASSESSMENT & PLAN NOTE
-Chronic, controlled. Latest blood pressure and vitals reviewed-     Temp:  [97.1 °F (36.2 °C)-97.8 °F (36.6 °C)]   Pulse:  []   Resp:  [15-22]   BP: (132-186)/()   SpO2:  [89 %-97 %] .   Home meds for hypertension were reviewed and noted below.   Hypertension Medications                carvediloL (COREG) 3.125 MG tablet Take 3.125 mg by mouth 2 (two) times daily with meals.        losartan (COZAAR) 50 MG tablet Take 2 tablets (100 mg total) by mouth once daily.     nitroGLYCERIN (NITROSTAT) 0.4 MG SL tablet Place 1 tablet (0.4 mg total) under the tongue every 5 (five) minutes as needed for Chest pain.            While in the hospital, will manage blood pressure as follows; Continue home antihypertensive regimen - not taking imdur, amlodipine or metoprolol; amlodipine resumed on 3/11 due to elevated BP    Will utilize p.r.n. blood pressure medication only if patient's blood pressure greater than 180/110 and he develops symptoms such as worsening chest pain or shortness of breath.

## 2024-03-11 NOTE — PLAN OF CARE
Plan of care reviewed with patient, verbalized understanding. IV intact and patent, no s/s infection or infiltration noted to site. ABX given as ordered. Meds given per MAR. Ambulated to bathroom with standby assist. Pain managed with PRN medication. BG monitored and covered per sliding scale orders. IS at bedside and encouraged use. NAD noted. Purposeful q2hr rounding done. Safety maintained with side rails up x3, bed wheels locked, bed in lowest position, bed alarm set, slip resistant socks maintained, and call light with in reach of patient. Patient educated to call for assistance when needed, verbalized understanding. Patient remains free from falls. No further needs expressed at this time. Will continue to monitor.     Problem: Adult Inpatient Plan of Care  Goal: Plan of Care Review  Outcome: Ongoing, Progressing  Goal: Patient-Specific Goal (Individualized)  Outcome: Ongoing, Progressing  Goal: Absence of Hospital-Acquired Illness or Injury  Outcome: Ongoing, Progressing  Goal: Optimal Comfort and Wellbeing  Outcome: Ongoing, Progressing  Goal: Readiness for Transition of Care  Outcome: Ongoing, Progressing     Problem: Diabetes Comorbidity  Goal: Blood Glucose Level Within Targeted Range  Outcome: Ongoing, Progressing     Problem: Fall Injury Risk  Goal: Absence of Fall and Fall-Related Injury  Outcome: Ongoing, Progressing     Problem: Skin Injury Risk Increased  Goal: Skin Health and Integrity  Outcome: Ongoing, Progressing

## 2024-03-11 NOTE — PT/OT/SLP PROGRESS
Occupational Therapy      Patient Name:  Sampson Holt   MRN:  [id number]    Attempted OT evaluation twice (@0921 and @1201). Patient not available on both attempts.    3/11/2024

## 2024-03-11 NOTE — PLAN OF CARE
Problem: Physical Therapy  Goal: Physical Therapy Goal  Description: 1. Supine to sit with Stand-by Assistance  2. Sit to stand transfer with Stand-by Assistance  3.. Bed to chair transfer with Supervision using Rolling Walker, WBAT LLE  4. Gait  x 100 feet with Minimal Assistance using Rolling Walker, WBAT L LE      Outcome: Ongoing, Progressing   Pt seen for thera ex and HEP education in supine. Min assist mobility and stood with RW, took 4 steps limited by pain. IPR

## 2024-03-11 NOTE — PLAN OF CARE
Met with pt at bedside again to follow up regarding placement. Pt is now considering SNF but still unsure about who will care for his animals. States he has a friend taking care of them today but unsure about after that. States he will have to stay here until he is strong enough to go home. I explained that he cannot stay here for just PT/OT once medically clear and if wants to go somewhere to get stronger then we can start process for SNF. Pt again tells me no but later in conversation states he needs to go somewhere. Notified him about WC covered with $27 copay. [facility]. I explained if [facility] then cannot wait here for [facility] to [facility] then [facility] due to their process taking days. Pt willing to pay the $27 to get WC faster.     SNF process started while pt makes decision.   LOCET called in, faxed completed PSSR to [facility]. Awaiting 142    [facility]bsc           03/11/24 1600   Post-Acute Status   Post-Acute Authorization Placement   Post-Acute Placement Status Referrals Sent   HME Status Referrals Sent   Home Health Status Patient declined/refused

## 2024-03-11 NOTE — PROGRESS NOTES
[facility] Medicine  Telemedicine Progress Note    Patient Name: Sampson Holt  MRN: [id number]  Patient Class: IP- Inpatient   Admission Date: 3/8/2024  Length of Stay: 3 days  Attending Physician: Sheila Malhotra MD  Primary Care Provider: Damian Vergara MD          Subjective:     Principal Problem:Closed intertrochanteric fracture of hip, left, initial encounter        HPI:  Sampson Holt is a 68 year old male with a previous medical history of CHF, DM II with long term use of insulin, Emphysema, Endocarditis, CAD with stent placement on daily ASA and plavix, Stroke, HLD, and hypertension who presents for left hip pain after trip and fall. Patient reports he was carrying gumbo and tripped over a box landing on his left side. Denies striking head or LOC. Initial ED imaging showed Intertrochanteric left femoral fracture for which Dr. Hdez was consulted and reports probably procedure tomorrow and to keep patient NPO at midnight . CBC and CMP unremarkable. Upon evaluation patient noted to still be having severe left hip pain so an additional one time dose of IV dilaudid was ordered. +2 bilateral pedal pulses. Patient unable to assist in full medication reconciliation but does endorse he is compliant with taking his aspirin and plavix daily last taken on 3/8/24.  Patient to be admitted by hospital medicine for further evaluation and management.     Overview/Hospital Course:  Sampson Holt is a 68 year old male with a past medical history of HTN, DM, COPD, HLD, tobacco use, EtOH abuse, THC use, CAD and BPH who presented with a left hip fracture after a fall. Orthopedic Surgery took the patient to the OR 3/9 for IMN placement. He is on ASA 81 BID. PT/OT has been consulted.      This follow-up encounter was provided through telemedicine to address  Closed intertrochanteric fracture of hip, left, initial encounter present on admission.  The patient location is: [facility]/[address]  admitted 3/8/2024  3:33 PM.      Interval History/Overnight Events:     Patient is able to provide adequate history.    Agitated yesterday after unable to go downstairs to smoke.  Nicotine patch made him jittery; he is focused on finding someone to feed his pets at home; he did taek 4 steps with PT today; morphine po alleviating pain not lasting long. Glucoses remain elevated but steroids now completed;  BP elevated when patient agitated.       Review of Systems   Constitutional:  Positive for activity change and fatigue. Negative for appetite change and fever.   Respiratory:  Negative for cough and shortness of breath.    Gastrointestinal:  Negative for vomiting.   Musculoskeletal:  Positive for arthralgias and myalgias.   Psychiatric/Behavioral:  Positive for agitation and dysphoric mood.           I have reviewed the following on 03/11/2024:    Data  Details     [x]   Lab results reviewed   WBC 8; Hgb 10.6; Na 135; AST/ALT nl; Cr 1; glucose 175-239    []   Micro reports reviewed      []   Pathology reports reviewed      []   Imaging reports reviewed      []   Cardiology Procedure reports reviewed      []   Non- records/CareEverywhere notes reviewed      []  Tests/studies orders placed or verified       []  Independently viewed/assessed       [x]  03/11/2024 Discussion of:  Discharge planning with case management       Inpatient Medications reviewed and prescribed for management of current problems:  Scheduled Meds:   acetaminophen  650 mg Oral Q6H    amLODIPine  5 mg Oral Daily    aspirin  81 mg Oral BID    atorvastatin  40 mg Oral Daily    carvediloL  3.125 mg Oral BID    famotidine  20 mg Oral BID    folic acid  1 mg Oral Daily    insulin detemir U-100  25 Units Subcutaneous BID    isosorbide mononitrate  30 mg Oral Daily    levoFLOXacin  500 mg Oral Daily    methocarbamoL  500 mg Oral Q6H    multivitamin  1 tablet Oral Daily    nicotine  1 patch Transdermal Daily    senna-docusate 8.6-50 mg  1 tablet Oral  BID    thiamine  100 mg Oral Daily     Continuous Infusions:  PRN Meds:.acetaminophen, albuterol-ipratropium, aluminum-magnesium hydroxide-simethicone, dextrose 10%, dextrose 10%, diphenhydrAMINE, glucagon (human recombinant), glucose, glucose, hydrALAZINE, hydrOXYzine HCL, insulin aspart U-100, magnesium oxide, magnesium oxide, melatonin, morphine, morphine, naloxone, nicotine, ondansetron, potassium bicarbonate, potassium bicarbonate, potassium bicarbonate, prochlorperazine, simethicone, sodium chloride 0.9%, sodium chloride 0.9%      Objective:     Temp:  [97.1 °F (36.2 °C)-97.8 °F (36.6 °C)] 97.1 °F (36.2 °C)  Pulse:  [] 88  Resp:  [15-22] 18  SpO2:  [89 %-97 %] 94 %  BP: (132-186)/() 142/72      Intake/Output Summary (Last 24 hours) at 3/11/2024 1652  Last data filed at 3/11/2024 1200  Gross per 24 hour   Intake 810 ml   Output 2175 ml   Net -1365 ml          Body mass index is 26.82 kg/m².    Physical Exam  Vitals and nursing note reviewed.   Constitutional:       General: He is not in acute distress.     Appearance: Normal appearance. He is normal weight. He is not ill-appearing.   HENT:      Head: Normocephalic and atraumatic.   Cardiovascular:      Rate and Rhythm: Normal rate.   Pulmonary:      Effort: Pulmonary effort is normal. No accessory muscle usage or respiratory distress.   Neurological:      General: No focal deficit present.      Mental Status: He is alert and oriented to person, place, and time.      Cranial Nerves: No cranial nerve deficit.      Motor: No weakness.   Psychiatric:         Attention and Perception: Attention normal.         Mood and Affect: Affect is labile.         Speech: Speech normal.         Behavior: Behavior is cooperative.          Labs: All labs within the last 24 hours were reviewed.   Recent Results (from the past 24 hour(s))   POCT glucose    Collection Time: 03/10/24  4:56 PM   Result Value Ref Range    POCT Glucose 188 (H) 70 - 110 mg/dL   POCT glucose     Collection Time: 03/10/24  8:33 PM   Result Value Ref Range    POCT Glucose 209 (H) 70 - 110 mg/dL   CBC with Automated Differential    Collection Time: 03/11/24  4:31 AM   Result Value Ref Range    WBC 8.09 3.90 - 12.70 K/uL    RBC 3.11 (L) 4.60 - 6.20 M/uL    Hemoglobin 10.6 (L) 14.0 - 18.0 g/dL    Hematocrit 31.1 (L) 40.0 - 54.0 %     (H) 82 - 98 fL    MCH 34.1 (H) 27.0 - 31.0 pg    MCHC 34.1 32.0 - 36.0 g/dL    RDW 13.1 11.5 - 14.5 %    Platelets 170 150 - 450 K/uL    MPV 9.6 9.2 - 12.9 fL    Immature Granulocytes 0.9 (H) 0.0 - 0.5 %    Gran # (ANC) 7.3 1.8 - 7.7 K/uL    Immature Grans (Abs) 0.07 (H) 0.00 - 0.04 K/uL    Lymph # 0.4 (L) 1.0 - 4.8 K/uL    Mono # 0.3 0.3 - 1.0 K/uL    Eos # 0.0 0.0 - 0.5 K/uL    Baso # 0.00 0.00 - 0.20 K/uL    nRBC 0 0 /100 WBC    Gran % 90.0 (H) 38.0 - 73.0 %    Lymph % 4.9 (L) 18.0 - 48.0 %    Mono % 4.2 4.0 - 15.0 %    Eosinophil % 0.0 0.0 - 8.0 %    Basophil % 0.0 0.0 - 1.9 %    Differential Method Automated    Comprehensive Metabolic Panel (CMP)    Collection Time: 03/11/24  4:31 AM   Result Value Ref Range    Sodium 135 (L) 136 - 145 mmol/L    Potassium 5.1 3.5 - 5.1 mmol/L    Chloride 101 95 - 110 mmol/L    CO2 25 23 - 29 mmol/L    Glucose 298 (H) 70 - 110 mg/dL    BUN 22 8 - 23 mg/dL    Creatinine 1.0 0.5 - 1.4 mg/dL    Calcium 8.9 8.7 - 10.5 mg/dL    Total Protein 6.5 6.0 - 8.4 g/dL    Albumin 3.4 (L) 3.5 - 5.2 g/dL    Total Bilirubin 0.5 0.1 - 1.0 mg/dL    Alkaline Phosphatase 65 55 - 135 U/L    AST 16 10 - 40 U/L    ALT 20 10 - 44 U/L    eGFR >60 >60 mL/min/1.73 m^2    Anion Gap 9 8 - 16 mmol/L   POCT glucose    Collection Time: 03/11/24 11:50 AM   Result Value Ref Range    POCT Glucose 239 (H) 70 - 110 mg/dL        Lab Results   Component Value Date    [id number] Negative 01/04/2024       Recent Labs   Lab 03/09/24  0342 03/10/24  0415 03/11/24  0431   WBC 9.00 9.19 8.09   LYMPH 16.9*  1.5 8.1*  0.7* 4.9*  0.4*   HGB 13.2* 10.6* 10.6*   HCT 38.6* 30.8*  "31.1*    179 170       Recent Labs   Lab 03/08/24  1702 03/09/24  0342 03/10/24  0415 03/11/24  0431    137 133* 135*   K 3.5 3.8 4.5 5.1    102 101 101   CO2 23 24 23 25   BUN 23 23 22 22   CREATININE 0.9 0.9 1.0 1.0   GLU 72 159* 304* 298*   CALCIUM 9.4 8.9 8.4* 8.9   MG 2.0  --   --   --        Recent Labs   Lab 03/09/24  0342 03/10/24  0415 03/11/24  0431   ALKPHOS 71 58 65   ALT 22 19 20   AST 18 11 16   ALBUMIN 3.7 3.3* 3.4*   PROT 6.6 5.9* 6.5   BILITOT 0.5 0.8 0.5   INR 1.1  --   --           No results for input(s): "DDIMER", "FERRITIN", "CRP", "LDH", "BNP", "TROPONINI", "CPK" in the last 72 hours.    Invalid input(s): "PROCALCITONIN"        Microbiology: All microbiology updates for the past 24 hours have been reviewed.  Microbiology Results (last 7 days)       ** No results found for the last 168 hours. **              Imaging All imaging within the last 24 hours was reviewed.   ECG Results              EKG 12-LEAD (Final result)  Result time 03/11/24 14:52:18      Final result by Unknown User (03/11/24 14:52:18)                                        Results for orders placed during the hospital encounter of 10/07/23    Echo    Interpretation Summary    Left Ventricle: The left ventricle is normal in size. Mildly increased wall thickness. There is concentric remodeling. Septal motion is normal. There is normal systolic function. Ejection fraction by visual approximation is 60%.    Right Ventricle: Normal right ventricular cavity size. Wall thickness is normal. Right ventricle wall motion  is normal. Systolic function is normal.    Mitral Valve: There is mild posterior mitral annular calcification present.    IVC/SVC: Normal venous pressure at 3 mmHg.    A limited echo was performed using limited 2D and color flow Doppler      SURG FL Surgery Fluoro Usage  See OP Notes for results.     IMPRESSION: See OP Notes for results.     This procedure was auto-finalized by: Virtual " Radiologist  X-Ray Chest 1 View  Narrative: EXAMINATION:  XR CHEST 1 VIEW    CLINICAL HISTORY:  pre op;    TECHNIQUE:  Single frontal view of the chest was performed.    COMPARISON:  01/04/2024    FINDINGS:  The heart is not enlarged.  The lungs are well expanded and clear.  The costophrenic sulci are sharp.  Impression: No acute cardiopulmonary disease    Electronically signed by: Rylee Kearney MD  Date:    03/09/2024  Time:    09:09            Assessment/Plan:      * Closed intertrochanteric fracture of hip, left, initial encounter  -s/p IMN on 3/9 per Dr. Hdez  -Fall precautions  -PT/OT  -PRN analgesics  -ASA 81 BID  -Incentive spirometry      Acute sinusitis  Being treated with levaquin and medrol dose pack prior to admit  -recent hives due to amoxicillin  -ordered completion of 2 days of Medrol Dosepak (completed) and 4 days of Levaquin  -ok to continue nasal saline from home      Tetrahydrocannabinol (THC) dependence  -Patient to be counseled on cessation      ETOH abuse  -Thiamine, folic acid and MVI  -CIWA Q4H  -Patient to be counseled on cessation      Hyperlipidemia  Continue statin therapy      CAD (coronary artery disease)  -hx of drug eluting stent in 5/2023  -ASA  -Statin  -Coreg  -Telemetry  -may resume plavix per Ortho    Cigarette smoker  -Patient counseled on cessation  -referral to cessation program if amenable          Type 2 diabetes mellitus with circulatory disorder, with long-term current use of insulin  Patient's FSGs are controlled on current medication regimen. He reports he takes 35 units of Lantus subQ BID  Last A1c reviewed-   Lab Results   Component Value Date    HGBA1C 6.1 03/08/2024     Most recent fingerstick glucose reviewed-   Recent Labs   Lab 03/10/24  2033 03/11/24  1150   POCTGLUCOSE 209* 239*       Current correctional scale  Medium  Increase anti-hyperglycemic dose as follows-   Antihyperglycemics (From admission, onward)      Start     Stop Route Frequency Ordered     03/11/24 2100  insulin detemir U-100 (Levemir) pen 30 Units         -- SubQ 2 times daily 03/11/24 1704    03/08/24 1822  insulin aspart U-100 pen 0-10 Units         -- SubQ Before meals & nightly PRN 03/08/24 1731          Hold Oral hypoglycemics while patient is in the hospital.       COPD (chronic obstructive pulmonary disease)  -continue PRN Duonebs  -monitor for symptoms    Benign essential HTN  -Chronic, controlled. Latest blood pressure and vitals reviewed-     Temp:  [97.1 °F (36.2 °C)-97.8 °F (36.6 °C)]   Pulse:  []   Resp:  [15-22]   BP: (132-186)/()   SpO2:  [89 %-97 %] .   Home meds for hypertension were reviewed and noted below.   Hypertension Medications                carvediloL (COREG) 3.125 MG tablet Take 3.125 mg by mouth 2 (two) times daily with meals.        losartan (COZAAR) 50 MG tablet Take 2 tablets (100 mg total) by mouth once daily.     nitroGLYCERIN (NITROSTAT) 0.4 MG SL tablet Place 1 tablet (0.4 mg total) under the tongue every 5 (five) minutes as needed for Chest pain.            While in the hospital, will manage blood pressure as follows; Continue home antihypertensive regimen - not taking imdur, amlodipine or metoprolol; amlodipine resumed on 3/11 due to elevated BP    Will utilize p.r.n. blood pressure medication only if patient's blood pressure greater than 180/110 and he develops symptoms such as worsening chest pain or shortness of breath.      VTE Risk Mitigation (From admission, onward)           Ordered     IP VTE LOW RISK PATIENT  Once         03/08/24 1731     Place sequential compression device  Until discontinued         03/08/24 1731                    High Risk Conditions:  Patient is currently receiving parenteral controlled substances: Morphine     I have completed this tele-visit with the assistance of a telepresenter.    The attending portion of this evaluation, treatment, and documentation was performed per Sheila Malhotra MD via Telemedicine AudioVisual  using the secure [employer] software platform with 2 way audio/video. The provider was located off-site and the patient is located in the hospital. The aforementioned video software was utilized to document the relevant history and physical exam    Sheila Malhotra MD  Department of Hospital Medicine   [facility]/Surg

## 2024-03-11 NOTE — RESPIRATORY THERAPY
02 saturation 89% on room air.  Patient had 02 off at this time.  02 at 2lpm .   Encouraged pt. To put 02 on due to saturation.  Patient stated no tx needed at this time.  Patient averaging 4000ml on IS.

## 2024-03-11 NOTE — PLAN OF CARE
"Met with pt at bedside to discuss DC options. Pt very adamant about discharging to home because he has 9 cats and has no one to feed them. Pt verbalized his frustration that he cannot leave the floor to smoke or feed his cats- CM attempted to explain reasoning, pt not willing to listen. I attempted to explain HH VS Rehab VS SNF. Pt refused all 3. States he he cannot leave his animals for any longer and he cannot have HH because his trailer is a mess and one of his animals is very aggressive. I attempted to explain outpt PT/OT and pt kept cutting me off stating that he knows what it is and he's been there before. I asked about transportation and pt kept stating he'll figure it out. Would not accept any resources.   CM asked about equip. Pt states he needs an electric "cart". I asked if he meant WC and pt stated no, the electric carts at [employer]. I explained that we cannot get electric carts or electric WC from hospital setting but can assist with getting WC. Pt stated "no".. states he WC will not fit in his trailer. I asked about smaller WC like 16" or even transport WC and pt again stated "no". I asked about any equipment- pt again states he needs a cart and will call someone to pick one up for him. Pt is agreeable to RW if approved by insurance         03/11/24 1143   Post-Acute Status   Post-Acute Authorization Placement;Home Health;HME   Post-Acute Placement Status Patient declined/refused   HME Status Referrals Sent   Home Health Status Patient declined/refused   Discharge Plan   Discharge Plan A Home       "

## 2024-03-11 NOTE — ASSESSMENT & PLAN NOTE
Patient's FSGs are controlled on current medication regimen. He reports he takes 35 units of Lantus subQ BID  Last A1c reviewed-   Lab Results   Component Value Date    HGBA1C 6.1 03/08/2024     Most recent fingerstick glucose reviewed-   Recent Labs   Lab 03/10/24  2033 03/11/24  1150   POCTGLUCOSE 209* 239*       Current correctional scale  Medium  Increase anti-hyperglycemic dose as follows-   Antihyperglycemics (From admission, onward)    Start     Stop Route Frequency Ordered    03/11/24 2100  insulin detemir U-100 (Levemir) pen 30 Units         -- SubQ 2 times daily 03/11/24 1704    03/08/24 1822  insulin aspart U-100 pen 0-10 Units         -- SubQ Before meals & nightly PRN 03/08/24 1731        Hold Oral hypoglycemics while patient is in the hospital.

## 2024-03-11 NOTE — SUBJECTIVE & OBJECTIVE
This follow-up encounter was provided through telemedicine to address  Closed intertrochanteric fracture of hip, left, initial encounter present on admission.  The patient location is: [address] admitted 3/8/2024  3:33 PM.      Interval History/Overnight Events:     Patient is able to provide adequate history.    Agitated yesterday after unable to go downstairs to smoke.  Nicotine patch made him jittery; he is focused on finding someone to feed his pets at home; he did taek 4 steps with PT today; morphine po alleviating pain not lasting long. Glucoses remain elevated but steroids now completed;  BP elevated when patient agitated.       Review of Systems   Constitutional:  Positive for activity change and fatigue. Negative for appetite change and fever.   Respiratory:  Negative for cough and shortness of breath.    Gastrointestinal:  Negative for vomiting.   Musculoskeletal:  Positive for arthralgias and myalgias.   Psychiatric/Behavioral:  Positive for agitation and dysphoric mood.           I have reviewed the following on 03/11/2024:    Data  Details     [x]   Lab results reviewed   WBC 8; Hgb 10.6; Na 135; AST/ALT nl; Cr 1; glucose 175-239    []   Micro reports reviewed      []   Pathology reports reviewed      []   Imaging reports reviewed      []   Cardiology Procedure reports reviewed      []   Non- records/CareEverywhere notes reviewed      []  Tests/studies orders placed or verified       []  Independently viewed/assessed       [x]  03/11/2024 Discussion of:  Discharge planning with case management       Inpatient Medications reviewed and prescribed for management of current problems:  Scheduled Meds:   acetaminophen  650 mg Oral Q6H    amLODIPine  5 mg Oral Daily    aspirin  81 mg Oral BID    atorvastatin  40 mg Oral Daily    carvediloL  3.125 mg Oral BID    famotidine  20 mg Oral BID    folic acid  1 mg Oral Daily    insulin detemir U-100  25 Units Subcutaneous BID    isosorbide mononitrate  30 mg Oral  Daily    levoFLOXacin  500 mg Oral Daily    methocarbamoL  500 mg Oral Q6H    multivitamin  1 tablet Oral Daily    nicotine  1 patch Transdermal Daily    senna-docusate 8.6-50 mg  1 tablet Oral BID    thiamine  100 mg Oral Daily     Continuous Infusions:  PRN Meds:.acetaminophen, albuterol-ipratropium, aluminum-magnesium hydroxide-simethicone, dextrose 10%, dextrose 10%, diphenhydrAMINE, glucagon (human recombinant), glucose, glucose, hydrALAZINE, hydrOXYzine HCL, insulin aspart U-100, magnesium oxide, magnesium oxide, melatonin, morphine, morphine, naloxone, nicotine, ondansetron, potassium bicarbonate, potassium bicarbonate, potassium bicarbonate, prochlorperazine, simethicone, sodium chloride 0.9%, sodium chloride 0.9%      Objective:     Temp:  [97.1 °F (36.2 °C)-97.8 °F (36.6 °C)] 97.1 °F (36.2 °C)  Pulse:  [] 88  Resp:  [15-22] 18  SpO2:  [89 %-97 %] 94 %  BP: (132-186)/() 142/72      Intake/Output Summary (Last 24 hours) at 3/11/2024 1652  Last data filed at 3/11/2024 1200  Gross per 24 hour   Intake 810 ml   Output 2175 ml   Net -1365 ml          Body mass index is 26.82 kg/m².    Physical Exam  Vitals and nursing note reviewed.   Constitutional:       General: He is not in acute distress.     Appearance: Normal appearance. He is normal weight. He is not ill-appearing.   HENT:      Head: Normocephalic and atraumatic.   Cardiovascular:      Rate and Rhythm: Normal rate.   Pulmonary:      Effort: Pulmonary effort is normal. No accessory muscle usage or respiratory distress.   Neurological:      General: No focal deficit present.      Mental Status: He is alert and oriented to person, place, and time.      Cranial Nerves: No cranial nerve deficit.      Motor: No weakness.   Psychiatric:         Attention and Perception: Attention normal.         Mood and Affect: Affect is labile.         Speech: Speech normal.         Behavior: Behavior is cooperative.          Labs: All labs within the last 24  hours were reviewed.   Recent Results (from the past 24 hour(s))   POCT glucose    Collection Time: 03/10/24  4:56 PM   Result Value Ref Range    POCT Glucose 188 (H) 70 - 110 mg/dL   POCT glucose    Collection Time: 03/10/24  8:33 PM   Result Value Ref Range    POCT Glucose 209 (H) 70 - 110 mg/dL   CBC with Automated Differential    Collection Time: 03/11/24  4:31 AM   Result Value Ref Range    WBC 8.09 3.90 - 12.70 K/uL    RBC 3.11 (L) 4.60 - 6.20 M/uL    Hemoglobin 10.6 (L) 14.0 - 18.0 g/dL    Hematocrit 31.1 (L) 40.0 - 54.0 %     (H) 82 - 98 fL    MCH 34.1 (H) 27.0 - 31.0 pg    MCHC 34.1 32.0 - 36.0 g/dL    RDW 13.1 11.5 - 14.5 %    Platelets 170 150 - 450 K/uL    MPV 9.6 9.2 - 12.9 fL    Immature Granulocytes 0.9 (H) 0.0 - 0.5 %    Gran # (ANC) 7.3 1.8 - 7.7 K/uL    Immature Grans (Abs) 0.07 (H) 0.00 - 0.04 K/uL    Lymph # 0.4 (L) 1.0 - 4.8 K/uL    Mono # 0.3 0.3 - 1.0 K/uL    Eos # 0.0 0.0 - 0.5 K/uL    Baso # 0.00 0.00 - 0.20 K/uL    nRBC 0 0 /100 WBC    Gran % 90.0 (H) 38.0 - 73.0 %    Lymph % 4.9 (L) 18.0 - 48.0 %    Mono % 4.2 4.0 - 15.0 %    Eosinophil % 0.0 0.0 - 8.0 %    Basophil % 0.0 0.0 - 1.9 %    Differential Method Automated    Comprehensive Metabolic Panel (CMP)    Collection Time: 03/11/24  4:31 AM   Result Value Ref Range    Sodium 135 (L) 136 - 145 mmol/L    Potassium 5.1 3.5 - 5.1 mmol/L    Chloride 101 95 - 110 mmol/L    CO2 25 23 - 29 mmol/L    Glucose 298 (H) 70 - 110 mg/dL    BUN 22 8 - 23 mg/dL    Creatinine 1.0 0.5 - 1.4 mg/dL    Calcium 8.9 8.7 - 10.5 mg/dL    Total Protein 6.5 6.0 - 8.4 g/dL    Albumin 3.4 (L) 3.5 - 5.2 g/dL    Total Bilirubin 0.5 0.1 - 1.0 mg/dL    Alkaline Phosphatase 65 55 - 135 U/L    AST 16 10 - 40 U/L    ALT 20 10 - 44 U/L    eGFR >60 >60 mL/min/1.73 m^2    Anion Gap 9 8 - 16 mmol/L   POCT glucose    Collection Time: 03/11/24 11:50 AM   Result Value Ref Range    POCT Glucose 239 (H) 70 - 110 mg/dL        Lab Results   Component Value Date    [id number]  "Negative 01/04/2024       Recent Labs   Lab 03/09/24  0342 03/10/24  0415 03/11/24  0431   WBC 9.00 9.19 8.09   LYMPH 16.9*  1.5 8.1*  0.7* 4.9*  0.4*   HGB 13.2* 10.6* 10.6*   HCT 38.6* 30.8* 31.1*    179 170       Recent Labs   Lab 03/08/24  1702 03/09/24 0342 03/10/24  0415 03/11/24  0431    137 133* 135*   K 3.5 3.8 4.5 5.1    102 101 101   CO2 23 24 23 25   BUN 23 23 22 22   CREATININE 0.9 0.9 1.0 1.0   GLU 72 159* 304* 298*   CALCIUM 9.4 8.9 8.4* 8.9   MG 2.0  --   --   --        Recent Labs   Lab 03/09/24  0342 03/10/24  0415 03/11/24  0431   ALKPHOS 71 58 65   ALT 22 19 20   AST 18 11 16   ALBUMIN 3.7 3.3* 3.4*   PROT 6.6 5.9* 6.5   BILITOT 0.5 0.8 0.5   INR 1.1  --   --           No results for input(s): "DDIMER", "FERRITIN", "CRP", "LDH", "BNP", "TROPONINI", "CPK" in the last 72 hours.    Invalid input(s): "PROCALCITONIN"        Microbiology: All microbiology updates for the past 24 hours have been reviewed.  Microbiology Results (last 7 days)       ** No results found for the last 168 hours. **              Imaging All imaging within the last 24 hours was reviewed.   ECG Results              EKG 12-LEAD (Final result)  Result time 03/11/24 14:52:18      Final result by Unknown User (03/11/24 14:52:18)                                        Results for orders placed during the hospital encounter of 10/07/23    Echo    Interpretation Summary    Left Ventricle: The left ventricle is normal in size. Mildly increased wall thickness. There is concentric remodeling. Septal motion is normal. There is normal systolic function. Ejection fraction by visual approximation is 60%.    Right Ventricle: Normal right ventricular cavity size. Wall thickness is normal. Right ventricle wall motion  is normal. Systolic function is normal.    Mitral Valve: There is mild posterior mitral annular calcification present.    IVC/SVC: Normal venous pressure at 3 mmHg.    A limited echo was performed using " limited 2D and color flow Doppler      SURG FL Surgery Fluoro Usage  See OP Notes for results.     IMPRESSION: See OP Notes for results.     This procedure was auto-finalized by: Virtual Radiologist  X-Ray Chest 1 View  Narrative: EXAMINATION:  XR CHEST 1 VIEW    CLINICAL HISTORY:  pre op;    TECHNIQUE:  Single frontal view of the chest was performed.    COMPARISON:  01/04/2024    FINDINGS:  The heart is not enlarged.  The lungs are well expanded and clear.  The costophrenic sulci are sharp.  Impression: No acute cardiopulmonary disease    Electronically signed by: Rylee Kearney MD  Date:    03/09/2024  Time:    09:09

## 2024-03-11 NOTE — ASSESSMENT & PLAN NOTE
-hx of drug eluting stent in 5/2023  -ASA  -Statin  -Coreg  -Telemetry  -may resume plavix per Ortho

## 2024-03-11 NOTE — NURSING
"Upon entering room to check on patient as patient bed alarm was going off, patient found to be sitting up on side of bed trying to get up with 2 cigarettes in his hand with a lighter. Patient also found to have taken multiple pills from personal belongings and swallowed them. Attempted to educated patient on importance of not taking home medications and how it is against hospital policy to smoke and attempted to remove those items from room but Patient got agitated and irate with this nurse and started yelling and stating he was leaving, stated "I know Melody Siva and will do or say whatever I have to to get someone in trouble and tell her and she will get what I want done.", "I dont know why these people have trouble getting my veins because I surly don't have a problem missing my vein's" and proceeded to show me his arms, "you need to let me go smoke a damn cigarette now because down the street they let me do it all the time, they let me leave with my IV and go home and come back." Security, House Supervisor, and Karina Mattson called and notified and all came to bedside. Cigarettes, lighters, and personal medication taken and secured away in locked locker.   "

## 2024-03-12 LAB
ALBUMIN SERPL BCP-MCNC: 3.5 G/DL (ref 3.5–5.2)
ALP SERPL-CCNC: 60 U/L (ref 55–135)
ALT SERPL W/O P-5'-P-CCNC: 19 U/L (ref 10–44)
ANION GAP SERPL CALC-SCNC: 10 MMOL/L (ref 8–16)
AST SERPL-CCNC: 15 U/L (ref 10–40)
BASOPHILS # BLD AUTO: 0.01 K/UL (ref 0–0.2)
BASOPHILS NFR BLD: 0.1 % (ref 0–1.9)
BILIRUB SERPL-MCNC: 0.5 MG/DL (ref 0.1–1)
BUN SERPL-MCNC: 31 MG/DL (ref 8–23)
CALCIUM SERPL-MCNC: 9.4 MG/DL (ref 8.7–10.5)
CHLORIDE SERPL-SCNC: 103 MMOL/L (ref 95–110)
CO2 SERPL-SCNC: 23 MMOL/L (ref 23–29)
CREAT SERPL-MCNC: 0.9 MG/DL (ref 0.5–1.4)
DIFFERENTIAL METHOD BLD: ABNORMAL
EOSINOPHIL # BLD AUTO: 0 K/UL (ref 0–0.5)
EOSINOPHIL NFR BLD: 0 % (ref 0–8)
ERYTHROCYTE [DISTWIDTH] IN BLOOD BY AUTOMATED COUNT: 13 % (ref 11.5–14.5)
EST. GFR  (NO RACE VARIABLE): >60 ML/MIN/1.73 M^2
GLUCOSE SERPL-MCNC: 230 MG/DL (ref 70–110)
HCT VFR BLD AUTO: 31.5 % (ref 40–54)
HGB BLD-MCNC: 10.6 G/DL (ref 14–18)
IMM GRANULOCYTES # BLD AUTO: 0.12 K/UL (ref 0–0.04)
IMM GRANULOCYTES NFR BLD AUTO: 1.1 % (ref 0–0.5)
LYMPHOCYTES # BLD AUTO: 0.7 K/UL (ref 1–4.8)
LYMPHOCYTES NFR BLD: 6.5 % (ref 18–48)
MCH RBC QN AUTO: 33.3 PG (ref 27–31)
MCHC RBC AUTO-ENTMCNC: 33.7 G/DL (ref 32–36)
MCV RBC AUTO: 99 FL (ref 82–98)
MONOCYTES # BLD AUTO: 1.1 K/UL (ref 0.3–1)
MONOCYTES NFR BLD: 9.9 % (ref 4–15)
NEUTROPHILS # BLD AUTO: 9.3 K/UL (ref 1.8–7.7)
NEUTROPHILS NFR BLD: 82.4 % (ref 38–73)
NRBC BLD-RTO: 0 /100 WBC
PLATELET # BLD AUTO: 188 K/UL (ref 150–450)
PMV BLD AUTO: 9.6 FL (ref 9.2–12.9)
POCT GLUCOSE: 143 MG/DL (ref 70–110)
POCT GLUCOSE: 187 MG/DL (ref 70–110)
POCT GLUCOSE: 202 MG/DL (ref 70–110)
POCT GLUCOSE: 238 MG/DL (ref 70–110)
POTASSIUM SERPL-SCNC: 4.7 MMOL/L (ref 3.5–5.1)
PROT SERPL-MCNC: 6.5 G/DL (ref 6–8.4)
RBC # BLD AUTO: 3.18 M/UL (ref 4.6–6.2)
SODIUM SERPL-SCNC: 136 MMOL/L (ref 136–145)
WBC # BLD AUTO: 11.33 K/UL (ref 3.9–12.7)

## 2024-03-12 PROCEDURE — 99900035 HC TECH TIME PER 15 MIN (STAT)

## 2024-03-12 PROCEDURE — 25000003 PHARM REV CODE 250: Performed by: ORTHOPAEDIC SURGERY

## 2024-03-12 PROCEDURE — 11000001 HC ACUTE MED/SURG PRIVATE ROOM

## 2024-03-12 PROCEDURE — 94761 N-INVAS EAR/PLS OXIMETRY MLT: CPT

## 2024-03-12 PROCEDURE — 30200315 PPD INTRADERMAL TEST REV CODE 302: Performed by: INTERNAL MEDICINE

## 2024-03-12 PROCEDURE — 25000003 PHARM REV CODE 250: Performed by: NURSE PRACTITIONER

## 2024-03-12 PROCEDURE — 80053 COMPREHEN METABOLIC PANEL: CPT | Performed by: ORTHOPAEDIC SURGERY

## 2024-03-12 PROCEDURE — S4991 NICOTINE PATCH NONLEGEND: HCPCS | Performed by: NURSE PRACTITIONER

## 2024-03-12 PROCEDURE — 86580 TB INTRADERMAL TEST: CPT | Performed by: INTERNAL MEDICINE

## 2024-03-12 PROCEDURE — 25000003 PHARM REV CODE 250: Performed by: STUDENT IN AN ORGANIZED HEALTH CARE EDUCATION/TRAINING PROGRAM

## 2024-03-12 PROCEDURE — 63600175 PHARM REV CODE 636 W HCPCS: Performed by: INTERNAL MEDICINE

## 2024-03-12 PROCEDURE — 36415 COLL VENOUS BLD VENIPUNCTURE: CPT | Performed by: ORTHOPAEDIC SURGERY

## 2024-03-12 PROCEDURE — 63600175 PHARM REV CODE 636 W HCPCS: Performed by: NURSE PRACTITIONER

## 2024-03-12 PROCEDURE — 97116 GAIT TRAINING THERAPY: CPT

## 2024-03-12 PROCEDURE — 97165 OT EVAL LOW COMPLEX 30 MIN: CPT

## 2024-03-12 PROCEDURE — 25000003 PHARM REV CODE 250: Performed by: INTERNAL MEDICINE

## 2024-03-12 PROCEDURE — 94799 UNLISTED PULMONARY SVC/PX: CPT | Mod: XB

## 2024-03-12 PROCEDURE — 97535 SELF CARE MNGMENT TRAINING: CPT

## 2024-03-12 PROCEDURE — 85025 COMPLETE CBC W/AUTO DIFF WBC: CPT | Performed by: ORTHOPAEDIC SURGERY

## 2024-03-12 RX ORDER — INSULIN ASPART 100 [IU]/ML
5 INJECTION, SOLUTION INTRAVENOUS; SUBCUTANEOUS
Status: DISCONTINUED | OUTPATIENT
Start: 2024-03-12 | End: 2024-03-13 | Stop reason: HOSPADM

## 2024-03-12 RX ORDER — LORAZEPAM 2 MG/ML
2 INJECTION INTRAMUSCULAR ONCE
Status: COMPLETED | OUTPATIENT
Start: 2024-03-12 | End: 2024-03-12

## 2024-03-12 RX ORDER — INSULIN ASPART 100 [IU]/ML
5 INJECTION, SOLUTION INTRAVENOUS; SUBCUTANEOUS
Status: DISCONTINUED | OUTPATIENT
Start: 2024-03-12 | End: 2024-03-12

## 2024-03-12 RX ORDER — POLYETHYLENE GLYCOL 3350 17 G/17G
17 POWDER, FOR SOLUTION ORAL 2 TIMES DAILY
Status: DISCONTINUED | OUTPATIENT
Start: 2024-03-12 | End: 2024-03-13 | Stop reason: HOSPADM

## 2024-03-12 RX ADMIN — Medication 800 MG: at 01:03

## 2024-03-12 RX ADMIN — INSULIN ASPART 5 UNITS: 100 INJECTION, SOLUTION INTRAVENOUS; SUBCUTANEOUS at 12:03

## 2024-03-12 RX ADMIN — MORPHINE SULFATE 30 MG: 10 SOLUTION ORAL at 09:03

## 2024-03-12 RX ADMIN — THERA TABS 1 TABLET: TAB at 09:03

## 2024-03-12 RX ADMIN — INSULIN ASPART 1 UNITS: 100 INJECTION, SOLUTION INTRAVENOUS; SUBCUTANEOUS at 09:03

## 2024-03-12 RX ADMIN — FAMOTIDINE 20 MG: 20 TABLET, FILM COATED ORAL at 09:03

## 2024-03-12 RX ADMIN — METHOCARBAMOL 500 MG: 500 TABLET ORAL at 05:03

## 2024-03-12 RX ADMIN — DOCUSATE SODIUM AND SENNOSIDES 1 TABLET: 8.6; 5 TABLET, FILM COATED ORAL at 09:03

## 2024-03-12 RX ADMIN — THIAMINE HCL TAB 100 MG 100 MG: 100 TAB at 09:03

## 2024-03-12 RX ADMIN — CLOPIDOGREL BISULFATE 75 MG: 75 TABLET, FILM COATED ORAL at 09:03

## 2024-03-12 RX ADMIN — ACETAMINOPHEN 650 MG: 325 TABLET ORAL at 05:03

## 2024-03-12 RX ADMIN — ACETAMINOPHEN 650 MG: 325 TABLET ORAL at 12:03

## 2024-03-12 RX ADMIN — ASPIRIN 81 MG CHEWABLE TABLET 81 MG: 81 TABLET CHEWABLE at 09:03

## 2024-03-12 RX ADMIN — METHOCARBAMOL 500 MG: 500 TABLET ORAL at 11:03

## 2024-03-12 RX ADMIN — Medication 1 PATCH: at 09:03

## 2024-03-12 RX ADMIN — LEVOFLOXACIN 500 MG: 250 TABLET, FILM COATED ORAL at 09:03

## 2024-03-12 RX ADMIN — MELATONIN TAB 3 MG 6 MG: 3 TAB at 12:03

## 2024-03-12 RX ADMIN — POLYETHYLENE GLYCOL (3350) 17 G: 17 POWDER, FOR SOLUTION ORAL at 12:03

## 2024-03-12 RX ADMIN — MORPHINE SULFATE 30 MG: 10 SOLUTION ORAL at 05:03

## 2024-03-12 RX ADMIN — LORAZEPAM 2 MG: 2 INJECTION INTRAMUSCULAR; INTRAVENOUS at 04:03

## 2024-03-12 RX ADMIN — CARVEDILOL 6.25 MG: 6.25 TABLET, FILM COATED ORAL at 09:03

## 2024-03-12 RX ADMIN — FOLIC ACID 1 MG: 1 TABLET ORAL at 09:03

## 2024-03-12 RX ADMIN — ATORVASTATIN CALCIUM 40 MG: 40 TABLET, FILM COATED ORAL at 09:03

## 2024-03-12 RX ADMIN — MORPHINE SULFATE 30 MG: 10 SOLUTION ORAL at 12:03

## 2024-03-12 RX ADMIN — ISOSORBIDE MONONITRATE 30 MG: 30 TABLET, EXTENDED RELEASE ORAL at 09:03

## 2024-03-12 RX ADMIN — METHOCARBAMOL 500 MG: 500 TABLET ORAL at 12:03

## 2024-03-12 RX ADMIN — Medication 800 MG: at 04:03

## 2024-03-12 RX ADMIN — AMLODIPINE BESYLATE 5 MG: 5 TABLET ORAL at 09:03

## 2024-03-12 RX ADMIN — INSULIN ASPART 5 UNITS: 100 INJECTION, SOLUTION INTRAVENOUS; SUBCUTANEOUS at 04:03

## 2024-03-12 NOTE — PLAN OF CARE
Problem: Physical Therapy  Goal: Physical Therapy Goal  Description: 1. Supine to sit with Stand-by Assistance  2. Sit to stand transfer with Stand-by Assistance  3.. Bed to chair transfer with Supervision using Rolling Walker, WBAT LLE  4. Gait  x 100 feet with Minimal Assistance using Rolling Walker, WBAT L LE      Outcome: Ongoing, Progressing   Gait with RW 15ft, 10ft with min assist and another person following with chair with 1 seated rest

## 2024-03-12 NOTE — PT/OT/SLP EVAL
Occupational Therapy   Evaluation    Name: Sampson Holt  MRN: [id number]  Admitting Diagnosis: Closed intertrochanteric fracture of hip, left, initial encounter  Recent Surgery: Procedure(s) (LRB):  INSERTION, INTRAMEDULLARY HUI, FEMUR (Left) 3 Days Post-Op    Recommendations:     Discharge Recommendations: High Intensity Therapy  Discharge Equipment Recommendations:  walker, rolling, wheelchair  Barriers to discharge:  Decreased caregiver support    Assessment:     Sampson Holt is a 68 y.o. male with a medical diagnosis of Closed intertrochanteric fracture of hip, left, initial encounter.  He presents with mild to moderate lethargy, but still participatory. Patient reported intensifying LLE pain with bed mobility and when ambulating short distance in room from bed<>sink. Patient required extra time with mobility and ADLs due to lethargy and LLE pain. Noted good static sitting balance at EOB. Poor tolerance with mobility due to LLE pain. Patient would greatly benefit from rehab placement to return back to [facility] as patient lives alone. Performance deficits affecting function: weakness, impaired endurance, impaired self care skills, impaired functional mobility, gait instability, impaired balance, decreased lower extremity function, decreased safety awareness, decreased coordination, pain, decreased ROM, orthopedic precautions, impaired cardiopulmonary response to activity.      Rehab Prognosis: Good; patient would benefit from acute skilled OT services to address these deficits and reach maximum level of function.       Plan:     Patient to be seen 5 x/week to address the above listed problems via self-care/home management, therapeutic activities, therapeutic exercises  Plan of Care Expires:    Plan of Care Reviewed with: patient    Subjective     Chief Complaint: LLE pain  Patient/Family Comments/goals: none    Occupational Profile:  Living Environment: Patient lives alone in a mobile home with ramp to enter.    Previous level of function: Patient was independent with ADLs and mobility.   Equipment Used at Home: none  Assistance upon Discharge: Patient will have limited to no assistance at home. Patient would benefit from rehab placement    Pain/Comfort:  Pain Rating 1:  (not rated)  Location - Side 1: Left  Location - Orientation 1: generalized  Location 1: hip  Pain Addressed 1: Reposition, Distraction, Pre-medicate for activity, Cessation of Activity, Nurse notified  Pain Rating Post-Intervention 1: 10/10    Patients cultural, spiritual, Restorationism conflicts given the current situation: no    Objective:     Communicated with: nurse Fay prior to session.  Patient found HOB elevated with bed alarm, peripheral IV, telemetry upon OT entry to room.    General Precautions: Standard, fall  Orthopedic Precautions: LLE weight bearing as tolerated  Braces: N/A  Respiratory Status: Room air    Occupational Performance:    Bed Mobility:    Patient completed Scooting/Bridging with minimum assistance  Patient completed Supine to Sit with minimum assistance  Patient completed Sit to Supine with minimum assistance    Functional Mobility/Transfers:  Patient completed Sit <> Stand Transfer with minimum assistance  with  rolling walker     Activities of Daily Living:  Grooming: stand by assistance with all grooming tasks while seated EOB  Lower Body Dressing: maximal assistance to don/doff socks seated EOB    Cognitive/Visual Perceptual:  Cognitive/Psychosocial Skills:     -       Oriented to: person, place, situation   -       Follows Commands/attention:Follows multistep  commands  -       Communication: clear/fluent  -       Safety awareness/insight to disability: impaired   -       Mood/Affect/Coping skills/emotional control: Cooperative and Lethargic  Visual/Perceptual:      -Intact     Physical Exam:  Postural examination/scapula alignment:    -       Rounded shoulders  -       Forward head  Upper Extremity Range of Motion:     -        Right Upper Extremity: WFL  -       Left Upper Extremity: WFL  Upper Extremity Strength:    -       Right Upper Extremity: WFL  -       Left Upper Extremity: WFL   Strength:    -       Right Upper Extremity: WFL  -       Left Upper Extremity: WFL  Fine Motor Coordination:    -       Intact  Gross motor coordination:   WFL in BUE    AMPAC 6 Click ADL:  AMPAC Total Score: 18    Treatment & Education:  OT ed pt on OT role & POC as well as discharge recommendations.  OT ed patient on safety with walker use for functional mobility with cues for hand placement & sequencing.   OT repositioned pt to HOB & raised HOB, instructing pt on keeping HOB upright as pt can tolerate to preserve endurance and minimize effects of prolonged bedrest.       Patient left HOB elevated with all lines intact, call button in reach, bed alarm on, and nurse notified    GOALS:   Multidisciplinary Problems       Occupational Therapy Goals          Problem: Occupational Therapy    Goal Priority Disciplines Outcome Interventions   Occupational Therapy Goal     OT, PT/OT Ongoing, Progressing    Description: Goals to be met by: 4/9/2024     Patient will increase functional independence with ADLs by performing:    LE Dressing with Supervision and Assistive Devices as needed.  Grooming while standing at sink with Supervision.  Toileting from toilet with Supervision for hygiene and clothing management.   Supine to sit with Stand-by Assistance.  Toilet transfer to toilet with Stand-by Assistance.                         History:     Past Medical History:   Diagnosis Date    Abdominal pain 2022    CHF (congestive heart failure)     Diabetes mellitus 2018    Emphysema lung     Endocarditis 2011    Hypertension     Stroke 2011    denies residual         Past Surgical History:   Procedure Laterality Date    ANGIOGRAM, CORONARY, WITH LEFT HEART CATHETERIZATION N/A 10/10/2023    Procedure: Angiogram, Coronary, with Left Heart Cath;  Surgeon: Connor  Dieudonne Bell MD;  Location: [facility] CATH/EP LAB;  Service: Cardiology;  Laterality: N/A;    BACK SURGERY  1981    COLONOSCOPY N/A 2/23/2023    Procedure: COLONOSCOPY;  Surgeon: Jonn Cruz MD;  Location: [facility] ENDO;  Service: Endoscopy;  Laterality: N/A;    CORONARY ANGIOGRAPHY N/A 5/9/2023    Procedure: ANGIOGRAM, CORONARY ARTERY;  Surgeon: Antonio Kessler MD;  Location: [facility] CATH/EP LAB;  Service: Cardiology;  Laterality: N/A;    EXCISION OF HYDROCELE      x2    FRACTIONAL FLOW RESERVE (FFR), CORONARY  5/11/2023    Procedure: Fractional Flow [place] (FFR), Coronary;  Surgeon: Antonio Kessler MD;  Location: [facility] CATH/EP LAB;  Service: Cardiology;;    INGUINAL HERNIA REPAIR  1960    INSTANTANEOUS WAVE-FREE RATIO (IFR) N/A 10/10/2023    Procedure: Instantaneous Wave-Free Ratio (IFR);  Surgeon: Dieudonne Ryan MD;  Location: [facility] CATH/EP LAB;  Service: Cardiology;  Laterality: N/A;    INTRAMEDULLARY RODDING OF FEMUR Left 3/9/2024    Procedure: INSERTION, INTRAMEDULLARY HUI, FEMUR;  Surgeon: Tarun Hdez MD;  [facility] OR;  Service: Orthopedics;  Laterality: Left;    INTRAVASCULAR ULTRASOUND, CORONARY N/A 5/9/2023    Procedure: Ultrasound-coronary;  Surgeon: Antonio Kessler MD;  Location: [facility] CATH/EP LAB;  Service: Cardiology;  Laterality: N/A;    IVUS, CORONARY  5/11/2023    Procedure: IVUS, Coronary;  Surgeon: Antonio Kessler MD;  Location: [facility] CATH/EP LAB;  Service: Cardiology;;    LEFT HEART CATHETERIZATION Left 5/11/2023    Procedure: Left heart cath;  Surgeon: Antonio Kessler MD;  Location: [facility] CATH/EP LAB;  Service: Cardiology;  Laterality: Left;    PERCUTANEOUS TRANSLUMINAL BALLOON ANGIOPLASTY OF CORONARY ARTERY  5/9/2023    Procedure: Angioplasty-coronary;  Surgeon: Antonio Kessler MD;  Location: [facility] CATH/EP LAB;  Service: Cardiology;;    RHINOPLASTY      STENT, DRUG ELUTING, SINGLE VESSEL, CORONARY  5/9/2023    Procedure: Stent, Drug Eluting, Single Vessel, Coronary;  Surgeon: Antonio  MD Checo;  Location[facility]ATH[facility]EP LAB;  Service: Cardiology;;    SURGICAL REMOVAL OF NODULE OF VOCAL CORD         Time Tracking:     OT Date of Treatment: 03/12/24  OT Start Time: 1335  OT Stop Time: 1354  OT Total Time (min): 19 min    Billable Minutes:Evaluation 9  Self Care/Home Management 10    3/12/2024

## 2024-03-12 NOTE — CARE UPDATE
03/12/24 0813   Patient Assessment/Suction   Level of Consciousness (AVPU) alert   Respiratory Effort Unlabored;Normal   Expansion/Accessory Muscles/Retractions no use of accessory muscles;no retractions;expansion symmetric   Rhythm/Pattern, Respiratory unlabored;pattern regular;depth regular;no shortness of breath reported   PRE-TX-O2   Device (Oxygen Therapy) room air   SpO2 96 %   Pulse Oximetry Type Intermittent   $ Pulse Oximetry - Multiple Charge Pulse Oximetry - Multiple   Pulse 78   Resp 16   Aerosol Therapy   $ Aerosol Therapy Charges PRN treatment not required   Respiratory Treatment Status (SVN) PRN treatment not required

## 2024-03-12 NOTE — PLAN OF CARE
Verified with Ramya SW with [facility], that SNF request and equipment request have been received and under review. I made sure to let her know we need both worked up/approved since patient cannot commit to going to SNF         03/12/24 1140   Post-Acute Status   Post-Acute Authorization Placement   Post-Acute Placement Status Pending payor review/awaiting authorization (if required)

## 2024-03-12 NOTE — ASSESSMENT & PLAN NOTE
-s/p IMN on 3/9 per Dr. Hdez  -Fall precautions  -PT/OT recommends high intensity therapy and case management working on disposition  -PRN analgesics  -ASA 81 BID for DVT prophylaxis  -Incentive spirometry

## 2024-03-12 NOTE — ASSESSMENT & PLAN NOTE
Patient's FSGs are controlled on current medication regimen. He reports he takes 35 units of Lantus subQ BID  Last A1c reviewed-   Lab Results   Component Value Date    HGBA1C 6.1 03/08/2024     Most recent fingerstick glucose reviewed-   Recent Labs   Lab 03/11/24  1722 03/11/24  2215 03/12/24  0824 03/12/24  1109   POCTGLUCOSE 288* 247* 202* 238*       Current correctional scale  Medium  Increase anti-hyperglycemic dose as follows-   Antihyperglycemics (From admission, onward)    Start     Stop Route Frequency Ordered    03/12/24 1215  insulin aspart U-100 pen 5 Units         -- SubQ 3 times daily with meals 03/12/24 1210    03/11/24 2100  insulin detemir U-100 (Levemir) pen 30 Units         -- SubQ 2 times daily 03/11/24 1704    03/08/24 1822  insulin aspart U-100 pen 0-10 Units         -- SubQ Before meals & nightly PRN 03/08/24 1731        Hold Oral hypoglycemics while patient is in the hospital.

## 2024-03-12 NOTE — PROGRESS NOTES
[facility] Medicine  Telemedicine Progress Note    Patient Name: Sampson Holt  MRN: [id number]  Patient Class: IP- Inpatient   Admission Date: 3/8/2024  Length of Stay: 4 days  Attending Physician: Sheila Malhotra MD  Primary Care Provider: Damian Vergara MD          Subjective:     Principal Problem:Closed intertrochanteric fracture of hip, left, initial encounter        HPI:  Sampson Holt is a 68 year old male with a previous medical history of CHF, DM II with long term use of insulin, Emphysema, Endocarditis, CAD with stent placement on daily ASA and plavix, Stroke, HLD, and hypertension who presents for left hip pain after trip and fall. Patient reports he was carrying gumbo and tripped over a box landing on his left side. Denies striking head or LOC. Initial ED imaging showed Intertrochanteric left femoral fracture for which Dr. Hdez was consulted and reports probably procedure tomorrow and to keep patient NPO at midnight . CBC and CMP unremarkable. Upon evaluation patient noted to still be having severe left hip pain so an additional one time dose of IV dilaudid was ordered. +2 bilateral pedal pulses. Patient unable to assist in full medication reconciliation but does endorse he is compliant with taking his aspirin and plavix daily last taken on 3/8/24.  Patient to be admitted by hospital medicine for further evaluation and management.     Overview/Hospital Course:  Sampson Holt is a 68 year old male with a past medical history of HTN, DM, COPD, HLD, tobacco use, EtOH abuse, THC use, CAD and BPH who presented with a left hip fracture after a fall. Orthopedic Surgery took the patient to the OR 3/9 for IMN placement. He is on ASA 81 BID. PT/OT has been consulted.      This follow-up encounter was provided through telemedicine to address  Closed intertrochanteric fracture of hip, left, initial encounter present on admission.  The patient location is: [facility]/[address]  admitted 3/8/2024  3:33 PM.      Interval History/Overnight Events:     Patient is able to provide adequate history.    Calmer today and seen working with therapy in the hallway.  Pain remains controlled.  BP improved after am meds.  Awaiting skilled nursing placement.       Review of Systems   Constitutional:  Positive for activity change and fatigue. Negative for appetite change and fever.   Respiratory:  Negative for cough and shortness of breath.    Gastrointestinal:  Negative for vomiting.   Musculoskeletal:  Positive for arthralgias and myalgias.   Psychiatric/Behavioral:  Negative for agitation and dysphoric mood.           I have reviewed the following on 03/12/2024:    Data  Details     [x]   Lab results reviewed   WBC 11; Hgb 10.6; Na 136; AST/ALT nl; Cr 0.9; glucose 202-288    []   Micro reports reviewed      []   Pathology reports reviewed      []   Imaging reports reviewed      []   Cardiology Procedure reports reviewed      []   Non- records/CareEverywhere notes reviewed      []  Tests/studies orders placed or verified       []  Independently viewed/assessed       [x]  03/12/2024 Discussion of:  Discharge planning with case management       Inpatient Medications reviewed and prescribed for management of current problems:  Scheduled Meds:   acetaminophen  650 mg Oral Q6H    amLODIPine  5 mg Oral Daily    aspirin  81 mg Oral BID    atorvastatin  40 mg Oral Daily    carvediloL  6.25 mg Oral BID    clopidogreL  75 mg Oral Daily    famotidine  20 mg Oral BID    folic acid  1 mg Oral Daily    insulin detemir U-100  30 Units Subcutaneous BID    isosorbide mononitrate  30 mg Oral Daily    levoFLOXacin  500 mg Oral Daily    methocarbamoL  500 mg Oral Q6H    multivitamin  1 tablet Oral Daily    nicotine  1 patch Transdermal Daily    polyethylene glycol  17 g Oral BID    senna-docusate 8.6-50 mg  1 tablet Oral BID    thiamine  100 mg Oral Daily     Continuous Infusions:  PRN Meds:.acetaminophen,  albuterol-ipratropium, aluminum-magnesium hydroxide-simethicone, dextrose 10%, dextrose 10%, diphenhydrAMINE, glucagon (human recombinant), glucose, glucose, hydrALAZINE, hydrOXYzine HCL, insulin aspart U-100, magnesium oxide, magnesium oxide, melatonin, morphine, morphine, naloxone, nicotine, ondansetron, potassium bicarbonate, potassium bicarbonate, potassium bicarbonate, prochlorperazine, simethicone, sodium chloride 0.9%, sodium chloride 0.9%      Objective:     Temp:  [97.1 °F (36.2 °C)-98.4 °F (36.9 °C)] 97.5 °F (36.4 °C)  Pulse:  [70-96] 76  Resp:  [14-18] 18  SpO2:  [92 %-96 %] 94 %  BP: (130-177)/(66-90) 135/70      Intake/Output Summary (Last 24 hours) at 3/12/2024 1159  Last data filed at 3/12/2024 0654  Gross per 24 hour   Intake 1275 ml   Output 600 ml   Net 675 ml          Body mass index is 26.82 kg/m².    Physical Exam  Vitals and nursing note reviewed.   Constitutional:       General: He is not in acute distress.     Appearance: Normal appearance. He is normal weight. He is not ill-appearing.   HENT:      Head: Normocephalic and atraumatic.   Cardiovascular:      Rate and Rhythm: Normal rate.   Pulmonary:      Effort: Pulmonary effort is normal. No accessory muscle usage or respiratory distress.   Neurological:      General: No focal deficit present.      Mental Status: He is alert and oriented to person, place, and time.      Cranial Nerves: No cranial nerve deficit.      Motor: No weakness.   Psychiatric:         Attention and Perception: Attention normal.         Mood and Affect: Affect is labile.         Speech: Speech normal.         Behavior: Behavior is cooperative.          Labs: All labs within the last 24 hours were reviewed.   Recent Results (from the past 24 hour(s))   POCT glucose    Collection Time: 03/11/24  5:22 PM   Result Value Ref Range    POCT Glucose 288 (H) 70 - 110 mg/dL   POCT glucose    Collection Time: 03/11/24 10:15 PM   Result Value Ref Range    POCT Glucose 247 (H) 70 -  110 mg/dL   CBC with Automated Differential    Collection Time: 03/12/24  4:38 AM   Result Value Ref Range    WBC 11.33 3.90 - 12.70 K/uL    RBC 3.18 (L) 4.60 - 6.20 M/uL    Hemoglobin 10.6 (L) 14.0 - 18.0 g/dL    Hematocrit 31.5 (L) 40.0 - 54.0 %    MCV 99 (H) 82 - 98 fL    MCH 33.3 (H) 27.0 - 31.0 pg    MCHC 33.7 32.0 - 36.0 g/dL    RDW 13.0 11.5 - 14.5 %    Platelets 188 150 - 450 K/uL    MPV 9.6 9.2 - 12.9 fL    Immature Granulocytes 1.1 (H) 0.0 - 0.5 %    Gran # (ANC) 9.3 (H) 1.8 - 7.7 K/uL    Immature Grans (Abs) 0.12 (H) 0.00 - 0.04 K/uL    Lymph # 0.7 (L) 1.0 - 4.8 K/uL    Mono # 1.1 (H) 0.3 - 1.0 K/uL    Eos # 0.0 0.0 - 0.5 K/uL    Baso # 0.01 0.00 - 0.20 K/uL    nRBC 0 0 /100 WBC    Gran % 82.4 (H) 38.0 - 73.0 %    Lymph % 6.5 (L) 18.0 - 48.0 %    Mono % 9.9 4.0 - 15.0 %    Eosinophil % 0.0 0.0 - 8.0 %    Basophil % 0.1 0.0 - 1.9 %    Differential Method Automated    Comprehensive Metabolic Panel (CMP)    Collection Time: 03/12/24  4:38 AM   Result Value Ref Range    Sodium 136 136 - 145 mmol/L    Potassium 4.7 3.5 - 5.1 mmol/L    Chloride 103 95 - 110 mmol/L    CO2 23 23 - 29 mmol/L    Glucose 230 (H) 70 - 110 mg/dL    BUN 31 (H) 8 - 23 mg/dL    Creatinine 0.9 0.5 - 1.4 mg/dL    Calcium 9.4 8.7 - 10.5 mg/dL    Total Protein 6.5 6.0 - 8.4 g/dL    Albumin 3.5 3.5 - 5.2 g/dL    Total Bilirubin 0.5 0.1 - 1.0 mg/dL    Alkaline Phosphatase 60 55 - 135 U/L    AST 15 10 - 40 U/L    ALT 19 10 - 44 U/L    eGFR >60 >60 mL/min/1.73 m^2    Anion Gap 10 8 - 16 mmol/L   POCT glucose    Collection Time: 03/12/24  8:24 AM   Result Value Ref Range    POCT Glucose 202 (H) 70 - 110 mg/dL   POCT glucose    Collection Time: 03/12/24 11:09 AM   Result Value Ref Range    POCT Glucose 238 (H) 70 - 110 mg/dL        Lab Results   Component Value Date    [id number] Negative 01/04/2024       Recent Labs   Lab 03/10/24  0415 03/11/24  0431 03/12/24  0438   WBC 9.19 8.09 11.33   LYMPH 8.1*  0.7* 4.9*  0.4* 6.5*  0.7*   HGB 10.6*  "10.6* 10.6*   HCT 30.8* 31.1* 31.5*    170 188       Recent Labs   Lab 03/08/24  1702 03/09/24  0342 03/10/24  0415 03/11/24  0431 03/12/24  0438      < > 133* 135* 136   K 3.5   < > 4.5 5.1 4.7      < > 101 101 103   CO2 23   < > 23 25 23   BUN 23   < > 22 22 31*   CREATININE 0.9   < > 1.0 1.0 0.9   GLU 72   < > 304* 298* 230*   CALCIUM 9.4   < > 8.4* 8.9 9.4   MG 2.0  --   --   --   --     < > = values in this interval not displayed.       Recent Labs   Lab 03/09/24  0342 03/10/24  0415 03/11/24  0431 03/12/24  0438   ALKPHOS 71 58 65 60   ALT 22 19 20 19   AST 18 11 16 15   ALBUMIN 3.7 3.3* 3.4* 3.5   PROT 6.6 5.9* 6.5 6.5   BILITOT 0.5 0.8 0.5 0.5   INR 1.1  --   --   --           No results for input(s): "DDIMER", "FERRITIN", "CRP", "LDH", "BNP", "TROPONINI", "CPK" in the last 72 hours.    Invalid input(s): "PROCALCITONIN"        Microbiology: All microbiology updates for the past 24 hours have been reviewed.  Microbiology Results (last 7 days)       ** No results found for the last 168 hours. **              Imaging All imaging within the last 24 hours was reviewed.   ECG Results              EKG 12-LEAD (Final result)  Result time 03/11/24 14:52:18      Final result by Unknown User (03/11/24 14:52:18)                                        Results for orders placed during the hospital encounter of 10/07/23    Echo    Interpretation Summary    Left Ventricle: The left ventricle is normal in size. Mildly increased wall thickness. There is concentric remodeling. Septal motion is normal. There is normal systolic function. Ejection fraction by visual approximation is 60%.    Right Ventricle: Normal right ventricular cavity size. Wall thickness is normal. Right ventricle wall motion  is normal. Systolic function is normal.    Mitral Valve: There is mild posterior mitral annular calcification present.    IVC/SVC: Normal venous pressure at 3 mmHg.    A limited echo was performed using limited 2D " and color flow Doppler      X-Ray Hip 1 View Left (with Pelvis when performed)  Narrative: EXAMINATION:  XR HIP 1 VIEW LEFT (WITH PELVIS WHEN PERFORMED)    CLINICAL HISTORY:  s/p IM teresa and c/o of popping of surgical limb;    TECHNIQUE:  Single view of the left hip was performed.    COMPARISON:  Left hip radiographs 03/08/2024    FINDINGS:  Postsurgical changes of ORIF of a left intertrochanteric fracture with intramedullary teresa and intertrochanteric screw.  Hardware is intact.  The fracture fragments are in improved the femoral heads are well seated within the acetabula.  Degenerative change of the lower lumbar spine.  Pubic symphysis is not widened.  No new fracture.  Impression: Postsurgical changes of ORIF of a left intertrochanteric fracture.    Electronically signed by: Leda Mcgill  Date:    03/11/2024  Time:    17:50            Assessment/Plan:      * Closed intertrochanteric fracture of hip, left, initial encounter  -s/p IMN on 3/9 per Dr. Hdez  -Fall precautions  -PT/OT recommends high intensity therapy and case management working on disposition  -PRN analgesics  -ASA 81 BID for DVT prophylaxis  -Incentive spirometry      Acute sinusitis  Being treated with levaquin and medrol dose pack prior to admit  -recent hives due to amoxicillin  -ordered completion of 2 days of Medrol Dosepak (completed) and 4 days of Levaquin  -ok to continue nasal saline from home      Tetrahydrocannabinol (THC) dependence  -Patient to be counseled on cessation      ETOH abuse  -Thiamine, folic acid and MVI  -CIWA Q4H  -Patient to be counseled on cessation      Hyperlipidemia  Continue statin therapy      CAD (coronary artery disease)  -hx of drug eluting stent in 5/2023  -ASA  -Statin  -Coreg  -Telemetry  -may resume plavix per Ortho    Cigarette smoker  -Patient counseled on cessation  -referral to cessation program if amenable          Type 2 diabetes mellitus with circulatory disorder, with long-term current use of  insulin  Patient's FSGs are controlled on current medication regimen. He reports he takes 35 units of Lantus subQ BID  Last A1c reviewed-   Lab Results   Component Value Date    HGBA1C 6.1 03/08/2024     Most recent fingerstick glucose reviewed-   Recent Labs   Lab 03/11/24  1722 03/11/24  2215 03/12/24  0824 03/12/24  1109   POCTGLUCOSE 288* 247* 202* 238*       Current correctional scale  Medium  Increase anti-hyperglycemic dose as follows-   Antihyperglycemics (From admission, onward)      Start     Stop Route Frequency Ordered    03/12/24 1215  insulin aspart U-100 pen 5 Units         -- SubQ 3 times daily with meals 03/12/24 1210    03/11/24 2100  insulin detemir U-100 (Levemir) pen 30 Units         -- SubQ 2 times daily 03/11/24 1704    03/08/24 1822  insulin aspart U-100 pen 0-10 Units         -- SubQ Before meals & nightly PRN 03/08/24 1731          Hold Oral hypoglycemics while patient is in the hospital.       COPD (chronic obstructive pulmonary disease)  -continue PRN Duonebs  -monitor for symptoms    Benign essential HTN  -Chronic, controlled. Latest blood pressure and vitals reviewed-     Temp:  [97.1 °F (36.2 °C)-98.4 °F (36.9 °C)]   Pulse:  [70-96]   Resp:  [14-18]   BP: (130-177)/(66-90)   SpO2:  [92 %-96 %] .   Home meds for hypertension were reviewed and noted below.   Hypertension Medications                carvediloL (COREG) 3.125 MG tablet Take 3.125 mg by mouth 2 (two) times daily with meals.        losartan (COZAAR) 50 MG tablet Take 2 tablets (100 mg total) by mouth once daily.     nitroGLYCERIN (NITROSTAT) 0.4 MG SL tablet Place 1 tablet (0.4 mg total) under the tongue every 5 (five) minutes as needed for Chest pain.            While in the hospital, will manage blood pressure as follows; Continue home antihypertensive regimen - not taking imdur, amlodipine or metoprolol; amlodipine resumed on 3/11 due to elevated BP    Will utilize p.r.n. blood pressure medication only if patient's blood  pressure greater than 180/110 and he develops symptoms such as worsening chest pain or shortness of breath.      VTE Risk Mitigation (From admission, onward)           Ordered     IP VTE LOW RISK PATIENT  Once         03/08/24 1731     Place sequential compression device  Until discontinued         03/08/24 1731                    High Risk Conditions:  Patient is currently receiving parenteral controlled substances: Morphine      I have completed this tele-visit with the assistance of a telepresenter.    The attending portion of this evaluation, treatment, and documentation was performed per Sheila Malhotra MD via Telemedicine AudioVisual using the secure [employer] software platform with 2 way audio/video. The provider was located off-site and the patient is located in the hospital. The aforementioned video software was utilized to document the relevant history and physical exam    Sheila Malhotra MD  Department of Hospital Medicine   [facility]/Surg

## 2024-03-12 NOTE — PLAN OF CARE
[facility] still [facility].     Pt still with no accepting facility.   Per Gloria with [facility], pt still under review.     Pt denied by [facility] due to no bed availability.        03/12/24 1559   Post-Acute Status   Post-Acute Authorization Placement   Post-Acute Placement Status Pending payor review/awaiting authorization (if required)

## 2024-03-12 NOTE — NURSING
AMA formed reviewed and signed by patient this evening for refusal of SCD's, oxygen, bed alarm and chair alarm, and refusal to leave patient ID and allergy band in place. Risks discussed with patient. Patient wishes to proceed with refusal of cares noted. ABRAHAN Byrne aware and signature obtained on AMA form.

## 2024-03-12 NOTE — SUBJECTIVE & OBJECTIVE
This follow-up encounter was provided through telemedicine to address  Closed intertrochanteric fracture of hip, left, initial encounter present on admission.  The patient location is: 315[address] A admitted 3/8/2024  3:33 PM.      Interval History/Overnight Events:     Patient is able to provide adequate history.    Calmer today and seen working with therapy in the hallway.  Pain remains controlled.  BP improved after am meds.  Awaiting skilled nursing placement.       Review of Systems   Constitutional:  Positive for activity change and fatigue. Negative for appetite change and fever.   Respiratory:  Negative for cough and shortness of breath.    Gastrointestinal:  Negative for vomiting.   Musculoskeletal:  Positive for arthralgias and myalgias.   Psychiatric/Behavioral:  Negative for agitation and dysphoric mood.           I have reviewed the following on 03/12/2024:    Data  Details     [x]   Lab results reviewed   WBC 11; Hgb 10.6; Na 136; AST/ALT nl; Cr 0.9; glucose 202-288    []   Micro reports reviewed      []   Pathology reports reviewed      []   Imaging reports reviewed      []   Cardiology Procedure reports reviewed      []   Non- records/CareEverywhere notes reviewed      []  Tests/studies orders placed or verified       []  Independently viewed/assessed       [x]  03/12/2024 Discussion of:  Discharge planning with case management       Inpatient Medications reviewed and prescribed for management of current problems:  Scheduled Meds:   acetaminophen  650 mg Oral Q6H    amLODIPine  5 mg Oral Daily    aspirin  81 mg Oral BID    atorvastatin  40 mg Oral Daily    carvediloL  6.25 mg Oral BID    clopidogreL  75 mg Oral Daily    famotidine  20 mg Oral BID    folic acid  1 mg Oral Daily    insulin detemir U-100  30 Units Subcutaneous BID    isosorbide mononitrate  30 mg Oral Daily    levoFLOXacin  500 mg Oral Daily    methocarbamoL  500 mg Oral Q6H    multivitamin  1 tablet Oral Daily    nicotine  1 patch  Transdermal Daily    polyethylene glycol  17 g Oral BID    senna-docusate 8.6-50 mg  1 tablet Oral BID    thiamine  100 mg Oral Daily     Continuous Infusions:  PRN Meds:.acetaminophen, albuterol-ipratropium, aluminum-magnesium hydroxide-simethicone, dextrose 10%, dextrose 10%, diphenhydrAMINE, glucagon (human recombinant), glucose, glucose, hydrALAZINE, hydrOXYzine HCL, insulin aspart U-100, magnesium oxide, magnesium oxide, melatonin, morphine, morphine, naloxone, nicotine, ondansetron, potassium bicarbonate, potassium bicarbonate, potassium bicarbonate, prochlorperazine, simethicone, sodium chloride 0.9%, sodium chloride 0.9%      Objective:     Temp:  [97.1 °F (36.2 °C)-98.4 °F (36.9 °C)] 97.5 °F (36.4 °C)  Pulse:  [70-96] 76  Resp:  [14-18] 18  SpO2:  [92 %-96 %] 94 %  BP: (130-177)/(66-90) 135/70      Intake/Output Summary (Last 24 hours) at 3/12/2024 1159  Last data filed at 3/12/2024 0654  Gross per 24 hour   Intake 1275 ml   Output 600 ml   Net 675 ml          Body mass index is 26.82 kg/m².    Physical Exam  Vitals and nursing note reviewed.   Constitutional:       General: He is not in acute distress.     Appearance: Normal appearance. He is normal weight. He is not ill-appearing.   HENT:      Head: Normocephalic and atraumatic.   Cardiovascular:      Rate and Rhythm: Normal rate.   Pulmonary:      Effort: Pulmonary effort is normal. No accessory muscle usage or respiratory distress.   Neurological:      General: No focal deficit present.      Mental Status: He is alert and oriented to person, place, and time.      Cranial Nerves: No cranial nerve deficit.      Motor: No weakness.   Psychiatric:         Attention and Perception: Attention normal.         Mood and Affect: Affect is labile.         Speech: Speech normal.         Behavior: Behavior is cooperative.          Labs: All labs within the last 24 hours were reviewed.   Recent Results (from the past 24 hour(s))   POCT glucose    Collection Time:  03/11/24  5:22 PM   Result Value Ref Range    POCT Glucose 288 (H) 70 - 110 mg/dL   POCT glucose    Collection Time: 03/11/24 10:15 PM   Result Value Ref Range    POCT Glucose 247 (H) 70 - 110 mg/dL   CBC with Automated Differential    Collection Time: 03/12/24  4:38 AM   Result Value Ref Range    WBC 11.33 3.90 - 12.70 K/uL    RBC 3.18 (L) 4.60 - 6.20 M/uL    Hemoglobin 10.6 (L) 14.0 - 18.0 g/dL    Hematocrit 31.5 (L) 40.0 - 54.0 %    MCV 99 (H) 82 - 98 fL    MCH 33.3 (H) 27.0 - 31.0 pg    MCHC 33.7 32.0 - 36.0 g/dL    RDW 13.0 11.5 - 14.5 %    Platelets 188 150 - 450 K/uL    MPV 9.6 9.2 - 12.9 fL    Immature Granulocytes 1.1 (H) 0.0 - 0.5 %    Gran # (ANC) 9.3 (H) 1.8 - 7.7 K/uL    Immature Grans (Abs) 0.12 (H) 0.00 - 0.04 K/uL    Lymph # 0.7 (L) 1.0 - 4.8 K/uL    Mono # 1.1 (H) 0.3 - 1.0 K/uL    Eos # 0.0 0.0 - 0.5 K/uL    Baso # 0.01 0.00 - 0.20 K/uL    nRBC 0 0 /100 WBC    Gran % 82.4 (H) 38.0 - 73.0 %    Lymph % 6.5 (L) 18.0 - 48.0 %    Mono % 9.9 4.0 - 15.0 %    Eosinophil % 0.0 0.0 - 8.0 %    Basophil % 0.1 0.0 - 1.9 %    Differential Method Automated    Comprehensive Metabolic Panel (CMP)    Collection Time: 03/12/24  4:38 AM   Result Value Ref Range    Sodium 136 136 - 145 mmol/L    Potassium 4.7 3.5 - 5.1 mmol/L    Chloride 103 95 - 110 mmol/L    CO2 23 23 - 29 mmol/L    Glucose 230 (H) 70 - 110 mg/dL    BUN 31 (H) 8 - 23 mg/dL    Creatinine 0.9 0.5 - 1.4 mg/dL    Calcium 9.4 8.7 - 10.5 mg/dL    Total Protein 6.5 6.0 - 8.4 g/dL    Albumin 3.5 3.5 - 5.2 g/dL    Total Bilirubin 0.5 0.1 - 1.0 mg/dL    Alkaline Phosphatase 60 55 - 135 U/L    AST 15 10 - 40 U/L    ALT 19 10 - 44 U/L    eGFR >60 >60 mL/min/1.73 m^2    Anion Gap 10 8 - 16 mmol/L   POCT glucose    Collection Time: 03/12/24  8:24 AM   Result Value Ref Range    POCT Glucose 202 (H) 70 - 110 mg/dL   POCT glucose    Collection Time: 03/12/24 11:09 AM   Result Value Ref Range    POCT Glucose 238 (H) 70 - 110 mg/dL        Lab Results   Component Value  "Date    [id number] Negative 01/04/2024       Recent Labs   Lab 03/10/24  0415 03/11/24  0431 03/12/24  0438   WBC 9.19 8.09 11.33   LYMPH 8.1*  0.7* 4.9*  0.4* 6.5*  0.7*   HGB 10.6* 10.6* 10.6*   HCT 30.8* 31.1* 31.5*    170 188       Recent Labs   Lab 03/08/24  1702 03/09/24  0342 03/10/24  0415 03/11/24  0431 03/12/24  0438      < > 133* 135* 136   K 3.5   < > 4.5 5.1 4.7      < > 101 101 103   CO2 23   < > 23 25 23   BUN 23   < > 22 22 31*   CREATININE 0.9   < > 1.0 1.0 0.9   GLU 72   < > 304* 298* 230*   CALCIUM 9.4   < > 8.4* 8.9 9.4   MG 2.0  --   --   --   --     < > = values in this interval not displayed.       Recent Labs   Lab 03/09/24  0342 03/10/24  0415 03/11/24  0431 03/12/24  0438   ALKPHOS 71 58 65 60   ALT 22 19 20 19   AST 18 11 16 15   ALBUMIN 3.7 3.3* 3.4* 3.5   PROT 6.6 5.9* 6.5 6.5   BILITOT 0.5 0.8 0.5 0.5   INR 1.1  --   --   --           No results for input(s): "DDIMER", "FERRITIN", "CRP", "LDH", "BNP", "TROPONINI", "CPK" in the last 72 hours.    Invalid input(s): "PROCALCITONIN"        Microbiology: All microbiology updates for the past 24 hours have been reviewed.  Microbiology Results (last 7 days)       ** No results found for the last 168 hours. **              Imaging All imaging within the last 24 hours was reviewed.   ECG Results              EKG 12-LEAD (Final result)  Result time 03/11/24 14:52:18      Final result by Unknown User (03/11/24 14:52:18)                                        Results for orders placed during the hospital encounter of 10/07/23    Echo    Interpretation Summary    Left Ventricle: The left ventricle is normal in size. Mildly increased wall thickness. There is concentric remodeling. Septal motion is normal. There is normal systolic function. Ejection fraction by visual approximation is 60%.    Right Ventricle: Normal right ventricular cavity size. Wall thickness is normal. Right ventricle wall motion  is normal. Systolic function " is normal.    Mitral Valve: There is mild posterior mitral annular calcification present.    IVC/SVC: Normal venous pressure at 3 mmHg.    A limited echo was performed using limited 2D and color flow Doppler      X-Ray Hip 1 View Left (with Pelvis when performed)  Narrative: EXAMINATION:  XR HIP 1 VIEW LEFT (WITH PELVIS WHEN PERFORMED)    CLINICAL HISTORY:  s/p IM teresa and c/o of popping of surgical limb;    TECHNIQUE:  Single view of the left hip was performed.    COMPARISON:  Left hip radiographs 03/08/2024    FINDINGS:  Postsurgical changes of ORIF of a left intertrochanteric fracture with intramedullary teresa and intertrochanteric screw.  Hardware is intact.  The fracture fragments are in improved the femoral heads are well seated within the acetabula.  Degenerative change of the lower lumbar spine.  Pubic symphysis is not widened.  No new fracture.  Impression: Postsurgical changes of ORIF of a left intertrochanteric fracture.    Electronically signed by: Leda Mcgill  Date:    03/11/2024  Time:    17:50

## 2024-03-12 NOTE — ASSESSMENT & PLAN NOTE
-Chronic, controlled. Latest blood pressure and vitals reviewed-     Temp:  [97.1 °F (36.2 °C)-98.4 °F (36.9 °C)]   Pulse:  [70-96]   Resp:  [14-18]   BP: (130-177)/(66-90)   SpO2:  [92 %-96 %] .   Home meds for hypertension were reviewed and noted below.   Hypertension Medications                carvediloL (COREG) 3.125 MG tablet Take 3.125 mg by mouth 2 (two) times daily with meals.        losartan (COZAAR) 50 MG tablet Take 2 tablets (100 mg total) by mouth once daily.     nitroGLYCERIN (NITROSTAT) 0.4 MG SL tablet Place 1 tablet (0.4 mg total) under the tongue every 5 (five) minutes as needed for Chest pain.            While in the hospital, will manage blood pressure as follows; Continue home antihypertensive regimen - not taking imdur, amlodipine or metoprolol; amlodipine resumed on 3/11 due to elevated BP    Will utilize p.r.n. blood pressure medication only if patient's blood pressure greater than 180/110 and he develops symptoms such as worsening chest pain or shortness of breath.

## 2024-03-12 NOTE — PLAN OF CARE
Problem: Adult Inpatient Plan of Care  Goal: Plan of Care Review  Outcome: Ongoing, Progressing   Sitting on side of bed. POC and medications reviewed with pt. Verbalized understanding. Saline loc in right wrist. DDI. No redness or swelling at site. Surgical dsg intact without drainage. Bed in lowest position with brakes locked. Will continue to monitor.   Problem: Adult Inpatient Plan of Care  Goal: Optimal Comfort and Wellbeing  Outcome: Ongoing, Progressing   Q 2 hourly rounds made through out shift and pain, IV site, position monitored. PRN meds given per MD order  Problem: Diabetes Comorbidity  Goal: Blood Glucose Level Within Targeted Range  Outcome: Ongoing, Progressing   CBGs monitored through out shift and insulin given per MD order.

## 2024-03-12 NOTE — PLAN OF CARE
Problem: Occupational Therapy  Goal: Occupational Therapy Goal  Description: Goals to be met by: 4/9/2024     Patient will increase functional independence with ADLs by performing:    LE Dressing with Supervision and Assistive Devices as needed.  Grooming while standing at sink with Supervision.  Toileting from toilet with Supervision for hygiene and clothing management.   Supine to sit with Stand-by Assistance.  Toilet transfer to toilet with Stand-by Assistance.    Outcome: Ongoing, Progressing

## 2024-03-12 NOTE — PT/OT/SLP PROGRESS
Physical Therapy Treatment    Patient Name:  Sampson Holt   MRN:  [id number]    Recommendations:     Discharge Recommendations: Moderate Intensity Therapy  Discharge Equipment Recommendations: wheelchair, walker, rolling  Barriers to discharge: Decreased caregiver support    Assessment:     Sampson Holt is a 68 y.o. male admitted with a medical diagnosis of Closed intertrochanteric fracture of hip, left, initial encounter.  He presents with the following impairments/functional limitations: weakness, impaired endurance, impaired sensation, impaired functional mobility, gait instability, impaired self care skills, impaired balance, decreased lower extremity function, pain, decreased ROM, edema, impaired cardiopulmonary response to activity, orthopedic precautions .    Pt seen supine in bed- calm and sleepy this am. Pt agreeable to PT. Min assist to sit EOB with assist LLE. Min assist to stand with RW and ambulated 15ft, 10ft, 1 seated rest and another person following with chair. Pt requiring cueing for correct step and RW sequencing. OOB chair and reclined.    Rehab Prognosis: Fair; patient would benefit from acute skilled PT services to address these deficits and reach maximum level of function.    Recent Surgery: Procedure(s) (LRB):  INSERTION, INTRAMEDULLARY HUI, FEMUR (Left) 3 Days Post-Op    Plan:     During this hospitalization, patient to be seen daily to address the identified rehab impairments via gait training, therapeutic activities, therapeutic exercises and progress toward the following goals:    Plan of Care Expires:  04/10/24    Subjective     Chief Complaint: LH pain and popping  Patient/Family Comments/goals: get well  Pain/Comfort:  Pain Rating 1:  (not rated)  Location - Side 1: Left  Location 1: hip  Pain Addressed 1: Pre-medicate for activity, Reposition, Distraction      Objective:     Communicated with nurse Fay prior to session.  Patient found HOB elevated with   upon PT entry to room.      General Precautions: Standard, fall  Orthopedic Precautions: LLE weight bearing as tolerated  Braces: N/A  Respiratory Status: Room air     Functional Mobility:  Bed Mobility:     Supine to Sit: minimum assistance  Transfers:     Sit to Stand:  minimum assistance with rolling walker  Bed to Chair: minimum assistance with  rolling walker  using  Stand Pivot  Gait: 15ft,  10ft   with RW min assist and another person following with chair seated rest      AM-PAC 6 CLICK MOBILITY  Turning over in bed (including adjusting bedclothes, sheets and blankets)?: 3  Sitting down on and standing up from a chair with arms (e.g., wheelchair, bedside commode, etc.): 3  Moving from lying on back to sitting on the side of the bed?: 3  Moving to and from a bed to a chair (including a wheelchair)?: 3  Need to walk in hospital room?: 3  Climbing 3-5 steps with a railing?: 1  Basic Mobility Total Score: 16       Treatment & Education:  Patient was educated on the importance of OOB activity and functional mobility to negate negative effects of prolonged bed rest during hospitalization, safe transfers and ambulation, and D/C planning   OOB chair post PT and repositioned  Set up assist for lunch tray    Patient left up in chair with all lines intact, call button in reach, and chair alarm on..    GOALS:   Multidisciplinary Problems       Physical Therapy Goals          Problem: Physical Therapy    Goal Priority Disciplines Outcome Goal Variances Interventions   Physical Therapy Goal     PT, PT/OT Ongoing, Progressing     Description: 1. Supine to sit with Stand-by Assistance  2. Sit to stand transfer with Stand-by Assistance  3.. Bed to chair transfer with Supervision using Rolling Walker, WBAT LLE  4. Gait  x 100 feet with Minimal Assistance using Rolling Walker, WBAT L LE                           Time Tracking:     PT Received On: 03/12/24  PT Start Time: 1120     PT Stop Time: 1143  PT Total Time (min): 23 min     Billable Minutes:  Gait Training 23    Treatment Type: Treatment  PT/PTA: PT     Number of PTA visits since last PT visit: 0     03/12/2024

## 2024-03-12 NOTE — PLAN OF CARE
Problem: Adult Inpatient Plan of Care  Goal: Plan of Care Review  Outcome: Ongoing, Progressing  Goal: Patient-Specific Goal (Individualized)  Outcome: Ongoing, Progressing  Goal: Absence of Hospital-Acquired Illness or Injury  Outcome: Ongoing, Progressing  Goal: Optimal Comfort and Wellbeing  Outcome: Ongoing, Progressing  Goal: Readiness for Transition of Care  Outcome: Ongoing, Progressing     Problem: Diabetes Comorbidity  Goal: Blood Glucose Level Within Targeted Range  Outcome: Ongoing, Progressing     Problem: Fall Injury Risk  Goal: Absence of Fall and Fall-Related Injury  Outcome: Ongoing, Progressing     Problem: Skin Injury Risk Increased  Goal: Skin Health and Integrity  Outcome: Ongoing, Progressing   Plan of care reviewed with patient. Patient verbalized understanding. Patient signed AMA for bed alarm, ID bands, O2, SCD's. All fall precautions maintained. Bed in lowest position, call light within reach, slip resistant socks maintained. Bed alarm on.

## 2024-03-13 VITALS
WEIGHT: 203.25 LBS | SYSTOLIC BLOOD PRESSURE: 170 MMHG | BODY MASS INDEX: 26.94 KG/M2 | HEIGHT: 73 IN | DIASTOLIC BLOOD PRESSURE: 88 MMHG | TEMPERATURE: 98 F | OXYGEN SATURATION: 95 % | RESPIRATION RATE: 20 BRPM | HEART RATE: 75 BPM

## 2024-03-13 LAB
ALBUMIN SERPL BCP-MCNC: 3.3 G/DL (ref 3.5–5.2)
ALP SERPL-CCNC: 62 U/L (ref 55–135)
ALT SERPL W/O P-5'-P-CCNC: 30 U/L (ref 10–44)
ANION GAP SERPL CALC-SCNC: 9 MMOL/L (ref 8–16)
AST SERPL-CCNC: 38 U/L (ref 10–40)
BASOPHILS # BLD AUTO: 0.02 K/UL (ref 0–0.2)
BASOPHILS NFR BLD: 0.2 % (ref 0–1.9)
BILIRUB SERPL-MCNC: 0.5 MG/DL (ref 0.1–1)
BUN SERPL-MCNC: 36 MG/DL (ref 8–23)
CALCIUM SERPL-MCNC: 9.3 MG/DL (ref 8.7–10.5)
CHLORIDE SERPL-SCNC: 103 MMOL/L (ref 95–110)
CO2 SERPL-SCNC: 25 MMOL/L (ref 23–29)
CREAT SERPL-MCNC: 0.9 MG/DL (ref 0.5–1.4)
DIFFERENTIAL METHOD BLD: ABNORMAL
EOSINOPHIL # BLD AUTO: 0.1 K/UL (ref 0–0.5)
EOSINOPHIL NFR BLD: 0.6 % (ref 0–8)
ERYTHROCYTE [DISTWIDTH] IN BLOOD BY AUTOMATED COUNT: 13.2 % (ref 11.5–14.5)
EST. GFR  (NO RACE VARIABLE): >60 ML/MIN/1.73 M^2
GLUCOSE SERPL-MCNC: 160 MG/DL (ref 70–110)
HCT VFR BLD AUTO: 34.6 % (ref 40–54)
HGB BLD-MCNC: 11.5 G/DL (ref 14–18)
IMM GRANULOCYTES # BLD AUTO: 0.11 K/UL (ref 0–0.04)
IMM GRANULOCYTES NFR BLD AUTO: 1.1 % (ref 0–0.5)
LYMPHOCYTES # BLD AUTO: 2.1 K/UL (ref 1–4.8)
LYMPHOCYTES NFR BLD: 21.8 % (ref 18–48)
MCH RBC QN AUTO: 33.5 PG (ref 27–31)
MCHC RBC AUTO-ENTMCNC: 33.2 G/DL (ref 32–36)
MCV RBC AUTO: 101 FL (ref 82–98)
MONOCYTES # BLD AUTO: 1.3 K/UL (ref 0.3–1)
MONOCYTES NFR BLD: 13.5 % (ref 4–15)
NEUTROPHILS # BLD AUTO: 6.1 K/UL (ref 1.8–7.7)
NEUTROPHILS NFR BLD: 62.8 % (ref 38–73)
NRBC BLD-RTO: 0 /100 WBC
PLATELET # BLD AUTO: 227 K/UL (ref 150–450)
PMV BLD AUTO: 9.3 FL (ref 9.2–12.9)
POCT GLUCOSE: 167 MG/DL (ref 70–110)
POCT GLUCOSE: 194 MG/DL (ref 70–110)
POTASSIUM SERPL-SCNC: 4.5 MMOL/L (ref 3.5–5.1)
PROT SERPL-MCNC: 6.4 G/DL (ref 6–8.4)
RBC # BLD AUTO: 3.43 M/UL (ref 4.6–6.2)
SODIUM SERPL-SCNC: 137 MMOL/L (ref 136–145)
WBC # BLD AUTO: 9.72 K/UL (ref 3.9–12.7)

## 2024-03-13 PROCEDURE — 80053 COMPREHEN METABOLIC PANEL: CPT | Performed by: ORTHOPAEDIC SURGERY

## 2024-03-13 PROCEDURE — 99900035 HC TECH TIME PER 15 MIN (STAT)

## 2024-03-13 PROCEDURE — 25000003 PHARM REV CODE 250: Performed by: INTERNAL MEDICINE

## 2024-03-13 PROCEDURE — 63600175 PHARM REV CODE 636 W HCPCS: Performed by: INTERNAL MEDICINE

## 2024-03-13 PROCEDURE — 25000003 PHARM REV CODE 250: Performed by: STUDENT IN AN ORGANIZED HEALTH CARE EDUCATION/TRAINING PROGRAM

## 2024-03-13 PROCEDURE — 36415 COLL VENOUS BLD VENIPUNCTURE: CPT | Performed by: ORTHOPAEDIC SURGERY

## 2024-03-13 PROCEDURE — 86580 TB INTRADERMAL TEST: CPT | Performed by: INTERNAL MEDICINE

## 2024-03-13 PROCEDURE — 94761 N-INVAS EAR/PLS OXIMETRY MLT: CPT

## 2024-03-13 PROCEDURE — 30200315 PPD INTRADERMAL TEST REV CODE 302: Performed by: INTERNAL MEDICINE

## 2024-03-13 PROCEDURE — 25000003 PHARM REV CODE 250: Performed by: ORTHOPAEDIC SURGERY

## 2024-03-13 PROCEDURE — 97116 GAIT TRAINING THERAPY: CPT

## 2024-03-13 PROCEDURE — 85025 COMPLETE CBC W/AUTO DIFF WBC: CPT | Performed by: ORTHOPAEDIC SURGERY

## 2024-03-13 PROCEDURE — 94799 UNLISTED PULMONARY SVC/PX: CPT | Mod: XB

## 2024-03-13 PROCEDURE — 97535 SELF CARE MNGMENT TRAINING: CPT

## 2024-03-13 RX ORDER — FAMOTIDINE 20 MG/1
20 TABLET, FILM COATED ORAL 2 TIMES DAILY
Qty: 60 TABLET | Refills: 11
Start: 2024-03-13 | End: 2025-03-13

## 2024-03-13 RX ORDER — INSULIN ASPART 100 [IU]/ML
0-10 INJECTION, SOLUTION INTRAVENOUS; SUBCUTANEOUS
Refills: 0
Start: 2024-03-13 | End: 2025-03-13

## 2024-03-13 RX ORDER — CARVEDILOL 3.12 MG/1
6.25 TABLET ORAL 2 TIMES DAILY WITH MEALS
Start: 2024-03-13

## 2024-03-13 RX ORDER — HYDROXYZINE HYDROCHLORIDE 50 MG/1
50 TABLET, FILM COATED ORAL 3 TIMES DAILY PRN
Start: 2024-03-13

## 2024-03-13 RX ORDER — AMOXICILLIN 250 MG
1 CAPSULE ORAL 2 TIMES DAILY
Start: 2024-03-13

## 2024-03-13 RX ORDER — LACTULOSE 10 G/15ML
20 SOLUTION ORAL 3 TIMES DAILY PRN
Status: DISCONTINUED | OUTPATIENT
Start: 2024-03-13 | End: 2024-03-13 | Stop reason: HOSPADM

## 2024-03-13 RX ORDER — NAPROXEN SODIUM 220 MG/1
TABLET, FILM COATED ORAL
Refills: 0
Start: 2024-03-13 | End: 2025-03-12

## 2024-03-13 RX ORDER — POLYETHYLENE GLYCOL 3350 17 G/17G
17 POWDER, FOR SOLUTION ORAL 2 TIMES DAILY
Refills: 0
Start: 2024-03-13

## 2024-03-13 RX ORDER — TALC
9 POWDER (GRAM) TOPICAL NIGHTLY PRN
Refills: 0
Start: 2024-03-13

## 2024-03-13 RX ORDER — DIAZEPAM 5 MG/1
5 TABLET ORAL DAILY
Status: DISCONTINUED | OUTPATIENT
Start: 2024-03-15 | End: 2024-03-13 | Stop reason: HOSPADM

## 2024-03-13 RX ORDER — MORPHINE SULFATE 30 MG/1
30 TABLET ORAL EVERY 4 HOURS PRN
Qty: 25 TABLET | Refills: 0 | Status: SHIPPED | OUTPATIENT
Start: 2024-03-13

## 2024-03-13 RX ORDER — METHOCARBAMOL 500 MG/1
500 TABLET, FILM COATED ORAL EVERY 8 HOURS
Qty: 30 TABLET | Refills: 0
Start: 2024-03-13 | End: 2024-03-23

## 2024-03-13 RX ORDER — ACETAMINOPHEN 325 MG/1
650 TABLET ORAL EVERY 8 HOURS
Refills: 0
Start: 2024-03-13

## 2024-03-13 RX ORDER — LACTULOSE 10 G/15ML
20 SOLUTION ORAL 3 TIMES DAILY PRN
Start: 2024-03-13

## 2024-03-13 RX ORDER — DIAZEPAM 5 MG/1
5 TABLET ORAL EVERY 8 HOURS
Status: DISCONTINUED | OUTPATIENT
Start: 2024-03-13 | End: 2024-03-13 | Stop reason: HOSPADM

## 2024-03-13 RX ORDER — DIAZEPAM 5 MG/1
5 TABLET ORAL EVERY 12 HOURS
Status: DISCONTINUED | OUTPATIENT
Start: 2024-03-14 | End: 2024-03-13 | Stop reason: HOSPADM

## 2024-03-13 RX ORDER — METHOCARBAMOL 500 MG/1
500 TABLET, FILM COATED ORAL EVERY 6 HOURS
Status: DISCONTINUED | OUTPATIENT
Start: 2024-03-15 | End: 2024-03-13 | Stop reason: HOSPADM

## 2024-03-13 RX ORDER — LORAZEPAM 1 MG/1
2 TABLET ORAL EVERY 4 HOURS PRN
Status: DISCONTINUED | OUTPATIENT
Start: 2024-03-13 | End: 2024-03-13 | Stop reason: HOSPADM

## 2024-03-13 RX ORDER — DIAZEPAM 5 MG/1
TABLET ORAL
Qty: 6 TABLET | Refills: 0 | Status: SHIPPED | OUTPATIENT
Start: 2024-03-13 | End: 2024-03-17

## 2024-03-13 RX ORDER — LORAZEPAM 2 MG/ML
2 INJECTION INTRAMUSCULAR
Status: DISCONTINUED | OUTPATIENT
Start: 2024-03-13 | End: 2024-03-13 | Stop reason: HOSPADM

## 2024-03-13 RX ORDER — INSULIN GLARGINE 100 [IU]/ML
35 INJECTION, SOLUTION SUBCUTANEOUS 2 TIMES DAILY
Refills: 0
Start: 2024-03-13 | End: 2025-03-13

## 2024-03-13 RX ADMIN — ATORVASTATIN CALCIUM 40 MG: 40 TABLET, FILM COATED ORAL at 08:03

## 2024-03-13 RX ADMIN — DIAZEPAM 5 MG: 5 TABLET ORAL at 03:03

## 2024-03-13 RX ADMIN — DOCUSATE SODIUM AND SENNOSIDES 1 TABLET: 8.6; 5 TABLET, FILM COATED ORAL at 08:03

## 2024-03-13 RX ADMIN — MORPHINE SULFATE 30 MG: 10 SOLUTION ORAL at 10:03

## 2024-03-13 RX ADMIN — METHOCARBAMOL 500 MG: 500 TABLET ORAL at 05:03

## 2024-03-13 RX ADMIN — ACETAMINOPHEN 650 MG: 325 TABLET ORAL at 11:03

## 2024-03-13 RX ADMIN — FOLIC ACID 1 MG: 1 TABLET ORAL at 08:03

## 2024-03-13 RX ADMIN — CARVEDILOL 6.25 MG: 6.25 TABLET, FILM COATED ORAL at 08:03

## 2024-03-13 RX ADMIN — POLYETHYLENE GLYCOL (3350) 17 G: 17 POWDER, FOR SOLUTION ORAL at 08:03

## 2024-03-13 RX ADMIN — ACETAMINOPHEN 650 MG: 325 TABLET ORAL at 05:03

## 2024-03-13 RX ADMIN — INSULIN ASPART 5 UNITS: 100 INJECTION, SOLUTION INTRAVENOUS; SUBCUTANEOUS at 08:03

## 2024-03-13 RX ADMIN — CLOPIDOGREL BISULFATE 75 MG: 75 TABLET, FILM COATED ORAL at 08:03

## 2024-03-13 RX ADMIN — ASPIRIN 81 MG CHEWABLE TABLET 81 MG: 81 TABLET CHEWABLE at 08:03

## 2024-03-13 RX ADMIN — AMLODIPINE BESYLATE 5 MG: 5 TABLET ORAL at 08:03

## 2024-03-13 RX ADMIN — LEVOFLOXACIN 500 MG: 250 TABLET, FILM COATED ORAL at 08:03

## 2024-03-13 RX ADMIN — INSULIN ASPART 5 UNITS: 100 INJECTION, SOLUTION INTRAVENOUS; SUBCUTANEOUS at 11:03

## 2024-03-13 RX ADMIN — THIAMINE HCL TAB 100 MG 100 MG: 100 TAB at 08:03

## 2024-03-13 RX ADMIN — ISOSORBIDE MONONITRATE 30 MG: 30 TABLET, EXTENDED RELEASE ORAL at 08:03

## 2024-03-13 RX ADMIN — MORPHINE SULFATE 30 MG: 10 SOLUTION ORAL at 03:03

## 2024-03-13 RX ADMIN — FAMOTIDINE 20 MG: 20 TABLET, FILM COATED ORAL at 08:03

## 2024-03-13 RX ADMIN — THERA TABS 1 TABLET: TAB at 08:03

## 2024-03-13 RX ADMIN — TUBERCULIN PURIFIED PROTEIN DERIVATIVE 5 UNITS: 5 INJECTION, SOLUTION INTRADERMAL at 10:03

## 2024-03-13 NOTE — PLAN OF CARE
Pt clear for DC from case management standpoint. Discharging to [facility]       03/13/24 1354   Final Note   Assessment Type Final Discharge Note   Anticipated Discharge Disposition SNF

## 2024-03-13 NOTE — CARE UPDATE
03/12/24 2003   Patient Assessment/Suction   Level of Consciousness (AVPU) alert   Respiratory Effort Unlabored   Expansion/Accessory Muscles/Retractions no use of accessory muscles   All Lung Fields Breath Sounds Anterior:;equal bilaterally   Rhythm/Pattern, Respiratory unlabored;pattern regular   Cough Frequency no cough   PRE-TX-O2   Device (Oxygen Therapy) room air   SpO2 95 %   Pulse Oximetry Type Intermittent   $ Pulse Oximetry - Multiple Charge Pulse Oximetry - Multiple   Pulse 88   Resp 20   Aerosol Therapy   $ Aerosol Therapy Charges PRN treatment not required   Respiratory Treatment Status (SVN) PRN treatment not required   Incentive Spirometer   $ Incentive Spirometer Charges done independently per patient

## 2024-03-13 NOTE — NURSING
Pt educated on discharge. Verbalized understanding. PIV removed. Pt tolerated well. Left floor via wheelchair and put into passenger's seat of wheelchair van safely with belongings in hand.

## 2024-03-13 NOTE — PLAN OF CARE
Problem: Physical Therapy  Goal: Physical Therapy Goal  Description: 1. Supine to sit with Stand-by Assistance  2. Sit to stand transfer with Stand-by Assistance  3.. Bed to chair transfer with Supervision using Rolling Walker, WBAT LLE  4. Gait  x 100 feet with Minimal Assistance using Rolling Walker, WBAT L LE      Outcome: Ongoing, Progressing   C/o increased L groin pain. Pt ambulated 4 steps with RW with lots of encouragement. OOB chair

## 2024-03-13 NOTE — PT/OT/SLP PROGRESS
"Occupational Therapy   Treatment    Name: Sampson Holt  MRN: [id number]  Admitting Diagnosis:  Closed intertrochanteric fracture of hip, left, initial encounter  4 Days Post-Op    Recommendations:     Discharge Recommendations: High Intensity Therapy  Discharge Equipment Recommendations:  walker, rolling, wheelchair  Barriers to discharge:  Decreased caregiver support    Assessment:     Sampson Holt is a 68 y.o. male with a medical diagnosis of Closed intertrochanteric fracture of hip, left, initial encounter.  He presents with c/o persistent and intensifying LLE pain especially with bed mobility and ambulation with RW. Noted patient had difficulty advancing LLE this tx, requiring extra time with sit<>stand and bed>chair transfers. However, patient was able to pivot with LLE into chair requiring Mod A. Patient declined further participation due to LLE pain. Performance deficits affecting function are weakness, impaired endurance, impaired self care skills, impaired functional mobility, gait instability, impaired balance, decreased coordination, decreased lower extremity function, decreased safety awareness, pain, decreased ROM, orthopedic precautions.     Rehab Prognosis:  Good; patient would benefit from acute skilled OT services to address these deficits and reach maximum level of function.       Plan:     Patient to be seen 5 x/week to address the above listed problems via self-care/home management, therapeutic activities, therapeutic exercises  Plan of Care Expires: 04/09/24  Plan of Care Reviewed with: patient    Subjective     Chief Complaint: LLE pain with mobility  Patient/Family Comments/goals: "Something just doesn't feel right."  Pain/Comfort:  Pain Rating 1:  (not rated)  Location - Side 1: Left  Location - Orientation 1: generalized  Location 1: leg  Pain Addressed 1: Reposition, Distraction  Pain Rating Post-Intervention 1: 10/10    Objective:     Communicated with: nurse Damon prior to session.  " Patient found HOB elevated with bed alarm upon OT entry to room.    General Precautions: Standard, fall    Orthopedic Precautions:LLE weight bearing as tolerated  Braces: N/A  Respiratory Status: Room air     Occupational Performance:     Bed Mobility:    Patient completed Scooting/Bridging with minimum assistance  Patient completed Supine to Sit with minimum assistance to LLE out of bed    Functional Mobility/Transfers:  Patient completed Sit <> Stand Transfer with moderate assistance  with  rolling walker   Patient completed Bed <> Chair Transfer using Stand Pivot technique with minimum assistance with rolling walker  Functional Mobility: Noted patient had difficulty with advancing LLE. Patient was able to pivot LLE into chair requiring Mod A.     Activities of Daily Living:  Lower Body Dressing: maximal assistance to don/doff socks at EOB      AMPA 6 Click ADL:      Treatment & Education:  OT ed patient on safety with walker use for functional mobility with cues for hand placement & sequencing.       Patient left up in chair with call button in reach, chair alarm on, and PT present    GOALS:   Multidisciplinary Problems       Occupational Therapy Goals          Problem: Occupational Therapy    Goal Priority Disciplines Outcome Interventions   Occupational Therapy Goal     OT, PT/OT Ongoing, Progressing    Description: Goals to be met by: 4/9/2024     Patient will increase functional independence with ADLs by performing:    LE Dressing with Supervision and Assistive Devices as needed.  Grooming while standing at sink with Supervision.  Toileting from toilet with Supervision for hygiene and clothing management.   Supine to sit with Stand-by Assistance.  Toilet transfer to toilet with Stand-by Assistance.                         Time Tracking:     OT Date of Treatment: 03/13/24  OT Start Time: 1041  OT Stop Time: 1108  OT Total Time (min): 27 min    Billable Minutes:Therapeutic Activity 27    OT/KELLY: OT           3/13/2024

## 2024-03-13 NOTE — PLAN OF CARE
[facility]    FACILITY TRANSFER ORDERS      Patient Name: Sampson Holt  YOB: 1955    PCP: Damian Vergara MD   PCP Address: [address] [place] [address] [address] / [place] [address]  PCP Phone Number: [phone]  PCP Fax: [phone]    Encounter Date: 03/13/2024     Admit to: skilled nursing    Diagnoses:   Active Hospital Problems    Diagnosis  POA    *Closed intertrochanteric fracture of hip, left, initial encounter [S72.142A]  Yes    Acute sinusitis [J01.90]  Yes    ETOH abuse [F10.10]  Yes    Tetrahydrocannabinol (THC) dependence [F12.20]  Yes    Hyperlipidemia [E78.5]  Yes    CAD (coronary artery disease) [I25.10]  Yes    Cigarette smoker [F17.210]  Yes    Benign essential HTN [I10]  Yes    COPD (chronic obstructive pulmonary disease) [J44.9]  Yes    Type 2 diabetes mellitus with circulatory disorder, with long-term current use of insulin [E11.59, Z79.4]  Not Applicable      Resolved Hospital Problems   No resolved problems to display.     Allergies:   Review of patient's allergies indicates:   Allergen Reactions    Amoxicillin Shortness Of Breath and Edema       Code Status: full code    Vitals: Every shift       Diet: cardiac diet and diabetic diet: 2200 calorie    Activity: Activity as tolerated and Weight bearing status: weight bearing as tolerated: left leg    Nursing Precautions: Aspiration , Fall, Seizure, and Pressure ulcer prevention    Consults: PT to evaluate and treat- 5 times a week, OT to evaluate and treat- 5 times a week, Wound Care, and Nutrition to evaluate and recommend diet    Oxygen: room air    Labs:   CBC, CMP twice weekly      MISCELLANEOUS CARE:  Routine Skin for Bedridden Patients: Apply moisture barrier cream to all skin folds and wet areas in perineal area daily and after baths and all bowel movements. and Diabetes Care:   SN to perform and educate Diabetic management with blood glucose monitoring:, Fingerstick blood sugar AC and HS, and Report CBG <  60 or > 350 to physician.    WOUND CARE ORDERS  Monitor left hip incision for signs of infection       Medications: Discontinue all previous medication orders, if any. See new list below.  Current Discharge Medication List        START taking these medications    Details   acetaminophen (TYLENOL) 325 MG tablet Take 2 tablets (650 mg total) by mouth every 8 (eight) hours.  Refills: 0      aspirin 81 MG Chew Take 1 tablet (81 mg total) by mouth 2 (two) times a day for 24 days, THEN 1 tablet (81 mg total) once daily.  Refills: 0      diazePAM (VALIUM) 5 MG tablet Take 1 tablet (5 mg total) by mouth 2 (two) times a day for 2 days, THEN 1 tablet (5 mg total) once daily for 2 days. Ending on 3/16.  Qty: 6 tablet, Refills: 0      famotidine (PEPCID) 20 MG tablet Take 1 tablet (20 mg total) by mouth 2 (two) times daily.  Qty: 60 tablet, Refills: 11      hydrOXYzine HCL (ATARAX) 50 MG tablet Take 1 tablet (50 mg total) by mouth 3 (three) times daily as needed for Anxiety.      insulin aspart U-100 (NOVOLOG) 100 unit/mL (3 mL) InPn pen Inject 0-10 Units into the skin before meals and at bedtime as needed (Hyperglycemia). **MODERATE CORRECTION DOSE**  Blood Glucose  mg/dL                  Pre-meal              151-200                2 units                     201-250                4 units                  251-300                6 units                      301-350                8 units                     >350                     10 units                  Administer subcutaneously if needed at times designated by monitoring  schedule.  Refills: 0      insulin glargine 100 units/mL SubQ pen Inject 35 Units into the skin 2 (two) times a day.  Refills: 0      lactulose (CHRONULAC) 20 gram/30 mL Soln Take 30 mLs (20 g total) by mouth 3 (three) times daily as needed.      melatonin (MELATIN) 3 mg tablet Take 3 tablets (9 mg total) by mouth nightly as needed for Insomnia.  Refills: 0      methocarbamoL (ROBAXIN) 500 MG Tab Take 1  tablet (500 mg total) by mouth every 8 (eight) hours. for 10 days  Qty: 30 tablet, Refills: 0      morphine (MSIR) 30 MG tablet Take 1 tablet (30 mg total) by mouth every 4 (four) hours as needed for Pain.  Qty: 25 tablet, Refills: 0    Comments: Quantity prescribed more than 7 day supply? No      multivitamin Tab Take 1 tablet by mouth once daily.      polyethylene glycol (GLYCOLAX) 17 gram PwPk Take 17 g by mouth 2 (two) times daily.  Refills: 0      senna-docusate 8.6-50 mg (PERICOLACE) 8.6-50 mg per tablet Take 1 tablet by mouth 2 (two) times daily.           CONTINUE these medications which have CHANGED    Details   carvediloL (COREG) 3.125 MG tablet Take 2 tablets (6.25 mg total) by mouth 2 (two) times daily with meals.    Comments: .           CONTINUE these medications which have NOT CHANGED    Details   albuterol (PROVENTIL/VENTOLIN HFA) 90 mcg/actuation inhaler Inhale 2 puffs into the lungs every 6 (six) hours as needed for Wheezing. Rescue  Qty: 18 g, Refills: 6      amLODIPine (NORVASC) 5 MG tablet Take 1 tablet (5 mg total) by mouth once daily.  Qty: 30 tablet, Refills: 11    Comments: .      atorvastatin (LIPITOR) 40 MG tablet Take 1 tablet (40 mg total) by mouth once daily.  Qty: 90 tablet, Refills: 3      clopidogreL (PLAVIX) 75 mg tablet Take 1 tablet (75 mg total) by mouth once daily.  Qty: 90 tablet, Refills: 3    Associated Diagnoses: Status post insertion of drug eluting coronary artery stent      isosorbide mononitrate (IMDUR) 30 MG 24 hr tablet       mirabegron (MYRBETRIQ) 50 mg Tb24 Take 1 tablet (50 mg total) by mouth every morning. Take one every morning for overactive bladder  Qty: 90 tablet, Refills: 3      nitroGLYCERIN (NITROSTAT) 0.4 MG SL tablet Place 1 tablet (0.4 mg total) under the tongue every 5 (five) minutes as needed for Chest pain.  Qty: 25 tablet, Refills: 5      ondansetron (ZOFRAN) 4 MG tablet Take 1 tablet (4 mg total) by mouth every 8 (eight) hours as needed for  Nausea.  Qty: 12 tablet, Refills: 0           STOP taking these medications       aspirin (ECOTRIN) 81 MG EC tablet Comments:   Reason for Stopping:         coenzyme Q10 100 mg capsule Comments:   Reason for Stopping:         insulin glargine,hum.rec.anlog (LANTUS SUBQ) Comments:   Reason for Stopping:         levoFLOXacin (LEVAQUIN) 500 MG tablet Comments:   Reason for Stopping:         losartan (COZAAR) 50 MG tablet Comments:   Reason for Stopping:         metoprolol tartrate (LOPRESSOR) 25 MG tablet Comments:   Reason for Stopping:         rosuvastatin (CRESTOR) 10 MG tablet Comments:   Reason for Stopping:         tamsulosin (FLOMAX) 0.4 mg Cap Comments:   Reason for Stopping:             Follow up:   Future Appointments   Date Time Provider Department [place]   3/22/2024 12:45 PM Tarun Hdez MD [facility] ORTHO [facility] [place]   4/29/2024 11:15 AM Katie Huber FNP [facility] [id number] [place] [id number]   5/27/2024  9:15 AM [facility] XR2 EH XRAY [facility]   5/27/2024  9:30 AM EH US3 SMEH ULTRAS [facility]   6/4/2024 10:45 AM Jennifer Ferreira MD [facility] UROLOGY [facility] [place]           Immunizations Administered as of 3/13/2024       No immunizations on file.                      _________________________________  Sheila Malhotra MD  03/13/2024

## 2024-03-13 NOTE — PT/OT/SLP PROGRESS
Physical Therapy Treatment    Patient Name:  Sampson Holt   MRN:  [id number]    Recommendations:     Discharge Recommendations: Moderate Intensity Therapy  Discharge Equipment Recommendations: wheelchair, walker, rolling  Barriers to discharge: Decreased caregiver support    Assessment:     Sampson oHlt is a 68 y.o. male admitted with a medical diagnosis of Closed intertrochanteric fracture of hip, left, initial encounter.  He presents with the following impairments/functional limitations: weakness, impaired endurance, impaired sensation, impaired functional mobility, gait instability, impaired self care skills, impaired balance, decreased lower extremity function, pain, decreased ROM, edema, impaired cardiopulmonary response to activity, orthopedic precautions .    Pt seen up in chair post OT. Pt c/o L groin pain. Pt agreeable to PT and taken at hallways. Min assist to stand with RW and ambulated ~4 steps with lots of cueing and encouragement. Pt c/o pain and allowed to sit back in chair. Pt encouraged Gluteal sets.  Mod complexity.    Rehab Prognosis: Fair; patient would benefit from acute skilled PT services to address these deficits and reach maximum level of function.    Recent Surgery: Procedure(s) (LRB):  INSERTION, INTRAMEDULLARY HUI, FEMUR (Left) 4 Days Post-Op    Plan:     During this hospitalization, patient to be seen daily to address the identified rehab impairments via gait training, therapeutic activities, therapeutic exercises and progress toward the following goals:    Plan of Care Expires:  04/10/24    Subjective   Stated that he will transfer to another facility for therapy  Chief Complaint: pain L groin  Patient/Family Comments/goals: get well  Pain/Comfort:  Pain Rating 1:  (not rated)  Location - Side 1: Left  Location 1: groin  Pain Addressed 1: Pre-medicate for activity, Reposition, Distraction      Objective:     Communicated with nurse Damon prior to session.  Patient found up in chair  with chair check upon PT entry to room.     General Precautions: Standard, fall  Orthopedic Precautions: LLE weight bearing as tolerated  Braces: N/A  Respiratory Status: Room air     Functional Mobility:  Transfers:     Sit to Stand:  minimum assistance with rolling walker  Gait: 4 steps with RW min assist with encouragement and WBAT LLE      AM-PAC 6 CLICK MOBILITY          Treatment & Education:  Patient was educated on the importance of OOB activity and functional mobility to negate negative effects of prolonged bed rest during hospitalization, safe transfers and ambulation, and D/C planning   Returned to room and up in chair  Encouraged gluteal sets    Patient left up in chair with all lines intact, call button in reach, and chair alarm on..    GOALS:   Multidisciplinary Problems       Physical Therapy Goals          Problem: Physical Therapy    Goal Priority Disciplines Outcome Goal Variances Interventions   Physical Therapy Goal     PT, PT/OT Ongoing, Progressing     Description: 1. Supine to sit with Stand-by Assistance  2. Sit to stand transfer with Stand-by Assistance  3.. Bed to chair transfer with Supervision using Rolling Walker, WBAT LLE  4. Gait  x 100 feet with Minimal Assistance using Rolling Walker, WBAT L LE                           Time Tracking:     PT Received On: 03/13/24  PT Start Time: 1107     PT Stop Time: 1132  PT Total Time (min): 25 min     Billable Minutes: Gait Training 25    Treatment Type: Treatment  PT/PTA: PT     Number of PTA visits since last PT visit: 0     03/13/2024

## 2024-03-13 NOTE — PLAN OF CARE
1050 Pt accepted at [facility]. Updated Jennifer with PHN of accepting facility and DC today  Per Ya with [facility]- can accept today. Requested DC orders    1140 DC orders sent to [facility] via [facility]. 142 received. Uploaded into [facility]        1400 per Ya with [facility], report can be called to [phone]. Pt going to room 106B         03/13/24 1051   Post-Acute Status   Post-Acute Authorization Placement   Post-Acute Placement Status Set-up Complete/Auth obtained

## 2024-03-15 LAB
OHS QRS DURATION: 152 MS
OHS QTC CALCULATION: 519 MS

## 2024-03-15 NOTE — DISCHARGE SUMMARY
[facility]  Discharge Summary      Patient Name: Sampson Holt  MRN: [id number]  Patient Class: IP- Inpatient  Admission Date: 3/8/2024  Hospital Length of Stay: 5 days  Discharge Date and Time: 3/13/2024  5:20 PM  Attending Physician: No att. providers found   Discharging Provider: Sheila Malhotra MD  Primary Care Provider: Damian Vergara MD      HPI:   Sampson Holt is a 68 year old male with a previous medical history of CHF, DM II with long term use of insulin, Emphysema, Endocarditis, CAD with stent placement on daily ASA and plavix, Stroke, HLD, and hypertension who presents for left hip pain after trip and fall. Patient reports he was carrying gumbo and tripped over a box landing on his left side. Denies striking head or LOC. Initial ED imaging showed Intertrochanteric left femoral fracture for which Dr. Hdez was consulted and reports probably procedure tomorrow and to keep patient NPO at midnight . CBC and CMP unremarkable. Upon evaluation patient noted to still be having severe left hip pain so an additional one time dose of IV dilaudid was ordered. +2 bilateral pedal pulses. Patient unable to assist in full medication reconciliation but does endorse he is compliant with taking his aspirin and plavix daily last taken on 3/8/24.  Patient to be admitted by hospital medicine for further evaluation and management.     Procedure(s) (LRB):  INSERTION, INTRAMEDULLARY HUI, FEMUR (Left)      Hospital Course:   Sampson Holt is a 68 year old male with a past medical history of HTN, DM, COPD, HLD, tobacco use, EtOH abuse, THC use, CAD and BPH who presented with a left hip fracture after a fall. Orthopedic Surgery took the patient to the OR 3/9 for IMN placement. He is on ASA 81 BID. PT/OT has been consulted.  Patient accepted to skilled nursing.     Goals of Care Treatment Preferences:  Code Status: Full Code      Consults:   Consults (From admission, onward)           Status Ordering Provider     Inpatient consult to Social Work/Case Management  Once        Provider:  (Not yet assigned)    Completed ROXANA CAPONE     Inpatient virtual consult to Hospital Medicine  Once        Provider:  (Not yet assigned)    Completed YANNA BEAVERS     Case Management/  Once        Provider:  (Not yet assigned)    Completed JENNIFFER CARLSON     Inpatient consult to Orthopedics  Once        Provider:  Jenniffer Carlson MD    Completed AUNG OCASIO            Psychiatric  Tetrahydrocannabinol (THC) dependence  -Patient to be counseled on cessation      ETOH abuse  -Thiamine, folic acid and MVI  -CIWA Q4H  -Patient to be counseled on cessation      ENT  Acute sinusitis  Being treated with levaquin and medrol dose pack prior to admit  -recent hives due to amoxicillin  -ordered completion of 2 days of Medrol Dosepak (completed) and 4 days of Levaquin  -ok to continue nasal saline from home      Pulmonary  COPD (chronic obstructive pulmonary disease)  -continue PRN Duonebs  -monitor for symptoms    Cardiac/Vascular  Hyperlipidemia  Continue statin therapy      CAD (coronary artery disease)  -hx of drug eluting stent in 5/2023  -ASA  -Statin  -Coreg  -Telemetry  -may resume plavix per Ortho    Benign essential HTN  -Chronic, controlled. Latest blood pressure and vitals reviewed-      .   Home meds for hypertension were reviewed and noted below.   Hypertension Medications                carvediloL (COREG) 3.125 MG tablet Take 3.125 mg by mouth 2 (two) times daily with meals.        losartan (COZAAR) 50 MG tablet Take 2 tablets (100 mg total) by mouth once daily.     nitroGLYCERIN (NITROSTAT) 0.4 MG SL tablet Place 1 tablet (0.4 mg total) under the tongue every 5 (five) minutes as needed for Chest pain.            While in the hospital, will manage blood pressure as follows; Continue home antihypertensive regimen - not taking imdur, amlodipine or metoprolol; amlodipine resumed  on 3/11 due to elevated BP    Will utilize p.r.n. blood pressure medication only if patient's blood pressure greater than 180/110 and he develops symptoms such as worsening chest pain or shortness of breath.    Endocrine  Type 2 diabetes mellitus with circulatory disorder, with long-term current use of insulin  Patient's FSGs are controlled on current medication regimen. He reports he takes 35 units of Lantus subQ BID  Last A1c reviewed-   Lab Results   Component Value Date    HGBA1C 6.1 03/08/2024     Most recent fingerstick glucose reviewed-     Current correctional scale  Medium  Resume lantus therapy at discharge.  Increased glucoses due to steroids which he completed.       Other  * Closed intertrochanteric fracture of hip, left, initial encounter  -s/p IMN on 3/9 per Dr. Hdez  -Fall precautions  -PT/OT recommends high intensity therapy and case management working on disposition  -PRN analgesics  -ASA 81 BID for DVT prophylaxis  -Incentive spirometry      Cigarette smoker  -Patient counseled on cessation  -referral to cessation program if amenable            Final Active Diagnoses:    Diagnosis Date Noted POA    PRINCIPAL PROBLEM:  Closed intertrochanteric fracture of hip, left, initial encounter [S72.142A] 03/09/2024 Yes    Acute sinusitis [J01.90] 03/10/2024 Yes    ETOH abuse [F10.10] 03/09/2024 Yes    Tetrahydrocannabinol (THC) dependence [F12.20] 03/09/2024 Yes    Hyperlipidemia [E78.5] 07/02/2023 Yes    CAD (coronary artery disease) [I25.10] 05/13/2023 Yes    Cigarette smoker [F17.210] 05/10/2023 Yes    Benign essential HTN [I10] 03/27/2020 Yes    COPD (chronic obstructive pulmonary disease) [J44.9] 03/27/2020 Yes    Type 2 diabetes mellitus with circulatory disorder, with long-term current use of insulin [E11.59, Z79.4] 03/27/2020 Not Applicable      Problems Resolved During this Admission:       Discharged Condition: stable    Disposition: Skilled Nursing Facility    Follow Up:    Patient  "Instructions:      WALKER FOR HOME USE     Order Specific Question Answer Comments   Type of Walker: Adult (5'4"-6'6")    With wheels? Yes    Height: 6' 1" (1.854 m)    Weight: 92.2 kg (203 lb 4.2 oz)    Length of need (1-99 months): 99    Does patient have medical equipment at home? none    Please check all that apply: Patient's condition impairs ambulation.      WHEELCHAIR FOR HOME USE     Order Specific Question Answer Comments   Hours in W/C per day: 10    Type of Wheelchair: Standard    Size(Width): 18"(STD adult)    Leg Support: Elevating leg rests    Lap Belt: Velcro    Accessories: Anti-tippers    Cushion: Basic    Reclining Back No    Height: 6' 1" (1.854 m)    Weight: 92.2 kg (203 lb 4.2 oz)    Does patient have medical equipment at home? none    Length of need (1-99 months): 12    Please check all that apply: Patient's upper body strength is sufficient for propulsion.      COMMODE FOR HOME USE     Order Specific Question Answer Comments   Type: Standard    Height: 6' 1" (1.854 m)    Weight: 92.2 kg (203 lb 4.2 oz)    Does patient have medical equipment at home? none    Length of need (1-99 months): 12      Ambulatory referral/consult to Orthopedics   Standing Status: Future   Referral Priority: Routine Referral Type: Consultation   Referred to Provider: JENNIFFER CARLSON Requested Specialty: Orthopedic Surgery   Number of Visits Requested: 1     Diet diabetic     Diet Cardiac     Notify your health care provider if you experience any of the following:  temperature >100.4     Notify your health care provider if you experience any of the following:  persistent nausea and vomiting or diarrhea     Notify your health care provider if you experience any of the following:  severe uncontrolled pain     Notify your health care provider if you experience any of the following:  difficulty breathing or increased cough     Notify your health care provider if you experience any of the following:  severe persistent " headache     Notify your health care provider if you experience any of the following:  worsening rash     Notify your health care provider if you experience any of the following:  persistent dizziness, light-headedness, or visual disturbances     Notify your health care provider if you experience any of the following:  increased confusion or weakness     Reason for not Ordering Smoking Cessation Referral     Order Specific Question Answer Comments   Reason for not ordering: Patient refused      Reason for not Prescribing Nicotine Replacement     Order Specific Question Answer Comments   Reason for not Prescribing: Patient refused      Activity as tolerated       Significant Diagnostic Studies: as above    Pending Diagnostic Studies:       None           Medications:  Reconciled Home Medications:      Medication List        START taking these medications      acetaminophen 325 MG tablet  Commonly known as: TYLENOL  Take 2 tablets (650 mg total) by mouth every 8 (eight) hours.     albuterol 90 mcg/actuation inhaler  Commonly known as: PROVENTIL/VENTOLIN HFA  Inhale 2 puffs into the lungs every 6 (six) hours as needed for Wheezing. Rescue     amLODIPine 5 MG tablet  Commonly known as: NORVASC  Take 1 tablet (5 mg total) by mouth once daily.     aspirin 81 MG Chew  Take 1 tablet (81 mg total) by mouth 2 (two) times a day for 24 days, THEN 1 tablet (81 mg total) once daily.  Start taking on: March 13, 2024  Replaces: aspirin 81 MG EC tablet     diazePAM 5 MG tablet  Commonly known as: VALIUM  Take 1 tablet (5 mg total) by mouth 2 (two) times a day for 2 days, THEN 1 tablet (5 mg total) once daily for 2 days. Ending on 3/16.  Start taking on: March 13, 2024     famotidine 20 MG tablet  Commonly known as: PEPCID  Take 1 tablet (20 mg total) by mouth 2 (two) times daily.     hydrOXYzine 50 MG tablet  Commonly known as: ATARAX  Take 1 tablet (50 mg total) by mouth 3 (three) times daily as needed for Anxiety.     insulin  aspart U-100 100 unit/mL (3 mL) Inpn pen  Commonly known as: NovoLOG  Inject 0-10 Units into the skin before meals and at bedtime as needed (Hyperglycemia). **MODERATE CORRECTION DOSE**  Blood Glucose  mg/dL                  Pre-meal              151-200                2 units                     201-250                4 units                  251-300                6 units                      301-350                8 units                     >350                     10 units                  Administer subcutaneously if needed at times designated by monitoring  schedule.     insulin glargine 100 units/mL SubQ pen  Inject 35 Units into the skin 2 (two) times a day.  Replaces: LANTUS SUBQ     lactulose 20 gram/30 mL Soln  Commonly known as: CHRONULAC  Take 30 mLs (20 g total) by mouth 3 (three) times daily as needed.     melatonin 3 mg tablet  Commonly known as: MELATIN  Take 3 tablets (9 mg total) by mouth nightly as needed for Insomnia.     methocarbamoL 500 MG Tab  Commonly known as: ROBAXIN  Take 1 tablet (500 mg total) by mouth every 8 (eight) hours. for 10 days     morphine 30 MG tablet  Commonly known as: MSIR  Take 1 tablet (30 mg total) by mouth every 4 (four) hours as needed for Pain.     multivitamin Tab  Take 1 tablet by mouth once daily.     polyethylene glycol 17 gram Pwpk  Commonly known as: GLYCOLAX  Take 17 g by mouth 2 (two) times daily.     senna-docusate 8.6-50 mg 8.6-50 mg per tablet  Commonly known as: PERICOLACE  Take 1 tablet by mouth 2 (two) times daily.            CHANGE how you take these medications      carvediloL 3.125 MG tablet  Commonly known as: COREG  Take 2 tablets (6.25 mg total) by mouth 2 (two) times daily with meals.  What changed: how much to take            CONTINUE taking these medications      atorvastatin 40 MG tablet  Commonly known as: LIPITOR  Take 1 tablet (40 mg total) by mouth once daily.     clopidogreL 75 mg tablet  Commonly known as: PLAVIX  Take 1 tablet (75 mg  total) by mouth once daily.     isosorbide mononitrate 30 MG 24 hr tablet  Commonly known as: IMDUR     MYRBETRIQ 50 mg Tb24  Generic drug: mirabegron  Take 1 tablet (50 mg total) by mouth every morning. Take one every morning for overactive bladder     nitroGLYCERIN 0.4 MG SL tablet  Commonly known as: NITROSTAT  Place 1 tablet (0.4 mg total) under the tongue every 5 (five) minutes as needed for Chest pain.     ondansetron 4 MG tablet  Commonly known as: ZOFRAN  Take 1 tablet (4 mg total) by mouth every 8 (eight) hours as needed for Nausea.            STOP taking these medications      aspirin 81 MG EC tablet  Commonly known as: ECOTRIN  Replaced by: aspirin 81 MG Chew     coenzyme Q10 100 mg capsule     LANTUS SUBQ  Replaced by: insulin glargine 100 units/mL SubQ pen     levoFLOXacin 500 MG tablet  Commonly known as: LEVAQUIN     losartan 50 MG tablet  Commonly known as: COZAAR     metoprolol tartrate 25 MG tablet  Commonly known as: LOPRESSOR     rosuvastatin 10 MG tablet  Commonly known as: CRESTOR     tamsulosin 0.4 mg Cap  Commonly known as: FLOMAX              Indwelling Lines/Drains at time of discharge:   Lines/Drains/Airways       None                   Time spent on the discharge of patient: 45 minutes         The attending portion of this evaluation, treatment, and documentation was performed per Sheila Malhotra MD via Telemedicine AudioVisual using the secure Vid[employer] software platform with 2 way audio/video. The provider was located off-site and the patient is located in the hospital. The aforementioned video software was utilized to document the relevant history and physical exam    Sheila Malhotra MD  Department of Hospital Medicine  [facility]/Surg

## 2024-03-15 NOTE — ASSESSMENT & PLAN NOTE
Patient's FSGs are controlled on current medication regimen. He reports he takes 35 units of Lantus subQ BID  Last A1c reviewed-   Lab Results   Component Value Date    HGBA1C 6.1 03/08/2024     Most recent fingerstick glucose reviewed-     Current correctional scale  Medium  Resume lantus therapy at discharge.  Increased glucoses due to steroids which he completed.

## 2024-03-15 NOTE — ASSESSMENT & PLAN NOTE
-Chronic, controlled. Latest blood pressure and vitals reviewed-      .   Home meds for hypertension were reviewed and noted below.   Hypertension Medications                carvediloL (COREG) 3.125 MG tablet Take 3.125 mg by mouth 2 (two) times daily with meals.        losartan (COZAAR) 50 MG tablet Take 2 tablets (100 mg total) by mouth once daily.     nitroGLYCERIN (NITROSTAT) 0.4 MG SL tablet Place 1 tablet (0.4 mg total) under the tongue every 5 (five) minutes as needed for Chest pain.            While in the hospital, will manage blood pressure as follows; Continue home antihypertensive regimen - not taking imdur, amlodipine or metoprolol; amlodipine resumed on 3/11 due to elevated BP    Will utilize p.r.n. blood pressure medication only if patient's blood pressure greater than 180/110 and he develops symptoms such as worsening chest pain or shortness of breath.

## 2024-03-19 DIAGNOSIS — S72.142A CLOSED INTERTROCHANTERIC FRACTURE OF HIP, LEFT, INITIAL ENCOUNTER: Primary | ICD-10-CM

## 2024-03-23 ENCOUNTER — HOSPITAL ENCOUNTER (OUTPATIENT)
Dept: RADIOLOGY | Facility: HOSPITAL | Age: 69
Discharge: HOME | End: 2024-03-23
Attending: PHYSICAL MEDICINE & REHABILITATION
Payer: MEDICARE

## 2024-03-23 LAB
OHS QRS DURATION: 164 MS
OHS QTC CALCULATION: 478 MS

## 2024-03-23 PROCEDURE — 93970 EXTREMITY STUDY: CPT | Mod: 26,,, | Performed by: RADIOLOGY

## 2024-04-11 DIAGNOSIS — S72.142A CLOSED INTERTROCHANTERIC FRACTURE OF HIP, LEFT, INITIAL ENCOUNTER: Primary | ICD-10-CM

## 2024-04-12 ENCOUNTER — TELEPHONE (OUTPATIENT)
Dept: ORTHOPEDICS | Facility: CLINIC | Age: 69
End: 2024-04-12
Payer: MEDICARE

## 2024-04-12 NOTE — TELEPHONE ENCOUNTER
----- Message from Jaylene Still MA sent at 4/12/2024  8:20 AM CDT -----  Contact: pt  Wants to reschedule post op  to Tuesday next week   Call back

## 2024-04-16 ENCOUNTER — OFFICE VISIT (OUTPATIENT)
Dept: ORTHOPEDICS | Facility: CLINIC | Age: 69
End: 2024-04-16
Payer: OTHER GOVERNMENT

## 2024-04-16 ENCOUNTER — HOSPITAL ENCOUNTER (OUTPATIENT)
Dept: RADIOLOGY | Facility: HOSPITAL | Age: 69
Discharge: HOME | End: 2024-04-16
Attending: ORTHOPAEDIC SURGERY
Payer: OTHER GOVERNMENT

## 2024-04-16 VITALS — BODY MASS INDEX: 26.94 KG/M2 | WEIGHT: 203.25 LBS | HEIGHT: 73 IN

## 2024-04-16 DIAGNOSIS — S72.142A CLOSED INTERTROCHANTERIC FRACTURE OF HIP, LEFT, INITIAL ENCOUNTER: Primary | ICD-10-CM

## 2024-04-16 DIAGNOSIS — S72.142A CLOSED INTERTROCHANTERIC FRACTURE OF HIP, LEFT, INITIAL ENCOUNTER: ICD-10-CM

## 2024-04-16 PROCEDURE — 99213 OFFICE O/P EST LOW 20 MIN: CPT | Mod: PBBFAC,25,PO | Performed by: ORTHOPAEDIC SURGERY

## 2024-04-16 PROCEDURE — 99024 POSTOP FOLLOW-UP VISIT: CPT | Mod: ,,, | Performed by: ORTHOPAEDIC SURGERY

## 2024-04-16 PROCEDURE — 73502 X-RAY EXAM HIP UNI 2-3 VIEWS: CPT | Mod: TC,PO,LT

## 2024-04-16 PROCEDURE — 99999 PR PBB SHADOW E&M-EST. PATIENT-LVL III: CPT | Mod: PBBFAC,,, | Performed by: ORTHOPAEDIC SURGERY

## 2024-04-16 PROCEDURE — 73502 X-RAY EXAM HIP UNI 2-3 VIEWS: CPT | Mod: 26,LT,, | Performed by: RADIOLOGY

## 2024-04-16 RX ORDER — OXYCODONE AND ACETAMINOPHEN 5; 325 MG/1; MG/1
1 TABLET ORAL EVERY 6 HOURS PRN
Qty: 28 TABLET | Refills: 0 | Status: CANCELLED | OUTPATIENT
Start: 2024-04-16

## 2024-04-16 RX ORDER — OXYCODONE AND ACETAMINOPHEN 7.5; 325 MG/1; MG/1
1 TABLET ORAL EVERY 6 HOURS PRN
Qty: 28 TABLET | Refills: 0 | Status: SHIPPED | OUTPATIENT
Start: 2024-04-16

## 2024-04-16 NOTE — PROGRESS NOTES
Post-op Note    HPI    Sampson Holt is here 5 weeks s/p the following procedure:     Left hip intramedullary nailing    Overall doing well. Pain controlled on current regimen. He is not currently enrolled in Physical Therapy. Denies any chest pain or shortness of breathe. Denies any drainage from the incision. Denies any fevers, chills or paresthesias.  Reports pain mostly in his groin and lateral hip.  Pain worse with hip flexion    DVT Prophylaxis:  Complete      Physical Exam:     Patient is alert and oriented no acute distress.   Assistive Device:  Walker    Left hip Incision(s) are well healed.  There is no evidence of dehiscence.  There is no induration erythema or signs of infection.  Appropriate soft tissue swelling.  Compartments are soft and compressible.  Warm well-perfused extremity.  Pain with resisted hip flexion.  Mild pain over the trochanteric bursa    Imaging:     I have personally reviewed the following imaging and these are an interpretation of my findings:     X-Ray: I have reviewed all pertinent results/findings and my personal findings are:  Healing left intertrochanteric fracture status post intramedullary nailing    Assessment    Sampson Holt is 5 weeks Post-op     Plan:    Overall doing as expected.  We discussed expectations of surgery and postoperative course.     Pain: Continued postoperative pain regimen -- Tylenol/ibuprofen.  Refill pain medication  DVT prophylaxis:  Complete  PT/OT: Continue/Initiate physical therapy (weight bearing status:  As tolerated), no restriction     Follow-up: 6 weeks   X-rays next visit: left hip

## 2024-04-26 RX ORDER — TRAMADOL HYDROCHLORIDE 50 MG/1
50 TABLET ORAL EVERY 6 HOURS PRN
Qty: 28 TABLET | Refills: 0 | Status: SHIPPED | OUTPATIENT
Start: 2024-04-26

## 2024-04-26 NOTE — TELEPHONE ENCOUNTER
----- Message from Juan Antonio Ruiz sent at 4/26/2024  1:18 PM CDT -----  Patient would like a refill of his pain medication      [facility]      Please call  [phone]

## 2024-04-26 NOTE — TELEPHONE ENCOUNTER
----- Message from Jaylene Still MA sent at 4/26/2024  8:08 AM CDT -----  Contact: pt  Pain and tramidol   Pharmacy  [facility]   Call back

## 2024-05-20 ENCOUNTER — NURSE TRIAGE (OUTPATIENT)
Dept: ADMINISTRATIVE | Facility: CLINIC | Age: 69
End: 2024-05-20
Payer: MEDICARE

## 2024-05-20 ENCOUNTER — CLINICAL SUPPORT (OUTPATIENT)
Dept: REHABILITATION | Facility: HOSPITAL | Age: 69
End: 2024-05-20
Attending: ORTHOPAEDIC SURGERY
Payer: MEDICARE

## 2024-05-20 DIAGNOSIS — S72.142A CLOSED INTERTROCHANTERIC FRACTURE OF HIP, LEFT, INITIAL ENCOUNTER: ICD-10-CM

## 2024-05-20 DIAGNOSIS — M25.552 LEFT HIP PAIN: ICD-10-CM

## 2024-05-20 DIAGNOSIS — Z74.09 DECREASED FUNCTIONAL MOBILITY AND ENDURANCE: Primary | ICD-10-CM

## 2024-05-20 DIAGNOSIS — R53.1 WEAKNESS: ICD-10-CM

## 2024-05-20 DIAGNOSIS — R26.2 DIFFICULTY WALKING: ICD-10-CM

## 2024-05-20 DIAGNOSIS — Z55.9 SPECIAL EDUCATIONAL NEEDS: ICD-10-CM

## 2024-05-20 PROCEDURE — 97162 PT EVAL MOD COMPLEX 30 MIN: CPT | Mod: PN

## 2024-05-20 PROCEDURE — 97110 THERAPEUTIC EXERCISES: CPT | Mod: PN

## 2024-05-20 SDOH — SOCIAL DETERMINANTS OF HEALTH (SDOH): PROBLEMS RELATED TO EDUCATION AND LITERACY, UNSPECIFIED: Z55.9

## 2024-05-20 NOTE — PLAN OF CARE
"[facility]   Physical Therapy Initial Evaluation     Date: 5/20/2024   Name: Sampson Holt  [facility] Number: [id number]    Therapy Diagnosis:   Encounter Diagnoses   Name Primary?    Closed intertrochanteric fracture of hip, left, initial encounter     Left hip pain     Weakness     Difficulty walking     Decreased functional mobility and endurance Yes    Special educational needs      Physician: Tarun Hdez MD    Physician Orders: PT Eval and Treat   Medical Diagnosis from Referral: S72.142A (ICD-10-CM) - Closed intertrochanteric fracture of hip, left, initial encounter  Evaluation Date: 5/20/2024  Authorization Period Expiration: 12/31/2024  Plan of Care Expiration: 07/19/2024 at 2x/wk x 8 weeks  Progress Note Due: 06/20/2024  Visit # / Visits authorized: 1/ 1   FOTO: 1/3     Time In: 10:48 am  Time Out: 11:49 am  Total Appointment Time (timed & untimed codes): 61 minutes    Precautions: Standard, Weightbearing, and surgical  SUBJECTIVE   Date of onset: His hip surgery was 03/09/2024.    History of current condition - Sampson reports: He had a fall sustaining a left hip intertrochanteric fracture on 03/08/2024. Patient did undergo left hip ORIF on 03/09/2024 by Dr. Hdez, and he was eventually [facility], and alcohol/benzo withdrawal syndrome. Patient was medically stabilized and was transferred to a PT rehab facility. He stayed there until 04/08/2024.Today, he reports being home since April 8th having to do all the chores and housework by himself as he lives alone. "I live in a single wide trailer. I have to feed the pets and do all the household tasks. I have had a lot of hip pain. I do my exercises they showed [facility], but I probably don't do them as much as I need to." He enters this day using a rolling walker.    Falls: He reports no other recent falls.    Imaging: From " "the surgeon's note: X-Ray: I have reviewed all pertinent results/findings and my personal findings are:  Healing left intertrochanteric fracture status post intramedullary nailing    Prior Therapy: He has not had any type of PT since 04/08/2024.  Social History: He lives alone and does not want anyone coming to his house.   Occupation: Disabled. He was a  at one time.  Prior Level of Function: He could function as needed.  Current Level of Function: He is getting around using a rolling walker. His remaining left hip pain limits his standing and walking.    Pain:  Current 0/10, worst 9/10, best 0/10   Location: The Left hip.  Description: Aching, Dull, Sharp, Electric, and Variable  Aggravating Factors: Sitting, Prolonged standing, Prolonged walking, Laying, Night Time, Getting out of bed/chair, and laying on his left hip at night, scooting up in the bed.  Easing Factors: Lay on the right side and take breaks from any weight bearing activity.    Patients goals: "I'd like to be able to play golf again one day."     Medical History:   Past Medical History:   Diagnosis Date    Abdominal pain 2022    CHF (congestive heart failure)     Diabetes mellitus 2018    Emphysema lung     Endocarditis 2011    Hypertension     Stroke 2011    denies residual     Surgical History:   Sampson Holt  has a past surgical history that includes Inguinal hernia repair (1960); Back surgery (1981); Excision of hydrocele; Rhinoplasty; Surgical removal of nodule of vocal cord; Colonoscopy (N/A, 2/23/2023); Coronary angiography (N/A, 5/9/2023); Percutaneous transluminal balloon angioplasty of coronary artery (5/9/2023); intravascular ultrasound, coronary (N/A, 5/9/2023); stent, drug eluting, single vessel, coronary (5/9/2023); Left heart catheterization (Left, 5/11/2023); ivus, coronary (5/11/2023); fractional flow reserve (ffr), coronary (5/11/2023); ANGIOGRAM, CORONARY, WITH LEFT HEART CATHETERIZATION (N/A, 10/10/2023); instantaneous " wave-free ratio (ifr) (N/A, 10/10/2023); and Intramedullary rodding of femur (Left, 3/9/2024).    Medications:   Sampson has a current medication list which includes the following prescription(s): acetaminophen, albuterol, amlodipine, aspirin, atorvastatin, carvedilol, clopidogrel, cyclobenzaprine, diazepam, famotidine, hydroxyzine, insulin aspart u-100, insulin glargine u-100 (lantus), isosorbide mononitrate, lactulose, losartan, melatonin, myrbetriq, morphine, multivitamin, nitroglycerin, ondansetron, oxycodone-acetaminophen, polyethylene glycol, senna-docusate 8.6-50 mg, tramadol, and trazodone.    Allergies:   Review of patient's allergies indicates:   Allergen Reactions    Amoxicillin Shortness Of Breath and Edema      OBJECTIVE     Hip    Impaired Hip: Left     Structural/Postural Inspection/Palpation: No significant post surgical structural abnormalities are noted. The patient remains tender to palpation at the left hip joint and anteriorly in the inguinal area. He walks with an upright posture using his rolling walker. A slight leg length difference is noted, but it does not affect his gait. Bilateral heel strike and toe off are noted. His annette is slow. He does not appear in distress.        Tone    Muscle Left Right Comment         Quadriceps Minimal Increase Normal    Hamstrings Minimal Increase Minimal Increase    Piriformis Minimal Increase Normal        ROM       Hip AROM AROM Comment    Left Right          Flexion WFLs*  WFLs* Left is slightly decreased due to pain   Extension  WFLs*  WFLs*    ABduction  WFLs*  WFLs*    ADDuction  WFLs*  WFLs*    IR  WFLs*  WFLs*    ER  WFLs*  WFLs*                MMT      Hip   Comment    Left Right          Flexion 3+/5 5/5    Extension 4-/5 5/5    ABduction 3+/5 5/5    ADduction 4-/5 5/5    Internal Rotation 3+/5 5/5    External Rotation 3+/5 5/5    Glut Max 4-/5 4-/5               SPECIAL TESTS       Hip   Comment    Left Right          Trendelenburg Test(hip  dysf: weak gluts) Negative. Negative.    Fabers test(hip, lumbar,SI jt dysf) Positive. For pain Negative.         FUNCTIONAL MOBILITY    Balance: No gross abnormalities.      Gait: See above.        Limitation/Restriction for FOTO Hip Survey    Therapist reviewed FOTO scores for Sampson Holt on 5/20/2024.   FOTO documents entered into [employer] - see Media section.    Limitation Score: 46% Function at the Evaluation.       TREATMENT     Total Treatment time (time-based codes) separate from Evaluation: 30 minutes      Sampson received the treatments listed below:    -Gluteal Sets with 2 second hold x 20  -Bilateral long arc quads with 2 second hold x 3 minutes  -Standing bilateral yellow banded hip abduction x 20 each leg  -Supine pillow squeezes with 2 second hold x 20  -Standing bilateral yellow banded hip extension x 20 each leg.  -Bridging with yellow band abduction x 20  -Right side lying, left leg yellow banded clamshell x 20    PATIENT EDUCATION AND HOME EXERCISES     Education provided:   -The patient received his initial written home exercise program this day. He returned demo for PT, and he was without questions. He received a yellow band for use at home.     Written Home Exercises Provided: yes. Exercises were reviewed and Sampson was able to demonstrate them prior to the end of the session.  Sampson demonstrated good  understanding of the education provided. See EMR under Patient Instructions for exercises provided during therapy sessions.  ASSESSMENT     Sampson is a 68 y.o. male referred to outpatient Physical Therapy with a medical diagnosis of S72.142A (ICD-10-CM) - Closed intertrochanteric fracture of hip, left, initial encounter. He presents status post surgery x 8 weeks. He ambulates in weight bearing as tolerated on the left using a rolling walker. He continues to have left hip pain and weakness. He exhibits decreased function. He needs patient education and a custom written home exercise program. He is a  good candidate for skilled PT.     Patient prognosis is Good.   Patient will benefit from skilled outpatient Physical Therapy to address the deficits stated above and in the chart below, provide patient /family education, and to maximize patient's level of independence.     Plan of care discussed with patient: Yes  Patient's spiritual, cultural and educational needs considered and patient is agreeable to the plan of care and goals as stated below:     Anticipated Barriers for therapy: his co-morbidities and compliance with attendance.     Medical Necessity is demonstrated by the following  History  Co-morbidities and personal factors that may impact the plan of care Co-morbidities:   CAD, CHF, coping style/mechanism, diabetes, difficulty sleeping, history of CVA, HTN, level of undertstanding of current condition, poor medication/medical compliance, and abdominal pain, emphysema, endocarditis.    Personal Factors:   coping style  social background  lifestyle  character  attitudes     high   Examination  Body Structures and Functions, activity limitations and participation restrictions that may impact the plan of care Body Regions:   lower extremities    Body Systems:    gross symmetry  ROM  strength  gross coordinated movement  balance  gait  motor control  motor learning  And pain control.    Participation Restrictions:   none    Activity limitations:   Learning and applying knowledge  no deficits    General Tasks and Commands  undertaking multiple tasks    Communication  no deficits    Mobility  lifting and carrying objects  walking  moving around using equipment (WC)  driving (bike, car, motorcycle)    Self care  washing oneself (bathing, drying, washing hands)  toileting  dressing  looking after one's health    Domestic Life  shopping  cooking  doing house work (cleaning house, washing dishes, laundry)  assisting others    Interactions/Relationships  relating with strangers    Life Areas  no deficits    Community  and Social Life  community life  recreation and leisure         moderate   Clinical Presentation evolving clinical presentation with changing clinical characteristics moderate   Decision Making/ Complexity Score: moderate     Goals:    STG  Weeks/Visits Date Established  Date Met   1.Decrease his max left hip pain to 6/10 in order to improve his quality of life. 4 weeks. 5/20/2024   In Progress   2. Increase his left hip strength a 1/2 grade in order to improve his weightbearing activity endurance with the least assistive device. 4 weeks. 5/20/2024   In Progress   3.Increase his FOTO function score to >=51% in order to improve his activity of daily living and household task ability.  4 weeks. 5/20/2024   In Progress   4.Fair Initial written home exercise knowledge and be without questions in order to have carryover after discharge from PT.  4 weeks. 5/20/2024   In Progress     LTG Weeks/Visits Date Established  Date Met   1.Decrease his max left hip pain to 3/10 in order to improve his quality of life. 8 weeks. 5/20/2024   In Progress   2. Increase his left hip strength one grade from the evaluation date in order to improve his weightbearing activity endurance without an assistive device. 8 weeks. 5/20/2024     In Progress   3..Increase his FOTO function score to >=56% in order to improve his activity of daily living and household task ability. 8 weeks. 5/20/2024   In Progress   4.Good Progressed written home exercise knowledge and be without questions in order to have carryover after discharge from PT.  8 weeks. 5/20/2024   In Progress     PLAN   Plan of care Certification: 5/20/2024 to 07/19/2024.    Outpatient Physical Therapy 2 times weekly for 8 weeks to include the following interventions: Electrical Stimulation unattended and Maldivian, Gait Training, Manual Therapy, Moist Heat/ Ice, Neuromuscular Re-ed, Orthotic Management and Training, Patient Education, Self Care, Therapeutic Activities, Therapeutic  Exercise, and care by a PTA.     Mata Alvarado PT      I CERTIFY THE NEED FOR THESE SERVICES FURNISHED UNDER THIS PLAN OF TREATMENT AND WHILE UNDER MY CARE   Physician's comments:     Physician's Signature: ___________________________________________________

## 2024-05-21 ENCOUNTER — OFFICE VISIT (OUTPATIENT)
Dept: ORTHOPEDICS | Facility: CLINIC | Age: 69
End: 2024-05-21
Payer: MEDICARE

## 2024-05-21 ENCOUNTER — HOSPITAL ENCOUNTER (OUTPATIENT)
Dept: RADIOLOGY | Facility: HOSPITAL | Age: 69
Discharge: HOME | End: 2024-05-21
Attending: ORTHOPAEDIC SURGERY
Payer: MEDICARE

## 2024-05-21 VITALS — HEIGHT: 73 IN | BODY MASS INDEX: 26.94 KG/M2 | WEIGHT: 203.25 LBS

## 2024-05-21 DIAGNOSIS — S72.142A CLOSED INTERTROCHANTERIC FRACTURE OF HIP, LEFT, INITIAL ENCOUNTER: Primary | ICD-10-CM

## 2024-05-21 DIAGNOSIS — S72.142A CLOSED INTERTROCHANTERIC FRACTURE OF HIP, LEFT, INITIAL ENCOUNTER: ICD-10-CM

## 2024-05-21 PROCEDURE — 1125F AMNT PAIN NOTED PAIN PRSNT: CPT | Mod: CPTII,S$GLB,, | Performed by: ORTHOPAEDIC SURGERY

## 2024-05-21 PROCEDURE — 3044F HG A1C LEVEL LT 7.0%: CPT | Mod: CPTII,S$GLB,, | Performed by: ORTHOPAEDIC SURGERY

## 2024-05-21 PROCEDURE — 73502 X-RAY EXAM HIP UNI 2-3 VIEWS: CPT | Mod: 26,LT,, | Performed by: RADIOLOGY

## 2024-05-21 PROCEDURE — 99024 POSTOP FOLLOW-UP VISIT: CPT | Mod: S$GLB,,, | Performed by: ORTHOPAEDIC SURGERY

## 2024-05-21 PROCEDURE — 73502 X-RAY EXAM HIP UNI 2-3 VIEWS: CPT | Mod: TC,PO,LT

## 2024-05-21 PROCEDURE — 4010F ACE/ARB THERAPY RXD/TAKEN: CPT | Mod: CPTII,S$GLB,, | Performed by: ORTHOPAEDIC SURGERY

## 2024-05-21 PROCEDURE — 99999 PR PBB SHADOW E&M-EST. PATIENT-LVL II: CPT | Mod: PBBFAC,,, | Performed by: ORTHOPAEDIC SURGERY

## 2024-05-21 RX ORDER — HYDROCODONE BITARTRATE AND ACETAMINOPHEN 5; 325 MG/1; MG/1
1 TABLET ORAL EVERY 6 HOURS PRN
Qty: 28 TABLET | Refills: 0 | Status: CANCELLED | OUTPATIENT
Start: 2024-05-21

## 2024-05-21 RX ORDER — HYDROCODONE BITARTRATE AND ACETAMINOPHEN 5; 325 MG/1; MG/1
1 TABLET ORAL EVERY 6 HOURS PRN
Qty: 28 TABLET | Refills: 0 | Status: SHIPPED | OUTPATIENT
Start: 2024-05-21 | End: 2024-05-21 | Stop reason: SDUPTHER

## 2024-05-21 RX ORDER — HYDROCODONE BITARTRATE AND ACETAMINOPHEN 5; 325 MG/1; MG/1
1 TABLET ORAL EVERY 6 HOURS PRN
Qty: 28 TABLET | Refills: 0 | Status: SHIPPED | OUTPATIENT
Start: 2024-05-21

## 2024-05-21 NOTE — TELEPHONE ENCOUNTER
Pt has restarted physical therapy today following a fracture with surgical repair and he is having increased pain to his left hip and lower back. Pain is 9/10 patient has tried tramadol and motrin with no relief. Pt also reports trying bourbon. Care advice is be seen in the next 4 hours. Pt refuses ER and would just like to talk with his MD tomorrow about getting a prescription for pain medication. Also offered ODVV. Message sent with high priority. Instructed patient to call back with additional questions or worsening of symptoms. Pt verbalized understanding.   Reason for Disposition   [1] SEVERE pain (e.g., excruciating, unable to do any normal activities) AND [2] not improved after 2 hours of pain medicine    Additional Information   Negative: Looks like a broken bone or dislocated joint (e.g., crooked or deformed)   Negative: Sounds like a life-threatening emergency to the triager   Negative: [1] SEVERE pain (e.g., excruciating, unable to do any normal activities) AND [2] fever   Negative: Can't stand (bear weight) or walk   Negative: [1] Red area or streak AND [2] fever   Negative: Patient sounds very sick or weak to the triager    Protocols used: Hip Pain-A-AH

## 2024-05-21 NOTE — PROGRESS NOTES
Post-op Note    HPI    Sampson Holt is here  2 months s/p the following procedure:     Left hip intramedullary nailing    Overall doing well.  Pain 6/10 today.  Most of his pain is after physical therapy.  He continues to report similar pain in his groin and lateral hip.  He also has history of sacral issues prior to his fall.  Walking with a walker today      Physical Exam:     Patient is alert and oriented no acute distress.   Assistive Device:  Walker    Left hip Incision(s) are well healed.  There is no evidence of dehiscence.  There is no induration erythema or signs of infection.  Appropriate soft tissue swelling.  Compartments are soft and compressible.  Warm well-perfused extremity.  Pain with resisted hip flexion.  Mild pain over the trochanteric bursa    Imaging:     I have personally reviewed the following imaging and these are an interpretation of my findings:     X-Ray: I have reviewed all pertinent results/findings and my personal findings are:   healed left intertrochanteric fracture status post intramedullary nailing    Assessment    Sampson Holt is  2 months Post-op     Plan:    Overall doing as expected.  We discussed expectations of surgery and postoperative course.      His x-rays show healed fracture without complication.  Still having some chronic type pain.  He is requesting pain medication today.  I explained that I can not prescribe pain medication long term.  We will give him 1 prescription to take at night and in the future we will need to find a pain management doctor if this becomes a long-term issue.  Follow up as needed

## 2024-05-25 NOTE — PROGRESS NOTES
"[facility]   Physical Therapy Treatment Note     Name: Sampson Holt  [facility] Number: [id number]    Therapy Diagnosis:   Encounter Diagnoses   Name Primary?    Left hip pain     Weakness Yes    Difficulty walking     Decreased functional mobility and endurance     Special educational needs      Physician: Tarun Hdez MD    Visit Date: 5/27/2024  Physician Orders: PT Eval and Treat   Medical Diagnosis from Referral: S72.142A (ICD-10-CM) - Closed intertrochanteric fracture of hip, left, initial encounter  Evaluation Date: 5/20/2024  Authorization Period Expiration: 12/31/2024  Plan of Care Expiration: 07/19/2024 at 2x/wk x 8 weeks  Progress Note Due: 06/20/2024  Visit # / Visits authorized: 1/20  (2/16 on the Plan of Care)  FOTO: 1/3   PTA Visit #: 0/5      Time In: 12:57 pm    Time Out: 1:48 pm  Total Appointment Time (timed & untimed codes): 45 minutes     Precautions: Standard, Weightbearing, and surgical  SUBJECTIVE     Pt reports: "I showed up last Thursday, and I sat out there. They told me that my visit had been canceled, but I didn't cancel it." PT acknowledged. After looking into it, the patient had accidentally canceled it. "I hate technology." He has seen his surgeon since the PT evaluation. His x-rays that day showed his left hip hardware and bones had healed. Today, he enters ambulating with a standard cane. "I have been doing my exercises at home. I am getting around better with the cane then the walker. I may even try to go fishing tomorrow. I'd say that my pain is like a 3/10 after taking my pain medicine." PT acknowledges.    He was compliant with his home exercise program.    Response to previous treatment: This is the patient's first PT session since the evaluation.  Functional change: This is the patient's first PT session since the evaluation.    Pain: 3/10 Upon entering the clinic this day.  Location: The left hip.  OBJECTIVE     Objective Measures updated at " progress report unless specified.     Treatment     Sampson received the treatments listed below:      Sampson received therapeutic exercises to develop strength, endurance, ROM, flexibility, posture, and core stabilization for 15 minutes including:  -Gluteal Sets with 2 second hold x 20  -Supine pillow squeezes with 2 second hold x 20  -Bridging with yellow band abduction x 20  -+bilateral short arc quads with 2 pound ankle weights x 4 minutes  -+Straight leg raise 2 x 10    Below not performed this day:  -Bilateral long arc quads with 2 second hold x 3 minutes  -Right side lying, left leg yellow banded clamshell x 20    Sampson participated in neuromuscular re-education activities to improve: Balance, Coordination, Kinesthetic, Sense, Proprioception, Muscle Recruitment, and Posture for 20 minutes. The following activities were included:  -+Precor back machine with 30 pounds x 25  -+Precor leg press with seat on 14 and 10 pounds x 25  -+Precor knee extension with 10 pounds x 25  -+Precor knee flexion with 25 pounds x 25  -+Precor hip abduction with 20 pounds x 25  -+standing on first step and eccentrically lower for bilateral gastrocnemius stretch with 30 second hold x 3    Below not Performed this day:  -Standing bilateral yellow banded hip abduction x 20 each leg  -Standing bilateral yellow banded hip extension x 20 each leg.    Sampson participated in dynamic functional therapeutic activities to improve functional performance for 10 minutes, including:  -+Nu-step on Level 2 x 10 minutes within hip precautions.  Patient Education and Home Exercises     Home Exercises Provided and Patient Education Provided     Education provided:   -The patient is independent with his initial written home exercise program.     Written Home Exercises Provided: Patient instructed to cont prior HEP. Exercises were reviewed and Sampson was able to demonstrate them prior to the end of the session.  Sampson demonstrated good  understanding of the  education provided. See EMR under Patient Instructions for exercises provided during therapy sessions.  ASSESSMENT     Sampson continued his routine. He entered this day ambulating properly with a standard cane. His annette was much improved as was his posture with the cane today as opposed to ambulating with the walker as he did at the initial evaluation. He was able to safely add some Precor strengthening. PT kept resistances low even with the patient asking for more. This turned out good as he had muscle soreness and muscle fatigue at the end of today's session. He gave great efforts. He needs continued strengthening to help better stabilize the hip and to work towards safe independent ambulation. He tolerated today's PT session well.     Sampson Is progressing well towards his goals.   The patient's prognosis is Good.     The patient will continue to benefit from skilled outpatient physical therapy in order to address the deficits listed in the problem list box on the initial evaluation, provide patient/family education and to maximize the patient's level of independence in the home and in the community environment.     Pt's spiritual, cultural and educational needs considered and pt agreeable to plan of care and goals.     Anticipated barriers to physical therapy: his co-morbidities and compliance with attendance.     Goals:     STG  Weeks/Visits Date Established  Date Met   1.Decrease his max left hip pain to 6/10 in order to improve his quality of life. 4 weeks. 5/20/2024    In Progress 5/27/2024     2. Increase his left hip strength a 1/2 grade in order to improve his weightbearing activity endurance with the least assistive device. 4 weeks. 5/20/2024    In Progress 5/27/2024     3.Increase his FOTO function score to >=51% in order to improve his activity of daily living and household task ability.  4 weeks. 5/20/2024    In Progress 5/27/2024     4.Fair Initial written home exercise knowledge and be without  questions in order to have carryover after discharge from PT.  4 weeks. 5/20/2024    In Progress 5/27/2024        LTG Weeks/Visits Date Established  Date Met   1.Decrease his max left hip pain to 3/10 in order to improve his quality of life. 8 weeks. 5/20/2024    In Progress 5/27/2024     2. Increase his left hip strength one grade from the evaluation date in order to improve his weightbearing activity endurance without an assistive device. 8 weeks. 5/20/2024       In Progress 5/27/2024     3..Increase his FOTO function score to >=56% in order to improve his activity of daily living and household task ability. 8 weeks. 5/20/2024    In Progress 5/27/2024     4.Good Progressed written home exercise knowledge and be without questions in order to have carryover after discharge from PT.  8 weeks. 5/20/2024    In Progress 5/27/2024        PLAN     Plan of care Certification: 5/20/2024 to 07/19/2024. Outpatient Physical Therapy 2 times weekly for 8 weeks. Plan to maximize his left hip strength and flexibility in order to provide pain relief and to normalize his gait. Plan to progress his home program as he tolerates.        Mata Alvarado, PT

## 2024-05-27 ENCOUNTER — CLINICAL SUPPORT (OUTPATIENT)
Dept: REHABILITATION | Facility: HOSPITAL | Age: 69
End: 2024-05-27
Payer: MEDICARE

## 2024-05-27 DIAGNOSIS — M25.552 LEFT HIP PAIN: ICD-10-CM

## 2024-05-27 DIAGNOSIS — Z74.09 DECREASED FUNCTIONAL MOBILITY AND ENDURANCE: ICD-10-CM

## 2024-05-27 DIAGNOSIS — R26.2 DIFFICULTY WALKING: ICD-10-CM

## 2024-05-27 DIAGNOSIS — Z55.9 SPECIAL EDUCATIONAL NEEDS: ICD-10-CM

## 2024-05-27 DIAGNOSIS — R53.1 WEAKNESS: Primary | ICD-10-CM

## 2024-05-27 PROCEDURE — 97112 NEUROMUSCULAR REEDUCATION: CPT | Mod: PN

## 2024-05-27 PROCEDURE — 97110 THERAPEUTIC EXERCISES: CPT | Mod: PN

## 2024-05-27 PROCEDURE — 97530 THERAPEUTIC ACTIVITIES: CPT | Mod: PN

## 2024-05-27 SDOH — SOCIAL DETERMINANTS OF HEALTH (SDOH): PROBLEMS RELATED TO EDUCATION AND LITERACY, UNSPECIFIED: Z55.9

## 2024-05-30 ENCOUNTER — CLINICAL SUPPORT (OUTPATIENT)
Dept: REHABILITATION | Facility: HOSPITAL | Age: 69
End: 2024-05-30
Payer: MEDICARE

## 2024-05-30 DIAGNOSIS — Z74.09 DECREASED FUNCTIONAL MOBILITY AND ENDURANCE: ICD-10-CM

## 2024-05-30 DIAGNOSIS — Z55.9 SPECIAL EDUCATIONAL NEEDS: ICD-10-CM

## 2024-05-30 DIAGNOSIS — R53.1 WEAKNESS: ICD-10-CM

## 2024-05-30 DIAGNOSIS — R26.2 DIFFICULTY WALKING: ICD-10-CM

## 2024-05-30 DIAGNOSIS — M25.552 LEFT HIP PAIN: Primary | ICD-10-CM

## 2024-05-30 PROCEDURE — 97110 THERAPEUTIC EXERCISES: CPT | Mod: PN,CQ

## 2024-05-30 PROCEDURE — 97530 THERAPEUTIC ACTIVITIES: CPT | Mod: PN,CQ

## 2024-05-30 PROCEDURE — 97112 NEUROMUSCULAR REEDUCATION: CPT | Mod: PN,CQ

## 2024-05-30 SDOH — SOCIAL DETERMINANTS OF HEALTH (SDOH): PROBLEMS RELATED TO EDUCATION AND LITERACY, UNSPECIFIED: Z55.9

## 2024-05-30 NOTE — PROGRESS NOTES
"[facility]   Physical Therapy Treatment Note     Name: Sampson Holt  Clinic Number: [id number]    Therapy Diagnosis:   Encounter Diagnoses   Name Primary?    Left hip pain Yes    Weakness     Difficulty walking     Decreased functional mobility and endurance     Special educational needs      Physician: Tarun Hdez MD    Visit Date: 5/30/2024  Physician Orders: PT Eval and Treat   Medical Diagnosis from Referral: S72.142A (ICD-10-CM) - Closed intertrochanteric fracture of hip, left, initial encounter  Evaluation Date: 5/20/2024  Authorization Period Expiration: 12/31/2024  Plan of Care Expiration: 07/19/2024 at 2x/wk x 8 weeks  Progress Note Due: 06/20/2024  Visit # / Visits authorized: 2/20  (3/16 on the Plan of Care)  FOTO: 1/3   PTA Visit #: 1/5      Time In: 1:00 pm    Time Out: 2:00 pm  Total Appointment Time (timed & untimed codes): 55 minutes     Precautions: Standard, Weightbearing, and surgical  SUBJECTIVE     Pt reports: " I heard it pop as I was trying to step over something this morning." Mentions he feels sore from last visit " I might have over done it last time" wants to take it easy today to be able to do things around his house later.     He was compliant with his home exercise program.    Response to previous treatment: This is the patient's first PT session since the evaluation.  Functional change: This is the patient's first PT session since the evaluation.    Pain: 3/10 Upon entering the clinic this day.  Location: The left hip.  OBJECTIVE     Objective Measures updated at progress report unless specified.     Treatment     Sampson received the treatments listed below:      Sampson received therapeutic exercises to develop strength, endurance, ROM, flexibility, posture, and core stabilization for 30 minutes including:    -single knee to chest 10 x 10 second   -Bridging with yellow band abduction x 20  -Right side lying, left leg clamshell x 20  - Long arc quads " with  #2 2 second hold 3 x 10   - Modified standing Straight leg raise 2 x 10  - supine hip marches 3 x 10     Below not performed this day:  -Gluteal Sets with 2 second hold x 20  -Supine pillow squeezes with 2 second hold x 20  -+bilateral short arc quads with 2 pound ankle weights x 4 minutes    Sampson participated in neuromuscular re-education activities to improve: Balance, Coordination, Kinesthetic, Sense, Proprioception, Muscle Recruitment, and Posture for 10 minutes. The following activities were included:    -+Precor knee extension with 10 pounds x 25  -+Precor knee flexion with 30 pounds x 25  -+Precor hip abduction with 20 pounds x 25      Below not Performed this day:  -+standing on first step and eccentrically lower for bilateral gastrocnemius stretch with 30 second hold x 3  -+Precor back machine with 30 pounds x 25  -+Precor leg press with seat on 14 and 10 pounds x 25  -Standing bilateral yellow banded hip abduction x 20 each leg  -Standing bilateral yellow banded hip extension x 20 each leg.    Sampson participated in dynamic functional therapeutic activities to improve functional performance for 10 minutes, including:    -+Nu-step on Level 2 x 10 minutes within hip precautions.    Sampson received the following manual therapy techniques: Joint mobilizations were applied to the: Left hip for 5 minutes, including:     Hip Long axis distraction Grade I     Patient Education and Home Exercises     Home Exercises Provided and Patient Education Provided     Education provided:   -The patient is independent with his initial written home exercise program.     Written Home Exercises Provided: Patient instructed to cont prior HEP. Exercises were reviewed and Sampson was able to demonstrate them prior to the end of the session.  Sampson demonstrated good  understanding of the education provided. See EMR under Patient Instructions for exercises provided during therapy sessions.  ASSESSMENT     Patient arrived with  increased pain and soreness per last visit. Initiated hip mob for pain modulation. Modifications were made to improve patient's tolerance to exercises. Fair tolerance to treatment noted. Will continue to benefit from skilled Physical Therapy to maximize strength and stability as able to return to activities of daily living.    Sampson Is progressing well towards his goals.   The patient's prognosis is Good.     The patient will continue to benefit from skilled outpatient physical therapy in order to address the deficits listed in the problem list box on the initial evaluation, provide patient/family education and to maximize the patient's level of independence in the home and in the community environment.     Pt's spiritual, cultural and educational needs considered and pt agreeable to plan of care and goals.     Anticipated barriers to physical therapy: his co-morbidities and compliance with attendance.     Goals:     STG  Weeks/Visits Date Established  Date Met   1.Decrease his max left hip pain to 6/10 in order to improve his quality of life. 4 weeks. 5/20/2024    In Progress 5/30/2024     2. Increase his left hip strength a 1/2 grade in order to improve his weightbearing activity endurance with the least assistive device. 4 weeks. 5/20/2024    In Progress 5/30/2024     3.Increase his FOTO function score to >=51% in order to improve his activity of daily living and household task ability.  4 weeks. 5/20/2024    In Progress 5/30/2024     4.Fair Initial written home exercise knowledge and be without questions in order to have carryover after discharge from PT.  4 weeks. 5/20/2024    In Progress 5/30/2024        LTG Weeks/Visits Date Established  Date Met   1.Decrease his max left hip pain to 3/10 in order to improve his quality of life. 8 weeks. 5/20/2024    In Progress 5/30/2024     2. Increase his left hip strength one grade from the evaluation date in order to improve his weightbearing activity endurance without  an assistive device. 8 weeks. 5/20/2024       In Progress 5/30/2024     3..Increase his FOTO function score to >=56% in order to improve his activity of daily living and household task ability. 8 weeks. 5/20/2024    In Progress 5/30/2024     4.Good Progressed written home exercise knowledge and be without questions in order to have carryover after discharge from PT.  8 weeks. 5/20/2024    In Progress 5/30/2024        PLAN     Plan of care Certification: 5/20/2024 to 07/19/2024. Outpatient Physical Therapy 2 times weekly for 8 weeks. Plan to maximize his left hip strength and flexibility in order to provide pain relief and to normalize his gait. Plan to progress his home program as he tolerates.      KEISHA Mesa    I certify that I was present in the room directing the student in service delivery and guiding them using my skilled judgment. As the co-signing therapist I have reviewed the students documentation and am responsible for the treatment, assessment, and plan.       Jennifer Knapp, PTA

## 2024-06-02 NOTE — PROGRESS NOTES
"[facility]   Physical Therapy Treatment Note     Name: Sampson Holt  [facility] Number: [id number]    Therapy Diagnosis:   Encounter Diagnoses   Name Primary?    Left hip pain     Weakness Yes    Difficulty walking     Decreased functional mobility and endurance     Special educational needs     Closed intertrochanteric fracture of hip, left, initial encounter      Physician: Tarun Hdez MD    Visit Date: 6/3/2024  Physician Orders: PT Eval and Treat   Medical Diagnosis from Referral: S72.142A (ICD-10-CM) - Closed intertrochanteric fracture of hip, left, initial encounter  Evaluation Date: 5/20/2024  Authorization Period Expiration: 12/31/2024  Plan of Care Expiration: 07/19/2024 at 2x/wk x 8 weeks  Progress Note Due: 06/20/2024  Visit # / Visits authorized: 3/20  (4/16 on the Plan of Care)  FOTO: 1/3   PTA Visit #: 0/5      Time In: 12:51 pm    Time Out: 1:48 pm  Total Appointment Time (timed & untimed codes): 47 minutes     Precautions: Standard, Weightbearing, and surgical  SUBJECTIVE     Pt reports: "I was hurting earlier today. I took a shower, and I feel a little better. I am wondering if my arthritis is worsening. My thumbs and other joints are more sore. They weren't this bad before surgery. I wonder if my medicine is flaring up my arthritis. I am going to try and go fishing today. I'd say that I am a 6/10 today. I need to get my arms stronger. I am having trouble pulling up from the toilet." PT acknowledges.     He was compliant with his home exercise program.    Response to previous treatment: He enters with no new left hip complaints.   Functional change: He continues to ambulate with a standard cane.    Pain: 6/10 Upon entering the clinic this day.  Location: The left hip.  OBJECTIVE     Objective Measures updated at progress report unless specified.     Treatment     Sampson received the treatments listed below: +++NO HIP MOBILIZATION OR LONG ARC DISTRACTION+++    Sampson " received therapeutic exercises to develop strength, endurance, ROM, flexibility, posture, and core stabilization for 20 minutes including:  -+Bilateral shoulder flexion to 90* standing on blue foam with 2 pound dumb bells x 20  -+Bilateral shoulder abduction to 90* standing on blue foam with 2 pound dumb bells x 20  -Supine pillow squeezes with 2 second hold x 20  -Bilateral short arc quads with 3 pound weights x 4 minutes  -Gluteal Sets with 2 second hold x 20  -Active Assisted Straight leg raise 1 x 10    Below not performed this day:  -Bridging with yellow band abduction x 20  -Right side lying, left leg clamshell x 20  -Bilateral long arc quads with 2 pound ankle weights x 20  -supine hip marches 3 x 10     Sampson participated in neuromuscular re-education activities to improve: Balance, Coordination, Kinesthetic, Sense, Proprioception, Muscle Recruitment, and Posture for 15 minutes. The following activities were included:  -Precor back machine with 45 pounds x 25  -Precor knee flexion with 40 pounds x 25  -Precor knee extension with 10 pounds x 25  -Precor leg press with seat on 13 and 30 pounds x 25  -Precor hip abduction with 30 pounds x 20    Below not Performed this day:  -standing on first step and eccentrically lower for bilateral gastrocnemius stretch with 30 second hold x 3  -Standing bilateral yellow banded hip abduction x 20 each leg  -Standing bilateral yellow banded hip extension x 20 each leg.    Sampson participated in dynamic functional therapeutic activities to improve functional performance for 12 minutes, including:  -Nu-step on Level 2 x 10 minutes within hip precautions.  -+sit to stand from 4th foam box without arm use x 10    Patient Education and Home Exercises     Home Exercises Provided and Patient Education Provided     Education provided:   -The patient is independent with his initial written home exercise program.     Written Home Exercises Provided: Patient instructed to cont prior  HEP. Exercises were reviewed and Sampson was able to demonstrate them prior to the end of the session.  Sampson demonstrated good  understanding of the education provided. See EMR under Patient Instructions for exercises provided during therapy sessions.  ASSESSMENT     Sampson continued his routine. He did well today. He required verbal cues at times to slow down. He reported having less pain after getting off of the Nu-step. PT added some exercises that involved balance along with upper extremity strengthening. He was able to safely progress some of his Precor strengthening resistances this day. His left hip and quad remain weak as expected. He performed his straight leg raises as active assisted this day. PT reminds the patient that he will not reach his goals over night, but that he is progressing safely. No modality was needed this day. He tolerated today's PT session well.     Sampson Is progressing well towards his goals.   The patient's prognosis is Good.     The patient will continue to benefit from skilled outpatient physical therapy in order to address the deficits listed in the problem list box on the initial evaluation, provide patient/family education and to maximize the patient's level of independence in the home and in the community environment.     Pt's spiritual, cultural and educational needs considered and pt agreeable to plan of care and goals.     Anticipated barriers to physical therapy: his co-morbidities and compliance with attendance.     Goals:     STG  Weeks/Visits Date Established  Date Met   1.Decrease his max left hip pain to 6/10 in order to improve his quality of life. 4 weeks. 5/20/2024    In Progress 6/3/2024     2. Increase his left hip strength a 1/2 grade in order to improve his weightbearing activity endurance with the least assistive device. 4 weeks. 5/20/2024    In Progress 6/3/2024     3.Increase his FOTO function score to >=51% in order to improve his activity of daily living and  household task ability.  4 weeks. 5/20/2024    In Progress 6/3/2024     4.Fair Initial written home exercise knowledge and be without questions in order to have carryover after discharge from PT.  4 weeks. 5/20/2024    In Progress 6/3/2024        LTG Weeks/Visits Date Established  Date Met   1.Decrease his max left hip pain to 3/10 in order to improve his quality of life. 8 weeks. 5/20/2024    In Progress 6/3/2024     2. Increase his left hip strength one grade from the evaluation date in order to improve his weightbearing activity endurance without an assistive device. 8 weeks. 5/20/2024       In Progress 6/3/2024     3..Increase his FOTO function score to >=56% in order to improve his activity of daily living and household task ability. 8 weeks. 5/20/2024    In Progress 6/3/2024     4.Good Progressed written home exercise knowledge and be without questions in order to have carryover after discharge from PT.  8 weeks. 5/20/2024    In Progress 6/3/2024        PLAN     Plan of care Certification: 5/20/2024 to 07/19/2024. Outpatient Physical Therapy 2 times weekly for 8 weeks. Plan to maximize his left hip strength and flexibility in order to provide pain relief and to normalize his gait. Plan to progress his home program as he tolerates.        Mata Alvarado, PT

## 2024-06-03 ENCOUNTER — CLINICAL SUPPORT (OUTPATIENT)
Dept: REHABILITATION | Facility: HOSPITAL | Age: 69
End: 2024-06-03
Payer: MEDICARE

## 2024-06-03 DIAGNOSIS — Z74.09 DECREASED FUNCTIONAL MOBILITY AND ENDURANCE: ICD-10-CM

## 2024-06-03 DIAGNOSIS — M25.552 LEFT HIP PAIN: ICD-10-CM

## 2024-06-03 DIAGNOSIS — Z55.9 SPECIAL EDUCATIONAL NEEDS: ICD-10-CM

## 2024-06-03 DIAGNOSIS — R26.2 DIFFICULTY WALKING: ICD-10-CM

## 2024-06-03 DIAGNOSIS — S72.142A CLOSED INTERTROCHANTERIC FRACTURE OF HIP, LEFT, INITIAL ENCOUNTER: ICD-10-CM

## 2024-06-03 DIAGNOSIS — R53.1 WEAKNESS: Primary | ICD-10-CM

## 2024-06-03 PROCEDURE — 97112 NEUROMUSCULAR REEDUCATION: CPT | Mod: KX,PN

## 2024-06-03 PROCEDURE — 97110 THERAPEUTIC EXERCISES: CPT | Mod: KX,PN

## 2024-06-03 PROCEDURE — 97530 THERAPEUTIC ACTIVITIES: CPT | Mod: KX,PN

## 2024-06-03 SDOH — SOCIAL DETERMINANTS OF HEALTH (SDOH): PROBLEMS RELATED TO EDUCATION AND LITERACY, UNSPECIFIED: Z55.9

## 2024-06-06 ENCOUNTER — CLINICAL SUPPORT (OUTPATIENT)
Dept: REHABILITATION | Facility: HOSPITAL | Age: 69
End: 2024-06-06
Payer: MEDICARE

## 2024-06-06 DIAGNOSIS — R26.2 DIFFICULTY WALKING: ICD-10-CM

## 2024-06-06 DIAGNOSIS — Z74.09 DECREASED FUNCTIONAL MOBILITY AND ENDURANCE: ICD-10-CM

## 2024-06-06 DIAGNOSIS — R53.1 WEAKNESS: ICD-10-CM

## 2024-06-06 DIAGNOSIS — Z55.9 SPECIAL EDUCATIONAL NEEDS: ICD-10-CM

## 2024-06-06 DIAGNOSIS — M25.552 LEFT HIP PAIN: Primary | ICD-10-CM

## 2024-06-06 PROCEDURE — 97530 THERAPEUTIC ACTIVITIES: CPT | Mod: PN,CQ

## 2024-06-06 PROCEDURE — 97110 THERAPEUTIC EXERCISES: CPT | Mod: PN,CQ

## 2024-06-06 PROCEDURE — 97112 NEUROMUSCULAR REEDUCATION: CPT | Mod: PN,CQ

## 2024-06-06 SDOH — SOCIAL DETERMINANTS OF HEALTH (SDOH): PROBLEMS RELATED TO EDUCATION AND LITERACY, UNSPECIFIED: Z55.9

## 2024-06-06 NOTE — PROGRESS NOTES
"[facility]   Physical Therapy Treatment Note     Name: Sampson Holt  [facility] Number: [id number]    Therapy Diagnosis:   Encounter Diagnoses   Name Primary?    Left hip pain Yes    Weakness     Difficulty walking     Decreased functional mobility and endurance     Special educational needs      Physician: Tarun Hdez MD    Visit Date: 6/6/2024  Physician Orders: PT Eval and Treat   Medical Diagnosis from Referral: S72.142A (ICD-10-CM) - Closed intertrochanteric fracture of hip, left, initial encounter  Evaluation Date: 5/20/2024  Authorization Period Expiration: 12/31/2024  Plan of Care Expiration: 07/19/2024 at 2x/wk x 8 weeks  Progress Note Due: 06/20/2024  Visit # / Visits authorized: 4/20  (5/16 on the Plan of Care)  FOTO: 1/3   PTA Visit #: 1/5      Time In: 12:55 pm    Time Out: 1:48 pm  Total Appointment Time (timed & untimed codes): 53 minutes     Precautions: Standard, Weightbearing, and surgical  SUBJECTIVE     Pt reports: He will be getting a sinus surgery and will be out for a week but doesn't know a date yet. As for the hip, " just feeling sore".     He was compliant with his home exercise program.    Response to previous treatment: He enters with no new left hip complaints.   Functional change: He continues to ambulate with a standard cane.    Pain: 4/10   Location: The left hip.  OBJECTIVE     Objective Measures updated at progress report unless specified.     Treatment     Sampson received the treatments listed below: +++NO HIP MOBILIZATION OR LONG ARC DISTRACTION+++    Sampson received therapeutic exercises to develop strength, endurance, ROM, flexibility, posture, and core stabilization for 15 minutes including:    -+Bilateral shoulder flexion to 90* standing on blue foam with 2 pound dumb bells x 20  -+Bilateral shoulder abduction to 90* standing on blue foam with 2 pound dumb bells x 20  -Active Assisted Straight leg raise 1 x 10    Below not performed this " day:  -Supine pillow squeezes with 2 second hold x 20  -Bilateral short arc quads with 3 pound weights x 4 minutes  -Gluteal Sets with 2 second hold x 20  -Bridging with yellow band abduction x 20  -Right side lying, left leg clamshell x 20  -Bilateral long arc quads with 2 pound ankle weights x 20  -supine hip marches 3 x 10     Sampson participated in neuromuscular re-education activities to improve: Balance, Coordination, Kinesthetic, Sense, Proprioception, Muscle Recruitment, and Posture for 23 minutes. The following activities were included:    -Precor back machine with 45 pounds x 25  -Precor knee flexion with 40 pounds x 25  -Precor knee extension with 20 pounds x 25  -Precor leg press with seat on 13 and 30 pounds x 25  -Precor hip abduction with 30 pounds x 20    Below not Performed this day:  -standing on first step and eccentrically lower for bilateral gastrocnemius stretch with 30 second hold x 3  -Standing bilateral yellow banded hip abduction x 20 each leg  -Standing bilateral yellow banded hip extension x 20 each leg.    Sampson participated in dynamic functional therapeutic activities to improve functional performance for 15 minutes, including:    -Nu-step on Level 2 x 10 minutes within hip precautions.  -+sit to stand from 4th foam box without arm use x 10    Patient Education and Home Exercises     Home Exercises Provided and Patient Education Provided     Education provided:   -The patient is independent with his initial written home exercise program.     Written Home Exercises Provided: Patient instructed to cont prior HEP. Exercises were reviewed and Sampson was able to demonstrate them prior to the end of the session.  Sampson demonstrated good  understanding of the education provided. See EMR under Patient Instructions for exercises provided during therapy sessions.    ASSESSMENT     Sampson with good tolerance to treatment. Continued focus on global hip strengthening. Will benefit from skilled Physical  Therapy to improve his weightbearing activity endurance without an assistive device.     Sampson Is progressing well towards his goals.   The patient's prognosis is Good.     The patient will continue to benefit from skilled outpatient physical therapy in order to address the deficits listed in the problem list box on the initial evaluation, provide patient/family education and to maximize the patient's level of independence in the home and in the community environment.     Pt's spiritual, cultural and educational needs considered and pt agreeable to plan of care and goals.     Anticipated barriers to physical therapy: his co-morbidities and compliance with attendance.     Goals:     STG  Weeks/Visits Date Established  Date Met   1.Decrease his max left hip pain to 6/10 in order to improve his quality of life. 4 weeks. 5/20/2024    In Progress 6/6/2024     2. Increase his left hip strength a 1/2 grade in order to improve his weightbearing activity endurance with the least assistive device. 4 weeks. 5/20/2024    In Progress 6/6/2024     3.Increase his FOTO function score to >=51% in order to improve his activity of daily living and household task ability.  4 weeks. 5/20/2024    In Progress 6/6/2024     4.Fair Initial written home exercise knowledge and be without questions in order to have carryover after discharge from PT.  4 weeks. 5/20/2024    In Progress 6/6/2024        LTG Weeks/Visits Date Established  Date Met   1.Decrease his max left hip pain to 3/10 in order to improve his quality of life. 8 weeks. 5/20/2024    In Progress 6/6/2024     2. Increase his left hip strength one grade from the evaluation date in order to improve his weightbearing activity endurance without an assistive device. 8 weeks. 5/20/2024       In Progress 6/6/2024     3..Increase his FOTO function score to >=56% in order to improve his activity of daily living and household task ability. 8 weeks. 5/20/2024    In Progress 6/6/2024     4.Good  Progressed written home exercise knowledge and be without questions in order to have carryover after discharge from PT.  8 weeks. 5/20/2024    In Progress 6/6/2024        PLAN     Plan of care Certification: 5/20/2024 to 07/19/2024. Outpatient Physical Therapy 2 times weekly for 8 weeks. Plan to maximize his left hip strength and flexibility in order to provide pain relief and to normalize his gait. Plan to progress his home program as he tolerates.      KEISHA Mesa     I certify that I was present in the room directing the student in service delivery and guiding them using my skilled judgment. As the co-signing therapist I have reviewed the students documentation and am responsible for the treatment, assessment, and plan.     Jennifer Knapp, PTA

## 2024-06-10 ENCOUNTER — CLINICAL SUPPORT (OUTPATIENT)
Dept: REHABILITATION | Facility: HOSPITAL | Age: 69
End: 2024-06-10
Payer: MEDICARE

## 2024-06-10 DIAGNOSIS — M25.552 LEFT HIP PAIN: ICD-10-CM

## 2024-06-10 DIAGNOSIS — R26.2 DIFFICULTY WALKING: ICD-10-CM

## 2024-06-10 DIAGNOSIS — Z55.9 SPECIAL EDUCATIONAL NEEDS: ICD-10-CM

## 2024-06-10 DIAGNOSIS — Z74.09 DECREASED FUNCTIONAL MOBILITY AND ENDURANCE: ICD-10-CM

## 2024-06-10 DIAGNOSIS — R53.1 WEAKNESS: Primary | ICD-10-CM

## 2024-06-10 PROBLEM — I63.9 CEREBROVASCULAR ACCIDENT: Status: RESOLVED | Noted: 2024-02-02 | Resolved: 2024-06-10

## 2024-06-10 PROBLEM — J01.90 ACUTE SINUSITIS: Status: RESOLVED | Noted: 2024-03-10 | Resolved: 2024-06-10

## 2024-06-10 PROCEDURE — 97112 NEUROMUSCULAR REEDUCATION: CPT | Mod: KX,PN

## 2024-06-10 PROCEDURE — 97530 THERAPEUTIC ACTIVITIES: CPT | Mod: KX,PN

## 2024-06-10 SDOH — SOCIAL DETERMINANTS OF HEALTH (SDOH): PROBLEMS RELATED TO EDUCATION AND LITERACY, UNSPECIFIED: Z55.9

## 2024-06-13 ENCOUNTER — CLINICAL SUPPORT (OUTPATIENT)
Dept: REHABILITATION | Facility: HOSPITAL | Age: 69
End: 2024-06-13
Payer: MEDICARE

## 2024-06-13 DIAGNOSIS — R53.1 WEAKNESS: ICD-10-CM

## 2024-06-13 DIAGNOSIS — M25.552 LEFT HIP PAIN: Primary | ICD-10-CM

## 2024-06-13 DIAGNOSIS — Z55.9 SPECIAL EDUCATIONAL NEEDS: ICD-10-CM

## 2024-06-13 DIAGNOSIS — R26.2 DIFFICULTY WALKING: ICD-10-CM

## 2024-06-13 DIAGNOSIS — Z74.09 DECREASED FUNCTIONAL MOBILITY AND ENDURANCE: ICD-10-CM

## 2024-06-13 PROCEDURE — 97112 NEUROMUSCULAR REEDUCATION: CPT | Mod: PN,CQ

## 2024-06-13 PROCEDURE — 97530 THERAPEUTIC ACTIVITIES: CPT | Mod: PN,CQ

## 2024-06-13 SDOH — SOCIAL DETERMINANTS OF HEALTH (SDOH): PROBLEMS RELATED TO EDUCATION AND LITERACY, UNSPECIFIED: Z55.9

## 2024-06-13 NOTE — PROGRESS NOTES
[facility]   Physical Therapy Treatment Note     Name: Sampson Holt  Clinic Number: [id number]    Therapy Diagnosis:   Encounter Diagnoses   Name Primary?    Left hip pain Yes    Weakness     Difficulty walking     Decreased functional mobility and endurance     Special educational needs      Physician: Tarun Hdez MD    Visit Date: 6/13/2024  Physician Orders: PT Eval and Treat   Medical Diagnosis from Referral: S72.142A (ICD-10-CM) - Closed intertrochanteric fracture of hip, left, initial encounter  Evaluation Date: 5/20/2024  Authorization Period Expiration: 12/31/2024  Plan of Care Expiration: 07/19/2024 at 2x/wk x 8 weeks  Progress Note Due: 06/17/2024  Visit # / Visits authorized: 6/20  (7/16 on the Plan of Care)  FOTO: 1/3                                     +++++++do 2nd FOTO 06/17/2024++++++++++++  PTA Visit #: 1/5      Time In: 12:50 pm    Time Out: 1:40 pm  Total Appointment Time (timed & untimed codes): 45 minutes     Precautions: Standard, Weightbearing, and surgical  SUBJECTIVE     Pt reports: He was having some chest pain this morning. Reports his blood pressure is always high. Has history of heart conditions. Reports he is very sore from Monday.     He was compliant with his home exercise program.    Response to previous treatment: He enters with minimal left hip pain.   Functional change: He reports using his standard cane less at home.     Pain: 3/10 Upon entering the clinic this day.  Location: The left hip.  OBJECTIVE     Objective Measures updated at progress report unless specified. +++add to the home program 06/17/2024+++    Prior to treatment BP was 150/100mmHg HR 87   After nu-step x 10 minutes 151/100 mmHg HR 87  Post treatment 154/102 mmHg HR 88    Treatment     Sampson received the treatments listed below: +++NO HIP MOBILIZATION OR LONG ARC DISTRACTION+++    Sampson received therapeutic exercises to develop strength, endurance, ROM, flexibility, posture,  and core stabilization for 0 minutes including:  -Bilateral shoulder flexion to 90* standing on blue foam with 2 pound dumb bells x 20  -Bilateral shoulder abduction to 90* standing on blue foam with 2 pound dumb bells x 20  -Active Assisted Straight leg raise 1 x 10    Below not performed this day:  -Supine pillow squeezes with 2 second hold x 20  -Bilateral short arc quads with 3 pound weights x 4 minutes  -Gluteal Sets with 2 second hold x 20  -Bridging with yellow band abduction x 20  -Right side lying, left leg clamshell x 20  -Bilateral long arc quads with 2 pound ankle weights x 20  -supine hip marches 3 x 10     Sampson participated in neuromuscular re-education activities to improve: Balance, Coordination, Kinesthetic, Sense, Proprioception, Muscle Recruitment, and Posture for 35 minutes. The following activities were included:  -Precor back machine with 45 pounds for three sets: x 10 x 15 and x 20 repetitions.  -Precor leg press with seat on 11 and 30 pounds for three sets: x 10, x 15, x 15  repetitions  -+Precor heel raises on the leg press with 20 pounds at 2 x 15  -Precor knee extension with 25 pounds x 15 and then x 10  -+Precor with 10 pounds with 2 legs up and one leg down eccentrically lowering 2 x 10  -Precor knee flexion with 45 pounds x 25  -Precor hip abduction with 50 pounds x 30  -+Precor hip adduction with 35 pounds x 40    Below not Performed this day:  -standing on first step and eccentrically lower for bilateral gastrocnemius stretch with 30 second hold x 3  -Standing bilateral yellow banded hip abduction x 20 each leg  -Standing bilateral yellow banded hip extension x 20 each leg.    Sampson participated in dynamic functional therapeutic activities to improve functional performance for 10 minutes, including:  -Nu-step on Level 3 x 10 minutes within hip precautions.  -Step ups x 15 repetitions     Below not performed this day:  -sit to stand from 4th foam box without arm use x 10  ++Black band  walking??++++++++  Patient Education and Home Exercises     Home Exercises Provided and Patient Education Provided +++add to the home program 06/17/2024+++    Education provided:   -The patient is independent with his initial written home exercise program.     Written Home Exercises Provided: Patient instructed to cont prior HEP. Exercises were reviewed and Sampson was able to demonstrate them prior to the end of the session.  Sampson demonstrated good  understanding of the education provided. See EMR under Patient Instructions for exercises provided during therapy sessions.  ASSESSMENT     Due to subjective reporting, monitored Sampson's symptoms throughout treatment session. He denied chest pain and verbalized willingness to continue with exercises. Despite subjective reporting, weights were progressed to keep repetitions down as he would perform 50 repetitions if the weight was light enough. He verbalized with good tolerance to this.  Towards end of treatment session he verbalized dizziness and headache. Exercises were stopped and time to rest was provided. Sampson verbalized he felt safe to drive himself home. Please see above for BP values. Will progress strengthening as able.     Sampson Is progressing well towards his goals.   The patient's prognosis is Good.     The patient will continue to benefit from skilled outpatient physical therapy in order to address the deficits listed in the problem list box on the initial evaluation, provide patient/family education and to maximize the patient's level of independence in the home and in the community environment.     Pt's spiritual, cultural and educational needs considered and pt agreeable to plan of care and goals.     Anticipated barriers to physical therapy: his co-morbidities and compliance with attendance.     Goals:     STG  Weeks/Visits Date Established  Date Met   1.Decrease his max left hip pain to 6/10 in order to improve his quality of life. 4 weeks. 5/20/2024     In Progress 6/13/2024     2. Increase his left hip strength a 1/2 grade in order to improve his weightbearing activity endurance with the least assistive device. 4 weeks. 5/20/2024    In Progress 6/13/2024     3.Increase his FOTO function score to >=51% in order to improve his activity of daily living and household task ability.  4 weeks. 5/20/2024    In Progress 6/13/2024     4.Fair Initial written home exercise knowledge and be without questions in order to have carryover after discharge from PT.  4 weeks. 5/20/2024    In Progress 6/13/2024        LTG Weeks/Visits Date Established  Date Met   1.Decrease his max left hip pain to 3/10 in order to improve his quality of life. 8 weeks. 5/20/2024    In Progress 6/13/2024     2. Increase his left hip strength one grade from the evaluation date in order to improve his weightbearing activity endurance without an assistive device. 8 weeks. 5/20/2024       In Progress 6/13/2024     3..Increase his FOTO function score to >=56% in order to improve his activity of daily living and household task ability. 8 weeks. 5/20/2024    In Progress 6/13/2024     4.Good Progressed written home exercise knowledge and be without questions in order to have carryover after discharge from PT.  8 weeks. 5/20/2024    In Progress 6/13/2024        PLAN     Plan of care Certification: 5/20/2024 to 07/19/2024. Outpatient Physical Therapy 2 times weekly for 8 weeks. Plan to maximize his left hip strength and flexibility in order to provide pain relief and to normalize his gait. Plan to progress his home program as he tolerates.        Jennifer Knapp, PTA

## 2024-06-17 ENCOUNTER — CLINICAL SUPPORT (OUTPATIENT)
Dept: REHABILITATION | Facility: HOSPITAL | Age: 69
End: 2024-06-17
Payer: MEDICARE

## 2024-06-17 DIAGNOSIS — R53.1 WEAKNESS: Primary | ICD-10-CM

## 2024-06-17 DIAGNOSIS — R26.2 DIFFICULTY WALKING: ICD-10-CM

## 2024-06-17 DIAGNOSIS — Z55.9 SPECIAL EDUCATIONAL NEEDS: ICD-10-CM

## 2024-06-17 DIAGNOSIS — Z74.09 DECREASED FUNCTIONAL MOBILITY AND ENDURANCE: ICD-10-CM

## 2024-06-17 DIAGNOSIS — M25.552 LEFT HIP PAIN: ICD-10-CM

## 2024-06-17 PROCEDURE — 97112 NEUROMUSCULAR REEDUCATION: CPT | Mod: KX,PN

## 2024-06-17 PROCEDURE — 97530 THERAPEUTIC ACTIVITIES: CPT | Mod: KX,PN

## 2024-06-17 SDOH — SOCIAL DETERMINANTS OF HEALTH (SDOH): PROBLEMS RELATED TO EDUCATION AND LITERACY, UNSPECIFIED: Z55.9

## 2024-06-17 NOTE — PROGRESS NOTES
"[facility]   Physical Therapy Treatment Note/Re-Assessment     Name: Sampson Holt  Clinic Number: [id number]    Therapy Diagnosis:   Encounter Diagnoses   Name Primary?    Left hip pain     Weakness Yes    Difficulty walking     Decreased functional mobility and endurance     Special educational needs      Physician: Tarun Hdez MD    Visit Date: 6/17/2024  Physician Orders: PT Eval and Treat   Medical Diagnosis from Referral: S72.142A (ICD-10-CM) - Closed intertrochanteric fracture of hip, left, initial encounter  Evaluation Date: 5/20/2024  Authorization Period Expiration: 12/31/2024  Plan of Care Expiration: 07/19/2024 at 2x/wk x 8 weeks  Progress Note Due: 07/15/2024  Visit # / Visits authorized: 7/20  (8/16 on the Plan of Care)  FOTO: 2/3                                     +++++++2nd FOTO done 06/17/2024++++++++++++  PTA Visit #: 0/5      Time In: 1:47 pm    Time Out: 2:55 pm  Total Appointment Time (timed & untimed codes): 55 minutes     Precautions: Standard, Weightbearing, and surgical  SUBJECTIVE     Pt reports: "I am beginning to walk around some without my cane. I was able to safely take out the trash. I am now getting up and cooking for myself which I couldn't do before. I was able to move a dresser this weekend by using a tony. It has me sore today though. I am like a 7/10 today, but usually it may be a 4/10. I can definitely feel that I am getting better."  PT acknowledges.    He was compliant with his home exercise program.    Response to previous treatment: He reports an increase in soreness from safe over activity this past weekend.  Functional change: He is ambulating some without his standard cane. He reports multiple functional improvements since the evaluation.     Pain: 7/10 Upon entering the clinic this day. Max of 7/10. He reports general pain at a "usual" 4/10  Location: The left hip.  OBJECTIVE     Objective Measures updated at progress report unless " specified.       MMT      Hip     Comment     Left Right               Flexion 4-/5 > 3+/5 5/5     Extension 4/5 > 4-/5 5/5     ABduction 4-/5 > 3+/5 5/5     ADduction 4/5 > 4-/5 5/5     Internal Rotation 4-/5 > 3+/5 5/5     External Rotation 4-/5 > 3+/5 5/5     Glut Max 4/5 > 4-/5 4-/5                               FUNCTIONAL MOBILITY                 Limitation/Restriction for FOTO Hip Survey     Therapist reviewed FOTO scores for Sampson Holt on 06/17/2024.   FOTO documents entered into [employer] - see Media section.     Limitation Score: 53% Function 06/17/2024 > 46% Function at the Evaluation.         Treatment     Sampson received the treatments listed below: +++NO HIP MOBILIZATION OR LONG ARC DISTRACTION++ACT,Neuro, ex+    Sampson received therapeutic exercises to develop strength, endurance, ROM, flexibility, posture, and core stabilization for 0 minutes including:  -Bilateral shoulder flexion to 90* standing on blue foam with 2 pound dumb bells x 20  -Bilateral shoulder abduction to 90* standing on blue foam with 2 pound dumb bells x 20  -Active Assisted Straight leg raise 1 x 10    Below not performed this day:  -Supine pillow squeezes with 2 second hold x 20  -Bilateral short arc quads with 3 pound weights x 4 minutes  -Gluteal Sets with 2 second hold x 20  -Bridging with yellow band abduction x 20  -Right side lying, left leg clamshell x 20  -Bilateral long arc quads with 2 pound ankle weights x 20  -supine hip marches 3 x 10     Sampson participated in neuromuscular re-education activities to improve: Balance, Coordination, Kinesthetic, Sense, Proprioception, Muscle Recruitment, and Posture for 45 minutes. The following activities were included:  -Precor knee flexion with 50 pounds x 25  -Precor knee extension with 25 pounds x 25  -Precor with 10 pounds with 2 legs up and one leg down eccentrically lowering  2 x 10  -Precor leg press with seat on 9 and 30 pounds 3 x 10  -+supine green banded bilateral hip  flexion 2 x 10 each  -+supine bilateral green banded knee fallout 2 x 10 each  -+side lying left leg clamshell 2 x 10  -active assisted straight leg raise x 10  -+prone left hip extension x 10  -Precor hip abduction with 50 pounds x 30    Below not Performed this day:  -Precor back machine with 45 pounds for three sets: x 10 x 15 and x 20 repetitions.  -Precor heel raises on the leg press with 20 pounds at 2 x 15  -Precor hip adduction with 35 pounds x 40  -standing on first step and eccentrically lower for bilateral gastrocnemius stretch with 30 second hold x 3  -Standing bilateral yellow banded hip abduction x 20 each leg  -Standing bilateral yellow banded hip extension x 20 each leg.    Sampson participated in dynamic functional therapeutic activities to improve functional performance for 10 minutes, including:  -Nu-step on Level 4 x 10 minutes     Below not performed this day:  -Step ups x 15 repetitions   -sit to stand from 4th foam box without arm use x 10  ++Black band walking??++++++++  Patient Education and Home Exercises     Home Exercises Provided and Patient Education Provided     Education provided:   -The patient is independent with his initial written home exercise program.     Written Home Exercises Provided: Patient instructed to cont prior HEP. Exercises were reviewed and Sampson was able to demonstrate them prior to the end of the session.  Sampson demonstrated good  understanding of the education provided. See EMR under Patient Instructions for exercises provided during therapy sessions.  RE-ASSESSMENT     Sampson was re-assessed this day. He is reporting improved functional ability at home. His FOTO score from today supports this. He is showing strength gains. His strengthening resistances continue to safely progress. He continues with minimal pain, but he does not report an increase in pain during today's PT session. He should be ambulating safely without an assistive device soon. He will need better  quad strength before safely weaning off of his cane full time. His home program was progressed this day. He can benefit from continued skilled PT in order to safely advance his strength, balance, and standing activity endurance. This should help him to function as needed and without pain by the time of discharge. No modality was needed this day. He tolerated today's PT session well.     Sampson Is progressing well towards his goals.   The patient's prognosis is Good.     The patient will continue to benefit from skilled outpatient physical therapy in order to address the deficits listed in the problem list box on the initial evaluation, provide patient/family education and to maximize the patient's level of independence in the home and in the community environment.     Pt's spiritual, cultural and educational needs considered and pt agreeable to plan of care and goals.     Anticipated barriers to physical therapy: his co-morbidities and compliance with attendance.     Goals:     STG  Weeks/Visits Date Established  Date Met   1.Decrease his max left hip pain to 6/10 in order to improve his quality of life. 4 weeks. 5/20/2024    In Progress 6/17/2024  7/10 the day of the re-assessment, but he reports other days at 4/10   2. Increase his left hip strength a 1/2 grade in order to improve his weightbearing activity endurance with the least assistive device. 4 weeks. 5/20/2024    Goal Met 6/19/2024       3.Increase his FOTO function score to >=51% in order to improve his activity of daily living and household task ability.  4 weeks. 5/20/2024    Goal Met 6/19/2024  53%   4.Fair Initial written home exercise knowledge and be without questions in order to have carryover after discharge from PT.  4 weeks. 5/20/2024    Goal Met 6/19/2024        LTG Weeks/Visits Date Established  Date Met   1.Decrease his max left hip pain to 3/10 in order to improve his quality of life. 8 weeks. 5/20/2024    In Progress 6/17/2024     2.  Increase his left hip strength one grade from the evaluation date in order to improve his weightbearing activity endurance without an assistive device. 8 weeks. 5/20/2024       In Progress 6/17/2024     3..Increase his FOTO function score to >=56% in order to improve his activity of daily living and household task ability. 8 weeks. 5/20/2024    In Progress 6/17/2024     4.Good Progressed written home exercise knowledge and be without questions in order to have carryover after discharge from PT.  8 weeks. 5/20/2024    In Progress 6/17/2024        PLAN     Plan of care Certification: 5/20/2024 to 07/19/2024. Outpatient Physical Therapy 2 times weekly for 8 weeks. Plan to maximize his left hip strength and flexibility in order to provide pain relief and to normalize his gait. Plan to progress his home program as he tolerates.        Mata Alvarado, PT

## 2024-06-19 ENCOUNTER — NURSE TRIAGE (OUTPATIENT)
Dept: ADMINISTRATIVE | Facility: CLINIC | Age: 69
End: 2024-06-19
Payer: MEDICARE

## 2024-06-19 ENCOUNTER — TELEPHONE (OUTPATIENT)
Dept: UROLOGY | Facility: CLINIC | Age: 69
End: 2024-06-19
Payer: MEDICARE

## 2024-06-19 NOTE — TELEPHONE ENCOUNTER
"Spoke with pt who states that he is passing blood from penis." I felt like I was about to orgasm, and then all I saw was blood coming out." Reports noting blood mixed with semen as well. Advised to be seen in ED. Verbalized understanding.    Reason for Disposition   Passing pure blood or large blood clots (i.e., larger than a dime or grape)    Additional Information   Negative: Sounds like a life-threatening emergency to the triager   Negative: SEVERE pain (e.g., excruciating)   Negative: Shock suspected (e.g., cold/pale/clammy skin, too weak to stand, low BP, rapid pulse)   Negative: Sounds like a life-threatening emergency to the triager   Negative: Recent back or abdominal injury   Negative: Recent genital injury   Negative: Unable to urinate (or only a few drops) > 4 hours and bladder feels very full (e.g., palpable bladder or strong urge to urinate)   Negative: Diffuse (all over) muscle pains in the shoulders, arms, legs, and back and dark (cola or tea-colored) or red-colored urine    Protocols used: Penis and Scrotum Symptoms-A-OH, Urine - Blood In-A-OH    "

## 2024-06-19 NOTE — TELEPHONE ENCOUNTER
"OOC NT return call -  Pt reports has dark bloody discharge from penis. Pt did speak with triage nurse earlier today and was directed to go to ED at that time. Urology also instructed pt to go to ED. Pt reports he does not want to go to ED and he is angry.  Pt states he is angry stating "I'm tired and I think they should have already fixed this. I should not have to go to ED. The doctor doesn't care and should have already ordered test this. It's been ongoing for 3 years. My aunt is Twila Bocanegra and is very powerful. I will have the doctors license if I don't get my test. " Triage nurse allowed time for pt to verbalize concerns and did offer pt relation phone numbers but pt declined stating he already had them. Triage nurse did attempt to reiterate need to follow through with previous ED disposition given. Pt did end call.  Info protocol followed . Encounter routed to Urology.  Reason for Disposition   Caller hangs up    Additional Information   Caller is angry or rude (e.g., hangs up, verbally abusive, yelling)    Protocols used: No Contact or Duplicate Contact Call-A-AH, Difficult Call-A-AH    "

## 2024-06-19 NOTE — TELEPHONE ENCOUNTER
Returned call and spoke with patient, he states he has been dealing with blood coming from the scrotum for a few months. Informed no available appts in office this afternoon  and may want to go to the ER. Patient states he does not want to go because they will do nothing for him, them and [facility]. He wants to talk or  see provider or provider to give advisement. Informed will forward message and he will be contacted back with advisement, patient verbally understood.

## 2024-06-19 NOTE — TELEPHONE ENCOUNTER
Attempted to contact pt x2, unable to leave a VM message. Seen in chart that pt called earlier and was advised to go to the ED, note seen from Urology also where pt was advised to be seen in the ED.    Reason for Disposition   Second attempt to contact caller AND no contact made. Phone number verified.    Protocols used: No Contact or Duplicate Contact Call-A-AH

## 2024-06-19 NOTE — TELEPHONE ENCOUNTER
----- Message from Rebecca Freedman sent at 6/19/2024 12:10 PM CDT -----  Contact: Patient  Type:  Needs Medical Advice    Who Called:   Patient    Would the patient rather a call back or a response via [facility]?   Call back  Best Call Back Number:   [phone]    Additional Information:   States he has blood coming out of his testicles - states solid blood coming out and then semen and blood mixed - please call - thank you

## 2024-06-20 NOTE — TELEPHONE ENCOUNTER
Spoke with patient he states he has been dealing with blood coming out his penis for 3 years and needs some answers. Patient declined appointment at first but agreed to appointment on 6/27.

## 2024-06-23 NOTE — PROGRESS NOTES
"[facility]   Physical Therapy Treatment Note    Name: Sampson Holt  Clinic Number: [id number]    Therapy Diagnosis:   Encounter Diagnoses   Name Primary?    Left hip pain     Weakness Yes    Difficulty walking     Decreased functional mobility and endurance     Special educational needs      Physician: Tarun Hdez MD    Visit Date: 6/24/2024  Physician Orders: PT Eval and Treat   Medical Diagnosis from Referral: S72.142A (ICD-10-CM) - Closed intertrochanteric fracture of hip, left, initial encounter  Evaluation Date: 5/20/2024  Authorization Period Expiration: 12/31/2024  Plan of Care Expiration: 07/19/2024 at 2x/wk x 8 weeks  Progress Note Due: 07/15/2024  Visit # / Visits authorized: 8/20  (9/16 on the Plan of Care)  FOTO: 2/3                                     +++++++2nd FOTO done 06/17/2024++++++++++++  PTA Visit #: 0/5      Time In: 12:53 pm    Time Out: 1:47 pm  Total Appointment Time (timed & untimed codes): 40 minutes     Precautions: Standard, Weightbearing, and surgical  SUBJECTIVE     Pt reports: "My finger and thumbs hurt and under my arm hurts. I started some arthritis medicine which should help. My hip is only like a 3/10 today. I only carry my cane with me outside of here. I don't really use it. Sometimes, I will start to lean when I get tired and use the cane. I go see the Urologist this Thursday. I am going fishing later today to try and get a catfish." PT acknowledges.    He was compliant with his home exercise program.    Response to previous treatment: He enters with a decrease in his left hip pain.   Functional change: He feels well enough to attempt to go fishing again today.     Pain: 3/10 Upon entering the clinic this day.   Location: The left hip.  OBJECTIVE     Objective Measures updated at progress report unless specified.       Treatment     Sampson received the treatments listed below: +++NO HIP MOBILIZATION OR LONG ARC DISTRACTION++ACT,Neuro, " ex+    Sampson received therapeutic exercises to develop strength, endurance, ROM, flexibility, posture, and core stabilization for 0 minutes including:  -Bilateral shoulder flexion to 90* standing on blue foam with 2 pound dumb bells x 20  -Bilateral shoulder abduction to 90* standing on blue foam with 2 pound dumb bells x 20    Below not performed this day:  -Bilateral short arc quads with 3 pound weights x 4 minutes  -Bridging with yellow band abduction x 20  -Right side lying, left leg clamshell x 20  -Bilateral long arc quads with 2 pound ankle weights x 20    Sampson participated in neuromuscular re-education activities to improve: Balance, Coordination, Kinesthetic, Sense, Proprioception, Muscle Recruitment, and Posture for 10 minutes. The following activities were included:  -Precor knee flexion with 50 pounds x 25  -Precor knee extension with 20 pounds x 25  -Precor back machine with 45 pounds 3 x 10    Below not Performed this day:  -Precor leg press with seat on 9 and 30 pounds 3 x 10  -Precor knee extension with 10 pounds with 2 legs up and one leg down eccentrically lowering  2 x 10  -supine green banded bilateral hip flexion 2 x 10 each  -supine bilateral green banded knee fallout 2 x 10 each  -side lying left leg clamshell 2 x 10  -active assisted straight leg raise x 10  -prone left hip extension x 10  -Precor hip abduction with 50 pounds x 30  -Precor heel raises on the leg press with 20 pounds at 2 x 15  -Precor hip adduction with 35 pounds x 40  -standing on first step and eccentrically lower for bilateral gastrocnemius stretch with 30 second hold x 3  -Standing bilateral yellow banded hip abduction x 20 each leg  -Standing bilateral yellow banded hip extension x 20 each leg.    Sampson participated in dynamic functional therapeutic activities to improve functional performance for 30 minutes, including:  -Nu-step on Level 4 x 10 minutes   -+Black band walking: forwards, backwards, and bi-laterally x 3  minutes each direction.  -+SLED pushing and pulling with no weight x 5 minutes  -Step ups onto 6 inch step x 15 repetitions   -+TRX squats to tolerance x 10    Below not performed this day:  -sit to stand from 4th foam box without arm use x 10  Patient Education and Home Exercises     Home Exercises Provided and Patient Education Provided     Education provided:   -The patient is independent with his initial written home exercise program.   -06/17/2024 The patient received an updated written home exercise program this day. He returned demo to PT. He was without questions. He received bands to use at home.    Written Home Exercises Provided: Patient instructed to cont prior HEP. Exercises were reviewed and Sampson was able to demonstrate them prior to the end of the session.  Sampson demonstrated good  understanding of the education provided. See EMR under Patient Instructions for exercises provided during therapy sessions.  ASSESSMENT     Sampson continued his routine. Today, he was able to add static and dynamic standing activities against resistance to his routine. No cane was used. He had no loss of balance. His gait was non-antalgic. He did have fatigue from the progressed time in standing, but as much rest as he needed was allowed. He reported no increase in pain. He worked hard. He did well. He can still benefit from skilled PT in order to safely advance his strength, balance, and standing activity endurance. He should be able to ambulate independently soon. No modality was needed this day. He tolerated today's PT session well.     Sampson Is progressing well towards his goals.   The patient's prognosis is Good.     The patient will continue to benefit from skilled outpatient physical therapy in order to address the deficits listed in the problem list box on the initial evaluation, provide patient/family education and to maximize the patient's level of independence in the home and in the community environment.      Pt's spiritual, cultural and educational needs considered and pt agreeable to plan of care and goals.     Anticipated barriers to physical therapy: his co-morbidities and compliance with attendance.     Goals:     STG  Weeks/Visits Date Established  Date Met   1.Decrease his max left hip pain to 6/10 in order to improve his quality of life. 4 weeks. 5/20/2024    In Progress 6/24/2024  7/10 the day of the re-assessment, but he reports other days at 4/10   2. Increase his left hip strength a 1/2 grade in order to improve his weightbearing activity endurance with the least assistive device. 4 weeks. 5/20/2024    Goal Met 6/24/2024       3.Increase his FOTO function score to >=51% in order to improve his activity of daily living and household task ability.  4 weeks. 5/20/2024    Goal Met 6/24/2024  53%   4.Fair Initial written home exercise knowledge and be without questions in order to have carryover after discharge from PT.  4 weeks. 5/20/2024    Goal Met 6/24/2024        LTG Weeks/Visits Date Established  Date Met   1.Decrease his max left hip pain to 3/10 in order to improve his quality of life. 8 weeks. 5/20/2024    In Progress 6/24/2024     2. Increase his left hip strength one grade from the evaluation date in order to improve his weightbearing activity endurance without an assistive device. 8 weeks. 5/20/2024       In Progress 6/24/2024     3..Increase his FOTO function score to >=56% in order to improve his activity of daily living and household task ability. 8 weeks. 5/20/2024    In Progress 6/24/2024     4.Good Progressed written home exercise knowledge and be without questions in order to have carryover after discharge from PT.  8 weeks. 5/20/2024    In Progress 6/24/2024        PLAN     Plan of care Certification: 5/20/2024 to 07/19/2024. Outpatient Physical Therapy 2 times weekly for 8 weeks. Plan to maximize his left hip strength and flexibility in order to provide pain relief and to normalize his  gait. Plan to continue to progress his home program as he tolerates.        Mata Alvarado, PT

## 2024-06-24 ENCOUNTER — CLINICAL SUPPORT (OUTPATIENT)
Dept: REHABILITATION | Facility: HOSPITAL | Age: 69
End: 2024-06-24
Payer: MEDICARE

## 2024-06-24 DIAGNOSIS — R26.2 DIFFICULTY WALKING: ICD-10-CM

## 2024-06-24 DIAGNOSIS — Z55.9 SPECIAL EDUCATIONAL NEEDS: ICD-10-CM

## 2024-06-24 DIAGNOSIS — M25.552 LEFT HIP PAIN: ICD-10-CM

## 2024-06-24 DIAGNOSIS — Z74.09 DECREASED FUNCTIONAL MOBILITY AND ENDURANCE: ICD-10-CM

## 2024-06-24 DIAGNOSIS — R53.1 WEAKNESS: Primary | ICD-10-CM

## 2024-06-24 PROCEDURE — 97112 NEUROMUSCULAR REEDUCATION: CPT | Mod: KX,PN

## 2024-06-24 PROCEDURE — 97530 THERAPEUTIC ACTIVITIES: CPT | Mod: KX,PN

## 2024-06-24 SDOH — SOCIAL DETERMINANTS OF HEALTH (SDOH): PROBLEMS RELATED TO EDUCATION AND LITERACY, UNSPECIFIED: Z55.9

## 2024-06-26 NOTE — PROGRESS NOTES
[facility], formerly known as [facility]  Group MD's:Hanna/Lizbet/Daniel/Doris/Yaz  Group NP's: Julienne Prakash    PCP: Damian Vergara MD  Date of Service: 06/26/2024  Today's note written by: MD Hanna  Referring PCP: [facility]    Subjective:       HPI: Sampson Holt is a 68 y.o. 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    Initial consult by Rachel in CLINIC on 10/4/21 for testicle pain and frequency:  seen 2021 for right testicle pain and urinary frequency and was lost to f/up at that time  evaluation of right sided testicle pain x 3-4 years now and increased urinary frequency, more prominently at night time.  Therefore he is here today for further evaluation of his symptoms.  No dysuria  No bladder pain  No gross hematuria  Pt does have hx of kidney stone.  Nocturia-10-15x per night.  Rare occasions where he would have urinary leakage.  +Diabetes-poorly managed, pt states his glucose ranges in 250s-300s.  Pt has never been seen by a Urology group in the past.  But, pt does have hx of hydroceles and hernias    Interval history by tiffani in CLINIC on 08/23/23 for hematospermia:  Pt states he has had blood within his semen x one year or longer.  UA in office today showed--pt was unable to urinate upon arrival today.  PVR by bladder scan is 23 mL  No family hx of  cancers  +Tobacco use-cigarettes  [facility] Flomax 0.4 mg daily in the past but pt has not been taking the medication at this time.   Exam performed by me during OV today:  Inspection of anus normal  No scrotal rashes, cysts or lesions  Epididymis normal in size, no tenderness  Testes normal and size, equal size bilaterally, no masses  Urethral meatus normal without discharge  BRODIE: 35g smooth prostate gland without masses, tenderness. SV  not palpable. Normal sphincter tone. No hemhorroids.  Pea size skin tag on right side of outer right buttock.  Pt states he recently had +MI 8 weeks ago    UA Micro and Urine Culture to be processed today due to hx of microhematuria.  Pt needs to begin Flomax 0.4 mg daily if not already at this time.  PSA, Total and Free to be scheduled for annual check.  I do not [facility] that [facility].    Ultrasound of the Scrotum/Testicles to be scheduled    Interval history by me in CLINIC on 12/4/23 for nocturia, UUI:   Ctap with 1/30/23        Ctrss 5/12/23: LMP 5mm stone      Us scrotum 9/11/23: There are multiple right epididymal cysts largest measuring 10 mm. (H/o b hydrocele and b hernias)    Ct chest wo 11/13/23 screening done for his history of tobacco use.  Found to have a left upper pole cyst versus mass that was previously seen on the CT chest with contrast from January.  Review shows that the cyst is slightly larger.  Recommend further evaluation with ultrasound.  No family history of kidney cancer  Rbus: There is a 17 x 14 mm simple cyst in the upper pole the left kidney, possibly containing minimal debris/complexity.     Hematospermia- he says it resolved on its own over the past few months  Nocturia and urge incontinence:  He says his primary complaint remains waking up 7-8 times a night.  Review of his previous notes show that was 10-15 times a night.  He does admit that he might have some obstructive sleep apnea because he wakes up short of breath in the middle the night.  Has not had an official sleep study.  During the day he only urinates every few hours.  Drinks to [place] and sodas before bedtime.  Urge incontinence: He has urge incontinence both day and night.  Do not does not wear any pads.  Urge with just drops day and night.  Does have a history of 5 mini strokes, all within a week last in 2011  Other pertinent urologic hx: States he has 3 to 4 x in his life. Last one 10+ years ago. Says he has  had  since the 80s. He's had 2 hydrocele repairs  Found to have a kidney stone in his left kidney seen on CT this year.  Left mid pole 4 mm.  He is never passed a kidney stone before.  He is on Plavix  Blood pressure today is 207/107.  Has a history of an MI with stents in May.  Recent angiogram in September by Dr. Kessler was told it was normal.  However continues to have occasional palpitations and chest pain.  Has follow up in January.      Interval history by ME/ in CLINIC on 6/26/24 for hematospermia:  When I last saw patient in December I recommended he start myrbetriq for his urge incontinence.  He also stated that his hematospermia has resolved.  I let him know that his nocturia was likely related to his verbal and Coke drink him before bedtime.  Also recommended that he see Cardiology because his blood pressure and to have a sleep study.  He saw Robyn on 02/05/2024 to see how he is doing on myrbetriq and at the time he did not know if he was taking it or not.  He denied any hematuria or dysuria.  He then called on 06/19 stating that he has been having hematospermia/blood coming from his penis for the last 3 years and know what he is done anything about it.  Made him a follow-up appointment to rediscuss his hematospermia  Of note his last PSA was 09/11/2023 was 0.2.  Not due for another PSA until September.  Also last imaging to monitor his left mid pole 3 mm stone was in December 20, 2023 and plan was to repeat imaging by December 2024  Today he states:   No longer taking myrbetriq bc frequency improved on its own bc drinking less bourbon  Seen blood with ejaculate 3-4x over the last 6 months. Associated with burning in the perineal area. [facility] He's not sure. Says he's been on for years.   Ua void today with tr bld/tr leuk - no dysuria today. Circumcised. Sending for ua paige and culture. Tobacco smoker, active. Denies gross hematuria. Has had tr blood in urine  for 30 to 40 years.     REVIEW OF SYSTEMS:  A comprehensive 10 system review was performed and is negative except as noted above in HPI    Urine history: family history of kidney, bladder or prostate cancer:No, personal or family history of kidney stones: yes,tobacco use: Yes - , anticoagulation: Yes - plavix (stens from MI 2023).  6/27/24 Tr bld/tr leuk/mod bili  2/5/24  Tr protein  12/4/23 Small wbc/30 prot  8/23/23 Ng, void: 2 rbc/5 wbc  8/11/23 Tr prot  7/10/23 Neg  1/30/23 Neg  7/6/22  Neg  3/27/20 neg    PSA History: no fam hx of prostate cancer  9/11/23  0.2      Lab Results   Component Value Date    CREATININE 0.9 03/13/2024       Allergies:  Amoxicillin    Medications: reviewed   Objective:     Vitals:    06/27/24 0822   BP: 112/76   Pulse: 83           Assessment:       Sampson Holt is a 68 y.o. male    1. Hematospermia    2. Microhematuria    3. Kidney stone on left side    4. Complex renal cyst    5. Prostatitis, unspecified prostatitis type              Plan:     Hematospermia likely benign.  He has had it for few years and he is on Plavix.  I suspect prostate stones.  He does have some perineal pain with ejaculation so could be prostatitis.  Will treat with Cipro 500 twice a day for prostatitis for now.  Will see if his pain with ejaculation improves and see if the blood in semen improves  He should take a probiotic daily while he is taking antibiotics to help prevent diarrhea  Voided urine today with trace leukocytes and trace blood.  Will send for UA microscopic and culture as well as cytology with a smoking history.  He has had trace blood for years.  Scheduling cystoscopy because of his smoking history and transrectal ultrasound to evaluate prostate size and for prostate stones on Monday July 22nd, that will be 3 weeks after being on antibiotics    Discontinue myrbetriq which he already did because he no longer has urge incontinence or frequency during the day since he stopped bourbon    He  will let me know which BPH med/prostate medication he is on already that he was [facility]    He also has a left upper pole complex renal cyst measuring 1.7 cm which has been there for a few years.  Can monitor this with the ultrasound    Will need a PSA and an x-ray and ultrasound in September/up to December to monitor that left mid pole stone.  Does CV[facility] regularly and if he gets a PSA done there he can just get a copy or find out what it isn't let me know      Follow-up in 3-4 months with a PSA, x-ray and ultrasound prior

## 2024-06-27 ENCOUNTER — OFFICE VISIT (OUTPATIENT)
Dept: UROLOGY | Facility: CLINIC | Age: 69
End: 2024-06-27
Payer: MEDICARE

## 2024-06-27 ENCOUNTER — CLINICAL SUPPORT (OUTPATIENT)
Dept: REHABILITATION | Facility: HOSPITAL | Age: 69
End: 2024-06-27
Payer: MEDICARE

## 2024-06-27 VITALS
WEIGHT: 200.81 LBS | HEIGHT: 74 IN | SYSTOLIC BLOOD PRESSURE: 112 MMHG | DIASTOLIC BLOOD PRESSURE: 76 MMHG | BODY MASS INDEX: 25.77 KG/M2 | HEART RATE: 83 BPM

## 2024-06-27 DIAGNOSIS — N40.1 BENIGN PROSTATIC HYPERPLASIA WITH LOWER URINARY TRACT SYMPTOMS, SYMPTOM DETAILS UNSPECIFIED: Primary | ICD-10-CM

## 2024-06-27 DIAGNOSIS — R36.1 HEMATOSPERMIA: Primary | ICD-10-CM

## 2024-06-27 DIAGNOSIS — Z55.9 SPECIAL EDUCATIONAL NEEDS: ICD-10-CM

## 2024-06-27 DIAGNOSIS — R31.29 MICROHEMATURIA: ICD-10-CM

## 2024-06-27 DIAGNOSIS — Z12.5 ENCOUNTER FOR SCREENING FOR MALIGNANT NEOPLASM OF PROSTATE: ICD-10-CM

## 2024-06-27 DIAGNOSIS — N20.0 KIDNEY STONE ON LEFT SIDE: ICD-10-CM

## 2024-06-27 DIAGNOSIS — Z74.09 DECREASED FUNCTIONAL MOBILITY AND ENDURANCE: ICD-10-CM

## 2024-06-27 DIAGNOSIS — R53.1 WEAKNESS: ICD-10-CM

## 2024-06-27 DIAGNOSIS — N41.9 PROSTATITIS, UNSPECIFIED PROSTATITIS TYPE: ICD-10-CM

## 2024-06-27 DIAGNOSIS — M25.552 LEFT HIP PAIN: Primary | ICD-10-CM

## 2024-06-27 DIAGNOSIS — R26.2 DIFFICULTY WALKING: ICD-10-CM

## 2024-06-27 DIAGNOSIS — N28.1 COMPLEX RENAL CYST: ICD-10-CM

## 2024-06-27 LAB
BACTERIA #/AREA URNS AUTO: ABNORMAL /HPF
BILIRUBIN, UA POC OHS: ABNORMAL
BLOOD, UA POC OHS: ABNORMAL
CAOX CRY UR QL COMP ASSIST: ABNORMAL
CLARITY, UA POC OHS: CLEAR
COLOR, UA POC OHS: YELLOW
GLUCOSE, UA POC OHS: NEGATIVE
HYALINE CASTS UR QL AUTO: 31 /LPF
KETONES, UA POC OHS: 15
LEUKOCYTES, UA POC OHS: ABNORMAL
MICROSCOPIC COMMENT: ABNORMAL
NITRITE, UA POC OHS: NEGATIVE
PH, UA POC OHS: 5
POC RESIDUAL URINE VOLUME: 0 ML (ref 0–100)
PROTEIN, UA POC OHS: 30
RBC #/AREA URNS AUTO: 6 /HPF (ref 0–4)
SPECIFIC GRAVITY, UA POC OHS: 1.02
SQUAMOUS #/AREA URNS AUTO: 2 /HPF
UROBILINOGEN, UA POC OHS: 0.2
WBC #/AREA URNS AUTO: 6 /HPF (ref 0–5)

## 2024-06-27 PROCEDURE — 97530 THERAPEUTIC ACTIVITIES: CPT | Mod: PN,CQ

## 2024-06-27 PROCEDURE — 1159F MED LIST DOCD IN RCRD: CPT | Mod: CPTII,S$GLB,, | Performed by: UROLOGY

## 2024-06-27 PROCEDURE — 99999 PR PBB SHADOW E&M-EST. PATIENT-LVL III: CPT | Mod: PBBFAC,,, | Performed by: UROLOGY

## 2024-06-27 PROCEDURE — 51798 US URINE CAPACITY MEASURE: CPT | Mod: S$GLB,,, | Performed by: UROLOGY

## 2024-06-27 PROCEDURE — 97112 NEUROMUSCULAR REEDUCATION: CPT | Mod: PN,CQ

## 2024-06-27 PROCEDURE — 3008F BODY MASS INDEX DOCD: CPT | Mod: CPTII,S$GLB,, | Performed by: UROLOGY

## 2024-06-27 PROCEDURE — 97110 THERAPEUTIC EXERCISES: CPT | Mod: PN,CQ

## 2024-06-27 PROCEDURE — 1100F PTFALLS ASSESS-DOCD GE2>/YR: CPT | Mod: CPTII,S$GLB,, | Performed by: UROLOGY

## 2024-06-27 PROCEDURE — 87086 URINE CULTURE/COLONY COUNT: CPT | Performed by: UROLOGY

## 2024-06-27 PROCEDURE — 1160F RVW MEDS BY RX/DR IN RCRD: CPT | Mod: CPTII,S$GLB,, | Performed by: UROLOGY

## 2024-06-27 PROCEDURE — 3078F DIAST BP <80 MM HG: CPT | Mod: CPTII,S$GLB,, | Performed by: UROLOGY

## 2024-06-27 PROCEDURE — 3044F HG A1C LEVEL LT 7.0%: CPT | Mod: CPTII,S$GLB,, | Performed by: UROLOGY

## 2024-06-27 PROCEDURE — 81003 URINALYSIS AUTO W/O SCOPE: CPT | Mod: QW,S$GLB,, | Performed by: UROLOGY

## 2024-06-27 PROCEDURE — 3288F FALL RISK ASSESSMENT DOCD: CPT | Mod: CPTII,S$GLB,, | Performed by: UROLOGY

## 2024-06-27 PROCEDURE — 4010F ACE/ARB THERAPY RXD/TAKEN: CPT | Mod: CPTII,S$GLB,, | Performed by: UROLOGY

## 2024-06-27 PROCEDURE — 81001 URINALYSIS AUTO W/SCOPE: CPT | Performed by: UROLOGY

## 2024-06-27 PROCEDURE — G2211 COMPLEX E/M VISIT ADD ON: HCPCS | Mod: S$GLB,,, | Performed by: UROLOGY

## 2024-06-27 PROCEDURE — 1126F AMNT PAIN NOTED NONE PRSNT: CPT | Mod: CPTII,S$GLB,, | Performed by: UROLOGY

## 2024-06-27 PROCEDURE — 99214 OFFICE O/P EST MOD 30 MIN: CPT | Mod: S$GLB,,, | Performed by: UROLOGY

## 2024-06-27 PROCEDURE — 3074F SYST BP LT 130 MM HG: CPT | Mod: CPTII,S$GLB,, | Performed by: UROLOGY

## 2024-06-27 RX ORDER — CIPROFLOXACIN 500 MG/1
500 TABLET ORAL EVERY 12 HOURS
Qty: 60 TABLET | Refills: 0 | Status: SHIPPED | OUTPATIENT
Start: 2024-06-27 | End: 2024-07-28

## 2024-06-27 RX ORDER — ATORVASTATIN CALCIUM 40 MG/1
40 TABLET, FILM COATED ORAL DAILY
Qty: 90 TABLET | Refills: 3 | OUTPATIENT
Start: 2024-06-27 | End: 2025-06-27

## 2024-06-27 SDOH — SOCIAL DETERMINANTS OF HEALTH (SDOH): PROBLEMS RELATED TO EDUCATION AND LITERACY, UNSPECIFIED: Z55.9

## 2024-06-27 NOTE — PROGRESS NOTES
Procedure Order to Urology [[id number]]    Electronically signed by: Jennifer Ferreira MD on 06/27/24 0914 Status: Active   Ordering user: Jennifer Ferreira MD 06/27/24 0914 Authorized by: Jennifer Ferreira MD   Ordering mode: Standard   Frequency:  06/27/24 -     Diagnoses  Hematospermia [R36.1]   Questionnaire    Question Answer   Procedure Cystoscopy Comment - monday - july 22 - last pt  TRUS/Trans Rectal Ultrasound   Facility Name: [facility], Please order Urine POCT without micro . Local sedation/trus

## 2024-06-27 NOTE — PROGRESS NOTES
[facility]   Physical Therapy Treatment Note    Name: Sampson Holt  Clinic Number: [id number]    Therapy Diagnosis:   Encounter Diagnoses   Name Primary?    Left hip pain Yes    Weakness     Difficulty walking     Decreased functional mobility and endurance     Special educational needs      Physician: Tarun Hdez MD    Visit Date: 6/27/2024  Physician Orders: PT Eval and Treat   Medical Diagnosis from Referral: S72.142A (ICD-10-CM) - Closed intertrochanteric fracture of hip, left, initial encounter  Evaluation Date: 5/20/2024  Authorization Period Expiration: 12/31/2024  Plan of Care Expiration: 07/19/2024 at 2x/wk x 8 weeks  Progress Note Due: 07/15/2024  Visit # / Visits authorized: 9/20  (10/16 on the Plan of Care)  FOTO: 2/3                                     +++++++2nd FOTO done 06/17/2024++++++++++++  PTA Visit #: 1/5      Time In: 1:00 pm    Time Out: 1:55 pm  Total Appointment Time (timed & untimed codes): 53 minutes     Precautions: Standard, Weightbearing, and surgical  SUBJECTIVE     Pt reports: Continues to struggled getting his leg in and out of his car. Has to use his arms to help lift his leg.     He was compliant with his home exercise program.    Response to previous treatment: He enters with a decrease in his left hip pain.   Functional change: He feels well enough to attempt to go fishing again today.     Pain: 3/10 Upon entering the clinic this day.   Location: The left hip.  OBJECTIVE     Objective Measures updated at progress report unless specified.       Treatment     Sampson received the treatments listed below: +++NO HIP MOBILIZATION OR LONG ARC DISTRACTION++ACT,Neuro, ex+    Sampson received therapeutic exercises to develop strength, endurance, ROM, flexibility, posture, and core stabilization for 10 minutes including:  -Bilateral shoulder flexion to 90* standing on blue foam with 2 pound dumb bells x 20  -Bilateral shoulder abduction to 90* standing on  blue foam with 2 pound dumb bells x 20    Below not performed this day:  -Bilateral short arc quads with 3 pound weights x 4 minutes  -Bridging with yellow band abduction x 20  -Right side lying, left leg clamshell x 20  -Bilateral long arc quads with 2 pound ankle weights x 20    Sampson participated in neuromuscular re-education activities to improve: Balance, Coordination, Kinesthetic, Sense, Proprioception, Muscle Recruitment, and Posture for 13 minutes. The following activities were included:  -Precor knee flexion with 50 pounds x 25  -Precor knee extension with 20 pounds x 25  -Precor back machine with 45 pounds 3 x 10    Below not Performed this day:  -Precor leg press with seat on 9 and 30 pounds 3 x 10  -Precor knee extension with 10 pounds with 2 legs up and one leg down eccentrically lowering  2 x 10  -supine green banded bilateral hip flexion 2 x 10 each  -supine bilateral green banded knee fallout 2 x 10 each  -side lying left leg clamshell 2 x 10  -active assisted straight leg raise x 10  -prone left hip extension x 10  -Precor hip abduction with 50 pounds x 30  -Precor heel raises on the leg press with 20 pounds at 2 x 15  -Precor hip adduction with 35 pounds x 40  -standing on first step and eccentrically lower for bilateral gastrocnemius stretch with 30 second hold x 3  -Standing bilateral yellow banded hip abduction x 20 each leg  -Standing bilateral yellow banded hip extension x 20 each leg.    Sampson participated in dynamic functional therapeutic activities to improve functional performance for 30 minutes, including:  -Nu-step on Level 4 x 10 minutes   -+Black band walking: forwards, backwards, and bi-laterally x 3 minutes each direction.  -+SLED pushing and pulling with no weight x 5 minutes  -Step ups onto 6 inch step x 15 repetitions   -+TRX squats to tolerance x 10  Seated hip flexion over terry 3 x 10 repetitions     Below not performed this day:  -sit to stand from 4th foam box without arm  use x 10  Patient Education and Home Exercises     Home Exercises Provided and Patient Education Provided     Education provided:   -The patient is independent with his initial written home exercise program.   -06/17/2024 The patient received an updated written home exercise program this day. He returned demo to PT. He was without questions. He received bands to use at home.    Written Home Exercises Provided: Patient instructed to cont prior HEP. Exercises were reviewed and Sampson was able to demonstrate them prior to the end of the session.  Sampson demonstrated good  understanding of the education provided. See EMR under Patient Instructions for exercises provided during therapy sessions.  ASSESSMENT     Sampson noted with fair tolerance to treatment this date. He noted to be easily fatigued requiring several rest breaks. Able to incorporate functional strengthening to improve his ability to independently lift his leg into his car without having to use upper extremity support.  Will progress as able.     Sampson Is progressing well towards his goals.   The patient's prognosis is Good.     The patient will continue to benefit from skilled outpatient physical therapy in order to address the deficits listed in the problem list box on the initial evaluation, provide patient/family education and to maximize the patient's level of independence in the home and in the community environment.     Pt's spiritual, cultural and educational needs considered and pt agreeable to plan of care and goals.     Anticipated barriers to physical therapy: his co-morbidities and compliance with attendance.     Goals:     STG  Weeks/Visits Date Established  Date Met   1.Decrease his max left hip pain to 6/10 in order to improve his quality of life. 4 weeks. 5/20/2024    In Progress 6/27/2024  7/10 the day of the re-assessment, but he reports other days at 4/10   2. Increase his left hip strength a 1/2 grade in order to improve his weightbearing  activity endurance with the least assistive device. 4 weeks. 5/20/2024    Goal Met 6/27/2024       3.Increase his FOTO function score to >=51% in order to improve his activity of daily living and household task ability.  4 weeks. 5/20/2024    Goal Met 6/27/2024  53%   4.Fair Initial written home exercise knowledge and be without questions in order to have carryover after discharge from PT.  4 weeks. 5/20/2024    Goal Met 6/27/2024        LTG Weeks/Visits Date Established  Date Met   1.Decrease his max left hip pain to 3/10 in order to improve his quality of life. 8 weeks. 5/20/2024    In Progress 6/27/2024     2. Increase his left hip strength one grade from the evaluation date in order to improve his weightbearing activity endurance without an assistive device. 8 weeks. 5/20/2024       In Progress 6/27/2024     3..Increase his FOTO function score to >=56% in order to improve his activity of daily living and household task ability. 8 weeks. 5/20/2024    In Progress 6/27/2024     4.Good Progressed written home exercise knowledge and be without questions in order to have carryover after discharge from PT.  8 weeks. 5/20/2024    In Progress 6/27/2024        PLAN     Plan of care Certification: 5/20/2024 to 07/19/2024. Outpatient Physical Therapy 2 times weekly for 8 weeks. Plan to maximize his left hip strength and flexibility in order to provide pain relief and to normalize his gait. Plan to continue to progress his home program as he tolerates.        Jennifer Knapp, PTA

## 2024-06-27 NOTE — PATIENT INSTRUCTIONS
1. Hematospermia    2. Microhematuria    3. Kidney stone on left side    4. Complex renal cyst    5. Prostatitis, unspecified prostatitis type              Plan:     Hematospermia likely benign.  He has had it for few years and he is on Plavix.  I suspect prostate stones.  He does have some perineal pain with ejaculation so could be prostatitis.  Will treat with Cipro 500 twice a day for prostatitis for now.  Will see if his pain with ejaculation improves and see if the blood in semen improves  He should take a probiotic daily while he is taking antibiotics to help prevent diarrhea  Voided urine today with trace leukocytes and trace blood.  Will send for UA microscopic and culture as well as cytology with a smoking history.  He has had trace blood for years.  Scheduling cystoscopy because of his smoking history and transrectal ultrasound to evaluate prostate size and for prostate stones on Monday July 22nd, that will be 3 weeks after being on antibiotics    Discontinue myrbetriq which he already did because he no longer has urge incontinence or frequency during the day since he stopped bourbon    He will let me know which BPH med/prostate medication he is on already that [facility]    He also has a left upper pole complex renal cyst measuring 1.7 cm which has been there for a few years.  Can monitor this with the ultrasound    Will need a PSA and an x-ray and ultrasound in September/up to December to monitor that left mid pole stone.  Does [facility] regularly and if he gets a PSA done there he can just get a copy or find out what it isn't let me know      Follow-up in 3-4 months with a PSA, x-ray and ultrasound prior

## 2024-06-28 DIAGNOSIS — R82.998 CELLS AND CASTS IN URINE: Primary | ICD-10-CM

## 2024-06-28 LAB — BACTERIA UR CULT: NO GROWTH

## 2024-07-04 NOTE — PROGRESS NOTES
"[facility]   Physical Therapy Treatment Note    Name: Smapson Holt  [facility] Number: [id number]    Therapy Diagnosis:   Encounter Diagnoses   Name Primary?    Left hip pain     Weakness     Difficulty walking     Decreased functional mobility and endurance Yes    Special educational needs     Closed intertrochanteric fracture of hip, left, initial encounter      Physician: Tarun Hdez MD    Visit Date: 7/5/2024  Physician Orders: PT Eval and Treat   Medical Diagnosis from Referral: S72.142A (ICD-10-CM) - Closed intertrochanteric fracture of hip, left, initial encounter  Evaluation Date: 5/20/2024  Authorization Period Expiration: 12/31/2024  Plan of Care Expiration: 07/19/2024 at 2x/wk x 8 weeks  Progress Note Due: 07/15/2024  (extend POC)  Visit # / Visits authorized: 10/20  (11/16 on the Plan of Care)  FOTO: 2/3                           +++++++++++++++++Do 3rd FOTO 07/15/2024+++++++++++  PTA Visit #: 0/5      Time In: 1:26 pm    Time Out: 2:24 pm  Total Appointment Time (timed & untimed codes): 45 minutes     Precautions: Standard, Weightbearing, and surgical  SUBJECTIVE     Pt reports:  "I went to [facility] the other day. I got there, and I realized that I had left my cane at home. I still have trouble putting my socks and shoes on, but it is slowly getting better. I'd say that my hip is a 4-5/10. But, that is because I just got out of the car. I am stiff at first, and then it tends to loosen up." PT acknowledges.    He was compliant with his home exercise program.    Response to previous treatment: He enters with no new complaints.   Functional change: He is using his standard cane less when out in public.    Pain: 4-5/10 Upon entering the clinic this day.   Location: The left hip.  OBJECTIVE     Objective Measures updated at progress report unless specified.       Treatment     Sampson received the treatments listed below: +++NO HIP MOBILIZATION OR LONG ARC " DISTRACTION++ACT,Neuro, ex+    Sampson received therapeutic exercises to develop strength, endurance, ROM, flexibility, posture, and core stabilization for 15 minutes including:  -bridges with adduction ball 2 x 10  -Bridging with green band abduction x 20  -supine bilateral green banded knee fallout 2 x 10 each  -supine green banded bilateral hip flexion 2 x 10 each  -Right side lying, left leg clamshell x 20    Below not performed this day:  -Bilateral shoulder flexion to 90* standing on blue foam with 2 pound dumb bells x 20  -Bilateral shoulder abduction to 90* standing on blue foam with 2 pound dumb bells x 20  -Bilateral short arc quads with 3 pound weights x 4 minutes  -Bilateral long arc quads with 2 pound ankle weights x 20    Sampson participated in neuromuscular re-education activities to improve: Balance, Coordination, Kinesthetic, Sense, Proprioception, Muscle Recruitment, and Posture for 10 minutes. The following activities were included:  -Precor back machine with 65 pounds x 25  -Precor hip abduction with 60 pounds x 25  -Precor leg press with seat on 9 and 30 pounds 3 x 10  -Precor knee flexion with 50 pounds x 25    Below not Performed this day:  -Precor knee extension with 20 pounds x 25  -Precor knee extension with 10 pounds with 2 legs up and one leg down eccentrically lowering  2 x 10  -Precor hip adduction with 35 pounds x 40  -active assisted straight leg raise x 10  -prone left hip extension x 10  -Precor heel raises on the leg press with 20 pounds at 2 x 15  -standing on first step and eccentrically lower for bilateral gastrocnemius stretch with 30 second hold x 3  -Standing bilateral yellow banded hip abduction x 20 each leg  -Standing bilateral yellow banded hip extension x 20 each leg.    Sampson participated in dynamic functional therapeutic activities to improve functional performance for 20 minutes, including:  -Nu-step on Level 4 x 10 minutes   -+supine left iliopsoas stretch with 30  second hold x 2  -+standing left iliopsoas stretch with 30 second hold x 2  -SLED pushing and pulling with no weight x 5 minutes  -Step ups onto 6 inch step x 15 repetitions   +++ADD back black band and see if can do black/green band+++++ADD seated banded hip flexion to help get out of car and standing low hurdles+    Below not performed this day:  -sit to stand from 4th foam box without arm use x 10  -Black band walking: forwards, backwards, and bi-laterally x 3 minutes each direction.  -TRX squats to tolerance x 10  -+Seated hip flexion over terry 3 x 10 repetitions  Patient Education and Home Exercises     Home Exercises Provided and Patient Education Provided     Education provided:   -The patient is independent with his initial written home exercise program.   -06/17/2024 The patient received an updated written home exercise program this day. He returned demo to PT. He was without questions. He received bands to use at home.  -+07/05/2024 The patient's written home exercise program was updated this day. He returned demo to PT. He received bands to update his home program resistance.     Written Home Exercises Provided: Patient instructed to cont prior HEP. Exercises were reviewed and Sampson was able to demonstrate them prior to the end of the session.  Sampson demonstrated good  understanding of the education provided. See EMR under Patient Instructions for exercises provided during therapy sessions.  ASSESSMENT     Sampson continued his routine. Today, he was able to perform all of his hip strengthening on the mat table with green resistance. He reported no pain, and he had good end range motion. He was advanced to blue band. Hip flexion remains weak. His hip is stable. He is reporting improved function in public. He continues to need significant amount of rest. However, he is able to safely complete all of his repetitions. He works hard. His home program was updated this day. He tolerated today's PT session well.       Sampson Is progressing well towards his goals.   The patient's prognosis is Good.     The patient will continue to benefit from skilled outpatient physical therapy in order to address the deficits listed in the problem list box on the initial evaluation, provide patient/family education and to maximize the patient's level of independence in the home and in the community environment.     Pt's spiritual, cultural and educational needs considered and pt agreeable to plan of care and goals.     Anticipated barriers to physical therapy: his co-morbidities and compliance with attendance.     Goals:     STG  Weeks/Visits Date Established  Date Met   1.Decrease his max left hip pain to 6/10 in order to improve his quality of life. 4 weeks. 5/20/2024    In Progress 7/5/2024  7/10 the day of the re-assessment, but he reports other days at 4/10   2. Increase his left hip strength a 1/2 grade in order to improve his weightbearing activity endurance with the least assistive device. 4 weeks. 5/20/2024    Goal Met 7/5/2024       3.Increase his FOTO function score to >=51% in order to improve his activity of daily living and household task ability.  4 weeks. 5/20/2024    Goal Met 7/5/2024  53%   4.Fair Initial written home exercise knowledge and be without questions in order to have carryover after discharge from PT.  4 weeks. 5/20/2024    Goal Met 7/5/2024        LTG Weeks/Visits Date Established  Date Met   1.Decrease his max left hip pain to 3/10 in order to improve his quality of life. 8 weeks. 5/20/2024    In Progress 7/5/2024     2. Increase his left hip strength one grade from the evaluation date in order to improve his weightbearing activity endurance without an assistive device. 8 weeks. 5/20/2024       In Progress 7/5/2024     3..Increase his FOTO function score to >=56% in order to improve his activity of daily living and household task ability. 8 weeks. 5/20/2024    In Progress 7/5/2024     4.Good Progressed written  home exercise knowledge and be without questions in order to have carryover after discharge from PT.  8 weeks. 5/20/2024    In Progress 7/5/2024        PLAN     Plan of care Certification: 5/20/2024 to 07/19/2024. Outpatient Physical Therapy 2 times weekly for 8 weeks. Plan to maximize his left hip strength and flexibility in order to provide pain relief and to normalize his gait. Plan to continue to progress his home program as he tolerates.        Mata Alvarado, PT

## 2024-07-05 ENCOUNTER — CLINICAL SUPPORT (OUTPATIENT)
Dept: REHABILITATION | Facility: HOSPITAL | Age: 69
End: 2024-07-05
Payer: MEDICARE

## 2024-07-05 DIAGNOSIS — M25.552 LEFT HIP PAIN: ICD-10-CM

## 2024-07-05 DIAGNOSIS — Z55.9 SPECIAL EDUCATIONAL NEEDS: ICD-10-CM

## 2024-07-05 DIAGNOSIS — Z74.09 DECREASED FUNCTIONAL MOBILITY AND ENDURANCE: Primary | ICD-10-CM

## 2024-07-05 DIAGNOSIS — R53.1 WEAKNESS: ICD-10-CM

## 2024-07-05 DIAGNOSIS — R26.2 DIFFICULTY WALKING: ICD-10-CM

## 2024-07-05 DIAGNOSIS — S72.142A CLOSED INTERTROCHANTERIC FRACTURE OF HIP, LEFT, INITIAL ENCOUNTER: ICD-10-CM

## 2024-07-05 PROCEDURE — 97112 NEUROMUSCULAR REEDUCATION: CPT | Mod: KX,PN

## 2024-07-05 PROCEDURE — 97530 THERAPEUTIC ACTIVITIES: CPT | Mod: KX,PN

## 2024-07-05 PROCEDURE — 97110 THERAPEUTIC EXERCISES: CPT | Mod: KX,PN

## 2024-07-05 SDOH — SOCIAL DETERMINANTS OF HEALTH (SDOH): PROBLEMS RELATED TO EDUCATION AND LITERACY, UNSPECIFIED: Z55.9

## 2024-07-07 NOTE — PROGRESS NOTES
"[facility]   Physical Therapy Treatment Note    Name: Sampson Holt  [facility] Number: [id number]    Therapy Diagnosis:   Encounter Diagnoses   Name Primary?    Left hip pain     Weakness     Difficulty walking     Decreased functional mobility and endurance Yes    Special educational needs        Physician: Tarun Hdez MD    Visit Date: 7/8/2024  Physician Orders: PT Eval and Treat   Medical Diagnosis from Referral: S72.142A (ICD-10-CM) - Closed intertrochanteric fracture of hip, left, initial encounter  Evaluation Date: 5/20/2024  Authorization Period Expiration: 12/31/2024  Plan of Care Expiration: 07/19/2024 at 2x/wk x 8 weeks  Progress Note Due: 07/15/2024  (extend POC)  Visit # / Visits authorized: 11/20  (12/16 on the Plan of Care)  FOTO: 2/3                           +++++++++++++++++Do 3rd FOTO 07/15/2024+++++++++++  PTA Visit #: 0/5      Time In: 1:09 pm  +++Short session this day. Stopped by PT+++++++++++  Time Out: 1:33 pm  Total Appointment Time (timed & untimed codes): 13 minutes     Precautions: Standard, Weightbearing, and surgical  SUBJECTIVE     Pt reports:  "I am having a bad week. I think that I may have MS. I still have the pain and swelling in my thumbs. I thought that I had another CVA because of the numbness and tingling in these fingers. The hips have been more sore too. I'd say the hip was  4/10 walking back here. I haven't been able to do my home program as much as I should have." PT asked the patient had he called or talked to a Dr as he was told to do by this PT during the last PT session as he has complained of these same problems over the last 2 PT sessions. He reported that he has yet to talk to or see a Dr. PT again informed him that he needs to see a Dr so he can quit guessing at what is going on. The patient acknowledged, but PT did not get the sense that the patient was going to talk to a Dr.    He was compliant with his home exercise program but " not as frequently as needed.    Response to previous treatment: He enters with the similar complaints from the last two PT sessions.   Functional change: No changes to report since the last PT session.    Pain: 4/10 Upon entering the clinic this day.   Location: The left hip.  OBJECTIVE     Objective Measures updated at progress report unless specified.       Treatment     Sampson received the treatments listed below: +++NO HIP MOBILIZATION OR LONG ARC DISTRACTION++ACT,Neuro, ex+    Sampson received therapeutic exercises to develop strength, endurance, ROM, flexibility, posture, and core stabilization for 0 minutes including:  -bridges with adduction ball 2 x 10  -Bridging with green band abduction x 20  -supine bilateral green banded knee fallout 2 x 10 each  -supine green banded bilateral hip flexion 2 x 10 each  -Right side lying, left leg clamshell x 20    Below not performed this day:  -Bilateral shoulder flexion to 90* standing on blue foam with 2 pound dumb bells x 20  -Bilateral shoulder abduction to 90* standing on blue foam with 2 pound dumb bells x 20  -Bilateral short arc quads with 3 pound weights x 4 minutes  -Bilateral long arc quads with 2 pound ankle weights x 20    Sampson participated in neuromuscular re-education activities to improve: Balance, Coordination, Kinesthetic, Sense, Proprioception, Muscle Recruitment, and Posture for 0 minutes. The following activities were included:  -Precor back machine with 65 pounds x 25  -Precor hip abduction with 60 pounds x 25  -Precor leg press with seat on 9 and 30 pounds 3 x 10  -Precor knee flexion with 50 pounds x 25    Below not Performed this day:  -Precor knee extension with 20 pounds x 25  -Precor knee extension with 10 pounds with 2 legs up and one leg down eccentrically lowering  2 x 10  -Precor hip adduction with 35 pounds x 40  -active assisted straight leg raise x 10  -prone left hip extension x 10  -standing on first step and eccentrically lower for  bilateral gastrocnemius stretch with 30 second hold x 3  -Standing bilateral yellow banded hip abduction x 20 each leg  -Standing bilateral yellow banded hip extension x 20 each leg.    Sampson participated in dynamic functional therapeutic activities to improve functional performance for 13 minutes, including:  -Nu-step on Level 4 x 10 minutes   -Black band walking: forwards, backwards, and bi-laterally x 3 minutes each direction. (Only 1 direction this day and PT halted the session)  +++when he feels up to it ADD black/green band+++++can also ADD seated banded hip flexion to help get out of car and standing low hurdles+    Below not performed this day:  -supine left iliopsoas stretch with 30 second hold x 2  -standing left iliopsoas stretch with 30 second hold x 2  -SLED pushing and pulling with no weight x 5 minutes  -Step ups onto 6 inch step x 15 repetitions   -sit to stand from 4th foam box without arm use x 10  -TRX squats to tolerance x 10  -+Seated hip flexion over terry 3 x 10 repetitions  Patient Education and Home Exercises     Home Exercises Provided and Patient Education Provided     Education provided:   -The patient is independent with his initial written home exercise program.   -06/17/2024 The patient received an updated written home exercise program this day. He returned demo to PT. He was without questions. He received bands to use at home.  -07/05/2024 The patient's written home exercise program was updated this day. He returned demo to PT. He received bands to update his home program resistance.     Written Home Exercises Provided: Patient instructed to cont prior HEP. Exercises were reviewed and Sampson was able to demonstrate them prior to the end of the session.  Sampson demonstrated good  understanding of the education provided. See EMR under Patient Instructions for exercises provided during therapy sessions.  ASSESSMENT     Sampson entered in good spirits. He performed minimal activities this  "day. He was sweating. He reported being "a little dizzy." He just did not look well. PT stopped the therapy session. The patient rested before he left. PT again advised him to seek medical attention due to his medical history. As of the writing of this note, it is unclear if he ever did. However, his hip is doing good. He should be able to safely ambulate without an assisted device if no other medical conditions arise. He tolerated today's PT session.      Sampson Is progressing well towards his goals.   The patient's prognosis is Good.     The patient will continue to benefit from skilled outpatient physical therapy in order to address the deficits listed in the problem list box on the initial evaluation, provide patient/family education and to maximize the patient's level of independence in the home and in the community environment.     Pt's spiritual, cultural and educational needs considered and pt agreeable to plan of care and goals.     Anticipated barriers to physical therapy: his co-morbidities and compliance with attendance.     Goals:     STG  Weeks/Visits Date Established  Date Met   1.Decrease his max left hip pain to 6/10 in order to improve his quality of life. 4 weeks. 5/20/2024    In Progress 7/8/2024  7/10 the day of the re-assessment, but he reports other days at 4/10   2. Increase his left hip strength a 1/2 grade in order to improve his weightbearing activity endurance with the least assistive device. 4 weeks. 5/20/2024    Goal Met 7/8/2024       3.Increase his FOTO function score to >=51% in order to improve his activity of daily living and household task ability.  4 weeks. 5/20/2024    Goal Met 7/8/2024  53%   4.Fair Initial written home exercise knowledge and be without questions in order to have carryover after discharge from PT.  4 weeks. 5/20/2024    Goal Met 7/8/2024        LTG Weeks/Visits Date Established  Date Met   1.Decrease his max left hip pain to 3/10 in order to improve his quality " of life. 8 weeks. 5/20/2024    In Progress 7/8/2024     2. Increase his left hip strength one grade from the evaluation date in order to improve his weightbearing activity endurance without an assistive device. 8 weeks. 5/20/2024       In Progress 7/8/2024     3..Increase his FOTO function score to >=56% in order to improve his activity of daily living and household task ability. 8 weeks. 5/20/2024    In Progress 7/8/2024     4.Good Progressed written home exercise knowledge and be without questions in order to have carryover after discharge from PT.  8 weeks. 5/20/2024    In Progress 7/8/2024        PLAN     Plan of care Certification: 5/20/2024 to 07/19/2024. Outpatient Physical Therapy 2 times weekly for 8 weeks. Plan to maximize his left hip strength and flexibility in order to provide pain relief and to normalize his gait. Plan to continue to progress his home program as he tolerates.        Mata Alvarado, PT

## 2024-07-08 ENCOUNTER — CLINICAL SUPPORT (OUTPATIENT)
Dept: REHABILITATION | Facility: HOSPITAL | Age: 69
End: 2024-07-08
Payer: MEDICARE

## 2024-07-08 DIAGNOSIS — M25.552 LEFT HIP PAIN: ICD-10-CM

## 2024-07-08 DIAGNOSIS — R53.1 WEAKNESS: ICD-10-CM

## 2024-07-08 DIAGNOSIS — Z55.9 SPECIAL EDUCATIONAL NEEDS: ICD-10-CM

## 2024-07-08 DIAGNOSIS — Z74.09 DECREASED FUNCTIONAL MOBILITY AND ENDURANCE: Primary | ICD-10-CM

## 2024-07-08 DIAGNOSIS — R26.2 DIFFICULTY WALKING: ICD-10-CM

## 2024-07-08 PROCEDURE — 97530 THERAPEUTIC ACTIVITIES: CPT | Mod: KX,PN

## 2024-07-08 SDOH — SOCIAL DETERMINANTS OF HEALTH (SDOH): PROBLEMS RELATED TO EDUCATION AND LITERACY, UNSPECIFIED: Z55.9

## 2024-07-14 NOTE — PROGRESS NOTES
"[facility]   Physical Therapy Treatment Note/Updated Plan of Care    Name: Sampson Holt  Clinic Number: [id number]    Therapy Diagnosis:   Encounter Diagnoses   Name Primary?    Left hip pain     Weakness Yes    Difficulty walking     Decreased functional mobility and endurance     Special educational needs     Closed intertrochanteric fracture of hip, left, initial encounter      Physician: Tarun Hdez MD    Visit Date: 7/15/2024  Physician Orders: PT Eval and Treat   Medical Diagnosis from Referral: S72.142A (ICD-10-CM) - Closed intertrochanteric fracture of hip, left, initial encounter  Evaluation Date: 5/20/2024  Authorization Period Expiration: 12/31/2024  Plan of Care Expiration: 07/19/2024 (Updated Plan of Care: 07/16/2024 to 08/16/2024 at 2x/wk x 4 weeks)  Progress Note Due: 08/15/2024    Visit # / Visits authorized: 12/20  (13/16 on the Plan of Care)  FOTO: 3/3                           +++++++++++++3rd FOTO done 07/15/2024+++++++++++  PTA Visit #: 0/5      Time In: 1:03 pm   Time Out: 2:11 pm  Total Appointment Time (timed & untimed codes): 60 minutes     Precautions: Standard, Weightbearing, and surgical  SUBJECTIVE     Pt reports:  The patient enters without his cane. "I have decided not to use my cane any more. I was even able to reach down and cut my toenails this past weekend. I feel better then when I was last here. I am hardly having any hip pain. The most pain that I have had in the past week was maybe a 3/10." PT acknowledges.    He was compliant with his home exercise program.    Response to previous treatment: He enters feeling better overall then at the last PT session.  Functional change: He is ambulating independently without difficulty at this time.    Pain: 1/10 Upon entering the clinic this day. Max of 3/10 within the past 2-3 days.  Location: The left hip.  OBJECTIVE     Objective Measures updated at progress report unless specified.      MMT      Hip "     Comment     Left Right               Flexion 4-/5 > 3+/5 5/5     Extension 4+/5 > 4/5 > 4-/5 5/5     ABduction 4-/5 > 3+/5 5/5     ADduction 4+/5 > 4/5 > 4-/5 5/5     Internal Rotation 4/5 > 4-/5 > 3+/5 5/5     External Rotation  4-/5 > 3+/5 5/5     Glut Max 4/5 > 4-/5 4-/5                               FUNCTIONAL MOBILITY                 Limitation/Restriction for FOTO Hip Survey     Therapist reviewed FOTO scores for Sampson Holt on 07/15/2024.   FOTO documents entered into [employer] - see Media section.     Limitation Score: 56% Function 07/15/2024 > 53% Function 06/17/2024 > 46% Function at the Evaluation.         Treatment     Sampson received the treatments listed below: +++NO HIP MOBILIZATION OR LONG ARC DISTRACTION++ACT,Neuro, ex+    Sampson received therapeutic exercises to develop strength, endurance, ROM, flexibility, posture, and core stabilization for 15 minutes including:  -+Green ball crunches with 2 second hold x 15 (add left and right next PT session)  -bridges with adduction ball 2 x 10  -Bridging with green band abduction x 20  +++UP-DATED POC testing+++++++    Below not performed this day:  -supine bilateral green banded knee fallout 2 x 10 each  -supine green banded bilateral hip flexion 2 x 10 each  -Right side lying, left leg clamshell x 20  -Bilateral shoulder flexion to 90* standing on blue foam with 2 pound dumb bells x 20  -Bilateral shoulder abduction to 90* standing on blue foam with 2 pound dumb bells x 20  -Bilateral short arc quads with 3 pound weights x 4 minutes  -Bilateral long arc quads with 2 pound ankle weights x 20    Sampson participated in neuromuscular re-education activities to improve: Balance, Coordination, Kinesthetic, Sense, Proprioception, Muscle Recruitment, and Posture for 30 minutes. The following activities were included:  -Precor back machine with 75 pounds x 30  -Precor leg press with seat on 9 and 30 pounds 3 x 10  -Precor knee extension with 30 pounds x  25  -Precor knee flexion with 50 pounds x 25  -Precor hip abduction with 60 pounds x 25  -Precor knee extension with 10 pounds with 2 legs up and one leg down eccentrically lowering  2 x 10  -Precor hip adduction with 35 pounds x 40    Below not Performed this day:  -active assisted straight leg raise x 10  -prone left hip extension x 10  -standing on first step and eccentrically lower for bilateral gastrocnemius stretch with 30 second hold x 3  -Standing bilateral yellow banded hip abduction x 20 each leg  -Standing bilateral yellow banded hip extension x 20 each leg.    Sampson participated in dynamic functional therapeutic activities to improve functional performance for 15 minutes, including:  -Nu-step on Level 4.5 x 10 minutes   -Step ups onto 6 inch step x 15 repetitions   -sit to stand from 4th foam box without arm use x 10 (do third foam box at the next PT session)  +++when he feels up to it ADD black/green band++++and standing low hurdles+    Below not performed this day:  -Black band walking: forwards, backwards, and bi-laterally x 3 minutes each direction. (Only 1 direction this day and PT halted the session)  -supine left iliopsoas stretch with 30 second hold x 2  -SLED pushing and pulling with no weight x 5 minutes  -Seated hip flexion over terry 3 x 10 repetitions    Patient Education and Home Exercises     Home Exercises Provided and Patient Education Provided     Education provided:   -The patient is independent with his initial written home exercise program.   -06/17/2024 The patient received an updated written home exercise program this day. He returned demo to PT. He was without questions. He received bands to use at home.  -07/05/2024 The patient's written home exercise program was updated this day. He returned demo to PT. He received bands to update his home program resistance.     Written Home Exercises Provided: Patient instructed to cont prior HEP. Exercises were reviewed and Sampson was able to  demonstrate them prior to the end of the session.  Sampson demonstrated good  understanding of the education provided. See EMR under Patient Instructions for exercises provided during therapy sessions.  ASSESSMENT/Updated Plan of Care     Sampson updated his Plan of Care. As of today, he has met all of his short term goals. His long term goals were re-established as he had met two of them, but he had potential for more improvement on those goals. He ambulated in independently. He had a non-antalgic gait and a good annette. He reports improved function at home. His FOTO score from today supports this. His left hip strength is improving. He can benefit from improved muscle activity endurance. He needs better static/dynamic standing activity endurance in order for him to remain independent during his gait. He works hard. He is a pleasure to work with. His home program will soon be updated. He is compliant with PT. He tolerated today's PT session.      Sampson Is progressing well towards his goals.   The patient's prognosis is Good.     The patient will continue to benefit from skilled outpatient physical therapy in order to address the deficits listed in the problem list box on the initial evaluation, provide patient/family education and to maximize the patient's level of independence in the home and in the community environment.     Pt's spiritual, cultural and educational needs considered and pt agreeable to plan of care and goals.     Anticipated barriers to physical therapy: his co-morbidities and compliance with attendance.     Goals:     STG  Weeks/Visits Date Established  Date Met   1.Decrease his max left hip pain to 6/10 in order to improve his quality of life. 4 weeks. 5/20/2024    Goal Met 7/15/2024  3/10   2. Increase his left hip strength a 1/2 grade in order to improve his weightbearing activity endurance with the least assistive device. 4 weeks. 5/20/2024    Goal Met 7/15/2024       3.Increase his FOTO  function score to >=51% in order to improve his activity of daily living and household task ability.  4 weeks. 5/20/2024    Goal Met 7/15/2024  56%   4.Fair Initial written home exercise knowledge and be without questions in order to have carryover after discharge from PT.  4 weeks. 5/20/2024    Goal Met 7/15/2024        LTG Weeks/Visits Date Established  Date Met   1.Decrease his max left hip pain to 3/10 in order to improve his quality of life. 8 weeks. 5/20/2024    Goal Met 7/15/2024  3/10     2. Increase his left hip strength one grade from the evaluation date in order to improve his weightbearing activity endurance without an assistive device. 12 weeks. 5/20/2024       In Progress 7/15/2024     3.Increase his FOTO function score to >=56% in order to improve his activity of daily living and household task ability. 8 weeks. 5/20/2024    Goal Met 7/15/2024       4.Good Progressed written home exercise knowledge and be without questions in order to have carryover after discharge from PT.   5. Decrease his max left hip pain to 1-2/10 in order to improve his quality of life.  6. Increase his FOTO function score to >=60% in order to improve his activity of daily living and household task ability. 12 weeks.            12 weeks.        12 weeks 5/20/2024            07/15/2024        07/15/2024    In Progress 7/15/2024            In Progress 7/15/2024        In Progress 7/15/2024        PLAN     Plan of care Certification: 5/20/2024 to 07/19/2024. Updated Plan of Care: 07/16/2024 to 08/16/2024 at 2x/wk x 4 weeks to include the following interventions: Electrical Stimulation unattended and [place], Gait Training, Manual Therapy, Moist Heat/ Ice, Neuromuscular Re-ed, Orthotic Management and Training, Patient Education, Self Care, Therapeutic Activities, Therapeutic Exercise, and care by a PTA. Plan to continue to maximize his left hip strength and flexibility in order to provide pain relief and to normalize his gait. Plan  to continue to progress his home program as he tolerates.        Mata Alvarado, PT

## 2024-07-15 ENCOUNTER — TELEPHONE (OUTPATIENT)
Dept: UROLOGY | Facility: CLINIC | Age: 69
End: 2024-07-15
Payer: MEDICARE

## 2024-07-15 ENCOUNTER — CLINICAL SUPPORT (OUTPATIENT)
Dept: REHABILITATION | Facility: HOSPITAL | Age: 69
End: 2024-07-15
Payer: MEDICARE

## 2024-07-15 DIAGNOSIS — R26.2 DIFFICULTY WALKING: ICD-10-CM

## 2024-07-15 DIAGNOSIS — M25.552 LEFT HIP PAIN: ICD-10-CM

## 2024-07-15 DIAGNOSIS — Z74.09 DECREASED FUNCTIONAL MOBILITY AND ENDURANCE: ICD-10-CM

## 2024-07-15 DIAGNOSIS — Z55.9 SPECIAL EDUCATIONAL NEEDS: ICD-10-CM

## 2024-07-15 DIAGNOSIS — R53.1 WEAKNESS: Primary | ICD-10-CM

## 2024-07-15 DIAGNOSIS — S72.142A CLOSED INTERTROCHANTERIC FRACTURE OF HIP, LEFT, INITIAL ENCOUNTER: ICD-10-CM

## 2024-07-15 PROCEDURE — 97530 THERAPEUTIC ACTIVITIES: CPT | Mod: KX,PN

## 2024-07-15 PROCEDURE — 97110 THERAPEUTIC EXERCISES: CPT | Mod: KX,PN

## 2024-07-15 PROCEDURE — 97112 NEUROMUSCULAR REEDUCATION: CPT | Mod: KX,PN

## 2024-07-15 SDOH — SOCIAL DETERMINANTS OF HEALTH (SDOH): PROBLEMS RELATED TO EDUCATION AND LITERACY, UNSPECIFIED: Z55.9

## 2024-07-15 NOTE — TELEPHONE ENCOUNTER
Called patient and informed  it was a    a transrectal ultrasound  He can keep fu in October with Dr Ferreira   Called outpt surgery suite and canceled the appt

## 2024-07-15 NOTE — TELEPHONE ENCOUNTER
Has to cancel the procedure, can't afford to do this  he is on a limited income and states that [facility] is not going to [facility] this. Is asking what is the name of the procedure so that he can check and see if the [facility] in [place] will do this.please advise?

## 2024-07-15 NOTE — PLAN OF CARE
"[facility]   Physical Therapy Treatment Note/Updated Plan of Care    Name: Sampson Holt  Clinic Number: [id number]    Therapy Diagnosis:   Encounter Diagnoses   Name Primary?    Left hip pain     Weakness Yes    Difficulty walking     Decreased functional mobility and endurance     Special educational needs     Closed intertrochanteric fracture of hip, left, initial encounter      Physician: Tarun Hdez MD    Visit Date: 7/15/2024  Physician Orders: PT Eval and Treat   Medical Diagnosis from Referral: S72.142A (ICD-10-CM) - Closed intertrochanteric fracture of hip, left, initial encounter  Evaluation Date: 5/20/2024  Authorization Period Expiration: 12/31/2024  Plan of Care Expiration: 07/19/2024 (Updated Plan of Care: 07/16/2024 to 08/16/2024 at 2x/wk x 4 weeks)  Progress Note Due: 08/15/2024    Visit # / Visits authorized: 12/20  (13/16 on the Plan of Care)  FOTO: 3/3                           +++++++++++++3rd FOTO done 07/15/2024+++++++++++  PTA Visit #: 0/5      Time In: 1:03 pm   Time Out: 2:11 pm  Total Appointment Time (timed & untimed codes): 60 minutes     Precautions: Standard, Weightbearing, and surgical  SUBJECTIVE     Pt reports:  The patient enters without his cane. "I have decided not to use my cane any more. I was even able to reach down and cut my toenails this past weekend. I feel better then when I was last here. I am hardly having any hip pain. The most pain that I have had in the past week was maybe a 3/10." PT acknowledges.    He was compliant with his home exercise program.    Response to previous treatment: He enters feeling better overall then at the last PT session.  Functional change: He is ambulating independently without difficulty at this time.    Pain: 1/10 Upon entering the clinic this day. Max of 3/10 within the past 2-3 days.  Location: The left hip.  OBJECTIVE     Objective Measures updated at progress report unless specified.      MMT      Hip "     Comment     Left Right               Flexion 4-/5 > 3+/5 5/5     Extension 4+/5 > 4/5 > 4-/5 5/5     ABduction 4-/5 > 3+/5 5/5     ADduction 4+/5 > 4/5 > 4-/5 5/5     Internal Rotation 4/5 > 4-/5 > 3+/5 5/5     External Rotation  4-/5 > 3+/5 5/5     Glut Max 4/5 > 4-/5 4-/5                               FUNCTIONAL MOBILITY                 Limitation/Restriction for FOTO Hip Survey     Therapist reviewed FOTO scores for Sampson Holt on 07/15/2024.   FOTO documents entered into "[employer]" - see Media section.     Limitation Score: 56% Function 07/15/2024 > 53% Function 06/17/2024 > 46% Function at the Evaluation.         Treatment     Sampson received the treatments listed below: +++NO HIP MOBILIZATION OR LONG ARC DISTRACTION++ACT,Neuro, ex+    Sampson received therapeutic exercises to develop strength, endurance, ROM, flexibility, posture, and core stabilization for 15 minutes including:  -+Green ball crunches with 2 second hold x 15 (add left and right next PT session)  -bridges with adduction ball 2 x 10  -Bridging with green band abduction x 20  +++UP-DATED POC testing+++++++    Below not performed this day:  -supine bilateral green banded knee fallout 2 x 10 each  -supine green banded bilateral hip flexion 2 x 10 each  -Right side lying, left leg clamshell x 20  -Bilateral shoulder flexion to 90* standing on blue foam with 2 pound dumb bells x 20  -Bilateral shoulder abduction to 90* standing on blue foam with 2 pound dumb bells x 20  -Bilateral short arc quads with 3 pound weights x 4 minutes  -Bilateral long arc quads with 2 pound ankle weights x 20    Sampson participated in neuromuscular re-education activities to improve: Balance, Coordination, Kinesthetic, Sense, Proprioception, Muscle Recruitment, and Posture for 30 minutes. The following activities were included:  -Precor back machine with 75 pounds x 30  -Precor leg press with seat on 9 and 30 pounds 3 x 10  -Precor knee extension with 30 pounds x  25  -Precor knee flexion with 50 pounds x 25  -Precor hip abduction with 60 pounds x 25  -Precor knee extension with 10 pounds with 2 legs up and one leg down eccentrically lowering  2 x 10  -Precor hip adduction with 35 pounds x 40    Below not Performed this day:  -active assisted straight leg raise x 10  -prone left hip extension x 10  -standing on first step and eccentrically lower for bilateral gastrocnemius stretch with 30 second hold x 3  -Standing bilateral yellow banded hip abduction x 20 each leg  -Standing bilateral yellow banded hip extension x 20 each leg.    Sampson participated in dynamic functional therapeutic activities to improve functional performance for 15 minutes, including:  -Nu-step on Level 4.5 x 10 minutes   -Step ups onto 6 inch step x 15 repetitions   -sit to stand from 4th foam box without arm use x 10 (do third foam box at the next PT session)  +++when he feels up to it ADD black/green band++++and standing low hurdles+    Below not performed this day:  -Black band walking: forwards, backwards, and bi-laterally x 3 minutes each direction. (Only 1 direction this day and PT halted the session)  -supine left iliopsoas stretch with 30 second hold x 2  -SLED pushing and pulling with no weight x 5 minutes  -Seated hip flexion over terry 3 x 10 repetitions    Patient Education and Home Exercises     Home Exercises Provided and Patient Education Provided     Education provided:   -The patient is independent with his initial written home exercise program.   -06/17/2024 The patient received an updated written home exercise program this day. He returned demo to PT. He was without questions. He received bands to use at home.  -07/05/2024 The patient's written home exercise program was updated this day. He returned demo to PT. He received bands to update his home program resistance.     Written Home Exercises Provided: Patient instructed to cont prior HEP. Exercises were reviewed and Sampson was able to  demonstrate them prior to the end of the session.  Sampson demonstrated good  understanding of the education provided. See EMR under Patient Instructions for exercises provided during therapy sessions.  ASSESSMENT/Updated Plan of Care     Sampson updated his Plan of Care. As of today, he has met all of his short term goals. His long term goals were re-established as he had met two of them, but he had potential for more improvement on those goals. He ambulated in independently. He had a non-antalgic gait and a good annette. He reports improved function at home. His FOTO score from today supports this. His left hip strength is improving. He can benefit from improved muscle activity endurance. He needs better static/dynamic standing activity endurance in order for him to remain independent during his gait. He works hard. He is a pleasure to work with. His home program will soon be updated. He is compliant with PT. He tolerated today's PT session.      Sampson Is progressing well towards his goals.   The patient's prognosis is Good.     The patient will continue to benefit from skilled outpatient physical therapy in order to address the deficits listed in the problem list box on the initial evaluation, provide patient/family education and to maximize the patient's level of independence in the home and in the community environment.     Pt's spiritual, cultural and educational needs considered and pt agreeable to plan of care and goals.     Anticipated barriers to physical therapy: his co-morbidities and compliance with attendance.     Goals:     STG  Weeks/Visits Date Established  Date Met   1.Decrease his max left hip pain to 6/10 in order to improve his quality of life. 4 weeks. 5/20/2024    Goal Met 7/15/2024  3/10   2. Increase his left hip strength a 1/2 grade in order to improve his weightbearing activity endurance with the least assistive device. 4 weeks. 5/20/2024    Goal Met 7/15/2024       3.Increase his FOTO  function score to >=51% in order to improve his activity of daily living and household task ability.  4 weeks. 5/20/2024    Goal Met 7/15/2024  56%   4.Fair Initial written home exercise knowledge and be without questions in order to have carryover after discharge from PT.  4 weeks. 5/20/2024    Goal Met 7/15/2024        LTG Weeks/Visits Date Established  Date Met   1.Decrease his max left hip pain to 3/10 in order to improve his quality of life. 8 weeks. 5/20/2024    Goal Met 7/15/2024  3/10     2. Increase his left hip strength one grade from the evaluation date in order to improve his weightbearing activity endurance without an assistive device. 12 weeks. 5/20/2024       In Progress 7/15/2024     3.Increase his FOTO function score to >=56% in order to improve his activity of daily living and household task ability. 8 weeks. 5/20/2024    Goal Met 7/15/2024       4.Good Progressed written home exercise knowledge and be without questions in order to have carryover after discharge from PT.   5. Decrease his max left hip pain to 1-2/10 in order to improve his quality of life.  6. Increase his FOTO function score to >=60% in order to improve his activity of daily living and household task ability. 12 weeks.            12 weeks.        12 weeks 5/20/2024            07/15/2024        07/15/2024    In Progress 7/15/2024            In Progress 7/15/2024        In Progress 7/15/2024        PLAN     Plan of care Certification: 5/20/2024 to 07/19/2024. Updated Plan of Care: 07/16/2024 to 08/16/2024 at 2x/wk x 4 weeks to include the following interventions: Electrical Stimulation unattended and Indonesian, Gait Training, Manual Therapy, Moist Heat/ Ice, Neuromuscular Re-ed, Orthotic Management and Training, Patient Education, Self Care, Therapeutic Activities, Therapeutic Exercise, and care by a PTA. Plan to continue to maximize his left hip strength and flexibility in order to provide pain relief and to normalize his gait. Plan  to continue to progress his home program as he tolerates.        Mata Alvarado, PT

## 2024-07-21 NOTE — PROGRESS NOTES
"[facility]   Physical Therapy Treatment Note    Name: Sampson Holt  [employer] Number: [id number]    Therapy Diagnosis:   Encounter Diagnoses   Name Primary?    Left hip pain     Weakness     Difficulty walking     Decreased functional mobility and endurance Yes    Special educational needs        Physician: Tarun Hdez MD    Visit Date: 7/22/2024  Physician Orders: PT Eval and Treat   Medical Diagnosis from Referral: S72.142A (ICD-10-CM) - Closed intertrochanteric fracture of hip, left, initial encounter  Evaluation Date: 5/20/2024  Authorization Period Expiration: 12/31/2024  Plan of Care Expiration: 08/16/2024 at 2x/wk x 4 weeks  Progress Note Due: 08/05/2024  (Discharge)  Visit # / Visits authorized: 13/20   (1/8 on final plan of care)   (14th total visit)   FOTO: 3/3                           +++++++++++++3rd FOTO done 07/15/2024+++++++++++  PTA Visit #: 0/5      Time In: 12:03 pm   Time Out: 12:51 pm  Total Appointment Time (timed & untimed codes): 40 minutes     Precautions: Standard, Weightbearing, and surgical  SUBJECTIVE     Pt reports:  That he canceled his ultrasound that was scheduled for today due to finances. "I am on a fixed income, and [facility] was not going to pay for it. I fell at home last Thursday. I haven't gone to the ER. My right knee went out on me. My shoulders are hurting. I fell on my hip. I got up later, and it was tolerable. I need to go to the ER to see my Dr. I can't put the car in gear. I have to squeeze with both hands to put it in gear. I can't straighten this finger out. It has to be arthritis. It has to be. I'd say the hip is like a 5-6/10 today." Yet, the patient still has not gone to see a Dr about his complaints. It has been weeks since this PT told him that he needs to see a Dr.                                  He was compliant with his home exercise program.    Response to previous treatment: He reports a fall at home that he did not seek " medical attention for.   Functional change: He is ambulating with his cane again after his reported fall.     Pain: 5-6/10 Upon entering the clinic this day.   Location: The left hip.  OBJECTIVE     Objective Measures updated at progress report unless specified.        Treatment     Sampson received the treatments listed below: +++NO HIP MOBILIZATION OR LONG ARC DISTRACTION++ACT,Neuro, ex+    Sampson received therapeutic exercises to develop strength, endurance, ROM, flexibility, posture, and core stabilization for 0 minutes including:  -Green ball crunches with 2 second hold x 15 (add left and right next PT session)  -bridges with adduction ball 2 x 10  -Bridging with green band abduction x 20    Below not performed this day:  -supine bilateral green banded knee fallout 2 x 10 each  -supine green banded bilateral hip flexion 2 x 10 each  -Right side lying, left leg clamshell x 20  -Bilateral shoulder flexion to 90* standing on blue foam with 2 pound dumb bells x 20  -Bilateral shoulder abduction to 90* standing on blue foam with 2 pound dumb bells x 20  -Bilateral short arc quads with 3 pound weights x 4 minutes  -Bilateral long arc quads with 2 pound ankle weights x 20    Sampson participated in neuromuscular re-education activities to improve: Balance, Coordination, Kinesthetic, Sense, Proprioception, Muscle Recruitment, and Posture for 30 minutes. The following activities were included:  -Precor back machine with 70 pounds x 30  -Precor leg press with seat on 9 and 30 pounds 3 x 10  -Precor knee extension with 25 pounds x 30  -Precor knee flexion with 50 pounds x 30  -Precor hip abduction with 60 pounds x 30  -Precor hip adduction with 45 pounds x 30    Below not Performed this day:  -active assisted straight leg raise x 10  -prone left hip extension x 10  -Standing bilateral yellow banded hip abduction x 20 each leg  -Standing bilateral yellow banded hip extension x 20 each leg.    Sampson participated in dynamic  functional therapeutic activities to improve functional performance for 10 minutes, including:  -Nu-step on Level 4 x 10 minutes   +++when he feels up to it ADD and standing low hurdles+    Below not performed this day:  -Black band walking: forwards, backwards, and bi-laterally x 3 minutes each direction. (Only 1 direction this day and PT halted the session)  -Step ups onto 6 inch step x 15 repetitions     Patient Education and Home Exercises     Home Exercises Provided and Patient Education Provided     Education provided:   -The patient is independent with his initial written home exercise program.   -06/17/2024 The patient received an updated written home exercise program this day. He returned demo to PT. He was without questions. He received bands to use at home.  -07/05/2024 The patient's written home exercise program was updated this day. He returned demo to PT. He received bands to update his home program resistance.     Written Home Exercises Provided: Patient instructed to cont prior HEP. Exercises were reviewed and Sampson was able to demonstrate them prior to the end of the session.  Sampson demonstrated good  understanding of the education provided. See EMR under Patient Instructions for exercises provided during therapy sessions.  ASSESSMENT     Sampson continued his routine. He enters today with multiple areas of exacerbated pain after a reported fall at home last Thursday. PT assessed his right hip. No regression was noted other then tenderness to palpation. He appears with multiple complaints again this day unrelated to his right hip. Yet again, he does not seek medical attention. PT adjusted his routine this day to accommodate for his exacerbated pain. Some resistances were lowered if needed. PT again suggested he see a Dr. He will soon be ready to continue with his exercises at home. PT is hopeful that he can complete this final plan of care. If his other ailments will allow, he can benefit from  improved muscle activity endurance. He continues to need better static/dynamic standing activity endurance in order for him to return to being independent during his gait. He tolerated today's PT session.      Sampson Is progressing well towards his goals.   The patient's prognosis is Good.     The patient will continue to benefit from skilled outpatient physical therapy in order to address the deficits listed in the problem list box on the initial evaluation, provide patient/family education and to maximize the patient's level of independence in the home and in the community environment.     Pt's spiritual, cultural and educational needs considered and pt agreeable to plan of care and goals.     Anticipated barriers to physical therapy: his co-morbidities and compliance with attendance.     Goals:     STG  Weeks/Visits Date Established  Date Met   1.Decrease his max left hip pain to 6/10 in order to improve his quality of life. 4 weeks. 5/20/2024    Goal Met 7/22/2024  3/10   2. Increase his left hip strength a 1/2 grade in order to improve his weightbearing activity endurance with the least assistive device. 4 weeks. 5/20/2024    Goal Met 7/22/2024       3.Increase his FOTO function score to >=51% in order to improve his activity of daily living and household task ability.  4 weeks. 5/20/2024    Goal Met 7/22/2024  56%   4.Fair Initial written home exercise knowledge and be without questions in order to have carryover after discharge from PT.  4 weeks. 5/20/2024    Goal Met 7/22/2024        LTG Weeks/Visits Date Established  Date Met   1.Decrease his max left hip pain to 3/10 in order to improve his quality of life. 8 weeks. 5/20/2024    Goal Met 7/22/2024  3/10     2. Increase his left hip strength one grade from the evaluation date in order to improve his weightbearing activity endurance without an assistive device. 12 weeks. 5/20/2024       In Progress 7/22/2024     3.Increase his FOTO function score to >=56% in  order to improve his activity of daily living and household task ability. 8 weeks. 5/20/2024    Goal Met 7/22/2024       4.Good Progressed written home exercise knowledge and be without questions in order to have carryover after discharge from PT.   5. Decrease his max left hip pain to 1-2/10 in order to improve his quality of life.  6. Increase his FOTO function score to >=60% in order to improve his activity of daily living and household task ability. 12 weeks.            12 weeks.        12 weeks 5/20/2024            07/15/2024        07/15/2024    In Progress 7/22/2024            In Progress 7/22/2024        In Progress 7/22/2024        PLAN     Updated Plan of Care: 07/16/2024 to 08/16/2024 at 2x/wk x 4 weeks. Plan to continue to maximize his left hip strength and flexibility in order to provide pain relief and to normalize his gait. Plan to continue to progress his home program as he tolerates.        Mata Alvarado, PT

## 2024-07-22 ENCOUNTER — CLINICAL SUPPORT (OUTPATIENT)
Dept: REHABILITATION | Facility: HOSPITAL | Age: 69
End: 2024-07-22
Payer: MEDICARE

## 2024-07-22 DIAGNOSIS — M25.552 LEFT HIP PAIN: ICD-10-CM

## 2024-07-22 DIAGNOSIS — Z74.09 DECREASED FUNCTIONAL MOBILITY AND ENDURANCE: Primary | ICD-10-CM

## 2024-07-22 DIAGNOSIS — R26.2 DIFFICULTY WALKING: ICD-10-CM

## 2024-07-22 DIAGNOSIS — R53.1 WEAKNESS: ICD-10-CM

## 2024-07-22 DIAGNOSIS — Z55.9 SPECIAL EDUCATIONAL NEEDS: ICD-10-CM

## 2024-07-22 PROCEDURE — 97112 NEUROMUSCULAR REEDUCATION: CPT | Mod: KX,PN

## 2024-07-22 PROCEDURE — 97530 THERAPEUTIC ACTIVITIES: CPT | Mod: KX,PN

## 2024-07-22 SDOH — SOCIAL DETERMINANTS OF HEALTH (SDOH): PROBLEMS RELATED TO EDUCATION AND LITERACY, UNSPECIFIED: Z55.9

## 2024-07-25 ENCOUNTER — CLINICAL SUPPORT (OUTPATIENT)
Dept: REHABILITATION | Facility: HOSPITAL | Age: 69
End: 2024-07-25
Payer: MEDICARE

## 2024-07-25 DIAGNOSIS — R26.2 DIFFICULTY WALKING: ICD-10-CM

## 2024-07-25 DIAGNOSIS — Z74.09 DECREASED FUNCTIONAL MOBILITY AND ENDURANCE: ICD-10-CM

## 2024-07-25 DIAGNOSIS — Z55.9 SPECIAL EDUCATIONAL NEEDS: ICD-10-CM

## 2024-07-25 DIAGNOSIS — R53.1 WEAKNESS: ICD-10-CM

## 2024-07-25 DIAGNOSIS — M25.552 LEFT HIP PAIN: Primary | ICD-10-CM

## 2024-07-25 PROCEDURE — 97112 NEUROMUSCULAR REEDUCATION: CPT | Mod: PN,CQ

## 2024-07-25 PROCEDURE — 97530 THERAPEUTIC ACTIVITIES: CPT | Mod: PN,CQ

## 2024-07-25 PROCEDURE — 97110 THERAPEUTIC EXERCISES: CPT | Mod: PN,CQ

## 2024-07-25 SDOH — SOCIAL DETERMINANTS OF HEALTH (SDOH): PROBLEMS RELATED TO EDUCATION AND LITERACY, UNSPECIFIED: Z55.9

## 2024-07-25 NOTE — PROGRESS NOTES
[facility]   Physical Therapy Treatment Note    Name: Sampson Holt  Clinic Number: [id number]    Therapy Diagnosis:   Encounter Diagnoses   Name Primary?    Left hip pain Yes    Weakness     Difficulty walking     Decreased functional mobility and endurance     Special educational needs        Physician: Tarun Hdez MD    Visit Date: 7/25/2024  Physician Orders: PT Eval and Treat   Medical Diagnosis from Referral: S72.142A (ICD-10-CM) - Closed intertrochanteric fracture of hip, left, initial encounter  Evaluation Date: 5/20/2024  Authorization Period Expiration: 12/31/2024  Plan of Care Expiration: 08/16/2024 at 2x/wk x 4 weeks  Progress Note Due: 08/05/2024  (Discharge)  Visit # / Visits authorized: 14/20   (2/8 on final plan of care)   (14th total visit)   FOTO: 3/3                           +++++++++++++3rd FOTO done 07/15/2024+++++++++++  PTA Visit #: 1/5      Time In: 1345 pm   Time Out: 1440 pm  Total Appointment Time (timed & untimed codes): 53 minutes     Precautions: Standard, Weightbearing, and surgical  SUBJECTIVE     Pt reports:  He is having a flare up of arthritis limiting his activities of daily living's.        He was compliant with his home exercise program.    Response to previous treatment: He reports a fall at home that he did not seek medical attention for.   Functional change: He is ambulating with his cane again after his reported fall.     Pain: 5-6/10 Upon entering the clinic this day.   Location: The left hip.  OBJECTIVE     Objective Measures updated at progress report unless specified.        Treatment     Sampson received the treatments listed below: +++NO HIP MOBILIZATION OR LONG ARC DISTRACTION++ACT,Neuro, ex+    Sampson received therapeutic exercises to develop strength, endurance, ROM, flexibility, posture, and core stabilization for 13 minutes including:  -Green ball crunches with 2 second hold x 15 3 directions   -bridges with adduction ball 2 x  10  -Bridging with green band abduction x 20  -seated hip flexion 2 x 10 repetitions   -seated hip external rotation 2 x 10 repetitions     Below not performed this day:  -supine bilateral green banded knee fallout 2 x 10 each  -supine green banded bilateral hip flexion 2 x 10 each  -Right side lying, left leg clamshell x 20  -Bilateral shoulder flexion to 90* standing on blue foam with 2 pound dumb bells x 20  -Bilateral shoulder abduction to 90* standing on blue foam with 2 pound dumb bells x 20  -Bilateral short arc quads with 3 pound weights x 4 minutes  -Bilateral long arc quads with 2 pound ankle weights x 20    Sampson participated in neuromuscular re-education activities to improve: Balance, Coordination, Kinesthetic, Sense, Proprioception, Muscle Recruitment, and Posture for 30 minutes. The following activities were included:  -Precor back machine with 70 pounds x 30  -Precor leg press with seat on 9 and 30 pounds 3 x 10  -Precor knee extension with 25 pounds x 30  -Precor knee flexion with 50 pounds x 30  -Precor hip abduction with 60 pounds x 30  -Precor hip adduction with 45 pounds x 30    Below not Performed this day:  -active assisted straight leg raise x 10  -prone left hip extension x 10  -Standing bilateral yellow banded hip abduction x 20 each leg  -Standing bilateral yellow banded hip extension x 20 each leg.    Sampson participated in dynamic functional therapeutic activities to improve functional performance for 10 minutes, including:  -Nu-step on Level 4 x 10 minutes   +++when he feels up to it ADD and standing low hurdles+    Below not performed this day:  -Black band walking: forwards, backwards, and bi-laterally x 3 minutes each direction. (Only 1 direction this day and PT halted the session)  -Step ups onto 6 inch step x 15 repetitions     Patient Education and Home Exercises     Home Exercises Provided and Patient Education Provided     Education provided:   -The patient is independent with  his initial written home exercise program.   -06/17/2024 The patient received an updated written home exercise program this day. He returned demo to PT. He was without questions. He received bands to use at home.  -07/05/2024 The patient's written home exercise program was updated this day. He returned demo to PT. He received bands to update his home program resistance.     Written Home Exercises Provided: Patient instructed to cont prior HEP. Exercises were reviewed and Sampson was able to demonstrate them prior to the end of the session.  Sampson demonstrated good  understanding of the education provided. See EMR under Patient Instructions for exercises provided during therapy sessions.  ASSESSMENT     Sampson notes with fair tolerance to treatment. Subjective reports limit his ability to progress exercises here in clinic. He was able to include seated hip flexion and external rotation. Instructed him to add these to his exercises as home to improve tolerance to ADLs. Will progress as able.     Sampson Is progressing well towards his goals.   The patient's prognosis is Good.     The patient will continue to benefit from skilled outpatient physical therapy in order to address the deficits listed in the problem list box on the initial evaluation, provide patient/family education and to maximize the patient's level of independence in the home and in the community environment.     Pt's spiritual, cultural and educational needs considered and pt agreeable to plan of care and goals.     Anticipated barriers to physical therapy: his co-morbidities and compliance with attendance.     Goals:     STG  Weeks/Visits Date Established  Date Met   1.Decrease his max left hip pain to 6/10 in order to improve his quality of life. 4 weeks. 5/20/2024    Goal Met 7/25/2024  3/10   2. Increase his left hip strength a 1/2 grade in order to improve his weightbearing activity endurance with the least assistive device. 4 weeks. 5/20/2024    Goal  Met 7/25/2024       3.Increase his FOTO function score to >=51% in order to improve his activity of daily living and household task ability.  4 weeks. 5/20/2024    Goal Met 7/25/2024  56%   4.Fair Initial written home exercise knowledge and be without questions in order to have carryover after discharge from PT.  4 weeks. 5/20/2024    Goal Met 7/25/2024        LTG Weeks/Visits Date Established  Date Met   1.Decrease his max left hip pain to 3/10 in order to improve his quality of life. 8 weeks. 5/20/2024    Goal Met 7/25/2024  3/10     2. Increase his left hip strength one grade from the evaluation date in order to improve his weightbearing activity endurance without an assistive device. 12 weeks. 5/20/2024       In Progress 7/25/2024     3.Increase his FOTO function score to >=56% in order to improve his activity of daily living and household task ability. 8 weeks. 5/20/2024    Goal Met 7/25/2024       4.Good Progressed written home exercise knowledge and be without questions in order to have carryover after discharge from PT.   5. Decrease his max left hip pain to 1-2/10 in order to improve his quality of life.  6. Increase his FOTO function score to >=60% in order to improve his activity of daily living and household task ability. 12 weeks.            12 weeks.        12 weeks 5/20/2024            07/15/2024        07/15/2024    In Progress 7/25/2024            In Progress 7/25/2024        In Progress 7/25/2024        PLAN     Updated Plan of Care: 07/16/2024 to 08/16/2024 at 2x/wk x 4 weeks. Plan to continue to maximize his left hip strength and flexibility in order to provide pain relief and to normalize his gait. Plan to continue to progress his home program as he tolerates.        Jennifer Knapp, PTA

## 2024-07-28 NOTE — PROGRESS NOTES
"[facility]   Physical Therapy Treatment Note    Name: Sampson Holt  Clinic Number: [id number]    Therapy Diagnosis:   Encounter Diagnoses   Name Primary?    Left hip pain     Weakness     Difficulty walking     Decreased functional mobility and endurance Yes    Special educational needs      Physician: Tarun Hdez MD    Visit Date: 7/29/2024  Physician Orders: PT Eval and Treat   Medical Diagnosis from Referral: S72.142A (ICD-10-CM) - Closed intertrochanteric fracture of hip, left, initial encounter  Evaluation Date: 5/20/2024  Authorization Period Expiration: 12/31/2024  Plan of Care Expiration: 08/16/2024 at 2x/wk x 4 weeks  Progress Note Due: 08/05/2024  (Discharge)  Visit # / Visits authorized: 15/20   (3/8 on final plan of care)   (16th total visit)   FOTO: 3/3                           +++++++++++++3rd FOTO done 07/15/2024+++++++++++     Time In: 12:53 pm   Time Out: 1:56 pm  Total Appointment Time (timed & untimed codes): 55 minutes     Precautions: Standard, Weightbearing, and surgical  SUBJECTIVE     Pt reports:  "I go to the Dr tomorrow in [place] to have some blood drawn. I just don't know what could be causing me to hurt so bad. Today, me knees are really hurting. So are my thumbs. I just don't know. My left hip doesn't really hurt me any more. It's all this other stuff." PT acknowledges.    He was compliant with his home exercise program.    Response to previous treatment: He reports no left hip pain this day.  Functional change: His left hip no longer affects his functional ability.     Pain: 0/10 Upon entering the clinic this day.   Location: The left hip.  OBJECTIVE     Objective Measures updated at progress report unless specified.        Treatment     Sampson received the treatments listed below: +ACT,Neuro, ex+    Sampson received therapeutic exercises to develop strength, endurance, ROM, flexibility, posture, and core stabilization for 20 minutes " including:  -Red ball crunches with 2 second hold x 15  in all 3 directions   -ball squeeze with 2 second hold x 20  -bridges with adduction ball 2 x 10  -Bridging with green band abduction x 20  -seated hip flexion 2 x 10 repetitions ++++++++add to home program  -seated hip external rotation 2 x 10 repetitions ++++++++add to home program  (show laying down version and internal rotation)    Below not performed this day:  -supine bilateral green banded knee fallout 2 x 10 each  -supine green banded bilateral hip flexion 2 x 10 each  -Right side lying, left leg clamshell x 20  -Bilateral short arc quads with 3 pound weights x 4 minutes  -Bilateral long arc quads with 2 pound ankle weights x 20    Sampson participated in neuromuscular re-education activities to improve: Balance, Coordination, Kinesthetic, Sense, Proprioception, Muscle Recruitment, and Posture for 25 minutes. The following activities were included:  -Precor back machine with 70 pounds x 30  -Precor leg press with seat on 9 and 30 pounds 3 x 10  -Precor knee extension with 25 pounds x 30  -Precor knee flexion with 50 pounds x 30  -Precor hip abduction with 60 pounds x 30  -Precor hip adduction with 45 pounds x 30  -active assisted straight leg raise x 10  -prone left hip extension x 10    Below not Performed this day:  -Standing bilateral yellow banded hip abduction x 20 each leg  -Standing bilateral yellow banded hip extension x 20 each leg.    Sampson participated in dynamic functional therapeutic activities to improve functional performance for 10 minutes, including:  -Nu-step on Level 4 x 10 minutes   +++ADD standing low hurdles with ankle weights+    Below not performed this day:  -Black band walking: forwards, backwards, and bi-laterally x 3 minutes each direction. (Only 1 direction this day and PT halted the session)  -Step ups onto 6 inch step x 15 repetitions   Patient Education and Home Exercises     Home Exercises Provided and Patient Education  Provided     Education provided:   -The patient is independent with his initial written home exercise program.   -06/17/2024 The patient received an updated written home exercise program this day. He returned demo to PT. He was without questions. He received bands to use at home.  -07/05/2024 The patient's written home exercise program was updated this day. He returned demo to PT. He received bands to update his home program resistance.     Written Home Exercises Provided: Patient instructed to cont prior HEP. Exercises were reviewed and Sampson was able to demonstrate them prior to the end of the session.  Sampson demonstrated good  understanding of the education provided. See EMR under Patient Instructions for exercises provided during therapy sessions.  ASSESSMENT     Smapson continues his routine. Today, he entered with many other complaints not related to his left hip. PT has again advised him to ask the Dr that he sees tomorrow all of his questions. Otherwise, his left hip is progressing well. He reported no left hip pain this day. His left hip internal and external rotation remains tight. He has been shown exercises to help improve his hip's mobility. This should help him put his sock on with much greater ease. He continues to work hard. He should be able to continue with his exercises at home soon. He tolerated today's PT session well.      Sampson Is progressing well towards his goals.   The patient's prognosis is Good.     The patient will continue to benefit from skilled outpatient physical therapy in order to address the deficits listed in the problem list box on the initial evaluation, provide patient/family education and to maximize the patient's level of independence in the home and in the community environment.     Pt's spiritual, cultural and educational needs considered and pt agreeable to plan of care and goals.     Anticipated barriers to physical therapy: his co-morbidities and compliance with  attendance.     Goals:     STG  Weeks/Visits Date Established  Date Met   1.Decrease his max left hip pain to 6/10 in order to improve his quality of life. 4 weeks. 5/20/2024    Goal Met 7/29/2024  3/10   2. Increase his left hip strength a 1/2 grade in order to improve his weightbearing activity endurance with the least assistive device. 4 weeks. 5/20/2024    Goal Met 7/29/2024       3.Increase his FOTO function score to >=51% in order to improve his activity of daily living and household task ability.  4 weeks. 5/20/2024    Goal Met 7/29/2024  56%   4.Fair Initial written home exercise knowledge and be without questions in order to have carryover after discharge from PT.  4 weeks. 5/20/2024    Goal Met 7/29/2024        LTG Weeks/Visits Date Established  Date Met   1.Decrease his max left hip pain to 3/10 in order to improve his quality of life. 8 weeks. 5/20/2024    Goal Met 7/29/2024  3/10     2. Increase his left hip strength one grade from the evaluation date in order to improve his weightbearing activity endurance without an assistive device. 12 weeks. 5/20/2024       In Progress 7/29/2024     3.Increase his FOTO function score to >=56% in order to improve his activity of daily living and household task ability. 8 weeks. 5/20/2024    Goal Met 7/29/2024       4.Good Progressed written home exercise knowledge and be without questions in order to have carryover after discharge from PT.   5. Decrease his max left hip pain to 1-2/10 in order to improve his quality of life.  6. Increase his FOTO function score to >=60% in order to improve his activity of daily living and household task ability. 12 weeks.            12 weeks.        12 weeks 5/20/2024            07/15/2024        07/15/2024    In Progress 7/29/2024            In Progress 7/29/2024        In Progress 7/29/2024        PLAN     Updated Plan of Care: 07/16/2024 to 08/16/2024 at 2x/wk x 4 weeks. Plan to continue to maximize his left hip strength and  flexibility in order to provide pain relief and to normalize his gait. Plan to continue to progress his home program as he tolerates.        Mata Alvarado, PT

## 2024-07-29 ENCOUNTER — CLINICAL SUPPORT (OUTPATIENT)
Dept: REHABILITATION | Facility: HOSPITAL | Age: 69
End: 2024-07-29
Payer: MEDICARE

## 2024-07-29 DIAGNOSIS — Z55.9 SPECIAL EDUCATIONAL NEEDS: ICD-10-CM

## 2024-07-29 DIAGNOSIS — R53.1 WEAKNESS: ICD-10-CM

## 2024-07-29 DIAGNOSIS — R26.2 DIFFICULTY WALKING: ICD-10-CM

## 2024-07-29 DIAGNOSIS — Z74.09 DECREASED FUNCTIONAL MOBILITY AND ENDURANCE: Primary | ICD-10-CM

## 2024-07-29 DIAGNOSIS — M25.552 LEFT HIP PAIN: ICD-10-CM

## 2024-07-29 PROCEDURE — 97112 NEUROMUSCULAR REEDUCATION: CPT | Mod: KX,PN

## 2024-07-29 PROCEDURE — 97110 THERAPEUTIC EXERCISES: CPT | Mod: KX,PN

## 2024-07-29 PROCEDURE — 97530 THERAPEUTIC ACTIVITIES: CPT | Mod: KX,PN

## 2024-07-29 SDOH — SOCIAL DETERMINANTS OF HEALTH (SDOH): PROBLEMS RELATED TO EDUCATION AND LITERACY, UNSPECIFIED: Z55.9

## 2024-08-01 ENCOUNTER — CLINICAL SUPPORT (OUTPATIENT)
Dept: REHABILITATION | Facility: HOSPITAL | Age: 69
End: 2024-08-01
Payer: MEDICARE

## 2024-08-01 DIAGNOSIS — M25.552 LEFT HIP PAIN: ICD-10-CM

## 2024-08-01 DIAGNOSIS — R53.1 WEAKNESS: ICD-10-CM

## 2024-08-01 DIAGNOSIS — R26.2 DIFFICULTY WALKING: ICD-10-CM

## 2024-08-01 DIAGNOSIS — Z74.09 DECREASED FUNCTIONAL MOBILITY AND ENDURANCE: Primary | ICD-10-CM

## 2024-08-01 DIAGNOSIS — S72.142A CLOSED INTERTROCHANTERIC FRACTURE OF HIP, LEFT, INITIAL ENCOUNTER: ICD-10-CM

## 2024-08-01 DIAGNOSIS — Z55.9 SPECIAL EDUCATIONAL NEEDS: ICD-10-CM

## 2024-08-01 PROCEDURE — 97530 THERAPEUTIC ACTIVITIES: CPT | Mod: KX,PN

## 2024-08-01 PROCEDURE — 97110 THERAPEUTIC EXERCISES: CPT | Mod: KX,PN

## 2024-08-01 SDOH — SOCIAL DETERMINANTS OF HEALTH (SDOH): PROBLEMS RELATED TO EDUCATION AND LITERACY, UNSPECIFIED: Z55.9

## 2024-08-01 NOTE — PROGRESS NOTES
"[facility]   Physical Therapy Treatment Note    Name: Sampson Holt  Clinic Number: [id number]    Therapy Diagnosis:   Encounter Diagnoses   Name Primary?    Left hip pain     Weakness     Difficulty walking     Decreased functional mobility and endurance Yes    Special educational needs     Closed intertrochanteric fracture of hip, left, initial encounter      Physician: Tarun Hdez MD    Visit Date: 8/1/2024  Physician Orders: PT Eval and Treat   Medical Diagnosis from Referral: S72.142A (ICD-10-CM) - Closed intertrochanteric fracture of hip, left, initial encounter  Evaluation Date: 5/20/2024  Authorization Period Expiration: 12/31/2024  Plan of Care Expiration: 08/16/2024 at 2x/wk x 4 weeks  Progress Note Due: 08/05/2024  (Discharge)  Visit # / Visits authorized: 16/20   (4/8 on final plan of care)   (17th total visit)   FOTO: 3/3                           +++++++++++++Do final FOTO at discharge+++++++++++     Time In: 12:39 pm   Time Out: 1:47 pm  Total Appointment Time (timed & untimed codes): 40 minutes (timed) (60 minutes total)     Precautions: Standard, Weightbearing, and surgical  SUBJECTIVE     Pt reports: "[facility]. They are running all kinds of blood tests. They are testing me for rheumatoid arthritis, tetanus, and all kinds of stuff. They are sending me to an orthopedist for my thumbs. My knees are painful. My left hip seems to be the only thing doing good." PT acknowledges.    He was compliant with his home exercise program.    Response to previous treatment: He reports that he was finally able to get some tests ran that may help decrease his overall pain.   Functional change: No changes to report since the last PT session.    Pain: 0/10 Upon entering the clinic this day.   Location: The left hip.  OBJECTIVE     Objective Measures updated at progress report unless specified.        Treatment     Sampson received the treatments listed below: " +ACT,Neuro, ex+  +++A PT tech under the direct supervision of this PT was used to help treat the patient for 20 minutes this day++                                                                                                 Sampson received therapeutic exercises to develop strength, endurance, ROM, flexibility, posture, and core stabilization for 15 minutes including:  -Red ball crunches with 2 second hold x 15  in all 3 directions   -ball squeeze with 2 second hold x 20  -bridges with adduction ball 2 x 10  -seated hip flexion 2 x 10 repetitions   -seated hip external rotation 2 x 10 repetitions    Below not performed this day:  -Bridging with green band abduction x 20  -supine bilateral green banded knee fallout 2 x 10 each  -supine green banded bilateral hip flexion 2 x 10 each  -Right side lying, left leg clamshell x 20  -Bilateral short arc quads with 3 pound weights x 4 minutes  -Bilateral long arc quads with 2 pound ankle weights x 20    Sampson participated in neuromuscular re-education activities to improve: Balance, Coordination, Kinesthetic, Sense, Proprioception, Muscle Recruitment, and Posture for 20 minutes. The following activities were included:  -Precor back machine with 70 pounds x 30  -Precor leg press with seat on 9 and 30 pounds 3 x 10  -Precor knee extension with 25 pounds x 30  -Precor knee flexion with 50 pounds x 30  -Precor hip abduction with 60 pounds x 30  -Precor hip adduction with 45 pounds x 30    Below not Performed this day:  -active assisted straight leg raise x 10  -prone left hip extension x 10  -Standing bilateral yellow banded hip abduction x 20 each leg  -Standing bilateral yellow banded hip extension x 20 each leg.    Sampson participated in dynamic functional therapeutic activities to improve functional performance for 25 minutes, including:  -Nu-step on Level 4 x 10 minutes   -+stepping over low hurdles with 1 pound ankle weights x 5 minutes  -Black band walking: forwards,  backwards, and bi-laterally x 3 minutes each direction.     Patient Education and Home Exercises     Home Exercises Provided and Patient Education Provided     Education provided:   -The patient is independent with his initial written home exercise program.   -06/17/2024 The patient received an updated written home exercise program this day. He returned demo to PT. He was without questions. He received bands to use at home.  -07/05/2024 The patient's written home exercise program was updated this day. He returned demo to PT. He received bands to update his home program resistance.   -+08/01/2024 The patient received updates to his home program this day. He returned demo to PT. He was without questions.    Written Home Exercises Provided: yes. Exercises were reviewed and Sampson was able to demonstrate them prior to the end of the session.  Sampson demonstrated good  understanding of the education provided. See EMR under Patient Instructions for exercises provided during therapy sessions.  ASSESSMENT     Sampson enters in good spirits. He had a good visit with his Dr concerning his other, unrelated complaints. His left hip is doing well. Dynamic standing activities was performed this day without upper extremity use. He had no loss of balance. His hip flexion was good enough to clear all the hurdles. He reported no left hip pain this day. He will soon be ready to continue with his exercises at home. He tolerated today's PT session well.      Sampson is progressing well towards his goals.   The patient's prognosis is Good.     The patient will continue to benefit from skilled outpatient physical therapy in order to address the deficits listed in the problem list box on the initial evaluation, provide patient/family education and to maximize the patient's level of independence in the home and in the community environment.     Pt's spiritual, cultural and educational needs considered and pt agreeable to plan of care and goals.      Anticipated barriers to physical therapy: his co-morbidities and compliance with attendance.     Goals:     STG  Weeks/Visits Date Established  Date Met   1.Decrease his max left hip pain to 6/10 in order to improve his quality of life. 4 weeks. 5/20/2024    Goal Met 8/1/2024  3/10   2. Increase his left hip strength a 1/2 grade in order to improve his weightbearing activity endurance with the least assistive device. 4 weeks. 5/20/2024    Goal Met 8/1/2024       3.Increase his FOTO function score to >=51% in order to improve his activity of daily living and household task ability.  4 weeks. 5/20/2024    Goal Met 8/1/2024  56%   4.Fair Initial written home exercise knowledge and be without questions in order to have carryover after discharge from PT.  4 weeks. 5/20/2024    Goal Met 8/1/2024        LTG Weeks/Visits Date Established  Date Met   1.Decrease his max left hip pain to 3/10 in order to improve his quality of life. 8 weeks. 5/20/2024    Goal Met 8/1/2024  3/10     2. Increase his left hip strength one grade from the evaluation date in order to improve his weightbearing activity endurance without an assistive device. 12 weeks. 5/20/2024       In Progress 8/1/2024     3.Increase his FOTO function score to >=56% in order to improve his activity of daily living and household task ability. 8 weeks. 5/20/2024    Goal Met 8/1/2024       4.Good Progressed written home exercise knowledge and be without questions in order to have carryover after discharge from PT.   5. Decrease his max left hip pain to 1-2/10 in order to improve his quality of life.  6. Increase his FOTO function score to >=60% in order to improve his activity of daily living and household task ability. 12 weeks.            12 weeks.        12 weeks 5/20/2024            07/15/2024        07/15/2024    In Progress 8/1/2024            In Progress 8/1/2024        In Progress 8/1/2024        PLAN     Updated Plan of Care: 07/16/2024 to 08/16/2024 at  2x/wk x 4 weeks. Plan to continue to maximize his left hip strength and flexibility in order to provide pain relief and to normalize his gait. Plan to continue to progress his home program as he tolerates. Plan to discharge the patient to his home program after the next PT session.       Mata Alvarado, PT

## 2024-08-05 ENCOUNTER — CLINICAL SUPPORT (OUTPATIENT)
Dept: REHABILITATION | Facility: HOSPITAL | Age: 69
End: 2024-08-05
Payer: MEDICARE

## 2024-08-05 DIAGNOSIS — Z74.09 DECREASED FUNCTIONAL MOBILITY AND ENDURANCE: ICD-10-CM

## 2024-08-05 DIAGNOSIS — R26.2 DIFFICULTY WALKING: ICD-10-CM

## 2024-08-05 DIAGNOSIS — Z55.9 SPECIAL EDUCATIONAL NEEDS: Primary | ICD-10-CM

## 2024-08-05 DIAGNOSIS — M25.552 LEFT HIP PAIN: ICD-10-CM

## 2024-08-05 DIAGNOSIS — R53.1 WEAKNESS: ICD-10-CM

## 2024-08-05 DIAGNOSIS — N64.52 NIPPLE DISCHARGE: Primary | ICD-10-CM

## 2024-08-05 PROCEDURE — 97112 NEUROMUSCULAR REEDUCATION: CPT | Mod: KX,PN

## 2024-08-05 PROCEDURE — 97530 THERAPEUTIC ACTIVITIES: CPT | Mod: KX,PN

## 2024-08-05 SDOH — SOCIAL DETERMINANTS OF HEALTH (SDOH): PROBLEMS RELATED TO EDUCATION AND LITERACY, UNSPECIFIED: Z55.9

## 2024-08-10 RX ORDER — ATORVASTATIN CALCIUM 40 MG/1
40 TABLET, FILM COATED ORAL DAILY
Qty: 90 TABLET | Refills: 3 | OUTPATIENT
Start: 2024-08-10 | End: 2025-08-10

## 2024-08-12 ENCOUNTER — OFFICE VISIT (OUTPATIENT)
Dept: ORTHOPEDICS | Facility: CLINIC | Age: 69
End: 2024-08-12
Payer: OTHER GOVERNMENT

## 2024-08-12 DIAGNOSIS — G56.22 ULNAR NEUROPATHY OF LEFT UPPER EXTREMITY: ICD-10-CM

## 2024-08-12 DIAGNOSIS — M72.0 DUPUYTREN'S CONTRACTURE: ICD-10-CM

## 2024-08-12 DIAGNOSIS — M65.312 TRIGGER FINGER OF LEFT THUMB: Primary | ICD-10-CM

## 2024-08-12 PROCEDURE — 99999 PR PBB SHADOW E&M-EST. PATIENT-LVL III: CPT | Mod: PBBFAC,,, | Performed by: ORTHOPAEDIC SURGERY

## 2024-08-12 PROCEDURE — 20550 NJX 1 TENDON SHEATH/LIGAMENT: CPT | Mod: PBBFAC,PO,LT | Performed by: ORTHOPAEDIC SURGERY

## 2024-08-12 PROCEDURE — 99204 OFFICE O/P NEW MOD 45 MIN: CPT | Mod: S$PBB,25,, | Performed by: ORTHOPAEDIC SURGERY

## 2024-08-12 PROCEDURE — 99213 OFFICE O/P EST LOW 20 MIN: CPT | Mod: PBBFAC,PO,25 | Performed by: ORTHOPAEDIC SURGERY

## 2024-08-12 PROCEDURE — 99999PBSHW PR PBB SHADOW TECHNICAL ONLY FILED TO HB: Mod: PBBFAC,,,

## 2024-08-12 RX ORDER — TRIAMCINOLONE ACETONIDE 40 MG/ML
40 INJECTION, SUSPENSION INTRA-ARTICULAR; INTRAMUSCULAR
Status: DISCONTINUED | OUTPATIENT
Start: 2024-08-12 | End: 2024-08-12 | Stop reason: HOSPADM

## 2024-08-12 RX ADMIN — TRIAMCINOLONE ACETONIDE 40 MG: 40 INJECTION, SUSPENSION INTRA-ARTICULAR; INTRAMUSCULAR at 08:08

## 2024-08-12 NOTE — PROCEDURES
Tendon Sheath    Date/Time: 8/12/2024 8:40 AM    Performed by: Andrea Cordova MD  Authorized by: Andrea Cordova MD    Consent Done?:  Yes (Verbal)  Indications:  Pain  Site marked: the procedure site was marked    Timeout: prior to procedure the correct patient, procedure, and site was verified    Prep: patient was prepped and draped in usual sterile fashion      Local anesthetic:  Lidocaine 1% without epinephrine  Anesthetic total (ml):  0.5    Location:  Thumb  Site:  L thumb flexor tendon sheath  Needle size:  25 G  Medications:  40 mg triamcinolone acetonide 40 mg/mL (20mg injected)  Patient tolerance:  Patient tolerated the procedure well with no immediate complications

## 2024-08-12 NOTE — PROGRESS NOTES
8/12/2024    Chief Complaint:  Chief Complaint   Patient presents with    Left Hand - Pain     Dupuytrens contracture,Thumb pain       HPI:  Sampson Holt is a 68 y.o. male, who presents to clinic today he was pain about the left thumb.  He complains of numbness to the ring and the small finger.  He also has a Dupuytren's cord in the palm of the hand.  He was here today for evaluation of all of those problems.    PMHX:  Past Medical History:   Diagnosis Date    Abdominal pain 2022    CHF (congestive heart failure)     Diabetes mellitus 2018    Emphysema lung     Endocarditis 2011    Hypertension     Stroke 2011    denies residual       PSHX:  Past Surgical History:   Procedure Laterality Date    ANGIOGRAM, CORONARY, WITH LEFT HEART CATHETERIZATION N/A 10/10/2023    Procedure: Angiogram, Coronary, with Left Heart Cath;  Surgeon: Dieudonne Ryan MD;  Location: [facility] CATH/EP LAB;  Service: Cardiology;  Laterality: N/A;    BACK SURGERY  1981    COLONOSCOPY N/A 2/23/2023    Procedure: COLONOSCOPY;  Surgeon: Jonn Cruz MD;  Location: [facility] ENDO;  Service: Endoscopy;  Laterality: N/A;    CORONARY ANGIOGRAPHY N/A 5/9/2023    Procedure: ANGIOGRAM, CORONARY ARTERY;  Surgeon: Antonio Kessler MD;  Location: [facility] CATH/EP LAB;  Service: Cardiology;  Laterality: N/A;    EXCISION OF HYDROCELE      x2    FRACTIONAL FLOW RESERVE (FFR), CORONARY  5/11/2023    Procedure: Fractional Flow [place] (FFR), Coronary;  Surgeon: Antonio Kessler MD;  Location: [facility] CATH/EP LAB;  Service: Cardiology;;    INGUINAL HERNIA REPAIR  1960    INSTANTANEOUS WAVE-FREE RATIO (IFR) N/A 10/10/2023    Procedure: Instantaneous Wave-Free Ratio (IFR);  Surgeon: Dieudonne Ryan MD;  Location: [facility] CATH/EP LAB;  Service: Cardiology;  Laterality: N/A;    INTRAMEDULLARY RODDING OF FEMUR Left 3/9/2024    Procedure: INSERTION, INTRAMEDULLARY HUI, FEMUR;  Surgeon: Tarun Hdez MD;  Location: [facility] OR;  Service: Orthopedics;  Laterality: Left;     INTRAVASCULAR ULTRASOUND, CORONARY N/A 5/9/2023    Procedure: Ultrasound-coronary;  Surgeon: Antonio Kessler MD;  Location: [facility] CATH/EP LAB;  Service: Cardiology;  Laterality: N/A;    IVUS, CORONARY  5/11/2023    Procedure: IVUS, Coronary;  Surgeon: Antonio Kessler MD;  Location: [facility] CATH/EP LAB;  Service: Cardiology;;    LEFT HEART CATHETERIZATION Left 5/11/2023    Procedure: Left heart cath;  Surgeon: Antonio Kessler MD;  Location: [facility] CATH/EP LAB;  Service: Cardiology;  Laterality: Left;    PERCUTANEOUS TRANSLUMINAL BALLOON ANGIOPLASTY OF CORONARY ARTERY  5/9/2023    Procedure: Angioplasty-coronary;  Surgeon: Antonio Kessler MD;  Location: [facility] CATH/EP LAB;  Service: Cardiology;;    RHINOPLASTY      STENT, DRUG ELUTING, SINGLE VESSEL, CORONARY  5/9/2023    Procedure: Stent, Drug Eluting, Single Vessel, Coronary;  Surgeon: Antonio Kessler MD;  Location: [facility] CATH/EP LAB;  Service: Cardiology;;    SURGICAL REMOVAL OF NODULE OF VOCAL CORD         FMHX:  No family history on file.    SOCHX:  Social History     Tobacco Use    Smoking status: Every Day     Types: Cigarettes    Smokeless tobacco: Former    Tobacco comments:     Pt ready to quit smoking and states he can on his own. Seen 10/9/23 for inpatient smoking cessation. [employer] Member.     Occasionally   Substance Use Topics    Alcohol use: Yes     Alcohol/week: 14.0 standard drinks of alcohol     Types: 14 Shots of liquor per week       ALLERGIES:  Amoxicillin    CURRENT MEDICATIONS:  Current Outpatient Medications on File Prior to Visit   Medication Sig Dispense Refill    acetaminophen (TYLENOL) 325 MG tablet Take 2 tablets (650 mg total) by mouth every 8 (eight) hours.  0    albuterol (PROVENTIL/VENTOLIN HFA) 90 mcg/actuation inhaler Inhale 2 puffs into the lungs every 6 (six) hours as needed for Wheezing. Rescue 18 g 6    amLODIPine (NORVASC) 5 MG tablet Take 1 tablet (5 mg total) by mouth once daily. 30 tablet 11    aspirin 81 MG Chew Take 1 tablet  (81 mg total) by mouth 2 (two) times a day for 24 days, THEN 1 tablet (81 mg total) once daily.  0    carvediloL (COREG) 3.125 MG tablet Take 2 tablets (6.25 mg total) by mouth 2 (two) times daily with meals.      cyclobenzaprine (FLEXERIL) 5 MG tablet Take 1 tablet (5 mg total) by mouth every 8 (eight) hours as needed for muscle spasms. 30 tablet 0    famotidine (PEPCID) 20 MG tablet Take 1 tablet (20 mg total) by mouth 2 (two) times daily. 60 tablet 11    HYDROcodone-acetaminophen (NORCO) 5-325 mg per tablet Take 1 tablet by mouth every 6 (six) hours as needed for Pain. 28 tablet 0    hydrOXYzine HCL (ATARAX) 50 MG tablet Take 1 tablet (50 mg total) by mouth 3 (three) times daily as needed for Anxiety.      insulin aspart U-100 (NOVOLOG) 100 unit/mL (3 mL) InPn pen Inject 0-10 Units into the skin before meals and at bedtime as needed (Hyperglycemia). **MODERATE CORRECTION DOSE**  Blood Glucose  mg/dL                  Pre-meal              151-200                2 units                     201-250                4 units                  251-300                6 units                      301-350                8 units                     >350                     10 units                  Administer subcutaneously if needed at times designated by monitoring  schedule.  0    insulin glargine 100 units/mL SubQ pen Inject 35 Units into the skin 2 (two) times a day.  0    isosorbide mononitrate (IMDUR) 30 MG 24 hr tablet       lactulose (CHRONULAC) 20 gram/30 mL Soln Take 30 mLs (20 g total) by mouth 3 (three) times daily as needed.      losartan (COZAAR) 50 MG tablet Take 2 tablets (100 mg total) by mouth once daily. 180 tablet 3    melatonin (MELATIN) 3 mg tablet Take 3 tablets (9 mg total) by mouth nightly as needed for Insomnia.  0    morphine (MSIR) 30 MG tablet Take 1 tablet (30 mg total) by mouth every 4 (four) hours as needed for Pain. 25 tablet 0    multivitamin Tab Take 1 tablet by mouth once daily.       nitroGLYCERIN (NITROSTAT) 0.4 MG SL tablet Place 1 tablet (0.4 mg total) under the tongue every 5 (five) minutes as needed for Chest pain. 25 tablet 5    ondansetron (ZOFRAN) 4 MG tablet Take 1 tablet (4 mg total) by mouth every 8 (eight) hours as needed for Nausea. 12 tablet 0    oxyCODONE-acetaminophen (PERCOCET) 7.5-325 mg per tablet Take 1 tablet by mouth every 6 (six) hours as needed for Pain. 28 tablet 0    polyethylene glycol (GLYCOLAX) 17 gram PwPk Take 17 g by mouth 2 (two) times daily.  0    senna-docusate 8.6-50 mg (PERICOLACE) 8.6-50 mg per tablet Take 1 tablet by mouth 2 (two) times daily.      traMADoL (ULTRAM) 50 mg tablet Take 1 tablet (50 mg total) by mouth every 6 (six) hours as needed for Pain. 28 tablet 0    traZODone (DESYREL) 100 MG tablet Take 1 tablet (100 mg total) by mouth nightly. For deperession 30 tablet 0    atorvastatin (LIPITOR) 40 MG tablet Take 1 tablet (40 mg total) by mouth once daily. 90 tablet 3    clopidogreL (PLAVIX) 75 mg tablet Take 1 tablet (75 mg total) by mouth once daily. 90 tablet 3    diazePAM (VALIUM) 5 MG tablet Take 1 tablet (5 mg total) by mouth 2 (two) times a day for 2 days, THEN 1 tablet (5 mg total) once daily for 2 days. Ending on 3/16. 6 tablet 0     No current facility-administered medications on file prior to visit.       REVIEW OF SYSTEMS:  Review of Systems   Constitutional: Negative.    HENT: Negative.     Eyes: Negative.    Respiratory: Negative.     Cardiovascular: Negative.    Gastrointestinal: Negative.    Genitourinary: Negative.    Musculoskeletal:  Positive for joint pain.   Skin: Negative.    Neurological:  Positive for weakness.   Endo/Heme/Allergies: Negative.    Psychiatric/Behavioral: Negative.       GENERAL PHYSICAL EXAM:   There were no vitals taken for this visit.   GEN: well developed, well nourished, no acute distress   HENT: Normocephalic, atraumatic   EYES: No discharge, conjunctiva normal   NECK: Supple, non-tender   PULM: No  wheezing, no respiratory distress   CV: RRR   ABD: Soft, non-tender    ORTHO EXAM:   Examination of the left hand reveals that there is no significant areas of edema.  He does have changes consistent with Dupuytren's disease in the palm.  There were no other significant skin changes.  Palpation about the hand produces decreased sensation in the ulnar distribution with intact median and radial sensation.  He was pain overlying the A1 pulley of the thumb with active triggering upon flexion and extension.  There was no significant contracture noted from the Dupuytren's cord.  He does have capillary refill less than 2 seconds in all the digits.    RADIOLOGY:   None    ASSESSMENT:   Left thumb triggering, left ulnar neuropathy, left hand Dupuytren's disease    PLAN:  1. After consent was obtained injection was placed to the left thumb flexor tendon sheath     2. Will send him for an EMG and nerve conduction study to assess the ulnar neuropathy     3.  We will continue to monitor the Dupuytren's as he has no contracture at this time     4.  Will follow up with me as soon as the nerve conduction study is completed

## 2024-08-13 RX ORDER — ATORVASTATIN CALCIUM 40 MG/1
40 TABLET, FILM COATED ORAL DAILY
Qty: 90 TABLET | Refills: 3 | OUTPATIENT
Start: 2024-08-13 | End: 2025-08-13

## 2024-08-16 ENCOUNTER — DOCUMENTATION ONLY (OUTPATIENT)
Dept: REHABILITATION | Facility: HOSPITAL | Age: 69
End: 2024-08-16
Payer: MEDICARE

## 2024-08-16 NOTE — PROGRESS NOTES
"The patient has been discharged from skilled PT. However, he called this PT today with concerns. He reports that he can not be seen by an [facility] rheumatologist until October of this year. He reports that [facility] no longer practices. "The pain is all over. It's my shoulders and knees and hands and thumbs." He reports that he could not get out of bed yesterday. He is concerned as her reports that he did not have these complaints before his hip surgery. "I am not good with technology, and trying to do all this on the computer is tough for me. I live alone. I called the head of [facility], and I am waiting for him to call me back. I can't wait until October." PT suggested that he go to the ER. However, he declined. "They don't have a rheumatologist in the ER." PT informed the patient that he would document these complaints and put in his chart. PT is hopeful that it will help him in some way. The patient verbally acknowledged and thanked PT.     Mata Alvarado, PT     "

## 2024-09-06 ENCOUNTER — HOSPITAL ENCOUNTER (INPATIENT)
Facility: HOSPITAL | Age: 69
LOS: 4 days | Discharge: HOME | DRG: 194 | End: 2024-09-10
Attending: EMERGENCY MEDICINE | Admitting: STUDENT IN AN ORGANIZED HEALTH CARE EDUCATION/TRAINING PROGRAM
Payer: OTHER GOVERNMENT

## 2024-09-06 DIAGNOSIS — R07.9 CHEST PAIN: ICD-10-CM

## 2024-09-06 DIAGNOSIS — J18.9 PNEUMONIA OF BOTH LUNGS DUE TO INFECTIOUS ORGANISM, UNSPECIFIED PART OF LUNG: Primary | ICD-10-CM

## 2024-09-06 DIAGNOSIS — R53.1 WEAKNESS: ICD-10-CM

## 2024-09-06 DIAGNOSIS — J44.9 CHRONIC OBSTRUCTIVE PULMONARY DISEASE, UNSPECIFIED COPD TYPE: ICD-10-CM

## 2024-09-06 LAB
ALBUMIN SERPL BCP-MCNC: 4.5 G/DL (ref 3.5–5.2)
ALP SERPL-CCNC: 77 U/L (ref 55–135)
ALT SERPL W/O P-5'-P-CCNC: 9 U/L (ref 10–44)
ANION GAP SERPL CALC-SCNC: 14 MMOL/L (ref 8–16)
AST SERPL-CCNC: 12 U/L (ref 10–40)
BASOPHILS # BLD AUTO: 0.06 K/UL (ref 0–0.2)
BASOPHILS NFR BLD: 0.7 % (ref 0–1.9)
BILIRUB SERPL-MCNC: 0.4 MG/DL (ref 0.1–1)
BILIRUB UR QL STRIP: NEGATIVE
BNP SERPL-MCNC: 75 PG/ML (ref 0–99)
BUN SERPL-MCNC: 13 MG/DL (ref 8–23)
CALCIUM SERPL-MCNC: 9.5 MG/DL (ref 8.7–10.5)
CHLORIDE SERPL-SCNC: 103 MMOL/L (ref 95–110)
CLARITY UR: CLEAR
CO2 SERPL-SCNC: 21 MMOL/L (ref 23–29)
COLOR UR: YELLOW
CREAT SERPL-MCNC: 0.9 MG/DL (ref 0.5–1.4)
DIFFERENTIAL METHOD BLD: ABNORMAL
EOSINOPHIL # BLD AUTO: 0.1 K/UL (ref 0–0.5)
EOSINOPHIL NFR BLD: 0.8 % (ref 0–8)
ERYTHROCYTE [DISTWIDTH] IN BLOOD BY AUTOMATED COUNT: 13.5 % (ref 11.5–14.5)
EST. GFR  (NO RACE VARIABLE): >60 ML/MIN/1.73 M^2
GLUCOSE SERPL-MCNC: 198 MG/DL (ref 70–110)
GLUCOSE UR QL STRIP: NEGATIVE
HCT VFR BLD AUTO: 42.1 % (ref 40–54)
HGB BLD-MCNC: 14.5 G/DL (ref 14–18)
HGB UR QL STRIP: NEGATIVE
IMM GRANULOCYTES # BLD AUTO: 0.04 K/UL (ref 0–0.04)
IMM GRANULOCYTES NFR BLD AUTO: 0.5 % (ref 0–0.5)
INFLUENZA A, MOLECULAR: NEGATIVE
INFLUENZA B, MOLECULAR: NEGATIVE
KETONES UR QL STRIP: NEGATIVE
LDH SERPL L TO P-CCNC: 3.05 MMOL/L (ref 0.5–2.2)
LEUKOCYTE ESTERASE UR QL STRIP: NEGATIVE
LIPASE SERPL-CCNC: 18 U/L (ref 4–60)
LYMPHOCYTES # BLD AUTO: 1.5 K/UL (ref 1–4.8)
LYMPHOCYTES NFR BLD: 18 % (ref 18–48)
MCH RBC QN AUTO: 32.2 PG (ref 27–31)
MCHC RBC AUTO-ENTMCNC: 34.4 G/DL (ref 32–36)
MCV RBC AUTO: 94 FL (ref 82–98)
MONOCYTES # BLD AUTO: 0.8 K/UL (ref 0.3–1)
MONOCYTES NFR BLD: 9.7 % (ref 4–15)
NEUTROPHILS # BLD AUTO: 5.8 K/UL (ref 1.8–7.7)
NEUTROPHILS NFR BLD: 70.3 % (ref 38–73)
NITRITE UR QL STRIP: NEGATIVE
NRBC BLD-RTO: 0 /100 WBC
PH UR STRIP: 6 [PH] (ref 5–8)
PLATELET # BLD AUTO: 259 K/UL (ref 150–450)
PMV BLD AUTO: 8.9 FL (ref 9.2–12.9)
POTASSIUM SERPL-SCNC: 3.9 MMOL/L (ref 3.5–5.1)
PROT SERPL-MCNC: 7.7 G/DL (ref 6–8.4)
PROT UR QL STRIP: ABNORMAL
RBC # BLD AUTO: 4.5 M/UL (ref 4.6–6.2)
SAMPLE: ABNORMAL
SARS-COV-2 RDRP RESP QL NAA+PROBE: NEGATIVE
SODIUM SERPL-SCNC: 138 MMOL/L (ref 136–145)
SP GR UR STRIP: 1.02 (ref 1–1.03)
SPECIMEN SOURCE: NORMAL
TROPONIN I SERPL HS-MCNC: 7.7 PG/ML (ref 0–14.9)
TSH SERPL DL<=0.005 MIU/L-ACNC: 1.4 UIU/ML (ref 0.34–5.6)
URN SPEC COLLECT METH UR: ABNORMAL
UROBILINOGEN UR STRIP-ACNC: ABNORMAL EU/DL
WBC # BLD AUTO: 8.24 K/UL (ref 3.9–12.7)

## 2024-09-06 PROCEDURE — 83690 ASSAY OF LIPASE: CPT | Performed by: EMERGENCY MEDICINE

## 2024-09-06 PROCEDURE — 96365 THER/PROPH/DIAG IV INF INIT: CPT

## 2024-09-06 PROCEDURE — 11000001 HC ACUTE MED/SURG PRIVATE ROOM

## 2024-09-06 PROCEDURE — U0002 COVID-19 LAB TEST NON-CDC: HCPCS | Performed by: EMERGENCY MEDICINE

## 2024-09-06 PROCEDURE — 27000207 HC ISOLATION

## 2024-09-06 PROCEDURE — 84443 ASSAY THYROID STIM HORMONE: CPT | Performed by: EMERGENCY MEDICINE

## 2024-09-06 PROCEDURE — 36415 COLL VENOUS BLD VENIPUNCTURE: CPT | Performed by: EMERGENCY MEDICINE

## 2024-09-06 PROCEDURE — 84484 ASSAY OF TROPONIN QUANT: CPT | Performed by: EMERGENCY MEDICINE

## 2024-09-06 PROCEDURE — 85025 COMPLETE CBC W/AUTO DIFF WBC: CPT | Performed by: EMERGENCY MEDICINE

## 2024-09-06 PROCEDURE — 81003 URINALYSIS AUTO W/O SCOPE: CPT | Performed by: EMERGENCY MEDICINE

## 2024-09-06 PROCEDURE — 93010 ELECTROCARDIOGRAM REPORT: CPT | Mod: 76,,, | Performed by: INTERNAL MEDICINE

## 2024-09-06 PROCEDURE — 99285 EMERGENCY DEPT VISIT HI MDM: CPT | Mod: 25

## 2024-09-06 PROCEDURE — 93005 ELECTROCARDIOGRAM TRACING: CPT | Performed by: INTERNAL MEDICINE

## 2024-09-06 PROCEDURE — 96366 THER/PROPH/DIAG IV INF ADDON: CPT

## 2024-09-06 PROCEDURE — 80053 COMPREHEN METABOLIC PANEL: CPT | Performed by: EMERGENCY MEDICINE

## 2024-09-06 PROCEDURE — 87502 INFLUENZA DNA AMP PROBE: CPT | Performed by: EMERGENCY MEDICINE

## 2024-09-06 PROCEDURE — 93010 ELECTROCARDIOGRAM REPORT: CPT | Mod: ,,, | Performed by: INTERNAL MEDICINE

## 2024-09-06 PROCEDURE — 87040 BLOOD CULTURE FOR BACTERIA: CPT | Mod: 59 | Performed by: EMERGENCY MEDICINE

## 2024-09-06 PROCEDURE — 63600175 PHARM REV CODE 636 W HCPCS: Performed by: EMERGENCY MEDICINE

## 2024-09-06 PROCEDURE — 83880 ASSAY OF NATRIURETIC PEPTIDE: CPT | Performed by: EMERGENCY MEDICINE

## 2024-09-06 RX ORDER — GABAPENTIN 300 MG/1
300 CAPSULE ORAL
COMMUNITY
Start: 2024-04-08

## 2024-09-06 RX ORDER — LEVOFLOXACIN 5 MG/ML
750 INJECTION, SOLUTION INTRAVENOUS
Status: COMPLETED | OUTPATIENT
Start: 2024-09-06 | End: 2024-09-07

## 2024-09-06 RX ORDER — SIMETHICONE 80 MG
1 TABLET,CHEWABLE ORAL 4 TIMES DAILY PRN
Status: DISCONTINUED | OUTPATIENT
Start: 2024-09-07 | End: 2024-09-10 | Stop reason: HOSPADM

## 2024-09-06 RX ORDER — INSULIN GLARGINE-YFGN 100 [IU]/ML
INJECTION, SOLUTION SUBCUTANEOUS
Status: ON HOLD | COMMUNITY
Start: 2024-03-27 | End: 2024-09-07

## 2024-09-06 RX ORDER — LANOLIN ALCOHOL/MO/W.PET/CERES
800 CREAM (GRAM) TOPICAL
Status: DISCONTINUED | OUTPATIENT
Start: 2024-09-07 | End: 2024-09-10 | Stop reason: HOSPADM

## 2024-09-06 RX ORDER — TAMSULOSIN HYDROCHLORIDE 0.4 MG/1
0.4 CAPSULE ORAL
COMMUNITY
Start: 2024-04-09

## 2024-09-06 RX ORDER — SODIUM,POTASSIUM PHOSPHATES 280-250MG
2 POWDER IN PACKET (EA) ORAL
Status: DISCONTINUED | OUTPATIENT
Start: 2024-09-07 | End: 2024-09-10 | Stop reason: HOSPADM

## 2024-09-06 RX ORDER — AMOXICILLIN 250 MG
1 CAPSULE ORAL 2 TIMES DAILY
Status: DISCONTINUED | OUTPATIENT
Start: 2024-09-07 | End: 2024-09-10 | Stop reason: HOSPADM

## 2024-09-06 RX ORDER — IBUPROFEN 200 MG
24 TABLET ORAL
Status: DISCONTINUED | OUTPATIENT
Start: 2024-09-07 | End: 2024-09-10 | Stop reason: HOSPADM

## 2024-09-06 RX ORDER — ACETAMINOPHEN 325 MG/1
650 TABLET ORAL EVERY 4 HOURS PRN
Status: DISCONTINUED | OUTPATIENT
Start: 2024-09-07 | End: 2024-09-10 | Stop reason: HOSPADM

## 2024-09-06 RX ORDER — ALUMINUM HYDROXIDE, MAGNESIUM HYDROXIDE, AND SIMETHICONE 1200; 120; 1200 MG/30ML; MG/30ML; MG/30ML
30 SUSPENSION ORAL 4 TIMES DAILY PRN
Status: DISCONTINUED | OUTPATIENT
Start: 2024-09-07 | End: 2024-09-10 | Stop reason: HOSPADM

## 2024-09-06 RX ORDER — GLUCAGON 1 MG
1 KIT INJECTION
Status: DISCONTINUED | OUTPATIENT
Start: 2024-09-07 | End: 2024-09-10 | Stop reason: HOSPADM

## 2024-09-06 RX ORDER — IPRATROPIUM BROMIDE 0.5 MG/2.5ML
1 SOLUTION RESPIRATORY (INHALATION)
Status: COMPLETED | OUTPATIENT
Start: 2024-09-06 | End: 2024-09-07

## 2024-09-06 RX ORDER — NALOXONE HCL 0.4 MG/ML
0.02 VIAL (ML) INJECTION
Status: DISCONTINUED | OUTPATIENT
Start: 2024-09-07 | End: 2024-09-10 | Stop reason: HOSPADM

## 2024-09-06 RX ORDER — LEVALBUTEROL INHALATION SOLUTION 1.25 MG/3ML
3.75 SOLUTION RESPIRATORY (INHALATION)
Status: COMPLETED | OUTPATIENT
Start: 2024-09-06 | End: 2024-09-07

## 2024-09-06 RX ORDER — ROSUVASTATIN CALCIUM 10 MG/1
5 TABLET, COATED ORAL
Status: ON HOLD | COMMUNITY
Start: 2024-02-07 | End: 2024-09-07

## 2024-09-06 RX ORDER — IBUPROFEN 200 MG
16 TABLET ORAL
Status: DISCONTINUED | OUTPATIENT
Start: 2024-09-07 | End: 2024-09-10 | Stop reason: HOSPADM

## 2024-09-06 RX ORDER — ACETAMINOPHEN 325 MG/1
650 TABLET ORAL EVERY 8 HOURS PRN
Status: DISCONTINUED | OUTPATIENT
Start: 2024-09-07 | End: 2024-09-07

## 2024-09-06 RX ORDER — SODIUM CHLORIDE 0.9 % (FLUSH) 0.9 %
3 SYRINGE (ML) INJECTION EVERY 12 HOURS PRN
Status: DISCONTINUED | OUTPATIENT
Start: 2024-09-07 | End: 2024-09-10 | Stop reason: HOSPADM

## 2024-09-06 RX ORDER — LOSARTAN POTASSIUM 100 MG/1
100 TABLET ORAL
COMMUNITY

## 2024-09-06 RX ORDER — ONDANSETRON HYDROCHLORIDE 2 MG/ML
8 INJECTION, SOLUTION INTRAVENOUS EVERY 6 HOURS PRN
Status: DISCONTINUED | OUTPATIENT
Start: 2024-09-07 | End: 2024-09-10 | Stop reason: HOSPADM

## 2024-09-06 RX ORDER — TALC
9 POWDER (GRAM) TOPICAL NIGHTLY PRN
Status: DISCONTINUED | OUTPATIENT
Start: 2024-09-07 | End: 2024-09-10 | Stop reason: HOSPADM

## 2024-09-06 RX ADMIN — LEVOFLOXACIN 750 MG: 5 INJECTION, SOLUTION INTRAVENOUS at 10:09

## 2024-09-06 NOTE — Clinical Note
Diagnosis: Weakness [[id number]]   Future Attending Provider: YANNA BEAVERS [[id number]]   Admit to which facility:: [facility] [[id number]]   Reason for IP Medical Treatment  (Clinical interventions that can only be accomplished in the IP setting? ) :: pneumonia   Plans for Post-Acute care--if anticipated (pick the single best option):: A. No post acute care anticipated at this time   Special Needs:: No Special Needs [1]

## 2024-09-07 PROBLEM — N40.1 LOWER URINARY TRACT SYMPTOMS DUE TO BENIGN PROSTATIC HYPERPLASIA: Status: ACTIVE | Noted: 2024-02-02

## 2024-09-07 PROBLEM — J18.9 PNEUMONIA OF BOTH LOWER LOBES: Status: ACTIVE | Noted: 2024-09-07

## 2024-09-07 LAB
ADENOVIRUS: NOT DETECTED
ALBUMIN SERPL BCP-MCNC: 3.4 G/DL (ref 3.5–5.2)
ALP SERPL-CCNC: 75 U/L (ref 55–135)
ALT SERPL W/O P-5'-P-CCNC: 8 U/L (ref 10–44)
ANION GAP SERPL CALC-SCNC: 10 MMOL/L (ref 8–16)
AST SERPL-CCNC: 9 U/L (ref 10–40)
BASOPHILS # BLD AUTO: 0.06 K/UL (ref 0–0.2)
BASOPHILS NFR BLD: 0.8 % (ref 0–1.9)
BILIRUB SERPL-MCNC: 0.4 MG/DL (ref 0.1–1)
BORDETELLA PARAPERTUSSIS (IS1001): NOT DETECTED
BORDETELLA PERTUSSIS (PTXP): NOT DETECTED
BUN SERPL-MCNC: 14 MG/DL (ref 8–23)
CALCIUM SERPL-MCNC: 9.1 MG/DL (ref 8.7–10.5)
CHLAMYDIA PNEUMONIAE: NOT DETECTED
CHLORIDE SERPL-SCNC: 106 MMOL/L (ref 95–110)
CO2 SERPL-SCNC: 22 MMOL/L (ref 23–29)
CORONAVIRUS 229E, COMMON COLD VIRUS: NOT DETECTED
CORONAVIRUS HKU1, COMMON COLD VIRUS: NOT DETECTED
CORONAVIRUS NL63, COMMON COLD VIRUS: NOT DETECTED
CORONAVIRUS OC43, COMMON COLD VIRUS: NOT DETECTED
CREAT SERPL-MCNC: 0.9 MG/DL (ref 0.5–1.4)
DIFFERENTIAL METHOD BLD: ABNORMAL
EOSINOPHIL # BLD AUTO: 0.1 K/UL (ref 0–0.5)
EOSINOPHIL NFR BLD: 0.7 % (ref 0–8)
ERYTHROCYTE [DISTWIDTH] IN BLOOD BY AUTOMATED COUNT: 13.4 % (ref 11.5–14.5)
EST. GFR  (NO RACE VARIABLE): >60 ML/MIN/1.73 M^2
ESTIMATED AVG GLUCOSE: 131 MG/DL (ref 68–131)
FLUBV RNA NPH QL NAA+NON-PROBE: NOT DETECTED
GLUCOSE SERPL-MCNC: 167 MG/DL (ref 70–110)
HBA1C MFR BLD: 6.2 % (ref 4.5–6.2)
HCT VFR BLD AUTO: 36.9 % (ref 40–54)
HGB BLD-MCNC: 12.5 G/DL (ref 14–18)
HPIV1 RNA NPH QL NAA+NON-PROBE: NOT DETECTED
HPIV2 RNA NPH QL NAA+NON-PROBE: NOT DETECTED
HPIV3 RNA NPH QL NAA+NON-PROBE: NOT DETECTED
HPIV4 RNA NPH QL NAA+NON-PROBE: NOT DETECTED
HUMAN METAPNEUMOVIRUS: NOT DETECTED
IMM GRANULOCYTES # BLD AUTO: 0.04 K/UL (ref 0–0.04)
IMM GRANULOCYTES NFR BLD AUTO: 0.5 % (ref 0–0.5)
INFLUENZA A (SUBTYPES H1,H1-2009,H3): NOT DETECTED
LACTATE SERPL-SCNC: 1.7 MMOL/L (ref 0.5–2.2)
LYMPHOCYTES # BLD AUTO: 1.4 K/UL (ref 1–4.8)
LYMPHOCYTES NFR BLD: 18.7 % (ref 18–48)
MAGNESIUM SERPL-MCNC: 1.9 MG/DL (ref 1.6–2.6)
MCH RBC QN AUTO: 31.9 PG (ref 27–31)
MCHC RBC AUTO-ENTMCNC: 33.9 G/DL (ref 32–36)
MCV RBC AUTO: 94 FL (ref 82–98)
MONOCYTES # BLD AUTO: 0.7 K/UL (ref 0.3–1)
MONOCYTES NFR BLD: 9.3 % (ref 4–15)
MYCOPLASMA PNEUMONIAE: NOT DETECTED
NEUTROPHILS # BLD AUTO: 5.2 K/UL (ref 1.8–7.7)
NEUTROPHILS NFR BLD: 70 % (ref 38–73)
NRBC BLD-RTO: 0 /100 WBC
PHOSPHATE SERPL-MCNC: 3 MG/DL (ref 2.7–4.5)
PLATELET # BLD AUTO: 198 K/UL (ref 150–450)
PMV BLD AUTO: 8.7 FL (ref 9.2–12.9)
POCT GLUCOSE: 120 MG/DL (ref 70–110)
POCT GLUCOSE: 126 MG/DL (ref 70–110)
POCT GLUCOSE: 182 MG/DL (ref 70–110)
POCT GLUCOSE: 246 MG/DL (ref 70–110)
POCT GLUCOSE: 376 MG/DL (ref 70–110)
POTASSIUM SERPL-SCNC: 3.7 MMOL/L (ref 3.5–5.1)
PROCALCITONIN SERPL IA-MCNC: 0.05 NG/ML (ref 0–0.5)
PROT SERPL-MCNC: 6.6 G/DL (ref 6–8.4)
RBC # BLD AUTO: 3.92 M/UL (ref 4.6–6.2)
RESPIRATORY INFECTION PANEL SOURCE: NORMAL
RSV RNA NPH QL NAA+NON-PROBE: NOT DETECTED
RV+EV RNA NPH QL NAA+NON-PROBE: NOT DETECTED
SARS-COV-2 RNA RESP QL NAA+PROBE: NOT DETECTED
SODIUM SERPL-SCNC: 138 MMOL/L (ref 136–145)
WBC # BLD AUTO: 7.44 K/UL (ref 3.9–12.7)

## 2024-09-07 PROCEDURE — 99900035 HC TECH TIME PER 15 MIN (STAT)

## 2024-09-07 PROCEDURE — 94640 AIRWAY INHALATION TREATMENT: CPT

## 2024-09-07 PROCEDURE — 63600175 PHARM REV CODE 636 W HCPCS: Performed by: STUDENT IN AN ORGANIZED HEALTH CARE EDUCATION/TRAINING PROGRAM

## 2024-09-07 PROCEDURE — 87633 RESP VIRUS 12-25 TARGETS: CPT | Performed by: STUDENT IN AN ORGANIZED HEALTH CARE EDUCATION/TRAINING PROGRAM

## 2024-09-07 PROCEDURE — 63700000 PHARM REV CODE 250 ALT 637 W/O HCPCS: Performed by: STUDENT IN AN ORGANIZED HEALTH CARE EDUCATION/TRAINING PROGRAM

## 2024-09-07 PROCEDURE — 83735 ASSAY OF MAGNESIUM: CPT | Performed by: NURSE PRACTITIONER

## 2024-09-07 PROCEDURE — 63600175 PHARM REV CODE 636 W HCPCS: Performed by: NURSE PRACTITIONER

## 2024-09-07 PROCEDURE — 84145 PROCALCITONIN (PCT): CPT | Performed by: STUDENT IN AN ORGANIZED HEALTH CARE EDUCATION/TRAINING PROGRAM

## 2024-09-07 PROCEDURE — 83036 HEMOGLOBIN GLYCOSYLATED A1C: CPT | Performed by: NURSE PRACTITIONER

## 2024-09-07 PROCEDURE — 94799 UNLISTED PULMONARY SVC/PX: CPT

## 2024-09-07 PROCEDURE — 99900031 HC PATIENT EDUCATION (STAT)

## 2024-09-07 PROCEDURE — 25000242 PHARM REV CODE 250 ALT 637 W/ HCPCS: Performed by: STUDENT IN AN ORGANIZED HEALTH CARE EDUCATION/TRAINING PROGRAM

## 2024-09-07 PROCEDURE — 25000242 PHARM REV CODE 250 ALT 637 W/ HCPCS: Performed by: EMERGENCY MEDICINE

## 2024-09-07 PROCEDURE — 85025 COMPLETE CBC W/AUTO DIFF WBC: CPT | Performed by: NURSE PRACTITIONER

## 2024-09-07 PROCEDURE — 11000001 HC ACUTE MED/SURG PRIVATE ROOM

## 2024-09-07 PROCEDURE — 83605 ASSAY OF LACTIC ACID: CPT | Performed by: NURSE PRACTITIONER

## 2024-09-07 PROCEDURE — 36415 COLL VENOUS BLD VENIPUNCTURE: CPT | Performed by: STUDENT IN AN ORGANIZED HEALTH CARE EDUCATION/TRAINING PROGRAM

## 2024-09-07 PROCEDURE — 25000003 PHARM REV CODE 250: Performed by: NURSE PRACTITIONER

## 2024-09-07 PROCEDURE — 94760 N-INVAS EAR/PLS OXIMETRY 1: CPT

## 2024-09-07 PROCEDURE — 94761 N-INVAS EAR/PLS OXIMETRY MLT: CPT

## 2024-09-07 PROCEDURE — 25000003 PHARM REV CODE 250: Performed by: STUDENT IN AN ORGANIZED HEALTH CARE EDUCATION/TRAINING PROGRAM

## 2024-09-07 PROCEDURE — 84100 ASSAY OF PHOSPHORUS: CPT | Performed by: NURSE PRACTITIONER

## 2024-09-07 PROCEDURE — 80053 COMPREHEN METABOLIC PANEL: CPT | Performed by: NURSE PRACTITIONER

## 2024-09-07 PROCEDURE — S4991 NICOTINE PATCH NONLEGEND: HCPCS | Performed by: STUDENT IN AN ORGANIZED HEALTH CARE EDUCATION/TRAINING PROGRAM

## 2024-09-07 RX ORDER — PREDNISONE 20 MG/1
40 TABLET ORAL DAILY
Status: DISCONTINUED | OUTPATIENT
Start: 2024-09-07 | End: 2024-09-10 | Stop reason: HOSPADM

## 2024-09-07 RX ORDER — IBUPROFEN 200 MG
16 TABLET ORAL
Status: DISCONTINUED | OUTPATIENT
Start: 2024-09-07 | End: 2024-09-10 | Stop reason: HOSPADM

## 2024-09-07 RX ORDER — IPRATROPIUM BROMIDE AND ALBUTEROL SULFATE 2.5; .5 MG/3ML; MG/3ML
3 SOLUTION RESPIRATORY (INHALATION) EVERY 6 HOURS
Status: DISCONTINUED | OUTPATIENT
Start: 2024-09-07 | End: 2024-09-09

## 2024-09-07 RX ORDER — TAMSULOSIN HYDROCHLORIDE 0.4 MG/1
0.4 CAPSULE ORAL DAILY
Status: DISCONTINUED | OUTPATIENT
Start: 2024-09-07 | End: 2024-09-10 | Stop reason: HOSPADM

## 2024-09-07 RX ORDER — FAMOTIDINE 20 MG/1
20 TABLET, FILM COATED ORAL 2 TIMES DAILY
Status: DISCONTINUED | OUTPATIENT
Start: 2024-09-07 | End: 2024-09-10 | Stop reason: HOSPADM

## 2024-09-07 RX ORDER — SODIUM CHLORIDE, SODIUM LACTATE, POTASSIUM CHLORIDE, CALCIUM CHLORIDE 600; 310; 30; 20 MG/100ML; MG/100ML; MG/100ML; MG/100ML
INJECTION, SOLUTION INTRAVENOUS CONTINUOUS
Status: DISCONTINUED | OUTPATIENT
Start: 2024-09-07 | End: 2024-09-07

## 2024-09-07 RX ORDER — LEVOFLOXACIN 5 MG/ML
750 INJECTION, SOLUTION INTRAVENOUS
Status: DISCONTINUED | OUTPATIENT
Start: 2024-09-07 | End: 2024-09-07

## 2024-09-07 RX ORDER — GABAPENTIN 300 MG/1
300 CAPSULE ORAL 2 TIMES DAILY
Status: DISCONTINUED | OUTPATIENT
Start: 2024-09-07 | End: 2024-09-10 | Stop reason: HOSPADM

## 2024-09-07 RX ORDER — IBUPROFEN 200 MG
24 TABLET ORAL
Status: DISCONTINUED | OUTPATIENT
Start: 2024-09-07 | End: 2024-09-10 | Stop reason: HOSPADM

## 2024-09-07 RX ORDER — CLOPIDOGREL BISULFATE 75 MG/1
75 TABLET ORAL DAILY
Status: DISCONTINUED | OUTPATIENT
Start: 2024-09-07 | End: 2024-09-10 | Stop reason: HOSPADM

## 2024-09-07 RX ORDER — INSULIN ASPART 100 [IU]/ML
0-10 INJECTION, SOLUTION INTRAVENOUS; SUBCUTANEOUS
Status: DISCONTINUED | OUTPATIENT
Start: 2024-09-07 | End: 2024-09-10 | Stop reason: HOSPADM

## 2024-09-07 RX ORDER — ATORVASTATIN CALCIUM 40 MG/1
40 TABLET, FILM COATED ORAL DAILY
Status: DISCONTINUED | OUTPATIENT
Start: 2024-09-07 | End: 2024-09-10 | Stop reason: HOSPADM

## 2024-09-07 RX ORDER — AMLODIPINE BESYLATE 5 MG/1
5 TABLET ORAL DAILY
Status: DISCONTINUED | OUTPATIENT
Start: 2024-09-07 | End: 2024-09-10 | Stop reason: HOSPADM

## 2024-09-07 RX ORDER — GLUCAGON 1 MG
1 KIT INJECTION
Status: DISCONTINUED | OUTPATIENT
Start: 2024-09-07 | End: 2024-09-10 | Stop reason: HOSPADM

## 2024-09-07 RX ORDER — CARVEDILOL 6.25 MG/1
6.25 TABLET ORAL 2 TIMES DAILY WITH MEALS
Status: DISCONTINUED | OUTPATIENT
Start: 2024-09-07 | End: 2024-09-10 | Stop reason: HOSPADM

## 2024-09-07 RX ORDER — AZITHROMYCIN 250 MG/1
500 TABLET, FILM COATED ORAL DAILY
Status: DISCONTINUED | OUTPATIENT
Start: 2024-09-07 | End: 2024-09-10 | Stop reason: HOSPADM

## 2024-09-07 RX ORDER — IBUPROFEN 200 MG
1 TABLET ORAL DAILY
Status: DISCONTINUED | OUTPATIENT
Start: 2024-09-07 | End: 2024-09-10 | Stop reason: HOSPADM

## 2024-09-07 RX ORDER — IPRATROPIUM BROMIDE AND ALBUTEROL SULFATE 2.5; .5 MG/3ML; MG/3ML
3 SOLUTION RESPIRATORY (INHALATION) EVERY 6 HOURS PRN
Status: DISCONTINUED | OUTPATIENT
Start: 2024-09-07 | End: 2024-09-10 | Stop reason: HOSPADM

## 2024-09-07 RX ORDER — NAPROXEN SODIUM 220 MG/1
81 TABLET, FILM COATED ORAL DAILY
Status: DISCONTINUED | OUTPATIENT
Start: 2024-09-07 | End: 2024-09-10 | Stop reason: HOSPADM

## 2024-09-07 RX ORDER — HYDROCODONE BITARTRATE AND ACETAMINOPHEN 7.5; 325 MG/1; MG/1
1 TABLET ORAL EVERY 6 HOURS PRN
Status: DISCONTINUED | OUTPATIENT
Start: 2024-09-07 | End: 2024-09-10 | Stop reason: HOSPADM

## 2024-09-07 RX ORDER — LOSARTAN POTASSIUM 100 MG/1
100 TABLET ORAL DAILY
Status: DISCONTINUED | OUTPATIENT
Start: 2024-09-07 | End: 2024-09-10 | Stop reason: HOSPADM

## 2024-09-07 RX ADMIN — POTASSIUM BICARBONATE 50 MEQ: 977.5 TABLET, EFFERVESCENT ORAL at 06:09

## 2024-09-07 RX ADMIN — AMLODIPINE BESYLATE 5 MG: 5 TABLET ORAL at 08:09

## 2024-09-07 RX ADMIN — GABAPENTIN 300 MG: 300 CAPSULE ORAL at 08:09

## 2024-09-07 RX ADMIN — IPRATROPIUM BROMIDE AND ALBUTEROL SULFATE 3 ML: .5; 3 SOLUTION RESPIRATORY (INHALATION) at 08:09

## 2024-09-07 RX ADMIN — ATORVASTATIN CALCIUM 40 MG: 40 TABLET, FILM COATED ORAL at 08:09

## 2024-09-07 RX ADMIN — IPRATROPIUM BROMIDE AND ALBUTEROL SULFATE 3 ML: .5; 3 SOLUTION RESPIRATORY (INHALATION) at 12:09

## 2024-09-07 RX ADMIN — ACETAMINOPHEN 650 MG: 325 TABLET ORAL at 06:09

## 2024-09-07 RX ADMIN — CLOPIDOGREL BISULFATE 75 MG: 75 TABLET, FILM COATED ORAL at 08:09

## 2024-09-07 RX ADMIN — FAMOTIDINE 20 MG: 20 TABLET, FILM COATED ORAL at 08:09

## 2024-09-07 RX ADMIN — Medication 1 PATCH: at 12:09

## 2024-09-07 RX ADMIN — LEVALBUTEROL HYDROCHLORIDE 3.75 MG: 1.25 SOLUTION RESPIRATORY (INHALATION) at 12:09

## 2024-09-07 RX ADMIN — CARVEDILOL 6.25 MG: 6.25 TABLET, FILM COATED ORAL at 06:09

## 2024-09-07 RX ADMIN — CARVEDILOL 6.25 MG: 6.25 TABLET, FILM COATED ORAL at 05:09

## 2024-09-07 RX ADMIN — DOCUSATE SODIUM AND SENNOSIDES 1 TABLET: 8.6; 5 TABLET, FILM COATED ORAL at 09:09

## 2024-09-07 RX ADMIN — INSULIN ASPART 10 UNITS: 100 INJECTION, SOLUTION INTRAVENOUS; SUBCUTANEOUS at 05:09

## 2024-09-07 RX ADMIN — INSULIN ASPART 2 UNITS: 100 INJECTION, SOLUTION INTRAVENOUS; SUBCUTANEOUS at 09:09

## 2024-09-07 RX ADMIN — GABAPENTIN 300 MG: 300 CAPSULE ORAL at 09:09

## 2024-09-07 RX ADMIN — LOSARTAN POTASSIUM 100 MG: 100 TABLET, FILM COATED ORAL at 08:09

## 2024-09-07 RX ADMIN — AZITHROMYCIN DIHYDRATE 500 MG: 250 TABLET ORAL at 10:09

## 2024-09-07 RX ADMIN — PREDNISONE 40 MG: 20 TABLET ORAL at 10:09

## 2024-09-07 RX ADMIN — HYDROCODONE BITARTRATE AND ACETAMINOPHEN 1 TABLET: 7.5; 325 TABLET ORAL at 04:09

## 2024-09-07 RX ADMIN — IPRATROPIUM BROMIDE 1 MG: 0.5 SOLUTION RESPIRATORY (INHALATION) at 12:09

## 2024-09-07 RX ADMIN — HYDROCODONE BITARTRATE AND ACETAMINOPHEN 1 TABLET: 7.5; 325 TABLET ORAL at 10:09

## 2024-09-07 RX ADMIN — FAMOTIDINE 20 MG: 20 TABLET, FILM COATED ORAL at 09:09

## 2024-09-07 RX ADMIN — ASPIRIN 81 MG CHEWABLE TABLET 81 MG: 81 TABLET CHEWABLE at 08:09

## 2024-09-07 RX ADMIN — SODIUM CHLORIDE, POTASSIUM CHLORIDE, SODIUM LACTATE AND CALCIUM CHLORIDE: 600; 310; 30; 20 INJECTION, SOLUTION INTRAVENOUS at 04:09

## 2024-09-07 RX ADMIN — ACETAMINOPHEN 650 MG: 325 TABLET ORAL at 01:09

## 2024-09-07 RX ADMIN — HYDROCODONE BITARTRATE AND ACETAMINOPHEN 1 TABLET: 7.5; 325 TABLET ORAL at 05:09

## 2024-09-07 NOTE — ASSESSMENT & PLAN NOTE
Patient is chronically on statin.will continue for now. Monitor clinically. Last LDL was   Lab Results   Component Value Date    LDLCALC 75.4 01/29/2024

## 2024-09-07 NOTE — ASSESSMENT & PLAN NOTE
Patient's FSGs are controlled on current medication regimen.  Last A1c reviewed-   Lab Results   Component Value Date    HGBA1C 6.1 03/08/2024     Most recent fingerstick glucose reviewed-   Recent Labs   Lab 09/07/24  0304   POCTGLUCOSE 126*     Current correctional scale  Medium  Maintain anti-hyperglycemic dose as follows-   Antihyperglycemics (From admission, onward)      Start     Stop Route Frequency Ordered    09/07/24 0537  insulin aspart U-100 pen 0-10 Units         -- SubQ Before meals & nightly PRN 09/07/24 0438          Hold Oral hypoglycemics while patient is in the hospital.

## 2024-09-07 NOTE — PLAN OF CARE
Problem: Infection  Goal: Absence of Infection Signs and Symptoms  Outcome: Progressing     Problem: Diabetes Comorbidity  Goal: Blood Glucose Level Within Targeted Range  Outcome: Progressing     Problem: Pneumonia  Goal: Fluid Balance  Outcome: Progressing  Goal: Effective Oxygenation and Ventilation  Outcome: Progressing     Problem: Wound  Goal: Optimal Pain Control and Function  Outcome: Progressing

## 2024-09-07 NOTE — HPI
Sampson Holt is a 68 year old male with a past medical history of COPD, CHF, DM, HTN who presented to the ED with a complaint of fatigue and cough. He states he has been feeling generalized weakness, fatigue, headache, body aches, congestion and cough for a few days. He denies sick contacts, but states he has black mold in his home that he attributes to his breathing problems. He reports chills as well. He denies other complaint. ED work up included a CBC stable chronic anemia. CMP unremarkable.  Troponin HS 7.7, TSH 1.403. Lipase 18, BNP 75. POCT lactic 3.05. Urinalysis negative for evidence of infection. CXR showed evidence of possible bilateral pneumonia. He was initiated on Levaquin IV in the ED. Hospital Medicine consulted for admission and further management.

## 2024-09-07 NOTE — PLAN OF CARE
Problem: Adult Inpatient Plan of Care  Goal: Plan of Care Review  Outcome: Progressing     Problem: Adult Inpatient Plan of Care  Goal: Optimal Comfort and Wellbeing  Outcome: Progressing     Pt rested in bed this shift. Complaints of pain managed with prn medication. IVF infused as ordered. BG monitored closely, coverage provided per prn sliding scale. Safety maintained. Needs attended to.

## 2024-09-07 NOTE — ASSESSMENT & PLAN NOTE
Chronic, controlled. Latest blood pressure and vitals reviewed-     Temp:  [98 °F (36.7 °C)-99.2 °F (37.3 °C)]   Pulse:  []   Resp:  [16-20]   BP: (110-154)/(56-84)   SpO2:  [93 %-97 %] .   Home meds for hypertension were reviewed and noted below.   Hypertension Medications               amLODIPine (NORVASC) 5 MG tablet Take 1 tablet (5 mg total) by mouth once daily.    carvediloL (COREG) 3.125 MG tablet Take 2 tablets (6.25 mg total) by mouth 2 (two) times daily with meals.    losartan (COZAAR) 100 MG tablet Take 100 mg by mouth.    nitroGLYCERIN (NITROSTAT) 0.4 MG SL tablet Place 1 tablet (0.4 mg total) under the tongue every 5 (five) minutes as needed for Chest pain.            While in the hospital, will manage blood pressure as follows; Continue home antihypertensive regimen    Will utilize p.r.n. blood pressure medication only if patient's blood pressure greater than 180/110 and he develops symptoms such as worsening chest pain or shortness of breath.

## 2024-09-07 NOTE — ASSESSMENT & PLAN NOTE
Patient with known CAD  which is controlled Will continue ASA, Plavix, and Statin and monitor for S/Sx of angina/ACS. Continue to monitor on telemetry.

## 2024-09-07 NOTE — PT/OT/SLP PROGRESS
Occupational Therapy      Patient Name:  Sampson Holt   MRN:  [id number]    Patient not seen today secondary to Patient unwilling to participate, Other (Comment) (States unable to tolerate secondary to pain. Nurse Sherie advised.). Will follow-up.    9/7/2024

## 2024-09-07 NOTE — HOSPITAL COURSE
Pneumonia of both lower lobes  Patient has a diagnosis of pneumonia. The cause of the pneumonia is suspected to be bacterial in etiology but organism is not known. The pneumonia is stable. The patient has the following signs/symptoms of pneumonia: cough. The patient does not have a current oxygen requirement and the patient does not have a home oxygen requirement. I have reviewed the pertinent imaging. The following cultures have been collected: Blood cultures and Sputum culture The culture results were NGTD. Viral panel negative.      Treated with azithromycin for 3 days     Microbiology Results (last 7 days)         Procedure Component Value Units Date/Time     Blood culture #1 **CANNOT BE ORDERED STAT** [[id number]] Collected: 09/06/24 2123     Order Status: Sent Specimen: Blood from Peripheral, Antecubital, Left Updated: 09/06/24 2150     Blood culture #2 **CANNOT BE ORDERED STAT** [[id number]] Collected: 09/06/24 2123     Order Status: Sent Specimen: Blood from Peripheral, Antecubital, Right Updated: 09/06/24 2150                Benign prostatic hyperplasia with lower urinary tract symptoms  Noted, continue flomax        Hyperlipidemia   Patient is chronically on statin.will continue for now. Monitor clinically. Last LDL was         Lab Results   Component Value Date     LDLCALC 75.4 01/29/2024               CAD (coronary artery disease)  Patient with known CAD  which is controlled Will continue ASA, Plavix, and Statin and monitor for S/Sx of angina/ACS. Continue to monitor on telemetry.      Cigarette smoker  Dangers of cigarette smoking were reviewed with patient in detail for 5 minutes and patient was encouraged to quit. Nicotine replacement options were discussed. Nicotine replacement options were discussed. Nicotine replacement was prescribed.           Type 2 diabetes mellitus with circulatory disorder, with long-term current use of insulin  Patient's FSGs are controlled on current medication  regimen.  Last A1c reviewed-         Lab Results   Component Value Date     HGBA1C 6.1 03/08/2024      Most recent fingerstick glucose reviewed-       Recent Labs   Lab 09/07/24  0304   POCTGLUCOSE 126*      Current correctional scale  Medium  Maintain anti-hyperglycemic dose as follows-   Antihyperglycemics (From admission, onward)        Start     Stop Route Frequency Ordered     09/07/24 0537   insulin aspart U-100 pen 0-10 Units         -- SubQ Before meals & nightly PRN 09/07/24 0438             Hold Oral hypoglycemics while patient is in the hospital.           COPD (chronic obstructive pulmonary disease)  Patient's COPD is controlled currently.  Patient is currently off COPD Pathway. Continue scheduled inhalers Supplemental oxygen and monitor respiratory status closely.   Wean off O2  6 min walk test ordered. Qualified for home O2. Home O2 set up prior to DC.  Pulm referral placed for OP FU  Finish course of prednisone 40mg PO qd x5d     Benign essential HTN  Chronic, controlled. Latest blood pressure and vitals reviewed-      Temp:  [98 °F (36.7 °C)-99.2 °F (37.3 °C)]   Pulse:  []   Resp:  [16-20]   BP: (110-154)/(56-84)   SpO2:  [93 %-97 %] .   Home meds for hypertension were reviewed and noted below.   Hypertension Medications                    amLODIPine (NORVASC) 5 MG tablet Take 1 tablet (5 mg total) by mouth once daily.     carvediloL (COREG) 3.125 MG tablet Take 2 tablets (6.25 mg total) by mouth 2 (two) times daily with meals.     losartan (COZAAR) 100 MG tablet Take 100 mg by mouth.     nitroGLYCERIN (NITROSTAT) 0.4 MG SL tablet Place 1 tablet (0.4 mg total) under the tongue every 5 (five) minutes as needed for Chest pain.                While in the hospital, will manage blood pressure as follows; Continue home antihypertensive regimen     Will utilize p.r.n. blood pressure medication only if patient's blood pressure greater than 180/110 and he develops symptoms such as worsening chest pain  or shortness of breath.

## 2024-09-07 NOTE — NURSING
Nurses Note -- 4 Eyes      9/7/2024   4:37 AM      Skin assessed during: Admit      [] No Altered Skin Integrity Present    []Prevention Measures Documented      [x] Yes- Altered Skin Integrity Present or Discovered   [x] LDA Added if Not in Epic (Describe Wound)   [] New Altered Skin Integrity was Present on Admit and Documented in LDA   [x] Wound Image Taken    Wound Care Consulted? Yes    Attending Nurse:  Sima Navarro RN/Staff Member:  Noemy

## 2024-09-07 NOTE — ED PROVIDER NOTES
"Encounter Date: 9/6/2024       History     Chief Complaint   Patient presents with    Weakness     Reports "weakness all over for several days"      Patient presents emergency department with reported generalized fatigue weakness headache body aches congestion cough states he has a history of COPD cough is somewhat worse than normal he denies any fever but states he has had chills denies any urinary symptoms reports some nausea no vomiting or diarrhea patient has a history of emphysema see [facility] diabetes he states that he is concerned because he is exposed to black mold in his trailer        Review of patient's allergies indicates:   Allergen Reactions    Amoxicillin Shortness Of Breath and Edema     Past Medical History:   Diagnosis Date    Abdominal pain 2022    CHF (congestive heart failure)     Diabetes mellitus 2018    Emphysema lung     Endocarditis 2011    Hypertension     Stroke 2011    denies residual     Past Surgical History:   Procedure Laterality Date    ANGIOGRAM, CORONARY, WITH LEFT HEART CATHETERIZATION N/A 10/10/2023    Procedure: Angiogram, Coronary, with Left Heart Cath;  Surgeon: Dieudonne Ryan MD;  Location: [facility] CATH/EP LAB;  Service: Cardiology;  Laterality: N/A;    BACK SURGERY  1981    COLONOSCOPY N/A 2/23/2023    Procedure: COLONOSCOPY;  Surgeon: Jonn Cruz MD;  Location: [facility] ENDO;  Service: Endoscopy;  Laterality: N/A;    CORONARY ANGIOGRAPHY N/A 5/9/2023    Procedure: ANGIOGRAM, CORONARY ARTERY;  Surgeon: Antonio Kessler MD;  Location: [facility] CATH/EP LAB;  Service: Cardiology;  Laterality: N/A;    EXCISION OF HYDROCELE      x2    FRACTIONAL FLOW RESERVE (FFR), CORONARY  5/11/2023    Procedure: Fractional Flow [place] (FFR), Coronary;  Surgeon: Antonio Kessler MD;  Location: [facility] CATH/EP LAB;  Service: Cardiology;;    INGUINAL HERNIA REPAIR  1960    INSTANTANEOUS WAVE-FREE RATIO (IFR) N/A 10/10/2023    Procedure: Instantaneous Wave-Free Ratio (IFR);  Surgeon: Dieudonne Ryan, " MD;  Location: [facility] CATH/EP LAB;  Service: Cardiology;  Laterality: N/A;    INTRAMEDULLARY RODDING OF FEMUR Left 3/9/2024    Procedure: INSERTION, INTRAMEDULLARY HUI, FEMUR;  Surgeon: Tarun Hdez MD;  Location: [facility] OR;  Service: Orthopedics;  Laterality: Left;    INTRAVASCULAR ULTRASOUND, CORONARY N/A 5/9/2023    Procedure: Ultrasound-coronary;  Surgeon: Antonio Kessler MD;  Location: [facility] CATH/EP LAB;  Service: Cardiology;  Laterality: N/A;    IVUS, CORONARY  5/11/2023    Procedure: IVUS, Coronary;  Surgeon: Antonio Kessler MD;  Location: [facility] CATH/EP LAB;  Service: Cardiology;;    LEFT HEART CATHETERIZATION Left 5/11/2023    Procedure: Left heart cath;  Surgeon: Antonio Kessler MD;  Location: [facility] CATH/EP LAB;  Service: Cardiology;  Laterality: Left;    PERCUTANEOUS TRANSLUMINAL BALLOON ANGIOPLASTY OF CORONARY ARTERY  5/9/2023    Procedure: Angioplasty-coronary;  Surgeon: Antonio Kessler MD;  Location: [facility] CATH/EP LAB;  Service: Cardiology;;    RHINOPLASTY      STENT, DRUG ELUTING, SINGLE VESSEL, CORONARY  5/9/2023    Procedure: Stent, Drug Eluting, Single Vessel, Coronary;  Surgeon: Antonio Kessler MD;  Location: [facility] CATH/EP LAB;  Service: Cardiology;;    SURGICAL REMOVAL OF NODULE OF VOCAL CORD       No family history on file.  Social History     Tobacco Use    Smoking status: Every Day     Types: Cigarettes    Smokeless tobacco: Former    Tobacco comments:     Pt ready to quit smoking and states he can on his own. Seen 10/9/23 for inpatient smoking cessation. [employer] Member.     Occasionally   Substance Use Topics    Alcohol use: Yes     Alcohol/week: 14.0 standard drinks of alcohol     Types: 14 Shots of liquor per week    Drug use: Yes     Types: Marijuana     Review of Systems   Constitutional:  Positive for activity change, chills and fatigue.   Respiratory:  Positive for cough and shortness of breath.    Gastrointestinal:  Positive for nausea. Negative for diarrhea and vomiting.    Genitourinary:  Negative for dysuria.   Musculoskeletal:  Positive for myalgias.   Neurological:  Positive for headaches.   All other systems reviewed and are negative.      Physical Exam     Initial Vitals [09/06/24 2032]   BP Pulse Resp Temp SpO2   113/84 100 16 98.1 °F (36.7 °C) 97 %      MAP       --         Physical Exam    Constitutional: He appears well-developed and well-nourished. No distress.   HENT:   Head: Normocephalic and atraumatic.   Right Ear: External ear normal.   Left Ear: External ear normal.   Mouth/Throat: Oropharynx is clear and moist.   Eyes: EOM are normal. Pupils are equal, round, and reactive to light.   Neck: Neck supple.   Normal range of motion.  Cardiovascular:  Regular rhythm, S1 normal, S2 normal, normal heart sounds and intact distal pulses.           Tachycardia   Pulmonary/Chest: No respiratory distress. He has rhonchi.   Abdominal: Abdomen is soft. Bowel sounds are normal. There is no abdominal tenderness.   Musculoskeletal:         General: Normal range of motion.      Cervical back: Normal range of motion and neck supple.     Neurological: He is alert and oriented to person, place, and time. He has normal strength. GCS score is 15. GCS eye subscore is 4. GCS verbal subscore is 5. GCS motor subscore is 6.   Skin: Skin is warm and dry. Capillary refill takes less than 2 seconds. No rash noted.   Psychiatric: He has a normal mood and affect. His behavior is normal.         ED Course   Procedures  Labs Reviewed   CBC W/ AUTO DIFFERENTIAL - Abnormal       Result Value    WBC 8.24      RBC 4.50 (*)     Hemoglobin 14.5      Hematocrit 42.1      MCV 94      MCH 32.2 (*)     MCHC 34.4      RDW 13.5      Platelets 259      MPV 8.9 (*)     Immature Granulocytes 0.5      Gran # (ANC) 5.8      Immature Grans (Abs) 0.04      Lymph # 1.5      Mono # 0.8      Eos # 0.1      Baso # 0.06      nRBC 0      Gran % 70.3      Lymph % 18.0      Mono % 9.7      Eosinophil % 0.8      Basophil % 0.7       Differential Method Automated     COMPREHENSIVE METABOLIC PANEL - Abnormal    Sodium 138      Potassium 3.9      Chloride 103      CO2 21 (*)     Glucose 198 (*)     BUN 13      Creatinine 0.9      Calcium 9.5      Total Protein 7.7      Albumin 4.5      Total Bilirubin 0.4      Alkaline Phosphatase 77      AST 12      ALT 9 (*)     eGFR >60.0      Anion Gap 14     URINALYSIS, REFLEX TO URINE CULTURE - Abnormal    Specimen UA Urine, Clean Catch      Color, UA Yellow      Appearance, UA Clear      pH, UA 6.0      Specific Gravity, UA 1.025      Protein, UA Trace (*)     Glucose, UA Negative      Ketones, UA Negative      Bilirubin (UA) Negative      Occult Blood UA Negative      Nitrite, UA Negative      Urobilinogen, UA 2.0-3.0 (*)     Leukocytes, UA Negative      Narrative:     Specimen Source->Urine   ISTAT LACTATE - Abnormal    POC Lactate 3.05 (*)     Sample VENOUS     CULTURE, BLOOD   CULTURE, BLOOD   B-TYPE NATRIURETIC PEPTIDE    BNP 75     LIPASE    Lipase 18     INFLUENZA A AND B ANTIGEN    Influenza A, Molecular Negative      Influenza B, Molecular Negative      Flu A & B Source Nasal swab      Narrative:     Specimen Source->Nasopharyngeal Swab   TROPONIN I HIGH SENSITIVITY    Troponin I High Sensitivity 7.7     TSH    TSH 1.403     SARS-COV-2 RNA AMPLIFICATION, QUAL    SARS-CoV-2 RNA, Amplification, Qual Negative     POCT LACTATE          Imaging Results              X-Ray Chest AP Portable (Final result)  Result time 09/06/24 21:32:38      Final result by Say Patterson MD (09/06/24 21:32:38)                   Impression:      Mild streaky bibasilar opacities favored to reflect atelectasis or scarring, similar to prior examinations.  No new large confluent airspace consolidation identified, noting early viral pneumonia may be radiographically occult.      Electronically signed by: Say Patterson MD  Date:    09/06/2024  Time:    21:32               Narrative:    EXAMINATION:  XR CHEST AP  PORTABLE    CLINICAL HISTORY:  COVID-19;    TECHNIQUE:  Single frontal view of the chest was performed.    COMPARISON:  03/08/2024    FINDINGS:  Cardiac monitoring leads overlie the chest.  The cardiac silhouette is not significantly enlarged.  There are mild streaky bibasilar airspace opacities favored to reflect atelectasis or scarring.  No large confluent airspace consolidation identified.  No significant volume of pleural fluid or pneumothorax appreciated.  Osseous structures demonstrate degenerative changes.                                       Medications   sodium chloride 0.9% flush 3 mL (has no administration in time range)   melatonin tablet 9 mg (has no administration in time range)   ondansetron injection 8 mg (has no administration in time range)   acetaminophen tablet 650 mg (has no administration in time range)   simethicone chewable tablet 80 mg (has no administration in time range)   aluminum-magnesium hydroxide-simethicone 200-200-20 mg/5 mL suspension 30 mL (has no administration in time range)   acetaminophen tablet 650 mg (has no administration in time range)   naloxone 0.4 mg/mL injection 0.02 mg (has no administration in time range)   potassium bicarbonate disintegrating tablet 50 mEq (has no administration in time range)   potassium bicarbonate disintegrating tablet 35 mEq (has no administration in time range)   potassium bicarbonate disintegrating tablet 60 mEq (has no administration in time range)   magnesium oxide tablet 800 mg (has no administration in time range)   magnesium oxide tablet 800 mg (has no administration in time range)   potassium, sodium phosphates 280-160-250 mg packet 2 packet (has no administration in time range)   potassium, sodium phosphates 280-160-250 mg packet 2 packet (has no administration in time range)   potassium, sodium phosphates 280-160-250 mg packet 2 packet (has no administration in time range)   glucose chewable tablet 16 g (has no administration in time  range)   glucose chewable tablet 24 g (has no administration in time range)   dextrose 50% injection 12.5 g (has no administration in time range)   dextrose 50% injection 25 g (has no administration in time range)   glucagon (human recombinant) injection 1 mg (has no administration in time range)   senna-docusate 8.6-50 mg per tablet 1 tablet (has no administration in time range)   levoFLOXacin 750 mg/150 mL IVPB 750 mg (0 mg Intravenous Stopped 9/7/24 0005)   levalbuterol nebulizer solution 3.75 mg (3.75 mg Nebulization Given 9/7/24 0006)   ipratropium 0.02 % nebulizer solution 1 mg (1 mg Nebulization Given 9/7/24 0007)     Medical Decision Making  Laboratory evaluation reviewed patient's physical exam findings and chest x-ray consistent with pneumonia he has received a dose of Levaquin in the emergency department his lactate is elevated at 3 will admit for IV antibiotics trend his lactate I discussed patient's findings with Hospital Medicine will admit patient to [facility]    Amount and/or Complexity of Data Reviewed  Labs: ordered. Decision-making details documented in ED Course.  Radiology: ordered. Decision-making details documented in ED Course.  ECG/medicine tests: ordered. Decision-making details documented in ED Course.    Risk  Prescription drug management.  Decision regarding hospitalization.                                      Clinical Impression:  Final diagnoses:  [J18.9] Pneumonia of both lungs due to infectious organism, unspecified part of lung (Primary)          ED Disposition Condition    Admit Stable                Antolin Wagner MD  09/07/24 0058

## 2024-09-07 NOTE — CARE UPDATE
09/07/24 0725   Patient Assessment/Suction   Level of Consciousness (AVPU) alert   Respiratory Effort Normal;Unlabored   Expansion/Accessory Muscles/Retractions no use of accessory muscles;no retractions;expansion symmetric   All Lung Fields Breath Sounds Posterior:;Lateral:;diminished   Rhythm/Pattern, Respiratory unlabored;pattern regular;depth regular;no shortness of breath reported   Cough Frequency no cough   PRE-TX-O2   Device (Oxygen Therapy) room air  (NC 2 LPM Q HS PRN)   SpO2 97 %   Pulse Oximetry Type Intermittent   $ Pulse Oximetry - Multiple Charge Pulse Oximetry - Multiple   Pulse 88   Resp 16   Positioning Left side   Aerosol Therapy   $ Aerosol Therapy Charges PRN treatment not required   Education   $ Education Bronchodilator;Oxygen;15 min

## 2024-09-07 NOTE — PLAN OF CARE
[facility] - Med/Surg  Initial Discharge Assessment       Primary Care Provider: Damian Vergara MD    Admission Diagnosis: Weakness [R53.1]    Admission Date: 9/6/2024  Expected Discharge Date: 9/10/2024    Transition of Care Barriers: Mobility, Social    Payor: [employer] / Plan: [facility] [employer] [employer] / Product Type: Government /     Extended Emergency Contact Information  Primary Emergency Contact: Angel Klein  Mobile Phone: [phone]  Relation: Friend  Preferred language: English   needed? No  Secondary Emergency Contact: Jonn Kraft  Mobile Phone: [phone]  Relation: Brother  Preferred language: English   needed? No    Discharge Plan A: Skilled Nursing Facility  Discharge Plan B: Home      [facility]  [address]  [facility] [address]  Phone: [phone] Fax: [phone]      DC assessment completed with patient at bedside. Verified information on facesheet as correct. Denies POA. NOK 1 child but is out of contact with and did not provide any information for. [facility]. Pharmacy is [facility]. Denies hh/hd/outpt services. DME- rw, bsc, sc, cane,glucometer, and bp monitor. On plavix prior to admit. Pt states he was discharged from [facility] about 2 months ago and needs placement again. States he can't walk- will not tell me how long its been since he has walked. Also informed me that he cannot go back to his trailer because its full of mold. Reports taking home medications as prescribed and can currently afford them. Verified insurance on file. [facility] is [facility]. Denies recent inpt stay in last 30 days. DC plan is likely SNF    We discussed mold situation and DC plans. I explained that mold is not a reason to stay in the hospital unfortunately and suggested for him to start working on back up plan. I offered homeless resources and he declined. We discussed SNF... states he will not work with PT. I  explained SNF as a temporary option while he is working [facility]. Pt verbalized understanding and states he will consider SNF but [facility] not [facility]. Went there is the past and had bad experience. Really wants [facility]- again explained that we will likely need back up plan since recently there and no new dx given for rehab. Asked him to work with therapy and then we will go from there.     Initial Assessment (most recent)       Adult Discharge Assessment - 09/07/24 1216          Discharge Assessment    Assessment Type Discharge Planning Assessment     Confirmed/corrected address, phone number and insurance Yes     Confirmed Demographics Correct on Facesheet     Source of Information patient     Communicated DAVID with patient/caregiver Yes     Reason For Admission PNA     People in Home alone     Facility Arrived From: home     Do you expect to return to your current living situation? No     Do you have help at home or someone to help you manage your care at home? No     Prior to hospitilization cognitive status: Alert/Oriented     Current cognitive status: Alert/Oriented     Walking or Climbing Stairs Difficulty yes     Walking or Climbing Stairs ambulation difficulty, requires equipment     Dressing/Bathing Difficulty yes     Dressing/Bathing bathing difficulty, requires equipment     Equipment Currently Used at Home walker, rolling;bedside commode;shower chair     Readmission within 30 days? No     Patient currently being followed by outpatient case management? No     Do you currently have service(s) that help you manage your care at home? No     Do you take prescription medications? Yes     Do you have prescription coverage? Yes     Do you have any problems affording any of your prescribed medications? No     Is the patient taking medications as prescribed? yes     Who is going to help you get home at discharge? friend     How do you get to doctors appointments? family or friend will  provide;health plan transportation     Are you on dialysis? No     Do you take coumadin? No     Discharge Plan A Skilled Nursing Facility     Discharge Plan B Home     DME Needed Upon Discharge  none     Discharge Plan discussed with: Patient     Transition of Care Barriers Mobility;Social        Physical Activity    On average, how many days per week do you engage in moderate to strenuous exercise (like a brisk walk)? Patient declined     On average, how many minutes do you engage in exercise at this level? Patient declined        Financial Resource Strain    How hard is it for you to pay for the very basics like food, housing, medical care, and heating? Very hard        Housing Stability    In the last 12 months, was there a time when you were not able to pay the mortgage or rent on time? No     At any time in the past 12 months, were you homeless or living in a shelter (including now)? No        Transportation Needs    Has the lack of transportation kept you from medical appointments, meetings, work or from getting things needed for daily living? Yes, it has kept me from medical appointments or from getting my medications.        Food Insecurity    Within the past 12 months, you worried that your food would run out before you got the money to buy more. Never true     Within the past 12 months, the food you bought just didn't last and you didn't have money to get more. Never true        Stress    Do you feel stress - tense, restless, nervous, or anxious, or unable to sleep at night because your mind is troubled all the time - these days? Rather much        Social Isolation    How often do you feel lonely or isolated from those around you?  Sometimes        Alcohol Use    Q1: How often do you have a drink containing alcohol? 4 or more times a week     Q2: How many drinks containing alcohol do you have on a typical day when you are drinking? 1 or 2     Q3: How often do you have six or more drinks on one occasion?  Patient declined        Utilities    In the past 12 months has the electric, gas, oil, or water company threatened to shut off services in your home? No        Health Literacy    How often do you need to have someone help you when you read instructions, pamphlets, or other written material from your doctor or pharmacy? Never

## 2024-09-07 NOTE — H&P
"  [facility]  History & Physical    Patient Name: Sampson Holt  MRN: [id number]  Patient Class: IP- Inpatient  Admission Date: 9/6/2024  Attending Physician: Iron Pereira MD   Primary Care Provider: Damian Vergara MD         Patient information was obtained from patient, past medical records, and ER records.     Subjective:     Principal Problem:Pneumonia of both lower lobes    Chief Complaint:   Chief Complaint   Patient presents with    Weakness     Reports "weakness all over for several days"         HPI: Sampson Holt is a 68 year old male with a past medical history of COPD, CHF, DM, HTN who presented to the ED with a complaint of fatigue and cough. He states he has been feeling generalized weakness, fatigue, headache, body aches, congestion and cough for a few days. He denies sick contacts, but states he has black mold in his home that he attributes to his breathing problems. He reports chills as well. He denies other complaint. ED work up included a CBC stable chronic anemia. CMP unremarkable.  Troponin HS 7.7, TSH 1.403. Lipase 18, BNP 75. POCT lactic 3.05. Urinalysis negative for evidence of infection. CXR showed evidence of possible bilateral pneumonia. He was initiated on Levaquin IV in the ED. Hospital Medicine consulted for admission and further management.     Past Medical History:   Diagnosis Date    Abdominal pain 2022    CHF (congestive heart failure)     Diabetes mellitus 2018    Emphysema lung     Endocarditis 2011    Hypertension     Stroke 2011    denies residual       Past Surgical History:   Procedure Laterality Date    ANGIOGRAM, CORONARY, WITH LEFT HEART CATHETERIZATION N/A 10/10/2023    Procedure: Angiogram, Coronary, with Left Heart Cath;  Surgeon: Dieudonne Ryan MD;  Location: [facility] CATH/EP LAB;  Service: Cardiology;  Laterality: N/A;    BACK SURGERY  1981    COLONOSCOPY N/A 2/23/2023    Procedure: COLONOSCOPY;  Surgeon: Jonn DICKERSON" MD Anthony;  Location: [facility] ENDO;  Service: Endoscopy;  Laterality: N/A;    CORONARY ANGIOGRAPHY N/A 5/9/2023    Procedure: ANGIOGRAM, CORONARY ARTERY;  Surgeon: Antonio Kessler MD;  Location: [facility] CATH/EP LAB;  Service: Cardiology;  Laterality: N/A;    EXCISION OF HYDROCELE      x2    FRACTIONAL FLOW RESERVE (FFR), CORONARY  5/11/2023    Procedure: Fractional Flow Clarks (FFR), Coronary;  Surgeon: Antonio Kessler MD;  Location: [facility] CATH/EP LAB;  Service: Cardiology;;    INGUINAL HERNIA REPAIR  1960    INSTANTANEOUS WAVE-FREE RATIO (IFR) N/A 10/10/2023    Procedure: Instantaneous Wave-Free Ratio (IFR);  Surgeon: Dieudonne Ryan MD;  Location: [facility] CATH/EP LAB;  Service: Cardiology;  Laterality: N/A;    INTRAMEDULLARY RODDING OF FEMUR Left 3/9/2024    Procedure: INSERTION, INTRAMEDULLARY HUI, FEMUR;  Surgeon: Tarun Hdez MD;  Location: [facility] OR;  Service: Orthopedics;  Laterality: Left;    INTRAVASCULAR ULTRASOUND, CORONARY N/A 5/9/2023    Procedure: Ultrasound-coronary;  Surgeon: Antonio Kessler MD;  Location: [facility] CATH/EP LAB;  Service: Cardiology;  Laterality: N/A;    IVUS, CORONARY  5/11/2023    Procedure: IVUS, Coronary;  Surgeon: Antonio Kessler MD;  Location: [facility] CATH/EP LAB;  Service: Cardiology;;    LEFT HEART CATHETERIZATION Left 5/11/2023    Procedure: Left heart cath;  Surgeon: Antonio Kessler MD;  Location: [facility] CATH/EP LAB;  Service: Cardiology;  Laterality: Left;    PERCUTANEOUS TRANSLUMINAL BALLOON ANGIOPLASTY OF CORONARY ARTERY  5/9/2023    Procedure: Angioplasty-coronary;  Surgeon: Antonio Kessler MD;  Location: [facility] CATH/EP LAB;  Service: Cardiology;;    RHINOPLASTY      STENT, DRUG ELUTING, SINGLE VESSEL, CORONARY  5/9/2023    Procedure: Stent, Drug Eluting, Single Vessel, Coronary;  Surgeon: Antonio Kessler MD;  Location: [facility] CATH/EP LAB;  Service: Cardiology;;    SURGICAL REMOVAL OF NODULE OF VOCAL CORD         Review of patient's allergies indicates:   Allergen Reactions     Amoxicillin Shortness Of Breath and Edema       No current facility-administered medications on file prior to encounter.     Current Outpatient Medications on File Prior to Encounter   Medication Sig    acetaminophen (TYLENOL) 325 MG tablet Take 2 tablets (650 mg total) by mouth every 8 (eight) hours.    albuterol (PROVENTIL/VENTOLIN HFA) 90 mcg/actuation inhaler Inhale 2 puffs into the lungs every 6 (six) hours as needed for Wheezing. Rescue    amLODIPine (NORVASC) 5 MG tablet Take 1 tablet (5 mg total) by mouth once daily.    aspirin 81 MG Chew Take 1 tablet (81 mg total) by mouth 2 (two) times a day for 24 days, THEN 1 tablet (81 mg total) once daily.    carvediloL (COREG) 3.125 MG tablet Take 2 tablets (6.25 mg total) by mouth 2 (two) times daily with meals.    cyclobenzaprine (FLEXERIL) 5 MG tablet Take 1 tablet (5 mg total) by mouth every 8 (eight) hours as needed for muscle spasms.    famotidine (PEPCID) 20 MG tablet Take 1 tablet (20 mg total) by mouth 2 (two) times daily.    gabapentin (NEURONTIN) 300 MG capsule Take 300 mg by mouth.    hydrOXYzine HCL (ATARAX) 50 MG tablet Take 1 tablet (50 mg total) by mouth 3 (three) times daily as needed for Anxiety.    losartan (COZAAR) 100 MG tablet Take 100 mg by mouth.    multivitamin Tab Take 1 tablet by mouth once daily.    traZODone (DESYREL) 100 MG tablet Take 1 tablet (100 mg total) by mouth nightly. For deperession    [DISCONTINUED] melatonin (MELATIN) 3 mg tablet Take 3 tablets (9 mg total) by mouth nightly as needed for Insomnia.    atorvastatin (LIPITOR) 40 MG tablet Take 1 tablet (40 mg total) by mouth once daily.    clopidogreL (PLAVIX) 75 mg tablet Take 1 tablet (75 mg total) by mouth once daily. (Patient taking differently: Take 75 mg by mouth every evening.)    nitroGLYCERIN (NITROSTAT) 0.4 MG SL tablet Place 1 tablet (0.4 mg total) under the tongue every 5 (five) minutes as needed for Chest pain.    ondansetron (ZOFRAN) 4 MG tablet Take 1 tablet (4 mg  total) by mouth every 8 (eight) hours as needed for Nausea.    polyethylene glycol (GLYCOLAX) 17 gram PwPk Take 17 g by mouth 2 (two) times daily.    tamsulosin (FLOMAX) 0.4 mg Cap Take 0.4 mg by mouth.    traMADoL (ULTRAM) 50 mg tablet Take 1 tablet (50 mg total) by mouth every 6 (six) hours as needed for Pain.    [DISCONTINUED] insulin aspart U-100 (NOVOLOG) 100 unit/mL (3 mL) InPn pen Inject 0-10 Units into the skin before meals and at bedtime as needed (Hyperglycemia). **MODERATE CORRECTION DOSE**  Blood Glucose  mg/dL                  Pre-meal              151-200                2 units                     201-250                4 units                  251-300                6 units                      301-350                8 units                     >350                     10 units                  Administer subcutaneously if needed at times designated by monitoring  schedule.    [DISCONTINUED] insulin glargine 100 units/mL SubQ pen Inject 35 Units into the skin 2 (two) times a day.    [DISCONTINUED] insulin glargine-yfgn 100 unit/mL Soln Inject into the skin.    [DISCONTINUED] isosorbide mononitrate (IMDUR) 30 MG 24 hr tablet     [DISCONTINUED] lactulose (CHRONULAC) 20 gram/30 mL Soln Take 30 mLs (20 g total) by mouth 3 (three) times daily as needed.    [DISCONTINUED] rosuvastatin (CRESTOR) 10 MG tablet Take 5 mg by mouth.    [DISCONTINUED] senna-docusate 8.6-50 mg (PERICOLACE) 8.6-50 mg per tablet Take 1 tablet by mouth 2 (two) times daily.     Family History    None       Tobacco Use    Smoking status: Every Day     Types: Cigarettes    Smokeless tobacco: Former    Tobacco comments:     Pt ready to quit smoking and states he can on his own. Seen 10/9/23 for inpatient smoking cessation. [employer].     Occasionally   Substance and Sexual Activity    Alcohol use: Yes     Alcohol/week: 14.0 standard drinks of alcohol     Types: 14 Shots of liquor per week    Drug use: Yes     Types: Marijuana     Sexual activity: Not on file     Review of Systems   Constitutional:  Positive for activity change, chills and fatigue. Negative for fever.   HENT:  Negative for congestion and sore throat.    Eyes:  Negative for visual disturbance.   Respiratory:  Positive for cough. Negative for shortness of breath.    Cardiovascular:  Negative for chest pain and palpitations.   Gastrointestinal:  Positive for nausea. Negative for abdominal pain, constipation, diarrhea and vomiting.   Endocrine: Negative for cold intolerance and heat intolerance.   Genitourinary:  Negative for dysuria and hematuria.   Musculoskeletal:  Positive for arthralgias and myalgias.   Skin:  Negative for rash.   Neurological:  Positive for headaches. Negative for tremors and seizures.   Hematological:  Negative for adenopathy. Does not bruise/bleed easily.   All other systems reviewed and are negative.    Objective:     Vital Signs (Most Recent):  Temp: 98 °F (36.7 °C) (09/07/24 0328)  Pulse: 98 (09/07/24 0328)  Resp: 18 (09/07/24 0410)  BP: (!) 110/56 (09/07/24 0328)  SpO2: (!) 93 % (09/07/24 0328) Vital Signs (24h Range):  Temp:  [98 °F (36.7 °C)-99.2 °F (37.3 °C)] 98 °F (36.7 °C)  Pulse:  [] 98  Resp:  [16-20] 18  SpO2:  [93 %-97 %] 93 %  BP: (110-154)/(56-84) 110/56     Weight: 90.7 kg (199 lb 15.3 oz)  Body mass index is 26.38 kg/m².     Physical Exam  Vitals and nursing note reviewed.   Constitutional:       General: He is awake. He is not in acute distress.     Appearance: Normal appearance. He is well-developed. He is ill-appearing (chronically ill appearing).   HENT:      Head: Normocephalic and atraumatic.      Nose: Nose normal. No septal deviation.      Mouth/Throat:      Lips: Pink.      Mouth: Mucous membranes are moist.   Eyes:      Conjunctiva/sclera: Conjunctivae normal.      Pupils: Pupils are equal, round, and reactive to light.   Neck:      Thyroid: No thyroid mass.      Vascular: No JVD.      Trachea: No tracheal tenderness or  tracheal deviation.   Cardiovascular:      Rate and Rhythm: Normal rate and regular rhythm.      Heart sounds: Normal heart sounds, S1 normal and S2 normal. No murmur heard.     No friction rub. No gallop.   Pulmonary:      Effort: Pulmonary effort is normal.      Breath sounds: Examination of the right-lower field reveals rhonchi. Examination of the left-lower field reveals rhonchi. Rhonchi present. No decreased breath sounds, wheezing or rales.   Abdominal:      General: Bowel sounds are normal. There is no distension.      Palpations: Abdomen is soft. There is no hepatomegaly, splenomegaly or mass.      Tenderness: There is no abdominal tenderness.   Skin:     General: Skin is warm and dry.      Capillary Refill: Capillary refill takes less than 2 seconds.      Findings: No rash.   Neurological:      General: No focal deficit present.      Mental Status: He is alert and oriented to person, place, and time. Mental status is at baseline.      GCS: GCS eye subscore is 4. GCS verbal subscore is 5. GCS motor subscore is 6.      Cranial Nerves: No cranial nerve deficit.      Sensory: Sensation is intact. No sensory deficit.      Motor: Motor function is intact.   Psychiatric:         Mood and Affect: Mood normal.         Behavior: Behavior normal. Behavior is cooperative.              CRANIAL NERVES     CN III, IV, VI   Pupils are equal, round, and reactive to light.       Significant Labs: All pertinent labs within the past 24 hours have been reviewed.  CBC:   Recent Labs   Lab 09/06/24  2140   WBC 8.24   HGB 14.5   HCT 42.1        CMP:   Recent Labs   Lab 09/06/24  2140      K 3.9      CO2 21*   *   BUN 13   CREATININE 0.9   CALCIUM 9.5   PROT 7.7   ALBUMIN 4.5   BILITOT 0.4   ALKPHOS 77   AST 12   ALT 9*   ANIONGAP 14     Cardiac Markers:   Recent Labs   Lab 09/06/24  2140   BNP 75       POCT Glucose:   Recent Labs   Lab 09/07/24  0304   POCTGLUCOSE 126*     Troponin:   Recent Labs   Lab  09/06/24 2140   TROPONINIHS 7.7     TSH:   Recent Labs   Lab 09/06/24 2140   TSH 1.403     Urine Studies:   Recent Labs   Lab 09/06/24 2158   COLORU Yellow   APPEARANCEUA Clear   PHUR 6.0   SPECGRAV 1.025   PROTEINUA Trace*   GLUCUA Negative   KETONESU Negative   BILIRUBINUA Negative   OCCULTUA Negative   NITRITE Negative   UROBILINOGEN 2.0-3.0*   LEUKOCYTESUR Negative     09/06/24 2150  ISTAT Lactate  Collected: 09/06/24 2146  Final result  Specimen: Blood    POC Lactate 3.05 High  mmol/L Sample VENOUS        Significant Imaging: I have reviewed all pertinent imaging results/findings within the past 24 hours.  Imaging Results              X-Ray Chest AP Portable (Final result)  Result time 09/06/24 21:32:38      Final result by Say Patterson MD (09/06/24 21:32:38)                   Impression:      Mild streaky bibasilar opacities favored to reflect atelectasis or scarring, similar to prior examinations.  No new large confluent airspace consolidation identified, noting early viral pneumonia may be radiographically occult.      Electronically signed by: Say Patterson MD  Date:    09/06/2024  Time:    21:32               Narrative:    EXAMINATION:  XR CHEST AP PORTABLE    CLINICAL HISTORY:  COVID-19;    TECHNIQUE:  Single frontal view of the chest was performed.    COMPARISON:  03/08/2024    FINDINGS:  Cardiac monitoring leads overlie the chest.  The cardiac silhouette is not significantly enlarged.  There are mild streaky bibasilar airspace opacities favored to reflect atelectasis or scarring.  No large confluent airspace consolidation identified.  No significant volume of pleural fluid or pneumothorax appreciated.  Osseous structures demonstrate degenerative changes.                                      Assessment/Plan:     * Pneumonia of both lower lobes  Patient has a diagnosis of pneumonia. The cause of the pneumonia is suspected to be bacterial in etiology but organism is not known. The pneumonia  is stable. The patient has the following signs/symptoms of pneumonia: cough. The patient does not have a current oxygen requirement and the patient does not have a home oxygen requirement. I have reviewed the pertinent imaging. The following cultures have been collected: Blood cultures and Sputum culture The culture results are listed below.     Current antimicrobial regimen consists of the antibiotics listed below. Will monitor patient closely and continue current treatment plan unchanged.    Antibiotics (From admission, onward)      Start     Stop Route Frequency Ordered    09/07/24 2230  levoFLOXacin 750 mg/150 mL IVPB 750 mg         -- IV Every 24 hours (non-standard times) 09/07/24 0242            Microbiology Results (last 7 days)       Procedure Component Value Units Date/Time    Blood culture #1 **CANNOT BE ORDERED STAT** [[id number]] Collected: 09/06/24 2123    Order Status: Sent Specimen: Blood from Peripheral, Antecubital, Left Updated: 09/06/24 2150    Blood culture #2 **CANNOT BE ORDERED STAT** [[id number]] Collected: 09/06/24 2123    Order Status: Sent Specimen: Blood from Peripheral, Antecubital, Right Updated: 09/06/24 2150            Benign prostatic hyperplasia with lower urinary tract symptoms  Noted, continue flomax      Hyperlipidemia   Patient is chronically on statin.will continue for now. Monitor clinically. Last LDL was   Lab Results   Component Value Date    LDLCALC 75.4 01/29/2024            CAD (coronary artery disease)  Patient with known CAD  which is controlled Will continue ASA, Plavix, and Statin and monitor for S/Sx of angina/ACS. Continue to monitor on telemetry.     Cigarette smoker  Dangers of cigarette smoking were reviewed with patient in detail for 5 minutes and patient was encouraged to quit. Nicotine replacement options were discussed. Nicotine replacement options were discussed. Nicotine replacement was prescribed.        Type 2 diabetes mellitus with circulatory disorder,  with long-term current use of insulin  Patient's FSGs are controlled on current medication regimen.  Last A1c reviewed-   Lab Results   Component Value Date    HGBA1C 6.1 03/08/2024     Most recent fingerstick glucose reviewed-   Recent Labs   Lab 09/07/24  0304   POCTGLUCOSE 126*     Current correctional scale  Medium  Maintain anti-hyperglycemic dose as follows-   Antihyperglycemics (From admission, onward)      Start     Stop Route Frequency Ordered    09/07/24 0537  insulin aspart U-100 pen 0-10 Units         -- SubQ Before meals & nightly PRN 09/07/24 0438          Hold Oral hypoglycemics while patient is in the hospital.        COPD (chronic obstructive pulmonary disease)  Patient's COPD is controlled currently.  Patient is currently off COPD Pathway. Continue scheduled inhalers Supplemental oxygen and monitor respiratory status closely.     Benign essential HTN  Chronic, controlled. Latest blood pressure and vitals reviewed-     Temp:  [98 °F (36.7 °C)-99.2 °F (37.3 °C)]   Pulse:  []   Resp:  [16-20]   BP: (110-154)/(56-84)   SpO2:  [93 %-97 %] .   Home meds for hypertension were reviewed and noted below.   Hypertension Medications               amLODIPine (NORVASC) 5 MG tablet Take 1 tablet (5 mg total) by mouth once daily.    carvediloL (COREG) 3.125 MG tablet Take 2 tablets (6.25 mg total) by mouth 2 (two) times daily with meals.    losartan (COZAAR) 100 MG tablet Take 100 mg by mouth.    nitroGLYCERIN (NITROSTAT) 0.4 MG SL tablet Place 1 tablet (0.4 mg total) under the tongue every 5 (five) minutes as needed for Chest pain.            While in the hospital, will manage blood pressure as follows; Continue home antihypertensive regimen    Will utilize p.r.n. blood pressure medication only if patient's blood pressure greater than 180/110 and he develops symptoms such as worsening chest pain or shortness of breath.      VTE Risk Mitigation (From admission, onward)           Ordered     IP VTE LOW RISK  PATIENT  Once         09/06/24 2317     Place sequential compression device  Until discontinued         09/06/24 2317                                    ABRAHAN Cook  Department of Hospital Medicine  [facility]/Surg

## 2024-09-07 NOTE — ASSESSMENT & PLAN NOTE
Patient has a diagnosis of pneumonia. The cause of the pneumonia is suspected to be bacterial in etiology but organism is not known. The pneumonia is stable. The patient has the following signs/symptoms of pneumonia: cough. The patient does not have a current oxygen requirement and the patient does not have a home oxygen requirement. I have reviewed the pertinent imaging. The following cultures have been collected: Blood cultures and Sputum culture The culture results are listed below.     Current antimicrobial regimen consists of the antibiotics listed below. Will monitor patient closely and continue current treatment plan unchanged.    Antibiotics (From admission, onward)      Start     Stop Route Frequency Ordered    09/07/24 2230  levoFLOXacin 750 mg/150 mL IVPB 750 mg         -- IV Every 24 hours (non-standard times) 09/07/24 0242            Microbiology Results (last 7 days)       Procedure Component Value Units Date/Time    Blood culture #1 **CANNOT BE ORDERED STAT** [[id number]] Collected: 09/06/24 2123    Order Status: Sent Specimen: Blood from Peripheral, Antecubital, Left Updated: 09/06/24 2150    Blood culture #2 **CANNOT BE ORDERED STAT** [[id number]] Collected: 09/06/24 2123    Order Status: Sent Specimen: Blood from Peripheral, Antecubital, Right Updated: 09/06/24 2150

## 2024-09-07 NOTE — SUBJECTIVE & OBJECTIVE
Past Medical History:   Diagnosis Date    Abdominal pain 2022    CHF (congestive heart failure)     Diabetes mellitus 2018    Emphysema lung     Endocarditis 2011    Hypertension     Stroke 2011    denies residual       Past Surgical History:   Procedure Laterality Date    ANGIOGRAM, CORONARY, WITH LEFT HEART CATHETERIZATION N/A 10/10/2023    Procedure: Angiogram, Coronary, with Left Heart Cath;  Surgeon: Dieudonne Ryan MD;  Location: [facility] CATH/EP LAB;  Service: Cardiology;  Laterality: N/A;    BACK SURGERY  1981    COLONOSCOPY N/A 2/23/2023    Procedure: COLONOSCOPY;  Surgeon: Jonn Cruz MD;  Location: [facility] ENDO;  Service: Endoscopy;  Laterality: N/A;    CORONARY ANGIOGRAPHY N/A 5/9/2023    Procedure: ANGIOGRAM, CORONARY ARTERY;  Surgeon: Antonio Kessler MD;  Location: [facility] CATH/EP LAB;  Service: Cardiology;  Laterality: N/A;    EXCISION OF HYDROCELE      x2    FRACTIONAL FLOW RESERVE (FFR), CORONARY  5/11/2023    Procedure: Fractional Flow [place] (FFR), Coronary;  Surgeon: Antonio Kessler MD;  Location: [facility] CATH/EP LAB;  Service: Cardiology;;    INGUINAL HERNIA REPAIR  1960    INSTANTANEOUS WAVE-FREE RATIO (IFR) N/A 10/10/2023    Procedure: Instantaneous Wave-Free Ratio (IFR);  Surgeon: Dieudonne Ryan MD;  Location: [facility] CATH/EP LAB;  Service: Cardiology;  Laterality: N/A;    INTRAMEDULLARY RODDING OF FEMUR Left 3/9/2024    Procedure: INSERTION, INTRAMEDULLARY HUI, FEMUR;  Surgeon: Tarun Hdez MD;  Location: [facility] OR;  Service: Orthopedics;  Laterality: Left;    INTRAVASCULAR ULTRASOUND, CORONARY N/A 5/9/2023    Procedure: Ultrasound-coronary;  Surgeon: Antonio Kessler MD;  Location: [facility] CATH/EP LAB;  Service: Cardiology;  Laterality: N/A;    IVUS, CORONARY  5/11/2023    Procedure: IVUS, Coronary;  Surgeon: Antonio Kessler MD;  Location: [facility] CATH/EP LAB;  Service: Cardiology;;    LEFT HEART CATHETERIZATION Left 5/11/2023    Procedure: Left heart cath;  Surgeon: Antonio Kessler MD;   Location: [facility] CATH/EP LAB;  Service: Cardiology;  Laterality: Left;    PERCUTANEOUS TRANSLUMINAL BALLOON ANGIOPLASTY OF CORONARY ARTERY  5/9/2023    Procedure: Angioplasty-coronary;  Surgeon: Antonio Kessler MD;  Location: [facility] CATH/EP LAB;  Service: Cardiology;;    RHINOPLASTY      STENT, DRUG ELUTING, SINGLE VESSEL, CORONARY  5/9/2023    Procedure: Stent, Drug Eluting, Single Vessel, Coronary;  Surgeon: Antonio Kessler MD;  Location: [facility] CATH/EP LAB;  Service: Cardiology;;    SURGICAL REMOVAL OF NODULE OF VOCAL CORD         Review of patient's allergies indicates:   Allergen Reactions    Amoxicillin Shortness Of Breath and Edema       No current facility-administered medications on file prior to encounter.     Current Outpatient Medications on File Prior to Encounter   Medication Sig    acetaminophen (TYLENOL) 325 MG tablet Take 2 tablets (650 mg total) by mouth every 8 (eight) hours.    albuterol (PROVENTIL/VENTOLIN HFA) 90 mcg/actuation inhaler Inhale 2 puffs into the lungs every 6 (six) hours as needed for Wheezing. Rescue    amLODIPine (NORVASC) 5 MG tablet Take 1 tablet (5 mg total) by mouth once daily.    aspirin 81 MG Chew Take 1 tablet (81 mg total) by mouth 2 (two) times a day for 24 days, THEN 1 tablet (81 mg total) once daily.    carvediloL (COREG) 3.125 MG tablet Take 2 tablets (6.25 mg total) by mouth 2 (two) times daily with meals.    cyclobenzaprine (FLEXERIL) 5 MG tablet Take 1 tablet (5 mg total) by mouth every 8 (eight) hours as needed for muscle spasms.    famotidine (PEPCID) 20 MG tablet Take 1 tablet (20 mg total) by mouth 2 (two) times daily.    gabapentin (NEURONTIN) 300 MG capsule Take 300 mg by mouth.    hydrOXYzine HCL (ATARAX) 50 MG tablet Take 1 tablet (50 mg total) by mouth 3 (three) times daily as needed for Anxiety.    losartan (COZAAR) 100 MG tablet Take 100 mg by mouth.    multivitamin Tab Take 1 tablet by mouth once daily.    traZODone (DESYREL) 100 MG tablet Take 1 tablet (100 mg  total) by mouth nightly. For deperession    [DISCONTINUED] melatonin (MELATIN) 3 mg tablet Take 3 tablets (9 mg total) by mouth nightly as needed for Insomnia.    atorvastatin (LIPITOR) 40 MG tablet Take 1 tablet (40 mg total) by mouth once daily.    clopidogreL (PLAVIX) 75 mg tablet Take 1 tablet (75 mg total) by mouth once daily. (Patient taking differently: Take 75 mg by mouth every evening.)    nitroGLYCERIN (NITROSTAT) 0.4 MG SL tablet Place 1 tablet (0.4 mg total) under the tongue every 5 (five) minutes as needed for Chest pain.    ondansetron (ZOFRAN) 4 MG tablet Take 1 tablet (4 mg total) by mouth every 8 (eight) hours as needed for Nausea.    polyethylene glycol (GLYCOLAX) 17 gram PwPk Take 17 g by mouth 2 (two) times daily.    tamsulosin (FLOMAX) 0.4 mg Cap Take 0.4 mg by mouth.    traMADoL (ULTRAM) 50 mg tablet Take 1 tablet (50 mg total) by mouth every 6 (six) hours as needed for Pain.    [DISCONTINUED] insulin aspart U-100 (NOVOLOG) 100 unit/mL (3 mL) InPn pen Inject 0-10 Units into the skin before meals and at bedtime as needed (Hyperglycemia). **MODERATE CORRECTION DOSE**  Blood Glucose  mg/dL                  Pre-meal              151-200                2 units                     201-250                4 units                  251-300                6 units                      301-350                8 units                     >350                     10 units                  Administer subcutaneously if needed at times designated by monitoring  schedule.    [DISCONTINUED] insulin glargine 100 units/mL SubQ pen Inject 35 Units into the skin 2 (two) times a day.    [DISCONTINUED] insulin glargine-yfgn 100 unit/mL Soln Inject into the skin.    [DISCONTINUED] isosorbide mononitrate (IMDUR) 30 MG 24 hr tablet     [DISCONTINUED] lactulose (CHRONULAC) 20 gram/30 mL Soln Take 30 mLs (20 g total) by mouth 3 (three) times daily as needed.    [DISCONTINUED] rosuvastatin (CRESTOR) 10 MG tablet Take 5 mg by  mouth.    [DISCONTINUED] senna-docusate 8.6-50 mg (PERICOLACE) 8.6-50 mg per tablet Take 1 tablet by mouth 2 (two) times daily.     Family History    None       Tobacco Use    Smoking status: Every Day     Types: Cigarettes    Smokeless tobacco: Former    Tobacco comments:     Pt ready to quit smoking and states he can on his own. Seen 10/9/23 for inpatient smoking cessation. [employer].     Occasionally   Substance and Sexual Activity    Alcohol use: Yes     Alcohol/week: 14.0 standard drinks of alcohol     Types: 14 Shots of liquor per week    Drug use: Yes     Types: Marijuana    Sexual activity: Not on file     Review of Systems   Constitutional:  Positive for activity change, chills and fatigue. Negative for fever.   HENT:  Negative for congestion and sore throat.    Eyes:  Negative for visual disturbance.   Respiratory:  Positive for cough. Negative for shortness of breath.    Cardiovascular:  Negative for chest pain and palpitations.   Gastrointestinal:  Positive for nausea. Negative for abdominal pain, constipation, diarrhea and vomiting.   Endocrine: Negative for cold intolerance and heat intolerance.   Genitourinary:  Negative for dysuria and hematuria.   Musculoskeletal:  Positive for arthralgias and myalgias.   Skin:  Negative for rash.   Neurological:  Positive for headaches. Negative for tremors and seizures.   Hematological:  Negative for adenopathy. Does not bruise/bleed easily.   All other systems reviewed and are negative.    Objective:     Vital Signs (Most Recent):  Temp: 98 °F (36.7 °C) (09/07/24 0328)  Pulse: 98 (09/07/24 0328)  Resp: 18 (09/07/24 0410)  BP: (!) 110/56 (09/07/24 0328)  SpO2: (!) 93 % (09/07/24 0328) Vital Signs (24h Range):  Temp:  [98 °F (36.7 °C)-99.2 °F (37.3 °C)] 98 °F (36.7 °C)  Pulse:  [] 98  Resp:  [16-20] 18  SpO2:  [93 %-97 %] 93 %  BP: (110-154)/(56-84) 110/56     Weight: 90.7 kg (199 lb 15.3 oz)  Body mass index is 26.38 kg/m².     Physical  Exam  Vitals and nursing note reviewed.   Constitutional:       General: He is awake. He is not in acute distress.     Appearance: Normal appearance. He is well-developed. He is ill-appearing (chronically ill appearing).   HENT:      Head: Normocephalic and atraumatic.      Nose: Nose normal. No septal deviation.      Mouth/Throat:      Lips: Pink.      Mouth: Mucous membranes are moist.   Eyes:      Conjunctiva/sclera: Conjunctivae normal.      Pupils: Pupils are equal, round, and reactive to light.   Neck:      Thyroid: No thyroid mass.      Vascular: No JVD.      Trachea: No tracheal tenderness or tracheal deviation.   Cardiovascular:      Rate and Rhythm: Normal rate and regular rhythm.      Heart sounds: Normal heart sounds, S1 normal and S2 normal. No murmur heard.     No friction rub. No gallop.   Pulmonary:      Effort: Pulmonary effort is normal.      Breath sounds: Examination of the right-lower field reveals rhonchi. Examination of the left-lower field reveals rhonchi. Rhonchi present. No decreased breath sounds, wheezing or rales.   Abdominal:      General: Bowel sounds are normal. There is no distension.      Palpations: Abdomen is soft. There is no hepatomegaly, splenomegaly or mass.      Tenderness: There is no abdominal tenderness.   Skin:     General: Skin is warm and dry.      Capillary Refill: Capillary refill takes less than 2 seconds.      Findings: No rash.   Neurological:      General: No focal deficit present.      Mental Status: He is alert and oriented to person, place, and time. Mental status is at baseline.      GCS: GCS eye subscore is 4. GCS verbal subscore is 5. GCS motor subscore is 6.      Cranial Nerves: No cranial nerve deficit.      Sensory: Sensation is intact. No sensory deficit.      Motor: Motor function is intact.   Psychiatric:         Mood and Affect: Mood normal.         Behavior: Behavior normal. Behavior is cooperative.              CRANIAL NERVES     CN III, IV, VI    Pupils are equal, round, and reactive to light.       Significant Labs: All pertinent labs within the past 24 hours have been reviewed.  CBC:   Recent Labs   Lab 09/06/24 2140   WBC 8.24   HGB 14.5   HCT 42.1        CMP:   Recent Labs   Lab 09/06/24 2140      K 3.9      CO2 21*   *   BUN 13   CREATININE 0.9   CALCIUM 9.5   PROT 7.7   ALBUMIN 4.5   BILITOT 0.4   ALKPHOS 77   AST 12   ALT 9*   ANIONGAP 14     Cardiac Markers:   Recent Labs   Lab 09/06/24 2140   BNP 75       POCT Glucose:   Recent Labs   Lab 09/07/24  0304   POCTGLUCOSE 126*     Troponin:   Recent Labs   Lab 09/06/24 2140   TROPONINIHS 7.7     TSH:   Recent Labs   Lab 09/06/24 2140   TSH 1.403     Urine Studies:   Recent Labs   Lab 09/06/24 2158   COLORU Yellow   APPEARANCEUA Clear   PHUR 6.0   SPECGRAV 1.025   PROTEINUA Trace*   GLUCUA Negative   KETONESU Negative   BILIRUBINUA Negative   OCCULTUA Negative   NITRITE Negative   UROBILINOGEN 2.0-3.0*   LEUKOCYTESUR Negative     09/06/24 2150  ISTAT Lactate  Collected: 09/06/24 2146  Final result  Specimen: Blood    POC Lactate 3.05 High  mmol/L Sample VENOUS        Significant Imaging: I have reviewed all pertinent imaging results/findings within the past 24 hours.  Imaging Results              X-Ray Chest AP Portable (Final result)  Result time 09/06/24 21:32:38      Final result by Say Patterson MD (09/06/24 21:32:38)                   Impression:      Mild streaky bibasilar opacities favored to reflect atelectasis or scarring, similar to prior examinations.  No new large confluent airspace consolidation identified, noting early viral pneumonia may be radiographically occult.      Electronically signed by: Say Patterson MD  Date:    09/06/2024  Time:    21:32               Narrative:    EXAMINATION:  XR CHEST AP PORTABLE    CLINICAL HISTORY:  COVID-19;    TECHNIQUE:  Single frontal view of the chest was  performed.    COMPARISON:  03/08/2024    FINDINGS:  Cardiac monitoring leads overlie the chest.  The cardiac silhouette is not significantly enlarged.  There are mild streaky bibasilar airspace opacities favored to reflect atelectasis or scarring.  No large confluent airspace consolidation identified.  No significant volume of pleural fluid or pneumothorax appreciated.  Osseous structures demonstrate degenerative changes.

## 2024-09-07 NOTE — ASSESSMENT & PLAN NOTE
Dangers of cigarette smoking were reviewed with patient in detail for 5 minutes and patient was encouraged to quit. Nicotine replacement options were discussed. Nicotine replacement options were discussed. Nicotine replacement was prescribed.

## 2024-09-08 LAB
ANION GAP SERPL CALC-SCNC: 10 MMOL/L (ref 8–16)
BASOPHILS # BLD AUTO: 0.04 K/UL (ref 0–0.2)
BASOPHILS NFR BLD: 0.6 % (ref 0–1.9)
BUN SERPL-MCNC: 19 MG/DL (ref 8–23)
CALCIUM SERPL-MCNC: 9.4 MG/DL (ref 8.7–10.5)
CHLORIDE SERPL-SCNC: 106 MMOL/L (ref 95–110)
CO2 SERPL-SCNC: 20 MMOL/L (ref 23–29)
CREAT SERPL-MCNC: 0.8 MG/DL (ref 0.5–1.4)
DIFFERENTIAL METHOD BLD: ABNORMAL
EOSINOPHIL # BLD AUTO: 0 K/UL (ref 0–0.5)
EOSINOPHIL NFR BLD: 0 % (ref 0–8)
ERYTHROCYTE [DISTWIDTH] IN BLOOD BY AUTOMATED COUNT: 13.3 % (ref 11.5–14.5)
EST. GFR  (NO RACE VARIABLE): >60 ML/MIN/1.73 M^2
GLUCOSE SERPL-MCNC: 247 MG/DL (ref 70–110)
HCT VFR BLD AUTO: 36.5 % (ref 40–54)
HGB BLD-MCNC: 12.2 G/DL (ref 14–18)
IMM GRANULOCYTES # BLD AUTO: 0.02 K/UL (ref 0–0.04)
IMM GRANULOCYTES NFR BLD AUTO: 0.3 % (ref 0–0.5)
LYMPHOCYTES # BLD AUTO: 1.1 K/UL (ref 1–4.8)
LYMPHOCYTES NFR BLD: 17.4 % (ref 18–48)
MAGNESIUM SERPL-MCNC: 2.1 MG/DL (ref 1.6–2.6)
MCH RBC QN AUTO: 31.5 PG (ref 27–31)
MCHC RBC AUTO-ENTMCNC: 33.4 G/DL (ref 32–36)
MCV RBC AUTO: 94 FL (ref 82–98)
MONOCYTES # BLD AUTO: 0.7 K/UL (ref 0.3–1)
MONOCYTES NFR BLD: 11 % (ref 4–15)
NEUTROPHILS # BLD AUTO: 4.6 K/UL (ref 1.8–7.7)
NEUTROPHILS NFR BLD: 70.7 % (ref 38–73)
NRBC BLD-RTO: 0 /100 WBC
PHOSPHATE SERPL-MCNC: 2.1 MG/DL (ref 2.7–4.5)
PLATELET # BLD AUTO: 196 K/UL (ref 150–450)
PMV BLD AUTO: 9.2 FL (ref 9.2–12.9)
POCT GLUCOSE: 210 MG/DL (ref 70–110)
POCT GLUCOSE: 332 MG/DL (ref 70–110)
POCT GLUCOSE: 339 MG/DL (ref 70–110)
POTASSIUM SERPL-SCNC: 4.5 MMOL/L (ref 3.5–5.1)
RBC # BLD AUTO: 3.87 M/UL (ref 4.6–6.2)
SODIUM SERPL-SCNC: 136 MMOL/L (ref 136–145)
WBC # BLD AUTO: 6.45 K/UL (ref 3.9–12.7)

## 2024-09-08 PROCEDURE — 27000221 HC OXYGEN, UP TO 24 HOURS

## 2024-09-08 PROCEDURE — 94761 N-INVAS EAR/PLS OXIMETRY MLT: CPT

## 2024-09-08 PROCEDURE — 63600175 PHARM REV CODE 636 W HCPCS: Performed by: STUDENT IN AN ORGANIZED HEALTH CARE EDUCATION/TRAINING PROGRAM

## 2024-09-08 PROCEDURE — 99900031 HC PATIENT EDUCATION (STAT)

## 2024-09-08 PROCEDURE — 97162 PT EVAL MOD COMPLEX 30 MIN: CPT

## 2024-09-08 PROCEDURE — 97535 SELF CARE MNGMENT TRAINING: CPT

## 2024-09-08 PROCEDURE — 25000003 PHARM REV CODE 250: Performed by: STUDENT IN AN ORGANIZED HEALTH CARE EDUCATION/TRAINING PROGRAM

## 2024-09-08 PROCEDURE — 80048 BASIC METABOLIC PNL TOTAL CA: CPT | Performed by: STUDENT IN AN ORGANIZED HEALTH CARE EDUCATION/TRAINING PROGRAM

## 2024-09-08 PROCEDURE — 94640 AIRWAY INHALATION TREATMENT: CPT

## 2024-09-08 PROCEDURE — 99900035 HC TECH TIME PER 15 MIN (STAT)

## 2024-09-08 PROCEDURE — 84100 ASSAY OF PHOSPHORUS: CPT | Performed by: NURSE PRACTITIONER

## 2024-09-08 PROCEDURE — 11000001 HC ACUTE MED/SURG PRIVATE ROOM

## 2024-09-08 PROCEDURE — 36415 COLL VENOUS BLD VENIPUNCTURE: CPT | Performed by: NURSE PRACTITIONER

## 2024-09-08 PROCEDURE — 25000242 PHARM REV CODE 250 ALT 637 W/ HCPCS: Performed by: STUDENT IN AN ORGANIZED HEALTH CARE EDUCATION/TRAINING PROGRAM

## 2024-09-08 PROCEDURE — 97165 OT EVAL LOW COMPLEX 30 MIN: CPT

## 2024-09-08 PROCEDURE — 25000003 PHARM REV CODE 250: Performed by: NURSE PRACTITIONER

## 2024-09-08 PROCEDURE — S4991 NICOTINE PATCH NONLEGEND: HCPCS | Performed by: STUDENT IN AN ORGANIZED HEALTH CARE EDUCATION/TRAINING PROGRAM

## 2024-09-08 PROCEDURE — 94618 PULMONARY STRESS TESTING: CPT

## 2024-09-08 PROCEDURE — 85025 COMPLETE CBC W/AUTO DIFF WBC: CPT | Performed by: NURSE PRACTITIONER

## 2024-09-08 PROCEDURE — 63700000 PHARM REV CODE 250 ALT 637 W/O HCPCS: Performed by: STUDENT IN AN ORGANIZED HEALTH CARE EDUCATION/TRAINING PROGRAM

## 2024-09-08 PROCEDURE — 83735 ASSAY OF MAGNESIUM: CPT | Performed by: NURSE PRACTITIONER

## 2024-09-08 RX ADMIN — CARVEDILOL 6.25 MG: 6.25 TABLET, FILM COATED ORAL at 10:09

## 2024-09-08 RX ADMIN — ATORVASTATIN CALCIUM 40 MG: 40 TABLET, FILM COATED ORAL at 10:09

## 2024-09-08 RX ADMIN — GABAPENTIN 300 MG: 300 CAPSULE ORAL at 09:09

## 2024-09-08 RX ADMIN — PREDNISONE 40 MG: 20 TABLET ORAL at 10:09

## 2024-09-08 RX ADMIN — HYDROCODONE BITARTRATE AND ACETAMINOPHEN 1 TABLET: 7.5; 325 TABLET ORAL at 12:09

## 2024-09-08 RX ADMIN — IPRATROPIUM BROMIDE AND ALBUTEROL SULFATE 3 ML: .5; 3 SOLUTION RESPIRATORY (INHALATION) at 07:09

## 2024-09-08 RX ADMIN — INSULIN ASPART 8 UNITS: 100 INJECTION, SOLUTION INTRAVENOUS; SUBCUTANEOUS at 06:09

## 2024-09-08 RX ADMIN — LOSARTAN POTASSIUM 100 MG: 100 TABLET, FILM COATED ORAL at 10:09

## 2024-09-08 RX ADMIN — HYDROCODONE BITARTRATE AND ACETAMINOPHEN 1 TABLET: 7.5; 325 TABLET ORAL at 05:09

## 2024-09-08 RX ADMIN — GABAPENTIN 300 MG: 300 CAPSULE ORAL at 10:09

## 2024-09-08 RX ADMIN — ASPIRIN 81 MG CHEWABLE TABLET 81 MG: 81 TABLET CHEWABLE at 10:09

## 2024-09-08 RX ADMIN — INSULIN ASPART 4 UNITS: 100 INJECTION, SOLUTION INTRAVENOUS; SUBCUTANEOUS at 01:09

## 2024-09-08 RX ADMIN — FAMOTIDINE 20 MG: 20 TABLET, FILM COATED ORAL at 10:09

## 2024-09-08 RX ADMIN — HYDROCODONE BITARTRATE AND ACETAMINOPHEN 1 TABLET: 7.5; 325 TABLET ORAL at 06:09

## 2024-09-08 RX ADMIN — IPRATROPIUM BROMIDE AND ALBUTEROL SULFATE 3 ML: .5; 3 SOLUTION RESPIRATORY (INHALATION) at 12:09

## 2024-09-08 RX ADMIN — MELATONIN TAB 3 MG 9 MG: 3 TAB at 09:09

## 2024-09-08 RX ADMIN — ACETAMINOPHEN 650 MG: 325 TABLET ORAL at 06:09

## 2024-09-08 RX ADMIN — AMLODIPINE BESYLATE 5 MG: 5 TABLET ORAL at 10:09

## 2024-09-08 RX ADMIN — Medication 1 PATCH: at 10:09

## 2024-09-08 RX ADMIN — IPRATROPIUM BROMIDE AND ALBUTEROL SULFATE 3 ML: .5; 3 SOLUTION RESPIRATORY (INHALATION) at 01:09

## 2024-09-08 RX ADMIN — POTASSIUM & SODIUM PHOSPHATES POWDER PACK 280-160-250 MG 2 PACKET: 280-160-250 PACK at 06:09

## 2024-09-08 RX ADMIN — AZITHROMYCIN DIHYDRATE 500 MG: 250 TABLET ORAL at 10:09

## 2024-09-08 RX ADMIN — TAMSULOSIN HYDROCHLORIDE 0.4 MG: 0.4 CAPSULE ORAL at 10:09

## 2024-09-08 RX ADMIN — CLOPIDOGREL BISULFATE 75 MG: 75 TABLET, FILM COATED ORAL at 10:09

## 2024-09-08 RX ADMIN — DOCUSATE SODIUM AND SENNOSIDES 1 TABLET: 8.6; 5 TABLET, FILM COATED ORAL at 10:09

## 2024-09-08 RX ADMIN — CARVEDILOL 6.25 MG: 6.25 TABLET, FILM COATED ORAL at 05:09

## 2024-09-08 RX ADMIN — FAMOTIDINE 20 MG: 20 TABLET, FILM COATED ORAL at 09:09

## 2024-09-08 RX ADMIN — INSULIN ASPART 4 UNITS: 100 INJECTION, SOLUTION INTRAVENOUS; SUBCUTANEOUS at 09:09

## 2024-09-08 NOTE — PT/OT/SLP EVAL
"Physical Therapy Evaluation    Patient Name:  Sampson Holt   MRN:  [id number]    Recommendations:     Discharge Recommendations: No Therapy Indicated   Discharge Equipment Recommendations: none   Barriers to discharge: None    Assessment:     Sampson Holt is a 68 y.o. male admitted with a medical diagnosis of Pneumonia of both lower lobes.  He presents with the following impairments/functional limitations: weakness, impaired endurance, impaired self care skills, impaired functional mobility, gait instability, impaired balance, decreased safety awareness, impaired cardiopulmonary response to activity, decreased lower extremity function Pt is agreeable to participate in PT evaluation. At baseline pt ambulates with rolling walker. He lives alone and has a ramp to enter his home. He has a rolling walker, wheelchair, shower chair, and bedside commode at home. Upon evaluation pt performs supine to sit with MOD I, sit to stand with CGA using RW, and ambulates 100ft using RW with CGA for steadying.     Rehab Prognosis: Good; patient would benefit from acute skilled PT services to address these deficits and reach maximum level of function.    Recent Surgery: * No surgery found *      Plan:     During this hospitalization, patient to be seen 5 x/week to address the identified rehab impairments via gait training, therapeutic activities, therapeutic exercises and progress toward the following goals:    Plan of Care Expires:  10/08/24    Subjective     Chief Complaint: "I have black mold in my house and it has caused my hands, shoulders, and knees to start hurting. I have inflammation in my body and that is what brought me to the hospital. I just finished rehab for my hip and am doing really well."     Patient/Family Comments/goals: maximize functional strength and mobility prior to discharge.     Pain/Comfort:       Patients cultural, spiritual, [place] conflicts given the current situation:      Living " Environment:  Lives alone in mobile home with ramp to enter     Prior to admission, patients level of function was modified independent.  Equipment used at home: bedside commode, walker, standard, wheelchair, shower chair.  DME owned (not currently used): none.  Upon discharge, patient will have assistance from self.    Objective:     Communicated with nurse [place] prior to session.  Patient found supine with peripheral IV  upon PT entry to room.    General Precautions: Standard, fall  Orthopedic Precautions:N/A   Braces: N/A  Respiratory Status: Room air    Exams:  Cognitive Exam:  Patient is oriented to Person, Place, Time, and Situation  RUE ROM: Deficits: limited overhead flexion and abduction  RUE Strength: WFL  LUE ROM: Deficits: limited overhead flexion and abduction  LUE Strength: WFL  RLE ROM: WFL  RLE Strength: WFL  LLE ROM: WFL  LLE Strength: Deficits: hip flexion and knee extension 4-/5    Functional Mobility:  Bed Mobility:     Supine to Sit: modified independence  Transfers:     Sit to Stand:  contact guard assistance with rolling walker  Gait: 100ft using RW with CGA for steadying      AM-PAC 6 CLICK MOBILITY  Total Score:        Treatment & Education:    Assessment as above    Patient left sitting edge of bed with  nurse present.    GOALS:   Multidisciplinary Problems       Physical Therapy Goals          Problem: Physical Therapy    Goal Priority Disciplines Outcome Goal Variances Interventions   Physical Therapy Goal     PT, PT/OT Progressing     Description: Goals to be met by: 10/08/24      Patient will increase functional independence with mobility by performin. Gait  x 250 feet with Modified [place] using Rolling Walker.   2. Lower extremity exercise program x30 reps per handout, with independence                           History:     Past Medical History:   Diagnosis Date    Abdominal pain     CHF (congestive heart failure)     Diabetes mellitus 2018    Emphysema lung      Endocarditis 2011    Hypertension     Stroke 2011    denies residual       Past Surgical History:   Procedure Laterality Date    ANGIOGRAM, CORONARY, WITH LEFT HEART CATHETERIZATION N/A 10/10/2023    Procedure: Angiogram, Coronary, with Left Heart Cath;  Surgeon: Dieudonne Ryan MD;  Location: [facility] CATH/EP LAB;  Service: Cardiology;  Laterality: N/A;    BACK SURGERY  1981    COLONOSCOPY N/A 2/23/2023    Procedure: COLONOSCOPY;  Surgeon: Jonn Cruz MD;  Location: [facility] ENDO;  Service: Endoscopy;  Laterality: N/A;    CORONARY ANGIOGRAPHY N/A 5/9/2023    Procedure: ANGIOGRAM, CORONARY ARTERY;  Surgeon: Antonio Kessler MD;  Location: [facility] CATH/EP LAB;  Service: Cardiology;  Laterality: N/A;    EXCISION OF HYDROCELE      x2    FRACTIONAL FLOW RESERVE (FFR), CORONARY  5/11/2023    Procedure: Fractional Flow [place] (FFR), Coronary;  Surgeon: Antonio Kessler MD;  Location: [facility] CATH/EP LAB;  Service: Cardiology;;    INGUINAL HERNIA REPAIR  1960    INSTANTANEOUS WAVE-FREE RATIO (IFR) N/A 10/10/2023    Procedure: Instantaneous Wave-Free Ratio (IFR);  Surgeon: Dieudonne Ryan MD;  Location: [facility] CATH/EP LAB;  Service: Cardiology;  Laterality: N/A;    INTRAMEDULLARY RODDING OF FEMUR Left 3/9/2024    Procedure: INSERTION, INTRAMEDULLARY HUI, FEMUR;  Surgeon: Tarun Hdez MD;  Location: [facility] OR;  Service: Orthopedics;  Laterality: Left;    INTRAVASCULAR ULTRASOUND, CORONARY N/A 5/9/2023    Procedure: Ultrasound-coronary;  Surgeon: Antonio Kessler MD;  Location: [facility] CATH/EP LAB;  Service: Cardiology;  Laterality: N/A;    IVUS, CORONARY  5/11/2023    Procedure: IVUS, Coronary;  Surgeon: Antonio Kessler MD;  Location: [facility] CATH/EP LAB;  Service: Cardiology;;    LEFT HEART CATHETERIZATION Left 5/11/2023    Procedure: Left heart cath;  Surgeon: Antonio Kessler MD;  Location: [facility] CATH/EP LAB;  Service: Cardiology;  Laterality: Left;    PERCUTANEOUS TRANSLUMINAL BALLOON ANGIOPLASTY OF CORONARY ARTERY  5/9/2023     Procedure: Angioplasty-coronary;  Surgeon: Antonio Kessler MD;  Location: [facility] CATH/EP LAB;  Service: Cardiology;;    RHINOPLASTY      STENT, DRUG ELUTING, SINGLE VESSEL, CORONARY  5/9/2023    Procedure: Stent, Drug Eluting, Single Vessel, Coronary;  Surgeon: Antonio Kessler MD;  Location: [facility] CATH/EP LAB;  Service: Cardiology;;    SURGICAL REMOVAL OF NODULE OF VOCAL CORD         Time Tracking:     PT Received On: 09/08/24  PT Start Time: 0845     PT Stop Time: 0903  PT Total Time (min): 18 min     Billable Minutes: Evaluation 18      09/10/2024

## 2024-09-08 NOTE — PLAN OF CARE
POC reviewed with pt, verbalized understanding. Pt AAO x 4, able to make needs known. Meds given per MAR. IV intact and patent. Purposeful q2hr rounding done. Safety maintained with side rails up x3, bed wheels locked, bed in lowest position, and call light in reach. Pt educated to call for assistance with ambulation, verbalized understanding. Pt remains free of falls. No further needs expressed at this time. Will continue to monitor.

## 2024-09-08 NOTE — PLAN OF CARE
Goals to be met by: 10/08/24     Patient will increase functional independence with mobility by performin. Gait  x 250 feet with Modified [place] using Rolling Walker.   2. Lower extremity exercise program x30 reps per handout, with independence

## 2024-09-08 NOTE — PLAN OF CARE
POC and meds reviewed, pt verbalized understanding. Vital signs stable, pt remains afebrile. Remains on room air. IV in place, SL. Pt ambulates to toilet independently, urinal at bedside. IS at bedside, instructed on use and return demonstration performed. PRN pain medication administered. Repositions self. Hourly/Q2hr rounding performed, safety maintained. Bed in lowest position, wheels locked, call light within reach, SR up x2. No complaints at this time. Will continue to monitor.

## 2024-09-08 NOTE — PLAN OF CARE
09/08/24 0700   Patient Assessment/Suction   Level of Consciousness (AVPU) alert   Respiratory Effort Normal;Unlabored   Expansion/Accessory Muscles/Retractions no use of accessory muscles   All Lung Fields Breath Sounds Anterior:;Lateral:;diminished   Rhythm/Pattern, Respiratory pattern regular   Cough Frequency infrequent   Cough Type nonproductive   Skin Integrity   $ Wound Care Tech Time 15 min   Area Observed Left;Right;Behind ear;Nares   Skin Appearance without discoloration   PRE-TX-O2   Device (Oxygen Therapy) nasal cannula   $ Is the patient on Low Flow Oxygen? Yes   Flow (L/min) (Oxygen Therapy) 2   Oxygen Concentration (%) 28   SpO2 95 %   Pulse Oximetry Type Intermittent   $ Pulse Oximetry - Multiple Charge Pulse Oximetry - Multiple   Pulse 92   Resp 20   Aerosol Therapy   $ Aerosol Therapy Charges Aerosol Treatment  (duoneb)   Daily Review of Necessity (SVN) completed   Respiratory Treatment Status (SVN) given   Treatment Route (SVN) mask;oxygen   Patient Position HOB elevated   Post Treatment Assessment (SVN) breath sounds improved   Signs of Intolerance (SVN) none   Breath Sounds Post-Respiratory Treatment   Throughout All Fields Post-Treatment All Fields   Throughout All Fields Post-Treatment aeration increased   Post-treatment Heart Rate (beats/min) 96   Post-treatment Resp Rate (breaths/min) 20   General Safety Checklist   Safety Promotion/Fall Prevention side rails raised   Respiratory Interventions   Cough And Deep Breathing done with encouragement   Ready to Wean/Extubation Screen   FIO2<=50 (chart decimal) 0.28   Education   $ Education Bronchodilator;Oxygen;15 min

## 2024-09-08 NOTE — RESPIRATORY THERAPY
09/08/24 1436   Home Oxygen Qualification   $ Home O2 Qualification Pulmonary Stress Test/6 min walk   Room Air SpO2 At Rest 92 %   Room Air SpO2 During Ambulation (!) 86 %   SpO2 During Ambulation on O2 93 %   Heart Rate on O2 84 bpm   Ambulation O2 LPM 2 LPM   SpO2 Post Ambulation 94 %   Post Ambulation Heart Rate 76 bpm   Post Ambulation O2 LPM 2 LPM   Home O2 Eval Comments Qualifies for home oxygen

## 2024-09-08 NOTE — PT/OT/SLP EVAL
Occupational Therapy   Evaluation    Name: Sampson Holt  MRN: [id number]  Admitting Diagnosis: Pneumonia of both lower lobes  Recent Surgery: * No surgery found *      Recommendations:     Discharge Recommendations: No Therapy Indicated  Discharge Equipment Recommendations:  none  Barriers to discharge:  Inaccessible home environment (mold in the home.)    Assessment:     Sampson Holt is a 68 y.o. male with a medical diagnosis of Pneumonia of both lower lobes.  He presents with general weakness. Performance deficits affecting function: weakness, impaired endurance, impaired self care skills, impaired functional mobility, gait instability, impaired balance, decreased safety awareness, decreased lower extremity function, decreased upper extremity function.      Rehab Prognosis: Good; patient would benefit from acute skilled OT services to address these deficits and reach maximum level of function.       Plan:     Patient to be seen 3 x/week to address the above listed problems via self-care/home management, therapeutic activities, therapeutic exercises  Plan of Care Expires: 10/08/24  Plan of Care Reviewed with: patient    Subjective     Chief Complaint: Joint pain and general weakness  Patient/Family Comments/goals: Reduced pain, improved functional mobility and ADL independence.    Occupational Profile:  Living Environment: yuli in a trailer with ramp to enter. (Patient reports that the trailer has mold in it).  Previous level of function: modified independence using rolling walker for community distances, no AD in the trailer to ambulate, perform ADLs and IADLs.   Roles and Routines: primary homemaker  Equipment Used at Home: bedside commode, walker, rolling, wheelchair, shower chair  Assistance upon Discharge: Limited support    Pain/Comfort:  Pain Rating 1: 7/10  Location 1:  (All of his joints.)  Pain Addressed 1: Distraction, Reposition  Pain Rating Post-Intervention 1: 7/10    Patients cultural,  spiritual, Orthodoxy conflicts given the current situation: no    Objective:     Communicated with: nurse prior to session.  Patient found sitting edge of bed with telemetry, peripheral IV upon OT entry to room.    General Precautions: Standard, fall  Orthopedic Precautions: N/A  Braces: N/A  Respiratory Status: Room air    Occupational Performance:    Functional Mobility/Transfers:  Patient completed Sit <> Stand Transfer with contact guard assistance  with  rolling walker   Patient completed Toilet Transfer Step Transfer technique with contact guard assistance with  rolling walker  Functional Mobility: ambulated 20 feet in the hospital room and bathroom with contact guard assistance using rolling walker.    Activities of Daily Living:  Grooming: minimum assistance to don/doff socks sitting EOB.  Lower Body Dressing: contact guard assistance to wash hands standing at sink.    Cognitive/Visual Perceptual:  Cognitive/Psychosocial Skills:     -       Oriented to: Person, Place, Time, and Situation   -       Follows Commands/attention:Follows multistep  commands  -       Communication: clear/fluent  -       Memory: No Deficits noted  -       Safety awareness/insight to disability: impaired   -       Mood/Affect/Coping skills/emotional control: Cooperative and Pleasant  Visual/Perceptual:      -Intact Acuity    Physical Exam:  Balance:    -       Sitting/Standing: Contact Guard  Upper Extremity Range of Motion:     -       Right Upper Extremity: WFL  -       Left Upper Extremity: WFL  Upper Extremity Strength:    -       Right Upper Extremity: 4/5  -       Left Upper Extremity: 4/5   Strength:    -       Right Upper Extremity: WFL  -       Left Upper Extremity: WFL  Fine Motor Coordination:    -       Intact    AMPAC 6 Click ADL:  AMPAC Total Score: 24    Treatment & Education:  Patient educated on the purpose of Occupational Therapy and the importance of getting OOB.    Patient left HOB elevated with all lines  intact and call button in reach    GOALS:   Multidisciplinary Problems       Occupational Therapy Goals          Problem: Occupational Therapy    Goal Priority Disciplines Outcome Interventions   Occupational Therapy Goal     OT, PT/OT     Description: Goals to be met by: 10/8/2024     Patient will increase functional independence with ADLs by performing:    UE Dressing with Modified [place].  LE Dressing with Modified [place].  Grooming while standing at sink with Modified [place].  Toileting from toilet with Modified [place] for hygiene and clothing management.   Toilet transfer to toilet with Modified [place].  Perform 30 minutes sitting/standing ADL activity with no LOB.                         History:     Past Medical History:   Diagnosis Date    Abdominal pain 2022    CHF (congestive heart failure)     Diabetes mellitus 2018    Emphysema lung     Endocarditis 2011    Hypertension     Stroke 2011    denies residual         Past Surgical History:   Procedure Laterality Date    ANGIOGRAM, CORONARY, WITH LEFT HEART CATHETERIZATION N/A 10/10/2023    Procedure: Angiogram, Coronary, with Left Heart Cath;  Surgeon: Dieudonne Ryan MD;  Location: [facility] CATH/EP LAB;  Service: Cardiology;  Laterality: N/A;    BACK SURGERY  1981    COLONOSCOPY N/A 2/23/2023    Procedure: COLONOSCOPY;  Surgeon: Jonn Cruz MD;  Location: [facility] ENDO;  Service: Endoscopy;  Laterality: N/A;    CORONARY ANGIOGRAPHY N/A 5/9/2023    Procedure: ANGIOGRAM, CORONARY ARTERY;  Surgeon: Antonio Kessler MD;  Location: [facility] CATH/EP LAB;  Service: Cardiology;  Laterality: N/A;    EXCISION OF HYDROCELE      x2    FRACTIONAL FLOW RESERVE (FFR), CORONARY  5/11/2023    Procedure: Fractional Flow [place] (FFR), Coronary;  Surgeon: Antonio Kessler MD;  Location: [facility] CATH/EP LAB;  Service: Cardiology;;    INGUINAL HERNIA REPAIR  1960    INSTANTANEOUS WAVE-FREE RATIO (IFR) N/A 10/10/2023    Procedure: Instantaneous Wave-Free  Ratio (IFR);  Surgeon: Dieudonne Ryan MD;  Location: [facility] CATH/EP LAB;  Service: Cardiology;  Laterality: N/A;    INTRAMEDULLARY RODDING OF FEMUR Left 3/9/2024    Procedure: INSERTION, INTRAMEDULLARY HUI, FEMUR;  Surgeon: Tarun Hdez MD;  Location: [facility] OR;  Service: Orthopedics;  Laterality: Left;    INTRAVASCULAR ULTRASOUND, CORONARY N/A 5/9/2023    Procedure: Ultrasound-coronary;  Surgeon: Antonio Kessler MD;  Location: [facility] CATH/EP LAB;  Service: Cardiology;  Laterality: N/A;    IVUS, CORONARY  5/11/2023    Procedure: IVUS, Coronary;  Surgeon: Antonio Kessler MD;  Location: [facility] CATH/EP LAB;  Service: Cardiology;;    LEFT HEART CATHETERIZATION Left 5/11/2023    Procedure: Left heart cath;  Surgeon: Antonio Kessler MD;  Location: [facility] CATH/EP LAB;  Service: Cardiology;  Laterality: Left;    PERCUTANEOUS TRANSLUMINAL BALLOON ANGIOPLASTY OF CORONARY ARTERY  5/9/2023    Procedure: Angioplasty-coronary;  Surgeon: Antonio Kessler MD;  Location: [facility] CATH/EP LAB;  Service: Cardiology;;    RHINOPLASTY      STENT, DRUG ELUTING, SINGLE VESSEL, CORONARY  5/9/2023    Procedure: Stent, Drug Eluting, Single Vessel, Coronary;  Surgeon: Antonio Kessler MD;  Location: [facility] CATH/EP LAB;  Service: Cardiology;;    SURGICAL REMOVAL OF NODULE OF VOCAL CORD         Time Tracking:     OT Date of Treatment: 09/08/24  OT Start Time: 0905  OT Stop Time: 0925  OT Total Time (min): 20 min    Billable Minutes:Evaluation 10  Self Care/Home Management 10    9/8/2024

## 2024-09-09 LAB
ANION GAP SERPL CALC-SCNC: 10 MMOL/L (ref 8–16)
BASOPHILS # BLD AUTO: 0.03 K/UL (ref 0–0.2)
BASOPHILS NFR BLD: 0.4 % (ref 0–1.9)
BUN SERPL-MCNC: 23 MG/DL (ref 8–23)
CALCIUM SERPL-MCNC: 9.2 MG/DL (ref 8.7–10.5)
CHLORIDE SERPL-SCNC: 106 MMOL/L (ref 95–110)
CO2 SERPL-SCNC: 21 MMOL/L (ref 23–29)
CREAT SERPL-MCNC: 0.9 MG/DL (ref 0.5–1.4)
DIFFERENTIAL METHOD BLD: ABNORMAL
EOSINOPHIL # BLD AUTO: 0 K/UL (ref 0–0.5)
EOSINOPHIL NFR BLD: 0.3 % (ref 0–8)
ERYTHROCYTE [DISTWIDTH] IN BLOOD BY AUTOMATED COUNT: 13.2 % (ref 11.5–14.5)
EST. GFR  (NO RACE VARIABLE): >60 ML/MIN/1.73 M^2
GLUCOSE SERPL-MCNC: 268 MG/DL (ref 70–110)
HCT VFR BLD AUTO: 34 % (ref 40–54)
HGB BLD-MCNC: 11.5 G/DL (ref 14–18)
IMM GRANULOCYTES # BLD AUTO: 0.04 K/UL (ref 0–0.04)
IMM GRANULOCYTES NFR BLD AUTO: 0.6 % (ref 0–0.5)
LYMPHOCYTES # BLD AUTO: 1.6 K/UL (ref 1–4.8)
LYMPHOCYTES NFR BLD: 22.6 % (ref 18–48)
MAGNESIUM SERPL-MCNC: 1.9 MG/DL (ref 1.6–2.6)
MCH RBC QN AUTO: 31.9 PG (ref 27–31)
MCHC RBC AUTO-ENTMCNC: 33.8 G/DL (ref 32–36)
MCV RBC AUTO: 94 FL (ref 82–98)
MONOCYTES # BLD AUTO: 0.6 K/UL (ref 0.3–1)
MONOCYTES NFR BLD: 8.2 % (ref 4–15)
NEUTROPHILS # BLD AUTO: 4.7 K/UL (ref 1.8–7.7)
NEUTROPHILS NFR BLD: 67.9 % (ref 38–73)
NRBC BLD-RTO: 0 /100 WBC
PHOSPHATE SERPL-MCNC: 2.9 MG/DL (ref 2.7–4.5)
PLATELET # BLD AUTO: 221 K/UL (ref 150–450)
PMV BLD AUTO: 9.5 FL (ref 9.2–12.9)
POCT GLUCOSE: 160 MG/DL (ref 70–110)
POCT GLUCOSE: 229 MG/DL (ref 70–110)
POCT GLUCOSE: 340 MG/DL (ref 70–110)
POCT GLUCOSE: 377 MG/DL (ref 70–110)
POTASSIUM SERPL-SCNC: 4.2 MMOL/L (ref 3.5–5.1)
RBC # BLD AUTO: 3.6 M/UL (ref 4.6–6.2)
SODIUM SERPL-SCNC: 137 MMOL/L (ref 136–145)
WBC # BLD AUTO: 6.91 K/UL (ref 3.9–12.7)

## 2024-09-09 PROCEDURE — 83735 ASSAY OF MAGNESIUM: CPT | Performed by: NURSE PRACTITIONER

## 2024-09-09 PROCEDURE — 94761 N-INVAS EAR/PLS OXIMETRY MLT: CPT

## 2024-09-09 PROCEDURE — 63700000 PHARM REV CODE 250 ALT 637 W/O HCPCS: Performed by: STUDENT IN AN ORGANIZED HEALTH CARE EDUCATION/TRAINING PROGRAM

## 2024-09-09 PROCEDURE — 11000001 HC ACUTE MED/SURG PRIVATE ROOM

## 2024-09-09 PROCEDURE — 25000003 PHARM REV CODE 250: Performed by: STUDENT IN AN ORGANIZED HEALTH CARE EDUCATION/TRAINING PROGRAM

## 2024-09-09 PROCEDURE — 85025 COMPLETE CBC W/AUTO DIFF WBC: CPT | Performed by: NURSE PRACTITIONER

## 2024-09-09 PROCEDURE — 27000221 HC OXYGEN, UP TO 24 HOURS

## 2024-09-09 PROCEDURE — 94640 AIRWAY INHALATION TREATMENT: CPT

## 2024-09-09 PROCEDURE — 25000003 PHARM REV CODE 250: Performed by: NURSE PRACTITIONER

## 2024-09-09 PROCEDURE — 36415 COLL VENOUS BLD VENIPUNCTURE: CPT | Performed by: NURSE PRACTITIONER

## 2024-09-09 PROCEDURE — 99900035 HC TECH TIME PER 15 MIN (STAT)

## 2024-09-09 PROCEDURE — 80048 BASIC METABOLIC PNL TOTAL CA: CPT | Performed by: STUDENT IN AN ORGANIZED HEALTH CARE EDUCATION/TRAINING PROGRAM

## 2024-09-09 PROCEDURE — 25000242 PHARM REV CODE 250 ALT 637 W/ HCPCS: Performed by: STUDENT IN AN ORGANIZED HEALTH CARE EDUCATION/TRAINING PROGRAM

## 2024-09-09 PROCEDURE — 84100 ASSAY OF PHOSPHORUS: CPT | Performed by: NURSE PRACTITIONER

## 2024-09-09 PROCEDURE — 97116 GAIT TRAINING THERAPY: CPT | Mod: CQ

## 2024-09-09 PROCEDURE — 63600175 PHARM REV CODE 636 W HCPCS: Performed by: STUDENT IN AN ORGANIZED HEALTH CARE EDUCATION/TRAINING PROGRAM

## 2024-09-09 PROCEDURE — S4991 NICOTINE PATCH NONLEGEND: HCPCS | Performed by: STUDENT IN AN ORGANIZED HEALTH CARE EDUCATION/TRAINING PROGRAM

## 2024-09-09 PROCEDURE — 93010 ELECTROCARDIOGRAM REPORT: CPT | Mod: ,,, | Performed by: GENERAL PRACTICE

## 2024-09-09 PROCEDURE — 93005 ELECTROCARDIOGRAM TRACING: CPT

## 2024-09-09 RX ORDER — PREDNISONE 20 MG/1
40 TABLET ORAL DAILY
Qty: 4 TABLET | Refills: 0 | Status: SHIPPED | OUTPATIENT
Start: 2024-09-10 | End: 2024-09-12

## 2024-09-09 RX ORDER — IBUPROFEN 200 MG
1 TABLET ORAL DAILY
Qty: 30 PATCH | Refills: 0 | Status: SHIPPED | OUTPATIENT
Start: 2024-09-09

## 2024-09-09 RX ORDER — HYDROCODONE BITARTRATE AND ACETAMINOPHEN 7.5; 325 MG/1; MG/1
1 TABLET ORAL EVERY 6 HOURS PRN
Qty: 8 TABLET | Refills: 0 | Status: SHIPPED | OUTPATIENT
Start: 2024-09-09

## 2024-09-09 RX ADMIN — INSULIN ASPART 8 UNITS: 100 INJECTION, SOLUTION INTRAVENOUS; SUBCUTANEOUS at 05:09

## 2024-09-09 RX ADMIN — ATORVASTATIN CALCIUM 40 MG: 40 TABLET, FILM COATED ORAL at 09:09

## 2024-09-09 RX ADMIN — HYDROCODONE BITARTRATE AND ACETAMINOPHEN 1 TABLET: 7.5; 325 TABLET ORAL at 08:09

## 2024-09-09 RX ADMIN — INSULIN ASPART 5 UNITS: 100 INJECTION, SOLUTION INTRAVENOUS; SUBCUTANEOUS at 08:09

## 2024-09-09 RX ADMIN — AZITHROMYCIN DIHYDRATE 500 MG: 250 TABLET ORAL at 09:09

## 2024-09-09 RX ADMIN — DOCUSATE SODIUM AND SENNOSIDES 1 TABLET: 8.6; 5 TABLET, FILM COATED ORAL at 09:09

## 2024-09-09 RX ADMIN — HYDROCODONE BITARTRATE AND ACETAMINOPHEN 1 TABLET: 7.5; 325 TABLET ORAL at 12:09

## 2024-09-09 RX ADMIN — AMLODIPINE BESYLATE 5 MG: 5 TABLET ORAL at 09:09

## 2024-09-09 RX ADMIN — FAMOTIDINE 20 MG: 20 TABLET, FILM COATED ORAL at 08:09

## 2024-09-09 RX ADMIN — GABAPENTIN 300 MG: 300 CAPSULE ORAL at 08:09

## 2024-09-09 RX ADMIN — PREDNISONE 40 MG: 20 TABLET ORAL at 09:09

## 2024-09-09 RX ADMIN — FAMOTIDINE 20 MG: 20 TABLET, FILM COATED ORAL at 09:09

## 2024-09-09 RX ADMIN — MELATONIN TAB 3 MG 9 MG: 3 TAB at 08:09

## 2024-09-09 RX ADMIN — CARVEDILOL 6.25 MG: 6.25 TABLET, FILM COATED ORAL at 09:09

## 2024-09-09 RX ADMIN — IPRATROPIUM BROMIDE AND ALBUTEROL SULFATE 3 ML: .5; 3 SOLUTION RESPIRATORY (INHALATION) at 07:09

## 2024-09-09 RX ADMIN — LOSARTAN POTASSIUM 100 MG: 100 TABLET, FILM COATED ORAL at 09:09

## 2024-09-09 RX ADMIN — HYDROCODONE BITARTRATE AND ACETAMINOPHEN 1 TABLET: 7.5; 325 TABLET ORAL at 06:09

## 2024-09-09 RX ADMIN — INSULIN ASPART 4 UNITS: 100 INJECTION, SOLUTION INTRAVENOUS; SUBCUTANEOUS at 09:09

## 2024-09-09 RX ADMIN — CARVEDILOL 6.25 MG: 6.25 TABLET, FILM COATED ORAL at 05:09

## 2024-09-09 RX ADMIN — CLOPIDOGREL BISULFATE 75 MG: 75 TABLET, FILM COATED ORAL at 09:09

## 2024-09-09 RX ADMIN — TAMSULOSIN HYDROCHLORIDE 0.4 MG: 0.4 CAPSULE ORAL at 09:09

## 2024-09-09 RX ADMIN — Medication 1 PATCH: at 09:09

## 2024-09-09 RX ADMIN — GABAPENTIN 300 MG: 300 CAPSULE ORAL at 09:09

## 2024-09-09 RX ADMIN — IPRATROPIUM BROMIDE AND ALBUTEROL SULFATE 3 ML: .5; 3 SOLUTION RESPIRATORY (INHALATION) at 12:09

## 2024-09-09 RX ADMIN — ASPIRIN 81 MG CHEWABLE TABLET 81 MG: 81 TABLET CHEWABLE at 09:09
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
## 2024-09-09 NOTE — PLAN OF CARE
Discussed DC dispo with patient. Updated him of acceptance at [facility] and that he is under review at  [facility]. States he no longer wants placement and wants to return to his trailer at [place]. We also discussed HH VS OP therapy. He declined both. States he doesn't need PT/OT.  Updated MD         09/09/24 8529   Post-Acute Status   Post-Acute Authorization Placement;Home Health   Post-Acute Placement Status Discharge Plan Changed   Home Health Status Patient declined/refused

## 2024-09-09 NOTE — CARE UPDATE
09/08/24 1910   Patient Assessment/Suction   Level of Consciousness (AVPU) alert   Respiratory Effort Normal;Unlabored   Expansion/Accessory Muscles/Retractions expansion symmetric;no retractions;no use of accessory muscles   All Lung Fields Breath Sounds diminished   Cough Frequency infrequent   Cough Type nonproductive   PRE-TX-O2   Device (Oxygen Therapy) nasal cannula   Flow (L/min) (Oxygen Therapy) 2   Oxygen Concentration (%) 28   SpO2 (!) 89 %  (Sat was on room air.  Oxygen placed back on pt)   Pulse Oximetry Type Intermittent   Pulse 91   Resp 20   Aerosol Therapy   $ Aerosol Therapy Charges Aerosol Treatment   Respiratory Treatment Status (SVN) given   Treatment Route (SVN) mask;oxygen   Patient Position HOB elevated   Signs of Intolerance (SVN) none   Breath Sounds Post-Respiratory Treatment   Throughout All Fields Post-Treatment All Fields   Throughout All Fields Post-Treatment aeration increased   Post-treatment Heart Rate (beats/min) 81   Post-treatment Resp Rate (breaths/min) 18   Ready to Wean/Extubation Screen   FIO2<=50 (chart decimal) 0.28

## 2024-09-09 NOTE — NURSING
Dr Ortiz notified via secure chat Mr Yanet Room 310 C/O mild chest pain intensity of 3 below left nipple line not constant /86 HR 72 Sats 94 % on room air Asymptomatic

## 2024-09-09 NOTE — PLAN OF CARE
09/09/24 1633   Final Note   Assessment Type Final Discharge Note   Anticipated Discharge Disposition Left Against

## 2024-09-09 NOTE — PLAN OF CARE
POC reviewed, verbalized understanding.VSS AAOX afebrile.room air.ambulates to toilet  urinal at bedside. IS at bedside, instructed on use and return demonstration performed. PRN pain medication required for pain management Blood glucose monitored managed with PRN sliding scale protocol insulin Repositions self. Hourly/Q2hr rounding performed, safety maintained. Bed in lowest position, wheels locked, call light within reach, SR up x2. No complaints at this time. Will continue to monitor.

## 2024-09-09 NOTE — PLAN OF CARE
Goals to be met by: 10/08/24      Patient will increase functional independence with mobility by performin. Gait  x 250 feet with Modified [place] using Rolling Walker.   2. Lower extremity exercise program x30 reps per handout, with independence             Problem: Physical Therapy  Goal: Physical Therapy Goal  Outcome: Progressing   Therapeutic activity : bed mobility , transfers, gait with rw and assistance for safety.

## 2024-09-09 NOTE — PT/OT/SLP PROGRESS
Physical Therapy Treatment    Patient Name:  Sampson Holt   MRN:  [id number]    Recommendations:     Discharge Recommendations: No Therapy Indicated  Discharge Equipment Recommendations: none  Barriers to discharge: None    Assessment:     Sampson Holt is a 68 y.o. male admitted with a medical diagnosis of Pneumonia of both lower lobes.  He presents with the following impairments/functional limitations: weakness, impaired endurance, impaired self care skills, impaired functional mobility, gait instability, impaired balance, decreased safety awareness, impaired cardiopulmonary response to activity, decreased lower extremity function . Awake, alert, supine in bed.  Agreed to participate in therapy.  Reports pain various areas due to arthritis.  Sup > sit EOB with S.  Sat briefly, very talkative.  Ambulated 250' with rw and SBA . Returned to room to sit EOB, nurse arrived.    Rehab Prognosis: Good; patient would benefit from acute skilled PT services to address these deficits and reach maximum level of function.    Recent Surgery: * No surgery found *      Plan:     During this hospitalization, patient to be seen 5 x/week to address the identified rehab impairments via gait training, therapeutic activities, therapeutic exercises and progress toward the following goals:    Plan of Care Expires:  10/08/24    Subjective     Chief Complaint: pain in joints  Patient/Family Comments/goals: to return home  Pain/Comfort:  Pain Rating 1: other (see comments) (did not rate)  Location - Side 1: Bilateral  Location - Orientation 1: generalized  Location 1: knee (various areas noted due to arthritis ( hip , wrists , shoulders, neck).)  Pain Addressed 1: Reposition, Nurse notified      Objective:     Communicated with nurse Martínez prior to session.  Patient found supine with oxygen, peripheral IV upon PT entry to room.     General Precautions: Standard, fall  Orthopedic Precautions: N/A  Braces: N/A  Respiratory Status: Nasal  cannula, flow 2 L/min     Functional Mobility:  Bed Mobility:     Supine to Sit: supervision  Transfers:     Sit to Stand:  supervision with rolling walker  Gait: 250' with rw and SBA,IV and O2 in tow.       AM-PAC 6 CLICK MOBILITY          Treatment & Education:  Ambulated with rw and SBA for safety.    Patient left sitting edge of bed with all lines intact, call button in reach, and nurse present..    GOALS:   Goals to be met by: 10/08/24      Patient will increase functional independence with mobility by performin. Gait  x 250 feet with Modified [place] using Rolling Walker.   2. Lower extremity exercise program x30 reps per handout, with independence       Time Tracking:     PT Received On: 24  PT Start Time: 915     PT Stop Time: 930  PT Total Time (min): 15 min     Billable Minutes: Gait Training 15min    Treatment Type: Treatment  PT/PTA: PTA     Number of PTA visits since last PT visit: 2024

## 2024-09-09 NOTE — PT/OT/SLP PROGRESS
Occupational Therapy      Patient Name:  Sampson Holt   MRN:  [id number]    Patient not seen today secondary to patient was unwilling to participate. Will follow-up 9/10/2024.    9/9/2024

## 2024-09-09 NOTE — SUBJECTIVE & OBJECTIVE
Interval History: Pt noted to be awake, alert, conversant and calm. HDS and afebrile. No acute events overnight. No new complaints this morning. Wean off O2. 6 min walk test ordered. Plan to DC in am with Oxygen based on home O2 eval.     Review of Systems   Constitutional:  Positive for activity change and fatigue. Negative for fever.   Respiratory:  Positive for shortness of breath. Negative for cough.    Cardiovascular:  Negative for chest pain.   Gastrointestinal:  Negative for abdominal pain.   Neurological:  Negative for dizziness and headaches.   Psychiatric/Behavioral:  Negative for confusion.    All other systems reviewed and are negative.    Objective:     Vital Signs (Most Recent):  Temp: 97.8 °F (36.6 °C) (09/08/24 2333)  Pulse: 81 (09/08/24 2333)  Resp: 20 (09/08/24 2333)  BP: (!) 141/69 (09/08/24 2333)  SpO2: (!) 93 % (09/08/24 2333) Vital Signs (24h Range):  Temp:  [97.6 °F (36.4 °C)-98.5 °F (36.9 °C)] 97.8 °F (36.6 °C)  Pulse:  [] 81  Resp:  [17-20] 20  SpO2:  [89 %-96 %] 93 %  BP: (130-172)/(65-84) 141/69     Weight: 90.7 kg (199 lb 15.3 oz)  Body mass index is 26.38 kg/m².    Intake/Output Summary (Last 24 hours) at 9/9/2024 0002  Last data filed at 9/8/2024 1800  Gross per 24 hour   Intake 960 ml   Output 850 ml   Net 110 ml         Physical Exam  Vitals and nursing note reviewed.   Constitutional:       Appearance: Normal appearance.   HENT:      Head: Normocephalic and atraumatic.   Eyes:      Extraocular Movements: Extraocular movements intact.      Pupils: Pupils are equal, round, and reactive to light.   Cardiovascular:      Rate and Rhythm: Normal rate and regular rhythm.   Pulmonary:      Effort: Pulmonary effort is normal. No respiratory distress.   Abdominal:      General: Abdomen is flat. There is no distension.   Musculoskeletal:         General: Normal range of motion.      Cervical back: Normal range of motion.   Neurological:      General: No focal deficit present.      Mental  Status: He is alert and oriented to person, place, and time.   Psychiatric:         Mood and Affect: Mood normal.         Behavior: Behavior normal.             Significant Labs: All pertinent labs within the past 24 hours have been reviewed.  CBC:   Recent Labs   Lab 09/07/24  0500 09/08/24 0446   WBC 7.44 6.45   HGB 12.5* 12.2*   HCT 36.9* 36.5*    196     CMP:   Recent Labs   Lab 09/07/24 0500 09/08/24 0446    136   K 3.7 4.5    106   CO2 22* 20*   * 247*   BUN 14 19   CREATININE 0.9 0.8   CALCIUM 9.1 9.4   PROT 6.6  --    ALBUMIN 3.4*  --    BILITOT 0.4  --    ALKPHOS 75  --    AST 9*  --    ALT 8*  --    ANIONGAP 10 10       Significant Imaging: I have reviewed all pertinent imaging results/findings within the past 24 hours.

## 2024-09-09 NOTE — DISCHARGE SUMMARY
[facility] Medicine  Discharge Summary      Patient Name: Sampson Holt  MRN: [id number]  Patient Class: IP- Inpatient  Admission Date: 9/6/2024  Hospital Length of Stay: 3 days  Discharge Date and Time:  09/09/2024 12:43 PM  Attending Physician: Nevaeh Ortiz MD   Discharging Provider: Nevaeh Ortiz MD  Primary Care Provider: Damian Vergara MD      HPI:   Sampson Holt is a 68 year old male with a past medical history of COPD, CHF, DM, HTN who presented to the ED with a complaint of fatigue and cough. He states he has been feeling generalized weakness, fatigue, headache, body aches, congestion and cough for a few days. He denies sick contacts, but states he has black mold in his home that he attributes to his breathing problems. He reports chills as well. He denies other complaint. ED work up included a CBC stable chronic anemia. CMP unremarkable.  Troponin HS 7.7, TSH 1.403. Lipase 18, BNP 75. POCT lactic 3.05. Urinalysis negative for evidence of infection. CXR showed evidence of possible bilateral pneumonia. He was initiated on Levaquin IV in the ED. Hospital Medicine consulted for admission and further management.     * No surgery found *      Hospital Course:    Pneumonia of both lower lobes  Patient has a diagnosis of pneumonia. The cause of the pneumonia is suspected to be bacterial in etiology but organism is not known. The pneumonia is stable. The patient has the following signs/symptoms of pneumonia: cough. The patient does not have a current oxygen requirement and the patient does not have a home oxygen requirement. I have reviewed the pertinent imaging. The following cultures have been collected: Blood cultures and Sputum culture The culture results were NGTD. Viral panel negative.      Treated with azithromycin for 3 days     Microbiology Results (last 7 days)         Procedure Component Value Units Date/Time     Blood culture #1 **CANNOT BE ORDERED STAT**  [[id number]] Collected: 09/06/24 2123     Order Status: Sent Specimen: Blood from Peripheral, Antecubital, Left Updated: 09/06/24 2150     Blood culture #2 **CANNOT BE ORDERED STAT** [[id number]] Collected: 09/06/24 2123     Order Status: Sent Specimen: Blood from Peripheral, Antecubital, Right Updated: 09/06/24 2150                Benign prostatic hyperplasia with lower urinary tract symptoms  Noted, continue flomax        Hyperlipidemia   Patient is chronically on statin.will continue for now. Monitor clinically. Last LDL was         Lab Results   Component Value Date     LDLCALC 75.4 01/29/2024               CAD (coronary artery disease)  Patient with known CAD  which is controlled Will continue ASA, Plavix, and Statin and monitor for S/Sx of angina/ACS. Continue to monitor on telemetry.      Cigarette smoker  Dangers of cigarette smoking were reviewed with patient in detail for 5 minutes and patient was encouraged to quit. Nicotine replacement options were discussed. Nicotine replacement options were discussed. Nicotine replacement was prescribed.           Type 2 diabetes mellitus with circulatory disorder, with long-term current use of insulin  Patient's FSGs are controlled on current medication regimen.  Last A1c reviewed-         Lab Results   Component Value Date     HGBA1C 6.1 03/08/2024      Most recent fingerstick glucose reviewed-       Recent Labs   Lab 09/07/24  0304   POCTGLUCOSE 126*      Current correctional scale  Medium  Maintain anti-hyperglycemic dose as follows-   Antihyperglycemics (From admission, onward)        Start     Stop Route Frequency Ordered     09/07/24 0537   insulin aspart U-100 pen 0-10 Units         -- SubQ Before meals & nightly PRN 09/07/24 0438             Hold Oral hypoglycemics while patient is in the hospital.           COPD (chronic obstructive pulmonary disease)  Patient's COPD is controlled currently.  Patient is currently off COPD Pathway. Continue scheduled inhalers  Supplemental oxygen and monitor respiratory status closely.   Wean off O2  6 min walk test ordered. Qualified for home O2. Home O2 set up prior to DC.  Pulm referral placed for OP FU  Finish course of prednisone 40mg PO qd x5d     Benign essential HTN  Chronic, controlled. Latest blood pressure and vitals reviewed-      Temp:  [98 °F (36.7 °C)-99.2 °F (37.3 °C)]   Pulse:  []   Resp:  [16-20]   BP: (110-154)/(56-84)   SpO2:  [93 %-97 %] .   Home meds for hypertension were reviewed and noted below.   Hypertension Medications                    amLODIPine (NORVASC) 5 MG tablet Take 1 tablet (5 mg total) by mouth once daily.     carvediloL (COREG) 3.125 MG tablet Take 2 tablets (6.25 mg total) by mouth 2 (two) times daily with meals.     losartan (COZAAR) 100 MG tablet Take 100 mg by mouth.     nitroGLYCERIN (NITROSTAT) 0.4 MG SL tablet Place 1 tablet (0.4 mg total) under the tongue every 5 (five) minutes as needed for Chest pain.                While in the hospital, will manage blood pressure as follows; Continue home antihypertensive regimen     Will utilize p.r.n. blood pressure medication only if patient's blood pressure greater than 180/110 and he develops symptoms such as worsening chest pain or shortness of breath.          Goals of Care Treatment Preferences:  Code Status: Full Code      Consults:   Consults (From admission, onward)          Status Ordering Provider     Inpatient virtual consult to Hospital Medicine  Once        Provider:  Nevaeh Ortiz MD    Completed YANNA BEAVERS            No new Assessment & Plan notes have been filed under this hospital service since the last note was generated.  Service: Hospital Medicine    Final Active Diagnoses:    Diagnosis Date Noted POA    PRINCIPAL PROBLEM:  Pneumonia of both lower lobes [J18.9] 09/07/2024 Yes    Benign prostatic hyperplasia with lower urinary tract symptoms [N40.1] 02/02/2024 Yes    Hyperlipidemia [E78.5] 07/02/2023 Yes    CAD  "(coronary artery disease) [I25.10] 05/13/2023 Yes    Cigarette smoker [F17.210] 05/10/2023 Yes    Benign essential HTN [I10] 03/27/2020 Yes    COPD (chronic obstructive pulmonary disease) [J44.9] 03/27/2020 Yes    Type 2 diabetes mellitus with circulatory disorder, with long-term current use of insulin [E11.59, Z79.4] 03/27/2020 Not Applicable      Problems Resolved During this Admission:       Discharged Condition: stable    Disposition: Home or Self Care    Follow Up:    Patient Instructions:      OXYGEN FOR HOME USE     Order Specific Question Answer Comments   Liter Flow 2    Duration With activity    Qualifying Test Performed at: Activity    Oxygen saturation at rest 92    Oxygen saturation with activity 86    Oxygen saturation with activity on oxygen 93    Portable mode: continuous    Route nasal cannula    Device: home concentrator with portable tanks    Length of need (in months): 3 mos    Patient condition with qualifying saturation COPD    Height: 6' 1" (1.854 m)    Weight: 90.7 kg (199 lb 15.3 oz)    Alternative treatment measures have been tried or considered and deemed clinically ineffective. Yes      Ambulatory referral/consult to Smoking Cessation Program   Standing Status: Future   Referral Priority: Routine Referral Type: Consultation   Referral Reason: Specialty Services Required   Requested Specialty: CTTS   Number of Visits Requested: 1     Ambulatory referral/consult to Outpatient Case Management   Referral Priority: Routine Referral Type: Consultation   Referral Reason: Specialty Services Required   Number of Visits Requested: 1     Ambulatory referral/consult to Pulmonology   Standing Status: Future   Referral Priority: Routine Referral Type: Consultation   Referral Reason: Specialty Services Required   Requested Specialty: Pulmonary Disease   Number of Visits Requested: 1     Diet Cardiac     Notify your health care provider if you experience any of the following:  temperature >100.4 "     Notify your health care provider if you experience any of the following:  persistent nausea and vomiting or diarrhea     Notify your health care provider if you experience any of the following:  severe uncontrolled pain     Notify your health care provider if you experience any of the following:  redness, tenderness, or signs of infection (pain, swelling, redness, odor or green/yellow discharge around incision site)     Notify your health care provider if you experience any of the following:  difficulty breathing or increased cough     Notify your health care provider if you experience any of the following:  severe persistent headache     Notify your health care provider if you experience any of the following:  worsening rash     Notify your health care provider if you experience any of the following:  persistent dizziness, light-headedness, or visual disturbances     Notify your health care provider if you experience any of the following:  increased confusion or weakness     Send follow up & questions to   Order Comments: Patient's primary care physician: Damian Vergara MD     Activity as tolerated       Significant Diagnostic Studies: Labs: CMP   Recent Labs   Lab 09/08/24  0446 09/09/24  0515    137   K 4.5 4.2    106   CO2 20* 21*   * 268*   BUN 19 23   CREATININE 0.8 0.9   CALCIUM 9.4 9.2   ANIONGAP 10 10    and CBC   Recent Labs   Lab 09/08/24  0446 09/09/24  0515   WBC 6.45 6.91   HGB 12.2* 11.5*   HCT 36.5* 34.0*    221       Pending Diagnostic Studies:       Procedure Component Value Units Date/Time    EKG 12-lead [[id number]]     Order Status: Sent Lab Status: No result            Medications:  Reconciled Home Medications:      Medication List        START taking these medications      HYDROcodone-acetaminophen 7.5-325 mg per tablet  Commonly known as: NORCO  Take 1 tablet by mouth every 6 (six) hours as needed for Pain.     nicotine 21 mg/24 hr  Commonly known as:  NICODERM CQ  Place 1 patch onto the skin once daily.     predniSONE 20 MG tablet  Commonly known as: DELTASONE  Take 2 tablets (40 mg total) by mouth once daily. for 2 days  Start taking on: September 10, 2024            CHANGE how you take these medications      clopidogreL 75 mg tablet  Commonly known as: PLAVIX  Take 1 tablet (75 mg total) by mouth once daily.  What changed: when to take this            CONTINUE taking these medications      acetaminophen 325 MG tablet  Commonly known as: TYLENOL  Take 2 tablets (650 mg total) by mouth every 8 (eight) hours.     albuterol 90 mcg/actuation inhaler  Commonly known as: PROVENTIL/VENTOLIN HFA  Inhale 2 puffs into the lungs every 6 (six) hours as needed for Wheezing. Rescue     amLODIPine 5 MG tablet  Commonly known as: NORVASC  Take 1 tablet (5 mg total) by mouth once daily.     aspirin 81 MG Chew  Take 1 tablet (81 mg total) by mouth 2 (two) times a day for 24 days, THEN 1 tablet (81 mg total) once daily.  Start taking on: March 13, 2024     atorvastatin 40 MG tablet  Commonly known as: LIPITOR  Take 1 tablet (40 mg total) by mouth once daily.     carvediloL 3.125 MG tablet  Commonly known as: COREG  Take 2 tablets (6.25 mg total) by mouth 2 (two) times daily with meals.     cyclobenzaprine 5 MG tablet  Commonly known as: FLEXERIL  Take 1 tablet (5 mg total) by mouth every 8 (eight) hours as needed for muscle spasms.     famotidine 20 MG tablet  Commonly known as: PEPCID  Take 1 tablet (20 mg total) by mouth 2 (two) times daily.     gabapentin 300 MG capsule  Commonly known as: NEURONTIN  Take 300 mg by mouth.     hydrOXYzine 50 MG tablet  Commonly known as: ATARAX  Take 1 tablet (50 mg total) by mouth 3 (three) times daily as needed for Anxiety.     losartan 100 MG tablet  Commonly known as: COZAAR  Take 100 mg by mouth.     multivitamin Tab  Take 1 tablet by mouth once daily.     nitroGLYCERIN 0.4 MG SL tablet  Commonly known as: NITROSTAT  Place 1 tablet (0.4 mg  total) under the tongue every 5 (five) minutes as needed for Chest pain.     ondansetron 4 MG tablet  Commonly known as: ZOFRAN  Take 1 tablet (4 mg total) by mouth every 8 (eight) hours as needed for Nausea.     polyethylene glycol 17 gram Pwpk  Commonly known as: GLYCOLAX  Take 17 g by mouth 2 (two) times daily.     tamsulosin 0.4 mg Cap  Commonly known as: FLOMAX  Take 0.4 mg by mouth.     traZODone 100 MG tablet  Commonly known as: DESYREL  Take 1 tablet (100 mg total) by mouth nightly. For deperession            STOP taking these medications      traMADoL 50 mg tablet  Commonly known as: ULTRAM              Indwelling Lines/Drains at time of discharge:   Lines/Drains/Airways       None                   Time spent on the discharge of patient: 39 minutes         The attending portion of this evaluation, treatment, and documentation was performed per Nevaeh Ortiz MD via Telemedicine AudioVisual using the secure [employer] software platform with 2 way audio/video. The provider was located off-site and the patient is located in the hospital. The aforementioned video software was utilized to document the relevant history and physical exam    Nevaeh Ortiz MD  Department of Hospital Medicine  [facility]/Surg

## 2024-09-09 NOTE — ASSESSMENT & PLAN NOTE
Patient's COPD is controlled currently.  Patient is currently off COPD Pathway. Continue scheduled inhalers Supplemental oxygen and monitor respiratory status closely.   Wean off O2  6 min walk test ordered

## 2024-09-09 NOTE — PROGRESS NOTES
[facility] Medicine  Telemedicine Progress Note    Patient Name: Sampson Holt  MRN: [id number]  Patient Class: IP- Inpatient   Admission Date: 9/6/2024  Length of Stay: 3 days  Attending Physician: Nevaeh Ortiz MD  Primary Care Provider: Damian Vergara MD          Subjective:     Principal Problem:Pneumonia of both lower lobes        HPI:  Sampson Holt is a 68 year old male with a past medical history of COPD, CHF, DM, HTN who presented to the ED with a complaint of fatigue and cough. He states he has been feeling generalized weakness, fatigue, headache, body aches, congestion and cough for a few days. He denies sick contacts, but states he has black mold in his home that he attributes to his breathing problems. He reports chills as well. He denies other complaint. ED work up included a CBC stable chronic anemia. CMP unremarkable.  Troponin HS 7.7, TSH 1.403. Lipase 18, BNP 75. POCT lactic 3.05. Urinalysis negative for evidence of infection. CXR showed evidence of possible bilateral pneumonia. He was initiated on Levaquin IV in the ED. Hospital Medicine consulted for admission and further management.     Overview/Hospital Course:  Sampson Holt is a 68 year old male with a past medical history of COPD, tobacco use, EtOH use, anemia, DM, HTN, HLD, and BPH who presented with a COPD exacerbation for which he is on Duonebs, prednisone and azithromycin. A viral PCR panel and procalcitonin level are pending. He is stable on room air.    Interval History: Pt noted to be awake, alert, conversant and calm. HDS and afebrile. No acute events overnight. No new complaints this morning. Wean off O2. 6 min walk test ordered. Plan to DC in am with Oxygen based on home O2 eval.     Review of Systems   Constitutional:  Positive for activity change and fatigue. Negative for fever.   Respiratory:  Positive for shortness of breath. Negative for cough.    Cardiovascular:  Negative for  chest pain.   Gastrointestinal:  Negative for abdominal pain.   Neurological:  Negative for dizziness and headaches.   Psychiatric/Behavioral:  Negative for confusion.    All other systems reviewed and are negative.    Objective:     Vital Signs (Most Recent):  Temp: 97.8 °F (36.6 °C) (09/08/24 2333)  Pulse: 81 (09/08/24 2333)  Resp: 20 (09/08/24 2333)  BP: (!) 141/69 (09/08/24 2333)  SpO2: (!) 93 % (09/08/24 2333) Vital Signs (24h Range):  Temp:  [97.6 °F (36.4 °C)-98.5 °F (36.9 °C)] 97.8 °F (36.6 °C)  Pulse:  [] 81  Resp:  [17-20] 20  SpO2:  [89 %-96 %] 93 %  BP: (130-172)/(65-84) 141/69     Weight: 90.7 kg (199 lb 15.3 oz)  Body mass index is 26.38 kg/m².    Intake/Output Summary (Last 24 hours) at 9/9/2024 0002  Last data filed at 9/8/2024 1800  Gross per 24 hour   Intake 960 ml   Output 850 ml   Net 110 ml         Physical Exam  Vitals and nursing note reviewed.   Constitutional:       Appearance: Normal appearance.   HENT:      Head: Normocephalic and atraumatic.   Eyes:      Extraocular Movements: Extraocular movements intact.      Pupils: Pupils are equal, round, and reactive to light.   Cardiovascular:      Rate and Rhythm: Normal rate and regular rhythm.   Pulmonary:      Effort: Pulmonary effort is normal. No respiratory distress.   Abdominal:      General: Abdomen is flat. There is no distension.   Musculoskeletal:         General: Normal range of motion.      Cervical back: Normal range of motion.   Neurological:      General: No focal deficit present.      Mental Status: He is alert and oriented to person, place, and time.   Psychiatric:         Mood and Affect: Mood normal.         Behavior: Behavior normal.             Significant Labs: All pertinent labs within the past 24 hours have been reviewed.  CBC:   Recent Labs   Lab 09/07/24  0500 09/08/24  0446   WBC 7.44 6.45   HGB 12.5* 12.2*   HCT 36.9* 36.5*    196     CMP:   Recent Labs   Lab 09/07/24  0500 09/08/24  0446    136   K  3.7 4.5    106   CO2 22* 20*   * 247*   BUN 14 19   CREATININE 0.9 0.8   CALCIUM 9.1 9.4   PROT 6.6  --    ALBUMIN 3.4*  --    BILITOT 0.4  --    ALKPHOS 75  --    AST 9*  --    ALT 8*  --    ANIONGAP 10 10       Significant Imaging: I have reviewed all pertinent imaging results/findings within the past 24 hours.    Assessment/Plan:      * Pneumonia of both lower lobes  Patient has a diagnosis of pneumonia. The cause of the pneumonia is suspected to be bacterial in etiology but organism is not known. The pneumonia is stable. The patient has the following signs/symptoms of pneumonia: cough. The patient does not have a current oxygen requirement and the patient does not have a home oxygen requirement. I have reviewed the pertinent imaging. The following cultures have been collected: Blood cultures and Sputum culture The culture results are listed below.     Current antimicrobial regimen consists of the antibiotics listed below. Will monitor patient closely and continue current treatment plan unchanged.    Antibiotics (From admission, onward)      Start     Stop Route Frequency Ordered    09/07/24 2230  levoFLOXacin 750 mg/150 mL IVPB 750 mg         -- IV Every 24 hours (non-standard times) 09/07/24 0242            Microbiology Results (last 7 days)       Procedure Component Value Units Date/Time    Blood culture #1 **CANNOT BE ORDERED STAT** [[id number]] Collected: 09/06/24 2123    Order Status: Sent Specimen: Blood from Peripheral, Antecubital, Left Updated: 09/06/24 2150    Blood culture #2 **CANNOT BE ORDERED STAT** [[id number]] Collected: 09/06/24 2123    Order Status: Sent Specimen: Blood from Peripheral, Antecubital, Right Updated: 09/06/24 2150            Benign prostatic hyperplasia with lower urinary tract symptoms  Noted, continue flomax      Hyperlipidemia   Patient is chronically on statin.will continue for now. Monitor clinically. Last LDL was   Lab Results   Component Value Date    LDLCALC  75.4 01/29/2024            CAD (coronary artery disease)  Patient with known CAD  which is controlled Will continue ASA, Plavix, and Statin and monitor for S/Sx of angina/ACS. Continue to monitor on telemetry.     Cigarette smoker  Dangers of cigarette smoking were reviewed with patient in detail for 5 minutes and patient was encouraged to quit. Nicotine replacement options were discussed. Nicotine replacement options were discussed. Nicotine replacement was prescribed.        Type 2 diabetes mellitus with circulatory disorder, with long-term current use of insulin  Patient's FSGs are controlled on current medication regimen.  Last A1c reviewed-   Lab Results   Component Value Date    HGBA1C 6.1 03/08/2024     Most recent fingerstick glucose reviewed-   Recent Labs   Lab 09/07/24  0304   POCTGLUCOSE 126*     Current correctional scale  Medium  Maintain anti-hyperglycemic dose as follows-   Antihyperglycemics (From admission, onward)      Start     Stop Route Frequency Ordered    09/07/24 0537  insulin aspart U-100 pen 0-10 Units         -- SubQ Before meals & nightly PRN 09/07/24 0438          Hold Oral hypoglycemics while patient is in the hospital.        COPD (chronic obstructive pulmonary disease)  Patient's COPD is controlled currently.  Patient is currently off COPD Pathway. Continue scheduled inhalers Supplemental oxygen and monitor respiratory status closely.   Wean off O2  6 min walk test ordered    Benign essential HTN  Chronic, controlled. Latest blood pressure and vitals reviewed-     Temp:  [98 °F (36.7 °C)-99.2 °F (37.3 °C)]   Pulse:  []   Resp:  [16-20]   BP: (110-154)/(56-84)   SpO2:  [93 %-97 %] .   Home meds for hypertension were reviewed and noted below.   Hypertension Medications               amLODIPine (NORVASC) 5 MG tablet Take 1 tablet (5 mg total) by mouth once daily.    carvediloL (COREG) 3.125 MG tablet Take 2 tablets (6.25 mg total) by mouth 2 (two) times daily with meals.     losartan (COZAAR) 100 MG tablet Take 100 mg by mouth.    nitroGLYCERIN (NITROSTAT) 0.4 MG SL tablet Place 1 tablet (0.4 mg total) under the tongue every 5 (five) minutes as needed for Chest pain.            While in the hospital, will manage blood pressure as follows; Continue home antihypertensive regimen    Will utilize p.r.n. blood pressure medication only if patient's blood pressure greater than 180/110 and he develops symptoms such as worsening chest pain or shortness of breath.      VTE Risk Mitigation (From admission, onward)           Ordered     IP VTE LOW RISK PATIENT  Once         09/06/24 2317     Place sequential compression device  Until discontinued         09/06/24 2317                          I have completed this tele-visit without the assistance of a telepresenter.    The attending portion of this evaluation, treatment, and documentation was performed per Nevaeh Ortiz MD via Telemedicine AudioVisual using the secure [employer] software platform with 2 way audio/video. The provider was located off-site and the patient is located in the hospital. The aforementioned video software was utilized to document the relevant history and physical exam    Nevaeh Ortiz MD  Department of Hospital Medicine   [facility]/Surg

## 2024-09-09 NOTE — PLAN OF CARE
Pt qualifies for home o2.   [facility] for [facility].   Updated Ramya, SW with [facility] that [facility] cancelled and DC today with new home o2 set up       also sent home o2 order to [facility] for review       09/09/24 1956   Post-Acute Status   Post-Acute Authorization HME   HME Status Referrals Sent

## 2024-09-09 NOTE — PLAN OF CARE
1320 Spoke with Ramya from [facility]. States o2 orders received. Awaiting their MD to approval then will be sent to [facility] for delivery    1415 Ramya requested another form with [facility] to be completed.     1430 TA completed form and faxed/emailed abck    1445 CM asked [facility] to review for approval through pt's [facility] in case [facility] doesn't approve timely    1515 left VM for Ramya requesting call back    1520 emailed [facility] asking for update    1550 left another VM for Ramya     1600 attempted to reach resp dept at [facility], no answer and no option for VM. Attempted 2 other contacts with [facility] with no success.   CM also reached out to [facility], states they have not [facility] the [facility] to process for [facility] today.     1615 per Nakul with [facility], o2 can be delivered after $18 copay. Pt states he does not have any money and cannot pay it. TA asked Nakul about cost transfer.. states not an option d/t rental       09/09/24 1320   Post-Acute Status   Post-Acute Authorization HME   HME Status Pending payor review

## 2024-09-10 VITALS
RESPIRATION RATE: 19 BRPM | HEIGHT: 73 IN | OXYGEN SATURATION: 94 % | TEMPERATURE: 98 F | BODY MASS INDEX: 26.5 KG/M2 | SYSTOLIC BLOOD PRESSURE: 160 MMHG | DIASTOLIC BLOOD PRESSURE: 89 MMHG | HEART RATE: 71 BPM | WEIGHT: 199.94 LBS

## 2024-09-10 LAB
ANION GAP SERPL CALC-SCNC: 9 MMOL/L (ref 8–16)
BASOPHILS # BLD AUTO: 0.04 K/UL (ref 0–0.2)
BASOPHILS NFR BLD: 0.6 % (ref 0–1.9)
BUN SERPL-MCNC: 26 MG/DL (ref 8–23)
CALCIUM SERPL-MCNC: 9.2 MG/DL (ref 8.7–10.5)
CHLORIDE SERPL-SCNC: 107 MMOL/L (ref 95–110)
CO2 SERPL-SCNC: 21 MMOL/L (ref 23–29)
CREAT SERPL-MCNC: 0.9 MG/DL (ref 0.5–1.4)
DIFFERENTIAL METHOD BLD: ABNORMAL
EOSINOPHIL # BLD AUTO: 0 K/UL (ref 0–0.5)
EOSINOPHIL NFR BLD: 0.1 % (ref 0–8)
ERYTHROCYTE [DISTWIDTH] IN BLOOD BY AUTOMATED COUNT: 13.2 % (ref 11.5–14.5)
EST. GFR  (NO RACE VARIABLE): >60 ML/MIN/1.73 M^2
GLUCOSE SERPL-MCNC: 302 MG/DL (ref 70–110)
HCT VFR BLD AUTO: 33.4 % (ref 40–54)
HGB BLD-MCNC: 11.5 G/DL (ref 14–18)
IMM GRANULOCYTES # BLD AUTO: 0.04 K/UL (ref 0–0.04)
IMM GRANULOCYTES NFR BLD AUTO: 0.6 % (ref 0–0.5)
LYMPHOCYTES # BLD AUTO: 1.3 K/UL (ref 1–4.8)
LYMPHOCYTES NFR BLD: 18.9 % (ref 18–48)
MAGNESIUM SERPL-MCNC: 1.9 MG/DL (ref 1.6–2.6)
MCH RBC QN AUTO: 32.7 PG (ref 27–31)
MCHC RBC AUTO-ENTMCNC: 34.4 G/DL (ref 32–36)
MCV RBC AUTO: 95 FL (ref 82–98)
MONOCYTES # BLD AUTO: 0.6 K/UL (ref 0.3–1)
MONOCYTES NFR BLD: 8.8 % (ref 4–15)
NEUTROPHILS # BLD AUTO: 5 K/UL (ref 1.8–7.7)
NEUTROPHILS NFR BLD: 71 % (ref 38–73)
NRBC BLD-RTO: 0 /100 WBC
PHOSPHATE SERPL-MCNC: 2.9 MG/DL (ref 2.7–4.5)
PLATELET # BLD AUTO: 209 K/UL (ref 150–450)
PMV BLD AUTO: 9 FL (ref 9.2–12.9)
POCT GLUCOSE: 229 MG/DL (ref 70–110)
POCT GLUCOSE: 263 MG/DL (ref 70–110)
POTASSIUM SERPL-SCNC: 4 MMOL/L (ref 3.5–5.1)
RBC # BLD AUTO: 3.52 M/UL (ref 4.6–6.2)
SODIUM SERPL-SCNC: 137 MMOL/L (ref 136–145)
WBC # BLD AUTO: 7.04 K/UL (ref 3.9–12.7)

## 2024-09-10 PROCEDURE — 63600175 PHARM REV CODE 636 W HCPCS: Performed by: STUDENT IN AN ORGANIZED HEALTH CARE EDUCATION/TRAINING PROGRAM

## 2024-09-10 PROCEDURE — 94761 N-INVAS EAR/PLS OXIMETRY MLT: CPT

## 2024-09-10 PROCEDURE — 25000003 PHARM REV CODE 250: Performed by: NURSE PRACTITIONER

## 2024-09-10 PROCEDURE — 84100 ASSAY OF PHOSPHORUS: CPT | Performed by: NURSE PRACTITIONER

## 2024-09-10 PROCEDURE — 83735 ASSAY OF MAGNESIUM: CPT | Performed by: NURSE PRACTITIONER

## 2024-09-10 PROCEDURE — 99900035 HC TECH TIME PER 15 MIN (STAT)

## 2024-09-10 PROCEDURE — 36415 COLL VENOUS BLD VENIPUNCTURE: CPT | Performed by: NURSE PRACTITIONER

## 2024-09-10 PROCEDURE — 63700000 PHARM REV CODE 250 ALT 637 W/O HCPCS: Performed by: STUDENT IN AN ORGANIZED HEALTH CARE EDUCATION/TRAINING PROGRAM

## 2024-09-10 PROCEDURE — 94618 PULMONARY STRESS TESTING: CPT

## 2024-09-10 PROCEDURE — S4991 NICOTINE PATCH NONLEGEND: HCPCS | Performed by: STUDENT IN AN ORGANIZED HEALTH CARE EDUCATION/TRAINING PROGRAM

## 2024-09-10 PROCEDURE — 27000221 HC OXYGEN, UP TO 24 HOURS

## 2024-09-10 PROCEDURE — 85025 COMPLETE CBC W/AUTO DIFF WBC: CPT | Performed by: NURSE PRACTITIONER

## 2024-09-10 PROCEDURE — 25000003 PHARM REV CODE 250: Performed by: STUDENT IN AN ORGANIZED HEALTH CARE EDUCATION/TRAINING PROGRAM

## 2024-09-10 PROCEDURE — 80048 BASIC METABOLIC PNL TOTAL CA: CPT | Performed by: STUDENT IN AN ORGANIZED HEALTH CARE EDUCATION/TRAINING PROGRAM

## 2024-09-10 RX ADMIN — AZITHROMYCIN DIHYDRATE 500 MG: 250 TABLET ORAL at 08:09

## 2024-09-10 RX ADMIN — GABAPENTIN 300 MG: 300 CAPSULE ORAL at 08:09

## 2024-09-10 RX ADMIN — FAMOTIDINE 20 MG: 20 TABLET, FILM COATED ORAL at 08:09

## 2024-09-10 RX ADMIN — TAMSULOSIN HYDROCHLORIDE 0.4 MG: 0.4 CAPSULE ORAL at 08:09

## 2024-09-10 RX ADMIN — ASPIRIN 81 MG CHEWABLE TABLET 81 MG: 81 TABLET CHEWABLE at 08:09

## 2024-09-10 RX ADMIN — CLOPIDOGREL BISULFATE 75 MG: 75 TABLET, FILM COATED ORAL at 08:09

## 2024-09-10 RX ADMIN — CARVEDILOL 6.25 MG: 6.25 TABLET, FILM COATED ORAL at 08:09

## 2024-09-10 RX ADMIN — AMLODIPINE BESYLATE 5 MG: 5 TABLET ORAL at 08:09

## 2024-09-10 RX ADMIN — ATORVASTATIN CALCIUM 40 MG: 40 TABLET, FILM COATED ORAL at 08:09

## 2024-09-10 RX ADMIN — HYDROCODONE BITARTRATE AND ACETAMINOPHEN 1 TABLET: 7.5; 325 TABLET ORAL at 11:09

## 2024-09-10 RX ADMIN — LOSARTAN POTASSIUM 100 MG: 100 TABLET, FILM COATED ORAL at 08:09

## 2024-09-10 RX ADMIN — PREDNISONE 40 MG: 20 TABLET ORAL at 08:09

## 2024-09-10 RX ADMIN — Medication 1 PATCH: at 08:09

## 2024-09-10 NOTE — PLAN OF CARE
POC reviewed verbalized understanding.AAO x 4,VSS afebrile Meds given per MAR. IV intact and patent.  Safety maintained with side rails up x3, bed wheels locked, bed in lowest position, and call light in reach. Pt educated to call for assistance with ambulation, verbalized understanding. Pt remains free of falls.Blood tglucose monitored and managed with PRN sliding scale protocol insulin

## 2024-09-10 NOTE — PT/OT/SLP PROGRESS
Occupational Therapy      Patient Name:  Sampson Holt   MRN:  [id number]    Patient not seen today secondary to Other (Comment) (Pt declined OT, stating he is being discharged.). Will follow-up.    9/10/2024

## 2024-09-10 NOTE — RESPIRATORY THERAPY
09/10/24 1119   Home Oxygen Qualification   $ Home O2 Qualification Pulmonary Stress Test/6 min walk;Tech time 15 minutes   Room Air SpO2 At Rest 96 %   Room Air SpO2 During Ambulation 94 %   Heart Rate on O2 74 bpm   SpO2 Post Ambulation 95 %   Post Ambulation Heart Rate 80 bpm   Home O2 Eval Comments pt is not requiring home O2 at this time

## 2024-09-10 NOTE — NURSING
DC to home cancelled patient was going to leave AMA but was in formed that O2 Could be set up tomorrow AM Patient decided to stay for O2 Set up in AM.

## 2024-09-10 NOTE — PLAN OF CARE
[facility] work [facility]. CM received another form from [facility] that needed pt signature saying he would no longer smoke.   CM completed with patient and emailed back to [facility].     Pt continues to tell me he is going to leave AMA without the o2 but is still here. He did sign AMA form... again. CM will continue to work with [facility] on home o2.     CM also placed another home o2 eval d/t to pt ambulating in the [place] numerous times without o2 in place. Pt denies any SOB with ambulation

## 2024-09-10 NOTE — PLAN OF CARE
Problem: Pain Acute  Goal: Optimal Pain Control and Function  Outcome: Met   POC has been discussed with pt.  Pt doesn't approve for home O2.  No replacements needed today.  Pt 's pain has been controlled with current pain regimen.  Pt adequate for discharge home today.

## 2024-09-10 NOTE — ASSESSMENT & PLAN NOTE
Patient's COPD is controlled currently.  Patient is currently off COPD Pathway. Continue scheduled inhalers Supplemental oxygen and monitor respiratory status closely.   6 min walk test done. Needs home O2 set up.

## 2024-09-10 NOTE — SUBJECTIVE & OBJECTIVE
Interval History: Pt noted to be awake, alert, conversant and calm. HDS and afebrile. No acute events overnight. No new complaints this morning. Home O2 unable to get set up today. He is medically stable for DC. Plan to DC in am with Oxygen based on home O2 eval.     Review of Systems   Constitutional:  Positive for activity change and fatigue. Negative for fever.   Respiratory:  Positive for shortness of breath. Negative for cough.    Cardiovascular:  Negative for chest pain.   Gastrointestinal:  Negative for abdominal pain.   Neurological:  Negative for dizziness and headaches.   Psychiatric/Behavioral:  Negative for confusion.    All other systems reviewed and are negative.    Objective:     Vital Signs (Most Recent):  Temp: 98 °F (36.7 °C) (09/09/24 2334)  Pulse: 92 (09/09/24 2334)  Resp: 18 (09/09/24 2334)  BP: 135/77 (09/09/24 2334)  SpO2: 95 % (09/09/24 2334) Vital Signs (24h Range):  Temp:  [97.4 °F (36.3 °C)-98.4 °F (36.9 °C)] 98 °F (36.7 °C)  Pulse:  [] 92  Resp:  [15-20] 18  SpO2:  [92 %-97 %] 95 %  BP: (130-160)/(75-88) 135/77     Weight: 90.7 kg (199 lb 15.3 oz)  Body mass index is 26.38 kg/m².    Intake/Output Summary (Last 24 hours) at 9/10/2024 0015  Last data filed at 9/9/2024 1623  Gross per 24 hour   Intake 800 ml   Output 750 ml   Net 50 ml         Physical Exam  Vitals and nursing note reviewed.   Constitutional:       Appearance: Normal appearance.   HENT:      Head: Normocephalic and atraumatic.   Eyes:      Extraocular Movements: Extraocular movements intact.      Pupils: Pupils are equal, round, and reactive to light.   Cardiovascular:      Rate and Rhythm: Normal rate and regular rhythm.   Pulmonary:      Effort: Pulmonary effort is normal. No respiratory distress.   Abdominal:      General: Abdomen is flat. There is no distension.   Musculoskeletal:         General: Normal range of motion.      Cervical back: Normal range of motion.   Neurological:      General: No focal deficit  present.      Mental Status: He is alert and oriented to person, place, and time.   Psychiatric:         Mood and Affect: Mood normal.         Behavior: Behavior normal.             Significant Labs: All pertinent labs within the past 24 hours have been reviewed.  CBC:   Recent Labs   Lab 09/08/24 0446 09/09/24  0515   WBC 6.45 6.91   HGB 12.2* 11.5*   HCT 36.5* 34.0*    221     CMP:   Recent Labs   Lab 09/08/24 0446 09/09/24  0515    137   K 4.5 4.2    106   CO2 20* 21*   * 268*   BUN 19 23   CREATININE 0.8 0.9   CALCIUM 9.4 9.2   ANIONGAP 10 10       Significant Imaging: I have reviewed all pertinent imaging results/findings within the past 24 hours.

## 2024-09-10 NOTE — PLAN OF CARE
Goals to be met by: 10/08/24      Patient will increase functional independence with mobility by performin. Gait  x 250 feet with Modified [place] using Rolling Walker.   2. Lower extremity exercise program x30 reps per handout, with independence

## 2024-09-10 NOTE — PLAN OF CARE
Sp[facility] with [facility] the [facility]. States home o2 is denied d/t pt being a current smoker.   I updated her that pt has agreed to stop smoking and is agreeable to nicotine patches. States she will update MD and let me know if anything changes. I asked if still denied, then they expect him to sit in acute care setting until no longer needs o2.. she asked if pt has secondary insurance, informed he does but cannot afford the copay and that is why [facility] uses [facility]. Again states she will wait for her MD to get back with her. I requested for MD here and [facility] MD so they can discuss patient's needs. Awaiting Dr. Ortiz's response to see if in agreement.     Updated pt via phone of [facility] denying [facility]2. Pt verbalized his frustration and states he still cannot afford copay for insurance through [facility]. I suggested for him to call any contact he has within the [facility] for assistance. Again explained that he cannot safely DC from here since qualified for home o2 with walk test. He verbalized understanding and states he has to leave today and understands he will have to sign out AMA       09/10/24 0816   Post-Acute Status   Post-Acute Authorization HME   HME Status   (DENIED)

## 2024-09-10 NOTE — CARE UPDATE
09/09/24 2030   Patient Assessment/Suction   Level of Consciousness (AVPU) alert   Respiratory Effort Normal;Unlabored   Expansion/Accessory Muscles/Retractions expansion symmetric;no retractions;no use of accessory muscles   All Lung Fields Breath Sounds clear   PRE-TX-O2   Device (Oxygen Therapy) nasal cannula   Flow (L/min) (Oxygen Therapy) 2   Oxygen Concentration (%) 28   SpO2 97 %   Pulse Oximetry Type Intermittent   Pulse 101   Resp 18   Aerosol Therapy   $ Aerosol Therapy Charges PRN treatment not required   Respiratory Treatment Status (SVN) PRN treatment not required   Ready to Wean/Extubation Screen   FIO2<=50 (chart decimal) 0.28

## 2024-09-10 NOTE — PLAN OF CARE
POC/Meds reviewed, pt verbalized understanding. Vitals stable.  Afebrile. PRN medication given. Blood glucose monitored. Repositions self. Hourly/Q2hr rounding performed, safety maintained. Bed in lowest position, wheels locked, SR up x2, call light in easy reach. No  complaints at this time.

## 2024-09-10 NOTE — NURSING
Pt states he's Leaving AMA. Pt educated on risk leaving against medical advice. Pt verbalized understanding but still states he's leaving now. Dr. Ortiz updated per Gwen case management. Notified House Supervisor Robin. Primary Nurse Nancy updated. Pt calling his ride for .

## 2024-09-10 NOTE — NURSING
Pt discharged to home by himself and had his friend pick him up down in the lobby.  Pt rolled down with w/c to vehicle.  Pt verbalized understanding of discharge orders.   no c/o pain/nausea/vomitting.  Will CTM.

## 2024-09-10 NOTE — PLAN OF CARE
Pt clear for DC from case management standpoint. Discharging to home    Pt no longer needs home o2- new walk test done, pt did not desat with exertion    Pt to schedule his own PCP apt with [facility]         09/10/24 1143   Final Note   Assessment Type Final Discharge Note   Anticipated Discharge Disposition Home

## 2024-09-10 NOTE — PROGRESS NOTES
[facility]  Telemedicine Progress Note    Patient Name: Sampson Holt  MRN: [id number]  Patient Class: IP- Inpatient   Admission Date: 9/6/2024  Length of Stay: 4 days  Attending Physician: Nevaeh Ortiz MD  Primary Care Provider: Damian Vergara MD          Subjective:     Principal Problem:Pneumonia of both lower lobes        HPI:  Sampson Holt is a 68 year old male with a past medical history of COPD, CHF, DM, HTN who presented to the ED with a complaint of fatigue and cough. He states he has been feeling generalized weakness, fatigue, headache, body aches, congestion and cough for a few days. He denies sick contacts, but states he has black mold in his home that he attributes to his breathing problems. He reports chills as well. He denies other complaint. ED work up included a CBC stable chronic anemia. CMP unremarkable.  Troponin HS 7.7, TSH 1.403. Lipase 18, BNP 75. POCT lactic 3.05. Urinalysis negative for evidence of infection. CXR showed evidence of possible bilateral pneumonia. He was initiated on Levaquin IV in the ED. Hospital Medicine consulted for admission and further management.     Overview/Hospital Course:   Pneumonia of both lower lobes  Patient has a diagnosis of pneumonia. The cause of the pneumonia is suspected to be bacterial in etiology but organism is not known. The pneumonia is stable. The patient has the following signs/symptoms of pneumonia: cough. The patient does not have a current oxygen requirement and the patient does not have a home oxygen requirement. I have reviewed the pertinent imaging. The following cultures have been collected: Blood cultures and Sputum culture The culture results were NGTD. Viral panel negative.      Treated with azithromycin for 3 days     Microbiology Results (last 7 days)         Procedure Component Value Units Date/Time     Blood culture #1 **CANNOT BE ORDERED STAT** [[id number]] Collected: 09/06/24  2123     Order Status: Sent Specimen: Blood from Peripheral, Antecubital, Left Updated: 09/06/24 2150     Blood culture #2 **CANNOT BE ORDERED STAT** [[id number]] Collected: 09/06/24 2123     Order Status: Sent Specimen: Blood from Peripheral, Antecubital, Right Updated: 09/06/24 2150                Benign prostatic hyperplasia with lower urinary tract symptoms  Noted, continue flomax        Hyperlipidemia   Patient is chronically on statin.will continue for now. Monitor clinically. Last LDL was         Lab Results   Component Value Date     LDLCALC 75.4 01/29/2024               CAD (coronary artery disease)  Patient with known CAD  which is controlled Will continue ASA, Plavix, and Statin and monitor for S/Sx of angina/ACS. Continue to monitor on telemetry.      Cigarette smoker  Dangers of cigarette smoking were reviewed with patient in detail for 5 minutes and patient was encouraged to quit. Nicotine replacement options were discussed. Nicotine replacement options were discussed. Nicotine replacement was prescribed.           Type 2 diabetes mellitus with circulatory disorder, with long-term current use of insulin  Patient's FSGs are controlled on current medication regimen.  Last A1c reviewed-         Lab Results   Component Value Date     HGBA1C 6.1 03/08/2024      Most recent fingerstick glucose reviewed-       Recent Labs   Lab 09/07/24  0304   POCTGLUCOSE 126*      Current correctional scale  Medium  Maintain anti-hyperglycemic dose as follows-   Antihyperglycemics (From admission, onward)        Start     Stop Route Frequency Ordered     09/07/24 0537   insulin aspart U-100 pen 0-10 Units         -- SubQ Before meals & nightly PRN 09/07/24 0438             Hold Oral hypoglycemics while patient is in the hospital.           COPD (chronic obstructive pulmonary disease)  Patient's COPD is controlled currently.  Patient is currently off COPD Pathway. Continue scheduled inhalers Supplemental oxygen and monitor  respiratory status closely.   Wean off O2  6 min walk test ordered. Qualified for home O2. Home O2 set up prior to DC.  Pulm referral placed for OP FU  Finish course of prednisone 40mg PO qd x5d     Benign essential HTN  Chronic, controlled. Latest blood pressure and vitals reviewed-      Temp:  [98 °F (36.7 °C)-99.2 °F (37.3 °C)]   Pulse:  []   Resp:  [16-20]   BP: (110-154)/(56-84)   SpO2:  [93 %-97 %] .   Home meds for hypertension were reviewed and noted below.   Hypertension Medications                    amLODIPine (NORVASC) 5 MG tablet Take 1 tablet (5 mg total) by mouth once daily.     carvediloL (COREG) 3.125 MG tablet Take 2 tablets (6.25 mg total) by mouth 2 (two) times daily with meals.     losartan (COZAAR) 100 MG tablet Take 100 mg by mouth.     nitroGLYCERIN (NITROSTAT) 0.4 MG SL tablet Place 1 tablet (0.4 mg total) under the tongue every 5 (five) minutes as needed for Chest pain.                While in the hospital, will manage blood pressure as follows; Continue home antihypertensive regimen     Will utilize p.r.n. blood pressure medication only if patient's blood pressure greater than 180/110 and he develops symptoms such as worsening chest pain or shortness of breath.       Interval History: Pt noted to be awake, alert, conversant and calm. HDS and afebrile. No acute events overnight. No new complaints this morning. Home O2 unable to get set up today. He is medically stable for DC. Plan to DC in am with Oxygen based on home O2 eval.     Review of Systems   Constitutional:  Positive for activity change and fatigue. Negative for fever.   Respiratory:  Positive for shortness of breath. Negative for cough.    Cardiovascular:  Negative for chest pain.   Gastrointestinal:  Negative for abdominal pain.   Neurological:  Negative for dizziness and headaches.   Psychiatric/Behavioral:  Negative for confusion.    All other systems reviewed and are negative.    Objective:     Vital Signs (Most  Recent):  Temp: 98 °F (36.7 °C) (09/09/24 2334)  Pulse: 92 (09/09/24 2334)  Resp: 18 (09/09/24 2334)  BP: 135/77 (09/09/24 2334)  SpO2: 95 % (09/09/24 2334) Vital Signs (24h Range):  Temp:  [97.4 °F (36.3 °C)-98.4 °F (36.9 °C)] 98 °F (36.7 °C)  Pulse:  [] 92  Resp:  [15-20] 18  SpO2:  [92 %-97 %] 95 %  BP: (130-160)/(75-88) 135/77     Weight: 90.7 kg (199 lb 15.3 oz)  Body mass index is 26.38 kg/m².    Intake/Output Summary (Last 24 hours) at 9/10/2024 0015  Last data filed at 9/9/2024 1623  Gross per 24 hour   Intake 800 ml   Output 750 ml   Net 50 ml         Physical Exam  Vitals and nursing note reviewed.   Constitutional:       Appearance: Normal appearance.   HENT:      Head: Normocephalic and atraumatic.   Eyes:      Extraocular Movements: Extraocular movements intact.      Pupils: Pupils are equal, round, and reactive to light.   Cardiovascular:      Rate and Rhythm: Normal rate and regular rhythm.   Pulmonary:      Effort: Pulmonary effort is normal. No respiratory distress.   Abdominal:      General: Abdomen is flat. There is no distension.   Musculoskeletal:         General: Normal range of motion.      Cervical back: Normal range of motion.   Neurological:      General: No focal deficit present.      Mental Status: He is alert and oriented to person, place, and time.   Psychiatric:         Mood and Affect: Mood normal.         Behavior: Behavior normal.             Significant Labs: All pertinent labs within the past 24 hours have been reviewed.  CBC:   Recent Labs   Lab 09/08/24  0446 09/09/24  0515   WBC 6.45 6.91   HGB 12.2* 11.5*   HCT 36.5* 34.0*    221     CMP:   Recent Labs   Lab 09/08/24  0446 09/09/24  0515    137   K 4.5 4.2    106   CO2 20* 21*   * 268*   BUN 19 23   CREATININE 0.8 0.9   CALCIUM 9.4 9.2   ANIONGAP 10 10       Significant Imaging: I have reviewed all pertinent imaging results/findings within the past 24 hours.    Assessment/Plan:      * Pneumonia  of both lower lobes  Patient has a diagnosis of pneumonia. The cause of the pneumonia is suspected to be bacterial in etiology but organism is not known. The pneumonia is stable. The patient has the following signs/symptoms of pneumonia: cough. The patient does not have a current oxygen requirement and the patient does not have a home oxygen requirement. I have reviewed the pertinent imaging. The following cultures have been collected: Blood cultures and Sputum culture The culture results are listed below.     Current antimicrobial regimen consists of the antibiotics listed below. Will monitor patient closely and continue current treatment plan unchanged.    Antibiotics (From admission, onward)      Start     Stop Route Frequency Ordered    09/07/24 2230  levoFLOXacin 750 mg/150 mL IVPB 750 mg         -- IV Every 24 hours (non-standard times) 09/07/24 0242            Microbiology Results (last 7 days)       Procedure Component Value Units Date/Time    Blood culture #1 **CANNOT BE ORDERED STAT** [[id number]] Collected: 09/06/24 2123    Order Status: Sent Specimen: Blood from Peripheral, Antecubital, Left Updated: 09/06/24 2150    Blood culture #2 **CANNOT BE ORDERED STAT** [[id number]] Collected: 09/06/24 2123    Order Status: Sent Specimen: Blood from Peripheral, Antecubital, Right Updated: 09/06/24 2150            Benign prostatic hyperplasia with lower urinary tract symptoms  Noted, continue flomax      Hyperlipidemia   Patient is chronically on statin.will continue for now. Monitor clinically. Last LDL was   Lab Results   Component Value Date    LDLCALC 75.4 01/29/2024            CAD (coronary artery disease)  Patient with known CAD  which is controlled Will continue ASA, Plavix, and Statin and monitor for S/Sx of angina/ACS. Continue to monitor on telemetry.     Cigarette smoker  Dangers of cigarette smoking were reviewed with patient in detail for 5 minutes and patient was encouraged to quit. Nicotine  replacement options were discussed. Nicotine replacement options were discussed. Nicotine replacement was prescribed.        Type 2 diabetes mellitus with circulatory disorder, with long-term current use of insulin  Patient's FSGs are controlled on current medication regimen.  Last A1c reviewed-   Lab Results   Component Value Date    HGBA1C 6.1 03/08/2024     Most recent fingerstick glucose reviewed-   Recent Labs   Lab 09/07/24  0304   POCTGLUCOSE 126*     Current correctional scale  Medium  Maintain anti-hyperglycemic dose as follows-   Antihyperglycemics (From admission, onward)      Start     Stop Route Frequency Ordered    09/07/24 0537  insulin aspart U-100 pen 0-10 Units         -- SubQ Before meals & nightly PRN 09/07/24 0438          Hold Oral hypoglycemics while patient is in the hospital.        COPD (chronic obstructive pulmonary disease)  Patient's COPD is controlled currently.  Patient is currently off COPD Pathway. Continue scheduled inhalers Supplemental oxygen and monitor respiratory status closely.   6 min walk test done. Needs home O2 set up.      Benign essential HTN  Chronic, controlled. Latest blood pressure and vitals reviewed-     Temp:  [98 °F (36.7 °C)-99.2 °F (37.3 °C)]   Pulse:  []   Resp:  [16-20]   BP: (110-154)/(56-84)   SpO2:  [93 %-97 %] .   Home meds for hypertension were reviewed and noted below.   Hypertension Medications               amLODIPine (NORVASC) 5 MG tablet Take 1 tablet (5 mg total) by mouth once daily.    carvediloL (COREG) 3.125 MG tablet Take 2 tablets (6.25 mg total) by mouth 2 (two) times daily with meals.    losartan (COZAAR) 100 MG tablet Take 100 mg by mouth.    nitroGLYCERIN (NITROSTAT) 0.4 MG SL tablet Place 1 tablet (0.4 mg total) under the tongue every 5 (five) minutes as needed for Chest pain.            While in the hospital, will manage blood pressure as follows; Continue home antihypertensive regimen    Will utilize p.r.n. blood pressure  medication only if patient's blood pressure greater than 180/110 and he develops symptoms such as worsening chest pain or shortness of breath.      VTE Risk Mitigation (From admission, onward)           Ordered     IP VTE LOW RISK PATIENT  Once         09/06/24 2317     Place sequential compression device  Until discontinued         09/06/24 2317                          I have completed this tele-visit without the assistance of a telepresenter.    The attending portion of this evaluation, treatment, and documentation was performed per Nevaeh Ortiz MD via Telemedicine AudioVisual using the secure [employer] software platform with 2 way audio/video. The provider was located off-site and the patient is located in the hospital. The aforementioned video software was utilized to document the relevant history and physical exam    Nevaeh Ortiz MD  Department of Hospital Medicine   [facility]/Surg

## 2024-09-11 LAB
BACTERIA BLD CULT: NORMAL
BACTERIA BLD CULT: NORMAL

## 2024-09-16 LAB
OHS QRS DURATION: 86 MS
OHS QTC CALCULATION: 442 MS

## 2024-09-20 LAB
OHS QRS DURATION: 142 MS
OHS QRS DURATION: 144 MS
OHS QTC CALCULATION: 385 MS
OHS QTC CALCULATION: 508 MS

## 2024-09-24 ENCOUNTER — PATIENT OUTREACH (OUTPATIENT)
Dept: ADMINISTRATIVE | Facility: OTHER | Age: 69
End: 2024-09-24

## 2024-10-18 ENCOUNTER — HOSPITAL ENCOUNTER (INPATIENT)
Facility: HOSPITAL | Age: 69
LOS: 1 days | Discharge: HOME | DRG: 305 | End: 2024-10-22
Attending: EMERGENCY MEDICINE | Admitting: INTERNAL MEDICINE
Payer: OTHER GOVERNMENT

## 2024-10-18 DIAGNOSIS — I16.0 HYPERTENSIVE URGENCY: ICD-10-CM

## 2024-10-18 DIAGNOSIS — M06.9 RHEUMATOID ARTHRITIS, INVOLVING UNSPECIFIED SITE, UNSPECIFIED WHETHER RHEUMATOID FACTOR PRESENT: ICD-10-CM

## 2024-10-18 DIAGNOSIS — R07.9 CHEST PAIN: ICD-10-CM

## 2024-10-18 DIAGNOSIS — R94.31 ABNORMAL ECG: ICD-10-CM

## 2024-10-18 DIAGNOSIS — M19.90 ARTHRITIS: Primary | ICD-10-CM

## 2024-10-18 DIAGNOSIS — I25.10 CORONARY ARTERY DISEASE, UNSPECIFIED VESSEL OR LESION TYPE, UNSPECIFIED WHETHER ANGINA PRESENT, UNSPECIFIED WHETHER NATIVE OR TRANSPLANTED HEART: ICD-10-CM

## 2024-10-18 DIAGNOSIS — R06.02 SHORTNESS OF BREATH: ICD-10-CM

## 2024-10-18 LAB
ALBUMIN SERPL BCP-MCNC: 4 G/DL (ref 3.5–5.2)
ALP SERPL-CCNC: 87 U/L (ref 55–135)
ALT SERPL W/O P-5'-P-CCNC: 10 U/L (ref 10–44)
ANION GAP SERPL CALC-SCNC: 9 MMOL/L (ref 8–16)
AST SERPL-CCNC: 9 U/L (ref 10–40)
BASOPHILS # BLD AUTO: 0.09 K/UL (ref 0–0.2)
BASOPHILS NFR BLD: 1.1 % (ref 0–1.9)
BILIRUB SERPL-MCNC: 0.4 MG/DL (ref 0.1–1)
BNP SERPL-MCNC: 205 PG/ML (ref 0–99)
BUN SERPL-MCNC: 10 MG/DL (ref 8–23)
CALCIUM SERPL-MCNC: 9.4 MG/DL (ref 8.7–10.5)
CHLORIDE SERPL-SCNC: 103 MMOL/L (ref 95–110)
CO2 SERPL-SCNC: 26 MMOL/L (ref 23–29)
CREAT SERPL-MCNC: 0.8 MG/DL (ref 0.5–1.4)
DIFFERENTIAL METHOD BLD: ABNORMAL
EOSINOPHIL # BLD AUTO: 0.1 K/UL (ref 0–0.5)
EOSINOPHIL NFR BLD: 1.2 % (ref 0–8)
ERYTHROCYTE [DISTWIDTH] IN BLOOD BY AUTOMATED COUNT: 12.2 % (ref 11.5–14.5)
EST. GFR  (NO RACE VARIABLE): >60 ML/MIN/1.73 M^2
GLUCOSE SERPL-MCNC: 266 MG/DL (ref 70–110)
HCT VFR BLD AUTO: 41.7 % (ref 40–54)
HGB BLD-MCNC: 13.9 G/DL (ref 14–18)
IMM GRANULOCYTES # BLD AUTO: 0.03 K/UL (ref 0–0.04)
IMM GRANULOCYTES NFR BLD AUTO: 0.4 % (ref 0–0.5)
LYMPHOCYTES # BLD AUTO: 1.6 K/UL (ref 1–4.8)
LYMPHOCYTES NFR BLD: 19.6 % (ref 18–48)
MAGNESIUM SERPL-MCNC: 1.8 MG/DL (ref 1.6–2.6)
MCH RBC QN AUTO: 32 PG (ref 27–31)
MCHC RBC AUTO-ENTMCNC: 33.3 G/DL (ref 32–36)
MCV RBC AUTO: 96 FL (ref 82–98)
MONOCYTES # BLD AUTO: 0.6 K/UL (ref 0.3–1)
MONOCYTES NFR BLD: 7.4 % (ref 4–15)
NEUTROPHILS # BLD AUTO: 5.7 K/UL (ref 1.8–7.7)
NEUTROPHILS NFR BLD: 70.3 % (ref 38–73)
NRBC BLD-RTO: 0 /100 WBC
OHS QRS DURATION: 88 MS
OHS QRS DURATION: 92 MS
OHS QTC CALCULATION: 432 MS
OHS QTC CALCULATION: 452 MS
PLATELET # BLD AUTO: 236 K/UL (ref 150–450)
PMV BLD AUTO: 9.1 FL (ref 9.2–12.9)
POCT GLUCOSE: 184 MG/DL (ref 70–110)
POCT GLUCOSE: 208 MG/DL (ref 70–110)
POTASSIUM SERPL-SCNC: 4.3 MMOL/L (ref 3.5–5.1)
PROT SERPL-MCNC: 7.1 G/DL (ref 6–8.4)
RBC # BLD AUTO: 4.35 M/UL (ref 4.6–6.2)
SODIUM SERPL-SCNC: 138 MMOL/L (ref 136–145)
TROPONIN I SERPL HS-MCNC: 6.3 PG/ML (ref 0–14.9)
TROPONIN I SERPL HS-MCNC: 6.5 PG/ML (ref 0–14.9)
TROPONIN I SERPL HS-MCNC: 6.9 PG/ML (ref 0–14.9)
WBC # BLD AUTO: 8.1 K/UL (ref 3.9–12.7)

## 2024-10-18 PROCEDURE — 96375 TX/PRO/DX INJ NEW DRUG ADDON: CPT

## 2024-10-18 PROCEDURE — 96368 THER/DIAG CONCURRENT INF: CPT

## 2024-10-18 PROCEDURE — 94799 UNLISTED PULMONARY SVC/PX: CPT

## 2024-10-18 PROCEDURE — 85025 COMPLETE CBC W/AUTO DIFF WBC: CPT | Performed by: EMERGENCY MEDICINE

## 2024-10-18 PROCEDURE — S4991 NICOTINE PATCH NONLEGEND: HCPCS | Performed by: INTERNAL MEDICINE

## 2024-10-18 PROCEDURE — G0378 HOSPITAL OBSERVATION PER HR: HCPCS

## 2024-10-18 PROCEDURE — 96372 THER/PROPH/DIAG INJ SC/IM: CPT | Performed by: INTERNAL MEDICINE

## 2024-10-18 PROCEDURE — 96376 TX/PRO/DX INJ SAME DRUG ADON: CPT

## 2024-10-18 PROCEDURE — 84484 ASSAY OF TROPONIN QUANT: CPT | Performed by: INTERNAL MEDICINE

## 2024-10-18 PROCEDURE — 36415 COLL VENOUS BLD VENIPUNCTURE: CPT | Performed by: EMERGENCY MEDICINE

## 2024-10-18 PROCEDURE — 99285 EMERGENCY DEPT VISIT HI MDM: CPT | Mod: 25

## 2024-10-18 PROCEDURE — 63600175 PHARM REV CODE 636 W HCPCS: Performed by: INTERNAL MEDICINE

## 2024-10-18 PROCEDURE — 63600175 PHARM REV CODE 636 W HCPCS: Performed by: EMERGENCY MEDICINE

## 2024-10-18 PROCEDURE — 96365 THER/PROPH/DIAG IV INF INIT: CPT

## 2024-10-18 PROCEDURE — 82962 GLUCOSE BLOOD TEST: CPT

## 2024-10-18 PROCEDURE — 25000003 PHARM REV CODE 250: Performed by: EMERGENCY MEDICINE

## 2024-10-18 PROCEDURE — 25000003 PHARM REV CODE 250: Performed by: INTERNAL MEDICINE

## 2024-10-18 PROCEDURE — 83735 ASSAY OF MAGNESIUM: CPT | Performed by: EMERGENCY MEDICINE

## 2024-10-18 PROCEDURE — 80053 COMPREHEN METABOLIC PANEL: CPT | Performed by: EMERGENCY MEDICINE

## 2024-10-18 PROCEDURE — 99900035 HC TECH TIME PER 15 MIN (STAT)

## 2024-10-18 PROCEDURE — 84484 ASSAY OF TROPONIN QUANT: CPT | Mod: 91 | Performed by: EMERGENCY MEDICINE

## 2024-10-18 PROCEDURE — 94761 N-INVAS EAR/PLS OXIMETRY MLT: CPT

## 2024-10-18 PROCEDURE — 83880 ASSAY OF NATRIURETIC PEPTIDE: CPT | Performed by: EMERGENCY MEDICINE

## 2024-10-18 PROCEDURE — 36415 COLL VENOUS BLD VENIPUNCTURE: CPT | Performed by: INTERNAL MEDICINE

## 2024-10-18 PROCEDURE — 99900031 HC PATIENT EDUCATION (STAT)

## 2024-10-18 PROCEDURE — 96366 THER/PROPH/DIAG IV INF ADDON: CPT

## 2024-10-18 RX ORDER — GLUCAGON 1 MG
1 KIT INJECTION
Status: DISCONTINUED | OUTPATIENT
Start: 2024-10-18 | End: 2024-10-19

## 2024-10-18 RX ORDER — CARVEDILOL 6.25 MG/1
6.25 TABLET ORAL 2 TIMES DAILY WITH MEALS
Status: DISCONTINUED | OUTPATIENT
Start: 2024-10-18 | End: 2024-10-19

## 2024-10-18 RX ORDER — IBUPROFEN 200 MG
1 TABLET ORAL DAILY
Status: DISCONTINUED | OUTPATIENT
Start: 2024-10-18 | End: 2024-10-22 | Stop reason: HOSPADM

## 2024-10-18 RX ORDER — SODIUM,POTASSIUM PHOSPHATES 280-250MG
2 POWDER IN PACKET (EA) ORAL
Status: DISCONTINUED | OUTPATIENT
Start: 2024-10-18 | End: 2024-10-19

## 2024-10-18 RX ORDER — HYDROCODONE BITARTRATE AND ACETAMINOPHEN 5; 325 MG/1; MG/1
1 TABLET ORAL EVERY 4 HOURS PRN
Status: DISCONTINUED | OUTPATIENT
Start: 2024-10-18 | End: 2024-10-19

## 2024-10-18 RX ORDER — TRAZODONE HYDROCHLORIDE 50 MG/1
100 TABLET ORAL NIGHTLY
Status: DISCONTINUED | OUTPATIENT
Start: 2024-10-18 | End: 2024-10-22 | Stop reason: HOSPADM

## 2024-10-18 RX ORDER — HYDROMORPHONE HYDROCHLORIDE 1 MG/ML
0.5 INJECTION, SOLUTION INTRAMUSCULAR; INTRAVENOUS; SUBCUTANEOUS ONCE
Status: COMPLETED | OUTPATIENT
Start: 2024-10-18 | End: 2024-10-18

## 2024-10-18 RX ORDER — OXYCODONE HYDROCHLORIDE 5 MG/1
1 CAPSULE ORAL EVERY 6 HOURS PRN
Status: ON HOLD | COMMUNITY
Start: 2024-09-24 | End: 2024-10-22

## 2024-10-18 RX ORDER — TAMSULOSIN HYDROCHLORIDE 0.4 MG/1
0.4 CAPSULE ORAL DAILY
Status: DISCONTINUED | OUTPATIENT
Start: 2024-10-18 | End: 2024-10-22 | Stop reason: HOSPADM

## 2024-10-18 RX ORDER — IBUPROFEN 200 MG
24 TABLET ORAL
Status: DISCONTINUED | OUTPATIENT
Start: 2024-10-18 | End: 2024-10-22 | Stop reason: HOSPADM

## 2024-10-18 RX ORDER — ATORVASTATIN CALCIUM 40 MG/1
40 TABLET, FILM COATED ORAL NIGHTLY
Status: DISCONTINUED | OUTPATIENT
Start: 2024-10-18 | End: 2024-10-22 | Stop reason: HOSPADM

## 2024-10-18 RX ORDER — FLUTICASONE PROPIONATE 50 MCG
2 SPRAY, SUSPENSION (ML) NASAL DAILY PRN
COMMUNITY
Start: 2024-09-24

## 2024-10-18 RX ORDER — CARVEDILOL 12.5 MG/1
6.25 TABLET ORAL 2 TIMES DAILY
COMMUNITY
Start: 2024-09-24

## 2024-10-18 RX ORDER — ACETAMINOPHEN 325 MG/1
650 TABLET ORAL EVERY 4 HOURS PRN
Status: DISCONTINUED | OUTPATIENT
Start: 2024-10-18 | End: 2024-10-22 | Stop reason: HOSPADM

## 2024-10-18 RX ORDER — INSULIN GLARGINE-YFGN 100 [IU]/ML
45 INJECTION, SOLUTION SUBCUTANEOUS 2 TIMES DAILY
COMMUNITY
Start: 2024-09-24

## 2024-10-18 RX ORDER — CLOPIDOGREL BISULFATE 75 MG/1
75 TABLET ORAL DAILY
Status: DISCONTINUED | OUTPATIENT
Start: 2024-10-18 | End: 2024-10-22 | Stop reason: HOSPADM

## 2024-10-18 RX ORDER — FAMOTIDINE 20 MG/1
20 TABLET, FILM COATED ORAL 2 TIMES DAILY
Status: DISCONTINUED | OUTPATIENT
Start: 2024-10-18 | End: 2024-10-22 | Stop reason: HOSPADM

## 2024-10-18 RX ORDER — HYDROCODONE BITARTRATE AND ACETAMINOPHEN 5; 325 MG/1; MG/1
1 TABLET ORAL EVERY 6 HOURS PRN
Status: DISCONTINUED | OUTPATIENT
Start: 2024-10-18 | End: 2024-10-18

## 2024-10-18 RX ORDER — DULOXETIN HYDROCHLORIDE 30 MG/1
30 CAPSULE, DELAYED RELEASE ORAL DAILY
COMMUNITY
Start: 2024-09-24

## 2024-10-18 RX ORDER — ALBUTEROL SULFATE 90 UG/1
2 INHALANT RESPIRATORY (INHALATION) 4 TIMES DAILY PRN
COMMUNITY
Start: 2024-09-24

## 2024-10-18 RX ORDER — NITROGLYCERIN 0.4 MG/1
0.4 TABLET SUBLINGUAL EVERY 5 MIN PRN
Status: DISCONTINUED | OUTPATIENT
Start: 2024-10-18 | End: 2024-10-22 | Stop reason: HOSPADM

## 2024-10-18 RX ORDER — INSULIN ASPART 100 [IU]/ML
0-10 INJECTION, SOLUTION INTRAVENOUS; SUBCUTANEOUS
Status: DISCONTINUED | OUTPATIENT
Start: 2024-10-18 | End: 2024-10-19

## 2024-10-18 RX ORDER — ACETAMINOPHEN 325 MG/1
650 TABLET ORAL EVERY 8 HOURS PRN
Status: DISCONTINUED | OUTPATIENT
Start: 2024-10-18 | End: 2024-10-22 | Stop reason: HOSPADM

## 2024-10-18 RX ORDER — ALUMINUM HYDROXIDE, MAGNESIUM HYDROXIDE, AND SIMETHICONE 1200; 120; 1200 MG/30ML; MG/30ML; MG/30ML
30 SUSPENSION ORAL 4 TIMES DAILY PRN
Status: DISCONTINUED | OUTPATIENT
Start: 2024-10-18 | End: 2024-10-22 | Stop reason: HOSPADM

## 2024-10-18 RX ORDER — LANOLIN ALCOHOL/MO/W.PET/CERES
800 CREAM (GRAM) TOPICAL
Status: DISCONTINUED | OUTPATIENT
Start: 2024-10-18 | End: 2024-10-19

## 2024-10-18 RX ORDER — ENOXAPARIN SODIUM 100 MG/ML
40 INJECTION SUBCUTANEOUS EVERY 24 HOURS
Status: DISCONTINUED | OUTPATIENT
Start: 2024-10-18 | End: 2024-10-22 | Stop reason: HOSPADM

## 2024-10-18 RX ORDER — SENNOSIDES 8.6 MG/1
17.2 TABLET ORAL DAILY PRN
COMMUNITY
Start: 2024-09-24

## 2024-10-18 RX ORDER — ACETAMINOPHEN 500 MG
1000 TABLET ORAL EVERY 12 HOURS PRN
COMMUNITY
Start: 2024-10-09

## 2024-10-18 RX ORDER — IBUPROFEN 400 MG/1
1 TABLET ORAL EVERY 6 HOURS PRN
COMMUNITY
Start: 2024-10-09

## 2024-10-18 RX ORDER — AMLODIPINE BESYLATE 5 MG/1
5 TABLET ORAL DAILY
Status: DISCONTINUED | OUTPATIENT
Start: 2024-10-18 | End: 2024-10-19

## 2024-10-18 RX ORDER — ASPIRIN 81 MG/1
81 TABLET ORAL DAILY
COMMUNITY
Start: 2024-09-24

## 2024-10-18 RX ORDER — MAGNESIUM SULFATE HEPTAHYDRATE 40 MG/ML
2 INJECTION, SOLUTION INTRAVENOUS ONCE
Status: COMPLETED | OUTPATIENT
Start: 2024-10-18 | End: 2024-10-18

## 2024-10-18 RX ORDER — GABAPENTIN 300 MG/1
300 CAPSULE ORAL NIGHTLY
Status: DISCONTINUED | OUTPATIENT
Start: 2024-10-18 | End: 2024-10-19

## 2024-10-18 RX ORDER — NALOXONE HYDROCHLORIDE 4 MG/.1ML
1 SPRAY NASAL ONCE
COMMUNITY
Start: 2024-09-24

## 2024-10-18 RX ORDER — ONDANSETRON HYDROCHLORIDE 2 MG/ML
4 INJECTION, SOLUTION INTRAVENOUS EVERY 6 HOURS PRN
Status: DISCONTINUED | OUTPATIENT
Start: 2024-10-18 | End: 2024-10-22 | Stop reason: HOSPADM

## 2024-10-18 RX ORDER — FOLIC ACID 1 MG/1
1 TABLET ORAL DAILY
COMMUNITY
Start: 2024-10-11

## 2024-10-18 RX ORDER — NICARDIPINE HYDROCHLORIDE 0.2 MG/ML
0-15 INJECTION INTRAVENOUS CONTINUOUS
Status: DISCONTINUED | OUTPATIENT
Start: 2024-10-18 | End: 2024-10-19

## 2024-10-18 RX ORDER — NALOXONE HCL 0.4 MG/ML
0.02 VIAL (ML) INJECTION
Status: DISCONTINUED | OUTPATIENT
Start: 2024-10-18 | End: 2024-10-22 | Stop reason: HOSPADM

## 2024-10-18 RX ORDER — ASPIRIN 325 MG
325 TABLET ORAL
Status: COMPLETED | OUTPATIENT
Start: 2024-10-18 | End: 2024-10-18

## 2024-10-18 RX ORDER — CYCLOBENZAPRINE HCL 5 MG
5 TABLET ORAL 3 TIMES DAILY PRN
Status: DISCONTINUED | OUTPATIENT
Start: 2024-10-18 | End: 2024-10-22 | Stop reason: HOSPADM

## 2024-10-18 RX ORDER — TALC
6 POWDER (GRAM) TOPICAL NIGHTLY PRN
Status: DISCONTINUED | OUTPATIENT
Start: 2024-10-18 | End: 2024-10-22 | Stop reason: HOSPADM

## 2024-10-18 RX ORDER — LANOLIN ALCOHOL/MO/W.PET/CERES
100 CREAM (GRAM) TOPICAL DAILY
COMMUNITY
Start: 2024-09-24

## 2024-10-18 RX ORDER — ROSUVASTATIN CALCIUM 10 MG/1
5 TABLET, COATED ORAL DAILY
COMMUNITY
Start: 2024-09-24

## 2024-10-18 RX ORDER — ALPRAZOLAM 0.5 MG/1
0.5 TABLET ORAL 3 TIMES DAILY PRN
Status: DISCONTINUED | OUTPATIENT
Start: 2024-10-18 | End: 2024-10-22 | Stop reason: HOSPADM

## 2024-10-18 RX ORDER — IBUPROFEN 200 MG
16 TABLET ORAL
Status: DISCONTINUED | OUTPATIENT
Start: 2024-10-18 | End: 2024-10-22 | Stop reason: HOSPADM

## 2024-10-18 RX ORDER — LOSARTAN POTASSIUM 100 MG/1
100 TABLET ORAL DAILY
Status: ON HOLD | COMMUNITY
Start: 2024-09-24 | End: 2024-10-22

## 2024-10-18 RX ORDER — HYDROMORPHONE HYDROCHLORIDE 1 MG/ML
0.5 INJECTION, SOLUTION INTRAMUSCULAR; INTRAVENOUS; SUBCUTANEOUS
Status: DISCONTINUED | OUTPATIENT
Start: 2024-10-18 | End: 2024-10-19

## 2024-10-18 RX ORDER — ASPIRIN 81 MG/1
81 TABLET ORAL DAILY
Status: DISCONTINUED | OUTPATIENT
Start: 2024-10-18 | End: 2024-10-22 | Stop reason: HOSPADM

## 2024-10-18 RX ORDER — LOSARTAN POTASSIUM 50 MG/1
50 TABLET ORAL DAILY
Status: DISCONTINUED | OUTPATIENT
Start: 2024-10-18 | End: 2024-10-19

## 2024-10-18 RX ORDER — HYDROMORPHONE HYDROCHLORIDE 1 MG/ML
1 INJECTION, SOLUTION INTRAMUSCULAR; INTRAVENOUS; SUBCUTANEOUS
Status: COMPLETED | OUTPATIENT
Start: 2024-10-18 | End: 2024-10-18

## 2024-10-18 RX ORDER — INSULIN GLARGINE 100 [IU]/ML
35 INJECTION, SOLUTION SUBCUTANEOUS DAILY
Status: DISCONTINUED | OUTPATIENT
Start: 2024-10-18 | End: 2024-10-19

## 2024-10-18 RX ADMIN — HYDROCODONE BITARTRATE AND ACETAMINOPHEN 1 TABLET: 5; 325 TABLET ORAL at 07:10

## 2024-10-18 RX ADMIN — ASPIRIN 325 MG ORAL TABLET 325 MG: 325 PILL ORAL at 12:10

## 2024-10-18 RX ADMIN — HYDROCODONE BITARTRATE AND ACETAMINOPHEN 1 TABLET: 5; 325 TABLET ORAL at 11:10

## 2024-10-18 RX ADMIN — ATORVASTATIN CALCIUM 40 MG: 40 TABLET, FILM COATED ORAL at 08:10

## 2024-10-18 RX ADMIN — MAGNESIUM SULFATE HEPTAHYDRATE 2 G: 40 INJECTION, SOLUTION INTRAVENOUS at 11:10

## 2024-10-18 RX ADMIN — CARVEDILOL 6.25 MG: 6.25 TABLET, FILM COATED ORAL at 04:10

## 2024-10-18 RX ADMIN — ALPRAZOLAM 0.5 MG: 0.5 TABLET ORAL at 08:10

## 2024-10-18 RX ADMIN — FAMOTIDINE 20 MG: 20 TABLET ORAL at 11:10

## 2024-10-18 RX ADMIN — HYDROMORPHONE HYDROCHLORIDE 1 MG: 1 INJECTION, SOLUTION INTRAMUSCULAR; INTRAVENOUS; SUBCUTANEOUS at 10:10

## 2024-10-18 RX ADMIN — METHYLPREDNISOLONE SODIUM SUCCINATE 40 MG: 40 INJECTION, POWDER, FOR SOLUTION INTRAMUSCULAR; INTRAVENOUS at 05:10

## 2024-10-18 RX ADMIN — TRAZODONE HYDROCHLORIDE 100 MG: 50 TABLET ORAL at 08:10

## 2024-10-18 RX ADMIN — GABAPENTIN 300 MG: 300 CAPSULE ORAL at 08:10

## 2024-10-18 RX ADMIN — TAMSULOSIN HYDROCHLORIDE 0.4 MG: 0.4 CAPSULE ORAL at 12:10

## 2024-10-18 RX ADMIN — HYDROMORPHONE HYDROCHLORIDE 0.5 MG: 0.5 INJECTION, SOLUTION INTRAMUSCULAR; INTRAVENOUS; SUBCUTANEOUS at 05:10

## 2024-10-18 RX ADMIN — AMLODIPINE BESYLATE 5 MG: 5 TABLET ORAL at 11:10

## 2024-10-18 RX ADMIN — CYCLOBENZAPRINE HYDROCHLORIDE 5 MG: 5 TABLET, FILM COATED ORAL at 08:10

## 2024-10-18 RX ADMIN — FAMOTIDINE 20 MG: 20 TABLET ORAL at 08:10

## 2024-10-18 RX ADMIN — INSULIN ASPART 2 UNITS: 100 INJECTION, SOLUTION INTRAVENOUS; SUBCUTANEOUS at 07:10

## 2024-10-18 RX ADMIN — HYDROMORPHONE HYDROCHLORIDE 0.5 MG: 0.5 INJECTION, SOLUTION INTRAMUSCULAR; INTRAVENOUS; SUBCUTANEOUS at 09:10

## 2024-10-18 RX ADMIN — ENOXAPARIN SODIUM 40 MG: 40 INJECTION SUBCUTANEOUS at 04:10

## 2024-10-18 RX ADMIN — NICOTINE 1 PATCH: 21 PATCH, EXTENDED RELEASE TRANSDERMAL at 07:10

## 2024-10-18 RX ADMIN — NICARDIPINE HYDROCHLORIDE 5 MG/HR: 0.2 INJECTION INTRAVENOUS at 11:10

## 2024-10-18 RX ADMIN — METHYLPREDNISOLONE SODIUM SUCCINATE 40 MG: 40 INJECTION, POWDER, FOR SOLUTION INTRAMUSCULAR; INTRAVENOUS at 11:10

## 2024-10-18 NOTE — ED PROVIDER NOTES
Encounter Date: 10/18/2024       History     Chief Complaint   Patient presents with    Hypertension     Elevated blood pressure    Hand Pain     States arthritis to both hands pain for a month    Shoulder Pain     69-year-old male presented emergency department with elevated blood pressure.  Patient has been having high blood pressure and [facility] told by [facility] go to [facility].  Patient has multiple joints that are inflamed and painful and has diagnosis of rheumatoid arthritis and is waiting to see a rheumatologist and meanwhile pain is getting worse and patient has elevated blood pressure so was sent here.  Patient denies any fever or chills or nausea vomiting or chest pain or abdominal pain.  Denies any focal weakness or numbness.  Patient has history of heart disease.  Patient has history of diabetes and hypertension.      Review of patient's allergies indicates:   Allergen Reactions    Amoxicillin Shortness Of Breath and Edema     Past Medical History:   Diagnosis Date    Abdominal pain 2022    CHF (congestive heart failure)     Diabetes mellitus 2018    Emphysema lung     Endocarditis 2011    Hypertension     Stroke 2011    denies residual     Past Surgical History:   Procedure Laterality Date    ANGIOGRAM, CORONARY, WITH LEFT HEART CATHETERIZATION N/A 10/10/2023    Procedure: Angiogram, Coronary, with Left Heart Cath;  Surgeon: Dieudonne Ryan MD;  Location: [facility] CATH/EP LAB;  Service: Cardiology;  Laterality: N/A;    BACK SURGERY  1981    COLONOSCOPY N/A 2/23/2023    Procedure: COLONOSCOPY;  Surgeon: Jonn Cruz MD;  Location: [facility] ENDO;  Service: Endoscopy;  Laterality: N/A;    CORONARY ANGIOGRAPHY N/A 5/9/2023    Procedure: ANGIOGRAM, CORONARY ARTERY;  Surgeon: Antonio Kessler MD;  Location: [facility] CATH/EP LAB;  Service: Cardiology;  Laterality: N/A;    EXCISION OF HYDROCELE      x2    FRACTIONAL FLOW RESERVE (FFR), CORONARY  5/11/2023    Procedure: Fractional Flow Reserve (FFR),  Coronary;  Surgeon: Antonio Kessler MD;  Location: [facility] CATH/EP LAB;  Service: Cardiology;;    INGUINAL HERNIA REPAIR  1960    INSTANTANEOUS WAVE-FREE RATIO (IFR) N/A 10/10/2023    Procedure: Instantaneous Wave-Free Ratio (IFR);  Surgeon: Dieudonne Ryan MD;  Location: [facility] CATH/EP LAB;  Service: Cardiology;  Laterality: N/A;    INTRAMEDULLARY RODDING OF FEMUR Left 3/9/2024    Procedure: INSERTION, INTRAMEDULLARY HUI, FEMUR;  Surgeon: Tarun Hdez MD;  Location: [facility] OR;  Service: Orthopedics;  Laterality: Left;    INTRAVASCULAR ULTRASOUND, CORONARY N/A 5/9/2023    Procedure: Ultrasound-coronary;  Surgeon: nAtonio Kessler MD;  Location: [facility] CATH/EP LAB;  Service: Cardiology;  Laterality: N/A;    IVUS, CORONARY  5/11/2023    Procedure: IVUS, Coronary;  Surgeon: Antonio Kessler MD;  Location: [facility] CATH/EP LAB;  Service: Cardiology;;    LEFT HEART CATHETERIZATION Left 5/11/2023    Procedure: Left heart cath;  Surgeon: Antonio Kessler MD;  Location: [facility] CATH/EP LAB;  Service: Cardiology;  Laterality: Left;    PERCUTANEOUS TRANSLUMINAL BALLOON ANGIOPLASTY OF CORONARY ARTERY  5/9/2023    Procedure: Angioplasty-coronary;  Surgeon: Antonio Kessler MD;  Location: [facility] CATH/EP LAB;  Service: Cardiology;;    RHINOPLASTY      STENT, DRUG ELUTING, SINGLE VESSEL, CORONARY  5/9/2023    Procedure: Stent, Drug Eluting, Single Vessel, Coronary;  Surgeon: Antonio Kessler MD;  Location: [facility] CATH/EP LAB;  Service: Cardiology;;    SURGICAL REMOVAL OF NODULE OF VOCAL CORD       No family history on file.  Social History     Tobacco Use    Smoking status: Every Day     Types: Cigarettes    Smokeless tobacco: Former    Tobacco comments:     Pt ready to quit smoking and states he can on his own. Seen 10/9/23 for inpatient smoking cessation. [employer].     Occasionally   Substance Use Topics    Alcohol use: Yes     Alcohol/week: 14.0 standard drinks of alcohol     Types: 14 Shots of liquor per week    Drug use: Yes      Types: Marijuana     Review of Systems   Constitutional: Negative.    HENT: Negative.     Eyes: Negative.    Respiratory: Negative.     Cardiovascular: Negative.    Gastrointestinal: Negative.    Endocrine: Negative.    Genitourinary: Negative.    Musculoskeletal:  Positive for arthralgias and joint swelling.   Skin: Negative.    Allergic/Immunologic: Negative.    Neurological: Negative.    Hematological: Negative.    Psychiatric/Behavioral: Negative.     All other systems reviewed and are negative.      Physical Exam     Initial Vitals [10/18/24 0850]   BP Pulse Resp Temp SpO2   (!) 190/104 76 18 98 °F (36.7 °C) 98 %      MAP       --         Physical Exam    Nursing note and vitals reviewed.  Constitutional: He appears well-developed and well-nourished.   HENT:   Head: Normocephalic and atraumatic.   Nose: Nose normal.   Eyes: Conjunctivae and EOM are normal.   Neck: No tracheal deviation present.   Normal range of motion.  Cardiovascular:  Normal rate, regular rhythm, normal heart sounds and intact distal pulses.     Exam reveals no friction rub.       No murmur heard.  Pulmonary/Chest: Breath sounds normal. No respiratory distress. He has no wheezes. He has no rales.   Abdominal: Abdomen is soft. He exhibits no distension. There is no abdominal tenderness.   Musculoskeletal:         General: Tenderness present. Normal range of motion.      Cervical back: Normal range of motion.      Comments: Tenderness of multiple joints with inflammation in the joint secondary to arthritis.  Extremities otherwise neurovascularly intact.     Neurological: He is alert and oriented to person, place, and time. He has normal strength. GCS score is 15. GCS eye subscore is 4. GCS verbal subscore is 5. GCS motor subscore is 6.   Skin: Skin is warm and dry. Capillary refill takes less than 2 seconds.   Psychiatric: He has a normal mood and affect. Thought content normal.         ED Course   Critical Care    Date/Time: 10/18/2024 11:46  AM    Performed by: Bess Titus MD  Authorized by: Tee Quiñones MD  Direct patient critical care time: 10 minutes  Total critical care time (exclusive of procedural time) : 10 minutes        Labs Reviewed   CBC W/ AUTO DIFFERENTIAL - Abnormal       Result Value    WBC 8.10      RBC 4.35 (*)     Hemoglobin 13.9 (*)     Hematocrit 41.7      MCV 96      MCH 32.0 (*)     MCHC 33.3      RDW 12.2      Platelets 236      MPV 9.1 (*)     Immature Granulocytes 0.4      Gran # (ANC) 5.7      Immature Grans (Abs) 0.03      Lymph # 1.6      Mono # 0.6      Eos # 0.1      Baso # 0.09      nRBC 0      Gran % 70.3      Lymph % 19.6      Mono % 7.4      Eosinophil % 1.2      Basophil % 1.1      Differential Method Automated     COMPREHENSIVE METABOLIC PANEL - Abnormal    Sodium 138      Potassium 4.3      Chloride 103      CO2 26      Glucose 266 (*)     BUN 10      Creatinine 0.8      Calcium 9.4      Total Protein 7.1      Albumin 4.0      Total Bilirubin 0.4      Alkaline Phosphatase 87      AST 9 (*)     ALT 10      eGFR >60.0      Anion Gap 9     B-TYPE NATRIURETIC PEPTIDE - Abnormal     (*)    POCT GLUCOSE - Abnormal    POCT Glucose 184 (*)    TROPONIN I HIGH SENSITIVITY    Troponin I High Sensitivity 6.3     MAGNESIUM   MAGNESIUM    Magnesium 1.8     TROPONIN I HIGH SENSITIVITY   POCT GLUCOSE MONITORING CONTINUOUS     EKG Readings: (Independently Interpreted)   Initial Reading: No STEMI. Rhythm: Normal Sinus Rhythm. Ectopy: No Ectopy.   Intermittent episodes of wide complex rhythm   Other EKG Interpretations: Repeat EKG shows sinus rhythm with  narrow complex rhythm which is regular     ECG Results              EKG 12-lead (In process)        Collection Time Result Time QRS Duration OHS QTC Calculation    10/18/24 09:03:50 10/18/24 10:32:48 92 452                     In process by Interface, Lab In [facility] (10/18/24 10:32:51)                   Narrative:    Test Reason : R06.02,    Vent. Rate : 074 BPM     Atrial Rate  : 074 BPM     P-R Int : 226 ms          QRS Dur : 092 ms      QT Int : 408 ms       P-R-T Axes : 072 034 -01 degrees     QTc Int : 452 ms    Sinus rhythm with 1st degree A-V block with Fusion complexes  T wave abnormality, consider inferior ischemia  T wave abnormality, consider anterior ischemia  Abnormal ECG  When compared with ECG of 09-SEP-2024 12:25,  Fusion complexes are now Present  T wave inversion now evident in Inferior leads  Inverted T waves have replaced nonspecific T wave abnormality in Anterior  leads    Referred By: [email or web address]   SELF           Confirmed By:                                   Imaging Results              X-Ray Chest AP Portable (Final result)  Result time 10/18/24 09:22:29      Final result by Ren Leonard MD (10/18/24 09:22:29)                   Impression:      No acute cardiopulmonary abnormality.      Electronically signed by: Ren Leonard  Date:    10/18/2024  Time:    09:22               Narrative:    EXAMINATION:  XR CHEST AP PORTABLE    CLINICAL HISTORY:  CHF;    FINDINGS:  Portable chest at 856 compared with 09/06/2024 shows normal cardiomediastinal silhouette.    Previous reported bibasilar opacities have resolved comment lungs are now clear.  Pulmonary vasculature is normal. No acute osseous abnormality.                                       Medications   magnesium sulfate 2g in water 50mL IVPB (premix) (2 g Intravenous New Bag 10/18/24 1106)   niCARdipine 40 mg/200 mL (0.2 mg/mL) infusion (2.5 mg/hr Intravenous Rate/Dose Change 10/18/24 1132)   amLODIPine tablet 5 mg (5 mg Oral Given 10/18/24 1118)   aspirin EC tablet 81 mg (81 mg Oral Not Given 10/18/24 1215)   atorvastatin tablet 40 mg (has no administration in time range)   carvediloL tablet 6.25 mg (has no administration in time range)   clopidogreL tablet 75 mg (75 mg Oral Not Given 10/18/24 1215)   cyclobenzaprine tablet 5 mg (has no administration in time range)   famotidine tablet 20 mg (20 mg Oral Given 10/18/24  1118)   gabapentin capsule 300 mg (has no administration in time range)   insulin glargine U-100 (Lantus) pen 35 Units ( Subcutaneous Canceled Entry 10/18/24 1215)   losartan tablet 50 mg (50 mg Oral Not Given 10/18/24 1215)   nitroGLYCERIN SL tablet 0.4 mg (has no administration in time range)   tamsulosin 24 hr capsule 0.4 mg (has no administration in time range)   traZODone tablet 100 mg (has no administration in time range)   melatonin tablet 6 mg (has no administration in time range)   ondansetron injection 4 mg (has no administration in time range)   acetaminophen tablet 650 mg (has no administration in time range)   aluminum-magnesium hydroxide-simethicone 200-200-20 mg/5 mL suspension 30 mL (has no administration in time range)   acetaminophen tablet 650 mg (has no administration in time range)   HYDROcodone-acetaminophen 5-325 mg per tablet 1 tablet (1 tablet Oral Given 10/18/24 1131)   naloxone 0.4 mg/mL injection 0.02 mg (has no administration in time range)   potassium bicarbonate disintegrating tablet 50 mEq (has no administration in time range)   potassium bicarbonate disintegrating tablet 35 mEq (has no administration in time range)   potassium bicarbonate disintegrating tablet 60 mEq (has no administration in time range)   magnesium oxide tablet 800 mg (has no administration in time range)   magnesium oxide tablet 800 mg (has no administration in time range)   potassium, sodium phosphates 280-160-250 mg packet 2 packet (has no administration in time range)   potassium, sodium phosphates 280-160-250 mg packet 2 packet (has no administration in time range)   potassium, sodium phosphates 280-160-250 mg packet 2 packet (has no administration in time range)   glucose chewable tablet 16 g (has no administration in time range)   glucose chewable tablet 24 g (has no administration in time range)   dextrose 50% injection 12.5 g (has no administration in time range)   dextrose 50% injection 25 g (has no  administration in time range)   glucagon (human recombinant) injection 1 mg (has no administration in time range)   enoxaparin injection 40 mg (has no administration in time range)   insulin aspart U-100 pen 0-10 Units (has no administration in time range)   HYDROmorphone injection 1 mg (1 mg Intravenous Given 10/18/24 1002)     Medical Decision Making  69-year-old male presented emergency department with elevated blood pressure.  Patient has multiple joints that are inflamed and having pain and pain likely is causing hypotension so pain treated and blood pressure is still high so Cardene drip started.  Patient did have improvement of symptoms.  Given patient's EKG with change in morphology and there is concern for ischemia Hospital Medicine consulted for evaluation for admission and further management and this hypotension also likely contributing to EKG changes and this could be related to hypertensive emergency and given this started on IV blood pressure medication and Hospital Medicine consulted for admission to further manage and monitor patient.    Amount and/or Complexity of Data Reviewed  Labs: ordered. Decision-making details documented in ED Course.  Radiology: ordered. Decision-making details documented in ED Course.  ECG/medicine tests: ordered and independent interpretation performed. Decision-making details documented in ED Course.    Risk  Prescription drug management.                                      Clinical Impression:  Final diagnoses:  [R06.02] Shortness of breath  [I16.0] Hypertensive urgency  [M19.90] Arthritis (Primary)          ED Disposition Condition    Observation Serious                Bess Titus MD  10/18/24 1145       Bess Titus MD  10/18/24 1148

## 2024-10-18 NOTE — PLAN OF CARE
Met with patient at bedside, explained Medicare Outpatient Observation Notice (MOON), and answered all questions to patient/family satisfaction. VIEIRA form scanned into media manager.       10/18/24 1318   VIEIRA Message   Medicare Outpatient and Observation Notification regarding financial responsibility Explained to patient/caregiver;Signed/date by patient/caregiver;Given to patient/caregiver   Date VIEIRA was signed 10/18/24   Time VIEIRA was signed [id number]

## 2024-10-18 NOTE — SUBJECTIVE & OBJECTIVE
Past Medical History:   Diagnosis Date    Abdominal pain 2022    CHF (congestive heart failure)     Diabetes mellitus 2018    Emphysema lung     Endocarditis 2011    Hypertension     Stroke 2011    denies residual       Past Surgical History:   Procedure Laterality Date    ANGIOGRAM, CORONARY, WITH LEFT HEART CATHETERIZATION N/A 10/10/2023    Procedure: Angiogram, Coronary, with Left Heart Cath;  Surgeon: Dieudonne Ryan MD;  Location: [facility] CATH/EP LAB;  Service: Cardiology;  Laterality: N/A;    BACK SURGERY  1981    COLONOSCOPY N/A 2/23/2023    Procedure: COLONOSCOPY;  Surgeon: Jonn Cruz MD;  Location: [facility] ENDO;  Service: Endoscopy;  Laterality: N/A;    CORONARY ANGIOGRAPHY N/A 5/9/2023    Procedure: ANGIOGRAM, CORONARY ARTERY;  Surgeon: Antonio Kessler MD;  Location: [facility] CATH/EP LAB;  Service: Cardiology;  Laterality: N/A;    EXCISION OF HYDROCELE      x2    FRACTIONAL FLOW RESERVE (FFR), CORONARY  5/11/2023    Procedure: Fractional Flow [place] (FFR), Coronary;  Surgeon: Antonio Kessler MD;  Location: [facility] CATH/EP LAB;  Service: Cardiology;;    INGUINAL HERNIA REPAIR  1960    INSTANTANEOUS WAVE-FREE RATIO (IFR) N/A 10/10/2023    Procedure: Instantaneous Wave-Free Ratio (IFR);  Surgeon: Dieudonne Ryan MD;  Location: [facility] CATH/EP LAB;  Service: Cardiology;  Laterality: N/A;    INTRAMEDULLARY RODDING OF FEMUR Left 3/9/2024    Procedure: INSERTION, INTRAMEDULLARY HUI, FEMUR;  Surgeon: Tarun Hdez MD;  Location: [facility] OR;  Service: Orthopedics;  Laterality: Left;    INTRAVASCULAR ULTRASOUND, CORONARY N/A 5/9/2023    Procedure: Ultrasound-coronary;  Surgeon: Antonio Kessler MD;  Location: [facility] CATH/EP LAB;  Service: Cardiology;  Laterality: N/A;    IVUS, CORONARY  5/11/2023    Procedure: IVUS, Coronary;  Surgeon: Antonio Kessler MD;  Location: [facility] CATH/EP LAB;  Service: Cardiology;;    LEFT HEART CATHETERIZATION Left 5/11/2023    Procedure: Left heart cath;  Surgeon: Antonio Kessler MD;   Location: [facility] CATH/EP LAB;  Service: Cardiology;  Laterality: Left;    PERCUTANEOUS TRANSLUMINAL BALLOON ANGIOPLASTY OF CORONARY ARTERY  5/9/2023    Procedure: Angioplasty-coronary;  Surgeon: Antonio Kessler MD;  Location: [facility] CATH/EP LAB;  Service: Cardiology;;    RHINOPLASTY      STENT, DRUG ELUTING, SINGLE VESSEL, CORONARY  5/9/2023    Procedure: Stent, Drug Eluting, Single Vessel, Coronary;  Surgeon: Antonio Kessler MD;  Location: [facility] CATH/EP LAB;  Service: Cardiology;;    SURGICAL REMOVAL OF NODULE OF VOCAL CORD         Review of patient's allergies indicates:   Allergen Reactions    Amoxicillin Shortness Of Breath and Edema       No current facility-administered medications on file prior to encounter.     Current Outpatient Medications on File Prior to Encounter   Medication Sig    acetaminophen (TYLENOL) 500 MG tablet Take 1,000 mg by mouth every 12 (twelve) hours as needed for Pain or Temperature greater than.    albuterol (PROVENTIL/VENTOLIN HFA) 90 mcg/actuation inhaler Take 2 puffs by mouth 4 (four) times daily as needed for Wheezing or Shortness of Breath.    amLODIPine (NORVASC) 5 MG tablet Take 1 tablet (5 mg total) by mouth once daily.    aspirin (ECOTRIN) 81 MG EC tablet Take 81 mg by mouth once daily.    carvediloL (COREG) 12.5 MG tablet Take 6.25 mg by mouth 2 (two) times daily.    clopidogreL (PLAVIX) 75 mg tablet Take 1 tablet (75 mg total) by mouth once daily. (Patient taking differently: Take 75 mg by mouth every evening.)    cyclobenzaprine (FLEXERIL) 5 MG tablet Take 1 tablet (5 mg total) by mouth every 8 (eight) hours as needed for muscle spasms.    DULoxetine (CYMBALTA) 30 MG capsule Take 30 mg by mouth once daily.    famotidine (PEPCID) 20 MG tablet Take 1 tablet (20 mg total) by mouth 2 (two) times daily.    fluticasone propionate (FLONASE) 50 mcg/actuation nasal spray 2 sprays by Nasal route daily as needed.    folic acid (FOLVITE) 1 MG tablet Take 1 mg by mouth once daily.     gabapentin (NEURONTIN) 300 MG capsule Take 300 mg by mouth 2 (two) times daily.    ibuprofen (ADVIL,MOTRIN) 400 MG tablet Take 1 tablet by mouth every 6 (six) hours as needed.    insulin glargine-yfgn 100 unit/mL Soln Inject 45 Units into the skin 2 (two) times a day.    losartan (COZAAR) 100 MG tablet Take 50 mg by mouth once daily.    multivitamin Tab Take 1 tablet by mouth once daily.    naloxone (NARCAN) 4 mg/actuation Spry 1 spray by Nasal route once.    nicotine (NICODERM CQ) 21 mg/24 hr Place 1 patch onto the skin once daily.    nitroGLYCERIN (NITROSTAT) 0.4 MG SL tablet Place 1 tablet (0.4 mg total) under the tongue every 5 (five) minutes as needed for Chest pain.    oxyCODONE (OXY-IR) 5 mg Cap Take 1 tablet by mouth every 6 (six) hours as needed for Pain.    rosuvastatin (CRESTOR) 10 MG tablet Take 5 mg by mouth once daily.    thiamine 100 MG tablet Take 100 mg by mouth once daily.    senna (SENOKOT) 8.6 mg tablet Take 17.2 mg by mouth daily as needed.    traZODone (DESYREL) 100 MG tablet Take 1 tablet (100 mg total) by mouth nightly. For deperession    [DISCONTINUED] acetaminophen (TYLENOL) 325 MG tablet Take 2 tablets (650 mg total) by mouth every 8 (eight) hours.    [DISCONTINUED] albuterol (PROVENTIL/VENTOLIN HFA) 90 mcg/actuation inhaler Inhale 2 puffs into the lungs every 6 (six) hours as needed for Wheezing. Rescue    [DISCONTINUED] aspirin 81 MG Chew Take 1 tablet (81 mg total) by mouth 2 (two) times a day for 24 days, THEN 1 tablet (81 mg total) once daily.    [DISCONTINUED] atorvastatin (LIPITOR) 40 MG tablet Take 1 tablet (40 mg total) by mouth once daily.    [DISCONTINUED] carvediloL (COREG) 3.125 MG tablet Take 2 tablets (6.25 mg total) by mouth 2 (two) times daily with meals.    [DISCONTINUED] HYDROcodone-acetaminophen (NORCO) 7.5-325 mg per tablet Take 1 tablet by mouth every 6 (six) hours as needed for Pain.    [DISCONTINUED] hydrOXYzine HCL (ATARAX) 50 MG tablet Take 1 tablet (50 mg total)  by mouth 3 (three) times daily as needed for Anxiety.    [DISCONTINUED] losartan (COZAAR) 100 MG tablet Take 100 mg by mouth.    [DISCONTINUED] ondansetron (ZOFRAN) 4 MG tablet Take 1 tablet (4 mg total) by mouth every 8 (eight) hours as needed for Nausea.    [DISCONTINUED] polyethylene glycol (GLYCOLAX) 17 gram PwPk Take 17 g by mouth 2 (two) times daily.    [DISCONTINUED] tamsulosin (FLOMAX) 0.4 mg Cap Take 0.4 mg by mouth.     Family History       Problem Relation (Age of Onset)    Arthritis Mother          Tobacco Use    Smoking status: Every Day     Types: Cigarettes    Smokeless tobacco: Former    Tobacco comments:     Pt ready to quit smoking and states he can on his own. Seen 10/9/23 for inpatient smoking cessation. [employer] Member.     Occasionally   Substance and Sexual Activity    Alcohol use: Yes     Alcohol/week: 14.0 standard drinks of alcohol     Types: 14 Shots of liquor per week    Drug use: Yes     Types: Marijuana    Sexual activity: Not on file     Review of Systems   Constitutional:  Negative for activity change and appetite change.   HENT:  Negative for congestion and dental problem.    Eyes:  Negative for discharge and itching.   Respiratory:  Negative for shortness of breath.    Cardiovascular:  Negative for chest pain.   Gastrointestinal:  Negative for abdominal distention and abdominal pain.   Endocrine: Negative for cold intolerance.   Genitourinary:  Negative for difficulty urinating and dysuria.   Musculoskeletal:  Positive for arthralgias, back pain and joint swelling.   Skin:  Negative for color change.   Neurological:  Negative for dizziness and facial asymmetry.   Hematological:  Negative for adenopathy.   Psychiatric/Behavioral:  Negative for agitation and behavioral problems.      Objective:     Vital Signs (Most Recent):  Temp: 97.7 °F (36.5 °C) (10/18/24 1200)  Pulse: 74 (10/18/24 1701)  Resp: (!) 25 (10/18/24 1735)  BP: 139/83 (10/18/24 1700)  SpO2: (!) 93 % (10/18/24 1701)  Vital Signs (24h Range):  Temp:  [97.7 °F (36.5 °C)-98 °F (36.7 °C)] 97.7 °F (36.5 °C)  Pulse:  [64-78] 74  Resp:  [13-28] 25  SpO2:  [91 %-99 %] 93 %  BP: (129-209)/() 139/83     Weight: 98 kg (216 lb 0.8 oz)  Body mass index is 28.5 kg/m².     Physical Exam  Vitals and nursing note reviewed.   Constitutional:       Appearance: He is well-developed.   HENT:      Head: Atraumatic.      Right Ear: External ear normal.      Left Ear: External ear normal.      Nose: Nose normal.      Mouth/Throat:      Mouth: Mucous membranes are moist.   Eyes:      Extraocular Movements: Extraocular movements intact.   Cardiovascular:      Rate and Rhythm: Normal rate.   Pulmonary:      Effort: Pulmonary effort is normal.   Abdominal:      Palpations: Abdomen is soft.   Musculoskeletal:         General: Normal range of motion.      Cervical back: Full passive range of motion without pain and normal range of motion.      Comments: Muscle wasting on hands   Skin:     General: Skin is warm.   Neurological:      Mental Status: He is alert and oriented to person, place, and time.   Psychiatric:         Behavior: Behavior normal.                Significant Labs: All pertinent labs within the past 24 hours have been reviewed.  CBC:   Recent Labs   Lab 10/18/24  0927   WBC 8.10   HGB 13.9*   HCT 41.7        CMP:   Recent Labs   Lab 10/18/24  0927      K 4.3      CO2 26   *   BUN 10   CREATININE 0.8   CALCIUM 9.4   PROT 7.1   ALBUMIN 4.0   BILITOT 0.4   ALKPHOS 87   AST 9*   ALT 10   ANIONGAP 9       Significant Imaging: I have reviewed all pertinent imaging results/findings within the past 24 hours.

## 2024-10-18 NOTE — HPI
69 year old pt getting admitted with HT urgency in the background of intractable pain from RA  Per pt : he had ORIF of L hip done on May 2024  Since then he started having pain on knuckles/fingers and shoulders  Recently pain started radiating to Neck area  Pt was diagnosed with RA and started on prednisone for a while and pt felt good relief and later it was DC ed  Also [facility]  Last few days pt started having severe excruciating pain on fingers/shoulders and neck and even slight movements makes it worse  Because of this his BP went up and when [facility] contacted [facility] people , he was advised to come to ER  Pt was started on cardene gtt and got admitted   Exactly one year ago pt had coronary angiogram

## 2024-10-18 NOTE — Clinical Note
Diagnosis: Hypertensive urgency [031989]   Future Attending Provider: SHOSHANA WELLINGTON [[id number]]   Place in Observation: [facility] [[id number]]   Special Needs:: No Special Needs [1]

## 2024-10-18 NOTE — PLAN OF CARE
Met with patient at bedside, discussed disposition.  Patient states he lives alone and plans to return home at d/c.  Patient drove himself to the hospital and plans to drive himself home but if he cannot he will call his friend Angel Coopertigre [phone] to pick him up.       10/18/24 1315   Post-Acute Status   Coverage [facility], PHN   Discharge Delays None known at this time   Discharge Plan   Discharge Plan A Home   Discharge Plan B Home

## 2024-10-18 NOTE — ASSESSMENT & PLAN NOTE
Details unknown  Pt has muscle wasting on wrist area  Start on few doses of iv steroids  When he goes home he need opiates/steroids until he [facility] on [facility],so that readmissions  can be avoided  Possible home tomorrow if stable

## 2024-10-18 NOTE — ASSESSMENT & PLAN NOTE
Patient has a current diagnosis of hypertensive urgency (without evidence of end organ damage) which is controlled.  Latest blood pressure and vitals reviewed-   Temp:  [97.7 °F (36.5 °C)-98 °F (36.7 °C)]   Pulse:  [64-78]   Resp:  [13-28]   BP: (129-209)/()   SpO2:  [91 %-99 %] .   Patient currently on IV antihypertensives.   Home meds for hypertension were reviewed and noted below.   Hypertension Medications               amLODIPine (NORVASC) 5 MG tablet Take 1 tablet (5 mg total) by mouth once daily.    carvediloL (COREG) 12.5 MG tablet Take 6.25 mg by mouth 2 (two) times daily.    losartan (COZAAR) 100 MG tablet Take 50 mg by mouth once daily.    nitroGLYCERIN (NITROSTAT) 0.4 MG SL tablet Place 1 tablet (0.4 mg total) under the tongue every 5 (five) minutes as needed for Chest pain.            Medication adjustment for hospital antihypertensives is as follows-       Will aim for controlled BP reduction by medications noted above. Monitor and mitigate end organ damage as indicated.

## 2024-10-18 NOTE — PLAN OF CARE
[facility]  Initial Discharge Assessment       Primary Care Provider: Damian Vergara MD    Admission Diagnosis: Hypertensive urgency [I16.0]    Admission Date: 10/18/2024  Expected Discharge Date:       met with Pt at bedside to complete discharge assessment. Pt AAOx4s. Demographics, PCP, and insurance verified. No home health. No dialysis. Pt reports ability to complete ADLs without assistance. Pt verbalized plan to discharge home via family transport. Pt has no other needs to be addressed at this time.     Transition of Care Barriers: None    Payor: [employer] / Plan: [facility] OPTUM / Product Type: Government /     Extended Emergency Contact Information  Primary Emergency Contact: Angel Klein  Mobile Phone: [phone]  Relation: Friend  Preferred language: English   needed? No  Secondary Emergency Contact: Jonn Kraft  Mobile Phone: [phone]  Relation: Brother  Preferred language: English   needed? No    Discharge Plan A: Home Health  Discharge Plan B: Home with family      [facility]  [address]  [place] [address]  Phone: [phone] Fax: [phone]      Initial Assessment (most recent)       Adult Discharge Assessment - 10/18/24 1230          Discharge Assessment    Assessment Type Discharge Planning Assessment     Confirmed/corrected address, phone number and insurance Yes     Confirmed Demographics Correct on Facesheet     Source of Information patient     When was your last doctors appointment? --   Pt report last year    Communicated DAVID with patient/caregiver Date not available/Unable to determine     Reason For Admission Hypertensive urgency     People in Home alone     Facility Arrived From: Home     Do you expect to return to your current living situation? Yes     Do you have help at home or someone to help you manage your care at home? No     Prior to hospitilization cognitive status:  Alert/Oriented     Current cognitive status: Alert/Oriented     Walking or Climbing Stairs Difficulty no     Home Accessibility not wheelchair accessible     Home Layout Able to live on 1st floor     Equipment Currently Used at Home none     Readmission within 30 days? Yes     Patient currently being followed by outpatient case management? No     Do you currently have service(s) that help you manage your care at home? No     Do you take prescription medications? Yes     Do you have prescription coverage? Yes     Coverage Payor: [facility] ADMINISTRATION - [facility] [place]UM -     Do you have any problems affording any of your prescribed medications? No     Is the patient taking medications as prescribed? yes     Who is going to help you get home at discharge? Self transportation or friend: Jonn Kraft [phone]     How do you get to doctors appointments? car, drives self     Are you on dialysis? No     Do you take coumadin? --   Pt reports Plavis    Discharge Plan A Home Health     Discharge Plan B Home with family     DME Needed Upon Discharge  none     Discharge Plan discussed with: Patient     Transition of Care Barriers None        Physical Activity    On average, how many days per week do you engage in moderate to strenuous exercise (like a brisk walk)? 0 days     On average, how many minutes do you engage in exercise at this level? 0 min        Financial Resource Strain    How hard is it for you to pay for the very basics like food, housing, medical care, and heating? Not hard at all        Housing Stability    In the last 12 months, was there a time when you were not able to pay the mortgage or rent on time? No     At any time in the past 12 months, were you homeless or living in a shelter (including now)? No        Transportation Needs    Has the lack of transportation kept you from medical appointments, meetings, work or from getting things needed for daily living? No        Food Insecurity    Within the past 12  months, you worried that your food would run out before you got the money to buy more. Never true     Within the past 12 months, the food you bought just didn't last and you didn't have money to get more. Never true        Stress    Do you feel stress - tense, restless, nervous, or anxious, or unable to sleep at night because your mind is troubled all the time - these days? Not at all        Social Isolation    How often do you feel lonely or isolated from those around you?  Never        Alcohol Use    Q1: How often do you have a drink containing alcohol? 2-4 times a month     Q2: How many drinks containing alcohol do you have on a typical day when you are drinking? 1 or 2     Q3: How often do you have six or more drinks on one occasion? Never        Utilities    In the past 12 months has the electric, gas, oil, or water company threatened to shut off services in your home? No        Health Literacy    How often do you need to have someone help you when you read instructions, pamphlets, or other written material from your doctor or pharmacy? Never

## 2024-10-18 NOTE — ASSESSMENT & PLAN NOTE
Oct 2023 pt had LHC and results are as follows  Nonobstructive coronary artery disease.  Patent stents in mid RCA and distal circumflex.  Mid LAD lesion was not hemodynamically significant.

## 2024-10-18 NOTE — H&P
[facility] - Emergency Dept  Hospital Medicine  History & Physical    Patient Name: Sampson Holt  MRN: [id number]  Patient Class: OP- Observation  Admission Date: 10/18/2024  Attending Physician: Tee Quiñones MD   Primary Care Provider: Damian Vergara MD         Patient information was obtained from patient and ER records.     Subjective:     Principal Problem:Hypertensive urgency    Chief Complaint:   Chief Complaint   Patient presents with    Hypertension     Elevated blood pressure    Hand Pain     States arthritis to both hands pain for a month    Shoulder Pain        HPI: 69 year old pt getting admitted with HT urgency in the background of intractable pain from RA  Per pt : he had ORIF of L hip done on May 2024  Since then he started having pain on knuckles/fingers and shoulders  Recently pain started radiating to Neck area  Pt was diagnosed with RA and started on prednisone for a while and pt felt good relief and later it was DC ed  Also [facility]  Last few days pt started having severe excruciating pain on fingers/shoulders and neck and even slight movements makes it worse  Because of this his BP went up and when [facility] contacted [facility] people , he was advised to come to ER  Pt was started on cardene gtt and got admitted   Exactly one year ago pt had coronary angiogram    Past Medical History:   Diagnosis Date    Abdominal pain 2022    CHF (congestive heart failure)     Diabetes mellitus 2018    Emphysema lung     Endocarditis 2011    Hypertension     Stroke 2011    denies residual       Past Surgical History:   Procedure Laterality Date    ANGIOGRAM, CORONARY, WITH LEFT HEART CATHETERIZATION N/A 10/10/2023    Procedure: Angiogram, Coronary, with Left Heart Cath;  Surgeon: Dieudonne Ryan MD;  Location: [facility] CATH/EP LAB;  Service: Cardiology;  Laterality: N/A;    BACK SURGERY  1981    COLONOSCOPY N/A 2/23/2023    Procedure: COLONOSCOPY;  Surgeon:  Jonn Cruz MD;  Location: [facility] ENDO;  Service: Endoscopy;  Laterality: N/A;    CORONARY ANGIOGRAPHY N/A 5/9/2023    Procedure: ANGIOGRAM, CORONARY ARTERY;  Surgeon: Antonio Kessler MD;  Location: [facility] CATH/EP LAB;  Service: Cardiology;  Laterality: N/A;    EXCISION OF HYDROCELE      x2    FRACTIONAL FLOW RESERVE (FFR), CORONARY  5/11/2023    Procedure: Fractional Flow [place] (FFR), Coronary;  Surgeon: Antonio Kessler MD;  Location: [facility] CATH/EP LAB;  Service: Cardiology;;    INGUINAL HERNIA REPAIR  1960    INSTANTANEOUS WAVE-FREE RATIO (IFR) N/A 10/10/2023    Procedure: Instantaneous Wave-Free Ratio (IFR);  Surgeon: Dieudonne Ryan MD;  Location: [facility] CATH/EP LAB;  Service: Cardiology;  Laterality: N/A;    INTRAMEDULLARY RODDING OF FEMUR Left 3/9/2024    Procedure: INSERTION, INTRAMEDULLARY HUI, FEMUR;  Surgeon: Tarun Hdez MD;  Location: [facility] OR;  Service: Orthopedics;  Laterality: Left;    INTRAVASCULAR ULTRASOUND, CORONARY N/A 5/9/2023    Procedure: Ultrasound-coronary;  Surgeon: Antonio Kessler MD;  Location: [facility] CATH/EP LAB;  Service: Cardiology;  Laterality: N/A;    IVUS, CORONARY  5/11/2023    Procedure: IVUS, Coronary;  Surgeon: Antonio Kessler MD;  Location: [facility] CATH/EP LAB;  Service: Cardiology;;    LEFT HEART CATHETERIZATION Left 5/11/2023    Procedure: Left heart cath;  Surgeon: Antonio Kessler MD;  Location: [facility] CATH/EP LAB;  Service: Cardiology;  Laterality: Left;    PERCUTANEOUS TRANSLUMINAL BALLOON ANGIOPLASTY OF CORONARY ARTERY  5/9/2023    Procedure: Angioplasty-coronary;  Surgeon: Antonio Kessler MD;  Location: [facility] CATH/EP LAB;  Service: Cardiology;;    RHINOPLASTY      STENT, DRUG ELUTING, SINGLE VESSEL, CORONARY  5/9/2023    Procedure: Stent, Drug Eluting, Single Vessel, Coronary;  Surgeon: Antonio Kessler MD;  Location: [facility] CATH/EP LAB;  Service: Cardiology;;    SURGICAL REMOVAL OF NODULE OF VOCAL CORD         Review of patient's allergies indicates:   Allergen Reactions     Amoxicillin Shortness Of Breath and Edema       No current facility-administered medications on file prior to encounter.     Current Outpatient Medications on File Prior to Encounter   Medication Sig    acetaminophen (TYLENOL) 500 MG tablet Take 1,000 mg by mouth every 12 (twelve) hours as needed for Pain or Temperature greater than.    albuterol (PROVENTIL/VENTOLIN HFA) 90 mcg/actuation inhaler Take 2 puffs by mouth 4 (four) times daily as needed for Wheezing or Shortness of Breath.    amLODIPine (NORVASC) 5 MG tablet Take 1 tablet (5 mg total) by mouth once daily.    aspirin (ECOTRIN) 81 MG EC tablet Take 81 mg by mouth once daily.    carvediloL (COREG) 12.5 MG tablet Take 6.25 mg by mouth 2 (two) times daily.    clopidogreL (PLAVIX) 75 mg tablet Take 1 tablet (75 mg total) by mouth once daily. (Patient taking differently: Take 75 mg by mouth every evening.)    cyclobenzaprine (FLEXERIL) 5 MG tablet Take 1 tablet (5 mg total) by mouth every 8 (eight) hours as needed for muscle spasms.    DULoxetine (CYMBALTA) 30 MG capsule Take 30 mg by mouth once daily.    famotidine (PEPCID) 20 MG tablet Take 1 tablet (20 mg total) by mouth 2 (two) times daily.    fluticasone propionate (FLONASE) 50 mcg/actuation nasal spray 2 sprays by Nasal route daily as needed.    folic acid (FOLVITE) 1 MG tablet Take 1 mg by mouth once daily.    gabapentin (NEURONTIN) 300 MG capsule Take 300 mg by mouth 2 (two) times daily.    ibuprofen (ADVIL,MOTRIN) 400 MG tablet Take 1 tablet by mouth every 6 (six) hours as needed.    insulin glargine-yfgn 100 unit/mL Soln Inject 45 Units into the skin 2 (two) times a day.    losartan (COZAAR) 100 MG tablet Take 50 mg by mouth once daily.    multivitamin Tab Take 1 tablet by mouth once daily.    naloxone (NARCAN) 4 mg/actuation Spry 1 spray by Nasal route once.    nicotine (NICODERM CQ) 21 mg/24 hr Place 1 patch onto the skin once daily.    nitroGLYCERIN (NITROSTAT) 0.4 MG SL tablet Place 1 tablet (0.4  mg total) under the tongue every 5 (five) minutes as needed for Chest pain.    oxyCODONE (OXY-IR) 5 mg Cap Take 1 tablet by mouth every 6 (six) hours as needed for Pain.    rosuvastatin (CRESTOR) 10 MG tablet Take 5 mg by mouth once daily.    thiamine 100 MG tablet Take 100 mg by mouth once daily.    senna (SENOKOT) 8.6 mg tablet Take 17.2 mg by mouth daily as needed.    traZODone (DESYREL) 100 MG tablet Take 1 tablet (100 mg total) by mouth nightly. For deperession    [DISCONTINUED] acetaminophen (TYLENOL) 325 MG tablet Take 2 tablets (650 mg total) by mouth every 8 (eight) hours.    [DISCONTINUED] albuterol (PROVENTIL/VENTOLIN HFA) 90 mcg/actuation inhaler Inhale 2 puffs into the lungs every 6 (six) hours as needed for Wheezing. Rescue    [DISCONTINUED] aspirin 81 MG Chew Take 1 tablet (81 mg total) by mouth 2 (two) times a day for 24 days, THEN 1 tablet (81 mg total) once daily.    [DISCONTINUED] atorvastatin (LIPITOR) 40 MG tablet Take 1 tablet (40 mg total) by mouth once daily.    [DISCONTINUED] carvediloL (COREG) 3.125 MG tablet Take 2 tablets (6.25 mg total) by mouth 2 (two) times daily with meals.    [DISCONTINUED] HYDROcodone-acetaminophen (NORCO) 7.5-325 mg per tablet Take 1 tablet by mouth every 6 (six) hours as needed for Pain.    [DISCONTINUED] hydrOXYzine HCL (ATARAX) 50 MG tablet Take 1 tablet (50 mg total) by mouth 3 (three) times daily as needed for Anxiety.    [DISCONTINUED] losartan (COZAAR) 100 MG tablet Take 100 mg by mouth.    [DISCONTINUED] ondansetron (ZOFRAN) 4 MG tablet Take 1 tablet (4 mg total) by mouth every 8 (eight) hours as needed for Nausea.    [DISCONTINUED] polyethylene glycol (GLYCOLAX) 17 gram PwPk Take 17 g by mouth 2 (two) times daily.    [DISCONTINUED] tamsulosin (FLOMAX) 0.4 mg Cap Take 0.4 mg by mouth.     Family History       Problem Relation (Age of Onset)    Arthritis Mother          Tobacco Use    Smoking status: Every Day     Types: Cigarettes    Smokeless tobacco:  Former    Tobacco comments:     Pt ready to quit smoking and states he can on his own. Seen 10/9/23 for inpatient smoking cessation. [employer] Member.     Occasionally   Substance and Sexual Activity    Alcohol use: Yes     Alcohol/week: 14.0 standard drinks of alcohol     Types: 14 Shots of liquor per week    Drug use: Yes     Types: Marijuana    Sexual activity: Not on file     Review of Systems   Constitutional:  Negative for activity change and appetite change.   HENT:  Negative for congestion and dental problem.    Eyes:  Negative for discharge and itching.   Respiratory:  Negative for shortness of breath.    Cardiovascular:  Negative for chest pain.   Gastrointestinal:  Negative for abdominal distention and abdominal pain.   Endocrine: Negative for cold intolerance.   Genitourinary:  Negative for difficulty urinating and dysuria.   Musculoskeletal:  Positive for arthralgias, back pain and joint swelling.   Skin:  Negative for color change.   Neurological:  Negative for dizziness and facial asymmetry.   Hematological:  Negative for adenopathy.   Psychiatric/Behavioral:  Negative for agitation and behavioral problems.      Objective:     Vital Signs (Most Recent):  Temp: 97.7 °F (36.5 °C) (10/18/24 1200)  Pulse: 74 (10/18/24 1701)  Resp: (!) 25 (10/18/24 1735)  BP: 139/83 (10/18/24 1700)  SpO2: (!) 93 % (10/18/24 1701) Vital Signs (24h Range):  Temp:  [97.7 °F (36.5 °C)-98 °F (36.7 °C)] 97.7 °F (36.5 °C)  Pulse:  [64-78] 74  Resp:  [13-28] 25  SpO2:  [91 %-99 %] 93 %  BP: (129-209)/() 139/83     Weight: 98 kg (216 lb 0.8 oz)  Body mass index is 28.5 kg/m².     Physical Exam  Vitals and nursing note reviewed.   Constitutional:       Appearance: He is well-developed.   HENT:      Head: Atraumatic.      Right Ear: External ear normal.      Left Ear: External ear normal.      Nose: Nose normal.      Mouth/Throat:      Mouth: Mucous membranes are moist.   Eyes:      Extraocular Movements: Extraocular  movements intact.   Cardiovascular:      Rate and Rhythm: Normal rate.   Pulmonary:      Effort: Pulmonary effort is normal.   Abdominal:      Palpations: Abdomen is soft.   Musculoskeletal:         General: Normal range of motion.      Cervical back: Full passive range of motion without pain and normal range of motion.      Comments: Muscle wasting on hands   Skin:     General: Skin is warm.   Neurological:      Mental Status: He is alert and oriented to person, place, and time.   Psychiatric:         Behavior: Behavior normal.                Significant Labs: All pertinent labs within the past 24 hours have been reviewed.  CBC:   Recent Labs   Lab 10/18/24  0927   WBC 8.10   HGB 13.9*   HCT 41.7        CMP:   Recent Labs   Lab 10/18/24  0927      K 4.3      CO2 26   *   BUN 10   CREATININE 0.8   CALCIUM 9.4   PROT 7.1   ALBUMIN 4.0   BILITOT 0.4   ALKPHOS 87   AST 9*   ALT 10   ANIONGAP 9       Significant Imaging: I have reviewed all pertinent imaging results/findings within the past 24 hours.    Assessment/Plan:     * Hypertensive urgency  Patient has a current diagnosis of hypertensive urgency (without evidence of end organ damage) which is controlled.  Latest blood pressure and vitals reviewed-   Temp:  [97.7 °F (36.5 °C)-98 °F (36.7 °C)]   Pulse:  [64-78]   Resp:  [13-28]   BP: (129-209)/()   SpO2:  [91 %-99 %] .   Patient currently on IV antihypertensives.   Home meds for hypertension were reviewed and noted below.   Hypertension Medications               amLODIPine (NORVASC) 5 MG tablet Take 1 tablet (5 mg total) by mouth once daily.    carvediloL (COREG) 12.5 MG tablet Take 6.25 mg by mouth 2 (two) times daily.    losartan (COZAAR) 100 MG tablet Take 50 mg by mouth once daily.    nitroGLYCERIN (NITROSTAT) 0.4 MG SL tablet Place 1 tablet (0.4 mg total) under the tongue every 5 (five) minutes as needed for Chest pain.            Medication adjustment for hospital  antihypertensives is as follows-       Will aim for controlled BP reduction by medications noted above. Monitor and mitigate end organ damage as indicated.    Rheumatoid arthritis  Details unknown  Pt has muscle wasting on wrist area  Start on few doses of iv steroids  When he goes home he need opiates/steroids until [facility] on [facility],so that readmissions  can be avoided  Possible home tomorrow if stable       Chest pain  Non specific   Last LHC was one year ago      CAD (coronary artery disease)  Oct 2023 pt had LHC and results are as follows  Nonobstructive coronary artery disease.  Patent stents in mid RCA and distal circumflex.  Mid LAD lesion was not hemodynamically significant.     Type 2 diabetes mellitus with circulatory disorder, with long-term current use of insulin  Maintain present regime        VTE Risk Mitigation (From admission, onward)           Ordered     enoxaparin injection 40 mg  Daily         10/18/24 1106     IP VTE HIGH RISK PATIENT  Once         10/18/24 1106     Place sequential compression device  Until discontinued         10/18/24 1106                                    Tee Quiñones MD  Department of Hospital Medicine  [facility] - Emergency Dept

## 2024-10-19 LAB
ALBUMIN SERPL BCP-MCNC: 4 G/DL (ref 3.5–5.2)
ALP SERPL-CCNC: 81 U/L (ref 55–135)
ALT SERPL W/O P-5'-P-CCNC: 9 U/L (ref 10–44)
ANION GAP SERPL CALC-SCNC: 9 MMOL/L (ref 8–16)
AST SERPL-CCNC: 8 U/L (ref 10–40)
BASOPHILS # BLD AUTO: 0.01 K/UL (ref 0–0.2)
BASOPHILS NFR BLD: 0.2 % (ref 0–1.9)
BILIRUB SERPL-MCNC: 0.3 MG/DL (ref 0.1–1)
BUN SERPL-MCNC: 18 MG/DL (ref 8–23)
CALCIUM SERPL-MCNC: 9.4 MG/DL (ref 8.7–10.5)
CHLORIDE SERPL-SCNC: 98 MMOL/L (ref 95–110)
CO2 SERPL-SCNC: 24 MMOL/L (ref 23–29)
CREAT SERPL-MCNC: 0.9 MG/DL (ref 0.5–1.4)
DIFFERENTIAL METHOD BLD: ABNORMAL
EOSINOPHIL # BLD AUTO: 0 K/UL (ref 0–0.5)
EOSINOPHIL NFR BLD: 0 % (ref 0–8)
ERYTHROCYTE [DISTWIDTH] IN BLOOD BY AUTOMATED COUNT: 12 % (ref 11.5–14.5)
EST. GFR  (NO RACE VARIABLE): >60 ML/MIN/1.73 M^2
GLUCOSE SERPL-MCNC: 372 MG/DL (ref 70–110)
HCT VFR BLD AUTO: 38.1 % (ref 40–54)
HGB BLD-MCNC: 12.7 G/DL (ref 14–18)
IMM GRANULOCYTES # BLD AUTO: 0.02 K/UL (ref 0–0.04)
IMM GRANULOCYTES NFR BLD AUTO: 0.5 % (ref 0–0.5)
LYMPHOCYTES # BLD AUTO: 0.6 K/UL (ref 1–4.8)
LYMPHOCYTES NFR BLD: 13.1 % (ref 18–48)
MAGNESIUM SERPL-MCNC: 2 MG/DL (ref 1.6–2.6)
MCH RBC QN AUTO: 31.2 PG (ref 27–31)
MCHC RBC AUTO-ENTMCNC: 33.3 G/DL (ref 32–36)
MCV RBC AUTO: 94 FL (ref 82–98)
MONOCYTES # BLD AUTO: 0 K/UL (ref 0.3–1)
MONOCYTES NFR BLD: 0.9 % (ref 4–15)
NEUTROPHILS # BLD AUTO: 3.8 K/UL (ref 1.8–7.7)
NEUTROPHILS NFR BLD: 85.3 % (ref 38–73)
NRBC BLD-RTO: 0 /100 WBC
PLATELET # BLD AUTO: 215 K/UL (ref 150–450)
PMV BLD AUTO: 9 FL (ref 9.2–12.9)
POCT GLUCOSE: 275 MG/DL (ref 70–110)
POCT GLUCOSE: 359 MG/DL (ref 70–110)
POCT GLUCOSE: 417 MG/DL (ref 70–110)
POCT GLUCOSE: 426 MG/DL (ref 70–110)
POCT GLUCOSE: 449 MG/DL (ref 70–110)
POTASSIUM SERPL-SCNC: 4.9 MMOL/L (ref 3.5–5.1)
PROT SERPL-MCNC: 6.7 G/DL (ref 6–8.4)
RBC # BLD AUTO: 4.07 M/UL (ref 4.6–6.2)
SODIUM SERPL-SCNC: 131 MMOL/L (ref 136–145)
WBC # BLD AUTO: 4.42 K/UL (ref 3.9–12.7)

## 2024-10-19 PROCEDURE — G0378 HOSPITAL OBSERVATION PER HR: HCPCS

## 2024-10-19 PROCEDURE — 63600175 PHARM REV CODE 636 W HCPCS: Performed by: HOSPITALIST

## 2024-10-19 PROCEDURE — 25000003 PHARM REV CODE 250: Performed by: INTERNAL MEDICINE

## 2024-10-19 PROCEDURE — 85025 COMPLETE CBC W/AUTO DIFF WBC: CPT | Performed by: INTERNAL MEDICINE

## 2024-10-19 PROCEDURE — 63600175 PHARM REV CODE 636 W HCPCS: Performed by: INTERNAL MEDICINE

## 2024-10-19 PROCEDURE — 99214 OFFICE O/P EST MOD 30 MIN: CPT | Mod: ,,, | Performed by: INTERNAL MEDICINE

## 2024-10-19 PROCEDURE — 94761 N-INVAS EAR/PLS OXIMETRY MLT: CPT

## 2024-10-19 PROCEDURE — 96372 THER/PROPH/DIAG INJ SC/IM: CPT | Performed by: HOSPITALIST

## 2024-10-19 PROCEDURE — 96372 THER/PROPH/DIAG INJ SC/IM: CPT | Performed by: INTERNAL MEDICINE

## 2024-10-19 PROCEDURE — 96376 TX/PRO/DX INJ SAME DRUG ADON: CPT

## 2024-10-19 PROCEDURE — 82962 GLUCOSE BLOOD TEST: CPT

## 2024-10-19 PROCEDURE — 80053 COMPREHEN METABOLIC PANEL: CPT | Performed by: INTERNAL MEDICINE

## 2024-10-19 PROCEDURE — 36415 COLL VENOUS BLD VENIPUNCTURE: CPT | Performed by: INTERNAL MEDICINE

## 2024-10-19 PROCEDURE — S4991 NICOTINE PATCH NONLEGEND: HCPCS | Performed by: INTERNAL MEDICINE

## 2024-10-19 PROCEDURE — 83735 ASSAY OF MAGNESIUM: CPT | Performed by: INTERNAL MEDICINE

## 2024-10-19 PROCEDURE — 25000003 PHARM REV CODE 250: Performed by: HOSPITALIST

## 2024-10-19 RX ORDER — INSULIN GLARGINE 100 [IU]/ML
45 INJECTION, SOLUTION SUBCUTANEOUS DAILY
Status: DISCONTINUED | OUTPATIENT
Start: 2024-10-20 | End: 2024-10-20

## 2024-10-19 RX ORDER — HYDROCODONE BITARTRATE AND ACETAMINOPHEN 7.5; 325 MG/1; MG/1
1 TABLET ORAL EVERY 4 HOURS PRN
Status: DISCONTINUED | OUTPATIENT
Start: 2024-10-19 | End: 2024-10-22 | Stop reason: HOSPADM

## 2024-10-19 RX ORDER — GABAPENTIN 300 MG/1
300 CAPSULE ORAL 3 TIMES DAILY
Status: DISCONTINUED | OUTPATIENT
Start: 2024-10-19 | End: 2024-10-22 | Stop reason: HOSPADM

## 2024-10-19 RX ORDER — IBUPROFEN 200 MG
16 TABLET ORAL
Status: DISCONTINUED | OUTPATIENT
Start: 2024-10-19 | End: 2024-10-22 | Stop reason: HOSPADM

## 2024-10-19 RX ORDER — INSULIN ASPART 100 [IU]/ML
0-15 INJECTION, SOLUTION INTRAVENOUS; SUBCUTANEOUS
Status: DISCONTINUED | OUTPATIENT
Start: 2024-10-19 | End: 2024-10-20

## 2024-10-19 RX ORDER — LOSARTAN POTASSIUM 50 MG/1
100 TABLET ORAL DAILY
Status: DISCONTINUED | OUTPATIENT
Start: 2024-10-20 | End: 2024-10-22 | Stop reason: HOSPADM

## 2024-10-19 RX ORDER — IBUPROFEN 200 MG
24 TABLET ORAL
Status: DISCONTINUED | OUTPATIENT
Start: 2024-10-19 | End: 2024-10-22 | Stop reason: HOSPADM

## 2024-10-19 RX ORDER — CARVEDILOL 12.5 MG/1
12.5 TABLET ORAL 2 TIMES DAILY WITH MEALS
Status: DISCONTINUED | OUTPATIENT
Start: 2024-10-19 | End: 2024-10-22 | Stop reason: HOSPADM

## 2024-10-19 RX ORDER — GLUCAGON 1 MG
1 KIT INJECTION
Status: DISCONTINUED | OUTPATIENT
Start: 2024-10-19 | End: 2024-10-22 | Stop reason: HOSPADM

## 2024-10-19 RX ORDER — AMLODIPINE BESYLATE 5 MG/1
10 TABLET ORAL DAILY
Status: DISCONTINUED | OUTPATIENT
Start: 2024-10-20 | End: 2024-10-21

## 2024-10-19 RX ORDER — TAMSULOSIN HYDROCHLORIDE 0.4 MG/1
0.4 CAPSULE ORAL DAILY
COMMUNITY

## 2024-10-19 RX ORDER — POLYETHYLENE GLYCOL 3350 17 G/17G
17 POWDER, FOR SOLUTION ORAL DAILY
COMMUNITY

## 2024-10-19 RX ADMIN — ASPIRIN 81 MG: 81 TABLET, COATED ORAL at 08:10

## 2024-10-19 RX ADMIN — FAMOTIDINE 20 MG: 20 TABLET ORAL at 10:10

## 2024-10-19 RX ADMIN — INSULIN ASPART 15 UNITS: 100 INJECTION, SOLUTION INTRAVENOUS; SUBCUTANEOUS at 05:10

## 2024-10-19 RX ADMIN — HYDROCODONE BITARTRATE AND ACETAMINOPHEN 1 TABLET: 5; 325 TABLET ORAL at 05:10

## 2024-10-19 RX ADMIN — IBUPROFEN 600 MG: 200 TABLET, FILM COATED ORAL at 04:10

## 2024-10-19 RX ADMIN — HYDROCODONE BITARTRATE AND ACETAMINOPHEN 1 TABLET: 7.5; 325 TABLET ORAL at 05:10

## 2024-10-19 RX ADMIN — CLOPIDOGREL BISULFATE 75 MG: 75 TABLET, FILM COATED ORAL at 08:10

## 2024-10-19 RX ADMIN — INSULIN ASPART 6 UNITS: 100 INJECTION, SOLUTION INTRAVENOUS; SUBCUTANEOUS at 10:10

## 2024-10-19 RX ADMIN — FAMOTIDINE 20 MG: 20 TABLET ORAL at 08:10

## 2024-10-19 RX ADMIN — HYDROCODONE BITARTRATE AND ACETAMINOPHEN 1 TABLET: 7.5; 325 TABLET ORAL at 10:10

## 2024-10-19 RX ADMIN — ENOXAPARIN SODIUM 40 MG: 40 INJECTION SUBCUTANEOUS at 04:10

## 2024-10-19 RX ADMIN — CARVEDILOL 6.25 MG: 6.25 TABLET, FILM COATED ORAL at 08:10

## 2024-10-19 RX ADMIN — NICOTINE 1 PATCH: 21 PATCH, EXTENDED RELEASE TRANSDERMAL at 08:10

## 2024-10-19 RX ADMIN — AMLODIPINE BESYLATE 5 MG: 5 TABLET ORAL at 08:10

## 2024-10-19 RX ADMIN — ATORVASTATIN CALCIUM 40 MG: 40 TABLET, FILM COATED ORAL at 10:10

## 2024-10-19 RX ADMIN — METHYLPREDNISOLONE SODIUM SUCCINATE 40 MG: 40 INJECTION, POWDER, FOR SOLUTION INTRAMUSCULAR; INTRAVENOUS at 05:10

## 2024-10-19 RX ADMIN — INSULIN ASPART 15 UNITS: 100 INJECTION, SOLUTION INTRAVENOUS; SUBCUTANEOUS at 11:10

## 2024-10-19 RX ADMIN — TRAZODONE HYDROCHLORIDE 100 MG: 50 TABLET ORAL at 10:10

## 2024-10-19 RX ADMIN — INSULIN ASPART 10 UNITS: 100 INJECTION, SOLUTION INTRAVENOUS; SUBCUTANEOUS at 05:10

## 2024-10-19 RX ADMIN — TAMSULOSIN HYDROCHLORIDE 0.4 MG: 0.4 CAPSULE ORAL at 08:10

## 2024-10-19 RX ADMIN — LOSARTAN POTASSIUM 50 MG: 50 TABLET, FILM COATED ORAL at 08:10

## 2024-10-19 RX ADMIN — INSULIN ASPART 10 UNITS: 100 INJECTION, SOLUTION INTRAVENOUS; SUBCUTANEOUS at 08:10

## 2024-10-19 RX ADMIN — HYDROCODONE BITARTRATE AND ACETAMINOPHEN 1 TABLET: 5; 325 TABLET ORAL at 11:10

## 2024-10-19 RX ADMIN — HYDROMORPHONE HYDROCHLORIDE 0.5 MG: 0.5 INJECTION, SOLUTION INTRAMUSCULAR; INTRAVENOUS; SUBCUTANEOUS at 08:10

## 2024-10-19 RX ADMIN — HYDROMORPHONE HYDROCHLORIDE 0.5 MG: 0.5 INJECTION, SOLUTION INTRAMUSCULAR; INTRAVENOUS; SUBCUTANEOUS at 03:10

## 2024-10-19 RX ADMIN — ALPRAZOLAM 0.5 MG: 0.5 TABLET ORAL at 10:10

## 2024-10-19 RX ADMIN — GABAPENTIN 300 MG: 300 CAPSULE ORAL at 10:10

## 2024-10-19 RX ADMIN — INSULIN GLARGINE 35 UNITS: 100 INJECTION, SOLUTION SUBCUTANEOUS at 11:10

## 2024-10-19 RX ADMIN — CARVEDILOL 12.5 MG: 12.5 TABLET, FILM COATED ORAL at 04:10

## 2024-10-19 NOTE — ASSESSMENT & PLAN NOTE
Dangers of cigarette smoking were reviewed with patient in detail for 10 minutes and patient was encouraged to quit. Nicotine replacement options were discussed. Nicotine replacement options were discussed. Nicotine replacement was prescribed.

## 2024-10-19 NOTE — ASSESSMENT & PLAN NOTE
Oct 2023 pt had LHC and results are as follows  Nonobstructive coronary artery disease.  Patent stents in mid RCA and distal circumflex.  Mid LAD lesion was not hemodynamically significant.       EKG shows new onset T-wave inversion, likely LVH with repolarization abnormalities.  Troponin is negative  We will consult cardiologist's
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
## 2024-10-19 NOTE — ASSESSMENT & PLAN NOTE
Patient has a current diagnosis of hypertensive urgency (without evidence of end organ damage) which is controlled.  Latest blood pressure and vitals reviewed-   Temp:  [97.4 °F (36.3 °C)-98.1 °F (36.7 °C)]   Pulse:  []   Resp:  [13-28]   BP: (113-175)/(58-95)   SpO2:  [87 %-98 %] .   Patient currently on IV antihypertensives.   Home meds for hypertension were reviewed and noted below.   Hypertension Medications               amLODIPine (NORVASC) 5 MG tablet Take 1 tablet (5 mg total) by mouth once daily.    carvediloL (COREG) 12.5 MG tablet Take 6.25 mg by mouth 2 (two) times daily.    losartan (COZAAR) 100 MG tablet Take 50 mg by mouth once daily.    nitroGLYCERIN (NITROSTAT) 0.4 MG SL tablet Place 1 tablet (0.4 mg total) under the tongue every 5 (five) minutes as needed for Chest pain.            Medication adjustment for hospital antihypertensives is as follows-       Will aim for controlled BP reduction by medications noted above. Monitor and mitigate end organ damage as indicated.    Off Cardene drip, continue current p.o. medications

## 2024-10-19 NOTE — CONSULTS
[facility]  Department of Cardiology  Consult Note      PATIENT NAME: Sampson Holt    MRN: [id number]  TODAY'S DATE: 10/19/2024  ADMIT DATE: 10/18/2024                          CONSULT REQUESTED BY: Benji Zheng MD    SUBJECTIVE     PRINCIPAL PROBLEM: Hypertensive urgency    HPI:  69 year old male with a medical history significant for tobacco abuse, RA, HTN, remote history of endocarditis (10 years ago per pt report - details unavailable), DM type II, COPD, and CAD s/p PCI to the RCA and Lcx in the past who was admitted with hypertensive urgency. The patient has an intermittent left bundle which is known. He had a routine repeat ECG which revealed anterior TWI.  Cardiology consulted to assist with management and care. The patient denies chest pain or shortness of breath.        REASON FOR CONSULT:  From Hospitalist H&P: Abnormal ECG      Review of patient's allergies indicates:   Allergen Reactions    Amoxicillin Shortness Of Breath and Edema       Past Medical History:   Diagnosis Date    Abdominal pain 2022    CHF (congestive heart failure)     Diabetes mellitus 2018    Emphysema lung     Endocarditis 2011    Hypertension     Stroke 2011    denies residual     Past Surgical History:   Procedure Laterality Date    ANGIOGRAM, CORONARY, WITH LEFT HEART CATHETERIZATION N/A 10/10/2023    Procedure: Angiogram, Coronary, with Left Heart Cath;  Surgeon: Dieudonne Ryan MD;  Location: [facility] CATH/EP LAB;  Service: Cardiology;  Laterality: N/A;    BACK SURGERY  1981    COLONOSCOPY N/A 2/23/2023    Procedure: COLONOSCOPY;  Surgeon: Jonn Cruz MD;  Location: [facility] ENDO;  Service: Endoscopy;  Laterality: N/A;    CORONARY ANGIOGRAPHY N/A 5/9/2023    Procedure: ANGIOGRAM, CORONARY ARTERY;  Surgeon: Antonio Kessler MD;  Location: [facility] CATH/EP LAB;  Service: Cardiology;  Laterality: N/A;    EXCISION OF HYDROCELE      x2    FRACTIONAL FLOW RESERVE (FFR), CORONARY  5/11/2023    Procedure: Fractional Flow  [place] (FFR), Coronary;  Surgeon: Antonio Kessler MD;  Location: [facility] CATH/EP LAB;  Service: Cardiology;;    INGUINAL HERNIA REPAIR  1960    INSTANTANEOUS WAVE-FREE RATIO (IFR) N/A 10/10/2023    Procedure: Instantaneous Wave-Free Ratio (IFR);  Surgeon: Dieudonne Ryan MD;  Location: [facility] CATH/EP LAB;  Service: Cardiology;  Laterality: N/A;    INTRAMEDULLARY RODDING OF FEMUR Left 3/9/2024    Procedure: INSERTION, INTRAMEDULLARY HUI, FEMUR;  Surgeon: Tarun Hdez MD;  Location: [facility] OR;  Service: Orthopedics;  Laterality: Left;    INTRAVASCULAR ULTRASOUND, CORONARY N/A 5/9/2023    Procedure: Ultrasound-coronary;  Surgeon: Antonio Kessler MD;  Location: [facility] CATH/EP LAB;  Service: Cardiology;  Laterality: N/A;    IVUS, CORONARY  5/11/2023    Procedure: IVUS, Coronary;  Surgeon: Antonio Kessler MD;  Location: [facility] CATH/EP LAB;  Service: Cardiology;;    LEFT HEART CATHETERIZATION Left 5/11/2023    Procedure: Left heart cath;  Surgeon: Antonio Kessler MD;  Location: [facility] CATH/EP LAB;  Service: Cardiology;  Laterality: Left;    PERCUTANEOUS TRANSLUMINAL BALLOON ANGIOPLASTY OF CORONARY ARTERY  5/9/2023    Procedure: Angioplasty-coronary;  Surgeon: Antonio Kessler MD;  Location: [facility] CATH/EP LAB;  Service: Cardiology;;    RHINOPLASTY      STENT, DRUG ELUTING, SINGLE VESSEL, CORONARY  5/9/2023    Procedure: Stent, Drug Eluting, Single Vessel, Coronary;  Surgeon: Antonio Kessler MD;  Location: [facility] CATH/EP LAB;  Service: Cardiology;;    SURGICAL REMOVAL OF NODULE OF VOCAL CORD       Social History     Tobacco Use    Smoking status: Every Day     Types: Cigarettes    Smokeless tobacco: Former    Tobacco comments:     Pt ready to quit smoking and states he can on his own. Seen 10/9/23 for inpatient smoking cessation. [employer].     Occasionally   Substance Use Topics    Alcohol use: Yes     Alcohol/week: 14.0 standard drinks of alcohol     Types: 14 Shots of liquor per week    Drug use: Yes     Types:  Marijuana        REVIEW OF SYSTEMS  Per HPI    OBJECTIVE     VITAL SIGNS (Most Recent)  Temp: 98.1 °F (36.7 °C) (10/19/24 1101)  Pulse: 110 (10/19/24 1301)  Resp: 20 (10/19/24 1300)  BP: (!) 141/78 (10/19/24 1200)  SpO2: 95 % (10/19/24 1300)    VENTILATION STATUS  Resp: 20 (10/19/24 1300)  SpO2: 95 % (10/19/24 1300)           I & O (Last 24H):  Intake/Output Summary (Last 24 hours) at 10/19/2024 1530  Last data filed at 10/19/2024 1238  Gross per 24 hour   Intake 1560 ml   Output 1500 ml   Net 60 ml       WEIGHTS  Wt Readings from Last 1 Encounters:   10/18/24 1216 98 kg (216 lb 0.8 oz)   10/18/24 0850 91.2 kg (201 lb)       PHYSICAL EXAM  CONSTITUTIONAL: No fever, no chills  HEENT: Normocephalic, atraumatic,pupils reactive to light                 NECK:  No JVD no carotid bruit  CVS: S1S2+, RRR  LUNGS: Clear  ABDOMEN: Soft, NT, BS+  EXTREMITIES: No cyanosis, edema  : No mann catheter  NEURO: AAO X 3  PSY: Normal affect      HOME MEDICATIONS:  No current facility-administered medications on file prior to encounter.     Current Outpatient Medications on File Prior to Encounter   Medication Sig Dispense Refill    acetaminophen (TYLENOL) 500 MG tablet Take 1,000 mg by mouth every 12 (twelve) hours as needed for Pain or Temperature greater than.      albuterol (PROVENTIL/VENTOLIN HFA) 90 mcg/actuation inhaler Take 2 puffs by mouth 4 (four) times daily as needed for Wheezing or Shortness of Breath.      amLODIPine (NORVASC) 5 MG tablet Take 1 tablet (5 mg total) by mouth once daily. 30 tablet 11    aspirin (ECOTRIN) 81 MG EC tablet Take 81 mg by mouth once daily.      carvediloL (COREG) 12.5 MG tablet Take 6.25 mg by mouth 2 (two) times daily.      clopidogreL (PLAVIX) 75 mg tablet Take 1 tablet (75 mg total) by mouth once daily. (Patient taking differently: Take 75 mg by mouth every evening.) 90 tablet 3    cyclobenzaprine (FLEXERIL) 5 MG tablet Take 1 tablet (5 mg total) by mouth every 8 (eight) hours as needed for  muscle spasms. 30 tablet 0    DULoxetine (CYMBALTA) 30 MG capsule Take 30 mg by mouth once daily.      famotidine (PEPCID) 20 MG tablet Take 1 tablet (20 mg total) by mouth 2 (two) times daily. 60 tablet 11    fluticasone propionate (FLONASE) 50 mcg/actuation nasal spray 2 sprays by Nasal route daily as needed.      folic acid (FOLVITE) 1 MG tablet Take 1 mg by mouth once daily.      gabapentin (NEURONTIN) 300 MG capsule Take 300 mg by mouth 2 (two) times daily.      ibuprofen (ADVIL,MOTRIN) 400 MG tablet Take 1 tablet by mouth every 6 (six) hours as needed.      insulin glargine-yfgn 100 unit/mL Soln Inject 45 Units into the skin 2 (two) times a day.      losartan (COZAAR) 100 MG tablet Take 50 mg by mouth once daily.      multivitamin Tab Take 1 tablet by mouth once daily.      naloxone (NARCAN) 4 mg/actuation Spry 1 spray by Nasal route once.      nicotine (NICODERM CQ) 21 mg/24 hr Place 1 patch onto the skin once daily. 30 patch 0    nitroGLYCERIN (NITROSTAT) 0.4 MG SL tablet Place 1 tablet (0.4 mg total) under the tongue every 5 (five) minutes as needed for Chest pain. 25 tablet 5    oxyCODONE (OXY-IR) 5 mg Cap Take 1 tablet by mouth every 6 (six) hours as needed for Pain.      rosuvastatin (CRESTOR) 10 MG tablet Take 5 mg by mouth once daily.      thiamine 100 MG tablet Take 100 mg by mouth once daily.      senna (SENOKOT) 8.6 mg tablet Take 17.2 mg by mouth daily as needed.      traZODone (DESYREL) 100 MG tablet Take 1 tablet (100 mg total) by mouth nightly. For deperession 30 tablet 0       SCHEDULED MEDS:   [START ON 10/20/2024] amLODIPine  10 mg Oral Daily    aspirin  81 mg Oral Daily    atorvastatin  40 mg Oral QHS    carvediloL  12.5 mg Oral BID WM    clopidogreL  75 mg Oral Daily    enoxparin  40 mg Subcutaneous Daily    famotidine  20 mg Oral BID    gabapentin  300 mg Oral QHS    ibuprofen  600 mg Oral TID WM    insulin glargine U-100  35 Units Subcutaneous Daily    losartan  50 mg Oral Daily     "nicotine  1 patch Transdermal Daily    tamsulosin  0.4 mg Oral Daily    traZODone  100 mg Oral QHS       CONTINUOUS INFUSIONS:    PRN MEDS:  Current Facility-Administered Medications:     acetaminophen, 650 mg, Oral, Q8H PRN    acetaminophen, 650 mg, Oral, Q4H PRN    ALPRAZolam, 0.5 mg, Oral, TID PRN    aluminum-magnesium hydroxide-simethicone, 30 mL, Oral, QID PRN    cyclobenzaprine, 5 mg, Oral, TID PRN    dextrose 50%, 12.5 g, Intravenous, PRN    dextrose 50%, 12.5 g, Intravenous, PRN    dextrose 50%, 25 g, Intravenous, PRN    dextrose 50%, 25 g, Intravenous, PRN    glucagon (human recombinant), 1 mg, Intramuscular, PRN    glucose, 16 g, Oral, PRN    glucose, 16 g, Oral, PRN    glucose, 24 g, Oral, PRN    glucose, 24 g, Oral, PRN    HYDROcodone-acetaminophen, 1 tablet, Oral, Q4H PRN    insulin aspart U-100, 0-15 Units, Subcutaneous, QID (AC + HS) PRN    melatonin, 6 mg, Oral, Nightly PRN    naloxone, 0.02 mg, Intravenous, PRN    nitroGLYCERIN, 0.4 mg, Sublingual, Q5 Min PRN    ondansetron, 4 mg, Intravenous, Q6H PRN    LABS AND DIAGNOSTICS     CBC LAST 3 DAYS  Recent Labs   Lab 10/18/24  0927 10/19/24  0307   WBC 8.10 4.42   RBC 4.35* 4.07*   HGB 13.9* 12.7*   HCT 41.7 38.1*   MCV 96 94   MCH 32.0* 31.2*   MCHC 33.3 33.3   RDW 12.2 12.0    215   MPV 9.1* 9.0*   GRAN 70.3  5.7 85.3*  3.8   LYMPH 19.6  1.6 13.1*  0.6*   MONO 7.4  0.6 0.9*  0.0*   BASO 0.09 0.01   NRBC 0 0       COAGULATION LAST 3 DAYS  No results for input(s): "LABPT", "INR", "APTT" in the last 168 hours.    CHEMISTRY LAST 3 DAYS  Recent Labs   Lab 10/18/24  0927 10/19/24  0307    131*   K 4.3 4.9    98   CO2 26 24   ANIONGAP 9 9   BUN 10 18   CREATININE 0.8 0.9   * 372*   CALCIUM 9.4 9.4   MG 1.8 2.0   ALBUMIN 4.0 4.0   PROT 7.1 6.7   ALKPHOS 87 81   ALT 10 9*   AST 9* 8*   BILITOT 0.4 0.3       CARDIAC PROFILE LAST 3 DAYS  Recent Labs   Lab 10/18/24  0927 10/18/24  1123 10/18/24  1839   *  --   --  " "  TROPONINIHS 6.3 6.5 6.9       ENDOCRINE LAST 3 DAYS  No results for input(s): "TSH", "PROCAL" in the last 168 hours.    LAST ARTERIAL BLOOD GAS  ABG  No results for input(s): "PH", "PO2", "PCO2", "HCO3", "BE" in the last 168 hours.    LAST 7 DAYS MICROBIOLOGY   Microbiology Results (last 7 days)       ** No results found for the last 168 hours. **            MOST RECENT IMAGING  X-Ray Chest AP Portable  Narrative: EXAMINATION:  XR CHEST AP PORTABLE    CLINICAL HISTORY:  CHF;    FINDINGS:  Portable chest at 856 compared with 09/06/2024 shows normal cardiomediastinal silhouette.    Previous reported bibasilar opacities have resolved comment lungs are now clear.  Pulmonary vasculature is normal. No acute osseous abnormality.  Impression: No acute cardiopulmonary abnormality.    Electronically signed by: Ren Leonard  Date:    10/18/2024  Time:    09:22      ECHOCARDIOGRAM RESULTS (last 5)  Results for orders placed during the hospital encounter of 10/07/23    Echo    Interpretation Summary    Left Ventricle: The left ventricle is normal in size. Mildly increased wall thickness. There is concentric remodeling. Septal motion is normal. There is normal systolic function. Ejection fraction by visual approximation is 60%.    Right Ventricle: Normal right ventricular cavity size. Wall thickness is normal. Right ventricle wall motion  is normal. Systolic function is normal.    Mitral Valve: There is mild posterior mitral annular calcification present.    IVC/SVC: Normal venous pressure at 3 mmHg.    A limited echo was performed using limited 2D and color flow Doppler      Results for orders placed during the hospital encounter of 08/11/23    Echo    Interpretation Summary    Left Ventricle: The left ventricle is normal in size. Mildly increased wall thickness. Normal wall motion. There is normal systolic function with a visually estimated ejection fraction of 55 - 60%. There is normal diastolic function.    Right Ventricle: " Normal right ventricular cavity size. Systolic function is normal.    Aortic Valve: The aortic valve is a trileaflet valve.    Mitral Valve: There is no stenosis. The mean pressure gradient across the mitral valve is 3 mmHg at a heart rate of  bpm.    IVC/SVC: Normal venous pressure at 3 mmHg.      Results for orders placed during the hospital encounter of 05/09/23    Echo    Interpretation Summary  · The left ventricle is normal in size with mild concentric hypertrophy and normal systolic function.  · The estimated ejection fraction is 55%.  · Normal left ventricular diastolic function.  · Normal right ventricular size with normal right ventricular systolic function.  · Normal central venous pressure (3 mmHg).      CURRENT/PREVIOUS VISIT EKG  Results for orders placed or performed during the hospital encounter of 10/18/24   EKG 12-lead    Collection Time: 10/18/24  9:03 AM   Result Value Ref Range    QRS Duration 92 ms    [facility] QTC Calculation 452 ms    Narrative    Test Reason : R06.02,    Vent. Rate : 074 BPM     Atrial Rate : 074 BPM     P-R Int : 226 ms          QRS Dur : 092 ms      QT Int : 408 ms       P-R-T Axes : 072 034 -01 degrees     QTc Int : 452 ms    Sinus rhythm with 1st degree A-V block with Fusion complexes  T wave abnormality, consider inferior ischemia  T wave abnormality, consider anterior ischemia  Abnormal ECG  When compared with ECG of 09-SEP-2024 12:25,  Fusion complexes are now Present  T wave inversion now evident in Inferior leads  Inverted T waves have replaced nonspecific T wave abnormality in Anterior  leads    Referred By: [email or web address]   SELF           Confirmed By:            ASSESSMENT/PLAN:     Active Hospital Problems    Diagnosis    *Hypertensive urgency    Chest pain    Rheumatoid arthritis    Tobacco abuse    CAD (coronary artery disease)    Type 2 diabetes mellitus with circulatory disorder, with long-term current use of insulin       ASSESSMENT & PLAN:   Abnormal ECG   -Patient  has known CAD s/p PCI to the RCA and Lcx in the past.  His last angiogram was 10/2023 which revealed patent stents in the RCA and Lcx. He was found to have mild to moderate LAD stenosis which did not meet threshold for PCI.    - Review of previous ECGs reveal an intermittent left bundle.  He has nonspecific TW changes and/or anterior TWI noted on some of his ECGs dating back to 8/2023.    - Patient reports that he was hospitalized at the [facility] last month (9/2024) and was noted to have an abnormal ECG which prompted a cardiac work up including stress testing (please obtain records for review).    - The patient is currently chest pain free and with negative biomarkers.  If his stress test last month was negative, will plan for medical management and close interval follow up in cardiology clinic.     2. HTN  - Patient admitted with hypertensive urgency/emergency - now better controlled.     3. DM Type II  - per medicine team    4.COPD  - per Medicine team    5. RA    6. Full Code       Janelle Diamond MD  Date of Service: 10/19/2024  3:30 PM

## 2024-10-19 NOTE — ASSESSMENT & PLAN NOTE
Maintain present regime    Developed hyperglycemia secondary to IV steroids, discontinue IV steroids  High-dose sliding scale plus Lantus 35 units q.h.s. check hemoglobin A1c

## 2024-10-19 NOTE — RESPIRATORY THERAPY
10/18/24 1950   Patient Assessment/Suction   Level of Consciousness (AVPU) alert   PRE-TX-O2   Device (Oxygen Therapy) room air   SpO2 (!) 91 %   Pulse 66   Resp 14   Education   $ Education 15 min

## 2024-10-19 NOTE — PLAN OF CARE
Problem: Adult Inpatient Plan of Care  Goal: Plan of Care Review  Outcome: Progressing  Goal: Patient-Specific Goal (Individualized)  Outcome: Progressing  Goal: Absence of Hospital-Acquired Illness or Injury  Outcome: Progressing  Goal: Optimal Comfort and Wellbeing  Outcome: Progressing  Goal: Readiness for Transition of Care  Outcome: Progressing     Problem: Diabetes Comorbidity  Goal: Blood Glucose Level Within Targeted Range  Outcome: Progressing     Problem: Pneumonia  Goal: Fluid Balance  Outcome: Progressing  Goal: Resolution of Infection Signs and Symptoms  Outcome: Progressing  Goal: Effective Oxygenation and Ventilation  Outcome: Progressing     Problem: Wound  Goal: Optimal Coping  Outcome: Progressing  Goal: Optimal Functional Ability  Outcome: Progressing  Goal: Absence of Infection Signs and Symptoms  Outcome: Progressing  Goal: Improved Oral Intake  Outcome: Progressing  Goal: Optimal Pain Control and Function  Outcome: Progressing  Goal: Skin Health and Integrity  Outcome: Progressing  Goal: Optimal Wound Healing  Outcome: Progressing     Problem: Fall Injury Risk  Goal: Absence of Fall and Fall-Related Injury  Outcome: Progressing

## 2024-10-19 NOTE — PROGRESS NOTES
[facility]  Progress Note    Patient Name: Sampson Holt  MRN: [id number]  Patient Class: OP- Observation   Admission Date: 10/18/2024  Length of Stay: 0 days  Attending Physician: Benji Zheng MD  Primary Care Provider: Damian Vergara MD        Subjective:     Principal Problem:Hypertensive urgency        HPI:  69 year old pt getting admitted with HT urgency in the background of intractable pain from RA  Per pt : he had ORIF of L hip done on May 2024  Since then he started having pain on knuckles/fingers and shoulders  Recently pain started radiating to Neck area  Pt was diagnosed with RA and started on prednisone for a while and pt felt good relief and later it was DC ed  Also [facility]  Last few days pt started having severe excruciating pain on fingers/shoulders and neck and even slight movements makes it worse  Because of this his BP went up and when he contacted [facility] , he was advised to come to ER  Pt was started on cardene gtt and got admitted   Exactly one year ago pt had coronary angiogram    Overview/Hospital Course:  No notes on file    Interval History:   Seen and examined at multidisciplinary rounds at step-down unit, denies any fever or chills, complaining of pain all over now improved.  Patient also went down to hospital for smoking later come back.  Patient also complaining of intermittent chest pain prior to admission.  EKG shows diffuse T-wave inversions- new changes.  Troponin 3 times negative.  Blood pressure much improved.  Patient denies medical noncompliance.     Review of Systems   Cardiovascular:  Positive for chest pain.   Musculoskeletal:  Positive for arthralgias.     Objective:     Vital Signs (Most Recent):  Temp: 98.1 °F (36.7 °C) (10/19/24 1101)  Pulse: 97 (10/19/24 1101)  Resp: 20 (10/19/24 1150)  BP: 119/77 (10/19/24 1100)  SpO2: 95 % (10/19/24 1101) Vital Signs (24h Range):  Temp:  [97.4 °F (36.3  °C)-98.1 °F (36.7 °C)] 98.1 °F (36.7 °C)  Pulse:  [] 97  Resp:  [13-28] 20  SpO2:  [87 %-98 %] 95 %  BP: (113-175)/(58-95) 119/77     Weight: 98 kg (216 lb 0.8 oz)  Body mass index is 28.5 kg/m².    Intake/Output Summary (Last 24 hours) at 10/19/2024 1159  Last data filed at 10/19/2024 0839  Gross per 24 hour   Intake 1320 ml   Output 1500 ml   Net -180 ml         Physical Exam  Vitals and nursing note reviewed.   Eyes:      Pupils: Pupils are equal, round, and reactive to light.   Neck:      Vascular: No JVD.   Cardiovascular:      Rate and Rhythm: Normal rate and regular rhythm.      Heart sounds: Normal heart sounds.   Pulmonary:      Effort: Pulmonary effort is normal.      Breath sounds: Normal breath sounds.   Abdominal:      General: Bowel sounds are normal. There is no distension.      Palpations: Abdomen is soft.      Tenderness: There is no abdominal tenderness.   Musculoskeletal:         General: No deformity.   Lymphadenopathy:      Cervical: No cervical adenopathy.   Skin:     Coloration: Skin is not pale.      Findings: No rash.             Significant Labs: All pertinent labs within the past 24 hours have been reviewed.  CBC:   Recent Labs   Lab 10/18/24  0927 10/19/24  0307   WBC 8.10 4.42   HGB 13.9* 12.7*   HCT 41.7 38.1*    215     CMP:   Recent Labs   Lab 10/18/24  0927 10/19/24  0307    131*   K 4.3 4.9    98   CO2 26 24   * 372*   BUN 10 18   CREATININE 0.8 0.9   CALCIUM 9.4 9.4   PROT 7.1 6.7   ALBUMIN 4.0 4.0   BILITOT 0.4 0.3   ALKPHOS 87 81   AST 9* 8*   ALT 10 9*   ANIONGAP 9 9     Troponin:   Recent Labs   Lab 10/18/24  0927 10/18/24  1123 10/18/24  1839   TROPONINIHS 6.3 6.5 6.9       Significant Imaging: I have reviewed all pertinent imaging results/findings within the past 24 hours.  I have reviewed and interpreted all pertinent imaging results/findings within the past 24 hours.    Scheduled Meds:   [START ON 10/20/2024] amLODIPine  10 mg Oral Daily     aspirin  81 mg Oral Daily    atorvastatin  40 mg Oral QHS    carvediloL  12.5 mg Oral BID WM    clopidogreL  75 mg Oral Daily    enoxparin  40 mg Subcutaneous Daily    famotidine  20 mg Oral BID    gabapentin  300 mg Oral QHS    insulin glargine U-100  35 Units Subcutaneous Daily    losartan  50 mg Oral Daily    nicotine  1 patch Transdermal Daily    tamsulosin  0.4 mg Oral Daily    traZODone  100 mg Oral QHS     Continuous Infusions:  PRN Meds:.  Current Facility-Administered Medications:     acetaminophen, 650 mg, Oral, Q8H PRN    acetaminophen, 650 mg, Oral, Q4H PRN    ALPRAZolam, 0.5 mg, Oral, TID PRN    aluminum-magnesium hydroxide-simethicone, 30 mL, Oral, QID PRN    cyclobenzaprine, 5 mg, Oral, TID PRN    dextrose 50%, 12.5 g, Intravenous, PRN    dextrose 50%, 12.5 g, Intravenous, PRN    dextrose 50%, 25 g, Intravenous, PRN    dextrose 50%, 25 g, Intravenous, PRN    glucagon (human recombinant), 1 mg, Intramuscular, PRN    glucagon (human recombinant), 1 mg, Intramuscular, PRN    glucose, 16 g, Oral, PRN    glucose, 16 g, Oral, PRN    glucose, 24 g, Oral, PRN    glucose, 24 g, Oral, PRN    HYDROcodone-acetaminophen, 1 tablet, Oral, Q4H PRN    HYDROmorphone, 0.5 mg, Intravenous, Q3H PRN    insulin aspart U-100, 0-15 Units, Subcutaneous, QID (AC + HS) PRN    magnesium oxide, 800 mg, Oral, PRN    magnesium oxide, 800 mg, Oral, PRN    melatonin, 6 mg, Oral, Nightly PRN    naloxone, 0.02 mg, Intravenous, PRN    nitroGLYCERIN, 0.4 mg, Sublingual, Q5 Min PRN    ondansetron, 4 mg, Intravenous, Q6H PRN    potassium bicarbonate, 35 mEq, Oral, PRN    potassium bicarbonate, 50 mEq, Oral, PRN    potassium bicarbonate, 60 mEq, Oral, PRN    potassium, sodium phosphates, 2 packet, Oral, PRN    potassium, sodium phosphates, 2 packet, Oral, PRN    potassium, sodium phosphates, 2 packet, Oral, PRN    X-Ray Chest AP Portable    Result Date: 10/18/2024  EXAMINATION: XR CHEST AP PORTABLE CLINICAL HISTORY: CHF; FINDINGS: Portable  chest at 856 compared with 09/06/2024 shows normal cardiomediastinal silhouette. Previous reported bibasilar opacities have resolved comment lungs are now clear.  Pulmonary vasculature is normal. No acute osseous abnormality.     No acute cardiopulmonary abnormality. Electronically signed by: Ren Leonard Date:    10/18/2024 Time:    09:22  - pulls last radiology orders      Assessment/Plan:      * Hypertensive urgency  Patient has a current diagnosis of hypertensive urgency (without evidence of end organ damage) which is controlled.  Latest blood pressure and vitals reviewed-   Temp:  [97.4 °F (36.3 °C)-98.1 °F (36.7 °C)]   Pulse:  []   Resp:  [13-28]   BP: (113-175)/(58-95)   SpO2:  [87 %-98 %] .   Patient currently on IV antihypertensives.   Home meds for hypertension were reviewed and noted below.   Hypertension Medications               amLODIPine (NORVASC) 5 MG tablet Take 1 tablet (5 mg total) by mouth once daily.    carvediloL (COREG) 12.5 MG tablet Take 6.25 mg by mouth 2 (two) times daily.    losartan (COZAAR) 100 MG tablet Take 50 mg by mouth once daily.    nitroGLYCERIN (NITROSTAT) 0.4 MG SL tablet Place 1 tablet (0.4 mg total) under the tongue every 5 (five) minutes as needed for Chest pain.            Medication adjustment for hospital antihypertensives is as follows-       Will aim for controlled BP reduction by medications noted above. Monitor and mitigate end organ damage as indicated.    Off Cardene drip, continue current p.o. medications    Chest pain  Abnormal EKG  We will consult cardiologist    Rheumatoid arthritis  Details unknown  Pt has muscle wasting on wrist area  Start on few doses of iv steroids  When he goes home he need opiates/steroids until [facility],so that readmissions  can be avoided    Discontinue IV steroids now      Tobacco abuse  Dangers of cigarette smoking were reviewed with patient in detail for 10 minutes and patient was encouraged to  quit. Nicotine replacement options were discussed. Nicotine replacement options were discussed. Nicotine replacement was prescribed.        CAD (coronary artery disease)  Oct 2023 pt had LHC and results are as follows  Nonobstructive coronary artery disease.  Patent stents in mid RCA and distal circumflex.  Mid LAD lesion was not hemodynamically significant.       EKG shows new onset T-wave inversion, likely LVH with repolarization abnormalities.  Troponin is negative  We will consult cardiologist's    Type 2 diabetes mellitus with circulatory disorder, with long-term current use of insulin  Maintain present regime    Developed hyperglycemia secondary to IV steroids, discontinue IV steroids  High-dose sliding scale plus Lantus 35 units q.h.s. check hemoglobin A1c        VTE Risk Mitigation (From admission, onward)           Ordered     enoxaparin injection 40 mg  Daily         10/18/24 1106     IP VTE HIGH RISK PATIENT  Once         10/18/24 1106     Place sequential compression device  Until discontinued         10/18/24 1106                    Discharge Planning   DAVID: 10/20/2024     Code Status: Full Code   Is the patient medically ready for discharge?:     Reason for patient still in hospital (select all that apply): Patient trending condition and Treatment  Discharge Plan A: Home   Discharge Delays: None known at this time              Benji Zheng MD  Department of Hospital Medicine   [facility]

## 2024-10-19 NOTE — SUBJECTIVE & OBJECTIVE
Interval History:   Seen and examined at multidisciplinary rounds at step-down unit, denies any fever or chills, complaining of pain all over now improved.  Patient also went down to hospital for smoking later come back.  Patient also complaining of intermittent chest pain prior to admission.  EKG shows diffuse T-wave inversions- new changes.  Troponin 3 times negative.  Blood pressure much improved.  Patient denies medical noncompliance.     Review of Systems   Cardiovascular:  Positive for chest pain.   Musculoskeletal:  Positive for arthralgias.     Objective:     Vital Signs (Most Recent):  Temp: 98.1 °F (36.7 °C) (10/19/24 1101)  Pulse: 97 (10/19/24 1101)  Resp: 20 (10/19/24 1150)  BP: 119/77 (10/19/24 1100)  SpO2: 95 % (10/19/24 1101) Vital Signs (24h Range):  Temp:  [97.4 °F (36.3 °C)-98.1 °F (36.7 °C)] 98.1 °F (36.7 °C)  Pulse:  [] 97  Resp:  [13-28] 20  SpO2:  [87 %-98 %] 95 %  BP: (113-175)/(58-95) 119/77     Weight: 98 kg (216 lb 0.8 oz)  Body mass index is 28.5 kg/m².    Intake/Output Summary (Last 24 hours) at 10/19/2024 1159  Last data filed at 10/19/2024 0839  Gross per 24 hour   Intake 1320 ml   Output 1500 ml   Net -180 ml         Physical Exam  Vitals and nursing note reviewed.   Eyes:      Pupils: Pupils are equal, round, and reactive to light.   Neck:      Vascular: No JVD.   Cardiovascular:      Rate and Rhythm: Normal rate and regular rhythm.      Heart sounds: Normal heart sounds.   Pulmonary:      Effort: Pulmonary effort is normal.      Breath sounds: Normal breath sounds.   Abdominal:      General: Bowel sounds are normal. There is no distension.      Palpations: Abdomen is soft.      Tenderness: There is no abdominal tenderness.   Musculoskeletal:         General: No deformity.   Lymphadenopathy:      Cervical: No cervical adenopathy.   Skin:     Coloration: Skin is not pale.      Findings: No rash.             Significant Labs: All pertinent labs within the past 24 hours have been  reviewed.  CBC:   Recent Labs   Lab 10/18/24  0927 10/19/24  0307   WBC 8.10 4.42   HGB 13.9* 12.7*   HCT 41.7 38.1*    215     CMP:   Recent Labs   Lab 10/18/24  0927 10/19/24  0307    131*   K 4.3 4.9    98   CO2 26 24   * 372*   BUN 10 18   CREATININE 0.8 0.9   CALCIUM 9.4 9.4   PROT 7.1 6.7   ALBUMIN 4.0 4.0   BILITOT 0.4 0.3   ALKPHOS 87 81   AST 9* 8*   ALT 10 9*   ANIONGAP 9 9     Troponin:   Recent Labs   Lab 10/18/24  0927 10/18/24  1123 10/18/24  1839   TROPONINIHS 6.3 6.5 6.9       Significant Imaging: I have reviewed all pertinent imaging results/findings within the past 24 hours.  I have reviewed and interpreted all pertinent imaging results/findings within the past 24 hours.    Scheduled Meds:   [START ON 10/20/2024] amLODIPine  10 mg Oral Daily    aspirin  81 mg Oral Daily    atorvastatin  40 mg Oral QHS    carvediloL  12.5 mg Oral BID WM    clopidogreL  75 mg Oral Daily    enoxparin  40 mg Subcutaneous Daily    famotidine  20 mg Oral BID    gabapentin  300 mg Oral QHS    insulin glargine U-100  35 Units Subcutaneous Daily    losartan  50 mg Oral Daily    nicotine  1 patch Transdermal Daily    tamsulosin  0.4 mg Oral Daily    traZODone  100 mg Oral QHS     Continuous Infusions:  PRN Meds:.  Current Facility-Administered Medications:     acetaminophen, 650 mg, Oral, Q8H PRN    acetaminophen, 650 mg, Oral, Q4H PRN    ALPRAZolam, 0.5 mg, Oral, TID PRN    aluminum-magnesium hydroxide-simethicone, 30 mL, Oral, QID PRN    cyclobenzaprine, 5 mg, Oral, TID PRN    dextrose 50%, 12.5 g, Intravenous, PRN    dextrose 50%, 12.5 g, Intravenous, PRN    dextrose 50%, 25 g, Intravenous, PRN    dextrose 50%, 25 g, Intravenous, PRN    glucagon (human recombinant), 1 mg, Intramuscular, PRN    glucagon (human recombinant), 1 mg, Intramuscular, PRN    glucose, 16 g, Oral, PRN    glucose, 16 g, Oral, PRN    glucose, 24 g, Oral, PRN    glucose, 24 g, Oral, PRN    HYDROcodone-acetaminophen, 1 tablet,  Oral, Q4H PRN    HYDROmorphone, 0.5 mg, Intravenous, Q3H PRN    insulin aspart U-100, 0-15 Units, Subcutaneous, QID (AC + HS) PRN    magnesium oxide, 800 mg, Oral, PRN    magnesium oxide, 800 mg, Oral, PRN    melatonin, 6 mg, Oral, Nightly PRN    naloxone, 0.02 mg, Intravenous, PRN    nitroGLYCERIN, 0.4 mg, Sublingual, Q5 Min PRN    ondansetron, 4 mg, Intravenous, Q6H PRN    potassium bicarbonate, 35 mEq, Oral, PRN    potassium bicarbonate, 50 mEq, Oral, PRN    potassium bicarbonate, 60 mEq, Oral, PRN    potassium, sodium phosphates, 2 packet, Oral, PRN    potassium, sodium phosphates, 2 packet, Oral, PRN    potassium, sodium phosphates, 2 packet, Oral, PRN    X-Ray Chest AP Portable    Result Date: 10/18/2024  EXAMINATION: XR CHEST AP PORTABLE CLINICAL HISTORY: CHF; FINDINGS: Portable chest at 856 compared with 09/06/2024 shows normal cardiomediastinal silhouette. Previous reported bibasilar opacities have resolved comment lungs are now clear.  Pulmonary vasculature is normal. No acute osseous abnormality.     No acute cardiopulmonary abnormality. Electronically signed by: Ren Leonard Date:    10/18/2024 Time:    09:22  - pulls last radiology orders

## 2024-10-19 NOTE — SIGNIFICANT EVENT
Cardiology team consulted on 10/19/2024 at 10:16am - patient with hypertensive crisis and abnormal ECG.  Immediately reviewed patient's chart upon receipt of consult and went to patient's room. Patient was off the floor at the time and was found outside going to his car for unclear reasons.  Staff notified.      Janelle Diamond MD, MPH  Interventional Cardiology

## 2024-10-19 NOTE — NURSING
I was in another patient's room when the charge nurse came in and told me Dr. Diamond was looking for me to ask a question. When I came out of the room, Dr. Diamond asked me if the patient was off the unit for any reason. The patient has signed a bed alarm refusal form and has a portable telemetry box per request in order for him to walk around his room and the unit freely. When arriving for my shift this morning, I told the patient he is allowed to walk around, but he is not allowed to go outside. Before I came outside of my other patient's room to talk to Dr. Diamond, he informed the nurses at the nurses station that he was going to get a sandwhich from the vending machine. The nurses told him it was okay, but to not go outside once again. Dr. Diamond left and came back upstairs to inform me that she found the patient outside and security was with him. I immediately went downstairs and walked outside to find the patient at his car with a cigarette behind his ear and security present. I told the patient that it was not okay for him to go outside for any reason, he is not allowed to smoke a cigarette, and that he deliberately disobeyed my instructions. I walked back upstairs with the patient and got him settled in his room. As soon as I hooked him back up to his pulse oximetry and blood pressure cuff, he asked if he could go get coffee from the waiting room. I told him I will go get him coffee, and he needs to have a nurse present with him from now on to walk around. He told me he will not be staying with those rules placed on him. I called Dr. Zheng and informed him of the situation and that he wanted to leave AMA. Dr. Zheng went into the patient's room and talked to him. He said he will not leave AMA and will not go outside again.

## 2024-10-20 ENCOUNTER — CLINICAL SUPPORT (OUTPATIENT)
Dept: CARDIOLOGY | Facility: HOSPITAL | Age: 69
DRG: 305 | End: 2024-10-20
Attending: EMERGENCY MEDICINE
Payer: OTHER GOVERNMENT

## 2024-10-20 VITALS — HEIGHT: 73 IN | BODY MASS INDEX: 28.63 KG/M2 | WEIGHT: 216.06 LBS

## 2024-10-20 PROBLEM — F17.200 NICOTINE DEPENDENCE: Status: ACTIVE | Noted: 2024-03-09

## 2024-10-20 LAB
ALBUMIN SERPL BCP-MCNC: 3.7 G/DL (ref 3.5–5.2)
ALP SERPL-CCNC: 80 U/L (ref 55–135)
ALT SERPL W/O P-5'-P-CCNC: 8 U/L (ref 10–44)
ANION GAP SERPL CALC-SCNC: 5 MMOL/L (ref 8–16)
AORTIC ROOT ANNULUS: 4.1 CM
AORTIC VALVE CUSP SEPERATION: 1.7 CM
APICAL FOUR CHAMBER EJECTION FRACTION: 60 %
APICAL TWO CHAMBER EJECTION FRACTION: 64 %
AST SERPL-CCNC: 8 U/L (ref 10–40)
AV INDEX (PROSTH): 0.76
AV MEAN GRADIENT: 5 MMHG
AV PEAK GRADIENT: 9 MMHG
AV VALVE AREA BY VELOCITY RATIO: 3.3 CM²
AV VALVE AREA: 3.1 CM²
AV VELOCITY RATIO: 0.8
BASOPHILS # BLD AUTO: 0.02 K/UL (ref 0–0.2)
BASOPHILS NFR BLD: 0.2 % (ref 0–1.9)
BILIRUB SERPL-MCNC: 0.2 MG/DL (ref 0.1–1)
BSA FOR ECHO PROCEDURE: 2.25 M2
BUN SERPL-MCNC: 27 MG/DL (ref 8–23)
CALCIUM SERPL-MCNC: 9.3 MG/DL (ref 8.7–10.5)
CHLORIDE SERPL-SCNC: 103 MMOL/L (ref 95–110)
CO2 SERPL-SCNC: 27 MMOL/L (ref 23–29)
CREAT SERPL-MCNC: 1 MG/DL (ref 0.5–1.4)
CRP SERPL-MCNC: 1.1 MG/DL
CV ECHO LV RWT: 0.48 CM
DIFFERENTIAL METHOD BLD: ABNORMAL
DOP CALC AO PEAK VEL: 1.5 M/S
DOP CALC AO VTI: 33.1 CM
DOP CALC LVOT AREA: 4.2 CM2
DOP CALC LVOT DIAMETER: 2.3 CM
DOP CALC LVOT PEAK VEL: 1.2 M/S
DOP CALC LVOT STROKE VOLUME: 104.2 CM3
DOP CALC MV VTI: 34.6 CM
DOP CALCLVOT PEAK VEL VTI: 25.1 CM
E WAVE DECELERATION TIME: 246 MSEC
E/A RATIO: 0.8
E/E' RATIO: 10.4 M/S
ECHO LV POSTERIOR WALL: 1.1 CM (ref 0.6–1.1)
EOSINOPHIL # BLD AUTO: 0 K/UL (ref 0–0.5)
EOSINOPHIL NFR BLD: 0 % (ref 0–8)
ERYTHROCYTE [DISTWIDTH] IN BLOOD BY AUTOMATED COUNT: 12.4 % (ref 11.5–14.5)
ERYTHROCYTE [SEDIMENTATION RATE] IN BLOOD BY WESTERGREN METHOD: 20 MM/HR (ref 0–10)
EST. GFR  (NO RACE VARIABLE): >60 ML/MIN/1.73 M^2
ESTIMATED AVG GLUCOSE: 154 MG/DL (ref 68–131)
FRACTIONAL SHORTENING: 30.4 % (ref 28–44)
GLUCOSE SERPL-MCNC: 290 MG/DL (ref 70–110)
HBA1C MFR BLD: 7 % (ref 4.5–6.2)
HCT VFR BLD AUTO: 35.3 % (ref 40–54)
HGB BLD-MCNC: 11.9 G/DL (ref 14–18)
IMM GRANULOCYTES # BLD AUTO: 0.05 K/UL (ref 0–0.04)
IMM GRANULOCYTES NFR BLD AUTO: 0.4 % (ref 0–0.5)
INTERVENTRICULAR SEPTUM: 1.5 CM (ref 0.6–1.1)
IVC DIAMETER: 2.67 CM
LEFT ATRIUM AREA SYSTOLIC (APICAL 4 CHAMBER): 23 CM2
LEFT INTERNAL DIMENSION IN SYSTOLE: 3.2 CM (ref 2.1–4)
LEFT VENTRICLE DIASTOLIC VOLUME INDEX: 43.83 ML/M2
LEFT VENTRICLE DIASTOLIC VOLUME: 97.3 ML
LEFT VENTRICLE END DIASTOLIC VOLUME APICAL 2 CHAMBER: 66.5 ML
LEFT VENTRICLE END DIASTOLIC VOLUME APICAL 4 CHAMBER: 91.2 ML
LEFT VENTRICLE END SYSTOLIC VOLUME APICAL 4 CHAMBER: 62.6 ML
LEFT VENTRICLE MASS INDEX: 103.7 G/M2
LEFT VENTRICLE SYSTOLIC VOLUME INDEX: 17.7 ML/M2
LEFT VENTRICLE SYSTOLIC VOLUME: 39.4 ML
LEFT VENTRICULAR INTERNAL DIMENSION IN DIASTOLE: 4.6 CM (ref 3.5–6)
LEFT VENTRICULAR MASS: 230.2 G
LV LATERAL E/E' RATIO: 8.67 M/S
LV SEPTAL E/E' RATIO: 13 M/S
LVED V (TEICH): 97.3 ML
LVES V (TEICH): 39.4 ML
LVOT MG: 3 MMHG
LVOT MV: 0.78 CM/S
LYMPHOCYTES # BLD AUTO: 1.2 K/UL (ref 1–4.8)
LYMPHOCYTES NFR BLD: 10.1 % (ref 18–48)
MAGNESIUM SERPL-MCNC: 2.1 MG/DL (ref 1.6–2.6)
MCH RBC QN AUTO: 31.6 PG (ref 27–31)
MCHC RBC AUTO-ENTMCNC: 33.7 G/DL (ref 32–36)
MCV RBC AUTO: 94 FL (ref 82–98)
MONOCYTES # BLD AUTO: 0.6 K/UL (ref 0.3–1)
MONOCYTES NFR BLD: 5.6 % (ref 4–15)
MV MEAN GRADIENT: 3 MMHG
MV PEAK A VEL: 0.97 M/S
MV PEAK E VEL: 0.78 M/S
MV PEAK GRADIENT: 5 MMHG
MV STENOSIS PRESSURE HALF TIME: 82 MS
MV VALVE AREA BY CONTINUITY EQUATION: 3.01 CM2
MV VALVE AREA P 1/2 METHOD: 2.68 CM2
NEUTROPHILS # BLD AUTO: 9.5 K/UL (ref 1.8–7.7)
NEUTROPHILS NFR BLD: 83.7 % (ref 38–73)
NRBC BLD-RTO: 0 /100 WBC
OHS LV EJECTION FRACTION SIMPSONS BIPLANE MOD: 60 %
PISA TR MAX VEL: 2.21 M/S
PLATELET # BLD AUTO: 212 K/UL (ref 150–450)
PMV BLD AUTO: 8.8 FL (ref 9.2–12.9)
POCT GLUCOSE: 218 MG/DL (ref 70–110)
POCT GLUCOSE: 259 MG/DL (ref 70–110)
POCT GLUCOSE: 287 MG/DL (ref 70–110)
POCT GLUCOSE: 310 MG/DL (ref 70–110)
POTASSIUM SERPL-SCNC: 4.3 MMOL/L (ref 3.5–5.1)
PROT SERPL-MCNC: 6.2 G/DL (ref 6–8.4)
PV MV: 0.79 M/S
PV PEAK GRADIENT: 5 MMHG
PV PEAK VELOCITY: 1.16 M/S
RA PRESSURE ESTIMATED: 3 MMHG
RBC # BLD AUTO: 3.77 M/UL (ref 4.6–6.2)
RIGHT ATRIUM VOLUME AREA LENGTH APICAL 4 CHAMBER: 42.8 ML
RIGHT VENTRICULAR END-DIASTOLIC DIMENSION: 2.79 CM
RV TB RVSP: 5 MMHG
RV TISSUE DOPPLER FREE WALL SYSTOLIC VELOCITY 1 (APICAL 4 CHAMBER VIEW): 12.3 CM/S
SODIUM SERPL-SCNC: 135 MMOL/L (ref 136–145)
TDI LATERAL: 0.09 M/S
TDI SEPTAL: 0.06 M/S
TDI: 0.08 M/S
TR MAX PG: 20 MMHG
TRICUSPID ANNULAR PLANE SYSTOLIC EXCURSION: 2.8 CM
TROPONIN I SERPL HS-MCNC: 15.3 PG/ML (ref 0–14.9)
TV REST PULMONARY ARTERY PRESSURE: 23 MMHG
WBC # BLD AUTO: 11.38 K/UL (ref 3.9–12.7)
Z-SCORE OF LEFT VENTRICULAR DIMENSION IN END DIASTOLE: -5.22
Z-SCORE OF LEFT VENTRICULAR DIMENSION IN END SYSTOLE: -3.05

## 2024-10-20 PROCEDURE — 63600175 PHARM REV CODE 636 W HCPCS: Performed by: NURSE PRACTITIONER

## 2024-10-20 PROCEDURE — 36415 COLL VENOUS BLD VENIPUNCTURE: CPT | Performed by: INTERNAL MEDICINE

## 2024-10-20 PROCEDURE — 99214 OFFICE O/P EST MOD 30 MIN: CPT | Mod: 25,FS,, | Performed by: INTERNAL MEDICINE

## 2024-10-20 PROCEDURE — 80053 COMPREHEN METABOLIC PANEL: CPT | Performed by: INTERNAL MEDICINE

## 2024-10-20 PROCEDURE — 96372 THER/PROPH/DIAG INJ SC/IM: CPT | Performed by: NURSE PRACTITIONER

## 2024-10-20 PROCEDURE — 93306 TTE W/DOPPLER COMPLETE: CPT | Mod: 26,,, | Performed by: INTERNAL MEDICINE

## 2024-10-20 PROCEDURE — S4991 NICOTINE PATCH NONLEGEND: HCPCS | Performed by: INTERNAL MEDICINE

## 2024-10-20 PROCEDURE — 25000003 PHARM REV CODE 250: Performed by: INTERNAL MEDICINE

## 2024-10-20 PROCEDURE — 83735 ASSAY OF MAGNESIUM: CPT | Performed by: INTERNAL MEDICINE

## 2024-10-20 PROCEDURE — 25000003 PHARM REV CODE 250: Performed by: HOSPITALIST

## 2024-10-20 PROCEDURE — 63600175 PHARM REV CODE 636 W HCPCS: Performed by: INTERNAL MEDICINE

## 2024-10-20 PROCEDURE — G0378 HOSPITAL OBSERVATION PER HR: HCPCS

## 2024-10-20 PROCEDURE — 86140 C-REACTIVE PROTEIN: CPT | Performed by: HOSPITALIST

## 2024-10-20 PROCEDURE — 84484 ASSAY OF TROPONIN QUANT: CPT | Performed by: INTERNAL MEDICINE

## 2024-10-20 PROCEDURE — 85025 COMPLETE CBC W/AUTO DIFF WBC: CPT | Performed by: INTERNAL MEDICINE

## 2024-10-20 PROCEDURE — 83036 HEMOGLOBIN GLYCOSYLATED A1C: CPT | Performed by: HOSPITALIST

## 2024-10-20 PROCEDURE — 93306 TTE W/DOPPLER COMPLETE: CPT

## 2024-10-20 PROCEDURE — 96372 THER/PROPH/DIAG INJ SC/IM: CPT | Performed by: INTERNAL MEDICINE

## 2024-10-20 PROCEDURE — 85651 RBC SED RATE NONAUTOMATED: CPT | Performed by: HOSPITALIST

## 2024-10-20 RX ORDER — INSULIN GLARGINE 100 [IU]/ML
45 INJECTION, SOLUTION SUBCUTANEOUS 2 TIMES DAILY
Status: DISCONTINUED | OUTPATIENT
Start: 2024-10-20 | End: 2024-10-21

## 2024-10-20 RX ORDER — INSULIN ASPART 100 [IU]/ML
0-10 INJECTION, SOLUTION INTRAVENOUS; SUBCUTANEOUS
Status: DISCONTINUED | OUTPATIENT
Start: 2024-10-20 | End: 2024-10-21

## 2024-10-20 RX ORDER — PREDNISONE 5 MG/1
10 TABLET ORAL 2 TIMES DAILY
Status: DISCONTINUED | OUTPATIENT
Start: 2024-10-20 | End: 2024-10-22 | Stop reason: HOSPADM

## 2024-10-20 RX ADMIN — PREDNISONE 10 MG: 5 TABLET ORAL at 08:10

## 2024-10-20 RX ADMIN — NICOTINE 1 PATCH: 21 PATCH, EXTENDED RELEASE TRANSDERMAL at 09:10

## 2024-10-20 RX ADMIN — HYDROCODONE BITARTRATE AND ACETAMINOPHEN 1 TABLET: 7.5; 325 TABLET ORAL at 08:10

## 2024-10-20 RX ADMIN — TAMSULOSIN HYDROCHLORIDE 0.4 MG: 0.4 CAPSULE ORAL at 09:10

## 2024-10-20 RX ADMIN — INSULIN ASPART 9 UNITS: 100 INJECTION, SOLUTION INTRAVENOUS; SUBCUTANEOUS at 09:10

## 2024-10-20 RX ADMIN — CARVEDILOL 12.5 MG: 12.5 TABLET, FILM COATED ORAL at 07:10

## 2024-10-20 RX ADMIN — CARVEDILOL 12.5 MG: 12.5 TABLET, FILM COATED ORAL at 04:10

## 2024-10-20 RX ADMIN — INSULIN GLARGINE 45 UNITS: 100 INJECTION, SOLUTION SUBCUTANEOUS at 09:10

## 2024-10-20 RX ADMIN — GABAPENTIN 300 MG: 300 CAPSULE ORAL at 04:10

## 2024-10-20 RX ADMIN — HYDROCODONE BITARTRATE AND ACETAMINOPHEN 1 TABLET: 7.5; 325 TABLET ORAL at 07:10

## 2024-10-20 RX ADMIN — ASPIRIN 81 MG: 81 TABLET, COATED ORAL at 09:10

## 2024-10-20 RX ADMIN — INSULIN ASPART 4 UNITS: 100 INJECTION, SOLUTION INTRAVENOUS; SUBCUTANEOUS at 09:10

## 2024-10-20 RX ADMIN — GABAPENTIN 300 MG: 300 CAPSULE ORAL at 08:10

## 2024-10-20 RX ADMIN — CLOPIDOGREL BISULFATE 75 MG: 75 TABLET, FILM COATED ORAL at 09:10

## 2024-10-20 RX ADMIN — IBUPROFEN 600 MG: 200 TABLET, FILM COATED ORAL at 07:10

## 2024-10-20 RX ADMIN — TRAZODONE HYDROCHLORIDE 100 MG: 50 TABLET ORAL at 08:10

## 2024-10-20 RX ADMIN — ATORVASTATIN CALCIUM 40 MG: 40 TABLET, FILM COATED ORAL at 08:10

## 2024-10-20 RX ADMIN — FAMOTIDINE 20 MG: 20 TABLET ORAL at 08:10

## 2024-10-20 RX ADMIN — LOSARTAN POTASSIUM 100 MG: 50 TABLET, FILM COATED ORAL at 09:10

## 2024-10-20 RX ADMIN — ENOXAPARIN SODIUM 40 MG: 40 INJECTION SUBCUTANEOUS at 04:10

## 2024-10-20 RX ADMIN — IBUPROFEN 600 MG: 200 TABLET, FILM COATED ORAL at 12:10

## 2024-10-20 RX ADMIN — PREDNISONE 10 MG: 5 TABLET ORAL at 12:10

## 2024-10-20 RX ADMIN — FAMOTIDINE 20 MG: 20 TABLET ORAL at 09:10

## 2024-10-20 RX ADMIN — AMLODIPINE BESYLATE 10 MG: 5 TABLET ORAL at 09:10

## 2024-10-20 RX ADMIN — GABAPENTIN 300 MG: 300 CAPSULE ORAL at 09:10

## 2024-10-20 RX ADMIN — INSULIN ASPART 6 UNITS: 100 INJECTION, SOLUTION INTRAVENOUS; SUBCUTANEOUS at 04:10

## 2024-10-20 NOTE — ASSESSMENT & PLAN NOTE
Reports consuming alcohol to deal with his joint pain.  Last intake per his report was about 1.5 weeks ago.

## 2024-10-20 NOTE — ASSESSMENT & PLAN NOTE
Oct 2023 pt had LHC and results are as follows  Nonobstructive coronary artery disease.  Patent stents in mid RCA and distal circumflex.  Mid LAD lesion was not hemodynamically significant.       EKG shows new onset T-wave inversion, likely LVH with repolarization abnormalities.  Troponin is negative  Cardiology consulted.  [facility]  [facility] will be [facility] over the [facility].  Hopefully tomorrow.

## 2024-10-20 NOTE — SUBJECTIVE & OBJECTIVE
Interval History: pt has complaints this morning of increased pain and swelling to his bilateral hands and shoulders.  He says he suspected he had developed RA after his hip surgery.  He also wonders if his joint pain is related to the black mold in his trailer.  He says he had a [facility] RF [facility] and has an appointment with rheumatology in February 2025. [facility] 5mg bid, titrating down to 5mg daily per his discharge med rec.  Ran out of prednisone, pain and swelling returned.  Does not want to follow up with his PCP from [facility] because she will not write pain meds for him.  He has referral to pain management.  Does not have money for copays.  CRP, ESR elevated.  Add on RF and ALEXA.     Review of Systems   Cardiovascular:  Negative for chest pain.   Musculoskeletal:  Positive for arthralgias and joint swelling.     Objective:     Vital Signs (Most Recent):  Temp: 98 °F (36.7 °C) (10/20/24 1147)  Pulse: 65 (10/20/24 1147)  Resp: 17 (10/20/24 1147)  BP: (!) 162/79 (10/20/24 1147)  SpO2: 95 % (10/20/24 1147) Vital Signs (24h Range):  Temp:  [97.9 °F (36.6 °C)-98.3 °F (36.8 °C)] 98 °F (36.7 °C)  Pulse:  [] 65  Resp:  [17-20] 17  SpO2:  [95 %-96 %] 95 %  BP: (143-167)/(65-99) 162/79     Weight: 98 kg (216 lb 0.8 oz)  Body mass index is 28.5 kg/m².    Intake/Output Summary (Last 24 hours) at 10/20/2024 1202  Last data filed at 10/20/2024 0753  Gross per 24 hour   Intake 480 ml   Output 300 ml   Net 180 ml         Physical Exam  Vitals and nursing note reviewed.   Eyes:      Pupils: Pupils are equal, round, and reactive to light.   Neck:      Vascular: No JVD.   Cardiovascular:      Rate and Rhythm: Normal rate and regular rhythm.      Heart sounds: Normal heart sounds.   Pulmonary:      Effort: Pulmonary effort is normal.      Breath sounds: Normal breath sounds.   Abdominal:      General: Bowel sounds are normal. There is no distension.      Palpations:  Abdomen is soft.      Tenderness: There is no abdominal tenderness.   Musculoskeletal:         General: No deformity.   Lymphadenopathy:      Cervical: No cervical adenopathy.   Skin:     Coloration: Skin is not pale.      Findings: No rash.   Neurological:      General: No focal deficit present.      Mental Status: He is oriented to person, place, and time.   Psychiatric:         Attention and Perception: Attention normal.         Behavior: Behavior is cooperative.             Significant Labs: All pertinent labs within the past 24 hours have been reviewed.  CBC:   Recent Labs   Lab 10/19/24  0307 10/20/24  0434   WBC 4.42 11.38   HGB 12.7* 11.9*   HCT 38.1* 35.3*    212     CMP:   Recent Labs   Lab 10/19/24  0307 10/20/24  0433   * 135*   K 4.9 4.3   CL 98 103   CO2 24 27   * 290*   BUN 18 27*   CREATININE 0.9 1.0   CALCIUM 9.4 9.3   PROT 6.7 6.2   ALBUMIN 4.0 3.7   BILITOT 0.3 0.2   ALKPHOS 81 80   AST 8* 8*   ALT 9* 8*   ANIONGAP 9 5*       Significant Imaging: I have reviewed all pertinent imaging results/findings within the past 24 hours.

## 2024-10-20 NOTE — ASSESSMENT & PLAN NOTE
Patient has a current diagnosis of hypertensive urgency (without evidence of end organ damage) which is controlled.  Latest blood pressure and vitals reviewed-   Temp:  [97.9 °F (36.6 °C)-98.3 °F (36.8 °C)]   Pulse:  []   Resp:  [17-20]   BP: (143-167)/(65-99)   SpO2:  [95 %-96 %] .   Patient currently on IV antihypertensives.   Home meds for hypertension were reviewed and noted below.   Hypertension Medications               amLODIPine (NORVASC) 5 MG tablet Take 1 tablet (5 mg total) by mouth once daily.    carvediloL (COREG) 12.5 MG tablet Take 6.25 mg by mouth 2 (two) times daily.    losartan (COZAAR) 100 MG tablet Take 50 mg by mouth once daily.    nitroGLYCERIN (NITROSTAT) 0.4 MG SL tablet Place 1 tablet (0.4 mg total) under the tongue every 5 (five) minutes as needed for Chest pain.            Medication adjustment for hospital antihypertensives is as follows-       Will aim for controlled BP reduction by medications noted above. Monitor and mitigate end organ damage as indicated.    Off Cardene drip, continue current p.o. medications

## 2024-10-20 NOTE — ASSESSMENT & PLAN NOTE
Details unknown-CRP and sed rate elevated.  Check RF and ALEXA.  He has appt with rheumatology in 4 months.  Pt has muscle wasting on wrist area  Start on few doses of iv steroids  When he goes home he need opiates/steroids until he [facility],so that readmissions  can be avoided.  He has consult to pain management already.     IV steroids were discontinued due to hyperglycemia.  Resume home glargine (was ordered once daily; however, he doses bid at home) and start po prednisone.

## 2024-10-20 NOTE — PROGRESS NOTES
Formerly [facility], later [facility]  Progress Note    Patient Name: Sampson Holt  MRN: [id number]  Patient Class: OP- Observation   Admission Date: 10/18/2024  Length of Stay: 0 days  Attending Physician: Julian Gomez DO  Primary Care Provider: Damian Vergara MD        Subjective:     Principal Problem:Hypertensive urgency        HPI:  69 year old pt getting admitted with HT urgency in the background of intractable pain from RA  Per pt : he had ORIF of L hip done on May 2024  Since then he started having pain on knuckles/fingers and shoulders  Recently pain started radiating to Neck area  Pt was diagnosed with RA and started on prednisone for a while and pt felt good relief and later it was DC ed  Also [facility]  Last few days pt started having severe excruciating pain on fingers/shoulders and neck and even slight movements makes it worse  Because of this his BP went up and when he contacted [facility] people , he was advised to come to ER  Pt was started on cardene gtt and got admitted   Exactly one year ago pt had coronary angiogram    Overview/Hospital Course:   Patient admitted for further management, patient was placed on Cardene drip briefly, later this was discontinued after resumed home medication.  Patient also complaining of chest pain, evaluated by cardiologist, okay to move out step-down unit.  Patient developed steroid-induced hyperglycemia, IV steroids was discontinued. His long acting insulin was resumed at his home dose and he was placed on oral prednisone.    Interval History: pt has complaints this morning of increased pain and swelling to his bilateral hands and shoulders.  He says he suspected he had developed RA after his hip surgery.  He also wonders if his joint pain is related to the black mold in his trailer.  He says he had a [facility] and has an appointment with rheumatology in February 2025. [facility]  previously on prednisone 5mg bid, titrating down to 5mg daily per his discharge med rec.  Ran out of prednisone, pain and swelling returned.  Does not want to follow up with his PCP from the [facility] because she will not write pain meds for him.  He has referral to pain management.  Does not have money for copays.  CRP, ESR elevated.  Add on RF and ALEXA.     Review of Systems   Cardiovascular:  Negative for chest pain.   Musculoskeletal:  Positive for arthralgias and joint swelling.     Objective:     Vital Signs (Most Recent):  Temp: 98 °F (36.7 °C) (10/20/24 1147)  Pulse: 65 (10/20/24 1147)  Resp: 17 (10/20/24 1147)  BP: (!) 162/79 (10/20/24 1147)  SpO2: 95 % (10/20/24 1147) Vital Signs (24h Range):  Temp:  [97.9 °F (36.6 °C)-98.3 °F (36.8 °C)] 98 °F (36.7 °C)  Pulse:  [] 65  Resp:  [17-20] 17  SpO2:  [95 %-96 %] 95 %  BP: (143-167)/(65-99) 162/79     Weight: 98 kg (216 lb 0.8 oz)  Body mass index is 28.5 kg/m².    Intake/Output Summary (Last 24 hours) at 10/20/2024 1202  Last data filed at 10/20/2024 0753  Gross per 24 hour   Intake 480 ml   Output 300 ml   Net 180 ml         Physical Exam  Vitals and nursing note reviewed.   Eyes:      Pupils: Pupils are equal, round, and reactive to light.   Neck:      Vascular: No JVD.   Cardiovascular:      Rate and Rhythm: Normal rate and regular rhythm.      Heart sounds: Normal heart sounds.   Pulmonary:      Effort: Pulmonary effort is normal.      Breath sounds: Normal breath sounds.   Abdominal:      General: Bowel sounds are normal. There is no distension.      Palpations: Abdomen is soft.      Tenderness: There is no abdominal tenderness.   Musculoskeletal:         General: No deformity.   Lymphadenopathy:      Cervical: No cervical adenopathy.   Skin:     Coloration: Skin is not pale.      Findings: No rash.   Neurological:      General: No focal deficit present.      Mental Status: He is oriented to person, place, and time.   Psychiatric:         Attention and  Perception: Attention normal.         Behavior: Behavior is cooperative.             Significant Labs: All pertinent labs within the past 24 hours have been reviewed.  CBC:   Recent Labs   Lab 10/19/24  0307 10/20/24  0434   WBC 4.42 11.38   HGB 12.7* 11.9*   HCT 38.1* 35.3*    212     CMP:   Recent Labs   Lab 10/19/24  0307 10/20/24  0433   * 135*   K 4.9 4.3   CL 98 103   CO2 24 27   * 290*   BUN 18 27*   CREATININE 0.9 1.0   CALCIUM 9.4 9.3   PROT 6.7 6.2   ALBUMIN 4.0 3.7   BILITOT 0.3 0.2   ALKPHOS 81 80   AST 8* 8*   ALT 9* 8*   ANIONGAP 9 5*       Significant Imaging: I have reviewed all pertinent imaging results/findings within the past 24 hours.    Assessment/Plan:      * Hypertensive urgency  Patient has a current diagnosis of hypertensive urgency (without evidence of end organ damage) which is controlled.  Latest blood pressure and vitals reviewed-   Temp:  [97.9 °F (36.6 °C)-98.3 °F (36.8 °C)]   Pulse:  []   Resp:  [17-20]   BP: (143-167)/(65-99)   SpO2:  [95 %-96 %] .   Patient currently on IV antihypertensives.   Home meds for hypertension were reviewed and noted below.   Hypertension Medications               amLODIPine (NORVASC) 5 MG tablet Take 1 tablet (5 mg total) by mouth once daily.    carvediloL (COREG) 12.5 MG tablet Take 6.25 mg by mouth 2 (two) times daily.    losartan (COZAAR) 100 MG tablet Take 50 mg by mouth once daily.    nitroGLYCERIN (NITROSTAT) 0.4 MG SL tablet Place 1 tablet (0.4 mg total) under the tongue every 5 (five) minutes as needed for Chest pain.            Medication adjustment for hospital antihypertensives is as follows-       Will aim for controlled BP reduction by medications noted above. Monitor and mitigate end organ damage as indicated.    Off Cardene drip, continue current p.o. medications    Rheumatoid arthritis  Details unknown-CRP and sed rate elevated.  Check RF and ALEXA.  He has appt with rheumatology in 4 months.  Pt has muscle wasting  "on wrist area  Start on few doses of iv steroids  When he goes home he need opiates/steroids until [facility],so that readmissions  can be avoided.  He has consult to pain management already.     IV steroids were discontinued due to hyperglycemia.  Resume home glargine (was ordered once daily; however, he doses bid at home) and start po prednisone.      Chest pain  Abnormal EKG  We will consult cardiologist  Continue to monitor telemetry, continue ASA, plavix, statin, BB  EKG prn chest pain      ETOH abuse  Reports consuming alcohol to deal with his joint pain.  Last intake per his report was about 1.5 weeks ago.      Nicotine dependence  Dangers of cigarette smoking were reviewed with patient in detail for 10 minutes and patient was encouraged to quit. Nicotine replacement options were discussed. Nicotine replacement options were discussed. Nicotine replacement was prescribed.        Syncope  Reports several syncopal episodes at home.  States he becomes dizzy, develops tunnel vision and then "blacks out for a few seconds."  He says this does not correlate with alcohol intake.    Check echo and US bilateral carotid arteries  Maintain fall precautions      CAD (coronary artery disease)  Oct 2023 pt had LHC and results are as follows  Nonobstructive coronary artery disease.  Patent stents in mid RCA and distal circumflex.  Mid LAD lesion was not hemodynamically significant.       EKG shows new onset T-wave inversion, likely LVH with repolarization abnormalities.  Troponin is negative  Cardiology consulted.  Would like to [facility].  Unfortunately [facility] will be able to [facility] the [facility] over the weekend.  Hopefully tomorrow.    Type 2 diabetes mellitus with circulatory disorder, with long-term current use of insulin  Patient's FSGs are uncontrolled due to hyperglycemia on current medication regimen.  Last A1c reviewed-   Lab Results   Component Value Date    HGBA1C " 7.0 (H) 10/20/2024     Most recent fingerstick glucose reviewed-   Recent Labs   Lab 10/19/24  1618 10/19/24  2210 10/20/24  0727 10/20/24  1146   POCTGLUCOSE 359* 275* 287* 218*     Current correctional scale  Medium  Increase anti-hyperglycemic dose as follows-   Antihyperglycemics (From admission, onward)      Start     Stop Route Frequency Ordered    10/20/24 2100  insulin glargine U-100 (Lantus) pen 45 Units         -- SubQ 2 times daily 10/20/24 1157    10/20/24 1352  insulin aspart U-100 pen 0-10 Units         -- SubQ Before meals & nightly PRN 10/20/24 1253          Hold Oral hypoglycemics while patient is in the hospital.          VTE Risk Mitigation (From admission, onward)           Ordered     enoxaparin injection 40 mg  Daily         10/18/24 1106     IP VTE HIGH RISK PATIENT  Once         10/18/24 1106     Place sequential compression device  Until discontinued         10/18/24 1106                    Discharge Planning   DAVID: 10/20/2024     Code Status: Full Code   Is the patient medically ready for discharge?:     Reason for patient still in hospital (select all that apply): Patient trending condition, Treatment, and Consult recommendations  Discharge Plan A: Home   Discharge Delays: None known at this time              ABRAHAN Juarez  Department of Hospital Medicine   [facility]

## 2024-10-20 NOTE — ASSESSMENT & PLAN NOTE
Abnormal EKG  We will consult cardiologist  Continue to monitor telemetry, continue ASA, plavix, statin, BB  EKG prn chest pain

## 2024-10-20 NOTE — PROGRESS NOTES
[facility]  Department of Cardiology  Consult Note      PATIENT NAME: Sampson Holt    MRN: [id number]  TODAY'S DATE: 10/20/2024  ADMIT DATE: 10/18/2024                          CONSULT REQUESTED BY: Julian Gomez DO    SUBJECTIVE     PRINCIPAL PROBLEM: Hypertensive urgency    INTERVAL HISTORY    10/20/2024  Continues to deny chest pain or shortness of breath.  Remains hypertensive with systolic blood pressures in the 150s to 160s. SB on telemetry. Labs reviewed, stable this a.m..    HPI:  69 year old male with a medical history significant for tobacco abuse, RA, HTN, remote history of endocarditis (10 years ago per pt report - details unavailable), DM type II, COPD, and CAD s/p PCI to the RCA and Lcx in the past who was admitted with hypertensive urgency. The patient has an intermittent left bundle which is known. He had a routine repeat ECG which revealed anterior TWI.  Cardiology consulted to assist with management and care. The patient denies chest pain or shortness of breath.        REASON FOR CONSULT:  From Hospitalist H&P: Abnormal ECG      Review of patient's allergies indicates:   Allergen Reactions    Amoxicillin Shortness Of Breath and Edema       Past Medical History:   Diagnosis Date    Abdominal pain 2022    CHF (congestive heart failure)     Diabetes mellitus 2018    Emphysema lung     Endocarditis 2011    Hypertension     Stroke 2011    denies residual     Past Surgical History:   Procedure Laterality Date    ANGIOGRAM, CORONARY, WITH LEFT HEART CATHETERIZATION N/A 10/10/2023    Procedure: Angiogram, Coronary, with Left Heart Cath;  Surgeon: Dieudonne Ryan MD;  Location: [facility] CATH/EP LAB;  Service: Cardiology;  Laterality: N/A;    BACK SURGERY  1981    COLONOSCOPY N/A 2/23/2023    Procedure: COLONOSCOPY;  Surgeon: Jonn Cruz MD;  Location: [facility] ENDO;  Service: Endoscopy;  Laterality: N/A;    CORONARY ANGIOGRAPHY N/A 5/9/2023    Procedure: ANGIOGRAM, CORONARY ARTERY;   Surgeon: Antonio Kessler MD;  Location: [facility] CATH/EP LAB;  Service: Cardiology;  Laterality: N/A;    EXCISION OF HYDROCELE      x2    FRACTIONAL FLOW RESERVE (FFR), CORONARY  5/11/2023    Procedure: Fractional Flow Greene (FFR), Coronary;  Surgeon: Antonio Kessler MD;  Location: [facility] CATH/EP LAB;  Service: Cardiology;;    INGUINAL HERNIA REPAIR  1960    INSTANTANEOUS WAVE-FREE RATIO (IFR) N/A 10/10/2023    Procedure: Instantaneous Wave-Free Ratio (IFR);  Surgeon: Dieudonne Ryan MD;  Location: [facility] CATH/EP LAB;  Service: Cardiology;  Laterality: N/A;    INTRAMEDULLARY RODDING OF FEMUR Left 3/9/2024    Procedure: INSERTION, INTRAMEDULLARY HUI, FEMUR;  Surgeon: Tarun Hdez MD;  Location: [facility] OR;  Service: Orthopedics;  Laterality: Left;    INTRAVASCULAR ULTRASOUND, CORONARY N/A 5/9/2023    Procedure: Ultrasound-coronary;  Surgeon: Antonio Kessler MD;  Location: [facility] CATH/EP LAB;  Service: Cardiology;  Laterality: N/A;    IVUS, CORONARY  5/11/2023    Procedure: IVUS, Coronary;  Surgeon: Antonio Kessler MD;  Location: [facility] CATH/EP LAB;  Service: Cardiology;;    LEFT HEART CATHETERIZATION Left 5/11/2023    Procedure: Left heart cath;  Surgeon: Antonio Kessler MD;  Location: [facility] CATH/EP LAB;  Service: Cardiology;  Laterality: Left;    PERCUTANEOUS TRANSLUMINAL BALLOON ANGIOPLASTY OF CORONARY ARTERY  5/9/2023    Procedure: Angioplasty-coronary;  Surgeon: Antonio Kessler MD;  Location: [facility] CATH/EP LAB;  Service: Cardiology;;    RHINOPLASTY      STENT, DRUG ELUTING, SINGLE VESSEL, CORONARY  5/9/2023    Procedure: Stent, Drug Eluting, Single Vessel, Coronary;  Surgeon: Antonio Kessler MD;  Location: [facility] CATH/EP LAB;  Service: Cardiology;;    SURGICAL REMOVAL OF NODULE OF VOCAL CORD       Social History     Tobacco Use    Smoking status: Every Day     Types: Cigarettes    Smokeless tobacco: Former    Tobacco comments:     Pt ready to quit smoking and states he can on his own. Seen 10/9/23 for inpatient smoking  cessation. [employer] Member.     Occasionally   Substance Use Topics    Alcohol use: Yes     Alcohol/week: 14.0 standard drinks of alcohol     Types: 14 Shots of liquor per week    Drug use: Yes     Types: Marijuana        REVIEW OF SYSTEMS  Per HPI    OBJECTIVE     VITAL SIGNS (Most Recent)  Temp: 98.2 °F (36.8 °C) (10/20/24 0728)  Pulse: 83 (10/20/24 0728)  Resp: 17 (10/20/24 0749)  BP: (!) 149/79 (10/20/24 0728)  SpO2: 96 % (10/20/24 0728)    VENTILATION STATUS  Resp: 17 (10/20/24 0749)  SpO2: 96 % (10/20/24 0728)           I & O (Last 24H):  Intake/Output Summary (Last 24 hours) at 10/20/2024 0926  Last data filed at 10/20/2024 0753  Gross per 24 hour   Intake 480 ml   Output 300 ml   Net 180 ml       WEIGHTS  Wt Readings from Last 1 Encounters:   10/18/24 1216 98 kg (216 lb 0.8 oz)   10/18/24 0850 91.2 kg (201 lb)       PHYSICAL EXAM  CONSTITUTIONAL: No fever, no chills; lying flat in bed.   HEENT: Normocephalic, atraumatic,pupils reactive to light                 NECK:  No JVD no carotid bruit  CVS: S1S2+, RRR  LUNGS: Clear, RA  ABDOMEN: Soft, NT, BS+  EXTREMITIES: No cyanosis, edema  : No mann catheter  NEURO: AAO X 3  PSY: Normal affect      HOME MEDICATIONS:  No current facility-administered medications on file prior to encounter.     Current Outpatient Medications on File Prior to Encounter   Medication Sig Dispense Refill    acetaminophen (TYLENOL) 500 MG tablet Take 1,000 mg by mouth every 12 (twelve) hours as needed for Pain or Temperature greater than.      albuterol (PROVENTIL/VENTOLIN HFA) 90 mcg/actuation inhaler Take 2 puffs by mouth 4 (four) times daily as needed for Wheezing or Shortness of Breath.      amLODIPine (NORVASC) 5 MG tablet Take 1 tablet (5 mg total) by mouth once daily. 30 tablet 11    aspirin (ECOTRIN) 81 MG EC tablet Take 81 mg by mouth once daily.      carvediloL (COREG) 12.5 MG tablet Take 6.25 mg by mouth 2 (two) times daily.      clopidogreL (PLAVIX) 75 mg tablet Take 1  tablet (75 mg total) by mouth once daily. (Patient taking differently: Take 75 mg by mouth every evening.) 90 tablet 3    cyclobenzaprine (FLEXERIL) 5 MG tablet Take 1 tablet (5 mg total) by mouth every 8 (eight) hours as needed for muscle spasms. 30 tablet 0    DULoxetine (CYMBALTA) 30 MG capsule Take 30 mg by mouth once daily.      famotidine (PEPCID) 20 MG tablet Take 1 tablet (20 mg total) by mouth 2 (two) times daily. 60 tablet 11    fluticasone propionate (FLONASE) 50 mcg/actuation nasal spray 2 sprays by Nasal route daily as needed.      folic acid (FOLVITE) 1 MG tablet Take 1 mg by mouth once daily.      gabapentin (NEURONTIN) 300 MG capsule Take 300 mg by mouth 3 (three) times daily.      ibuprofen (ADVIL,MOTRIN) 400 MG tablet Take 1 tablet by mouth every 6 (six) hours as needed.      insulin glargine-yfgn 100 unit/mL Soln Inject 45 Units into the skin 2 (two) times a day.      losartan (COZAAR) 100 MG tablet Take 100 mg by mouth once daily.      multivitamin Tab Take 1 tablet by mouth once daily.      naloxone (NARCAN) 4 mg/actuation Spry 1 spray by Nasal route once.      nicotine (NICODERM CQ) 21 mg/24 hr Place 1 patch onto the skin once daily. 30 patch 0    nitroGLYCERIN (NITROSTAT) 0.4 MG SL tablet Place 1 tablet (0.4 mg total) under the tongue every 5 (five) minutes as needed for Chest pain. 25 tablet 5    oxyCODONE (OXY-IR) 5 mg Cap Take 1 tablet by mouth every 6 (six) hours as needed for Pain.      rosuvastatin (CRESTOR) 10 MG tablet Take 5 mg by mouth once daily.      thiamine 100 MG tablet Take 100 mg by mouth once daily.      polyethylene glycol (GLYCOLAX) 17 gram PwPk Take 17 g by mouth once daily.      senna (SENOKOT) 8.6 mg tablet Take 17.2 mg by mouth daily as needed.      tamsulosin (FLOMAX) 0.4 mg Cap Take 0.4 mg by mouth once daily.      traZODone (DESYREL) 100 MG tablet Take 1 tablet (100 mg total) by mouth nightly. For deperession 30 tablet 0       SCHEDULED MEDS:   amLODIPine  10 mg Oral  "Daily    aspirin  81 mg Oral Daily    atorvastatin  40 mg Oral QHS    carvediloL  12.5 mg Oral BID WM    clopidogreL  75 mg Oral Daily    enoxparin  40 mg Subcutaneous Daily    famotidine  20 mg Oral BID    gabapentin  300 mg Oral TID    ibuprofen  600 mg Oral TID WM    insulin glargine U-100  45 Units Subcutaneous Daily    losartan  100 mg Oral Daily    nicotine  1 patch Transdermal Daily    tamsulosin  0.4 mg Oral Daily    traZODone  100 mg Oral QHS       CONTINUOUS INFUSIONS:    PRN MEDS:  Current Facility-Administered Medications:     acetaminophen, 650 mg, Oral, Q8H PRN    acetaminophen, 650 mg, Oral, Q4H PRN    ALPRAZolam, 0.5 mg, Oral, TID PRN    aluminum-magnesium hydroxide-simethicone, 30 mL, Oral, QID PRN    cyclobenzaprine, 5 mg, Oral, TID PRN    dextrose 50%, 12.5 g, Intravenous, PRN    dextrose 50%, 12.5 g, Intravenous, PRN    dextrose 50%, 25 g, Intravenous, PRN    dextrose 50%, 25 g, Intravenous, PRN    glucagon (human recombinant), 1 mg, Intramuscular, PRN    glucose, 16 g, Oral, PRN    glucose, 16 g, Oral, PRN    glucose, 24 g, Oral, PRN    glucose, 24 g, Oral, PRN    HYDROcodone-acetaminophen, 1 tablet, Oral, Q4H PRN    insulin aspart U-100, 0-15 Units, Subcutaneous, QID (AC + HS) PRN    melatonin, 6 mg, Oral, Nightly PRN    naloxone, 0.02 mg, Intravenous, PRN    nitroGLYCERIN, 0.4 mg, Sublingual, Q5 Min PRN    ondansetron, 4 mg, Intravenous, Q6H PRN    LABS AND DIAGNOSTICS     CBC LAST 3 DAYS  Recent Labs   Lab 10/18/24  0927 10/19/24  0307 10/20/24  0434   WBC 8.10 4.42 11.38   RBC 4.35* 4.07* 3.77*   HGB 13.9* 12.7* 11.9*   HCT 41.7 38.1* 35.3*   MCV 96 94 94   MCH 32.0* 31.2* 31.6*   MCHC 33.3 33.3 33.7   RDW 12.2 12.0 12.4    215 212   MPV 9.1* 9.0* 8.8*   GRAN 70.3  5.7 85.3*  3.8 83.7*  9.5*   LYMPH 19.6  1.6 13.1*  0.6* 10.1*  1.2   MONO 7.4  0.6 0.9*  0.0* 5.6  0.6   BASO 0.09 0.01 0.02   NRBC 0 0 0       COAGULATION LAST 3 DAYS  No results for input(s): "LABPT", "INR", " ""APTT" in the last 168 hours.    CHEMISTRY LAST 3 DAYS  Recent Labs   Lab 10/18/24  0927 10/19/24  0307 10/20/24  0433    131* 135*   K 4.3 4.9 4.3    98 103   CO2 26 24 27   ANIONGAP 9 9 5*   BUN 10 18 27*   CREATININE 0.8 0.9 1.0   * 372* 290*   CALCIUM 9.4 9.4 9.3   MG 1.8 2.0 2.1   ALBUMIN 4.0 4.0 3.7   PROT 7.1 6.7 6.2   ALKPHOS 87 81 80   ALT 10 9* 8*   AST 9* 8* 8*   BILITOT 0.4 0.3 0.2       CARDIAC PROFILE LAST 3 DAYS  Recent Labs   Lab 10/18/24  0927 10/18/24  1123 10/18/24  1839   *  --   --    TROPONINIHS 6.3 6.5 6.9       ENDOCRINE LAST 3 DAYS  No results for input(s): "TSH", "PROCAL" in the last 168 hours.    LAST ARTERIAL BLOOD GAS  ABG  No results for input(s): "PH", "PO2", "PCO2", "HCO3", "BE" in the last 168 hours.    LAST 7 DAYS MICROBIOLOGY   Microbiology Results (last 7 days)       ** No results found for the last 168 hours. **            MOST RECENT IMAGING  X-Ray Chest AP Portable  Narrative: EXAMINATION:  XR CHEST AP PORTABLE    CLINICAL HISTORY:  CHF;    FINDINGS:  Portable chest at 856 compared with 09/06/2024 shows normal cardiomediastinal silhouette.    Previous reported bibasilar opacities have resolved comment lungs are now clear.  Pulmonary vasculature is normal. No acute osseous abnormality.  Impression: No acute cardiopulmonary abnormality.    Electronically signed by: Ren Leonard  Date:    10/18/2024  Time:    09:22      ECHOCARDIOGRAM RESULTS (last 5)  Results for orders placed during the hospital encounter of 10/07/23    Echo    Interpretation Summary    Left Ventricle: The left ventricle is normal in size. Mildly increased wall thickness. There is concentric remodeling. Septal motion is normal. There is normal systolic function. Ejection fraction by visual approximation is 60%.    Right Ventricle: Normal right ventricular cavity size. Wall thickness is normal. Right ventricle wall motion  is normal. Systolic function is normal.    Mitral Valve: There is " mild posterior mitral annular calcification present.    IVC/SVC: Normal venous pressure at 3 mmHg.    A limited echo was performed using limited 2D and color flow Doppler      Results for orders placed during the hospital encounter of 08/11/23    Echo    Interpretation Summary    Left Ventricle: The left ventricle is normal in size. Mildly increased wall thickness. Normal wall motion. There is normal systolic function with a visually estimated ejection fraction of 55 - 60%. There is normal diastolic function.    Right Ventricle: Normal right ventricular cavity size. Systolic function is normal.    Aortic Valve: The aortic valve is a trileaflet valve.    Mitral Valve: There is no stenosis. The mean pressure gradient across the mitral valve is 3 mmHg at a heart rate of  bpm.    IVC/SVC: Normal venous pressure at 3 mmHg.      Results for orders placed during the hospital encounter of 05/09/23    Echo    Interpretation Summary  · The left ventricle is normal in size with mild concentric hypertrophy and normal systolic function.  · The estimated ejection fraction is 55%.  · Normal left ventricular diastolic function.  · Normal right ventricular size with normal right ventricular systolic function.  · Normal central venous pressure (3 mmHg).      CURRENT/PREVIOUS VISIT EKG  Results for orders placed or performed during the hospital encounter of 10/18/24   EKG 12-lead    Collection Time: 10/18/24  9:03 AM   Result Value Ref Range    QRS Duration 92 ms    [facility] QTC Calculation 452 ms    Narrative    Test Reason : R06.02,    Vent. Rate : 074 BPM     Atrial Rate : 074 BPM     P-R Int : 226 ms          QRS Dur : 092 ms      QT Int : 408 ms       P-R-T Axes : 072 034 -01 degrees     QTc Int : 452 ms    Sinus rhythm with 1st degree A-V block with Fusion complexes  T wave abnormality, consider inferior ischemia  T wave abnormality, consider anterior ischemia  Abnormal ECG  When compared with ECG of 09-SEP-2024 12:25,  Fusion  complexes are now Present  T wave inversion now evident in Inferior leads  Inverted T waves have replaced nonspecific T wave abnormality in Anterior  leads    Referred By: [email or web address]   SELF           Confirmed By:            ASSESSMENT/PLAN:     Active Hospital Problems    Diagnosis    *Hypertensive urgency    Chest pain    Rheumatoid arthritis    Tobacco abuse    CAD (coronary artery disease)    Type 2 diabetes mellitus with circulatory disorder, with long-term current use of insulin       ASSESSMENT & PLAN:   Abnormal ECG   -Patient has known CAD s/p PCI to the RCA and Lcx in the past.  His last angiogram was 10/2023 which revealed patent stents in the RCA and Lcx. He was found to have mild to moderate LAD stenosis which did not meet threshold for PCI.    - Review of previous ECGs reveal an intermittent left bundle.  He has nonspecific TW changes and/or anterior TWI noted on some of his ECGs dating back to 8/2023.    - Patient reports that he was hospitalized at the [facility] last month (9/2024) and was noted to have an abnormal ECG which prompted a cardiac work up including stress testing (please obtain records for review).    - The patient is currently chest pain free and with negative biomarkers.  If his stress test last month was negative, will plan for medical management and close interval follow up in cardiology clinic.     2. HTN  - Patient admitted with hypertensive urgency/emergency.  Blood pressure overnight and this a.m. 150s to 160s systolic.  Continue amlodipine 10 mg daily, losartan 100 mg daily.  Recommend adding hydralazine 10 mg TID for hypertension.  Titrate as needed.  Sinus bradycardia.    3. DM Type II  - per medicine team    4.COPD  - per Medicine team    5. RA    6. Full Code     [facility] as [facility] reports [facility] hospitalization where stress test was performed.    We will follow PRN at this time.      Michelle Kelsey NP  Cardiology  Date of Service:  10/20/2024

## 2024-10-20 NOTE — ASSESSMENT & PLAN NOTE
Patient's FSGs are uncontrolled due to hyperglycemia on current medication regimen.  Last A1c reviewed-   Lab Results   Component Value Date    HGBA1C 7.0 (H) 10/20/2024     Most recent fingerstick glucose reviewed-   Recent Labs   Lab 10/19/24  1618 10/19/24  2210 10/20/24  0727 10/20/24  1146   POCTGLUCOSE 359* 275* 287* 218*     Current correctional scale  Medium  Increase anti-hyperglycemic dose as follows-   Antihyperglycemics (From admission, onward)      Start     Stop Route Frequency Ordered    10/20/24 2100  insulin glargine U-100 (Lantus) pen 45 Units         -- SubQ 2 times daily 10/20/24 1157    10/20/24 1352  insulin aspart U-100 pen 0-10 Units         -- SubQ Before meals & nightly PRN 10/20/24 1253          Hold Oral hypoglycemics while patient is in the hospital.

## 2024-10-20 NOTE — ASSESSMENT & PLAN NOTE
"Reports several syncopal episodes at home.  States he becomes dizzy, develops tunnel vision and then "blacks out for a few seconds."  He says this does not correlate with alcohol intake.    Check echo and US bilateral carotid arteries  Maintain fall precautions    "

## 2024-10-21 LAB
ALBUMIN SERPL BCP-MCNC: 4.4 G/DL (ref 3.5–5.2)
ALP SERPL-CCNC: 102 U/L (ref 55–135)
ALT SERPL W/O P-5'-P-CCNC: 14 U/L (ref 10–44)
ANION GAP SERPL CALC-SCNC: 9 MMOL/L (ref 8–16)
AST SERPL-CCNC: 12 U/L (ref 10–40)
BASOPHILS # BLD AUTO: 0.05 K/UL (ref 0–0.2)
BASOPHILS NFR BLD: 0.5 % (ref 0–1.9)
BILIRUB SERPL-MCNC: 0.3 MG/DL (ref 0.1–1)
BUN SERPL-MCNC: 28 MG/DL (ref 8–23)
CALCIUM SERPL-MCNC: 9.8 MG/DL (ref 8.7–10.5)
CHLORIDE SERPL-SCNC: 100 MMOL/L (ref 95–110)
CO2 SERPL-SCNC: 26 MMOL/L (ref 23–29)
CREAT SERPL-MCNC: 0.9 MG/DL (ref 0.5–1.4)
DIFFERENTIAL METHOD BLD: ABNORMAL
EOSINOPHIL # BLD AUTO: 0 K/UL (ref 0–0.5)
EOSINOPHIL NFR BLD: 0.1 % (ref 0–8)
ERYTHROCYTE [DISTWIDTH] IN BLOOD BY AUTOMATED COUNT: 12.2 % (ref 11.5–14.5)
EST. GFR  (NO RACE VARIABLE): >60 ML/MIN/1.73 M^2
GLUCOSE SERPL-MCNC: 353 MG/DL (ref 70–110)
HCT VFR BLD AUTO: 40.5 % (ref 40–54)
HGB BLD-MCNC: 13.4 G/DL (ref 14–18)
IMM GRANULOCYTES # BLD AUTO: 0.13 K/UL (ref 0–0.04)
IMM GRANULOCYTES NFR BLD AUTO: 1.3 % (ref 0–0.5)
LYMPHOCYTES # BLD AUTO: 1.5 K/UL (ref 1–4.8)
LYMPHOCYTES NFR BLD: 15.2 % (ref 18–48)
MAGNESIUM SERPL-MCNC: 2 MG/DL (ref 1.6–2.6)
MCH RBC QN AUTO: 31.1 PG (ref 27–31)
MCHC RBC AUTO-ENTMCNC: 33.1 G/DL (ref 32–36)
MCV RBC AUTO: 94 FL (ref 82–98)
MONOCYTES # BLD AUTO: 0.7 K/UL (ref 0.3–1)
MONOCYTES NFR BLD: 6.7 % (ref 4–15)
NEUTROPHILS # BLD AUTO: 7.6 K/UL (ref 1.8–7.7)
NEUTROPHILS NFR BLD: 76.2 % (ref 38–73)
NRBC BLD-RTO: 0 /100 WBC
PLATELET # BLD AUTO: 284 K/UL (ref 150–450)
PMV BLD AUTO: 8.9 FL (ref 9.2–12.9)
POCT GLUCOSE: 208 MG/DL (ref 70–110)
POCT GLUCOSE: 257 MG/DL (ref 70–110)
POCT GLUCOSE: 261 MG/DL (ref 70–110)
POCT GLUCOSE: 362 MG/DL (ref 70–110)
POTASSIUM SERPL-SCNC: 4.4 MMOL/L (ref 3.5–5.1)
PROT SERPL-MCNC: 7.4 G/DL (ref 6–8.4)
RBC # BLD AUTO: 4.31 M/UL (ref 4.6–6.2)
RHEUMATOID FACT SERPL-ACNC: 11 IU/ML (ref 0–15)
SODIUM SERPL-SCNC: 135 MMOL/L (ref 136–145)
WBC # BLD AUTO: 9.98 K/UL (ref 3.9–12.7)

## 2024-10-21 PROCEDURE — 86431 RHEUMATOID FACTOR QUANT: CPT | Performed by: STUDENT IN AN ORGANIZED HEALTH CARE EDUCATION/TRAINING PROGRAM

## 2024-10-21 PROCEDURE — 63600175 PHARM REV CODE 636 W HCPCS: Performed by: INTERNAL MEDICINE

## 2024-10-21 PROCEDURE — 25000003 PHARM REV CODE 250: Performed by: NURSE PRACTITIONER

## 2024-10-21 PROCEDURE — 63600175 PHARM REV CODE 636 W HCPCS: Performed by: NURSE PRACTITIONER

## 2024-10-21 PROCEDURE — 36415 COLL VENOUS BLD VENIPUNCTURE: CPT | Performed by: STUDENT IN AN ORGANIZED HEALTH CARE EDUCATION/TRAINING PROGRAM

## 2024-10-21 PROCEDURE — 86038 ANTINUCLEAR ANTIBODIES: CPT | Performed by: STUDENT IN AN ORGANIZED HEALTH CARE EDUCATION/TRAINING PROGRAM

## 2024-10-21 PROCEDURE — 12000002 HC ACUTE/MED SURGE SEMI-PRIVATE ROOM

## 2024-10-21 PROCEDURE — 25000003 PHARM REV CODE 250: Performed by: STUDENT IN AN ORGANIZED HEALTH CARE EDUCATION/TRAINING PROGRAM

## 2024-10-21 PROCEDURE — 85025 COMPLETE CBC W/AUTO DIFF WBC: CPT | Performed by: INTERNAL MEDICINE

## 2024-10-21 PROCEDURE — 83735 ASSAY OF MAGNESIUM: CPT | Performed by: INTERNAL MEDICINE

## 2024-10-21 PROCEDURE — 25000003 PHARM REV CODE 250: Performed by: INTERNAL MEDICINE

## 2024-10-21 PROCEDURE — 80053 COMPREHEN METABOLIC PANEL: CPT | Performed by: INTERNAL MEDICINE

## 2024-10-21 PROCEDURE — S4991 NICOTINE PATCH NONLEGEND: HCPCS | Performed by: INTERNAL MEDICINE

## 2024-10-21 PROCEDURE — 25000003 PHARM REV CODE 250: Performed by: HOSPITALIST

## 2024-10-21 RX ORDER — INSULIN ASPART 100 [IU]/ML
0-15 INJECTION, SOLUTION INTRAVENOUS; SUBCUTANEOUS
Status: DISCONTINUED | OUTPATIENT
Start: 2024-10-21 | End: 2024-10-22 | Stop reason: HOSPADM

## 2024-10-21 RX ORDER — HYDRALAZINE HYDROCHLORIDE 25 MG/1
25 TABLET, FILM COATED ORAL EVERY 8 HOURS
Status: DISCONTINUED | OUTPATIENT
Start: 2024-10-21 | End: 2024-10-22 | Stop reason: HOSPADM

## 2024-10-21 RX ORDER — AMLODIPINE BESYLATE 5 MG/1
5 TABLET ORAL 2 TIMES DAILY
Status: DISCONTINUED | OUTPATIENT
Start: 2024-10-21 | End: 2024-10-22 | Stop reason: HOSPADM

## 2024-10-21 RX ORDER — INSULIN GLARGINE 100 [IU]/ML
50 INJECTION, SOLUTION SUBCUTANEOUS 2 TIMES DAILY
Status: DISCONTINUED | OUTPATIENT
Start: 2024-10-21 | End: 2024-10-22 | Stop reason: HOSPADM

## 2024-10-21 RX ADMIN — GABAPENTIN 300 MG: 300 CAPSULE ORAL at 01:10

## 2024-10-21 RX ADMIN — HYDROCODONE BITARTRATE AND ACETAMINOPHEN 1 TABLET: 7.5; 325 TABLET ORAL at 01:10

## 2024-10-21 RX ADMIN — PREDNISONE 10 MG: 5 TABLET ORAL at 09:10

## 2024-10-21 RX ADMIN — AMLODIPINE BESYLATE 5 MG: 5 TABLET ORAL at 05:10

## 2024-10-21 RX ADMIN — HYDROCODONE BITARTRATE AND ACETAMINOPHEN 1 TABLET: 7.5; 325 TABLET ORAL at 05:10

## 2024-10-21 RX ADMIN — GABAPENTIN 300 MG: 300 CAPSULE ORAL at 09:10

## 2024-10-21 RX ADMIN — INSULIN ASPART 6 UNITS: 100 INJECTION, SOLUTION INTRAVENOUS; SUBCUTANEOUS at 12:10

## 2024-10-21 RX ADMIN — INSULIN GLARGINE 50 UNITS: 100 INJECTION, SOLUTION SUBCUTANEOUS at 09:10

## 2024-10-21 RX ADMIN — ATORVASTATIN CALCIUM 40 MG: 40 TABLET, FILM COATED ORAL at 09:10

## 2024-10-21 RX ADMIN — HYDROCODONE BITARTRATE AND ACETAMINOPHEN 1 TABLET: 7.5; 325 TABLET ORAL at 07:10

## 2024-10-21 RX ADMIN — INSULIN ASPART 6 UNITS: 100 INJECTION, SOLUTION INTRAVENOUS; SUBCUTANEOUS at 09:10

## 2024-10-21 RX ADMIN — TAMSULOSIN HYDROCHLORIDE 0.4 MG: 0.4 CAPSULE ORAL at 09:10

## 2024-10-21 RX ADMIN — CARVEDILOL 12.5 MG: 12.5 TABLET, FILM COATED ORAL at 09:10

## 2024-10-21 RX ADMIN — INSULIN GLARGINE 45 UNITS: 100 INJECTION, SOLUTION SUBCUTANEOUS at 10:10

## 2024-10-21 RX ADMIN — HYDROCODONE BITARTRATE AND ACETAMINOPHEN 1 TABLET: 7.5; 325 TABLET ORAL at 02:10

## 2024-10-21 RX ADMIN — INSULIN ASPART 10 UNITS: 100 INJECTION, SOLUTION INTRAVENOUS; SUBCUTANEOUS at 09:10

## 2024-10-21 RX ADMIN — TRAZODONE HYDROCHLORIDE 100 MG: 50 TABLET ORAL at 09:10

## 2024-10-21 RX ADMIN — CLOPIDOGREL BISULFATE 75 MG: 75 TABLET, FILM COATED ORAL at 09:10

## 2024-10-21 RX ADMIN — INSULIN ASPART 6 UNITS: 100 INJECTION, SOLUTION INTRAVENOUS; SUBCUTANEOUS at 04:10

## 2024-10-21 RX ADMIN — CARVEDILOL 12.5 MG: 12.5 TABLET, FILM COATED ORAL at 04:10

## 2024-10-21 RX ADMIN — LOSARTAN POTASSIUM 100 MG: 50 TABLET, FILM COATED ORAL at 09:10

## 2024-10-21 RX ADMIN — ALUMINUM HYDROXIDE, MAGNESIUM HYDROXIDE, AND SIMETHICONE 30 ML: 200; 200; 20 SUSPENSION ORAL at 06:10

## 2024-10-21 RX ADMIN — FAMOTIDINE 20 MG: 20 TABLET ORAL at 09:10

## 2024-10-21 RX ADMIN — NICOTINE 1 PATCH: 21 PATCH, EXTENDED RELEASE TRANSDERMAL at 09:10

## 2024-10-21 RX ADMIN — HYDRALAZINE HYDROCHLORIDE 25 MG: 25 TABLET ORAL at 09:10

## 2024-10-21 RX ADMIN — ENOXAPARIN SODIUM 40 MG: 40 INJECTION SUBCUTANEOUS at 04:10

## 2024-10-21 RX ADMIN — ASPIRIN 81 MG: 81 TABLET, COATED ORAL at 09:10

## 2024-10-21 RX ADMIN — HYDRALAZINE HYDROCHLORIDE 25 MG: 25 TABLET ORAL at 01:10

## 2024-10-21 NOTE — PLAN OF CARE
Problem: Adult Inpatient Plan of Care  Goal: Plan of Care Review  Outcome: Progressing  Goal: Patient-Specific Goal (Individualized)  Outcome: Progressing  Goal: Absence of Hospital-Acquired Illness or Injury  Outcome: Progressing  Goal: Optimal Comfort and Wellbeing  Outcome: Progressing  Goal: Readiness for Transition of Care  Outcome: Progressing     Problem: Diabetes Comorbidity  Goal: Blood Glucose Level Within Targeted Range  Outcome: Progressing     Problem: Pneumonia  Goal: Fluid Balance  Outcome: Progressing  Goal: Resolution of Infection Signs and Symptoms  Outcome: Progressing  Goal: Effective Oxygenation and Ventilation  Outcome: Progressing

## 2024-10-21 NOTE — SUBJECTIVE & OBJECTIVE
Interval History: swelling is much better in hands with steroids. Started on hydralazine today, will monitor response. Improvement of BP from 170/88 to 149/84 with initial dose. Awaiting cardiology plan. Hopefully discharge in AM    Review of Systems   Respiratory:  Positive for cough.    Musculoskeletal:  Positive for arthralgias and back pain.     Objective:     Vital Signs (Most Recent):  Temp: 98.1 °F (36.7 °C) (10/21/24 1220)  Pulse: 88 (10/21/24 1220)  Resp: 16 (10/21/24 1409)  BP: (!) 149/84 (10/21/24 1220)  SpO2: 96 % (10/21/24 1220) Vital Signs (24h Range):  Temp:  [97.7 °F (36.5 °C)-98.1 °F (36.7 °C)] 98.1 °F (36.7 °C)  Pulse:  [74-88] 88  Resp:  [16-20] 16  SpO2:  [94 %-97 %] 96 %  BP: (149-178)/(72-93) 149/84     Weight: 98 kg (216 lb 0.8 oz)  Body mass index is 28.5 kg/m².    Intake/Output Summary (Last 24 hours) at 10/21/2024 1557  Last data filed at 10/21/2024 1410  Gross per 24 hour   Intake 600 ml   Output 1050 ml   Net -450 ml         Physical Exam  Vitals and nursing note reviewed.   Constitutional:       Appearance: He is not toxic-appearing.   HENT:      Head: Normocephalic and atraumatic.      Nose: Nose normal.      Mouth/Throat:      Mouth: Mucous membranes are moist.      Pharynx: Oropharynx is clear.   Eyes:      Conjunctiva/sclera: Conjunctivae normal.      Pupils: Pupils are equal, round, and reactive to light.   Cardiovascular:      Rate and Rhythm: Normal rate and regular rhythm.   Pulmonary:      Effort: Pulmonary effort is normal.      Breath sounds: Normal breath sounds.   Abdominal:      General: Bowel sounds are normal.      Palpations: Abdomen is soft.   Musculoskeletal:         General: Normal range of motion.      Cervical back: Normal range of motion and neck supple.   Skin:     General: Skin is warm and dry.      Capillary Refill: Capillary refill takes less than 2 seconds.      Findings: Bruising present.   Neurological:      Mental Status: He is alert and oriented to person,  place, and time. Mental status is at baseline.   Psychiatric:         Mood and Affect: Mood normal.         Behavior: Behavior normal.             Significant Labs: All pertinent labs within the past 24 hours have been reviewed.  CBC:   Recent Labs   Lab 10/20/24  0434 10/21/24  0914   WBC 11.38 9.98   HGB 11.9* 13.4*   HCT 35.3* 40.5    284     CMP:   Recent Labs   Lab 10/20/24  0433 10/21/24  0914   * 135*   K 4.3 4.4    100   CO2 27 26   * 353*   BUN 27* 28*   CREATININE 1.0 0.9   CALCIUM 9.3 9.8   PROT 6.2 7.4   ALBUMIN 3.7 4.4   BILITOT 0.2 0.3   ALKPHOS 80 102   AST 8* 12   ALT 8* 14   ANIONGAP 5* 9       Significant Imaging: I have reviewed all pertinent imaging results/findings within the past 24 hours.    Echo    Result Date: 10/20/2024    Left Ventricle: The left ventricle is normal in size. Mildly increased wall thickness. There is concentric remodeling. There is normal systolic function. Biplane (2D) method of discs ejection fraction is 60%.   Mitral Valve: Mild mitral annular calcification. There is trace regurgitation.   Tricuspid Valve: There is trace regurgitation.   Pulmonic Valve: There is mild regurgitation.   Aorta: Aortic annulus is moderately dilated measuring 4.10 cm.   Pulmonary Artery: The estimated pulmonary artery systolic pressure is 23 mmHg.     US Carotid Bilateral    Result Date: 10/20/2024  CMS MANDATED QUALITY DATA - CAROTID - 195 All measurements and percent stenosis described below were determined using NASCET criteria or criteria similar to NASCET, as defined by the Society of Radiologists in Ultrasound Consensus Conference, Radiology, 2003 EXAMINATION: US CAROTID BILATERAL CLINICAL HISTORY: syncope;. TECHNIQUE: Grayscale, color and spectral Doppler analysis was performed. COMPARISON: 08/12/2023 FINDINGS: There are scattered calcified plaques at the carotid bifurcation. Peak systolic velocity in the right ICA is 70 cm/sec, with ICA/CCA ratio of 1.2. Peak  systolic velocity in the  left ICA is 71 cm/sec, with ICA/CCA ratio of 1.2. Antegrade flow within the vertebral artery     Three 2 no hemodynamically significant stenosis in the extracranial carotid arteries Antegrade flow within the vertebral arteries Electronically signed by: Agustina Jackson Date:    10/20/2024 Time:    13:50    X-Ray Chest AP Portable    Result Date: 10/18/2024  EXAMINATION: XR CHEST AP PORTABLE CLINICAL HISTORY: CHF; FINDINGS: Portable chest at 856 compared with 09/06/2024 shows normal cardiomediastinal silhouette. Previous reported bibasilar opacities have resolved comment lungs are now clear.  Pulmonary vasculature is normal. No acute osseous abnormality.     No acute cardiopulmonary abnormality. Electronically signed by: Ren Leonard Date:    10/18/2024 Time:    09:22

## 2024-10-21 NOTE — PLAN OF CARE
POC reviewed with patient this shift.  A/O x4.  Respirations unlabored.  Skin w/d.  Continent of b/b.  Urinal at bedside.  B/P continues to be slightly elevated this shift but correlated w/pain.  PRN Norco given x2 thus far in shift.  Tolerates meds whole with water without difficulty.  VSS.  See flowsheet for full assessment.  Able to verbalize wants/needs.  No s/s of distress.  Fall/safety precautions maintained.      Problem: Adult Inpatient Plan of Care  Goal: Plan of Care Review  Outcome: Progressing     Problem: Diabetes Comorbidity  Goal: Blood Glucose Level Within Targeted Range  Outcome: Progressing  Intervention: Monitor and Manage Glycemia  Flowsheets (Taken 10/21/2024 0322)  Glycemic Management: blood glucose monitored     Problem: Pneumonia  Goal: Effective Oxygenation and Ventilation  Outcome: Progressing     Problem: Fall Injury Risk  Goal: Absence of Fall and Fall-Related Injury  Outcome: Progressing
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
## 2024-10-21 NOTE — ASSESSMENT & PLAN NOTE
Abnormal EKG  We will consult cardiologist - awaiting [facility] for plan  Continue to monitor telemetry, continue ASA, plavix, statin, BB  EKG prn chest pain

## 2024-10-21 NOTE — ASSESSMENT & PLAN NOTE
Oct 2023 pt had LHC and results are as follows  Nonobstructive coronary artery disease.  Patent stents in mid RCA and distal circumflex.  Mid LAD lesion was not hemodynamically significant.       EKG shows new onset T-wave inversion, likely LVH with repolarization abnormalities.  Troponin is negative  Cardiology consulted.  Would like to [facility].

## 2024-10-21 NOTE — ASSESSMENT & PLAN NOTE
Patient's FSGs are uncontrolled due to hyperglycemia on current medication regimen.  Last A1c reviewed-   Lab Results   Component Value Date    HGBA1C 7.0 (H) 10/20/2024     Most recent fingerstick glucose reviewed-   Recent Labs   Lab 10/20/24  1646 10/20/24  1935 10/21/24  0739 10/21/24  1219   POCTGLUCOSE 259* 310* 362* 257*     Current correctional scale  Medium  Increase anti-hyperglycemic dose as follows-   Antihyperglycemics (From admission, onward)      Start     Stop Route Frequency Ordered    10/21/24 2100  insulin glargine U-100 (Lantus) pen 50 Units         -- SubQ 2 times daily 10/21/24 1605    10/21/24 1705  insulin aspart U-100 pen 0-15 Units         -- SubQ Before meals & nightly PRN 10/21/24 1605          Hold Oral hypoglycemics while patient is in the hospital.

## 2024-10-22 VITALS
OXYGEN SATURATION: 92 % | TEMPERATURE: 98 F | BODY MASS INDEX: 28.63 KG/M2 | DIASTOLIC BLOOD PRESSURE: 80 MMHG | HEIGHT: 73 IN | WEIGHT: 216.06 LBS | HEART RATE: 81 BPM | SYSTOLIC BLOOD PRESSURE: 166 MMHG | RESPIRATION RATE: 18 BRPM

## 2024-10-22 LAB
ALBUMIN SERPL BCP-MCNC: 3.8 G/DL (ref 3.5–5.2)
ALP SERPL-CCNC: 68 U/L (ref 55–135)
ALT SERPL W/O P-5'-P-CCNC: 14 U/L (ref 10–44)
ANA SER-ACNC: NEGATIVE
ANION GAP SERPL CALC-SCNC: 7 MMOL/L (ref 8–16)
AST SERPL-CCNC: 12 U/L (ref 10–40)
BASOPHILS # BLD AUTO: 0.04 K/UL (ref 0–0.2)
BASOPHILS NFR BLD: 0.5 % (ref 0–1.9)
BILIRUB SERPL-MCNC: 0.2 MG/DL (ref 0.1–1)
BUN SERPL-MCNC: 29 MG/DL (ref 8–23)
CALCIUM SERPL-MCNC: 9.3 MG/DL (ref 8.7–10.5)
CHLORIDE SERPL-SCNC: 103 MMOL/L (ref 95–110)
CO2 SERPL-SCNC: 27 MMOL/L (ref 23–29)
CREAT SERPL-MCNC: 0.8 MG/DL (ref 0.5–1.4)
DIFFERENTIAL METHOD BLD: ABNORMAL
EOSINOPHIL # BLD AUTO: 0 K/UL (ref 0–0.5)
EOSINOPHIL NFR BLD: 0.2 % (ref 0–8)
ERYTHROCYTE [DISTWIDTH] IN BLOOD BY AUTOMATED COUNT: 12.4 % (ref 11.5–14.5)
EST. GFR  (NO RACE VARIABLE): >60 ML/MIN/1.73 M^2
GLUCOSE SERPL-MCNC: 322 MG/DL (ref 70–110)
HCT VFR BLD AUTO: 37.6 % (ref 40–54)
HGB BLD-MCNC: 12.5 G/DL (ref 14–18)
IMM GRANULOCYTES # BLD AUTO: 0.11 K/UL (ref 0–0.04)
IMM GRANULOCYTES NFR BLD AUTO: 1.3 % (ref 0–0.5)
LYMPHOCYTES # BLD AUTO: 1.1 K/UL (ref 1–4.8)
LYMPHOCYTES NFR BLD: 13 % (ref 18–48)
MAGNESIUM SERPL-MCNC: 1.9 MG/DL (ref 1.6–2.6)
MCH RBC QN AUTO: 31.9 PG (ref 27–31)
MCHC RBC AUTO-ENTMCNC: 33.2 G/DL (ref 32–36)
MCV RBC AUTO: 96 FL (ref 82–98)
MONOCYTES # BLD AUTO: 0.7 K/UL (ref 0.3–1)
MONOCYTES NFR BLD: 8.2 % (ref 4–15)
NEUTROPHILS # BLD AUTO: 6.4 K/UL (ref 1.8–7.7)
NEUTROPHILS NFR BLD: 76.8 % (ref 38–73)
NRBC BLD-RTO: 0 /100 WBC
OHS QRS DURATION: 88 MS
OHS QTC CALCULATION: 432 MS
PLATELET # BLD AUTO: 236 K/UL (ref 150–450)
PMV BLD AUTO: 8.8 FL (ref 9.2–12.9)
POCT GLUCOSE: 232 MG/DL (ref 70–110)
POCT GLUCOSE: 311 MG/DL (ref 70–110)
POTASSIUM SERPL-SCNC: 4.5 MMOL/L (ref 3.5–5.1)
PROT SERPL-MCNC: 6.2 G/DL (ref 6–8.4)
RBC # BLD AUTO: 3.92 M/UL (ref 4.6–6.2)
SODIUM SERPL-SCNC: 137 MMOL/L (ref 136–145)
WBC # BLD AUTO: 8.32 K/UL (ref 3.9–12.7)

## 2024-10-22 PROCEDURE — 25000003 PHARM REV CODE 250: Performed by: STUDENT IN AN ORGANIZED HEALTH CARE EDUCATION/TRAINING PROGRAM

## 2024-10-22 PROCEDURE — 80053 COMPREHEN METABOLIC PANEL: CPT | Performed by: INTERNAL MEDICINE

## 2024-10-22 PROCEDURE — 85025 COMPLETE CBC W/AUTO DIFF WBC: CPT | Performed by: INTERNAL MEDICINE

## 2024-10-22 PROCEDURE — 25000003 PHARM REV CODE 250: Performed by: HOSPITALIST

## 2024-10-22 PROCEDURE — 25000003 PHARM REV CODE 250: Performed by: NURSE PRACTITIONER

## 2024-10-22 PROCEDURE — 36415 COLL VENOUS BLD VENIPUNCTURE: CPT | Performed by: INTERNAL MEDICINE

## 2024-10-22 PROCEDURE — 83735 ASSAY OF MAGNESIUM: CPT | Performed by: INTERNAL MEDICINE

## 2024-10-22 PROCEDURE — 25000003 PHARM REV CODE 250: Performed by: INTERNAL MEDICINE

## 2024-10-22 PROCEDURE — S4991 NICOTINE PATCH NONLEGEND: HCPCS | Performed by: INTERNAL MEDICINE

## 2024-10-22 PROCEDURE — 63600175 PHARM REV CODE 636 W HCPCS: Performed by: NURSE PRACTITIONER

## 2024-10-22 RX ORDER — OXYCODONE HYDROCHLORIDE 5 MG/1
5 TABLET ORAL EVERY 6 HOURS PRN
Qty: 28 TABLET | Refills: 0 | Status: SHIPPED | OUTPATIENT
Start: 2024-10-22 | End: 2024-10-29

## 2024-10-22 RX ORDER — LOSARTAN POTASSIUM 100 MG/1
100 TABLET ORAL DAILY
Qty: 90 TABLET | Refills: 3 | Status: SHIPPED | OUTPATIENT
Start: 2024-10-22 | End: 2025-10-22

## 2024-10-22 RX ORDER — IBUPROFEN 200 MG
1 TABLET ORAL DAILY
Qty: 28 PATCH | Refills: 0 | Status: SHIPPED | OUTPATIENT
Start: 2024-10-22 | End: 2024-11-19

## 2024-10-22 RX ORDER — PREDNISONE 10 MG/1
TABLET ORAL
Qty: 76 TABLET | Refills: 0 | Status: SHIPPED | OUTPATIENT
Start: 2024-10-22 | End: 2025-03-04

## 2024-10-22 RX ORDER — HYDRALAZINE HYDROCHLORIDE 25 MG/1
25 TABLET, FILM COATED ORAL EVERY 8 HOURS
Qty: 90 TABLET | Refills: 11 | Status: SHIPPED | OUTPATIENT
Start: 2024-10-22 | End: 2025-10-22

## 2024-10-22 RX ADMIN — NICOTINE 1 PATCH: 21 PATCH, EXTENDED RELEASE TRANSDERMAL at 09:10

## 2024-10-22 RX ADMIN — HYDRALAZINE HYDROCHLORIDE 25 MG: 25 TABLET ORAL at 06:10

## 2024-10-22 RX ADMIN — LOSARTAN POTASSIUM 100 MG: 50 TABLET, FILM COATED ORAL at 08:10

## 2024-10-22 RX ADMIN — INSULIN ASPART 12 UNITS: 100 INJECTION, SOLUTION INTRAVENOUS; SUBCUTANEOUS at 11:10

## 2024-10-22 RX ADMIN — CLOPIDOGREL BISULFATE 75 MG: 75 TABLET, FILM COATED ORAL at 08:10

## 2024-10-22 RX ADMIN — AMLODIPINE BESYLATE 5 MG: 5 TABLET ORAL at 06:10

## 2024-10-22 RX ADMIN — INSULIN GLARGINE 50 UNITS: 100 INJECTION, SOLUTION SUBCUTANEOUS at 08:10

## 2024-10-22 RX ADMIN — CARVEDILOL 12.5 MG: 12.5 TABLET, FILM COATED ORAL at 08:10

## 2024-10-22 RX ADMIN — ASPIRIN 81 MG: 81 TABLET, COATED ORAL at 08:10

## 2024-10-22 RX ADMIN — INSULIN ASPART 6 UNITS: 100 INJECTION, SOLUTION INTRAVENOUS; SUBCUTANEOUS at 08:10

## 2024-10-22 RX ADMIN — PREDNISONE 10 MG: 5 TABLET ORAL at 08:10

## 2024-10-22 RX ADMIN — GABAPENTIN 300 MG: 300 CAPSULE ORAL at 08:10

## 2024-10-22 RX ADMIN — HYDROCODONE BITARTRATE AND ACETAMINOPHEN 1 TABLET: 7.5; 325 TABLET ORAL at 01:10

## 2024-10-22 RX ADMIN — TAMSULOSIN HYDROCHLORIDE 0.4 MG: 0.4 CAPSULE ORAL at 08:10

## 2024-10-22 RX ADMIN — HYDROCODONE BITARTRATE AND ACETAMINOPHEN 1 TABLET: 7.5; 325 TABLET ORAL at 06:10

## 2024-10-22 RX ADMIN — FAMOTIDINE 20 MG: 20 TABLET ORAL at 08:10

## 2024-10-22 NOTE — PLAN OF CARE
Patient cleared for discharge from case management standpoint.    Follow up appointments scheduled and added to AVS. Pt to drive hisself home     Chart and discharge orders reviewed.  Patient discharged home with no further case management needs.       10/22/24 0945   Final Note   Assessment Type Final Discharge Note   Anticipated Discharge Disposition Home   Hospital Resources/Appts/Education Provided Provided patient/caregiver with written discharge plan information;Appointments scheduled and added to AVS   Post-Acute Status   Discharge Delays None known at this time

## 2024-10-22 NOTE — PLAN OF CARE
Problem: Adult Inpatient Plan of Care  Goal: Plan of Care Review  Outcome: Met  Goal: Patient-Specific Goal (Individualized)  Outcome: Met  Goal: Absence of Hospital-Acquired Illness or Injury  Outcome: Met  Goal: Optimal Comfort and Wellbeing  Outcome: Met  Goal: Readiness for Transition of Care  Outcome: Met     Problem: Diabetes Comorbidity  Goal: Blood Glucose Level Within Targeted Range  Outcome: Met     Problem: Pneumonia  Goal: Fluid Balance  Outcome: Met  Goal: Resolution of Infection Signs and Symptoms  Outcome: Met  Goal: Effective Oxygenation and Ventilation  Outcome: Met

## 2024-11-06 ENCOUNTER — PATIENT MESSAGE (OUTPATIENT)
Dept: RHEUMATOLOGY | Facility: CLINIC | Age: 69
End: 2024-11-06
Payer: OTHER GOVERNMENT

## 2024-11-11 ENCOUNTER — OFFICE VISIT (OUTPATIENT)
Dept: CARDIOLOGY | Facility: CLINIC | Age: 69
End: 2024-11-11
Payer: OTHER GOVERNMENT

## 2024-11-11 VITALS
OXYGEN SATURATION: 97 % | DIASTOLIC BLOOD PRESSURE: 68 MMHG | SYSTOLIC BLOOD PRESSURE: 130 MMHG | WEIGHT: 212.75 LBS | BODY MASS INDEX: 28.2 KG/M2 | HEIGHT: 73 IN | HEART RATE: 89 BPM

## 2024-11-11 DIAGNOSIS — I25.10 CORONARY ARTERY DISEASE, UNSPECIFIED VESSEL OR LESION TYPE, UNSPECIFIED WHETHER ANGINA PRESENT, UNSPECIFIED WHETHER NATIVE OR TRANSPLANTED HEART: ICD-10-CM

## 2024-11-11 DIAGNOSIS — Z79.4 TYPE 2 DIABETES MELLITUS WITH OTHER CIRCULATORY COMPLICATION, WITH LONG-TERM CURRENT USE OF INSULIN: ICD-10-CM

## 2024-11-11 DIAGNOSIS — I10 BENIGN ESSENTIAL HTN: ICD-10-CM

## 2024-11-11 DIAGNOSIS — E78.2 MIXED HYPERLIPIDEMIA: ICD-10-CM

## 2024-11-11 DIAGNOSIS — F17.210 CIGARETTE SMOKER: ICD-10-CM

## 2024-11-11 DIAGNOSIS — Z95.5 STATUS POST INSERTION OF DRUG ELUTING CORONARY ARTERY STENT: Primary | ICD-10-CM

## 2024-11-11 DIAGNOSIS — F10.10 ETOH ABUSE: ICD-10-CM

## 2024-11-11 DIAGNOSIS — E11.59 TYPE 2 DIABETES MELLITUS WITH OTHER CIRCULATORY COMPLICATION, WITH LONG-TERM CURRENT USE OF INSULIN: ICD-10-CM

## 2024-11-11 DIAGNOSIS — J44.9 CHRONIC OBSTRUCTIVE PULMONARY DISEASE, UNSPECIFIED COPD TYPE: ICD-10-CM

## 2024-11-11 PROCEDURE — 99214 OFFICE O/P EST MOD 30 MIN: CPT | Mod: S$PBB,,,

## 2024-11-11 PROCEDURE — 99999 PR PBB SHADOW E&M-EST. PATIENT-LVL III: CPT | Mod: PBBFAC,,,

## 2024-11-11 PROCEDURE — 99213 OFFICE O/P EST LOW 20 MIN: CPT | Mod: PBBFAC,PN

## 2024-11-11 RX ORDER — OXYCODONE HYDROCHLORIDE 5 MG/1
5 CAPSULE ORAL EVERY 6 HOURS PRN
COMMUNITY

## 2024-11-11 NOTE — ASSESSMENT & PLAN NOTE
Continue aspirin and plavix and statin therapy.  Continue coreg 12.5 mg daily, amlodipine 5 mg daily, hydralazine 25 mg q 8 hour, losartan 100 mg daily  Low sodium heart healthy diet  [facility] test [facility] recently, negative for reversible ischemia.  See under

## 2024-11-11 NOTE — ASSESSMENT & PLAN NOTE
Repeat lipid panel.  Continue statin therapy. Crestor 10 mg daily. Low sodium heart healthy diet. Exercise as tolerated     Latest Reference Range & Units Most Recent   Cholesterol Total 120 - 199 mg/dL 169  1/29/24 08:33   HDL 40 - 75 mg/dL 69  1/29/24 08:33   HDL/Cholesterol Ratio 20.0 - 50.0 % 40.8  1/29/24 08:33   Non-HDL Cholesterol mg/dL 100  1/29/24 08:33   Total Cholesterol/HDL Ratio 2.0 - 5.0  2.4  1/29/24 08:33   Triglycerides 30 - 150 mg/dL 123  1/29/24 08:33   LDL Cholesterol 63.0 - 159.0 mg/dL 75.4  1/29/24 08:33
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
## 2024-11-13 LAB
OHS QRS DURATION: 76 MS
OHS QTC CALCULATION: 425 MS

## 2024-11-19 ENCOUNTER — TELEPHONE (OUTPATIENT)
Dept: CARDIOLOGY | Facility: CLINIC | Age: 69
End: 2024-11-19
Payer: OTHER GOVERNMENT

## 2024-11-19 NOTE — TELEPHONE ENCOUNTER
11/19/24 Sw the patient  about his concerns with the medicine given on dc  he states that he have been SOB, Fatigue, HA,& sweating  for 2 days . I wanted to assign him a f/u manage the medicine list but, he stated that he wanted to wait to get some of his bills down before coming in I also give him the option to return to the ER to be observed  he stated that he will call back to schedule next visit .

## 2024-11-19 NOTE — TELEPHONE ENCOUNTER
----- Message from Leticia sent at 11/19/2024  8:07 AM CST -----  Contact: self  Type:  Needs Medical Advice    Who Called: self  Symptoms (please be specific): pt is needing to speak to provider regarding his medication, sts he might be taking too much and sts he doesn't feel right when taking them. Please call pt to discuss.    Would the patient rather a call back or a response via [facility]? call  Best Call Back Number:  [phone]    Additional Information: please advise and thank you.

## 2024-11-20 NOTE — TELEPHONE ENCOUNTER
AMIRA to patient. [facility] in [facility].  He will still call [facility] in [place] as well to see if there is an earlier appointment.

## 2024-12-10 ENCOUNTER — PATIENT OUTREACH (OUTPATIENT)
Dept: ADMINISTRATIVE | Facility: OTHER | Age: 69
End: 2024-12-10
Payer: OTHER GOVERNMENT

## 2024-12-10 NOTE — PROGRESS NOTES
CHW - Follow Up    This Community Health Worker completed a follow up visit with patient via telephone today.  Pt/Caregiver reported: Spoke to the pt aand notified him that the [facility] is going to reach out to him. I also gave him the number for [facility]. I will follow up in two weeks.   Community Health Worker provided: Spoke to the pt aand notified him that the [facility] is going to reach out to him. I also gave him the number for [facility] transportation. I will follow up in two weeks.   Follow up required:   Follow-up Outreach - Due: 12/24/2024     Same Histology In Subsequent Stages Text: The pattern and morphology of the tumor is as described in the first stage.

## 2024-12-18 ENCOUNTER — HOSPITAL ENCOUNTER (EMERGENCY)
Facility: HOSPITAL | Age: 69
Discharge: HOME | End: 2024-12-18
Attending: EMERGENCY MEDICINE
Payer: OTHER GOVERNMENT

## 2024-12-18 VITALS
TEMPERATURE: 99 F | WEIGHT: 212 LBS | HEIGHT: 73 IN | DIASTOLIC BLOOD PRESSURE: 103 MMHG | RESPIRATION RATE: 14 BRPM | OXYGEN SATURATION: 97 % | BODY MASS INDEX: 28.1 KG/M2 | HEART RATE: 76 BPM | SYSTOLIC BLOOD PRESSURE: 153 MMHG

## 2024-12-18 DIAGNOSIS — J44.1 COPD WITH ACUTE EXACERBATION: Primary | ICD-10-CM

## 2024-12-18 DIAGNOSIS — R06.02 SHORTNESS OF BREATH: ICD-10-CM

## 2024-12-18 LAB
ALBUMIN SERPL BCP-MCNC: 4.2 G/DL (ref 3.5–5.2)
ALP SERPL-CCNC: 85 U/L (ref 55–135)
ALT SERPL W/O P-5'-P-CCNC: 8 U/L (ref 10–44)
ANION GAP SERPL CALC-SCNC: 7 MMOL/L (ref 8–16)
AST SERPL-CCNC: 12 U/L (ref 10–40)
BASOPHILS # BLD AUTO: 0.06 K/UL (ref 0–0.2)
BASOPHILS NFR BLD: 0.8 % (ref 0–1.9)
BILIRUB SERPL-MCNC: 0.7 MG/DL (ref 0.1–1)
BNP SERPL-MCNC: 217 PG/ML (ref 0–99)
BUN SERPL-MCNC: 9 MG/DL (ref 8–23)
CALCIUM SERPL-MCNC: 9.3 MG/DL (ref 8.7–10.5)
CHLORIDE SERPL-SCNC: 104 MMOL/L (ref 95–110)
CO2 SERPL-SCNC: 27 MMOL/L (ref 23–29)
CREAT SERPL-MCNC: 0.9 MG/DL (ref 0.5–1.4)
DIFFERENTIAL METHOD BLD: ABNORMAL
EOSINOPHIL # BLD AUTO: 0.1 K/UL (ref 0–0.5)
EOSINOPHIL NFR BLD: 0.8 % (ref 0–8)
ERYTHROCYTE [DISTWIDTH] IN BLOOD BY AUTOMATED COUNT: 13.9 % (ref 11.5–14.5)
EST. GFR  (NO RACE VARIABLE): >60 ML/MIN/1.73 M^2
GLUCOSE SERPL-MCNC: 144 MG/DL (ref 70–110)
GROUP A STREP, MOLECULAR: NEGATIVE
HCT VFR BLD AUTO: 45.6 % (ref 40–54)
HGB BLD-MCNC: 15.3 G/DL (ref 14–18)
IMM GRANULOCYTES # BLD AUTO: 0.03 K/UL (ref 0–0.04)
IMM GRANULOCYTES NFR BLD AUTO: 0.4 % (ref 0–0.5)
INFLUENZA A, MOLECULAR: NEGATIVE
INFLUENZA B, MOLECULAR: NEGATIVE
LYMPHOCYTES # BLD AUTO: 1.5 K/UL (ref 1–4.8)
LYMPHOCYTES NFR BLD: 19.6 % (ref 18–48)
MAGNESIUM SERPL-MCNC: 1.8 MG/DL (ref 1.6–2.6)
MCH RBC QN AUTO: 31.2 PG (ref 27–31)
MCHC RBC AUTO-ENTMCNC: 33.6 G/DL (ref 32–36)
MCV RBC AUTO: 93 FL (ref 82–98)
MONOCYTES # BLD AUTO: 0.7 K/UL (ref 0.3–1)
MONOCYTES NFR BLD: 9 % (ref 4–15)
NEUTROPHILS # BLD AUTO: 5.3 K/UL (ref 1.8–7.7)
NEUTROPHILS NFR BLD: 69.4 % (ref 38–73)
NRBC BLD-RTO: 0 /100 WBC
PLATELET # BLD AUTO: 212 K/UL (ref 150–450)
PMV BLD AUTO: 8.9 FL (ref 9.2–12.9)
POTASSIUM SERPL-SCNC: 4.2 MMOL/L (ref 3.5–5.1)
PROT SERPL-MCNC: 7.2 G/DL (ref 6–8.4)
RBC # BLD AUTO: 4.9 M/UL (ref 4.6–6.2)
SARS-COV-2 RDRP RESP QL NAA+PROBE: NEGATIVE
SODIUM SERPL-SCNC: 138 MMOL/L (ref 136–145)
SPECIMEN SOURCE: NORMAL
TROPONIN I SERPL HS-MCNC: 11.9 PG/ML (ref 0–14.9)
WBC # BLD AUTO: 7.65 K/UL (ref 3.9–12.7)

## 2024-12-18 PROCEDURE — 83880 ASSAY OF NATRIURETIC PEPTIDE: CPT | Performed by: EMERGENCY MEDICINE

## 2024-12-18 PROCEDURE — 25000242 PHARM REV CODE 250 ALT 637 W/ HCPCS: Performed by: EMERGENCY MEDICINE

## 2024-12-18 PROCEDURE — 63600175 PHARM REV CODE 636 W HCPCS: Performed by: EMERGENCY MEDICINE

## 2024-12-18 PROCEDURE — 84484 ASSAY OF TROPONIN QUANT: CPT | Performed by: EMERGENCY MEDICINE

## 2024-12-18 PROCEDURE — 85025 COMPLETE CBC W/AUTO DIFF WBC: CPT | Performed by: EMERGENCY MEDICINE

## 2024-12-18 PROCEDURE — 93010 ELECTROCARDIOGRAM REPORT: CPT | Mod: ,,, | Performed by: INTERNAL MEDICINE

## 2024-12-18 PROCEDURE — 87635 SARS-COV-2 COVID-19 AMP PRB: CPT | Performed by: EMERGENCY MEDICINE

## 2024-12-18 PROCEDURE — 99285 EMERGENCY DEPT VISIT HI MDM: CPT | Mod: 25

## 2024-12-18 PROCEDURE — 94644 CONT INHLJ TX 1ST HOUR: CPT

## 2024-12-18 PROCEDURE — 94761 N-INVAS EAR/PLS OXIMETRY MLT: CPT

## 2024-12-18 PROCEDURE — 83735 ASSAY OF MAGNESIUM: CPT | Performed by: EMERGENCY MEDICINE

## 2024-12-18 PROCEDURE — 96375 TX/PRO/DX INJ NEW DRUG ADDON: CPT

## 2024-12-18 PROCEDURE — 94640 AIRWAY INHALATION TREATMENT: CPT

## 2024-12-18 PROCEDURE — 93005 ELECTROCARDIOGRAM TRACING: CPT | Performed by: INTERNAL MEDICINE

## 2024-12-18 PROCEDURE — 99900031 HC PATIENT EDUCATION (STAT)

## 2024-12-18 PROCEDURE — 80053 COMPREHEN METABOLIC PANEL: CPT | Performed by: EMERGENCY MEDICINE

## 2024-12-18 PROCEDURE — 87502 INFLUENZA DNA AMP PROBE: CPT | Performed by: EMERGENCY MEDICINE

## 2024-12-18 PROCEDURE — 87651 STREP A DNA AMP PROBE: CPT | Performed by: EMERGENCY MEDICINE

## 2024-12-18 PROCEDURE — 96374 THER/PROPH/DIAG INJ IV PUSH: CPT

## 2024-12-18 RX ORDER — PREDNISONE 20 MG/1
40 TABLET ORAL DAILY
Qty: 10 TABLET | Refills: 0 | Status: SHIPPED | OUTPATIENT
Start: 2024-12-18 | End: 2024-12-23

## 2024-12-18 RX ORDER — LEVALBUTEROL INHALATION SOLUTION 1.25 MG/3ML
3.75 SOLUTION RESPIRATORY (INHALATION)
Status: COMPLETED | OUTPATIENT
Start: 2024-12-18 | End: 2024-12-18

## 2024-12-18 RX ORDER — DOXYCYCLINE 100 MG/1
100 CAPSULE ORAL 2 TIMES DAILY
Qty: 20 CAPSULE | Refills: 0 | Status: SHIPPED | OUTPATIENT
Start: 2024-12-18 | End: 2024-12-28

## 2024-12-18 RX ORDER — IPRATROPIUM BROMIDE 0.5 MG/2.5ML
1 SOLUTION RESPIRATORY (INHALATION)
Status: COMPLETED | OUTPATIENT
Start: 2024-12-18 | End: 2024-12-18

## 2024-12-18 RX ORDER — METHYLPREDNISOLONE SOD SUCC 125 MG
125 VIAL (EA) INJECTION
Status: COMPLETED | OUTPATIENT
Start: 2024-12-18 | End: 2024-12-18

## 2024-12-18 RX ORDER — ONDANSETRON HYDROCHLORIDE 2 MG/ML
4 INJECTION, SOLUTION INTRAVENOUS
Status: COMPLETED | OUTPATIENT
Start: 2024-12-18 | End: 2024-12-18

## 2024-12-18 RX ADMIN — IPRATROPIUM BROMIDE 1 MG: 0.5 SOLUTION RESPIRATORY (INHALATION) at 09:12

## 2024-12-18 RX ADMIN — ONDANSETRON 4 MG: 2 INJECTION INTRAMUSCULAR; INTRAVENOUS at 09:12

## 2024-12-18 RX ADMIN — LEVALBUTEROL HYDROCHLORIDE 3.75 MG: 1.25 SOLUTION RESPIRATORY (INHALATION) at 09:12

## 2024-12-18 RX ADMIN — METHYLPREDNISOLONE SODIUM SUCCINATE 125 MG: 125 INJECTION, POWDER, FOR SOLUTION INTRAMUSCULAR; INTRAVENOUS at 09:12

## 2024-12-18 NOTE — ED PROVIDER NOTES
"Encounter Date: 12/18/2024       History     Chief Complaint   Patient presents with    Shortness of Breath     Has mold in his trailer, has been having increasing SOB "just making me scared."      Patient presents emergency department with reported cough shortness of breath that he is concerned he is being exacerbated by exposure to mold in his trailer he states that he had pneumonia in September that he believes was secondary to the mold exposure he denies any nausea vomiting he reports post nasal drip and congestion he denies any chest pain he has had low-grade fever he has a history of congestive heart failure as well as emphysema there is no reported nausea vomiting diarrhea        Review of patient's allergies indicates:   Allergen Reactions    Amoxicillin Shortness Of Breath and Edema     Past Medical History:   Diagnosis Date    Abdominal pain 2022    CHF (congestive heart failure)     Diabetes mellitus 2018    Emphysema lung     Endocarditis 2011    Hypertension     Stroke 2011    denies residual     Past Surgical History:   Procedure Laterality Date    ANGIOGRAM, CORONARY, WITH LEFT HEART CATHETERIZATION N/A 10/10/2023    Procedure: Angiogram, Coronary, with Left Heart Cath;  Surgeon: Dieudonne Ryan MD;  Location: [facility] CATH/EP LAB;  Service: Cardiology;  Laterality: N/A;    BACK SURGERY  1981    COLONOSCOPY N/A 2/23/2023    Procedure: COLONOSCOPY;  Surgeon: Jonn Cruz MD;  Location: [facility] ENDO;  Service: Endoscopy;  Laterality: N/A;    CORONARY ANGIOGRAPHY N/A 5/9/2023    Procedure: ANGIOGRAM, CORONARY ARTERY;  Surgeon: Antonio Kessler MD;  Location: [facility] CATH/EP LAB;  Service: Cardiology;  Laterality: N/A;    EXCISION OF HYDROCELE      x2    FRACTIONAL FLOW RESERVE (FFR), CORONARY  5/11/2023    Procedure: Fractional Flow [place] (FFR), Coronary;  Surgeon: Antonio Kessler MD;  Location: [facility] CATH/EP LAB;  Service: Cardiology;;    INGUINAL HERNIA REPAIR  1960    INSTANTANEOUS WAVE-FREE RATIO (IFR) " N/A 10/10/2023    Procedure: Instantaneous Wave-Free Ratio (IFR);  Surgeon: Dieudonne Ryan MD;  Location: [facility] CATH/EP LAB;  Service: Cardiology;  Laterality: N/A;    INTRAMEDULLARY RODDING OF FEMUR Left 3/9/2024    Procedure: INSERTION, INTRAMEDULLARY HUI, FEMUR;  Surgeon: Tarun Hdez MD;  Location: [facility] OR;  Service: Orthopedics;  Laterality: Left;    INTRAVASCULAR ULTRASOUND, CORONARY N/A 5/9/2023    Procedure: Ultrasound-coronary;  Surgeon: Antonio Kessler MD;  Location: [facility] CATH/EP LAB;  Service: Cardiology;  Laterality: N/A;    IVUS, CORONARY  5/11/2023    Procedure: IVUS, Coronary;  Surgeon: Antonio Kessler MD;  Location: [facility] CATH/EP LAB;  Service: Cardiology;;    LEFT HEART CATHETERIZATION Left 5/11/2023    Procedure: Left heart cath;  Surgeon: Antonio Kessler MD;  Location: [facility] CATH/EP LAB;  Service: Cardiology;  Laterality: Left;    PERCUTANEOUS TRANSLUMINAL BALLOON ANGIOPLASTY OF CORONARY ARTERY  5/9/2023    Procedure: Angioplasty-coronary;  Surgeon: Antonio Kessler MD;  Location: [facility] CATH/EP LAB;  Service: Cardiology;;    RHINOPLASTY      STENT, DRUG ELUTING, SINGLE VESSEL, CORONARY  5/9/2023    Procedure: Stent, Drug Eluting, Single Vessel, Coronary;  Surgeon: Antonio Kessler MD;  Location: [facility] CATH/EP LAB;  Service: Cardiology;;    SURGICAL REMOVAL OF NODULE OF VOCAL CORD       Family History   Problem Relation Name Age of Onset    Arthritis Mother       Social History     Tobacco Use    Smoking status: Every Day     Types: Cigarettes    Smokeless tobacco: Former    Tobacco comments:     Pt ready to quit smoking and states he can on his own. Seen 10/9/23 for inpatient smoking cessation. [employer] Member.     Occasionally   Substance Use Topics    Alcohol use: Yes     Alcohol/week: 14.0 standard drinks of alcohol     Types: 14 Shots of liquor per week    Drug use: Yes     Types: Marijuana     Review of Systems   Constitutional:  Positive for fatigue and fever.   HENT:  Positive for  congestion, postnasal drip and sore throat.    Respiratory:  Positive for cough, shortness of breath and wheezing.    Cardiovascular:  Negative for chest pain.   Gastrointestinal:  Negative for abdominal pain.   Skin:  Negative for rash.   All other systems reviewed and are negative.      Physical Exam     Initial Vitals [12/18/24 0920]   BP Pulse Resp Temp SpO2   (!) 174/119 77 20 99 °F (37.2 °C) 97 %      MAP       --         Physical Exam    Constitutional: He appears well-developed and well-nourished. No distress.   HENT:   Head: Normocephalic and atraumatic.   Right Ear: External ear normal.   Left Ear: External ear normal. Mouth/Throat: Oropharynx is clear and moist.   Eyes: Pupils are equal, round, and reactive to light.   Neck: Neck supple.   Normal range of motion.  Cardiovascular:  Normal rate, regular rhythm, S1 normal, S2 normal, normal heart sounds and intact distal pulses.           Pulmonary/Chest: No respiratory distress. He has wheezes. He has rhonchi.   Abdominal: Abdomen is soft. Bowel sounds are normal. There is no abdominal tenderness.   Musculoskeletal:         General: Normal range of motion.      Cervical back: Normal range of motion and neck supple.     Neurological: He is alert and oriented to person, place, and time. GCS score is 15. GCS eye subscore is 4. GCS verbal subscore is 5. GCS motor subscore is 6.   Skin: Skin is warm and dry. No rash noted.   Psychiatric: He has a normal mood and affect. His behavior is normal.         ED Course   Procedures  Labs Reviewed   CBC W/ AUTO DIFFERENTIAL - Abnormal       Result Value    WBC 7.65      RBC 4.90      Hemoglobin 15.3      Hematocrit 45.6      MCV 93      MCH 31.2 (*)     MCHC 33.6      RDW 13.9      Platelets 212      MPV 8.9 (*)     Immature Granulocytes 0.4      Gran # (ANC) 5.3      Immature Grans (Abs) 0.03      Lymph # 1.5      Mono # 0.7      Eos # 0.1      Baso # 0.06      nRBC 0      Gran % 69.4      Lymph % 19.6      Mono % 9.0       Eosinophil % 0.8      Basophil % 0.8      Differential Method Automated     COMPREHENSIVE METABOLIC PANEL - Abnormal    Sodium 138      Potassium 4.2      Chloride 104      CO2 27      Glucose 144 (*)     BUN 9      Creatinine 0.9      Calcium 9.3      Total Protein 7.2      Albumin 4.2      Total Bilirubin 0.7      Alkaline Phosphatase 85      AST 12      ALT 8 (*)     eGFR >60.0      Anion Gap 7 (*)    B-TYPE NATRIURETIC PEPTIDE - Abnormal     (*)    GROUP A STREP, MOLECULAR    Group A Strep, Molecular Negative     THROAT SCREEN, RAPID STREP   TROPONIN I HIGH SENSITIVITY    Troponin I High Sensitivity 11.9     MAGNESIUM    Magnesium 1.8     SARS-COV-2 RNA AMPLIFICATION, QUAL    SARS-CoV-2 RNA, Amplification, Qual Negative     INFLUENZA A AND B ANTIGEN    Influenza A, Molecular Negative      Influenza B, Molecular Negative      Flu A & B Source Nasal swab      Narrative:     Specimen Source->Nasopharyngeal Swab        ECG Results              EKG 12-lead (In process)        Collection Time Result Time QRS Duration OHS QTC Calculation    12/18/24 09:35:00 12/18/24 10:10:42 84 455                     In process by Interface, Lab In [facility] (12/18/24 10:10:46)                   Narrative:    Test Reason : R06.02,    Vent. Rate :  75 BPM     Atrial Rate :  75 BPM     P-R Int : 216 ms          QRS Dur :  84 ms      QT Int : 408 ms       P-R-T Axes :  -6  -9 -53 degrees    QTcB Int : 455 ms    Sinus rhythm with 1st degree A-V block  Septal infarct ,age undetermined  T wave abnormality, consider inferior ischemia  T wave abnormality, consider anterolateral ischemia  Abnormal ECG  No previous ECGs available    Referred By: AAAREFERRAL SELF           Confirmed By:                                   Imaging Results              X-Ray Chest AP Portable (Final result)  Result time 12/18/24 10:05:26      Final result by gAustina Jackson MD (12/18/24 10:05:26)                   Impression:      No acute  cardiopulmonary abnormality.      Electronically signed by: Agustina Jackson  Date:    12/18/2024  Time:    10:05               Narrative:    EXAMINATION:  XR CHEST AP PORTABLE    CLINICAL HISTORY:  CHF;    FINDINGS:  Portable chest at 09:58 is compared to 10/18/2024 shows normal cardiomediastinal silhouette.    Lungs are clear. Pulmonary vasculature is normal. No acute osseous abnormality.                                       Medications   levalbuterol nebulizer solution 3.75 mg (3.75 mg Nebulization Given 12/18/24 0939)   ipratropium 0.02 % nebulizer solution 1 mg (1 mg Nebulization Given 12/18/24 0939)   methylPREDNISolone sodium succinate injection 125 mg (125 mg Intravenous Given 12/18/24 0950)   ondansetron injection 4 mg (4 mg Intravenous Given 12/18/24 0950)     Medical Decision Making  Chest x-ray shows no evidence of pneumonia laboratory evaluations no negative for COVID flu and influenza white blood cell count is normal BNP is 217 to his baseline troponin is negative will treat as COPD exacerbation with prednisone doxycycline patient given nebulized therapy and Solu-Medrol in the emergency department recommend patient follow up with his primary care provider return to ER for any worsened symptoms or new symptoms outpatient mold remediation and treatment testing needed    Amount and/or Complexity of Data Reviewed  Labs: ordered.  Radiology: ordered.    Risk  Prescription drug management.                                      Clinical Impression:  Final diagnoses:  [R06.02] Shortness of breath  [J44.1] COPD with acute exacerbation (Primary)          ED Disposition Condition    Discharge Stable          ED Prescriptions       Medication Sig Dispense Start Date End Date Auth. Provider    predniSONE (DELTASONE) 20 MG tablet Take 2 tablets (40 mg total) by mouth once daily. for 5 days 10 tablet 12/18/2024 12/23/2024 Antolin Wagner MD    doxycycline (VIBRAMYCIN) 100 MG Cap Take 1 capsule (100 mg total) by  mouth 2 (two) times daily. for 10 days 20 capsule 12/18/2024 12/28/2024 Antolin Wagner MD          Follow-up Information       Follow up With Specialties Details Why Contact Info    Damian Vergara MD Internal Medicine Call in 1 day for further evaluation and treatment [address] [place] [address] [address]  [facility] [address]  [phone]               Antolin Wagner MD  12/18/24 [id number]

## 2024-12-18 NOTE — CARE UPDATE
12/18/24 0939   Patient Assessment/Suction   Level of Consciousness (AVPU) alert   Respiratory Effort Normal;Unlabored   Expansion/Accessory Muscles/Retractions no use of accessory muscles;no retractions;expansion symmetric   All Lung Fields Breath Sounds diminished   Rhythm/Pattern, Respiratory unlabored;pattern regular;depth regular;chest wiggle adequate;no shortness of breath reported   Cough Frequency infrequent   Cough Type good;nonproductive   PRE-TX-O2   Device (Oxygen Therapy) room air   SpO2 97 %   Pulse Oximetry Type Continuous   $ Pulse Oximetry - Multiple Charge Pulse Oximetry - Multiple   Pulse 77   Resp 19   Aerosol Therapy   $ Aerosol Therapy Charges Aerosol Treatment;Initial Continuous   Daily Review of Necessity (SVN) completed   Respiratory Treatment Status (SVN) given   Patient Position HOB elevated   Post Treatment Assessment (SVN) increased aeration   Signs of Intolerance (SVN) none   Breath Sounds Post-Respiratory Treatment   Throughout All Fields Post-Treatment All Fields   Throughout All Fields Post-Treatment aeration increased   Post-treatment Heart Rate (beats/min) 79   Post-treatment Resp Rate (breaths/min) 20   Education   $ Education Bronchodilator;15 min

## 2024-12-18 NOTE — PLAN OF CARE
12/18/24 1603   Discharge Reassessment   Assessment Type Discharge Planning Reassessment   Did the patient's condition or plan change since previous assessment? Yes   Discharge Plan discussed with: Patient   Discharge Plan A Home   Discharge Plan B Home with family   DME Needed Upon Discharge  none   Transition of Care Barriers Social   Post-Acute Status   Discharge Delays (!) Personal Transportation     CM met with the patient to discuss housing placement resources. 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 The patient will discharge to his home via private transport.   Attempted to call patient voice mailbox is full and cannot accept any messages at this time

## 2024-12-19 LAB
OHS QRS DURATION: 84 MS
OHS QTC CALCULATION: 455 MS

## 2025-01-05 ENCOUNTER — HOSPITAL ENCOUNTER (EMERGENCY)
Facility: HOSPITAL | Age: 70
Discharge: HOME | End: 2025-01-05
Attending: EMERGENCY MEDICINE
Payer: OTHER GOVERNMENT

## 2025-01-05 VITALS
OXYGEN SATURATION: 98 % | HEART RATE: 91 BPM | WEIGHT: 201 LBS | RESPIRATION RATE: 17 BRPM | BODY MASS INDEX: 26.64 KG/M2 | DIASTOLIC BLOOD PRESSURE: 138 MMHG | TEMPERATURE: 99 F | HEIGHT: 73 IN | SYSTOLIC BLOOD PRESSURE: 173 MMHG

## 2025-01-05 DIAGNOSIS — R11.2 NAUSEA AND VOMITING, UNSPECIFIED VOMITING TYPE: ICD-10-CM

## 2025-01-05 DIAGNOSIS — R10.13 EPIGASTRIC PAIN: Primary | ICD-10-CM

## 2025-01-05 DIAGNOSIS — R07.9 CHEST PAIN: ICD-10-CM

## 2025-01-05 LAB
ALBUMIN SERPL BCP-MCNC: 4.5 G/DL (ref 3.5–5.2)
ALP SERPL-CCNC: 78 U/L (ref 55–135)
ALT SERPL W/O P-5'-P-CCNC: 11 U/L (ref 10–44)
ANION GAP SERPL CALC-SCNC: 12 MMOL/L (ref 8–16)
AST SERPL-CCNC: 13 U/L (ref 10–40)
BASOPHILS # BLD AUTO: 0.06 K/UL (ref 0–0.2)
BASOPHILS NFR BLD: 0.7 % (ref 0–1.9)
BILIRUB SERPL-MCNC: 0.8 MG/DL (ref 0.1–1)
BILIRUB UR QL STRIP: NEGATIVE
BNP SERPL-MCNC: 330 PG/ML (ref 0–99)
BUN SERPL-MCNC: 10 MG/DL (ref 8–23)
CALCIUM SERPL-MCNC: 10.3 MG/DL (ref 8.7–10.5)
CHLORIDE SERPL-SCNC: 100 MMOL/L (ref 95–110)
CLARITY UR: ABNORMAL
CO2 SERPL-SCNC: 26 MMOL/L (ref 23–29)
COLOR UR: YELLOW
CREAT SERPL-MCNC: 0.9 MG/DL (ref 0.5–1.4)
DIFFERENTIAL METHOD BLD: ABNORMAL
EOSINOPHIL # BLD AUTO: 0.1 K/UL (ref 0–0.5)
EOSINOPHIL NFR BLD: 0.7 % (ref 0–8)
ERYTHROCYTE [DISTWIDTH] IN BLOOD BY AUTOMATED COUNT: 14.1 % (ref 11.5–14.5)
EST. GFR  (NO RACE VARIABLE): >60 ML/MIN/1.73 M^2
GLUCOSE SERPL-MCNC: 195 MG/DL (ref 70–110)
GLUCOSE UR QL STRIP: ABNORMAL
HCT VFR BLD AUTO: 46.7 % (ref 40–54)
HGB BLD-MCNC: 15.9 G/DL (ref 14–18)
HGB UR QL STRIP: NEGATIVE
IMM GRANULOCYTES # BLD AUTO: 0.02 K/UL (ref 0–0.04)
IMM GRANULOCYTES NFR BLD AUTO: 0.2 % (ref 0–0.5)
KETONES UR QL STRIP: ABNORMAL
LEUKOCYTE ESTERASE UR QL STRIP: NEGATIVE
LIPASE SERPL-CCNC: 14 U/L (ref 4–60)
LYMPHOCYTES # BLD AUTO: 1.2 K/UL (ref 1–4.8)
LYMPHOCYTES NFR BLD: 14.7 % (ref 18–48)
MAGNESIUM SERPL-MCNC: 1.9 MG/DL (ref 1.6–2.6)
MCH RBC QN AUTO: 31.5 PG (ref 27–31)
MCHC RBC AUTO-ENTMCNC: 34 G/DL (ref 32–36)
MCV RBC AUTO: 93 FL (ref 82–98)
MONOCYTES # BLD AUTO: 0.7 K/UL (ref 0.3–1)
MONOCYTES NFR BLD: 8.2 % (ref 4–15)
NEUTROPHILS # BLD AUTO: 6.4 K/UL (ref 1.8–7.7)
NEUTROPHILS NFR BLD: 75.5 % (ref 38–73)
NITRITE UR QL STRIP: NEGATIVE
NRBC BLD-RTO: 0 /100 WBC
OHS QRS DURATION: 130 MS
OHS QTC CALCULATION: 502 MS
PH UR STRIP: 8 [PH] (ref 5–8)
PLATELET # BLD AUTO: 206 K/UL (ref 150–450)
PMV BLD AUTO: 9.1 FL (ref 9.2–12.9)
POTASSIUM SERPL-SCNC: 4.1 MMOL/L (ref 3.5–5.1)
PROT SERPL-MCNC: 7.6 G/DL (ref 6–8.4)
PROT UR QL STRIP: ABNORMAL
RBC # BLD AUTO: 5.04 M/UL (ref 4.6–6.2)
SODIUM SERPL-SCNC: 138 MMOL/L (ref 136–145)
SP GR UR STRIP: 1.02 (ref 1–1.03)
TROPONIN I SERPL HS-MCNC: 10.2 PG/ML (ref 0–14.9)
TROPONIN I SERPL HS-MCNC: 11.2 PG/ML (ref 0–14.9)
URN SPEC COLLECT METH UR: ABNORMAL
UROBILINOGEN UR STRIP-ACNC: NEGATIVE EU/DL
WBC # BLD AUTO: 8.44 K/UL (ref 3.9–12.7)

## 2025-01-05 PROCEDURE — 96374 THER/PROPH/DIAG INJ IV PUSH: CPT

## 2025-01-05 PROCEDURE — 83690 ASSAY OF LIPASE: CPT | Performed by: NURSE PRACTITIONER

## 2025-01-05 PROCEDURE — 25500020 PHARM REV CODE 255: Performed by: EMERGENCY MEDICINE

## 2025-01-05 PROCEDURE — 96375 TX/PRO/DX INJ NEW DRUG ADDON: CPT

## 2025-01-05 PROCEDURE — 81003 URINALYSIS AUTO W/O SCOPE: CPT | Performed by: NURSE PRACTITIONER

## 2025-01-05 PROCEDURE — 83735 ASSAY OF MAGNESIUM: CPT | Performed by: NURSE PRACTITIONER

## 2025-01-05 PROCEDURE — 25000003 PHARM REV CODE 250: Performed by: EMERGENCY MEDICINE

## 2025-01-05 PROCEDURE — 63600175 PHARM REV CODE 636 W HCPCS: Performed by: EMERGENCY MEDICINE

## 2025-01-05 PROCEDURE — 85025 COMPLETE CBC W/AUTO DIFF WBC: CPT | Performed by: NURSE PRACTITIONER

## 2025-01-05 PROCEDURE — 84484 ASSAY OF TROPONIN QUANT: CPT | Mod: 91 | Performed by: NURSE PRACTITIONER

## 2025-01-05 PROCEDURE — 99285 EMERGENCY DEPT VISIT HI MDM: CPT | Mod: 25

## 2025-01-05 PROCEDURE — 83880 ASSAY OF NATRIURETIC PEPTIDE: CPT | Performed by: NURSE PRACTITIONER

## 2025-01-05 PROCEDURE — 80053 COMPREHEN METABOLIC PANEL: CPT | Performed by: NURSE PRACTITIONER

## 2025-01-05 RX ORDER — OMEPRAZOLE 20 MG/1
20 CAPSULE, DELAYED RELEASE ORAL 2 TIMES DAILY
Qty: 20 CAPSULE | Refills: 0 | Status: SHIPPED | OUTPATIENT
Start: 2025-01-05 | End: 2025-01-15

## 2025-01-05 RX ORDER — HYDROCODONE BITARTRATE AND ACETAMINOPHEN 5; 325 MG/1; MG/1
1 TABLET ORAL EVERY 6 HOURS PRN
Qty: 9 TABLET | Refills: 0 | Status: SHIPPED | OUTPATIENT
Start: 2025-01-05

## 2025-01-05 RX ORDER — ALUMINUM HYDROXIDE, MAGNESIUM HYDROXIDE, AND SIMETHICONE 1200; 120; 1200 MG/30ML; MG/30ML; MG/30ML
30 SUSPENSION ORAL
Status: COMPLETED | OUTPATIENT
Start: 2025-01-05 | End: 2025-01-05

## 2025-01-05 RX ORDER — ONDANSETRON 4 MG/1
4 TABLET, ORALLY DISINTEGRATING ORAL EVERY 8 HOURS PRN
Qty: 12 TABLET | Refills: 0 | Status: SHIPPED | OUTPATIENT
Start: 2025-01-05

## 2025-01-05 RX ORDER — ONDANSETRON HYDROCHLORIDE 2 MG/ML
4 INJECTION, SOLUTION INTRAVENOUS
Status: COMPLETED | OUTPATIENT
Start: 2025-01-05 | End: 2025-01-05

## 2025-01-05 RX ORDER — MORPHINE SULFATE 4 MG/ML
8 INJECTION, SOLUTION INTRAMUSCULAR; INTRAVENOUS
Status: COMPLETED | OUTPATIENT
Start: 2025-01-05 | End: 2025-01-05

## 2025-01-05 RX ORDER — METOPROLOL SUCCINATE 25 MG/1
25 TABLET, EXTENDED RELEASE ORAL 2 TIMES DAILY
COMMUNITY

## 2025-01-05 RX ADMIN — ONDANSETRON 4 MG: 2 INJECTION INTRAMUSCULAR; INTRAVENOUS at 03:01

## 2025-01-05 RX ADMIN — MORPHINE SULFATE 8 MG: 4 INJECTION, SOLUTION INTRAMUSCULAR; INTRAVENOUS at 03:01

## 2025-01-05 RX ADMIN — IOHEXOL 100 ML: 350 INJECTION, SOLUTION INTRAVENOUS at 03:01

## 2025-01-05 RX ADMIN — ALUMINUM HYDROXIDE, MAGNESIUM HYDROXIDE, AND SIMETHICONE 30 ML: 200; 200; 20 SUSPENSION ORAL at 04:01

## 2025-01-05 NOTE — ED PROVIDER NOTES
In [facility] complaint:  Chest Pain (JUST NOW) and Abdominal Pain      HPI:  Sampson Holt is a 69 y.o. male with hx DM, diastolic CHF, htn, COPD, CVA, CAD s/p stent, RA presenting with acute onset epigastric abdominal pain approximately 12 hours prior to presentation with generalized involvement of the entire abdomen to a lesser extent.  He denies radiation or migration of pain.  He does note some brief, nonexertional, separate sternal chest pressure earlier today absent diaphoresis, associated emesis, dyspnea.  He denies cough or fever.  He did have multiple episodes of nonbilious, nonbloody emesis prior to chest pain soon after onset of pain.  He does endorse frequent NSAIDs.  He denies known hepatic or renal issues he denies urinary symptoms such as frequency, urgency, dysuria, hematuria, flank pain.  He denies blood in the stools or dark stools.    ROS: As per HPI and below:  No fever, syncope, swelling, rash.    Review of patient's allergies indicates:   Allergen Reactions    Amoxicillin Shortness Of Breath and Edema       Discharge Medication List as of 1/5/2025  5:50 PM        START taking these medications    Details   HYDROcodone-acetaminophen (NORCO) 5-325 mg per tablet Take 1 tablet by mouth every 6 (six) hours as needed for Pain., Starting Sun 1/5/2025, Normal      omeprazole (PRILOSEC) 20 MG capsule Take 1 capsule (20 mg total) by mouth 2 (two) times a day. for 10 days, Starting Sun 1/5/2025, Until Wed 1/15/2025, Normal      ondansetron (ZOFRAN-ODT) 4 MG TbDL Take 1 tablet (4 mg total) by mouth every 8 (eight) hours as needed (nausea)., Starting Sun 1/5/2025, Normal           CONTINUE these medications which have NOT CHANGED    Details   aspirin (ECOTRIN) 81 MG EC tablet Take 81 mg by mouth once daily., Starting Tue 9/24/2024, Historical Med      carvediloL (COREG) 12.5 MG tablet Take 6.25 mg by mouth 2 (two) times daily., Starting Tue 9/24/2024, Historical Med      clopidogreL (PLAVIX) 75 mg tablet  Take 1 tablet (75 mg total) by mouth once daily., Starting Sat 5/13/2023, Until Sun 1/5/2025, Normal      folic acid (FOLVITE) 1 MG tablet Take 1 mg by mouth once daily., Starting Fri 10/11/2024, Historical Med      hydrALAZINE (APRESOLINE) 25 MG tablet Take 1 tablet (25 mg total) by mouth every 8 (eight) hours., Starting Tue 10/22/2024, Until Wed 10/22/2025, Normal      insulin glargine-yfgn 100 unit/mL Soln Inject 45 Units into the skin 2 (two) times a day., Starting Tue 9/24/2024, Historical Med      losartan (COZAAR) 100 MG tablet Take 1 tablet (100 mg total) by mouth once daily., Starting Tue 10/22/2024, Until Wed 10/22/2025, Normal      metoprolol succinate (TOPROL-XL) 25 MG 24 hr tablet Take 25 mg by mouth 2 (two) times daily., Historical Med      multivitamin Tab Take 1 tablet by mouth once daily., Starting Thu 3/14/2024, No Print      rosuvastatin (CRESTOR) 10 MG tablet Take 5 mg by mouth once daily., Starting Tue 9/24/2024, Historical Med      thiamine 100 MG tablet Take 100 mg by mouth once daily., Starting Tue 9/24/2024, Historical Med      acetaminophen (TYLENOL) 500 MG tablet Take 1,000 mg by mouth every 12 (twelve) hours as needed for Pain or Temperature greater than., Starting Wed 10/9/2024, Historical Med      albuterol (PROVENTIL/VENTOLIN HFA) 90 mcg/actuation inhaler Take 2 puffs by mouth 4 (four) times daily as needed for Wheezing or Shortness of Breath., Starting Tue 9/24/2024, Historical Med      ibuprofen (ADVIL,MOTRIN) 400 MG tablet Take 1 tablet by mouth every 6 (six) hours as needed., Starting Wed 10/9/2024, Historical Med      naloxone (NARCAN) 4 mg/actuation Spry 1 spray by Nasal route once., Starting Tue 9/24/2024, Historical Med      nitroGLYCERIN (NITROSTAT) 0.4 MG SL tablet Place 1 tablet (0.4 mg total) under the tongue every 5 (five) minutes as needed for Chest pain., Starting Mon 1/22/2024, Until Tue 1/21/2025 at 2359, Normal      predniSONE (DELTASONE) 10 MG tablet Multiple  Dosages:Starting Tue 10/22/2024, Until Thu 10/24/2024 at 2359, THEN Starting Fri 10/25/2024, Until Sun 11/3/2024 at 2359, THEN Starting Mon 11/4/2024, Until Mon 3/3/2025 at 2359Take 1 tablet (10 mg total) by mouth 2 (two) times daily for 3 d ays, THEN 1 tablet (10 mg total) once daily for 10 days, THEN 0.5 tablets (5 mg total) once daily., Normal      senna (SENOKOT) 8.6 mg tablet Take 17.2 mg by mouth daily as needed., Starting Tue 9/24/2024, Historical Med      tamsulosin (FLOMAX) 0.4 mg Cap Take 0.4 mg by mouth once daily., Historical Med             PMH:  As per HPI and below:  Past Medical History:   Diagnosis Date    Abdominal pain 2022    CHF (congestive heart failure)     Diabetes mellitus 2018    Emphysema lung     Endocarditis 2011    Hypertension     Stroke 2011    denies residual     Past Surgical History:   Procedure Laterality Date    ANGIOGRAM, CORONARY, WITH LEFT HEART CATHETERIZATION N/A 10/10/2023    Procedure: Angiogram, Coronary, with Left Heart Cath;  Surgeon: Dieudonne Ryan MD;  Location: [facility] CATH/EP LAB;  Service: Cardiology;  Laterality: N/A;    BACK SURGERY  1981    COLONOSCOPY N/A 2/23/2023    Procedure: COLONOSCOPY;  Surgeon: Jonn Cruz MD;  Location: [facility] ENDO;  Service: Endoscopy;  Laterality: N/A;    CORONARY ANGIOGRAPHY N/A 5/9/2023    Procedure: ANGIOGRAM, CORONARY ARTERY;  Surgeon: Antonio Kessler MD;  Location: [facility] CATH/EP LAB;  Service: Cardiology;  Laterality: N/A;    EXCISION OF HYDROCELE      x2    FRACTIONAL FLOW RESERVE (FFR), CORONARY  5/11/2023    Procedure: Fractional Flow [place] (FFR), Coronary;  Surgeon: Antonio Kessler MD;  Location: [facility] CATH/EP LAB;  Service: Cardiology;;    INGUINAL HERNIA REPAIR  1960    INSTANTANEOUS WAVE-FREE RATIO (IFR) N/A 10/10/2023    Procedure: Instantaneous Wave-Free Ratio (IFR);  Surgeon: Dieudonne Ryan MD;  Location: [facility] CATH/EP LAB;  Service: Cardiology;  Laterality: N/A;    INTRAMEDULLARY RODDING OF FEMUR Left  3/9/2024    Procedure: INSERTION, INTRAMEDULLARY HUI, FEMUR;  Surgeon: Tarun Hdez MD;  Location: [facility] OR;  Service: Orthopedics;  Laterality: Left;    INTRAVASCULAR ULTRASOUND, CORONARY N/A 5/9/2023    Procedure: Ultrasound-coronary;  Surgeon: Antonio Kessler MD;  Location: [facility] CATH/EP LAB;  Service: Cardiology;  Laterality: N/A;    IVUS, CORONARY  5/11/2023    Procedure: IVUS, Coronary;  Surgeon: Antonio Kessler MD;  Location: [facility] CATH/EP LAB;  Service: Cardiology;;    LEFT HEART CATHETERIZATION Left 5/11/2023    Procedure: Left heart cath;  Surgeon: Antonio Kessler MD;  Location: [facility] CATH/EP LAB;  Service: Cardiology;  Laterality: Left;    PERCUTANEOUS TRANSLUMINAL BALLOON ANGIOPLASTY OF CORONARY ARTERY  5/9/2023    Procedure: Angioplasty-coronary;  Surgeon: Antonio Kessler MD;  Location: [facility] CATH/EP LAB;  Service: Cardiology;;    RHINOPLASTY      STENT, DRUG ELUTING, SINGLE VESSEL, CORONARY  5/9/2023    Procedure: Stent, Drug Eluting, Single Vessel, Coronary;  Surgeon: Antonio Kessler MD;  Location: [facility] CATH/EP LAB;  Service: Cardiology;;    SURGICAL REMOVAL OF NODULE OF VOCAL CORD         Social History     Socioeconomic History    Marital status: Legally    Tobacco Use    Smoking status: Every Day     Types: Cigarettes    Smokeless tobacco: Former    Tobacco comments:     Pt ready to quit smoking and states he can on his own. Seen 10/9/23 for inpatient smoking cessation. [employer] Member.     Occasionally   Substance and Sexual Activity    Alcohol use: Yes     Alcohol/week: 14.0 standard drinks of alcohol     Types: 14 Shots of liquor per week    Drug use: Yes     Types: Marijuana     Social Drivers of Health     Financial Resource Strain: Low Risk  (10/18/2024)    Overall Financial Resource Strain (CARDIA)     Difficulty of Paying Living Expenses: Not hard at all   Recent Concern: Financial Resource Strain - High Risk (9/7/2024)    Overall Financial Resource Strain (CARDIA)     Difficulty  of Paying Living Expenses: Very hard   Food Insecurity: No Food Insecurity (10/18/2024)    Hunger Vital Sign     Worried About Running Out of Food in the Last Year: Never true     Ran Out of Food in the Last Year: Never true   Transportation Needs: No Transportation Needs (10/18/2024)    TRANSPORTATION NEEDS     Transportation : No   Recent Concern: Transportation Needs - Unmet Transportation Needs (9/7/2024)    TRANSPORTATION NEEDS     Transportation : Yes, it has kept me from medical appointments or from getting my medications.   Physical Activity: Inactive (10/18/2024)    Exercise Vital Sign     Days of Exercise per Week: 0 days     Minutes of Exercise per Session: 0 min   Stress: No Stress Concern Present (10/18/2024)    [employer] - Occupational Stress Questionnaire     Feeling of Stress : Not at all   Recent Concern: Stress - Stress Concern Present (9/7/2024)    [employer] - Occupational Stress Questionnaire     Feeling of Stress : Rather much   Housing Stability: Low Risk  (10/18/2024)    Housing Stability Vital Sign     Unable to Pay for Housing in the Last Year: No     Homeless in the Last Year: No       Family History   Problem Relation Name Age of Onset    Arthritis Mother         Physical Exam:    Vitals:    01/05/25 1818   BP:    Pulse:    Resp:    Temp: 98.9 °F (37.2 °C)     GENERAL:  No apparent distress.  Alert.    HEENT:  Moist mucous membranes.  Normocephalic and atraumatic.    NECK:  No swelling.  Midline trachea.   CARDIOVASCULAR:  Regular rate and rhythm.  2+ radial pulses.    PULMONARY:  Lungs clear to auscultation bilaterally.  No wheezes, rales, or rhonci.    ABDOMEN:  Mild distention with epigastric tenderness with mild voluntary guarding.  No involuntary guarding, palpable organomegaly or hernia.  No rebound tenderness.  EXTREMITIES:  Warm and well perfused.  Brisk capillary refill.  No peripheral edema.  NEUROLOGICAL:  Normal  mental status.  Appropriate and conversant.    SKIN:  No rashes or ecchymoses.    BACK:  Atraumatic.  No CVA tenderness to palpation.      Labs Reviewed   CBC W/ AUTO DIFFERENTIAL - Abnormal       Result Value    WBC 8.44      RBC 5.04      Hemoglobin 15.9      Hematocrit 46.7      MCV 93      MCH 31.5 (*)     MCHC 34.0      RDW 14.1      Platelets 206      MPV 9.1 (*)     Immature Granulocytes 0.2      Gran # (ANC) 6.4      Immature Grans (Abs) 0.02      Lymph # 1.2      Mono # 0.7      Eos # 0.1      Baso # 0.06      nRBC 0      Gran % 75.5 (*)     Lymph % 14.7 (*)     Mono % 8.2      Eosinophil % 0.7      Basophil % 0.7      Differential Method Automated     COMPREHENSIVE METABOLIC PANEL - Abnormal    Sodium 138      Potassium 4.1      Chloride 100      CO2 26      Glucose 195 (*)     BUN 10      Creatinine 0.9      Calcium 10.3      Total Protein 7.6      Albumin 4.5      Total Bilirubin 0.8      Alkaline Phosphatase 78      AST 13      ALT 11      eGFR >60.0      Anion Gap 12     B-TYPE NATRIURETIC PEPTIDE - Abnormal     (*)    URINALYSIS, REFLEX TO URINE CULTURE - Abnormal    Specimen UA Urine, Clean Catch      Color, UA Yellow      Appearance, UA Hazy (*)     pH, UA 8.0      Specific Gravity, UA 1.020      Protein, UA Trace (*)     Glucose, UA Trace (*)     Ketones, UA Trace (*)     Bilirubin (UA) Negative      Occult Blood UA Negative      Nitrite, UA Negative      Urobilinogen, UA Negative      Leukocytes, UA Negative      Narrative:     Specimen Source->Urine   MAGNESIUM    Magnesium 1.9     TROPONIN I HIGH SENSITIVITY    Troponin I High Sensitivity 11.2     TROPONIN I HIGH SENSITIVITY    Troponin I High Sensitivity 10.2     LIPASE    Lipase 14         Discharge Medication List as of 1/5/2025  5:50 PM        START taking these medications    Details   HYDROcodone-acetaminophen (NORCO) 5-325 mg per tablet Take 1 tablet by mouth every 6 (six) hours as needed for Pain., Starting Sun 1/5/2025, Normal       omeprazole (PRILOSEC) 20 MG capsule Take 1 capsule (20 mg total) by mouth 2 (two) times a day. for 10 days, Starting Sun 1/5/2025, Until Wed 1/15/2025, Normal      ondansetron (ZOFRAN-ODT) 4 MG TbDL Take 1 tablet (4 mg total) by mouth every 8 (eight) hours as needed (nausea)., Starting Sun 1/5/2025, Normal           CONTINUE these medications which have NOT CHANGED    Details   aspirin (ECOTRIN) 81 MG EC tablet Take 81 mg by mouth once daily., Starting Tue 9/24/2024, Historical Med      carvediloL (COREG) 12.5 MG tablet Take 6.25 mg by mouth 2 (two) times daily., Starting Tue 9/24/2024, Historical Med      clopidogreL (PLAVIX) 75 mg tablet Take 1 tablet (75 mg total) by mouth once daily., Starting Sat 5/13/2023, Until Sun 1/5/2025, Normal      folic acid (FOLVITE) 1 MG tablet Take 1 mg by mouth once daily., Starting Fri 10/11/2024, Historical Med      hydrALAZINE (APRESOLINE) 25 MG tablet Take 1 tablet (25 mg total) by mouth every 8 (eight) hours., Starting Tue 10/22/2024, Until Wed 10/22/2025, Normal      insulin glargine-yfgn 100 unit/mL Soln Inject 45 Units into the skin 2 (two) times a day., Starting Tue 9/24/2024, Historical Med      losartan (COZAAR) 100 MG tablet Take 1 tablet (100 mg total) by mouth once daily., Starting Tue 10/22/2024, Until Wed 10/22/2025, Normal      metoprolol succinate (TOPROL-XL) 25 MG 24 hr tablet Take 25 mg by mouth 2 (two) times daily., Historical Med      multivitamin Tab Take 1 tablet by mouth once daily., Starting Thu 3/14/2024, No Print      rosuvastatin (CRESTOR) 10 MG tablet Take 5 mg by mouth once daily., Starting Tue 9/24/2024, Historical Med      thiamine 100 MG tablet Take 100 mg by mouth once daily., Starting Tue 9/24/2024, Historical Med      acetaminophen (TYLENOL) 500 MG tablet Take 1,000 mg by mouth every 12 (twelve) hours as needed for Pain or Temperature greater than., Starting Wed 10/9/2024, Historical Med      albuterol (PROVENTIL/VENTOLIN HFA) 90 mcg/actuation  inhaler Take 2 puffs by mouth 4 (four) times daily as needed for Wheezing or Shortness of Breath., Starting Tue 9/24/2024, Historical Med      ibuprofen (ADVIL,MOTRIN) 400 MG tablet Take 1 tablet by mouth every 6 (six) hours as needed., Starting Wed 10/9/2024, Historical Med      naloxone (NARCAN) 4 mg/actuation Spry 1 spray by Nasal route once., Starting Tue 9/24/2024, Historical Med      nitroGLYCERIN (NITROSTAT) 0.4 MG SL tablet Place 1 tablet (0.4 mg total) under the tongue every 5 (five) minutes as needed for Chest pain., Starting Mon 1/22/2024, Until Tue 1/21/2025 at 2359, Normal      predniSONE (DELTASONE) 10 MG tablet Multiple Dosages:Starting Tue 10/22/2024, Until Thu 10/24/2024 at 2359, THEN Starting Fri 10/25/2024, Until Sun 11/3/2024 at 2359, THEN Starting Mon 11/4/2024, Until Mon 3/3/2025 at 2359Take 1 tablet (10 mg total) by mouth 2 (two) times daily for 3 d ays, THEN 1 tablet (10 mg total) once daily for 10 days, THEN 0.5 tablets (5 mg total) once daily., Normal      senna (SENOKOT) 8.6 mg tablet Take 17.2 mg by mouth daily as needed., Starting Tue 9/24/2024, Historical Med      tamsulosin (FLOMAX) 0.4 mg Cap Take 0.4 mg by mouth once daily., Historical Med             Orders Placed This Encounter   Procedures    X-Ray Chest PA And Lateral    CT Abdomen Pelvis With IV Contrast NO Oral Contrast    Magnesium    CBC auto differential    Comprehensive metabolic panel    Troponin I High Sensitivity #1    Troponin I High Sensitivity #2    B-Type natriuretic peptide (BNP)    Lipase    Urinalysis, Reflex to Urine Culture Urine, Clean Catch    EKG 12-lead    Saline lock IV       Imaging Results              CT Abdomen Pelvis With IV Contrast NO Oral Contrast (Final result)  Result time 01/05/25 15:52:51      Final result by Ezra Gaffney MD (01/05/25 15:52:51)                   Impression:      Focal gastric antral wall thickening which could indicate gastritis in the setting of epigastric  pain.    Consider follow-up upper endoscopy or short interval follow-up CT.  Malignancy not excluded.    Bilateral nonobstructing nephrolithiasis.    Hepatic steatosis.    Distal colonic diverticulosis.    Aortoiliac atherosclerosis.      Electronically signed by: Ezra Gaffney  Date:    01/05/2025  Time:    15:52               Narrative:    EXAMINATION:  CT ABDOMEN PELVIS WITH IV CONTRAST    CLINICAL HISTORY:  Nausea/vomiting;Epigastric pain;    TECHNIQUE:  Axial CT imaging obtained through the abdomen and pelvis after 100 mL Omnipaque 350.  Coronal and sagittal reformatted images.    CMS Mandated Quality Data-CT Radiation Dose-436    All CT scans at this facility dose modulation, iterative reconstruction, and or weight-based dosing when appropriate to reduce radiation dose to as low as reasonably achievable.    COMPARISON:  CT abdomen and pelvis 01/30/2023    FINDINGS:  Limited image through the lower thorax demonstrates mild dependent atelectasis/scarring.  Atherosclerotic calcification of the coronary arteries.  Mitral annular calcification.    Bone window images show degenerative changes of the spine, most notably at L5-S1.  No acute or aggressive osseous abnormality.  Internal fixation hardware left femur, partially visualized.    Numerous hepatic cysts appears similar to prior.  Diffuse hepatic steatosis.  Gallbladder and biliary tree are unremarkable.  Portal vein is patent.    Subcentimeter hypodensity in the spleen is stable compared to prior and of doubtful significance.  Small left upper quadrant splenule.  Pancreas is unremarkable.  No adrenal lesion.    3 mm nonobstructing calculus upper pole right kidney.  4 mm nonobstructing calculus midpole left kidney.  Small left upper pole renal cyst.  No hydronephrosis.  Ureters are normal in caliber.  Urinary bladder is unremarkable.    Focal gastric wall thickening in the antral region (image 87 series 3).  No evidence of perforation.  No evidence of  small-bowel obstruction.  Normal appendix.  No findings of colitis.  Distal colonic diverticula involving the sigmoid colon.    No free fluid or free air within the abdomen or pelvis.  Aortoiliac atherosclerotic calcification.  No pathologically enlarged lymph nodes in the abdomen or pelvis.                                       X-Ray Chest PA And Lateral (Final result)  Result time 01/05/25 14:44:21      Final result by Ezra Gaffney MD (01/05/25 14:44:21)                   Impression:      Cardiomegaly.  No acute pulmonary pathology.      Electronically signed by: Ezra Gaffney  Date:    01/05/2025  Time:    14:44               Narrative:    EXAMINATION:  XR CHEST PA AND LATERAL    CLINICAL HISTORY:  Chest Pain;    COMPARISON:  12/18/2024    FINDINGS:  Cardiac silhouette size is mildly enlarged.  No pulmonary edema or confluent airspace disease.  No pleural fluid or pneumothorax.  No acute osseous abnormality.                                  (Rad read)    The patient was informed of the incidental finding(s) as well as the need for PCP or specialist follow-up for reevaluation and possible further investigation or monitoring.      ED Course as of 01/05/25 2137   Sun Jan 05, 2025   1429 EKG:  NSR, rate of 85, wide QRS with old LBBB intermittently present in past EKG, L axis.  Appropriate discordance with negative modified Sgarbossa criteria for acute ischemia.  (Independently interpreted by me)   [MR]   1446 CXR:  NAD. (Independently interpreted by me) [MR]      ED Course User Index  [MR] Say Jeff MD       MDM:    69 y.o. male with acute onset of abdominal pain primarily in the epigastric region with some emesis later associated with now resolved chest pain.  Patient denies actual migration or radiation of pain.  Low suspicion for aortic dissection.  I do think he requires abdominal imaging to exclude life-threatening etiologies such as perforated viscus, obstruction, pancreatitis, abscess.   Laboratories done to assess end-organ dysfunction including LFTs and lipase for hepatic dysfunction or pancreatitis.  Low suspicion for ACS with EKG reviewed without significant change.  Cardiac biomarker sent for risk stratification as well.  Chest x-ray without acute pathology such as pneumonia or obvious initial perforated viscus.  There is a narrow mediastinum.  Morphine given for pain with ondansetron for nausea pending further studies.    Workup strongly consistent with likely upper GI etiology with gastritis suggested by gastric thickening on CT.  He frequently uses NSAIDs and does drink alcohol.  Avoidance GI irritants discussed in detail with recommendation to follow up with GI.  PPI initiated.  Antacids as necessary over-the-counter recommended for home with short course analgesia and antiemetic as needed as well.  Very low suspicion for ACS.  I do not think he would benefit from inpatient cardiac monitoring or other emergent workup.  No sign of other life-threatening intra-abdominal process or complication.  Return precautions reviewed.    Diagnoses:    1. Epigastric pain  2. Vomiting       Say Jeff MD  01/05/25 3409

## 2025-01-05 NOTE — FIRST PROVIDER EVALUATION
Emergency Department TeleTriage Encounter Note      CHIEF COMPLAINT    Chief Complaint   Patient presents with    Chest Pain     JUST NOW    Abdominal Pain       VITAL SIGNS   Initial Vitals [01/05/25 1352]   BP Pulse Resp Temp SpO2   (!) 168/113 92 (!) 22 99.4 °F (37.4 °C) 95 %      MAP       --            ALLERGIES    Review of patient's allergies indicates:   Allergen Reactions    Amoxicillin Shortness Of Breath and Edema       PROVIDER TRIAGE NOTE  Verbal consent for the teletriage evaluation was given by the patient at the start of the evaluation.  All efforts will be made to maintain patient's privacy during the evaluation.      This is a teletriage evaluation of a 69 y.o. male presenting to the ED with c/o abdominal pain, nausea, and vomiting that started at 3 AM.  Also reports CP that started 30 minutes PTA. Limited physical exam via telehealth: The patient is awake, alert, answering questions appropriately and is not in respiratory distress.  As the Teletriage provider, I performed an initial assessment and ordered appropriate labs and imaging studies, if any, to facilitate the patient's care once placed in the ED. Once a room is available, care and a full evaluation will be completed by an alternate ED provider.  Any additional orders and the final disposition will be determined by that provider.  All imaging and labs will not be followed-up by the Teletriage Team, including myself.         ORDERS  Labs Reviewed - No data to display    ED Orders (720h ago, onward)      Start Ordered     Status Ordering Provider    01/05/25 1604 01/05/25 1404  Troponin I High Sensitivity #2  Once Timed         Ordered JES STAFFORD    01/05/25 1405 01/05/25 1404  X-Ray Chest PA And Lateral  1 time imaging         Ordered JES STAFFORD    01/05/25 1405 01/05/25 1404  Lipase  STAT         Ordered JES STAFFORD    01/05/25 1405 01/05/25 1404  Urinalysis, Reflex to Urine Culture Urine, Clean Catch  STAT         Ordered RIVERA  JES RENTERIA    01/05/25 1405 01/05/25 1404  POCT glucose  Once         Ordered RIVERA, JES RENTERIA    01/05/25 1404 01/05/25 1404  Magnesium  STAT         Ordered RIVERA, JES RENTERIA    01/05/25 1404 01/05/25 1404  Vital signs  Every 15 min         Ordered RIVERA, JES RENTERIA    01/05/25 1404 01/05/25 1404  Cardiac Monitoring - Adult  Continuous        Comments: Notify Physician If:    Ordered RIVERA, JES RENTERIA    01/05/25 1404 01/05/25 1404  Pulse Oximetry Continuous  Continuous         Ordered RIVERA, JES RENTERIA    01/05/25 1404 01/05/25 1404  Diet NPO  Diet effective now         Ordered RIVERA, JES RENTERIA    01/05/25 1404 01/05/25 1404  Saline lock IV  Once         Ordered RIVERA, JES RENTERIA    01/05/25 1404 01/05/25 1404  CBC auto differential  STAT         Ordered RIVERA, JES RENTERIA    01/05/25 1404 01/05/25 1404  Comprehensive metabolic panel  STAT         Ordered RIVERA, JES RENTERIA    01/05/25 1404 01/05/25 1404  Troponin I High Sensitivity #1  STAT         Ordered RIVERA, JES RENTERIA    01/05/25 1404 01/05/25 1404  B-Type natriuretic peptide (BNP)  STAT         Ordered RIVERA, JES RENTERIA    01/05/25 1355 01/05/25 1355  EKG 12-lead  Once         Ordered KIN AUGUSTIN              Virtual Visit Note: The provider triage portion of this emergency department evaluation and documentation was performed via [employer], a HIPAA-compliant telemedicine application, in concert with a tele-presenter in the room. A face to face patient evaluation with one of my colleagues will occur once the patient is placed in an emergency department room.      DISCLAIMER: This note was prepared with [employer] voice recognition transcription software. Garbled syntax, mangled pronouns, and other bizarre constructions may be attributed to that software system.

## 2025-01-06 ENCOUNTER — PATIENT OUTREACH (OUTPATIENT)
Dept: ADMINISTRATIVE | Facility: OTHER | Age: 70
End: 2025-01-06
Payer: OTHER GOVERNMENT

## 2025-01-06 NOTE — PROGRESS NOTES
CHW - Case Closure    This Community Health Worker spoke to patient via telephone today.   Pt/Caregiver reported: Pt reached out so someone can assist him with filling out an application for home repairs. Case closur   Pt/Caregiver denied any additional needs at this time and agrees with episode closure at this time.  Provided patient with Community Health Worker's contact information and encouraged him/her to contact this Community Health Worker if additional needs arise.            Followed by another CHW

## 2025-01-06 NOTE — PROGRESS NOTES
CHW - Initial Contact    This Community Health Worker completed OR updated the Social Determinant of Health questionnaire with patient via telephone today.    Pt identified barriers of most importance are: home repairs     Support and Services:   Patient stated his trailer needs home repairs; a tree is rubbing on the corner causing damage, rust and mold. He also has a water leak underneath. He stated his  is aware of the issue and maintenance lizbeth will get to the tree when he can. He stated his income is $1,100 monthly. He bought UV lights that kill mold/germs/bacteria. Sampson has been contacted by [employer] via [employer] home repair program ([phone]) to complete application. He will come to clinic 1/7/25 to receive assistance in completing.     I contacted the [employer] regarding alisha application and process.    Other information discussed the patient needs / wants help with: n/a   Follow up required:   Follow-up Outreach, Follow-up Outreach - Due: 12/24/2024, 1/7/2025

## 2025-01-07 ENCOUNTER — HOSPITAL ENCOUNTER (EMERGENCY)
Facility: HOSPITAL | Age: 70
Discharge: HOME | End: 2025-01-07
Attending: EMERGENCY MEDICINE
Payer: OTHER GOVERNMENT

## 2025-01-07 VITALS
RESPIRATION RATE: 18 BRPM | HEART RATE: 72 BPM | DIASTOLIC BLOOD PRESSURE: 71 MMHG | BODY MASS INDEX: 26.64 KG/M2 | OXYGEN SATURATION: 97 % | WEIGHT: 201 LBS | SYSTOLIC BLOOD PRESSURE: 153 MMHG | TEMPERATURE: 98 F | HEIGHT: 73 IN

## 2025-01-07 DIAGNOSIS — Z87.19 HISTORY OF GASTRITIS: ICD-10-CM

## 2025-01-07 DIAGNOSIS — R04.0 EPISTAXIS: Primary | ICD-10-CM

## 2025-01-07 PROCEDURE — 25000003 PHARM REV CODE 250: Performed by: EMERGENCY MEDICINE

## 2025-01-07 PROCEDURE — 25000003 PHARM REV CODE 250: Performed by: STUDENT IN AN ORGANIZED HEALTH CARE EDUCATION/TRAINING PROGRAM

## 2025-01-07 PROCEDURE — 99283 EMERGENCY DEPT VISIT LOW MDM: CPT

## 2025-01-07 RX ORDER — LIDOCAINE HYDROCHLORIDE 20 MG/ML
15 SOLUTION OROPHARYNGEAL ONCE
Status: COMPLETED | OUTPATIENT
Start: 2025-01-07 | End: 2025-01-07

## 2025-01-07 RX ORDER — ALUMINUM HYDROXIDE, MAGNESIUM HYDROXIDE, AND SIMETHICONE 1200; 120; 1200 MG/30ML; MG/30ML; MG/30ML
30 SUSPENSION ORAL ONCE
Status: COMPLETED | OUTPATIENT
Start: 2025-01-07 | End: 2025-01-07

## 2025-01-07 RX ORDER — HYOSCYAMINE SULFATE 0.12 MG/1
0.12 TABLET SUBLINGUAL EVERY 4 HOURS PRN
Qty: 16 TABLET | Refills: 0 | Status: SHIPPED | OUTPATIENT
Start: 2025-01-07

## 2025-01-07 RX ORDER — OXYMETAZOLINE HCL 0.05 %
1 SPRAY, NON-AEROSOL (ML) NASAL
Status: COMPLETED | OUTPATIENT
Start: 2025-01-07 | End: 2025-01-07

## 2025-01-07 RX ORDER — SILVER NITRATE 38.21; 12.74 MG/1; MG/1
1 STICK TOPICAL ONCE
Status: DISCONTINUED | OUTPATIENT
Start: 2025-01-07 | End: 2025-01-07

## 2025-01-07 RX ADMIN — ALUMINUM HYDROXIDE, MAGNESIUM HYDROXIDE, AND SIMETHICONE 30 ML: 200; 200; 20 SUSPENSION ORAL at 10:01

## 2025-01-07 RX ADMIN — Medication 1 SPRAY: at 08:01

## 2025-01-07 RX ADMIN — LIDOCAINE HYDROCHLORIDE 15 ML: 20 SOLUTION ORAL at 10:01

## 2025-01-07 NOTE — ED PROVIDER NOTES
Encounter Date: 1/7/2025       History     Chief Complaint   Patient presents with    Epistaxis     Pt said he woke up around 4 AM with his nose bleeding.  Pt says his nose is dry     HPI  Review of patient's allergies indicates:   Allergen Reactions    Amoxicillin Shortness Of Breath and Edema     Past Medical History:   Diagnosis Date    Abdominal pain 2022    CHF (congestive heart failure)     Diabetes mellitus 2018    Emphysema lung     Endocarditis 2011    Hypertension     Stroke 2011    denies residual     Past Surgical History:   Procedure Laterality Date    ANGIOGRAM, CORONARY, WITH LEFT HEART CATHETERIZATION N/A 10/10/2023    Procedure: Angiogram, Coronary, with Left Heart Cath;  Surgeon: Dieudonne Ryan MD;  Location: [facility] CATH/EP LAB;  Service: Cardiology;  Laterality: N/A;    BACK SURGERY  1981    COLONOSCOPY N/A 2/23/2023    Procedure: COLONOSCOPY;  Surgeon: Jonn Cruz MD;  Location: [facility] ENDO;  Service: Endoscopy;  Laterality: N/A;    CORONARY ANGIOGRAPHY N/A 5/9/2023    Procedure: ANGIOGRAM, CORONARY ARTERY;  Surgeon: Antonio Kessler MD;  Location: [facility] CATH/EP LAB;  Service: Cardiology;  Laterality: N/A;    EXCISION OF HYDROCELE      x2    FRACTIONAL FLOW RESERVE (FFR), CORONARY  5/11/2023    Procedure: Fractional Flow [place] (FFR), Coronary;  Surgeon: Antonio Kessler MD;  Location: [facility] CATH/EP LAB;  Service: Cardiology;;    INGUINAL HERNIA REPAIR  1960    INSTANTANEOUS WAVE-FREE RATIO (IFR) N/A 10/10/2023    Procedure: Instantaneous Wave-Free Ratio (IFR);  Surgeon: Dieudonne Ryan MD;  Location: [facility] CATH/EP LAB;  Service: Cardiology;  Laterality: N/A;    INTRAMEDULLARY RODDING OF FEMUR Left 3/9/2024    Procedure: INSERTION, INTRAMEDULLARY HUI, FEMUR;  Surgeon: Tarun Hdez MD;  Location: [facility] OR;  Service: Orthopedics;  Laterality: Left;    INTRAVASCULAR ULTRASOUND, CORONARY N/A 5/9/2023    Procedure: Ultrasound-coronary;  Surgeon: Antonio Kessler MD;  Location: [facility]  CATH/EP LAB;  Service: Cardiology;  Laterality: N/A;    IVUS, CORONARY  5/11/2023    Procedure: IVUS, Coronary;  Surgeon: Antonio Kessler MD;  Location: [facility] CATH/EP LAB;  Service: Cardiology;;    LEFT HEART CATHETERIZATION Left 5/11/2023    Procedure: Left heart cath;  Surgeon: Antonio Kessler MD;  Location: [facility] CATH/EP LAB;  Service: Cardiology;  Laterality: Left;    PERCUTANEOUS TRANSLUMINAL BALLOON ANGIOPLASTY OF CORONARY ARTERY  5/9/2023    Procedure: Angioplasty-coronary;  Surgeon: Antonio Kessler MD;  Location: [facility] CATH/EP LAB;  Service: Cardiology;;    RHINOPLASTY      STENT, DRUG ELUTING, SINGLE VESSEL, CORONARY  5/9/2023    Procedure: Stent, Drug Eluting, Single Vessel, Coronary;  Surgeon: Antonio Kessler MD;  Location: [facility] CATH/EP LAB;  Service: Cardiology;;    SURGICAL REMOVAL OF NODULE OF VOCAL CORD       Family History   Problem Relation Name Age of Onset    Arthritis Mother       Social History     Tobacco Use    Smoking status: Every Day     Types: Cigarettes    Smokeless tobacco: Former    Tobacco comments:     Pt ready to quit smoking and states he can on his own. Seen 10/9/23 for inpatient smoking cessation. [employer] Member.     Occasionally   Substance Use Topics    Alcohol use: Yes     Alcohol/week: 14.0 standard drinks of alcohol     Types: 14 Shots of liquor per week    Drug use: Yes     Types: Marijuana     Review of Systems    Physical Exam     Initial Vitals [01/07/25 0443]   BP Pulse Resp Temp SpO2   (!) 186/120 100 18 98.2 °F (36.8 °C) 96 %      MAP       --         Physical Exam    ED Course   Procedures  Labs Reviewed - No data to display       Imaging Results    None          Medications   oxymetazoline 0.05 % nasal spray 1 spray (1 spray Each Nostril Given 1/7/25 0809)   aluminum-magnesium hydroxide-simethicone 200-200-20 mg/5 mL suspension 30 mL (30 mLs Oral Given 1/7/25 1057)     And   LIDOcaine viscous HCl 2% oral solution 15 mL (15 mLs Oral Given 1/7/25 1057)     Medical  Decision Making  Risk  OTC drugs.  Prescription drug management.                                      Clinical Impression:  Final diagnoses:  [R04.0] Epistaxis (Primary)  [Z87.19] History of gastritis          ED Disposition Condition    Discharge Stable          ED Prescriptions       Medication Sig Dispense Start Date End Date Auth. Provider    hyoscyamine 0.125 mg Subl Place 1 tablet (0.125 mg total) under the tongue every 4 (four) hours as needed. 16 tablet 1/7/2025 -- Hill Vyas MD          Follow-up Information       Follow up With Specialties Details Why Contact Info    Damian Vergara MD Internal Medicine Schedule an appointment as soon as possible for a visit in 1 week For recheck/continuing care [address] [place] [address] Dr Melinda [place] [address]  [phone]      Johnnie Esclaante MD Otolaryngology Schedule an appointment as soon as possible for a visit in 1 week For recheck/continuing care [address] [facility]  [place] [address]  [phone]               Hill Vyas MD  01/07/25 1116

## 2025-01-07 NOTE — ED PROVIDER NOTES
Encounter Date: 1/7/2025       History     Chief Complaint   Patient presents with    Epistaxis     Pt said he woke up around 4 AM with his nose bleeding.  Pt says his nose is dry     HPI  69-year-old with a history of hypertension CAD on aspirin and Plavix presents for nosebleed  Review of patient's allergies indicates:   Allergen Reactions    Amoxicillin Shortness Of Breath and Edema     Past Medical History:   Diagnosis Date    Abdominal pain 2022    CHF (congestive heart failure)     Diabetes mellitus 2018    Emphysema lung     Endocarditis 2011    Hypertension     Stroke 2011    denies residual     Past Surgical History:   Procedure Laterality Date    ANGIOGRAM, CORONARY, WITH LEFT HEART CATHETERIZATION N/A 10/10/2023    Procedure: Angiogram, Coronary, with Left Heart Cath;  Surgeon: Dieudonne Ryan MD;  Location: [facility] CATH/EP LAB;  Service: Cardiology;  Laterality: N/A;    BACK SURGERY  1981    COLONOSCOPY N/A 2/23/2023    Procedure: COLONOSCOPY;  Surgeon: Jonn Cruz MD;  Location: [facility] ENDO;  Service: Endoscopy;  Laterality: N/A;    CORONARY ANGIOGRAPHY N/A 5/9/2023    Procedure: ANGIOGRAM, CORONARY ARTERY;  Surgeon: Antonio Kessler MD;  Location: [facility] CATH/EP LAB;  Service: Cardiology;  Laterality: N/A;    EXCISION OF HYDROCELE      x2    FRACTIONAL FLOW RESERVE (FFR), CORONARY  5/11/2023    Procedure: Fractional Flow [place] (FFR), Coronary;  Surgeon: Antonio Kessler MD;  Location: [facility] CATH/EP LAB;  Service: Cardiology;;    INGUINAL HERNIA REPAIR  1960    INSTANTANEOUS WAVE-FREE RATIO (IFR) N/A 10/10/2023    Procedure: Instantaneous Wave-Free Ratio (IFR);  Surgeon: Dieudonne Ryan MD;  Location: [facility] CATH/EP LAB;  Service: Cardiology;  Laterality: N/A;    INTRAMEDULLARY RODDING OF FEMUR Left 3/9/2024    Procedure: INSERTION, INTRAMEDULLARY HUI, FEMUR;  Surgeon: Tarun Hdez MD;  Location: [facility] OR;  Service: Orthopedics;  Laterality: Left;    INTRAVASCULAR ULTRASOUND, CORONARY N/A  5/9/2023    Procedure: Ultrasound-coronary;  Surgeon: Antonio Kessler MD;  Location: [facility] CATH/EP LAB;  Service: Cardiology;  Laterality: N/A;    IVUS, CORONARY  5/11/2023    Procedure: IVUS, Coronary;  Surgeon: Antonio Kessler MD;  Location: [facility] CATH/EP LAB;  Service: Cardiology;;    LEFT HEART CATHETERIZATION Left 5/11/2023    Procedure: Left heart cath;  Surgeon: Antonio Kessler MD;  Location: [facility] CATH/EP LAB;  Service: Cardiology;  Laterality: Left;    PERCUTANEOUS TRANSLUMINAL BALLOON ANGIOPLASTY OF CORONARY ARTERY  5/9/2023    Procedure: Angioplasty-coronary;  Surgeon: Antonio Kessler MD;  Location: [facility] CATH/EP LAB;  Service: Cardiology;;    RHINOPLASTY      STENT, DRUG ELUTING, SINGLE VESSEL, CORONARY  5/9/2023    Procedure: Stent, Drug Eluting, Single Vessel, Coronary;  Surgeon: Antonio Kessler MD;  Location: [facility] CATH/EP LAB;  Service: Cardiology;;    SURGICAL REMOVAL OF NODULE OF VOCAL CORD       Family History   Problem Relation Name Age of Onset    Arthritis Mother       Social History     Tobacco Use    Smoking status: Every Day     Types: Cigarettes    Smokeless tobacco: Former    Tobacco comments:     Pt ready to quit smoking and states he can on his own. Seen 10/9/23 for inpatient smoking cessation. [employer] Member.     Occasionally   Substance Use Topics    Alcohol use: Yes     Alcohol/week: 14.0 standard drinks of alcohol     Types: 14 Shots of liquor per week    Drug use: Yes     Types: Marijuana     Review of Systems    Physical Exam     Initial Vitals [01/07/25 0443]   BP Pulse Resp Temp SpO2   (!) 186/120 100 18 98.2 °F (36.8 °C) 96 %      MAP       --         Physical Exam    ED Course   Procedures  Labs Reviewed - No data to display       Imaging Results    None          Medications   oxymetazoline 0.05 % nasal spray 1 spray (has no administration in time range)   silver nitrate applicators applicator 1 applicator (has no administration in time range)     Medical Decision Making  Risk  OTC  "drugs.  Prescription drug management.                                      Clinical Impression:   ***Please document a Clinical Impression and click the "Refresh" button to refresh your note and automatically pull in before signing.***           "

## 2025-01-07 NOTE — PROGRESS NOTES
CHW - Case Closure    This Community Health Worker spoke to patient via telephone today.   Pt/Caregiver reported: [employer] is mailing application to patient so does not need to come into clinic today for assistance in completing. He voiced he is in ER again, bad nose bleed in middle of night.  Pt/Caregiver denied any additional needs at this time and agrees with episode closure at this time.  Provided patient with Community Health Worker's contact information and encouraged him/her to contact this Community Health Worker if additional needs arise.

## 2025-01-11 NOTE — PROGRESS NOTES
"[facility]   Physical Therapy Treatment Note     Name: Sampson Holt  Clinic Number: [id number]    Therapy Diagnosis:   Encounter Diagnoses   Name Primary?    Left hip pain     Weakness Yes    Difficulty walking     Decreased functional mobility and endurance     Special educational needs      Physician: Tarun Hdez MD    Visit Date: 6/10/2024  Physician Orders: PT Eval and Treat   Medical Diagnosis from Referral: S72.142A (ICD-10-CM) - Closed intertrochanteric fracture of hip, left, initial encounter  Evaluation Date: 5/20/2024  Authorization Period Expiration: 12/31/2024  Plan of Care Expiration: 07/19/2024 at 2x/wk x 8 weeks  Progress Note Due: 06/17/2024  Visit # / Visits authorized: 5/20  (6/16 on the Plan of Care)  FOTO: 1/3                                     +++++++do 2nd FOTO 06/17/2024++++++++++++  PTA Visit #: 0/5      Time In: 12:52 pm    Time Out: 1:42 pm  Total Appointment Time (timed & untimed codes): 45 minutes     Precautions: Standard, Weightbearing, and surgical  SUBJECTIVE     Pt reports: "I have some constant hip pain, but it seems to be getting better. When I am laying in the bed too long playing my video games, the hip tends to get stiff. I can get around a little more without my cane now. I plan on going fishing again today. I'd say that I am only a 3/10 today." PT acknowledges.     He was compliant with his home exercise program.    Response to previous treatment: He enters with minimal left hip pain.   Functional change: He reports using his standard cane less at home.     Pain: 3/10 Upon entering the clinic this day.  Location: The left hip.  OBJECTIVE     Objective Measures updated at progress report unless specified. +++add to the home program 06/17/2024+++    Treatment     Sampson received the treatments listed below: +++NO HIP MOBILIZATION OR LONG ARC DISTRACTION+++    Sampson received therapeutic exercises to develop strength, endurance, ROM, " flexibility, posture, and core stabilization for 0 minutes including:  -Bilateral shoulder flexion to 90* standing on blue foam with 2 pound dumb bells x 20  -Bilateral shoulder abduction to 90* standing on blue foam with 2 pound dumb bells x 20  -Active Assisted Straight leg raise 1 x 10    Below not performed this day:  -Supine pillow squeezes with 2 second hold x 20  -Bilateral short arc quads with 3 pound weights x 4 minutes  -Gluteal Sets with 2 second hold x 20  -Bridging with yellow band abduction x 20  -Right side lying, left leg clamshell x 20  -Bilateral long arc quads with 2 pound ankle weights x 20  -supine hip marches 3 x 10     Sampson participated in neuromuscular re-education activities to improve: Balance, Coordination, Kinesthetic, Sense, Proprioception, Muscle Recruitment, and Posture for 35 minutes. The following activities were included:  -Precor back machine with 45 pounds for three sets: x 10 x 15 and x 20 repetitions.  -Precor leg press with seat on 11 and 30 pounds for three sets: x 10, x 15, x 15  repetitions  -+Precor heel raises on the leg press with 20 pounds at 2 x 15  -Precor knee extension with 25 pounds x 15 and then x 10  -+Precor with 10 pounds with 2 legs up and one leg down eccentrically lowering x 10  -Precor knee flexion with 40 pounds x 25  -Precor hip abduction with 35 pounds x 30  -+Precor hip adduction with 25 pounds x 40    Below not Performed this day:  -standing on first step and eccentrically lower for bilateral gastrocnemius stretch with 30 second hold x 3  -Standing bilateral yellow banded hip abduction x 20 each leg  -Standing bilateral yellow banded hip extension x 20 each leg.    Sampson participated in dynamic functional therapeutic activities to improve functional performance for 10 minutes, including:  -Nu-step on Level 3 x 10 minutes within hip precautions.    Below not performed this day:  -sit to stand from 4th foam box without arm use x 10  ++Black band  walking??++++++++  Patient Education and Home Exercises     Home Exercises Provided and Patient Education Provided +++add to the home program 06/17/2024+++    Education provided:   -The patient is independent with his initial written home exercise program.     Written Home Exercises Provided: Patient instructed to cont prior HEP. Exercises were reviewed and Sampson was able to demonstrate them prior to the end of the session.  Sampson demonstrated good  understanding of the education provided. See EMR under Patient Instructions for exercises provided during therapy sessions.  ASSESSMENT     Sampson entered in good spirits. He was able to safely progress his routine this day. He added a level of resistance on the Nu-step, and he was able to progress some of his Precor resistances. He required multiple verbal cues to slow down. He was performing the repetitions too quickly which fatigued his muscles quicker. PT finally got him to slow down which allowed him to complete today's session. PT is hopeful to progress his home program soon. He can continue to benefit from hip specific exercises and improved single leg strength and endurance in order to allow him to safely transition to an independent gait. He tolerated today's PT session well.      Sampson Is progressing well towards his goals.   The patient's prognosis is Good.     The patient will continue to benefit from skilled outpatient physical therapy in order to address the deficits listed in the problem list box on the initial evaluation, provide patient/family education and to maximize the patient's level of independence in the home and in the community environment.     Pt's spiritual, cultural and educational needs considered and pt agreeable to plan of care and goals.     Anticipated barriers to physical therapy: his co-morbidities and compliance with attendance.     Goals:     STG  Weeks/Visits Date Established  Date Met   1.Decrease his max left hip pain to 6/10 in  order to improve his quality of life. 4 weeks. 5/20/2024    In Progress 6/10/2024     2. Increase his left hip strength a 1/2 grade in order to improve his weightbearing activity endurance with the least assistive device. 4 weeks. 5/20/2024    In Progress 6/10/2024     3.Increase his FOTO function score to >=51% in order to improve his activity of daily living and household task ability.  4 weeks. 5/20/2024    In Progress 6/10/2024     4.Fair Initial written home exercise knowledge and be without questions in order to have carryover after discharge from PT.  4 weeks. 5/20/2024    In Progress 6/10/2024        LTG Weeks/Visits Date Established  Date Met   1.Decrease his max left hip pain to 3/10 in order to improve his quality of life. 8 weeks. 5/20/2024    In Progress 6/10/2024     2. Increase his left hip strength one grade from the evaluation date in order to improve his weightbearing activity endurance without an assistive device. 8 weeks. 5/20/2024       In Progress 6/10/2024     3..Increase his FOTO function score to >=56% in order to improve his activity of daily living and household task ability. 8 weeks. 5/20/2024    In Progress 6/10/2024     4.Good Progressed written home exercise knowledge and be without questions in order to have carryover after discharge from PT.  8 weeks. 5/20/2024    In Progress 6/10/2024        PLAN     Plan of care Certification: 5/20/2024 to 07/19/2024. Outpatient Physical Therapy 2 times weekly for 8 weeks. Plan to maximize his left hip strength and flexibility in order to provide pain relief and to normalize his gait. Plan to progress his home program as he tolerates.        Mata Alvarado, PT                1.58

## 2025-03-07 DIAGNOSIS — I25.10 CORONARY ARTERY DISEASE INVOLVING NATIVE CORONARY ARTERY OF NATIVE HEART WITHOUT ANGINA PECTORIS: ICD-10-CM

## 2025-03-10 RX ORDER — METOPROLOL SUCCINATE 25 MG/1
25 TABLET, EXTENDED RELEASE ORAL 2 TIMES DAILY
Qty: 180 TABLET | Refills: 3 | OUTPATIENT
Start: 2025-03-10 | End: 2026-03-10

## 2025-03-12 NOTE — TELEPHONE ENCOUNTER
----- Message from Elmo sent at 3/12/2025  1:01 PM CDT -----  Regarding: [id number]  Contact: patient  Type:  RX Refill RequestWho Called: patientRefill or New Rx:refill new orderRX Name and Strength:metoprolol succinate (TOPROL-XL) 25 MG 24 hr tabletHow is the patient currently taking it? (ex. 1XDay):Is this a 30 day or 90 day RX:90Preferred Pharmacy with phone number:[facility] [address]Pxpww: [phone] Fax: [phone] or Mail Order:localOrdering Provider:Jesseould the patient rather a call back or a response via [facility]? Best Call Back Number:[phone] Information: patient is also requesting to speak with office about this medication

## 2025-03-13 RX ORDER — METOPROLOL SUCCINATE 25 MG/1
25 TABLET, EXTENDED RELEASE ORAL 2 TIMES DAILY
Qty: 180 TABLET | Refills: 1 | OUTPATIENT
Start: 2025-03-13

## 2025-04-07 ENCOUNTER — CLINICAL SUPPORT (OUTPATIENT)
Dept: CARDIOLOGY | Facility: CLINIC | Age: 70
End: 2025-04-07
Payer: MEDICAID

## 2025-04-07 ENCOUNTER — CLINICAL SUPPORT (OUTPATIENT)
Dept: SMOKING CESSATION | Facility: CLINIC | Age: 70
End: 2025-04-07
Payer: COMMERCIAL

## 2025-04-07 DIAGNOSIS — F17.210 MODERATE SMOKER (20 OR LESS PER DAY): Primary | ICD-10-CM

## 2025-04-07 DIAGNOSIS — I25.10 CORONARY ARTERY DISEASE INVOLVING NATIVE CORONARY ARTERY OF NATIVE HEART WITHOUT ANGINA PECTORIS: ICD-10-CM

## 2025-04-07 DIAGNOSIS — I10 BENIGN ESSENTIAL HTN: Primary | ICD-10-CM

## 2025-04-07 PROCEDURE — 99404 PREV MED CNSL INDIV APPRX 60: CPT | Mod: S$GLB,,,

## 2025-04-07 PROCEDURE — 99211 OFF/OP EST MAY X REQ PHY/QHP: CPT | Mod: PBBFAC,PN

## 2025-04-07 PROCEDURE — 99999 PR PBB SHADOW E&M-EST. PATIENT-LVL I: CPT | Mod: PBBFAC,,,

## 2025-04-07 RX ORDER — IBUPROFEN 200 MG
1 TABLET ORAL DAILY
Qty: 28 PATCH | Refills: 0 | Status: SHIPPED | OUTPATIENT
Start: 2025-04-07 | End: 2025-04-07

## 2025-04-07 RX ORDER — IBUPROFEN 200 MG
1 TABLET ORAL DAILY
Qty: 28 PATCH | Refills: 0 | Status: SHIPPED | OUTPATIENT
Start: 2025-04-07

## 2025-04-07 NOTE — PROGRESS NOTES
Pt came to inquire about the refill request for the metoprolol  person will need a PA for this Rx he alsowas assigned a slot to f/u in the office the patient missed his 3 month f/u

## 2025-04-07 NOTE — TELEPHONE ENCOUNTER
Refill request ;  patient came in for a NV to check on status update for refill request it was mentioned in a previous conversation that the patient is currently on the Metoprolol succinate 25 mg  BID per Michelle Kelsey NP  But was not reviewed as a current medicine taking

## 2025-04-07 NOTE — TELEPHONE ENCOUNTER
Pt came today to get status update on refill request for his metoprolol succinate 25 mg BID. I explained today in office that a PA was needed so it was process today  and he was given a f/u because he miss visit 3 month f/u 02/25

## 2025-04-07 NOTE — TELEPHONE ENCOUNTER
Dr Kessler ,     This patient showed up wanting this medication metoprolol ? We see it was discontinued . Does he need this medication ? He has an appointment scheduled in May, with an NP . Thank you

## 2025-04-07 NOTE — Clinical Note
Patient was seen for tobacco cessation intake assessment. Patient will begin on 14 mg nicotine patch daily. We discussed self awarness, triggers, tracking usage, reduction/treatment plan and follow up.

## 2025-04-21 RX ORDER — METOPROLOL SUCCINATE 25 MG/1
25 TABLET, EXTENDED RELEASE ORAL 2 TIMES DAILY
Qty: 180 TABLET | Refills: 3 | OUTPATIENT
Start: 2025-04-21 | End: 2026-04-21

## 2025-04-28 ENCOUNTER — CLINICAL SUPPORT (OUTPATIENT)
Dept: SMOKING CESSATION | Facility: CLINIC | Age: 70
End: 2025-04-28
Payer: COMMERCIAL

## 2025-04-28 DIAGNOSIS — F17.210 LIGHT CIGARETTE SMOKER (1-9 CIGS/DAY): Primary | ICD-10-CM

## 2025-04-28 PROCEDURE — 99404 PREV MED CNSL INDIV APPRX 60: CPT | Mod: S$GLB,,,

## 2025-04-29 NOTE — PROGRESS NOTES
Individual Follow-Up Form    4/29/2025    Clinical Status of Patient: Outpatient    Length of Service: 60 minutes    Continuing Medication: yes  Patches    Other Medications: none     Target Symptoms: Withdrawal and medication side effects. The following were  rated moderate (3) to severe (4) on TCRS:  Moderate (3): none  Severe (4): none    Comments: Patient is smoking the same.  Completion of TCRS (Tobacco Cessation Rating Scale) learned addiction model, cues/triggers, personal reasons/motivation for quitting, willpower, medications, goals, quit date. Patient is using 14 mg nicotine patch daily without any negative side effects at this time.  We discussed tobacco cessation strategies and behavior changes he could try to aid in cessation. The patient denies any abnormal behavioral or mental changes at this time. The patient will continue with  therapy sessions and medication monitoring by CTTS. Prescribed medication management will be by physician.      Diagnosis: F17.210    Next Visit: 2 weeks

## 2025-05-05 ENCOUNTER — CLINICAL SUPPORT (OUTPATIENT)
Dept: SMOKING CESSATION | Facility: CLINIC | Age: 70
End: 2025-05-05
Payer: COMMERCIAL

## 2025-05-05 DIAGNOSIS — F17.210 LIGHT CIGARETTE SMOKER (1-9 CIGS/DAY): Primary | ICD-10-CM

## 2025-05-05 PROCEDURE — 99407 BEHAV CHNG SMOKING > 10 MIN: CPT | Mod: S$GLB,,,

## 2025-05-14 ENCOUNTER — OFFICE VISIT (OUTPATIENT)
Dept: CARDIOLOGY | Facility: CLINIC | Age: 70
End: 2025-05-14
Payer: MEDICARE

## 2025-05-14 VITALS
DIASTOLIC BLOOD PRESSURE: 73 MMHG | BODY MASS INDEX: 28.84 KG/M2 | SYSTOLIC BLOOD PRESSURE: 140 MMHG | OXYGEN SATURATION: 98 % | HEIGHT: 73 IN | HEART RATE: 85 BPM | WEIGHT: 217.63 LBS

## 2025-05-14 DIAGNOSIS — F17.210 CIGARETTE SMOKER: ICD-10-CM

## 2025-05-14 DIAGNOSIS — F10.10 ETOH ABUSE: ICD-10-CM

## 2025-05-14 DIAGNOSIS — E11.59 TYPE 2 DIABETES MELLITUS WITH OTHER CIRCULATORY COMPLICATION, WITH LONG-TERM CURRENT USE OF INSULIN: ICD-10-CM

## 2025-05-14 DIAGNOSIS — R06.02 SHORTNESS OF BREATH: Primary | ICD-10-CM

## 2025-05-14 DIAGNOSIS — E78.2 MIXED HYPERLIPIDEMIA: ICD-10-CM

## 2025-05-14 DIAGNOSIS — Z95.5 STATUS POST INSERTION OF DRUG ELUTING CORONARY ARTERY STENT: ICD-10-CM

## 2025-05-14 DIAGNOSIS — R10.9 ABDOMINAL PAIN, UNSPECIFIED ABDOMINAL LOCATION: ICD-10-CM

## 2025-05-14 DIAGNOSIS — Z79.4 TYPE 2 DIABETES MELLITUS WITH OTHER CIRCULATORY COMPLICATION, WITH LONG-TERM CURRENT USE OF INSULIN: ICD-10-CM

## 2025-05-14 DIAGNOSIS — R07.9 CHEST PAIN, UNSPECIFIED TYPE: ICD-10-CM

## 2025-05-14 DIAGNOSIS — J44.9 CHRONIC OBSTRUCTIVE PULMONARY DISEASE, UNSPECIFIED COPD TYPE: ICD-10-CM

## 2025-05-14 DIAGNOSIS — R53.83 FATIGUE, UNSPECIFIED TYPE: ICD-10-CM

## 2025-05-14 DIAGNOSIS — I10 BENIGN ESSENTIAL HTN: ICD-10-CM

## 2025-05-14 DIAGNOSIS — I25.10 CORONARY ARTERY DISEASE INVOLVING NATIVE CORONARY ARTERY OF NATIVE HEART WITHOUT ANGINA PECTORIS: ICD-10-CM

## 2025-05-14 LAB
OHS QRS DURATION: 84 MS
OHS QTC CALCULATION: 441 MS

## 2025-05-14 PROCEDURE — 3077F SYST BP >= 140 MM HG: CPT | Mod: CPTII,S$GLB,,

## 2025-05-14 PROCEDURE — 1126F AMNT PAIN NOTED NONE PRSNT: CPT | Mod: CPTII,S$GLB,,

## 2025-05-14 PROCEDURE — 99215 OFFICE O/P EST HI 40 MIN: CPT | Mod: S$GLB,,,

## 2025-05-14 PROCEDURE — 1101F PT FALLS ASSESS-DOCD LE1/YR: CPT | Mod: CPTII,S$GLB,,

## 2025-05-14 PROCEDURE — 99999 PR PBB SHADOW E&M-EST. PATIENT-LVL V: CPT | Mod: PBBFAC,,,

## 2025-05-14 PROCEDURE — 3008F BODY MASS INDEX DOCD: CPT | Mod: CPTII,S$GLB,,

## 2025-05-14 PROCEDURE — 3078F DIAST BP <80 MM HG: CPT | Mod: CPTII,S$GLB,,

## 2025-05-14 PROCEDURE — 3288F FALL RISK ASSESSMENT DOCD: CPT | Mod: CPTII,S$GLB,,

## 2025-05-14 PROCEDURE — 1160F RVW MEDS BY RX/DR IN RCRD: CPT | Mod: CPTII,S$GLB,,

## 2025-05-14 PROCEDURE — 1159F MED LIST DOCD IN RCRD: CPT | Mod: CPTII,S$GLB,,

## 2025-05-14 RX ORDER — FUROSEMIDE 20 MG/1
20 TABLET ORAL DAILY PRN
Qty: 60 TABLET | Refills: 3 | Status: SHIPPED | OUTPATIENT
Start: 2025-05-14

## 2025-05-14 NOTE — PROGRESS NOTES
Subjective:    Patient ID:  Sampson Holt is a 69 y.o. male patient here for evaluation Follow-up (For [facility] extended [facility] document needed  to filled), Annual Exam, and Fatigue (Ongoing )      History of Present Illness    CHIEF COMPLAINT:  Patient presents today for follow up. He does not feel well over all.  He c/o atypical CP worsened with position changes, acute on chronic shortness of breath, and unintential weight gain, primarily in abdomen.  He is on 1.5 L O2 NC.    RESPIRATORY:  He requires oxygen 24 hours per day, increased from previous nighttime-only requirement. He reports dyspnea with ambulation. He has a history of emphysema.  He continues to smoke- reduced to 1-2 ciagrettes daily. He has been smoking since the 60s.     CHEST PAIN:  He experiences chest pain, primarily occurring at night when lying down and occasionally during the day. Pain is localized to the central chest area with radiation laterally. He denies any exertional component. He reports frequent falls and generalized weakness.    WEIGHT CHANGES:  He reports a 16-pound weight gain over 3 weeks with significant abdominal distension and bloating despite minimal food intake.  Last CT abdomen / pelvis 1/5/25 showed- Focal gastric antral wall thickening which could indicate gastritis in the setting of epigastric pain.  Consider follow-up upper endoscopy or short interval follow-up CT.  Malignancy not excluded.  Bilateral nonobstructing nephrolithiasis.  Hepatic steatosis.  Distal colonic diverticulosis.  Aortoiliac atherosclerosis.    SOCIAL HISTORY:  He currently smokes 1 cigarette daily with smoking history since 1962. He consumes 2 alcoholic drinks daily, having switched from bourbon to vodka and orange juice.    MEDICAL HISTORY:  He has a history of hip fracture from a fall and HTN. He reports possible mold exposure in his living environment.  He can barely afford rent, insurance, and groceries at this time.  [facility] is a  [facility] but is having a difficult time getting resources to assist him.      ROS:  General: -fever, -chills, +fatigue, +weight gain, -weight loss, +decreased energy levels, +loss of energy, +change in weight, +appetite changes, +loss of appetite  Cardiovascular: +chest pain, -palpitations, -lower extremity edema, +chest pain at rest  Respiratory: -cough, -shortness of breath, +exertional dyspnea  Gastrointestinal: -abdominal pain, -nausea, -vomiting, -diarrhea, -constipation, -blood in stool, +bloating, +abdominal distention  Musculoskeletal: -joint pain, -muscle pain  Skin: -rash, -lesion  Neurological: -headache, -dizziness, -numbness, -tingling, +lightheadedness  Psychiatric: -anxiety, -depression                   Review of patient's allergies indicates:   Allergen Reactions    Amoxicillin Shortness Of Breath and Edema       Past Medical History:   Diagnosis Date    Abdominal pain 2022    CHF (congestive heart failure)     Diabetes mellitus 2018    Emphysema lung     Endocarditis 2011    Hypertension     Stroke 2011    denies residual     Past Surgical History:   Procedure Laterality Date    ANGIOGRAM, CORONARY, WITH LEFT HEART CATHETERIZATION N/A 10/10/2023    Procedure: Angiogram, Coronary, with Left Heart Cath;  Surgeon: Dieudonne Ryan MD;  Location: [facility] CATH/EP LAB;  Service: Cardiology;  Laterality: N/A;    BACK SURGERY  1981    COLONOSCOPY N/A 2/23/2023    Procedure: COLONOSCOPY;  Surgeon: Jonn Cruz MD;  Location: [facility] ENDO;  Service: Endoscopy;  Laterality: N/A;    CORONARY ANGIOGRAPHY N/A 5/9/2023    Procedure: ANGIOGRAM, CORONARY ARTERY;  Surgeon: Antonio Kessler MD;  Location: [facility] CATH/EP LAB;  Service: Cardiology;  Laterality: N/A;    EXCISION OF HYDROCELE      x2    FRACTIONAL FLOW RESERVE (FFR), CORONARY  5/11/2023    Procedure: Fractional Flow [place] (FFR), Coronary;  Surgeon: Antonio Kessler MD;  Location: [facility] CATH/EP LAB;  Service: Cardiology;;    INGUINAL  HERNIA REPAIR  1960    INSTANTANEOUS WAVE-FREE RATIO (IFR) N/A 10/10/2023    Procedure: Instantaneous Wave-Free Ratio (IFR);  Surgeon: Dieudonne Ryan MD;  Location: [facility] CATH/EP LAB;  Service: Cardiology;  Laterality: N/A;    INTRAMEDULLARY RODDING OF FEMUR Left 3/9/2024    Procedure: INSERTION, INTRAMEDULLARY HUI, FEMUR;  Surgeon: Tarun Hdez MD;  Location: [facility] OR;  Service: Orthopedics;  Laterality: Left;    INTRAVASCULAR ULTRASOUND, CORONARY N/A 5/9/2023    Procedure: Ultrasound-coronary;  Surgeon: Antonio Kessler MD;  Location: [facility] CATH/EP LAB;  Service: Cardiology;  Laterality: N/A;    IVUS, CORONARY  5/11/2023    Procedure: IVUS, Coronary;  Surgeon: Antonio Kessler MD;  Location: [facility] CATH/EP LAB;  Service: Cardiology;;    LEFT HEART CATHETERIZATION Left 5/11/2023    Procedure: Left heart cath;  Surgeon: Antonio Kessler MD;  Location: [facility] CATH/EP LAB;  Service: Cardiology;  Laterality: Left;    PERCUTANEOUS TRANSLUMINAL BALLOON ANGIOPLASTY OF CORONARY ARTERY  5/9/2023    Procedure: Angioplasty-coronary;  Surgeon: Antonio eKssler MD;  Location: [facility] CATH/EP LAB;  Service: Cardiology;;    RHINOPLASTY      STENT, DRUG ELUTING, SINGLE VESSEL, CORONARY  5/9/2023    Procedure: Stent, Drug Eluting, Single Vessel, Coronary;  Surgeon: Antonio Kessler MD;  Location: [facility] CATH/EP LAB;  Service: Cardiology;;    SURGICAL REMOVAL OF NODULE OF VOCAL CORD       Social History[1]                Objective        Vitals:    05/14/25 1308   BP: (!) 140/73   Pulse: 85       LIPIDS - LAST 2   Lab Results   Component Value Date    CHOL 169 01/29/2024    CHOL 188 05/10/2023    HDL 69 01/29/2024    HDL 45 05/10/2023    LDLCALC 75.4 01/29/2024    LDLCALC 118.2 05/10/2023    TRIG 123 01/29/2024    TRIG 124 05/10/2023    CHOLHDL 40.8 01/29/2024    CHOLHDL 23.9 05/10/2023       CBC - LAST 2  Lab Results   Component Value Date    WBC 8.44 01/05/2025    WBC 7.65 12/18/2024    RBC 5.04 01/05/2025    RBC 4.90 12/18/2024    HGB  15.9 01/05/2025    HGB 15.3 12/18/2024    HCT 46.7 01/05/2025    HCT 45.6 12/18/2024    MCV 93 01/05/2025    MCV 93 12/18/2024    MCH 31.5 (H) 01/05/2025    MCH 31.2 (H) 12/18/2024    MCHC 34.0 01/05/2025    MCHC 33.6 12/18/2024    RDW 14.1 01/05/2025    RDW 13.9 12/18/2024     01/05/2025     12/18/2024    MPV 9.1 (L) 01/05/2025    MPV 8.9 (L) 12/18/2024    GRAN 6.4 01/05/2025    GRAN 75.5 (H) 01/05/2025    LYMPH 1.2 01/05/2025    LYMPH 14.7 (L) 01/05/2025    MONO 0.7 01/05/2025    MONO 8.2 01/05/2025    BASO 0.06 01/05/2025    BASO 0.06 12/18/2024    NRBC 0 01/05/2025    NRBC 0 12/18/2024       CHEMISTRY & LIVER FUNCTION - LAST 2  Lab Results   Component Value Date     01/05/2025     12/18/2024    K 4.1 01/05/2025    K 4.2 12/18/2024     01/05/2025     12/18/2024    CO2 26 01/05/2025    CO2 27 12/18/2024    ANIONGAP 12 01/05/2025    ANIONGAP 7 (L) 12/18/2024    BUN 10 01/05/2025    BUN 9 12/18/2024    CREATININE 0.9 01/05/2025    CREATININE 0.9 12/18/2024     (H) 01/05/2025     (H) 12/18/2024    CALCIUM 10.3 01/05/2025    CALCIUM 9.3 12/18/2024    PH 7.422 03/27/2020    MG 1.9 01/05/2025    MG 1.8 12/18/2024    ALBUMIN 4.5 01/05/2025    ALBUMIN 4.2 12/18/2024    PROT 7.6 01/05/2025    PROT 7.2 12/18/2024    ALKPHOS 78 01/05/2025    ALKPHOS 85 12/18/2024    ALT 11 01/05/2025    ALT 8 (L) 12/18/2024    AST 13 01/05/2025    AST 12 12/18/2024    BILITOT 0.8 01/05/2025    BILITOT 0.7 12/18/2024        CARDIAC PROFILE - LAST 2  Lab Results   Component Value Date     (H) 01/05/2025     (H) 12/18/2024    CPK 71 07/06/2022    CPK 75 03/27/2020    CPKMB 1.8 07/06/2022    CPKMB 1.5 03/27/2020     03/27/2020    TROPONINI <0.030 07/06/2022    TROPONINI <0.030 03/28/2020    TROPONINIHS 10.2 01/05/2025    TROPONINIHS 11.2 01/05/2025        COAGULATION - LAST 2  Lab Results   Component Value Date    LABPT 13.3 07/06/2022    INR 1.1 03/09/2024    INR 1.0  10/09/2023    APTT 27.8 03/09/2024    APTT 28.1 10/09/2023       ENDOCRINE & PSA - LAST 2  Lab Results   Component Value Date    HGBA1C 7.0 (H) 10/20/2024    HGBA1C 6.2 09/07/2024    TSH 1.403 09/06/2024    TSH 2.160 05/10/2023    PROCAL 0.05 09/07/2024    PROCAL <0.05 08/11/2023        ECHOCARDIOGRAM RESULTS  Results for orders placed during the hospital encounter of 10/18/24    Echo    Interpretation Summary    Left Ventricle: The left ventricle is normal in size. Mildly increased wall thickness. There is concentric remodeling. There is normal systolic function. Biplane (2D) method of discs ejection fraction is 60%.    Mitral Valve: Mild mitral annular calcification. There is trace regurgitation.    Tricuspid Valve: There is trace regurgitation.    Pulmonic Valve: There is mild regurgitation.    Aorta: Aortic annulus is moderately dilated measuring 4.10 cm.    Pulmonary Artery: The estimated pulmonary artery systolic pressure is 23 mmHg.      CURRENT/PREVIOUS VISIT EKG  Results for orders placed or performed during the hospital encounter of 01/05/25   EKG 12-lead    Collection Time: 01/05/25  1:58 PM   Result Value Ref Range    QRS Duration 130 ms    OHS QTC Calculation 502 ms    Narrative    Test Reason : R07.9,    Vent. Rate :  85 BPM     Atrial Rate :  85 BPM     P-R Int : 190 ms          QRS Dur : 130 ms      QT Int : 422 ms       P-R-T Axes :  46 -61  88 degrees    QTcB Int : 502 ms    Normal sinus rhythm  Left axis deviation  Left bundle branch block  Abnormal ECG  When compared with ECG of 18-Dec-2024 09:11,  Left bundle branch block is now Present  Criteria for Septal infarct are no longer Present  Confirmed by Pipe Matos ([id number]) on 1/13/2025 5:23:56 PM    Referred By:            Confirmed By: Pipe Matos     No valid procedures specified.   Results for orders placed during the hospital encounter of 06/22/23    Nuclear Stress - Cardiology Interpreted    Interpretation Summary    Normal myocardial  perfusion scan. There is no evidence of myocardial ischemia or infarction.    The gated perfusion images showed an ejection fraction of 70% at rest. The gated perfusion images showed an ejection fraction of 73% post stress. Normal ejection fraction is greater than 53%.    The ECG portion of the study is negative for ischemia.    The patient reported no chest pain during the stress test.    There were no arrhythmias during stress.    No valid procedures specified.    Physical Exam    General: No acute distress. Chronically ill appearing, obese.  1.5 L NC in office  HENT: Normocephalic. Atraumatic.   Cardiovascular: Regular rate. Regular rhythm. No murmurs. No rubs. No gallops. Normal S1, S2.  Respiratory: Normal respiratory effort. Clear to auscultation bilaterally. Trace scattered wheezing  Abdomen: Soft. Obese rounded abdomen, + BS  Musculoskeletal: No  obvious deformity.  Extremities: No lower extremity edema.  Neurological: Alert & oriented x3. No slurred speech. Normal gait.  Psychiatric: Normal mood. Normal affect. Good insight. Good judgment.  Skin: Warm. Dry. No rash.         I HAVE REVIEWED :    The vital signs, nurses notes, and all the pertinent radiology and labs.        Current Outpatient Medications   Medication Instructions    acetaminophen (TYLENOL) 1,000 mg, Every 12 hours PRN    albuterol (PROVENTIL/VENTOLIN HFA) 90 mcg/actuation inhaler 2 puffs, 4 times daily PRN    aspirin (ECOTRIN) 81 mg, Daily    carvediloL (COREG) 6.25 mg, 2 times daily    clopidogreL (PLAVIX) 75 mg, Oral, Daily    folic acid (FOLVITE) 1 mg, Daily    furosemide (LASIX) 20 mg, Oral, Daily PRN    hydrALAZINE (APRESOLINE) 25 mg, Oral, Every 8 hours    HYDROcodone-acetaminophen (NORCO) 5-325 mg per tablet 1 tablet, Oral, Every 6 hours PRN    hyoscyamine 0.125 mg, Sublingual, Every 4 hours PRN    ibuprofen (ADVIL,MOTRIN) 400 MG tablet 1 tablet, Every 6 hours PRN    insulin glargine-yfgn 45 Units, 2 times daily    losartan (COZAAR)  100 mg, Oral, Daily    metoprolol succinate (TOPROL-XL) 25 mg, 2 times daily    multivitamin Tab 1 tablet, Oral, Daily    naloxone (NARCAN) 4 mg/actuation Spry 1 spray, Once    nicotine (NICODERM CQ) 14 mg/24 hr 1 patch, Transdermal, Daily    nitroGLYCERIN (NITROSTAT) 0.4 mg, Sublingual, Every 5 min PRN    omeprazole (PRILOSEC) 20 mg, Oral, 2 times daily    ondansetron (ZOFRAN-ODT) 4 mg, Oral, Every 8 hours PRN    rosuvastatin (CRESTOR) 5 mg, Daily    senna (SENOKOT) 17.2 mg, Daily PRN    tamsulosin (FLOMAX) 0.4 mg, Daily    thiamine 100 mg, Daily          Assessment & Plan   Assessment & Plan    J44.9 Chronic obstructive pulmonary disease, unspecified COPD type  I25.10 Coronary artery disease involving native coronary artery of native heart without angina pectoris  R06.02 Shortness of breath  R53.83 Fatigue, unspecified type  Z95.5 Status post insertion of drug eluting coronary artery stent  E78.2 Mixed hyperlipidemia  I10 Benign essential HTN  E11.59, 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  Stop ETOH use.    - Ordered labs to check for fluid retention- start lasix 20 mg PRN daily for 2-3 lbs weight gain and increased abdominal distention.  Do not continue if no significant improvement in shortness of breath or distention is noted.   - ETOH cessation  - Continue efforts to reduce smoking, aiming for cessation  - Contact office via patient portal for direct communication    CHRONIC OBSTRUCTIVE PULMONARY DISEASE, UNSPECIFIED COPD TYPE:  - [facility] to [facility]ule with repeat CT before considering biopsy per patient     CORONARY ARTERY  DISEASE INVOLVING NATIVE CORONARY ARTERY OF NATIVE HEART WITHOUT ANGINA PECTORIS:  - Recommend cardiac PET stress test due to ongoing chest pain at [facility] (awaiting [facility] approval).    SHORTNESS OF BREATH:  - PET Stress test recommended   - Last echo showed normal systolic function and no acute valvular abnormalities  - Shortness of breath is likely secondary to long term cigarette smoking hx.  Stop smoking.   - Continue to follow with pulmonology  - Repeat CXR  - BMP and BNP to assess fluid status and renal function    BENIGN ESSENTIAL HTN:  - Ordered labs to check for fluid retention.  - Continue current antihypertensive regimen  - 2 g salt restriction. 1.5 L fluid restriction  - Avoid processed foods, recommend eating fresh/frozen foods.     CIGARETTE SMOKER:  - Patient to continue efforts to reduce smoking, aiming for complete cessation.    ETOH ABUSE:  - Recommend stopping alcohol use (currently reported as 2 drinks per day).    LIFESTYLE CHANGES:  - Contact the office using the patient portal for direct communication.           This note was generated with the assistance of ambient listening technology. Verbal consent was obtained by the patient and accompanying visitor(s) for the recording of patient appointment to facilitate this note. I attest to having reviewed and edited the generated note for accuracy, though some syntax or spelling errors may persist. Please contact the author of this note for any clarification.             [1]   Social History  Tobacco Use    Smoking status: Every Day     Types: Cigarettes    Smokeless tobacco: Former    Tobacco comments:     Pt ready to quit smoking and states he can on his own. Seen 10/9/23 for inpatient smoking cessation. [employer] Member.     Occasionally   Substance Use Topics    Alcohol use: Yes     Alcohol/week: 14.0 standard drinks of alcohol     Types: 14 Shots of liquor per week    Drug use: Yes     Types: Marijuana

## 2025-05-15 ENCOUNTER — TELEPHONE (OUTPATIENT)
Dept: CARDIOLOGY | Facility: CLINIC | Age: 70
End: 2025-05-15
Payer: MEDICARE

## 2025-05-15 NOTE — TELEPHONE ENCOUNTER
----- Message from Christianobrookpamela sent at 5/15/2025  9:38 AM CDT -----  Regarding: advice  Type:  Needs Medical AdviceWho Called: pt Best Call Back Number: [phone] Information: pt st [facility] clinic [facility]  please call to discuss. Fax:[phone]

## 2025-05-19 ENCOUNTER — CLINICAL SUPPORT (OUTPATIENT)
Dept: SMOKING CESSATION | Facility: CLINIC | Age: 70
End: 2025-05-19
Payer: COMMERCIAL

## 2025-05-19 DIAGNOSIS — F17.210 MODERATE SMOKER (20 OR LESS PER DAY): ICD-10-CM

## 2025-05-19 PROCEDURE — 99404 PREV MED CNSL INDIV APPRX 60: CPT | Mod: S$GLB,,,

## 2025-05-19 RX ORDER — IBUPROFEN 200 MG
1 TABLET ORAL DAILY
Qty: 28 PATCH | Refills: 0 | Status: SHIPPED | OUTPATIENT
Start: 2025-05-19

## 2025-05-19 NOTE — PROGRESS NOTES
Individual Follow-Up Form    5/19/2025      Clinical Status of Patient: Outpatient    Length of Service: 60 minutes    Continuing Medication: yes  Patches    Other Medications: none     Target Symptoms: Withdrawal and medication side effects. The following were  rated moderate (3) to severe (4) on TCRS:  Moderate (3): none  Severe (4): none    Comments: Patient is smoking 2 cpd and reports relighting.  Completion of TCRS (Tobacco Cessation Rating Scale) reviewed strategies, cues, and triggers. Introduced the negative impact of tobacco on health, the health advantages of discontinuing the use of tobacco, time line improved health changes after a quit, withdrawal issues to expect from nicotine and habit, and ways to achieve the goal of a quit. The patient denies any abnormal behavioral or mental changes at this time. The patient will continue with  therapy sessions and medication monitoring by CTTS. Prescribed medication management will be by physician.        Diagnosis: F17.210    Next Visit: 1 week

## 2025-05-20 ENCOUNTER — TELEPHONE (OUTPATIENT)
Dept: CARDIOLOGY | Facility: CLINIC | Age: 70
End: 2025-05-20
Payer: MEDICARE

## 2025-05-20 NOTE — TELEPHONE ENCOUNTER
Sw the patient about his concerns with a new medicine his Rheumatologist  his prescribed and he is starting to have side effects he was suggested to consult with that provider about the symptoms he is having

## 2025-05-20 NOTE — TELEPHONE ENCOUNTER
----- Message from Jitendra sent at 5/20/2025  1:55 PM CDT -----  Regarding: advice  Type:  Needs Medical AdviceWho Called: alysha Best Call Back Number: [phone] Information: pt wants a callback from provider before she leaves today to discuss some concerns about a new med.  please call to discuss.

## 2025-05-21 ENCOUNTER — TELEPHONE (OUTPATIENT)
Dept: CARDIOLOGY | Facility: CLINIC | Age: 70
End: 2025-05-21
Payer: OTHER GOVERNMENT

## 2025-05-21 NOTE — TELEPHONE ENCOUNTER
----- Message from Michelle Kelsey NP sent at 5/21/2025  4:06 PM CDT -----  Regarding: FW: advice  Can you call the patient and get me further information?  ----- Message -----  From: Manuel Dorsey MA  Sent: 5/20/2025   4:38 PM CDT  To: Michelle Kelsey NP  Subject: FW: advice                                       FYI  ----- Message -----  From: Jitendra Schmidt  Sent: 5/20/2025   1:56 PM CDT  To: [place]  Subject: advice                                           Type:  Needs Medical AdviceWho Called: pt Best Call Back Number: [phone] Information: pt wants a callback from provider before she leaves today to discuss some concerns about a new med.  please call to discuss.

## 2025-05-23 ENCOUNTER — LAB VISIT (OUTPATIENT)
Dept: LAB | Facility: HOSPITAL | Age: 70
End: 2025-05-23
Payer: MEDICARE

## 2025-05-23 DIAGNOSIS — I25.10 CORONARY ARTERY DISEASE INVOLVING NATIVE CORONARY ARTERY OF NATIVE HEART WITHOUT ANGINA PECTORIS: ICD-10-CM

## 2025-05-23 DIAGNOSIS — R06.02 SHORTNESS OF BREATH: ICD-10-CM

## 2025-05-23 LAB
BNP SERPL-MCNC: 53 PG/ML
CHOLEST SERPL-MCNC: 162 MG/DL (ref 120–199)
CHOLEST/HDLC SERPL: 3.1 {RATIO} (ref 2–5)
CREAT SERPL-MCNC: 1.1 MG/DL (ref 0.5–1.4)
GFR SERPLBLD CREATININE-BSD FMLA CKD-EPI: >60 ML/MIN/1.73/M2
HDLC SERPL-MCNC: 52 MG/DL (ref 40–75)
HDLC SERPL: 32.1 % (ref 20–50)
LDLC SERPL CALC-MCNC: 72.2 MG/DL (ref 63–159)
NONHDLC SERPL-MCNC: 110 MG/DL
TRIGL SERPL-MCNC: 189 MG/DL (ref 30–150)

## 2025-05-23 PROCEDURE — 82465 ASSAY BLD/SERUM CHOLESTEROL: CPT

## 2025-05-23 PROCEDURE — 36415 COLL VENOUS BLD VENIPUNCTURE: CPT

## 2025-05-23 PROCEDURE — 83880 ASSAY OF NATRIURETIC PEPTIDE: CPT

## 2025-05-23 PROCEDURE — 82565 ASSAY OF CREATININE: CPT

## 2025-05-26 ENCOUNTER — PATIENT MESSAGE (OUTPATIENT)
Dept: CARDIOLOGY | Facility: CLINIC | Age: 70
End: 2025-05-26
Payer: OTHER GOVERNMENT

## 2025-06-02 ENCOUNTER — CLINICAL SUPPORT (OUTPATIENT)
Dept: SMOKING CESSATION | Facility: CLINIC | Age: 70
End: 2025-06-02
Payer: COMMERCIAL

## 2025-06-02 DIAGNOSIS — F17.210 LIGHT CIGARETTE SMOKER (1-9 CIGS/DAY): Primary | ICD-10-CM

## 2025-06-02 PROCEDURE — 99403 PREV MED CNSL INDIV APPRX 45: CPT | Mod: S$GLB,,,

## 2025-06-02 NOTE — PROGRESS NOTES
Individual Follow-Up Form    6/2/2025    Clinical Status of Patient: Outpatient    Length of Service: 45 min    Continuing Medication: yes  Patches    Other Medications: none     Target Symptoms: Withdrawal and medication side effects. The following were  rated moderate (3) to severe (4) on TCRS:  Moderate (3): none  Severe (4): none    Comments: Patient is smoking 4 cpd or less daily. Patient continued to relight tobacco each one twice.  Patient continue to use 14 mg nicotine patch daily without any negative side effects at this time. We reviewed tobacco cessation strategies and timeline to quitting.  Patient was reminded of the importance of behavior change to aid in  rate fading.    The patient denies any abnormal behavioral or mental changes at this time. The patient will continue with  therapy sessions and medication monitoring by CTTS. Prescribed medication management will be by physician.      Diagnosis: F17.210    Next Visit: 2 weeks

## 2025-06-04 ENCOUNTER — TELEPHONE (OUTPATIENT)
Dept: CARDIOLOGY | Facility: CLINIC | Age: 70
End: 2025-06-04
Payer: OTHER GOVERNMENT

## 2025-06-05 DIAGNOSIS — I20.9 ANGINA PECTORIS, UNSPECIFIED: Primary | ICD-10-CM

## 2025-06-12 ENCOUNTER — HOSPITAL ENCOUNTER (OUTPATIENT)
Dept: RADIOLOGY | Facility: HOSPITAL | Age: 70
Discharge: HOME | End: 2025-06-12
Payer: OTHER GOVERNMENT

## 2025-06-12 ENCOUNTER — RESULTS FOLLOW-UP (OUTPATIENT)
Dept: CARDIOLOGY | Facility: CLINIC | Age: 70
End: 2025-06-12

## 2025-06-12 DIAGNOSIS — R10.9 ABDOMINAL PAIN, UNSPECIFIED ABDOMINAL LOCATION: ICD-10-CM

## 2025-06-12 DIAGNOSIS — R10.9 ABDOMINAL PAIN, UNSPECIFIED ABDOMINAL LOCATION: Primary | ICD-10-CM

## 2025-06-12 PROCEDURE — 25500020 PHARM REV CODE 255

## 2025-06-12 PROCEDURE — 74177 CT ABD & PELVIS W/CONTRAST: CPT | Mod: TC

## 2025-06-12 PROCEDURE — 74177 CT ABD & PELVIS W/CONTRAST: CPT | Mod: 26,,, | Performed by: RADIOLOGY

## 2025-06-12 RX ADMIN — IOHEXOL 95 ML: 350 INJECTION, SOLUTION INTRAVENOUS at 02:06

## 2025-06-13 ENCOUNTER — TELEPHONE (OUTPATIENT)
Dept: CARDIOLOGY | Facility: CLINIC | Age: 70
End: 2025-06-13
Payer: OTHER GOVERNMENT

## 2025-06-13 NOTE — TELEPHONE ENCOUNTER
----- Message from Michelle Kelsey NP sent at 6/12/2025  5:30 PM CDT -----  Referral to GI   Multiple cysts on liver, renal and spleen  ----- Message -----  From: Interface, Rad Results In  Sent: 6/12/2025   2:47 PM CDT  To: Michelle Kelsey NP

## 2025-06-16 ENCOUNTER — TELEPHONE (OUTPATIENT)
Dept: CARDIOLOGY | Facility: CLINIC | Age: 70
End: 2025-06-16
Payer: OTHER GOVERNMENT

## 2025-06-16 NOTE — TELEPHONE ENCOUNTER
Spoke with patient needs to find a Gastro that will take his [facility] the referral we sent does not amira eit . Advise pt to contact [facility] for a list of [facility] doctors .

## 2025-06-16 NOTE — TELEPHONE ENCOUNTER
Copied from CRM #[id number]. Topic: General Inquiry - Patient Advice  >> Jun 16, 2025 10:45 AM Jason wrote:  Type:  Needs Medical Advice    Who Called: pt      Would the patient rather a call back or a response via [employer]? Call back    Best Call Back Number: [phone]     Additional Information: pt st can office staff give him a call. Pt st ref sent to gastro does not accept insurance. Pt st would like a call. please call to discuss

## 2025-06-23 DIAGNOSIS — R10.9 UNSPECIFIED ABDOMINAL PAIN: Primary | ICD-10-CM

## 2025-06-24 NOTE — PROGRESS NOTES
"Subjective:       Patient ID: Sampson Holt is a 69 y.o. male Body mass index is 28.56 kg/m².    Chief Complaint: Abdominal Pain (When he touches the abdomin it is a 10/10)    This patient is new to me.  [facility].     Gained 15+ lbs - negative CT and U/S of abdomen.  + pain with palpation    [facility]:   "69 y.o. w/ abdominal pain, admission/dx ischemic colitis 2016 per record review records does not appear to have completed follow up endoscopy"     6/2025 CT A/P  FINDINGS:  Several cysts are scattered throughout the liver measuring up to 3.4 cm left hepatic lobe.  The gallbladder is normal.  The pancreas and adrenal glands are normal.  There is a subcentimeter probable cyst of the spleen, unchanged.     There is a 3 mm stone of the upper pole of the left kidney.  A small simple cyst of the left kidney upper pole is present.  The right kidney demonstrates a 2 mm upper pole stone.  There is no hydronephrosis.     The bowel is nondilated.  The appendix is normal.  There is mild sigmoid diverticulosis.     There is moderate aortic plaque formation accompanied by infrarenal fusiform ectasia of 2.5 cm with localized plaque ulceration.  Emphysematous changes are present at the lung bases.  There has been ORIF of the left proximal femur.     Impression:     No acute abdominopelvic process.     Bilateral nonobstructive nephrolithiasis.     Multiple hepatic cysts, left renal cysts, and probable subcentimeter splenic cyst.  A     Atherosclerosis with distal aortic ectasia and focal plaque ulceration.     Mild sigmoid diverticulosis.    6/2025 Abdominal U/S  FINDINGS:  Pancreas: The visualized portions of pancreas appear normal.     Aorta: No aneurysm.     Liver: 16.3 cm, normal in size. The liver demonstrates diffuse hyperechogenicity consistent with hepatic steatosis with foci of focal fatty sparing at the gallbladder fossa.  No worrisome focal lesions.  Hepatic " cysts are redemonstrated.     Gallbladder: No calculi, wall thickening, or pericholecystic fluid.  Negative sonographic Sherman's sign.     Biliary system: 0.4 mm common bile duct.  No intrahepatic ductal dilatation.     Inferior vena cava: Normal in appearance.     Right kidney: 10.4 x 6.8 x 6.2 cm. No hydronephrosis.     Left kidney: 10.4 x 5.6 x 5.2 cm. No hydronephrosis.     Spleen: 10.3 cm.  Normal in size with homogeneous echotexture.     Miscellaneous: No ascites.     Impression:     No significant abnormality involving the upper abdomen.        Review of Systems   Constitutional:  Negative for activity change, appetite change, fatigue, fever and unexpected weight change.   HENT:  Negative for sore throat and trouble swallowing.    Respiratory:  Negative for cough and shortness of breath.    Cardiovascular:  Negative for chest pain.   Gastrointestinal:  Positive for abdominal distention and abdominal pain. Negative for anal bleeding, blood in stool, constipation, diarrhea, nausea, rectal pain and vomiting.       No LMP for male patient.  Past Medical History:   Diagnosis Date    Abdominal pain 2022    CHF (congestive heart failure)     Cigarette smoker     Diabetes mellitus 2018    Emphysema lung     Endocarditis 2011    Hypertension     Stroke 2011    denies residual     Past Surgical History:   Procedure Laterality Date    ANGIOGRAM, CORONARY, WITH LEFT HEART CATHETERIZATION N/A 10/10/2023    Procedure: Angiogram, Coronary, with Left Heart Cath;  Surgeon: Dieudonne Ryan MD;  Location: [facility] CATH/EP LAB;  Service: Cardiology;  Laterality: N/A;    BACK SURGERY  1981    COLONOSCOPY N/A 2/23/2023    Procedure: COLONOSCOPY;  Surgeon: Jonn Cruz MD;  Location: [facility] ENDO;  Service: Endoscopy;  Laterality: N/A;    CORONARY ANGIOGRAPHY N/A 5/9/2023    Procedure: ANGIOGRAM, CORONARY ARTERY;  Surgeon: Antonio Kessler MD;  Location: [facility] CATH/EP LAB;  Service: Cardiology;  Laterality: N/A;    EXCISION OF  HYDROCELE      x2    FRACTIONAL FLOW RESERVE (FFR), CORONARY  5/11/2023    Procedure: Fractional Flow [place] (FFR), Coronary;  Surgeon: Antonio Kessler MD;  Location: [facility] CATH/EP LAB;  Service: Cardiology;;    INGUINAL HERNIA REPAIR  1960    INSTANTANEOUS WAVE-FREE RATIO (IFR) N/A 10/10/2023    Procedure: Instantaneous Wave-Free Ratio (IFR);  Surgeon: Dieudonne Ryan MD;  Location: [facility] CATH/EP LAB;  Service: Cardiology;  Laterality: N/A;    INTRAMEDULLARY RODDING OF FEMUR Left 3/9/2024    Procedure: INSERTION, INTRAMEDULLARY HUI, FEMUR;  Surgeon: Tarun Hdez MD;  Location: [facility] OR;  Service: Orthopedics;  Laterality: Left;    INTRAVASCULAR ULTRASOUND, CORONARY N/A 5/9/2023    Procedure: Ultrasound-coronary;  Surgeon: Antonio Kessler MD;  Location: [facility] CATH/EP LAB;  Service: Cardiology;  Laterality: N/A;    IVUS, CORONARY  5/11/2023    Procedure: IVUS, Coronary;  Surgeon: Antonio Kessler MD;  Location: [facility] CATH/EP LAB;  Service: Cardiology;;    LEFT HEART CATHETERIZATION Left 5/11/2023    Procedure: Left heart cath;  Surgeon: Antonio Kessler MD;  Location: [facility] CATH/EP LAB;  Service: Cardiology;  Laterality: Left;    PERCUTANEOUS TRANSLUMINAL BALLOON ANGIOPLASTY OF CORONARY ARTERY  5/9/2023    Procedure: Angioplasty-coronary;  Surgeon: Antonio Kessler MD;  Location: [facility] CATH/EP LAB;  Service: Cardiology;;    RHINOPLASTY      STENT, DRUG ELUTING, SINGLE VESSEL, CORONARY  5/9/2023    Procedure: Stent, Drug Eluting, Single Vessel, Coronary;  Surgeon: Antonio Kessler MD;  Location: [facility] CATH/EP LAB;  Service: Cardiology;;    SURGICAL REMOVAL OF NODULE OF VOCAL CORD       Family History   Problem Relation Name Age of Onset    Arthritis Mother       Social History[1]  Wt Readings from Last 10 Encounters:   06/30/25 98.2 kg (216 lb 7.9 oz)   06/26/25 98.4 kg (217 lb)   05/14/25 98.7 kg (217 lb 9.6 oz)   01/07/25 91.2 kg (201 lb)   01/05/25 91.2 kg (201 lb)   12/18/24 96.2 kg (212 lb)   11/11/24 96.5 kg (212  lb 11.9 oz)   10/18/24 98 kg (216 lb 0.8 oz)   10/20/24 98 kg (216 lb 0.8 oz)   09/07/24 90.7 kg (199 lb 15.3 oz)     Lab Results   Component Value Date    WBC 4.46 06/28/2025    HGB 13.0 (L) 06/28/2025    HCT 38.1 (L) 06/28/2025    MCV 96 06/28/2025     (L) 06/28/2025     CMP  Sodium   Date Value Ref Range Status   06/28/2025 138 136 - 145 mmol/L Final     Potassium   Date Value Ref Range Status   06/28/2025 4.1 3.5 - 5.1 mmol/L Final     Chloride   Date Value Ref Range Status   06/28/2025 104 95 - 110 mmol/L Final     CO2   Date Value Ref Range Status   06/28/2025 27 23 - 29 mmol/L Final     Glucose   Date Value Ref Range Status   06/28/2025 318 (H) 70 - 110 mg/dL Final     BUN   Date Value Ref Range Status   06/28/2025 21 8 - 23 mg/dL Final     Creatinine   Date Value Ref Range Status   06/28/2025 1.0 0.5 - 1.4 mg/dL Final     Calcium   Date Value Ref Range Status   06/28/2025 9.2 8.7 - 10.5 mg/dL Final     Protein Total   Date Value Ref Range Status   06/28/2025 6.5 6.0 - 8.4 gm/dL Final     Albumin   Date Value Ref Range Status   06/28/2025 4.0 3.5 - 5.2 g/dL Final     Bilirubin Total   Date Value Ref Range Status   06/28/2025 0.4 0.1 - 1.0 mg/dL Final     Comment:     For infants and newborns, interpretation of results should be based   on gestational age, weight and in agreement with clinical   observations.    Premature Infant recommended reference ranges:   0-24 hours:  <8.0 mg/dL   24-48 hours: <12.0 mg/dL   3-5 days:    <15.0 mg/dL   6-29 days:   <15.0 mg/dL     ALP   Date Value Ref Range Status   06/28/2025 64 55 - 135 unit/L Final     AST   Date Value Ref Range Status   06/28/2025 10 10 - 40 unit/L Final     ALT   Date Value Ref Range Status   06/28/2025 11 10 - 44 unit/L Final     Anion Gap   Date Value Ref Range Status   06/28/2025 7 (L) 8 - 16 mmol/L Final     eGFR if    Date Value Ref Range Status   07/06/2022 >60.0 >60 mL/min/1.73 m^2 Final     eGFR if non African American  "  Date Value Ref Range Status   07/06/2022 >60.0 >60 mL/min/1.73 m^2 Final     Comment:     Calculation used to obtain the estimated glomerular filtration  rate (eGFR) is the CKD-EPI equation.        Lab Results   Component Value Date    AMYLASE 39 07/06/2022     Lab Results   Component Value Date    LIPASE 14 01/05/2025     No results found for: "LIPASERES"  Lab Results   Component Value Date    TSH 1.403 09/06/2024       Reviewed prior medical records including radiology report of CT A/P and U/S abdomen 6/2025, [facility] and [facility] & endoscopy history (see surgical history).    Objective:      Physical Exam  Vitals and nursing note reviewed.   Constitutional:       General: He is not in acute distress.     Appearance: He is obese. He is not ill-appearing.   HENT:      Head: Normocephalic and atraumatic.      Mouth/Throat:      Mouth: Mucous membranes are moist.      Pharynx: Oropharynx is clear.   Eyes:      Conjunctiva/sclera: Conjunctivae normal.   Cardiovascular:      Rate and Rhythm: Normal rate and regular rhythm.      Pulses: Normal pulses.      Heart sounds: Normal heart sounds.   Pulmonary:      Effort: Pulmonary effort is normal.      Breath sounds: Normal breath sounds.   Abdominal:      General: Abdomen is flat. Bowel sounds are normal. There is distension.      Tenderness: There is abdominal tenderness in the right upper quadrant, right lower quadrant, epigastric area, periumbilical area and suprapubic area.   Skin:     General: Skin is warm and dry.      Capillary Refill: Capillary refill takes 2 to 3 seconds.   Neurological:      Mental Status: He is alert and oriented to person, place, and time.         Assessment:       1. Fatty stool    2. Unspecified abdominal pain        Plan:       Fatty stool  Stool studies to rule out causes of fatty stool - suspect possible malabsorption.  - schedule Colonoscopy, discussed procedure with the patient, including risks and benefits, patient verbalized " understanding    -     Pancreatic elastase, fecal; Future; Expected date: 06/30/2025  -     Fecal fat, qualitative; Future; Expected date: 06/30/2025  -     Calprotectin, Stool; Future; Expected date: 06/30/2025  -     Giardia / Cryptosporidum, EIA; Future; Expected date: 06/30/2025  -     Stool Exam-Ova,Cysts,Parasites; Future; Expected date: 06/30/2025  -     Clostridium difficile EIA; Future; Expected date: 06/30/2025  -     Stool culture; Future; Expected date: 06/30/2025    Unspecified abdominal pain  - schedule EGD, discussed procedure with patient, including risks and benefits, patient verbalized understanding  - schedule Colonoscopy, discussed procedure with the patient, including risks and benefits, patient verbalized understanding    Pain with palpation - 10/10 but otherwise no severe pain.  Distended abdomen without ascites noted on on ultrasound  Upper and lower endoscopy to further evaluate for abnormal findings.  -     Ambulatory referral/consult to Gastroenterology  -     H. pylori antigen, stool; Future; Expected date: 06/30/2025  -     Pancreatic elastase, fecal; Future; Expected date: 06/30/2025  -     Fecal fat, qualitative; Future; Expected date: 06/30/2025  -     Calprotectin, Stool; Future; Expected date: 06/30/2025  -     Giardia / Cryptosporidum, EIA; Future; Expected date: 06/30/2025  -     Stool Exam-Ova,Cysts,Parasites; Future; Expected date: 06/30/2025  -     Clostridium difficile EIA; Future; Expected date: 06/30/2025  -     Stool culture; Future; Expected date: 06/30/2025  -     Case Request Endoscopy: EGD (ESOPHAGOGASTRODUODENOSCOPY), COLONOSCOPY  -     E-Consult to General Cardiology    Other orders  -     polyethylene glycol (GOLYTELY) 236-22.74-6.74 -5.86 gram suspension; Take 4,000 mLs (4 L total) by mouth once. Take as directed for 1 dose  Dispense: 4000 mL; Refill: 0      No follow-ups on file.      If no improvement in symptoms or symptoms worsen, call/follow-up at clinic or go  to MIGNON Chen, FNP-C    Encounter includes face to face time and non-face to face time preparing to see the patient (eg, review of tests), obtaining and/or reviewing separately obtained history, documenting clinical information in the electronic or other health record, independently interpreting results (not separately reported) and communicating results to the patient/family/caregiver, or care coordination (not separately reported).     A dictation software program was used for this note. Please expect some simple typographical errors in this note.         [1]   Social History  Tobacco Use    Smoking status: Every Day     Types: Cigarettes    Smokeless tobacco: Former    Tobacco comments:     Pt ready to quit smoking and states he can on his own. Seen 10/9/23 for inpatient smoking cessation. [employer] Member.     Occasionally   Substance Use Topics    Alcohol use: Yes     Alcohol/week: 14.0 standard drinks of alcohol     Types: 14 Shots of liquor per week    Drug use: Yes     Types: Marijuana

## 2025-06-26 ENCOUNTER — HOSPITAL ENCOUNTER (OUTPATIENT)
Facility: HOSPITAL | Age: 70
Discharge: HOME | End: 2025-06-28
Attending: EMERGENCY MEDICINE | Admitting: INTERNAL MEDICINE
Payer: OTHER GOVERNMENT

## 2025-06-26 DIAGNOSIS — R07.9 CHEST PAIN, UNSPECIFIED TYPE: ICD-10-CM

## 2025-06-26 DIAGNOSIS — R07.9 CHEST PAIN, RULE OUT ACUTE MYOCARDIAL INFARCTION: ICD-10-CM

## 2025-06-26 DIAGNOSIS — R07.9 CHEST PAIN: ICD-10-CM

## 2025-06-26 DIAGNOSIS — R06.00 DYSPNEA, UNSPECIFIED TYPE: Primary | ICD-10-CM

## 2025-06-26 PROBLEM — J18.9 PNEUMONIA OF BOTH LOWER LOBES: Status: RESOLVED | Noted: 2024-09-07 | Resolved: 2025-06-26

## 2025-06-26 PROBLEM — F17.200 NICOTINE DEPENDENCE: Status: ACTIVE | Noted: 2023-05-10

## 2025-06-26 PROBLEM — N40.1 LOWER URINARY TRACT SYMPTOMS DUE TO BENIGN PROSTATIC HYPERPLASIA: Status: RESOLVED | Noted: 2024-02-02 | Resolved: 2025-06-26

## 2025-06-26 PROBLEM — I16.0 HYPERTENSIVE URGENCY: Status: RESOLVED | Noted: 2024-10-18 | Resolved: 2025-06-26

## 2025-06-26 PROBLEM — R55 SYNCOPE: Status: RESOLVED | Noted: 2023-08-11 | Resolved: 2025-06-26

## 2025-06-26 LAB
ABSOLUTE EOSINOPHIL (SMH): 0.1 K/UL
ABSOLUTE MONOCYTE (SMH): 0.69 K/UL (ref 0.3–1)
ABSOLUTE NEUTROPHIL COUNT (SMH): 4.5 K/UL (ref 1.8–7.7)
ALBUMIN SERPL-MCNC: 4.6 G/DL (ref 3.5–5.2)
ALP SERPL-CCNC: 74 UNIT/L (ref 55–135)
ALT SERPL-CCNC: 14 UNIT/L (ref 10–44)
ANION GAP (SMH): 13 MMOL/L (ref 8–16)
AST SERPL-CCNC: 13 UNIT/L (ref 10–40)
BASOPHILS # BLD AUTO: 0.05 K/UL
BASOPHILS NFR BLD AUTO: 0.7 %
BILIRUB SERPL-MCNC: 0.8 MG/DL (ref 0.1–1)
BNP SERPL-MCNC: 89 PG/ML
BUN SERPL-MCNC: 17 MG/DL (ref 8–23)
CALCIUM SERPL-MCNC: 9.7 MG/DL (ref 8.7–10.5)
CHLORIDE SERPL-SCNC: 98 MMOL/L (ref 95–110)
CO2 SERPL-SCNC: 25 MMOL/L (ref 23–29)
CREAT SERPL-MCNC: 1.1 MG/DL (ref 0.5–1.4)
ERYTHROCYTE [DISTWIDTH] IN BLOOD BY AUTOMATED COUNT: 13.8 % (ref 11.5–14.5)
ETHANOL SERPL-MCNC: <10 MG/DL
GFR SERPLBLD CREATININE-BSD FMLA CKD-EPI: >60 ML/MIN/1.73/M2
GLUCOSE SERPL-MCNC: 334 MG/DL (ref 70–110)
HCT VFR BLD AUTO: 45 % (ref 40–54)
HCV AB SERPL QL IA: NORMAL
HGB BLD-MCNC: 15.2 GM/DL (ref 14–18)
HIV 1+2 AB+HIV1 P24 AG SERPL QL IA: NORMAL
IMM GRANULOCYTES # BLD AUTO: 0.04 K/UL (ref 0–0.04)
IMM GRANULOCYTES NFR BLD AUTO: 0.6 % (ref 0–0.5)
INFLUENZA A MOLECULAR (OHS): NEGATIVE
INFLUENZA B MOLECULAR (OHS): NEGATIVE
INR PPP: 1 (ref 0.8–1.2)
LYMPHOCYTES # BLD AUTO: 1.38 K/UL (ref 1–4.8)
MAGNESIUM SERPL-MCNC: 1.9 MG/DL (ref 1.6–2.6)
MCH RBC QN AUTO: 31.7 PG (ref 27–31)
MCHC RBC AUTO-ENTMCNC: 33.8 G/DL (ref 32–36)
MCV RBC AUTO: 94 FL (ref 82–98)
NUCLEATED RBC (/100WBC) (SMH): 0 /100 WBC
PLATELET # BLD AUTO: 175 K/UL (ref 150–450)
PMV BLD AUTO: 9.7 FL (ref 9.2–12.9)
POCT GLUCOSE: 252 MG/DL (ref 70–110)
POTASSIUM SERPL-SCNC: 3.9 MMOL/L (ref 3.5–5.1)
PROT SERPL-MCNC: 7.5 GM/DL (ref 6–8.4)
PROTHROMBIN TIME: 11.4 SECONDS (ref 9–12.5)
RBC # BLD AUTO: 4.79 M/UL (ref 4.6–6.2)
RELATIVE EOSINOPHIL (SMH): 1.5 % (ref 0–8)
RELATIVE LYMPHOCYTE (SMH): 20.3 % (ref 18–48)
RELATIVE MONOCYTE (SMH): 10.2 % (ref 4–15)
RELATIVE NEUTROPHIL (SMH): 66.7 % (ref 38–73)
SARS-COV-2 RDRP RESP QL NAA+PROBE: NEGATIVE
SODIUM SERPL-SCNC: 136 MMOL/L (ref 136–145)
TROPONIN HIGH SENSITIVE (SMH): 11.1 PG/ML
TROPONIN HIGH SENSITIVE (SMH): 7.6 PG/ML
WBC # BLD AUTO: 6.79 K/UL (ref 3.9–12.7)

## 2025-06-26 PROCEDURE — 82077 ASSAY SPEC XCP UR&BREATH IA: CPT | Performed by: EMERGENCY MEDICINE

## 2025-06-26 PROCEDURE — 63600175 PHARM REV CODE 636 W HCPCS: Performed by: NURSE PRACTITIONER

## 2025-06-26 PROCEDURE — 87502 INFLUENZA DNA AMP PROBE: CPT | Performed by: EMERGENCY MEDICINE

## 2025-06-26 PROCEDURE — 93010 ELECTROCARDIOGRAM REPORT: CPT | Mod: ,,, | Performed by: GENERAL PRACTICE

## 2025-06-26 PROCEDURE — 25000003 PHARM REV CODE 250: Performed by: EMERGENCY MEDICINE

## 2025-06-26 PROCEDURE — 99900035 HC TECH TIME PER 15 MIN (STAT)

## 2025-06-26 PROCEDURE — 83735 ASSAY OF MAGNESIUM: CPT | Performed by: EMERGENCY MEDICINE

## 2025-06-26 PROCEDURE — S4991 NICOTINE PATCH NONLEGEND: HCPCS | Performed by: EMERGENCY MEDICINE

## 2025-06-26 PROCEDURE — 94640 AIRWAY INHALATION TREATMENT: CPT

## 2025-06-26 PROCEDURE — 99285 EMERGENCY DEPT VISIT HI MDM: CPT | Mod: 25

## 2025-06-26 PROCEDURE — U0002 COVID-19 LAB TEST NON-CDC: HCPCS | Performed by: EMERGENCY MEDICINE

## 2025-06-26 PROCEDURE — 84484 ASSAY OF TROPONIN QUANT: CPT | Performed by: EMERGENCY MEDICINE

## 2025-06-26 PROCEDURE — 86803 HEPATITIS C AB TEST: CPT | Performed by: EMERGENCY MEDICINE

## 2025-06-26 PROCEDURE — G0378 HOSPITAL OBSERVATION PER HR: HCPCS

## 2025-06-26 PROCEDURE — 99900031 HC PATIENT EDUCATION (STAT)

## 2025-06-26 PROCEDURE — 93005 ELECTROCARDIOGRAM TRACING: CPT | Performed by: GENERAL PRACTICE

## 2025-06-26 PROCEDURE — 25000242 PHARM REV CODE 250 ALT 637 W/ HCPCS: Performed by: EMERGENCY MEDICINE

## 2025-06-26 PROCEDURE — 25000242 PHARM REV CODE 250 ALT 637 W/ HCPCS: Mod: MUE | Performed by: EMERGENCY MEDICINE

## 2025-06-26 PROCEDURE — 82040 ASSAY OF SERUM ALBUMIN: CPT | Performed by: EMERGENCY MEDICINE

## 2025-06-26 PROCEDURE — 94799 UNLISTED PULMONARY SVC/PX: CPT

## 2025-06-26 PROCEDURE — 83880 ASSAY OF NATRIURETIC PEPTIDE: CPT | Performed by: EMERGENCY MEDICINE

## 2025-06-26 PROCEDURE — 25000003 PHARM REV CODE 250: Performed by: NURSE PRACTITIONER

## 2025-06-26 PROCEDURE — 85610 PROTHROMBIN TIME: CPT | Performed by: EMERGENCY MEDICINE

## 2025-06-26 PROCEDURE — 85025 COMPLETE CBC W/AUTO DIFF WBC: CPT | Performed by: EMERGENCY MEDICINE

## 2025-06-26 PROCEDURE — 96372 THER/PROPH/DIAG INJ SC/IM: CPT | Performed by: NURSE PRACTITIONER

## 2025-06-26 PROCEDURE — 94761 N-INVAS EAR/PLS OXIMETRY MLT: CPT

## 2025-06-26 PROCEDURE — 87389 HIV-1 AG W/HIV-1&-2 AB AG IA: CPT | Performed by: EMERGENCY MEDICINE

## 2025-06-26 RX ORDER — THIAMINE HCL 100 MG
100 TABLET ORAL DAILY
Status: DISCONTINUED | OUTPATIENT
Start: 2025-06-27 | End: 2025-06-28 | Stop reason: HOSPADM

## 2025-06-26 RX ORDER — IBUPROFEN 200 MG
1 TABLET ORAL
Status: COMPLETED | OUTPATIENT
Start: 2025-06-26 | End: 2025-06-27

## 2025-06-26 RX ORDER — CARVEDILOL 6.25 MG/1
6.25 TABLET ORAL 2 TIMES DAILY
Status: DISCONTINUED | OUTPATIENT
Start: 2025-06-26 | End: 2025-06-28 | Stop reason: HOSPADM

## 2025-06-26 RX ORDER — IPRATROPIUM BROMIDE AND ALBUTEROL SULFATE 2.5; .5 MG/3ML; MG/3ML
3 SOLUTION RESPIRATORY (INHALATION) EVERY 6 HOURS PRN
Status: DISCONTINUED | OUTPATIENT
Start: 2025-06-26 | End: 2025-06-28 | Stop reason: HOSPADM

## 2025-06-26 RX ORDER — SODIUM,POTASSIUM PHOSPHATES 280-250MG
2 POWDER IN PACKET (EA) ORAL
Status: DISCONTINUED | OUTPATIENT
Start: 2025-06-26 | End: 2025-06-28 | Stop reason: HOSPADM

## 2025-06-26 RX ORDER — FOLIC ACID 1 MG/1
1 TABLET ORAL DAILY
Status: DISCONTINUED | OUTPATIENT
Start: 2025-06-27 | End: 2025-06-28 | Stop reason: HOSPADM

## 2025-06-26 RX ORDER — LANOLIN ALCOHOL/MO/W.PET/CERES
800 CREAM (GRAM) TOPICAL
Status: DISCONTINUED | OUTPATIENT
Start: 2025-06-26 | End: 2025-06-28 | Stop reason: HOSPADM

## 2025-06-26 RX ORDER — FLUTICASONE PROPIONATE AND SALMETEROL 250; 50 UG/1; UG/1
1 POWDER RESPIRATORY (INHALATION) 2 TIMES DAILY
COMMUNITY
Start: 2025-04-10

## 2025-06-26 RX ORDER — HYDROCODONE BITARTRATE AND ACETAMINOPHEN 5; 325 MG/1; MG/1
1 TABLET ORAL EVERY 6 HOURS PRN
Refills: 0 | Status: DISCONTINUED | OUTPATIENT
Start: 2025-06-26 | End: 2025-06-28 | Stop reason: HOSPADM

## 2025-06-26 RX ORDER — IBUPROFEN 200 MG
24 TABLET ORAL
Status: DISCONTINUED | OUTPATIENT
Start: 2025-06-26 | End: 2025-06-28 | Stop reason: HOSPADM

## 2025-06-26 RX ORDER — GUAIFENESIN 600 MG/1
600 TABLET, EXTENDED RELEASE ORAL 2 TIMES DAILY
Status: DISCONTINUED | OUTPATIENT
Start: 2025-06-26 | End: 2025-06-28 | Stop reason: HOSPADM

## 2025-06-26 RX ORDER — ACETAMINOPHEN 325 MG/1
650 TABLET ORAL EVERY 4 HOURS PRN
Status: DISCONTINUED | OUTPATIENT
Start: 2025-06-26 | End: 2025-06-28 | Stop reason: HOSPADM

## 2025-06-26 RX ORDER — ONDANSETRON HYDROCHLORIDE 2 MG/ML
4 INJECTION, SOLUTION INTRAVENOUS EVERY 6 HOURS PRN
Status: DISCONTINUED | OUTPATIENT
Start: 2025-06-26 | End: 2025-06-28 | Stop reason: HOSPADM

## 2025-06-26 RX ORDER — NITROGLYCERIN 0.4 MG/1
0.4 TABLET SUBLINGUAL EVERY 5 MIN PRN
Status: DISCONTINUED | OUTPATIENT
Start: 2025-06-26 | End: 2025-06-28 | Stop reason: HOSPADM

## 2025-06-26 RX ORDER — HEPARIN SODIUM 5000 [USP'U]/ML
5000 INJECTION, SOLUTION INTRAVENOUS; SUBCUTANEOUS EVERY 8 HOURS
Status: DISCONTINUED | OUTPATIENT
Start: 2025-06-26 | End: 2025-06-28 | Stop reason: HOSPADM

## 2025-06-26 RX ORDER — CLOPIDOGREL BISULFATE 75 MG/1
75 TABLET ORAL DAILY
Status: DISCONTINUED | OUTPATIENT
Start: 2025-06-27 | End: 2025-06-28 | Stop reason: HOSPADM

## 2025-06-26 RX ORDER — ATORVASTATIN CALCIUM 40 MG/1
40 TABLET, FILM COATED ORAL NIGHTLY
Status: DISCONTINUED | OUTPATIENT
Start: 2025-06-26 | End: 2025-06-28 | Stop reason: HOSPADM

## 2025-06-26 RX ORDER — INSULIN ASPART 100 [IU]/ML
0-5 INJECTION, SOLUTION INTRAVENOUS; SUBCUTANEOUS
Status: DISCONTINUED | OUTPATIENT
Start: 2025-06-26 | End: 2025-06-26

## 2025-06-26 RX ORDER — FLUTICASONE FUROATE AND VILANTEROL 100; 25 UG/1; UG/1
1 POWDER RESPIRATORY (INHALATION) DAILY
Status: DISCONTINUED | OUTPATIENT
Start: 2025-06-27 | End: 2025-06-26

## 2025-06-26 RX ORDER — BUDESONIDE 0.5 MG/2ML
0.5 INHALANT ORAL EVERY 12 HOURS
Status: DISCONTINUED | OUTPATIENT
Start: 2025-06-27 | End: 2025-06-28 | Stop reason: HOSPADM

## 2025-06-26 RX ORDER — ALUMINUM HYDROXIDE, MAGNESIUM HYDROXIDE, AND SIMETHICONE 1200; 120; 1200 MG/30ML; MG/30ML; MG/30ML
30 SUSPENSION ORAL 4 TIMES DAILY PRN
Status: DISCONTINUED | OUTPATIENT
Start: 2025-06-26 | End: 2025-06-28 | Stop reason: HOSPADM

## 2025-06-26 RX ORDER — GLUCAGON 1 MG
1 KIT INJECTION
Status: DISCONTINUED | OUTPATIENT
Start: 2025-06-26 | End: 2025-06-28 | Stop reason: HOSPADM

## 2025-06-26 RX ORDER — SODIUM CHLORIDE 9 MG/ML
1000 INJECTION, SOLUTION INTRAVENOUS
Status: COMPLETED | OUTPATIENT
Start: 2025-06-26 | End: 2025-06-26

## 2025-06-26 RX ORDER — SODIUM CHLORIDE 0.9 % (FLUSH) 0.9 %
2 SYRINGE (ML) INJECTION
Status: DISCONTINUED | OUTPATIENT
Start: 2025-06-26 | End: 2025-06-28 | Stop reason: HOSPADM

## 2025-06-26 RX ORDER — TAMSULOSIN HYDROCHLORIDE 0.4 MG/1
0.4 CAPSULE ORAL DAILY
Status: DISCONTINUED | OUTPATIENT
Start: 2025-06-27 | End: 2025-06-28 | Stop reason: HOSPADM

## 2025-06-26 RX ORDER — TALC
6 POWDER (GRAM) TOPICAL NIGHTLY PRN
Status: DISCONTINUED | OUTPATIENT
Start: 2025-06-26 | End: 2025-06-28 | Stop reason: HOSPADM

## 2025-06-26 RX ORDER — IBUPROFEN 200 MG
16 TABLET ORAL
Status: DISCONTINUED | OUTPATIENT
Start: 2025-06-26 | End: 2025-06-28 | Stop reason: HOSPADM

## 2025-06-26 RX ORDER — IPRATROPIUM BROMIDE AND ALBUTEROL SULFATE 2.5; .5 MG/3ML; MG/3ML
3 SOLUTION RESPIRATORY (INHALATION)
Status: COMPLETED | OUTPATIENT
Start: 2025-06-26 | End: 2025-06-26

## 2025-06-26 RX ORDER — INSULIN GLARGINE 100 [IU]/ML
20 INJECTION, SOLUTION SUBCUTANEOUS NIGHTLY
Status: DISCONTINUED | OUTPATIENT
Start: 2025-06-27 | End: 2025-06-28 | Stop reason: HOSPADM

## 2025-06-26 RX ORDER — ARFORMOTEROL TARTRATE 15 UG/2ML
15 SOLUTION RESPIRATORY (INHALATION) 2 TIMES DAILY
Status: DISCONTINUED | OUTPATIENT
Start: 2025-06-27 | End: 2025-06-28 | Stop reason: HOSPADM

## 2025-06-26 RX ORDER — NALOXONE HCL 0.4 MG/ML
0.02 VIAL (ML) INJECTION
Status: DISCONTINUED | OUTPATIENT
Start: 2025-06-26 | End: 2025-06-28 | Stop reason: HOSPADM

## 2025-06-26 RX ORDER — PREDNISONE 10 MG/1
10 TABLET ORAL DAILY
COMMUNITY
Start: 2025-04-11

## 2025-06-26 RX ORDER — OXYCODONE AND ACETAMINOPHEN 5; 325 MG/1; MG/1
1 TABLET ORAL NIGHTLY PRN
COMMUNITY
Start: 2025-06-06

## 2025-06-26 RX ORDER — INSULIN ASPART 100 [IU]/ML
0-10 INJECTION, SOLUTION INTRAVENOUS; SUBCUTANEOUS
Status: DISCONTINUED | OUTPATIENT
Start: 2025-06-26 | End: 2025-06-28 | Stop reason: HOSPADM

## 2025-06-26 RX ORDER — ASPIRIN 325 MG
325 TABLET ORAL
Status: COMPLETED | OUTPATIENT
Start: 2025-06-26 | End: 2025-06-26

## 2025-06-26 RX ORDER — HYDROCODONE BITARTRATE AND ACETAMINOPHEN 5; 325 MG/1; MG/1
1 TABLET ORAL
Refills: 0 | Status: COMPLETED | OUTPATIENT
Start: 2025-06-26 | End: 2025-06-26

## 2025-06-26 RX ORDER — LOSARTAN POTASSIUM 50 MG/1
50 TABLET ORAL DAILY
Status: DISCONTINUED | OUTPATIENT
Start: 2025-06-27 | End: 2025-06-28 | Stop reason: HOSPADM

## 2025-06-26 RX ORDER — ASPIRIN 81 MG/1
81 TABLET ORAL DAILY
Status: DISCONTINUED | OUTPATIENT
Start: 2025-06-27 | End: 2025-06-28 | Stop reason: HOSPADM

## 2025-06-26 RX ADMIN — CARVEDILOL 6.25 MG: 6.25 TABLET, FILM COATED ORAL at 09:06

## 2025-06-26 RX ADMIN — ATORVASTATIN CALCIUM 40 MG: 40 TABLET, FILM COATED ORAL at 09:06

## 2025-06-26 RX ADMIN — ASPIRIN 325 MG ORAL TABLET 325 MG: 325 PILL ORAL at 11:06

## 2025-06-26 RX ADMIN — HEPARIN SODIUM 5000 UNITS: 5000 INJECTION INTRAVENOUS; SUBCUTANEOUS at 09:06

## 2025-06-26 RX ADMIN — NICOTINE 1 PATCH: 21 PATCH, EXTENDED RELEASE TRANSDERMAL at 11:06

## 2025-06-26 RX ADMIN — SODIUM CHLORIDE 1000 ML: 9 INJECTION, SOLUTION INTRAVENOUS at 02:06

## 2025-06-26 RX ADMIN — INSULIN ASPART 6 UNITS: 100 INJECTION, SOLUTION INTRAVENOUS; SUBCUTANEOUS at 09:06

## 2025-06-26 RX ADMIN — GUAIFENESIN 600 MG: 600 TABLET, EXTENDED RELEASE ORAL at 09:06

## 2025-06-26 RX ADMIN — IPRATROPIUM BROMIDE AND ALBUTEROL SULFATE 3 ML: 2.5; .5 SOLUTION RESPIRATORY (INHALATION) at 11:06

## 2025-06-26 RX ADMIN — HYDROCODONE BITARTRATE AND ACETAMINOPHEN 1 TABLET: 5; 325 TABLET ORAL at 09:06

## 2025-06-26 RX ADMIN — HYDROCODONE BITARTRATE AND ACETAMINOPHEN 1 TABLET: 5; 325 TABLET ORAL at 04:06

## 2025-06-26 NOTE — ASSESSMENT & PLAN NOTE
Patient's blood pressure range in the last 24 hours was: BP  Min: 116/73  Max: 169/89.The patient's inpatient anti-hypertensive regimen is listed below:  Current Antihypertensives  carvediloL tablet 6.25 mg, 2 times daily, Oral  losartan tablet 50 mg, Daily, Oral  nitroGLYCERIN SL tablet 0.4 mg, Every 5 min PRN, Sublingual    Plan  - BP is controlled, no changes needed to their regimen  - monitor

## 2025-06-26 NOTE — ASSESSMENT & PLAN NOTE
"Patient's FSGs are uncontrolled due to hyperglycemia on current medication regimen.  Last A1c reviewed-   Lab Results   Component Value Date    HGBA1C 7.0 (H) 10/20/2024     Most recent fingerstick glucose reviewed- No results for input(s): "POCTGLUCOSE" in the last 24 hours.  Current correctional scale  Low  Maintain anti-hyperglycemic dose as follows-   Antihyperglycemics (From admission, onward)      Start     Stop Route Frequency Ordered    06/27/25 2100  insulin glargine U-100 (Lantus) pen 20 Units         -- SubQ Nightly 06/26/25 1753    06/26/25 1836  insulin aspart U-100 pen 0-10 Units         -- SubQ Before meals & nightly PRN 06/26/25 1758          Hold Oral hypoglycemics while patient is in the hospital.  Reducing patient's Lantus as he will be NPO after midnight adding sliding scale    "

## 2025-06-26 NOTE — ASSESSMENT & PLAN NOTE
Patient reports I do not drink alcohol that much denies withdrawal symptoms  Endorses 1-2 drinks daily with ED MD  Monitor for withdrawal symptoms  History of hepatic steatosis reports 10 lb abdominal lb increase ultrasound ordered

## 2025-06-26 NOTE — ASSESSMENT & PLAN NOTE
Trend troponin  Given  mg  Continue ASA 81 mg  Telemetry  Echocardiogram ordered  EKG PRN  Nitroglycerin 0.4 mcg SL PRN  Consult cardiology      Satisfactory
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
## 2025-06-26 NOTE — HPI
Sampson Holt is a 69 y.o. year-old patient with history of  has a past medical history of Abdominal pain, CHF (congestive heart failure), Cigarette smoker, Diabetes mellitus, Emphysema lung, Endocarditis, Hypertension, and Stroke. who presented to the ED with reports of chest pain, shortness of breath, and productive cough x3 days.  Patient also states he has had a 10 lb weight gain in his abdomen only.  He is also with complaints of multiple areas of abdominal pain where he had insulin injections 2 years prior.  Patient reports he does not drink very much however he endorses 1-2 drinks daily along with 4-5 cigarettes. Patient also reports that he is unfamiliar with all of his medications.  Decision made to admit patient for cardiology follow up in a.m. as LAD with stenosis with last heart catheterization.      Triage vitals with pulse 84, /83, temp 98.5°, SpO2 96% on room air.  Blood work performed in the ED with abnormal glucose of 334.  Initial troponin 11.1, repeat 7.6.  Chest x-ray no acute cardiopulmonary abnormality.  CT head without contrast no acute intracranial abnormality.  Unchanged multifocal encephalomalacia of right frontal and bilateral parietal lobes suggesting sequela of old cortical infarcts.  EKG in sinus rhythm with first-degree AV block and left bundle branch block.

## 2025-06-26 NOTE — ASSESSMENT & PLAN NOTE
Patient's COPD is controlled currently.  Patient is currently off COPD Pathway. Continue scheduled inhalers Supplemental oxygen and monitor respiratory status closely.  Patient is not currently in an exacerbation.  Guaifenesin q.12 added

## 2025-06-26 NOTE — SUBJECTIVE & OBJECTIVE
Past Medical History:   Diagnosis Date    Abdominal pain 2022    CHF (congestive heart failure)     Cigarette smoker     Diabetes mellitus 2018    Emphysema lung     Endocarditis 2011    Hypertension     Stroke 2011    denies residual       Past Surgical History:   Procedure Laterality Date    ANGIOGRAM, CORONARY, WITH LEFT HEART CATHETERIZATION N/A 10/10/2023    Procedure: Angiogram, Coronary, with Left Heart Cath;  Surgeon: Dieudonne Ryan MD;  Location: [facility] CATH/EP LAB;  Service: Cardiology;  Laterality: N/A;    BACK SURGERY  1981    COLONOSCOPY N/A 2/23/2023    Procedure: COLONOSCOPY;  Surgeon: Jonn Cruz MD;  Location: [facility] ENDO;  Service: Endoscopy;  Laterality: N/A;    CORONARY ANGIOGRAPHY N/A 5/9/2023    Procedure: ANGIOGRAM, CORONARY ARTERY;  Surgeon: Antonio Kessler MD;  Location: [facility] CATH/EP LAB;  Service: Cardiology;  Laterality: N/A;    EXCISION OF HYDROCELE      x2    FRACTIONAL FLOW RESERVE (FFR), CORONARY  5/11/2023    Procedure: Fractional Flow [place] (FFR), Coronary;  Surgeon: Antonio Kessler MD;  Location: [facility] CATH/EP LAB;  Service: Cardiology;;    INGUINAL HERNIA REPAIR  1960    INSTANTANEOUS WAVE-FREE RATIO (IFR) N/A 10/10/2023    Procedure: Instantaneous Wave-Free Ratio (IFR);  Surgeon: Dieudonne Ryan MD;  Location: [facility] CATH/EP LAB;  Service: Cardiology;  Laterality: N/A;    INTRAMEDULLARY RODDING OF FEMUR Left 3/9/2024    Procedure: INSERTION, INTRAMEDULLARY HUI, FEMUR;  Surgeon: Tarun Hdez MD;  Location: [facility] OR;  Service: Orthopedics;  Laterality: Left;    INTRAVASCULAR ULTRASOUND, CORONARY N/A 5/9/2023    Procedure: Ultrasound-coronary;  Surgeon: Antonio Kessler MD;  Location: [facility] CATH/EP LAB;  Service: Cardiology;  Laterality: N/A;    IVUS, CORONARY  5/11/2023    Procedure: IVUS, Coronary;  Surgeon: Atnonio Kessler MD;  Location: [facility] CATH/EP LAB;  Service: Cardiology;;    LEFT HEART CATHETERIZATION Left 5/11/2023    Procedure: Left heart cath;  Surgeon:  Antonio Kessler MD;  Location: [facility] CATH/EP LAB;  Service: Cardiology;  Laterality: Left;    PERCUTANEOUS TRANSLUMINAL BALLOON ANGIOPLASTY OF CORONARY ARTERY  5/9/2023    Procedure: Angioplasty-coronary;  Surgeon: Antonio Kessler MD;  Location: [facility] CATH/EP LAB;  Service: Cardiology;;    RHINOPLASTY      STENT, DRUG ELUTING, SINGLE VESSEL, CORONARY  5/9/2023    Procedure: Stent, Drug Eluting, Single Vessel, Coronary;  Surgeon: Antonio Kessler MD;  Location: [facility] CATH/EP LAB;  Service: Cardiology;;    SURGICAL REMOVAL OF NODULE OF VOCAL CORD         Review of patient's allergies indicates:   Allergen Reactions    Amoxicillin Shortness Of Breath and Edema       No current facility-administered medications on file prior to encounter.     Current Outpatient Medications on File Prior to Encounter   Medication Sig    acetaminophen (TYLENOL) 500 MG tablet Take 1,000 mg by mouth every 12 (twelve) hours as needed for Pain or Temperature greater than.    albuterol (PROVENTIL/VENTOLIN HFA) 90 mcg/actuation inhaler Take 2 puffs by mouth 4 (four) times daily as needed for Wheezing or Shortness of Breath.    aspirin (ECOTRIN) 81 MG EC tablet Take 81 mg by mouth once daily.    carvediloL (COREG) 12.5 MG tablet Take 6.25 mg by mouth 2 (two) times daily.    clopidogreL (PLAVIX) 75 mg tablet Take 1 tablet (75 mg total) by mouth once daily.    folic acid (FOLVITE) 1 MG tablet Take 1 mg by mouth once daily.    furosemide (LASIX) 20 MG tablet Take 1 tablet (20 mg total) by mouth daily as needed (for 2-3 lbs weight gain in 24 hrs, or lower extremity edema).    hydrALAZINE (APRESOLINE) 25 MG tablet Take 1 tablet (25 mg total) by mouth every 8 (eight) hours.    HYDROcodone-acetaminophen (NORCO) 5-325 mg per tablet Take 1 tablet by mouth every 6 (six) hours as needed for Pain.    hyoscyamine 0.125 mg Subl Place 1 tablet (0.125 mg total) under the tongue every 4 (four) hours as needed.    ibuprofen (ADVIL,MOTRIN) 400 MG tablet Take 1 tablet by  mouth every 6 (six) hours as needed.    insulin glargine-yfgn 100 unit/mL Soln Inject 45 Units into the skin 2 (two) times a day.    losartan (COZAAR) 100 MG tablet Take 1 tablet (100 mg total) by mouth once daily.    metoprolol succinate (TOPROL-XL) 25 MG 24 hr tablet Take 25 mg by mouth 2 (two) times daily.    multivitamin Tab Take 1 tablet by mouth once daily.    naloxone (NARCAN) 4 mg/actuation Spry 1 spray by Nasal route once.    nicotine (NICODERM CQ) 14 mg/24 hr Place 1 patch onto the skin once daily.    nitroGLYCERIN (NITROSTAT) 0.4 MG SL tablet Place 1 tablet (0.4 mg total) under the tongue every 5 (five) minutes as needed for Chest pain.    omeprazole (PRILOSEC) 20 MG capsule Take 1 capsule (20 mg total) by mouth 2 (two) times a day. for 10 days    ondansetron (ZOFRAN-ODT) 4 MG TbDL Take 1 tablet (4 mg total) by mouth every 8 (eight) hours as needed (nausea).    rosuvastatin (CRESTOR) 10 MG tablet Take 5 mg by mouth once daily.    senna (SENOKOT) 8.6 mg tablet Take 17.2 mg by mouth daily as needed.    tamsulosin (FLOMAX) 0.4 mg Cap Take 0.4 mg by mouth once daily.    thiamine 100 MG tablet Take 100 mg by mouth once daily.     Family History       Problem Relation (Age of Onset)    Arthritis Mother          Tobacco Use    Smoking status: Every Day     Types: Cigarettes    Smokeless tobacco: Former    Tobacco comments:     Pt ready to quit smoking and states he can on his own. Seen 10/9/23 for inpatient smoking cessation. [employer].     Occasionally   Substance and Sexual Activity    Alcohol use: Yes     Alcohol/week: 14.0 standard drinks of alcohol     Types: 14 Shots of liquor per week    Drug use: Yes     Types: Marijuana    Sexual activity: Not on file     Review of Systems   Respiratory:  Positive for cough and shortness of breath.    Cardiovascular:  Positive for chest pain.   Gastrointestinal:  Positive for abdominal distention and abdominal pain.     Objective:     Vital Signs (Most  Recent):  Temp: 98.5 °F (36.9 °C) (06/26/25 1001)  Pulse: 70 (06/26/25 1610)  Resp: 17 (06/26/25 1610)  BP: (!) 169/89 (06/26/25 1600)  SpO2: 95 % (06/26/25 1610) Vital Signs (24h Range):  Temp:  [98.5 °F (36.9 °C)] 98.5 °F (36.9 °C)  Pulse:  [68-84] 70  Resp:  [14-20] 17  SpO2:  [93 %-98 %] 95 %  BP: (130-169)/(69-93) 169/89     Weight: 98.4 kg (217 lb)  Body mass index is 28.63 kg/m².     Physical Exam  Vitals reviewed.   Constitutional:       Appearance: Normal appearance.   HENT:      Head: Normocephalic and atraumatic.      Nose: No congestion.      Mouth/Throat:      Mouth: Mucous membranes are moist.      Pharynx: Oropharynx is clear.   Eyes:      Extraocular Movements: Extraocular movements intact.      Pupils: Pupils are equal, round, and reactive to light.   Cardiovascular:      Rate and Rhythm: Normal rate and regular rhythm.      Pulses: Normal pulses.      Heart sounds: Normal heart sounds.   Pulmonary:      Effort: Pulmonary effort is normal.      Breath sounds: Normal breath sounds.   Abdominal:      General: Bowel sounds are normal.      Palpations: Abdomen is soft.   Musculoskeletal:         General: Normal range of motion.      Cervical back: Normal range of motion and neck supple.   Skin:     General: Skin is warm and dry.      Capillary Refill: Capillary refill takes less than 2 seconds.   Neurological:      General: No focal deficit present.      Mental Status: He is alert and oriented to person, place, and time. Mental status is at baseline.   Psychiatric:         Mood and Affect: Mood normal.         Behavior: Behavior normal.         Judgment: Judgment normal.              CRANIAL NERVES     CN III, IV, VI   Pupils are equal, round, and reactive to light.       Significant Labs: All pertinent labs within the past 24 hours have been reviewed.  Recent Lab Results         06/26/25  1422   06/26/25  1148   06/26/25  1019        Influenza A, Molecular     Negative       Influenza B, Molecular      Negative       Albumin     4.6       Alcohol, Serum   <10         ALP     74       ALT     14       Anion Gap     13       AST     13       Baso #     0.05       Basophil %     0.7       BILIRUBIN TOTAL     0.8  Comment: For infants and newborns, interpretation of results should be based   on gestational age, weight and in agreement with clinical   observations.    Premature Infant recommended reference ranges:   0-24 hours:  <8.0 mg/dL   24-48 hours: <12.0 mg/dL   3-5 days:    <15.0 mg/dL   6-29 days:   <15.0 mg/dL       BNP     89  Comment: Values of less than 100 pg/ml are consistent with non-CHF populations.       BUN     17       Calcium     9.7       Chloride     98       CO2     25       SARS COV-2 MOLECULAR     Negative  Comment: This test utilizes isothermal nucleic acid amplification technology to detect the SARS-CoV-2 RdRp nucleic acid segment. The analytical sensitivity (limit of detection) is 500 copies/swab.     A POSITIVE result is indicative of the presence of SARS-CoV-2 RNA; clinical correlation with patient history and other diagnostic information is necessary to determine patient infection status.     A NEGATIVE result means that SARS-CoV-2 nucleic acids are not present above the limit of detection. A NEGATIVE result should be treated as presumptive. It does not rule out the possibility of COVID-19 and should not be the sole basis for treatment decisions. If COVID-19 is strongly suspected based on clinical and exposure history, re-testing using an alternate molecular assay should be considered.     This test is FDA approved.     Performance characteristics of this test has been independently verified by [facility] in conjunction with [facility] Department of Pathology and Laboratory Medicine.          Creatinine     1.1       eGFR     >60       Eos #     0.10       Eos %     1.5       Glucose     334       Hematocrit     45.0       Hemoglobin     15.2       Hepatitis C  Ab     Non-Reactive       HIV 1/2 Ag/Ab     Non-Reactive       Immature Grans (Abs)     0.04  Comment: Mild elevation in immature granulocytes is non specific and can be seen in a variety of conditions including stress response, acute inflammation, trauma and pregnancy. Correlation with other laboratory and clinical findings is essential.       Immature Granulocytes     0.6       INR     1.0  Comment: Coumadin Therapy:    2.0 - 3.0 for INR for all indicators except mechanical heart valves    and antiphospholipid syndromes which should use 2.5 - 3.5.       Lymph #     1.38       LYMPH %     20.3       Magnesium      1.9       MCH     31.7       MCHC     33.8       MCV     94       Mono #     0.69       Mono %     10.2       MPV     9.7       Neut #     4.5       Neut %     66.7       nRBC     0       Platelet Count     175       Potassium     3.9       PROTEIN TOTAL     7.5       PT     11.4       RBC     4.79       RDW     13.8       Sodium     136       Troponin I High Sensitivity 7.6  Comment: Troponin results differ between methods. Do not use   results between Troponin methods interchangeably.    Alkaline Phospatase levels above 400 U/L may   cause false positive results.    Access hsTnI should not be used for patients taking   Asfotase hilary (Strensiq).     11.1  Comment: Troponin results differ between methods. Do not use   results between Troponin methods interchangeably.    Alkaline Phospatase levels above 400 U/L may   cause false positive results.    Access hsTnI should not be used for patients taking   Asfotase hilary (Strensiq).       WBC     6.79               Significant Imaging: I have reviewed all pertinent imaging results/findings within the past 24 hours.    CT Head Without Contrast  Narrative: EXAMINATION:  CT HEAD WITHOUT CONTRAST    CLINICAL HISTORY:  Transient ischemic attack (TIA);R arm tingling x1 week;    TECHNIQUE:  Head CT without IV contrast.    COMPARISON:  MRI 08/12/2023, CT  08/11/2023    FINDINGS:  Gray-white differentiation is maintained without hemorrhage, midline shift, or mass effect. 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    The ventricles and cisterns are maintained.    Calvarium is intact. Trace left maxillary sinus mucosal thickening is present.  Impression: 1. No acute intracranial abnormality.  2. 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    Electronically signed by: Ren Leonard  Date:    06/26/2025  Time:    14:31  X-Ray Chest AP Portable  Narrative: EXAMINATION:  XR CHEST AP PORTABLE    CLINICAL HISTORY:  Chest pain;    FINDINGS:  Portable chest with comparison chest x-ray 01/05/2020.  Normal cardiomediastinal silhouette.Lungs are clear. Pulmonary vasculature is normal. No acute osseous abnormality.  Impression: No acute cardiopulmonary abnormality.    Electronically signed by: Tomas Kan  Date:    06/26/2025  Time:    10:27

## 2025-06-26 NOTE — ASSESSMENT & PLAN NOTE
Patient is chronically on statin.will continue for now. Monitor clinically. Last LDL was   Lab Results   Component Value Date    LDLCALC 72.2 05/23/2025

## 2025-06-26 NOTE — H&P
[facility] - Emergency Dept  Hospital Medicine  History & Physical    Patient Name: Sampson Holt  MRN: [id number]  Patient Class: OP- Observation  Admission Date: 6/26/2025  Attending Physician: Tee Quiñones MD   Primary Care Provider: Damian Vergara MD         Patient information was obtained from patient and ER records.     Subjective:     Principal Problem:Chest pain    Chief Complaint:   Chief Complaint   Patient presents with    Chest Pain     CP and SOB X 3 days, states he has a productive cough        HPI: Sampson Holt is a 69 y.o. year-old patient with history of  has a past medical history of Abdominal pain, CHF (congestive heart failure), Cigarette smoker, Diabetes mellitus, Emphysema lung, Endocarditis, Hypertension, and Stroke. who presented to the ED with reports of chest pain, shortness of breath, and productive cough x3 days.  Patient also states he has had a 10 lb weight gain in his abdomen only.  He is also with complaints of multiple areas of abdominal pain where he had insulin injections 2 years prior.  Patient reports he does not drink very much however he endorses 1-2 drinks daily along with 4-5 cigarettes. Patient also reports that he is unfamiliar with all of his medications.  Decision made to admit patient for cardiology follow up in a.m. as LAD with stenosis with last heart catheterization.      Triage vitals with pulse 84, /83, temp 98.5°, SpO2 96% on room air.  Blood work performed in the ED with abnormal glucose of 334.  Initial troponin 11.1, repeat 7.6.  Chest x-ray no acute cardiopulmonary abnormality.  CT head without contrast no acute intracranial abnormality.  Unchanged multifocal encephalomalacia of right frontal and bilateral parietal lobes suggesting sequela of old cortical infarcts.  EKG in sinus rhythm with first-degree AV block and left bundle branch block.      Past Medical History:   Diagnosis Date    Abdominal pain 2022    CHF (congestive  heart failure)     Cigarette smoker     Diabetes mellitus 2018    Emphysema lung     Endocarditis 2011    Hypertension     Stroke 2011    denies residual       Past Surgical History:   Procedure Laterality Date    ANGIOGRAM, CORONARY, WITH LEFT HEART CATHETERIZATION N/A 10/10/2023    Procedure: Angiogram, Coronary, with Left Heart Cath;  Surgeon: Dieudonne Ryan MD;  Location: [facility] CATH/EP LAB;  Service: Cardiology;  Laterality: N/A;    BACK SURGERY  1981    COLONOSCOPY N/A 2/23/2023    Procedure: COLONOSCOPY;  Surgeon: Jonn Cruz MD;  Location: [facility] ENDO;  Service: Endoscopy;  Laterality: N/A;    CORONARY ANGIOGRAPHY N/A 5/9/2023    Procedure: ANGIOGRAM, CORONARY ARTERY;  Surgeon: Antonio Kessler MD;  Location: [facility] CATH/EP LAB;  Service: Cardiology;  Laterality: N/A;    EXCISION OF HYDROCELE      x2    FRACTIONAL FLOW RESERVE (FFR), CORONARY  5/11/2023    Procedure: Fractional Flow [place] (FFR), Coronary;  Surgeon: Antonio Kessler MD;  Location: [facility] CATH/EP LAB;  Service: Cardiology;;    INGUINAL HERNIA REPAIR  1960    INSTANTANEOUS WAVE-FREE RATIO (IFR) N/A 10/10/2023    Procedure: Instantaneous Wave-Free Ratio (IFR);  Surgeon: Dieudonne Ryan MD;  Location: [facility] CATH/EP LAB;  Service: Cardiology;  Laterality: N/A;    INTRAMEDULLARY RODDING OF FEMUR Left 3/9/2024    Procedure: INSERTION, INTRAMEDULLARY HUI, FEMUR;  Surgeon: Tarun Hdez MD;  Location: [facility] OR;  Service: Orthopedics;  Laterality: Left;    INTRAVASCULAR ULTRASOUND, CORONARY N/A 5/9/2023    Procedure: Ultrasound-coronary;  Surgeon: Antonio Kessler MD;  Location: [facility] CATH/EP LAB;  Service: Cardiology;  Laterality: N/A;    IVUS, CORONARY  5/11/2023    Procedure: IVUS, Coronary;  Surgeon: Antonio Kessler MD;  Location: [facility] CATH/EP LAB;  Service: Cardiology;;    LEFT HEART CATHETERIZATION Left 5/11/2023    Procedure: Left heart cath;  Surgeon: Antonio Kessler MD;  Location: [facility] CATH/EP LAB;  Service: Cardiology;  Laterality:  Left;    PERCUTANEOUS TRANSLUMINAL BALLOON ANGIOPLASTY OF CORONARY ARTERY  5/9/2023    Procedure: Angioplasty-coronary;  Surgeon: Antonio Kessler MD;  Location: [facility] CATH/EP LAB;  Service: Cardiology;;    RHINOPLASTY      STENT, DRUG ELUTING, SINGLE VESSEL, CORONARY  5/9/2023    Procedure: Stent, Drug Eluting, Single Vessel, Coronary;  Surgeon: Antonio Kessler MD;  Location: [facility] CATH/EP LAB;  Service: Cardiology;;    SURGICAL REMOVAL OF NODULE OF VOCAL CORD         Review of patient's allergies indicates:   Allergen Reactions    Amoxicillin Shortness Of Breath and Edema       No current facility-administered medications on file prior to encounter.     Current Outpatient Medications on File Prior to Encounter   Medication Sig    acetaminophen (TYLENOL) 500 MG tablet Take 1,000 mg by mouth every 12 (twelve) hours as needed for Pain or Temperature greater than.    albuterol (PROVENTIL/VENTOLIN HFA) 90 mcg/actuation inhaler Take 2 puffs by mouth 4 (four) times daily as needed for Wheezing or Shortness of Breath.    aspirin (ECOTRIN) 81 MG EC tablet Take 81 mg by mouth once daily.    carvediloL (COREG) 12.5 MG tablet Take 6.25 mg by mouth 2 (two) times daily.    clopidogreL (PLAVIX) 75 mg tablet Take 1 tablet (75 mg total) by mouth once daily.    folic acid (FOLVITE) 1 MG tablet Take 1 mg by mouth once daily.    furosemide (LASIX) 20 MG tablet Take 1 tablet (20 mg total) by mouth daily as needed (for 2-3 lbs weight gain in 24 hrs, or lower extremity edema).    hydrALAZINE (APRESOLINE) 25 MG tablet Take 1 tablet (25 mg total) by mouth every 8 (eight) hours.    HYDROcodone-acetaminophen (NORCO) 5-325 mg per tablet Take 1 tablet by mouth every 6 (six) hours as needed for Pain.    hyoscyamine 0.125 mg Subl Place 1 tablet (0.125 mg total) under the tongue every 4 (four) hours as needed.    ibuprofen (ADVIL,MOTRIN) 400 MG tablet Take 1 tablet by mouth every 6 (six) hours as needed.    insulin glargine-yfgn 100 unit/mL Soln  Inject 45 Units into the skin 2 (two) times a day.    losartan (COZAAR) 100 MG tablet Take 1 tablet (100 mg total) by mouth once daily.    metoprolol succinate (TOPROL-XL) 25 MG 24 hr tablet Take 25 mg by mouth 2 (two) times daily.    multivitamin Tab Take 1 tablet by mouth once daily.    naloxone (NARCAN) 4 mg/actuation Spry 1 spray by Nasal route once.    nicotine (NICODERM CQ) 14 mg/24 hr Place 1 patch onto the skin once daily.    nitroGLYCERIN (NITROSTAT) 0.4 MG SL tablet Place 1 tablet (0.4 mg total) under the tongue every 5 (five) minutes as needed for Chest pain.    omeprazole (PRILOSEC) 20 MG capsule Take 1 capsule (20 mg total) by mouth 2 (two) times a day. for 10 days    ondansetron (ZOFRAN-ODT) 4 MG TbDL Take 1 tablet (4 mg total) by mouth every 8 (eight) hours as needed (nausea).    rosuvastatin (CRESTOR) 10 MG tablet Take 5 mg by mouth once daily.    senna (SENOKOT) 8.6 mg tablet Take 17.2 mg by mouth daily as needed.    tamsulosin (FLOMAX) 0.4 mg Cap Take 0.4 mg by mouth once daily.    thiamine 100 MG tablet Take 100 mg by mouth once daily.     Family History       Problem Relation (Age of Onset)    Arthritis Mother          Tobacco Use    Smoking status: Every Day     Types: Cigarettes    Smokeless tobacco: Former    Tobacco comments:     Pt ready to quit smoking and states he can on his own. Seen 10/9/23 for inpatient smoking cessation. [employer].     Occasionally   Substance and Sexual Activity    Alcohol use: Yes     Alcohol/week: 14.0 standard drinks of alcohol     Types: 14 Shots of liquor per week    Drug use: Yes     Types: Marijuana    Sexual activity: Not on file     Review of Systems   Respiratory:  Positive for cough and shortness of breath.    Cardiovascular:  Positive for chest pain.   Gastrointestinal:  Positive for abdominal distention and abdominal pain.     Objective:     Vital Signs (Most Recent):  Temp: 98.5 °F (36.9 °C) (06/26/25 1001)  Pulse: 70 (06/26/25 1610)  Resp:  17 (06/26/25 1610)  BP: (!) 169/89 (06/26/25 1600)  SpO2: 95 % (06/26/25 1610) Vital Signs (24h Range):  Temp:  [98.5 °F (36.9 °C)] 98.5 °F (36.9 °C)  Pulse:  [68-84] 70  Resp:  [14-20] 17  SpO2:  [93 %-98 %] 95 %  BP: (130-169)/(69-93) 169/89     Weight: 98.4 kg (217 lb)  Body mass index is 28.63 kg/m².     Physical Exam  Vitals reviewed.   Constitutional:       Appearance: Normal appearance.   HENT:      Head: Normocephalic and atraumatic.      Nose: No congestion.      Mouth/Throat:      Mouth: Mucous membranes are moist.      Pharynx: Oropharynx is clear.   Eyes:      Extraocular Movements: Extraocular movements intact.      Pupils: Pupils are equal, round, and reactive to light.   Cardiovascular:      Rate and Rhythm: Normal rate and regular rhythm.      Pulses: Normal pulses.      Heart sounds: Normal heart sounds.   Pulmonary:      Effort: Pulmonary effort is normal.      Breath sounds: Normal breath sounds.   Abdominal:      General: Bowel sounds are normal.      Palpations: Abdomen is soft.   Musculoskeletal:         General: Normal range of motion.      Cervical back: Normal range of motion and neck supple.   Skin:     General: Skin is warm and dry.      Capillary Refill: Capillary refill takes less than 2 seconds.   Neurological:      General: No focal deficit present.      Mental Status: He is alert and oriented to person, place, and time. Mental status is at baseline.   Psychiatric:         Mood and Affect: Mood normal.         Behavior: Behavior normal.         Judgment: Judgment normal.              CRANIAL NERVES     CN III, IV, VI   Pupils are equal, round, and reactive to light.       Significant Labs: All pertinent labs within the past 24 hours have been reviewed.  Recent Lab Results         06/26/25  1422   06/26/25  1148   06/26/25  1019        Influenza A, Molecular     Negative       Influenza B, Molecular     Negative       Albumin     4.6       Alcohol, Serum   <10         ALP     74        ALT     14       Anion Gap     13       AST     13       Baso #     0.05       Basophil %     0.7       BILIRUBIN TOTAL     0.8  Comment: For infants and newborns, interpretation of results should be based   on gestational age, weight and in agreement with clinical   observations.    Premature Infant recommended reference ranges:   0-24 hours:  <8.0 mg/dL   24-48 hours: <12.0 mg/dL   3-5 days:    <15.0 mg/dL   6-29 days:   <15.0 mg/dL       BNP     89  Comment: Values of less than 100 pg/ml are consistent with non-CHF populations.       BUN     17       Calcium     9.7       Chloride     98       CO2     25       SARS COV-2 MOLECULAR     Negative  Comment: This test utilizes isothermal nucleic acid amplification technology to detect the SARS-CoV-2 RdRp nucleic acid segment. The analytical sensitivity (limit of detection) is 500 copies/swab.     A POSITIVE result is indicative of the presence of SARS-CoV-2 RNA; clinical correlation with patient history and other diagnostic information is necessary to determine patient infection status.     A NEGATIVE result means that SARS-CoV-2 nucleic acids are not present above the limit of detection. A NEGATIVE result should be treated as presumptive. It does not rule out the possibility of COVID-19 and should not be the sole basis for treatment decisions. If COVID-19 is strongly suspected based on clinical and exposure history, re-testing using an alternate molecular assay should be considered.     This test is FDA approved.     Performance characteristics of this test has been independently verified by [facility] in conjunction with [facility] Department of Pathology and Laboratory Medicine.          Creatinine     1.1       eGFR     >60       Eos #     0.10       Eos %     1.5       Glucose     334       Hematocrit     45.0       Hemoglobin     15.2       Hepatitis C Ab     Non-Reactive       HIV 1/2 Ag/Ab     Non-Reactive       Immature Grans  (Abs)     0.04  Comment: Mild elevation in immature granulocytes is non specific and can be seen in a variety of conditions including stress response, acute inflammation, trauma and pregnancy. Correlation with other laboratory and clinical findings is essential.       Immature Granulocytes     0.6       INR     1.0  Comment: Coumadin Therapy:    2.0 - 3.0 for INR for all indicators except mechanical heart valves    and antiphospholipid syndromes which should use 2.5 - 3.5.       Lymph #     1.38       LYMPH %     20.3       Magnesium      1.9       MCH     31.7       MCHC     33.8       MCV     94       Mono #     0.69       Mono %     10.2       MPV     9.7       Neut #     4.5       Neut %     66.7       nRBC     0       Platelet Count     175       Potassium     3.9       PROTEIN TOTAL     7.5       PT     11.4       RBC     4.79       RDW     13.8       Sodium     136       Troponin I High Sensitivity 7.6  Comment: Troponin results differ between methods. Do not use   results between Troponin methods interchangeably.    Alkaline Phospatase levels above 400 U/L may   cause false positive results.    Access hsTnI should not be used for patients taking   Asfotase hilary (Strensiq).     11.1  Comment: Troponin results differ between methods. Do not use   results between Troponin methods interchangeably.    Alkaline Phospatase levels above 400 U/L may   cause false positive results.    Access hsTnI should not be used for patients taking   Asfotase hilary (Strensiq).       WBC     6.79               Significant Imaging: I have reviewed all pertinent imaging results/findings within the past 24 hours.    CT Head Without Contrast  Narrative: EXAMINATION:  CT HEAD WITHOUT CONTRAST    CLINICAL HISTORY:  Transient ischemic attack (TIA);R arm tingling x1 week;    TECHNIQUE:  Head CT without IV contrast.    COMPARISON:  MRI 08/12/2023, CT 08/11/2023    FINDINGS:  Gray-white differentiation is maintained without hemorrhage,  midline shift, or mass effect. 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    The ventricles and cisterns are maintained.    Calvarium is intact. Trace left maxillary sinus mucosal thickening is present.  Impression: 1. No acute intracranial abnormality.  2. 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    Electronically signed by: Ren Leonard  Date:    06/26/2025  Time:    14:31  X-Ray Chest AP Portable  Narrative: EXAMINATION:  XR CHEST AP PORTABLE    CLINICAL HISTORY:  Chest pain;    FINDINGS:  Portable chest with comparison chest x-ray 01/05/2020.  Normal cardiomediastinal silhouette.Lungs are clear. Pulmonary vasculature is normal. No acute osseous abnormality.  Impression: No acute cardiopulmonary abnormality.    Electronically signed by: Tomas Kan  Date:    06/26/2025  Time:    10:27      Assessment/Plan:     Assessment & Plan  Chest pain  Trend troponin  Given  mg  Continue ASA 81 mg  Telemetry  Echocardiogram ordered  EKG PRN  Nitroglycerin 0.4 mcg SL PRN  Consult cardiology     Benign essential HTN  Patient's blood pressure range in the last 24 hours was: BP  Min: 116/73  Max: 169/89.The patient's inpatient anti-hypertensive regimen is listed below:  Current Antihypertensives  carvediloL tablet 6.25 mg, 2 times daily, Oral  losartan tablet 50 mg, Daily, Oral  nitroGLYCERIN SL tablet 0.4 mg, Every 5 min PRN, Sublingual    Plan  - BP is controlled, no changes needed to their regimen  - monitor  COPD (chronic obstructive pulmonary disease)  Patient's COPD is controlled currently.  Patient is currently off COPD Pathway. Continue scheduled inhalers Supplemental oxygen and monitor respiratory status closely.  Patient is not currently in an exacerbation.  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  Last A1c reviewed-   Lab Results   Component Value Date    HGBA1C 7.0 (H) 10/20/2024     Most recent fingerstick glucose reviewed- No results for input(s): "POCTGLUCOSE" in the last 24 hours.  Current correctional scale  Low  Maintain anti-hyperglycemic dose as follows-   Antihyperglycemics (From admission, onward)      Start     Stop Route Frequency Ordered    06/27/25 2100  insulin glargine U-100 (Lantus) pen 20 Units         -- SubQ Nightly 06/26/25 1753    06/26/25 1836  insulin aspart U-100 pen 0-10 Units         -- SubQ Before meals & nightly PRN 06/26/25 1758          Hold Oral hypoglycemics while patient is in the hospital.  Reducing patient's Lantus as he will be NPO after midnight adding sliding scale    Hyperlipidemia   Patient is chronically on statin.will continue for now. Monitor clinically. Last LDL was   Lab Results   Component Value Date    LDLCALC 72.2 05/23/2025      Nicotine dependence  Dangers of cigarette smoking were reviewed with patient in detail. Patient was Counseled for 3-10 minutes. Nicotine replacement options were discussed. Nicotine replacement was discussed- prescribed  ETOH abuse  Patient reports I do not drink alcohol that much denies withdrawal symptoms  Endorses 1-2 drinks daily with ED MD  Monitor for withdrawal symptoms  History of hepatic steatosis reports 10 lb abdominal lb increase ultrasound ordered    VTE Risk Mitigation (From admission, onward)           Ordered     heparin (porcine) injection 5,000 Units  Every 8 hours         06/26/25 1739     IP VTE HIGH RISK PATIENT  Once         06/26/25 1739     Place sequential compression device  Until discontinued         06/26/25 1739                         On 06/26/2025, patient should be placed in hospital observation services under my care in collaboration with " Nargis Duran NP  Department of Hospital Medicine  [facility] - Emergency Dept

## 2025-06-27 ENCOUNTER — CLINICAL SUPPORT (OUTPATIENT)
Dept: CARDIOLOGY | Facility: HOSPITAL | Age: 70
End: 2025-06-27
Attending: EMERGENCY MEDICINE
Payer: OTHER GOVERNMENT

## 2025-06-27 PROBLEM — E11.59 TYPE 2 DIABETES MELLITUS WITH CIRCULATORY DISORDER, WITH LONG-TERM CURRENT USE OF INSULIN: Status: RESOLVED | Noted: 2020-03-27 | Resolved: 2025-06-27

## 2025-06-27 PROBLEM — Z79.4 TYPE 2 DIABETES MELLITUS WITH CIRCULATORY DISORDER, WITH LONG-TERM CURRENT USE OF INSULIN: Status: RESOLVED | Noted: 2020-03-27 | Resolved: 2025-06-27

## 2025-06-27 PROBLEM — R07.9 CHEST PAIN: Status: RESOLVED | Noted: 2024-10-18 | Resolved: 2025-06-27

## 2025-06-27 LAB
ABSOLUTE EOSINOPHIL (SMH): 0.11 K/UL
ABSOLUTE MONOCYTE (SMH): 0.66 K/UL (ref 0.3–1)
ABSOLUTE NEUTROPHIL COUNT (SMH): 3.5 K/UL (ref 1.8–7.7)
ALBUMIN SERPL-MCNC: 4.2 G/DL (ref 3.5–5.2)
ALP SERPL-CCNC: 72 UNIT/L (ref 55–135)
ALT SERPL-CCNC: 11 UNIT/L (ref 10–44)
AMPHET UR QL SCN: NEGATIVE
ANION GAP (SMH): 13 MMOL/L (ref 8–16)
AORTIC ROOT ANNULUS: 3.5 CM
AORTIC SIZE INDEX: 1.4 CM/M2
AORTIC VALVE CUSP SEPERATION: 1.5 CM
APICAL FOUR CHAMBER EJECTION FRACTION: 42 %
APICAL TWO CHAMBER EJECTION FRACTION: 42 %
ASCENDING AORTA: 3.2 CM
AST SERPL-CCNC: 12 UNIT/L (ref 10–40)
AV INDEX (PROSTH): 0.79
AV MEAN GRADIENT: 2 MMHG
AV PEAK GRADIENT: 5 MMHG
AV VALVE AREA BY VELOCITY RATIO: 3.4 CM²
AV VALVE AREA: 3.3 CM²
AV VELOCITY RATIO: 0.82
BARBITURATE SCN PRESENT UR: NEGATIVE
BASOPHILS # BLD AUTO: 0.06 K/UL
BASOPHILS NFR BLD AUTO: 1 %
BENZODIAZ UR QL SCN: NEGATIVE
BILIRUB SERPL-MCNC: 0.5 MG/DL (ref 0.1–1)
BSA FOR ECHO PROCEDURE: 2.25 M2
BUN SERPL-MCNC: 23 MG/DL (ref 8–23)
CALCIUM SERPL-MCNC: 9.5 MG/DL (ref 8.7–10.5)
CANNABINOIDS UR QL SCN: NEGATIVE
CHLORIDE SERPL-SCNC: 99 MMOL/L (ref 95–110)
CO2 SERPL-SCNC: 23 MMOL/L (ref 23–29)
COCAINE UR QL SCN: NEGATIVE
CREAT SERPL-MCNC: 1.1 MG/DL (ref 0.5–1.4)
CREAT UR-MCNC: 112.8 MG/DL (ref 23–375)
CV ECHO LV RWT: 0.54 CM
CV PHARM DOSE: 0.4 MG
CV STRESS BASE HR: 76 BPM
DIASTOLIC BLOOD PRESSURE: 87 MMHG
DOP CALC AO PEAK VEL: 1.1 M/S
DOP CALC AO VTI: 20.9 CM
DOP CALC LVOT AREA: 4.2 CM2
DOP CALC LVOT DIAMETER: 2.3 CM
DOP CALC LVOT PEAK VEL: 0.9 M/S
DOP CALC LVOT STROKE VOLUME: 68.9 CM3
DOP CALC MV VTI: 23.2 CM
DOP CALCLVOT PEAK VEL VTI: 16.6 CM
E WAVE DECELERATION TIME: 201 MSEC
E/A RATIO: 0.58
E/E' RATIO: 12 M/S
ECHO LV POSTERIOR WALL: 1.1 CM (ref 0.6–1.1)
ERYTHROCYTE [DISTWIDTH] IN BLOOD BY AUTOMATED COUNT: 13.9 % (ref 11.5–14.5)
FRACTIONAL SHORTENING: 22 % (ref 28–44)
GFR SERPLBLD CREATININE-BSD FMLA CKD-EPI: >60 ML/MIN/1.73/M2
GLUCOSE SERPL-MCNC: 386 MG/DL (ref 70–110)
HCT VFR BLD AUTO: 42.2 % (ref 40–54)
HGB BLD-MCNC: 14.6 GM/DL (ref 14–18)
IMM GRANULOCYTES # BLD AUTO: 0.02 K/UL (ref 0–0.04)
IMM GRANULOCYTES NFR BLD AUTO: 0.3 % (ref 0–0.5)
INTERVENTRICULAR SEPTUM: 1.5 CM (ref 0.6–1.1)
IVC DIAMETER: 1.8 CM
LEFT ATRIUM AREA SYSTOLIC (APICAL 2 CHAMBER): 14 CM2
LEFT ATRIUM AREA SYSTOLIC (APICAL 4 CHAMBER): 11.8 CM2
LEFT ATRIUM VOLUME INDEX MOD: 11 ML/M2
LEFT ATRIUM VOLUME MOD: 24 ML
LEFT INTERNAL DIMENSION IN SYSTOLE: 3.2 CM (ref 2.1–4)
LEFT VENTRICLE DIASTOLIC VOLUME INDEX: 33.18 ML/M2
LEFT VENTRICLE DIASTOLIC VOLUME: 74 ML
LEFT VENTRICLE END DIASTOLIC VOLUME APICAL 2 CHAMBER: 83.2 ML
LEFT VENTRICLE END DIASTOLIC VOLUME APICAL 4 CHAMBER: 124 ML
LEFT VENTRICLE END SYSTOLIC VOLUME APICAL 2 CHAMBER: 28.2 ML
LEFT VENTRICLE END SYSTOLIC VOLUME APICAL 4 CHAMBER: 20.7 ML
LEFT VENTRICLE MASS INDEX: 86.8 G/M2
LEFT VENTRICLE SYSTOLIC VOLUME INDEX: 18.4 ML/M2
LEFT VENTRICLE SYSTOLIC VOLUME: 41 ML
LEFT VENTRICULAR INTERNAL DIMENSION IN DIASTOLE: 4.1 CM (ref 3.5–6)
LEFT VENTRICULAR MASS: 193.5 G
LV LATERAL E/E' RATIO: 11 M/S
LV SEPTAL E/E' RATIO: 13.2 M/S
LVED V (TEICH): 74.22 ML
LVES V (TEICH): 40.96 ML
LVOT MG: 1 MMHG
LVOT MV: 0.54 CM/S
LYMPHOCYTES # BLD AUTO: 1.82 K/UL (ref 1–4.8)
MAGNESIUM SERPL-MCNC: 2 MG/DL (ref 1.6–2.6)
MCH RBC QN AUTO: 31.4 PG (ref 27–31)
MCHC RBC AUTO-ENTMCNC: 34.6 G/DL (ref 32–36)
MCV RBC AUTO: 91 FL (ref 82–98)
MV MEAN GRADIENT: 3 MMHG
MV PEAK A VEL: 1.13 M/S
MV PEAK E VEL: 0.66 M/S
MV PEAK GRADIENT: 6 MMHG
MV VALVE AREA BY CONTINUITY EQUATION: 2.97 CM2
NUCLEATED RBC (/100WBC) (SMH): 0 /100 WBC
OHS CV CPX 1 MINUTE RECOVERY HEART RATE: 85 BPM
OHS CV CPX 85 PERCENT MAX PREDICTED HEART RATE MALE: 128
OHS CV CPX MAX PREDICTED HEART RATE: 150
OHS CV CPX PATIENT IS FEMALE: 0
OHS CV CPX PATIENT IS MALE: 1
OHS CV CPX PEAK DIASTOLIC BLOOD PRESSURE: 85 MMHG
OHS CV CPX PEAK HEAR RATE: 91 BPM
OHS CV CPX PEAK RATE PRESSURE PRODUCT: NORMAL
OHS CV CPX PEAK SYSTOLIC BLOOD PRESSURE: 127 MMHG
OHS CV CPX PERCENT MAX PREDICTED HEART RATE ACHIEVED: 61
OHS CV CPX RATE PRESSURE PRODUCT PRESENTING: 9044
OHS CV RV/LV RATIO: 0.66 CM
OHS LV EJECTION FRACTION SIMPSONS BIPLANE MOD: 43 %
OPIATES UR QL SCN: ABNORMAL
PCP UR QL: NEGATIVE
PLATELET # BLD AUTO: 171 K/UL (ref 150–450)
PMV BLD AUTO: 9.4 FL (ref 9.2–12.9)
POCT GLUCOSE: 306 MG/DL (ref 70–110)
POCT GLUCOSE: 338 MG/DL (ref 70–110)
POCT GLUCOSE: 417 MG/DL (ref 70–110)
POTASSIUM SERPL-SCNC: 4.1 MMOL/L (ref 3.5–5.1)
PROT SERPL-MCNC: 7 GM/DL (ref 6–8.4)
PV MV: 0.91 M/S
PV PEAK GRADIENT: 5 MMHG
PV PEAK VELOCITY: 1.17 M/S
RA PRESSURE ESTIMATED: 3 MMHG
RBC # BLD AUTO: 4.65 M/UL (ref 4.6–6.2)
RELATIVE EOSINOPHIL (SMH): 1.8 % (ref 0–8)
RELATIVE LYMPHOCYTE (SMH): 29.5 % (ref 18–48)
RELATIVE MONOCYTE (SMH): 10.7 % (ref 4–15)
RELATIVE NEUTROPHIL (SMH): 56.7 % (ref 38–73)
RIGHT ATRIUM END SYSTOLIC VOLUME APICAL 4 CHAMBER INDEX BSA: 8.74 ML/M2
RIGHT ATRIUM VOLUME AREA LENGTH APICAL 4 CHAMBER: 19.5 ML
RIGHT VENTRICLE DIASTOLIC BASEL DIMENSION: 2.7 CM
RIGHT VENTRICULAR END-DIASTOLIC DIMENSION: 2.7 CM
RV TISSUE DOPPLER FREE WALL SYSTOLIC VELOCITY 1 (APICAL 4 CHAMBER VIEW): 9.46 CM/S
SODIUM SERPL-SCNC: 135 MMOL/L (ref 136–145)
SYSTOLIC BLOOD PRESSURE: 119 MMHG
TDI LATERAL: 0.06 M/S
TDI SEPTAL: 0.05 M/S
TDI: 0.06 M/S
TRICUSPID ANNULAR PLANE SYSTOLIC EXCURSION: 2 CM
TROPONIN HIGH SENSITIVE (SMH): 11.3 PG/ML
WBC # BLD AUTO: 6.17 K/UL (ref 3.9–12.7)
Z-SCORE OF LEFT VENTRICULAR DIMENSION IN END DIASTOLE: -6.53
Z-SCORE OF LEFT VENTRICULAR DIMENSION IN END SYSTOLE: -3.17

## 2025-06-27 PROCEDURE — 99900035 HC TECH TIME PER 15 MIN (STAT)

## 2025-06-27 PROCEDURE — 27000221 HC OXYGEN, UP TO 24 HOURS

## 2025-06-27 PROCEDURE — 93018 CV STRESS TEST I&R ONLY: CPT | Mod: ,,, | Performed by: INTERNAL MEDICINE

## 2025-06-27 PROCEDURE — 94640 AIRWAY INHALATION TREATMENT: CPT | Mod: XB

## 2025-06-27 PROCEDURE — S4991 NICOTINE PATCH NONLEGEND: HCPCS | Performed by: STUDENT IN AN ORGANIZED HEALTH CARE EDUCATION/TRAINING PROGRAM

## 2025-06-27 PROCEDURE — 99214 OFFICE O/P EST MOD 30 MIN: CPT | Mod: 25,,, | Performed by: INTERNAL MEDICINE

## 2025-06-27 PROCEDURE — 25000242 PHARM REV CODE 250 ALT 637 W/ HCPCS: Performed by: INTERNAL MEDICINE

## 2025-06-27 PROCEDURE — 93016 CV STRESS TEST SUPVJ ONLY: CPT | Mod: ,,, | Performed by: INTERNAL MEDICINE

## 2025-06-27 PROCEDURE — 96374 THER/PROPH/DIAG INJ IV PUSH: CPT

## 2025-06-27 PROCEDURE — 25000003 PHARM REV CODE 250: Performed by: STUDENT IN AN ORGANIZED HEALTH CARE EDUCATION/TRAINING PROGRAM

## 2025-06-27 PROCEDURE — 63600175 PHARM REV CODE 636 W HCPCS: Performed by: NURSE PRACTITIONER

## 2025-06-27 PROCEDURE — 93017 CV STRESS TEST TRACING ONLY: CPT

## 2025-06-27 PROCEDURE — G0378 HOSPITAL OBSERVATION PER HR: HCPCS

## 2025-06-27 PROCEDURE — 93306 TTE W/DOPPLER COMPLETE: CPT | Mod: 26,,, | Performed by: INTERNAL MEDICINE

## 2025-06-27 PROCEDURE — A9502 TC99M TETROFOSMIN: HCPCS | Performed by: INTERNAL MEDICINE

## 2025-06-27 PROCEDURE — 96372 THER/PROPH/DIAG INJ SC/IM: CPT | Performed by: NURSE PRACTITIONER

## 2025-06-27 PROCEDURE — 36415 COLL VENOUS BLD VENIPUNCTURE: CPT | Performed by: NURSE PRACTITIONER

## 2025-06-27 PROCEDURE — 82040 ASSAY OF SERUM ALBUMIN: CPT | Performed by: NURSE PRACTITIONER

## 2025-06-27 PROCEDURE — 80307 DRUG TEST PRSMV CHEM ANLYZR: CPT | Performed by: NURSE PRACTITIONER

## 2025-06-27 PROCEDURE — 94761 N-INVAS EAR/PLS OXIMETRY MLT: CPT

## 2025-06-27 PROCEDURE — 83735 ASSAY OF MAGNESIUM: CPT | Performed by: NURSE PRACTITIONER

## 2025-06-27 PROCEDURE — 93306 TTE W/DOPPLER COMPLETE: CPT

## 2025-06-27 PROCEDURE — 84484 ASSAY OF TROPONIN QUANT: CPT | Performed by: INTERNAL MEDICINE

## 2025-06-27 PROCEDURE — 63600175 PHARM REV CODE 636 W HCPCS

## 2025-06-27 PROCEDURE — 99900031 HC PATIENT EDUCATION (STAT)

## 2025-06-27 PROCEDURE — 63600175 PHARM REV CODE 636 W HCPCS: Performed by: INTERNAL MEDICINE

## 2025-06-27 PROCEDURE — 25000003 PHARM REV CODE 250: Performed by: NURSE PRACTITIONER

## 2025-06-27 PROCEDURE — 85025 COMPLETE CBC W/AUTO DIFF WBC: CPT | Performed by: NURSE PRACTITIONER

## 2025-06-27 RX ORDER — IBUPROFEN 200 MG
1 TABLET ORAL DAILY
Status: DISCONTINUED | OUTPATIENT
Start: 2025-06-27 | End: 2025-06-28 | Stop reason: HOSPADM

## 2025-06-27 RX ORDER — AMINOPHYLLINE 25 MG/ML
50 INJECTION, SOLUTION INTRAVENOUS
Status: COMPLETED | OUTPATIENT
Start: 2025-06-27 | End: 2025-06-27

## 2025-06-27 RX ORDER — REGADENOSON 0.08 MG/ML
0.4 INJECTION, SOLUTION INTRAVENOUS
Status: COMPLETED | OUTPATIENT
Start: 2025-06-27 | End: 2025-06-27

## 2025-06-27 RX ORDER — FUROSEMIDE 10 MG/ML
20 INJECTION INTRAMUSCULAR; INTRAVENOUS ONCE
Status: COMPLETED | OUTPATIENT
Start: 2025-06-27 | End: 2025-06-27

## 2025-06-27 RX ADMIN — ASPIRIN 81 MG: 81 TABLET ORAL at 09:06

## 2025-06-27 RX ADMIN — INSULIN GLARGINE 20 UNITS: 100 INJECTION, SOLUTION SUBCUTANEOUS at 09:06

## 2025-06-27 RX ADMIN — ALUMINUM HYDROXIDE, MAGNESIUM HYDROXIDE, AND DIMETHICONE 30 ML: 200; 20; 200 SUSPENSION ORAL at 02:06

## 2025-06-27 RX ADMIN — Medication 100 MG: at 09:06

## 2025-06-27 RX ADMIN — BUDESONIDE INHALATION 0.5 MG: 0.5 SUSPENSION RESPIRATORY (INHALATION) at 07:06

## 2025-06-27 RX ADMIN — FUROSEMIDE 20 MG: 10 INJECTION, SOLUTION INTRAMUSCULAR; INTRAVENOUS at 02:06

## 2025-06-27 RX ADMIN — GUAIFENESIN 600 MG: 600 TABLET, EXTENDED RELEASE ORAL at 09:06

## 2025-06-27 RX ADMIN — TAMSULOSIN HYDROCHLORIDE 0.4 MG: 0.4 CAPSULE ORAL at 09:06

## 2025-06-27 RX ADMIN — REGADENOSON 0.4 MG: 0.08 INJECTION, SOLUTION INTRAVENOUS at 12:06

## 2025-06-27 RX ADMIN — HYDROCODONE BITARTRATE AND ACETAMINOPHEN 1 TABLET: 5; 325 TABLET ORAL at 10:06

## 2025-06-27 RX ADMIN — ARFORMOTEROL TARTRATE 15 MCG: 15 SOLUTION RESPIRATORY (INHALATION) at 07:06

## 2025-06-27 RX ADMIN — HYDROCODONE BITARTRATE AND ACETAMINOPHEN 1 TABLET: 5; 325 TABLET ORAL at 04:06

## 2025-06-27 RX ADMIN — FOLIC ACID 1 MG: 1 TABLET ORAL at 09:06

## 2025-06-27 RX ADMIN — CARVEDILOL 6.25 MG: 6.25 TABLET, FILM COATED ORAL at 09:06

## 2025-06-27 RX ADMIN — TETROFOSMIN 27.5 MILLICURIE: 0.23 INJECTION, POWDER, LYOPHILIZED, FOR SOLUTION INTRAVENOUS at 12:06

## 2025-06-27 RX ADMIN — HEPARIN SODIUM 5000 UNITS: 5000 INJECTION INTRAVENOUS; SUBCUTANEOUS at 05:06

## 2025-06-27 RX ADMIN — INSULIN ASPART 10 UNITS: 100 INJECTION, SOLUTION INTRAVENOUS; SUBCUTANEOUS at 04:06

## 2025-06-27 RX ADMIN — INSULIN ASPART 4 UNITS: 100 INJECTION, SOLUTION INTRAVENOUS; SUBCUTANEOUS at 09:06

## 2025-06-27 RX ADMIN — TETROFOSMIN 11 MILLICURIE: 0.23 INJECTION, POWDER, LYOPHILIZED, FOR SOLUTION INTRAVENOUS at 11:06

## 2025-06-27 RX ADMIN — HEPARIN SODIUM 5000 UNITS: 5000 INJECTION INTRAVENOUS; SUBCUTANEOUS at 09:06

## 2025-06-27 RX ADMIN — AMINOPHYLLINE 50 MG: 25 INJECTION, SOLUTION INTRAVENOUS at 12:06

## 2025-06-27 RX ADMIN — HEPARIN SODIUM 5000 UNITS: 5000 INJECTION INTRAVENOUS; SUBCUTANEOUS at 02:06

## 2025-06-27 RX ADMIN — THERA TABS 1 TABLET: TAB at 09:06

## 2025-06-27 RX ADMIN — NICOTINE 1 PATCH: 21 PATCH, EXTENDED RELEASE TRANSDERMAL at 10:06

## 2025-06-27 RX ADMIN — CLOPIDOGREL 75 MG: 75 TABLET ORAL at 09:06

## 2025-06-27 RX ADMIN — ATORVASTATIN CALCIUM 40 MG: 40 TABLET, FILM COATED ORAL at 09:06

## 2025-06-27 RX ADMIN — LOSARTAN POTASSIUM 50 MG: 50 TABLET, FILM COATED ORAL at 09:06

## 2025-06-27 NOTE — PLAN OF CARE
LENORE met with patient at bedside to discuss his portable oxygen being delivered to bedside for discharge. Patient reported he would contact his friend, Angel, to  his oxygen from his home tomorrow while he is there feeding his animals. Patient reported he would not have any issues getting Angel to bring his portable oxygen to hospital for discharge.

## 2025-06-27 NOTE — ASSESSMENT & PLAN NOTE
Patient's FSGs are uncontrolled due to hyperglycemia on current medication regimen.  Last A1c reviewed-   Lab Results   Component Value Date    HGBA1C 7.0 (H) 10/20/2024     Most recent fingerstick glucose reviewed-   Recent Labs   Lab 06/26/25 2059 06/27/25  0724   POCTGLUCOSE 252* 338*     Current correctional scale  Low  Maintain anti-hyperglycemic dose as follows-   Antihyperglycemics (From admission, onward)      Start     Stop Route Frequency Ordered    06/27/25 2100  insulin glargine U-100 (Lantus) pen 20 Units         -- SubQ Nightly 06/26/25 1753    06/26/25 1836  insulin aspart U-100 pen 0-10 Units         -- SubQ Before meals & nightly PRN 06/26/25 1758          Hold Oral hypoglycemics while patient is in the hospital.  Reducing patient's Lantus as he will be NPO after midnight adding sliding scale

## 2025-06-27 NOTE — ASSESSMENT & PLAN NOTE
Patient's blood pressure range in the last 24 hours was: BP  Min: 114/86  Max: 169/89.The patient's inpatient anti-hypertensive regimen is listed below:  Current Antihypertensives  carvediloL tablet 6.25 mg, 2 times daily, Oral  losartan tablet 50 mg, Daily, Oral  nitroGLYCERIN SL tablet 0.4 mg, Every 5 min PRN, Sublingual    Plan  - BP is controlled, no changes needed to their regimen  - monitor

## 2025-06-27 NOTE — PLAN OF CARE
Pt clear for DC from case management standpoint. Discharging to home. Patient reported that he will drive himself home.     In basket message sent to PCP and IRENA Kelsey's office for hospital follow up. Added to AVS.      06/27/25 9196   Final Note   Assessment Type Final Discharge Note   Anticipated Discharge Disposition Home   Hospital Resources/Appts/Education Provided Appointment suggestion unavailable

## 2025-06-27 NOTE — PROGRESS NOTES
[facility] Medicine  Progress Note    Patient Name: Sampson Holt  MRN: [id number]  Patient Class: OP- Observation   Admission Date: 6/26/2025  Length of Stay: 0 days  Attending Physician: Latisha Cedillo MD  Primary Care Provider: Damian Vergara MD        Subjective     Principal Problem:Chest pain        HPI:  Sampson Holt is a 69 y.o. year-old patient with history of  has a past medical history of Abdominal pain, CHF (congestive heart failure), Cigarette smoker, Diabetes mellitus, Emphysema lung, Endocarditis, Hypertension, and Stroke. who presented to the ED with reports of chest pain, shortness of breath, and productive cough x3 days.  Patient also states he has had a 10 lb weight gain in his abdomen only.  He is also with complaints of multiple areas of abdominal pain where he had insulin injections 2 years prior.  Patient reports he does not drink very much however he endorses 1-2 drinks daily along with 4-5 cigarettes. Patient also reports that he is unfamiliar with all of his medications.  Decision made to admit patient for cardiology follow up in a.m. as LAD with stenosis with last heart catheterization.      Triage vitals with pulse 84, /83, temp 98.5°, SpO2 96% on room air.  Blood work performed in the ED with abnormal glucose of 334.  Initial troponin 11.1, repeat 7.6.  Chest x-ray no acute cardiopulmonary abnormality.  CT head without contrast no acute intracranial abnormality.  Unchanged multifocal encephalomalacia of right frontal and bilateral parietal lobes suggesting sequela of old cortical infarcts.  EKG in sinus rhythm with first-degree AV block and left bundle branch block.      Overview/Hospital Course:  During hospitalization was admitted to [facility] for evaluation and management of chest pain.  Patient admits to history of chest pain with the placement of 2 cardiac stents.  His troponins were normal.  Echo was performed and Cardiology  Refill Authorization Note   Raquel Pickard  is requesting a refill authorization.  Brief Assessment and Rationale for Refill:  Approve    -Medication-Related Problems Identified: Requires labs  Medication Therapy Plan:  FOV;    Medication Reconciliation Completed: No   Comments:     Provider Staff:     Action is required for this patient.   Please see care gap opportunities below in Care Due Message.     Thanks!  [facility]     Appointments      Date Provider   Last Visit   11/10/2021 Debra Jensen MD   Next Visit   5/11/2022 Debra Jensen MD     Note composed:4:07 PM 04/26/2022           Note composed:4:07 PM 04/26/2022           consulted.    Interval History:  Seen and examined at bedside during morning multidisciplinary rounds.  Patient seems to have several chronic issues, including financial issues at home.  He also complains of pinpoint pain in his abdomen at injection sites from insulin.  His troponins have been within normal limits, echo is pending, cardiology consult pending.    Review of Systems   All other systems reviewed and are negative.    Objective:     Vital Signs (Most Recent):  Temp: 98.1 °F (36.7 °C) (06/27/25 0705)  Pulse: 95 (06/27/25 0710)  Resp: 18 (06/27/25 0710)  BP: 134/87 (06/27/25 0941)  SpO2: 95 % (06/27/25 0710) Vital Signs (24h Range):  Temp:  [97.4 °F (36.3 °C)-98.1 °F (36.7 °C)] 98.1 °F (36.7 °C)  Pulse:  [68-95] 95  Resp:  [14-20] 18  SpO2:  [91 %-98 %] 95 %  BP: (114-169)/(69-93) 134/87     Weight: 98.4 kg (217 lb)  Body mass index is 28.63 kg/m².    Intake/Output Summary (Last 24 hours) at 6/27/2025 1006  Last data filed at 6/27/2025 0258  Gross per 24 hour   Intake 120 ml   Output 250 ml   Net -130 ml         Physical Exam  Constitutional:       Appearance: Normal appearance.   HENT:      Head: Normocephalic and atraumatic.      Nose: Nose normal.      Mouth/Throat:      Mouth: Mucous membranes are moist.      Pharynx: Oropharynx is clear.   Eyes:      Pupils: Pupils are equal, round, and reactive to light.   Cardiovascular:      Rate and Rhythm: Normal rate and regular rhythm.   Pulmonary:      Effort: Pulmonary effort is normal.      Breath sounds: Normal breath sounds.   Abdominal:      General: Bowel sounds are normal.      Palpations: Abdomen is soft.      Comments: Diffuse areas of tenderness, patient states this is chronic pain for over a year now.  Describes it as pinpoint at insulin injection sites..   Musculoskeletal:         General: Normal range of motion.      Cervical back: Normal range of motion.   Skin:     General: Skin is warm and dry.   Neurological:      General: No focal deficit  present.      Mental Status: He is alert and oriented to person, place, and time.   Psychiatric:         Mood and Affect: Mood normal.         Behavior: Behavior normal.                 Assessment & Plan  Chest pain  Heart score 6   Cardiac have been normal.  Echo pending, cardiology consult pending  Benign essential HTN  Patient's blood pressure range in the last 24 hours was: BP  Min: 114/86  Max: 169/89.The patient's inpatient anti-hypertensive regimen is listed below:  Current Antihypertensives  carvediloL tablet 6.25 mg, 2 times daily, Oral  losartan tablet 50 mg, Daily, Oral  nitroGLYCERIN SL tablet 0.4 mg, Every 5 min PRN, Sublingual    Plan  - BP is controlled, no changes needed to their regimen  - monitor  COPD (chronic obstructive pulmonary disease)  Patient's COPD is controlled currently.  Patient is currently off COPD Pathway. Continue scheduled inhalers Supplemental oxygen and monitor respiratory status closely.  Patient is not currently in an exacerbation.  Guaifenesin q.12 added  Type 2 diabetes mellitus with circulatory disorder, with long-term current use of insulin  Patient's FSGs are uncontrolled due to hyperglycemia on current medication regimen.  Last A1c reviewed-   Lab Results   Component Value Date    HGBA1C 7.0 (H) 10/20/2024     Most recent fingerstick glucose reviewed-   Recent Labs   Lab 06/26/25 2059 06/27/25  0724   POCTGLUCOSE 252* 338*     Current correctional scale  Low  Maintain anti-hyperglycemic dose as follows-   Antihyperglycemics (From admission, onward)      Start     Stop Route Frequency Ordered    06/27/25 2100  insulin glargine U-100 (Lantus) pen 20 Units         -- SubQ Nightly 06/26/25 1753    06/26/25 1836  insulin aspart U-100 pen 0-10 Units         -- SubQ Before meals & nightly PRN 06/26/25 1758          Hold Oral hypoglycemics while patient is in the hospital.  Reducing patient's Lantus as he will be NPO after midnight adding sliding scale    Hyperlipidemia    Patient is chronically on statin.will continue for now. Monitor clinically. Last LDL was   Lab Results   Component Value Date    LDLCALC 72.2 05/23/2025      Nicotine dependence  Dangers of cigarette smoking were reviewed with patient in detail. Patient was Counseled for 3-10 minutes. Nicotine replacement options were discussed. Nicotine replacement was discussed- prescribed  ETOH abuse  Monitor for withdrawal.  VTE Risk Mitigation (From admission, onward)           Ordered     heparin (porcine) injection 5,000 Units  Every 8 hours         06/26/25 1739     IP VTE HIGH RISK PATIENT  Once         06/26/25 1739     Place sequential compression device  Until discontinued         06/26/25 1739                    Discharge Planning   DAVID: 6/27/2025     Code Status: Full Code   Medical Readiness for Discharge Date:                            Javier Martin NP  Department of Hospital Medicine   [facility]

## 2025-06-27 NOTE — CONSULTS
[facility]  Department of Cardiology  Consult Note      PATIENT NAME: Sampson Holt  MRN: [id number]  TODAY'S DATE: 06/27/2025  ADMIT DATE: 6/26/2025                          CONSULT REQUESTED BY: Latisha Cedillo MD    SUBJECTIVE     PRINCIPAL PROBLEM: Chest pain      REASON FOR CONSULT:    Chest pain in CAD patient      HPI:    69-year-old male with past medical history of diabetes mellitus, CVA, hypertension, CAD s/p PCI, tobacco abuse, EtOH abuse, emphysema, fatty liver disease, who presented to the emergency room with complains of chest discomfort with exertion, shortness of breath, and productive cough x3 days.  He has noticed a 10 lb weight gain in his abdomen only.  He also reports abdominal pain with palpation.  Shortness of breath occurs with exertion and with lying down as his abdomen compresses his lungs.  History of ETOH abuse but has reduced his drinking to 1-2 drinks a day.  No acute STT wave changes noted on EKG. Troponin HS negative.   Plans for nuclear stress test today            Review of patient's allergies indicates:   Allergen Reactions    Amoxicillin Shortness Of Breath and Edema       Past Medical History:   Diagnosis Date    Abdominal pain 2022    CHF (congestive heart failure)     Cigarette smoker     Diabetes mellitus 2018    Emphysema lung     Endocarditis 2011    Hypertension     Stroke 2011    denies residual     Past Surgical History:   Procedure Laterality Date    ANGIOGRAM, CORONARY, WITH LEFT HEART CATHETERIZATION N/A 10/10/2023    Procedure: Angiogram, Coronary, with Left Heart Cath;  Surgeon: Dieudonne Ryan MD;  Location: [facility] CATH/EP LAB;  Service: Cardiology;  Laterality: N/A;    BACK SURGERY  1981    COLONOSCOPY N/A 2/23/2023    Procedure: COLONOSCOPY;  Surgeon: Jonn Cruz MD;  Location: [facility] ENDO;  Service: Endoscopy;  Laterality: N/A;    CORONARY ANGIOGRAPHY N/A 5/9/2023    Procedure: ANGIOGRAM, CORONARY ARTERY;  Surgeon: Antonio Kessler MD;   Location: [facility] CATH/EP LAB;  Service: Cardiology;  Laterality: N/A;    EXCISION OF HYDROCELE      x2    FRACTIONAL FLOW RESERVE (FFR), CORONARY  5/11/2023    Procedure: Fractional Flow [place] (FFR), Coronary;  Surgeon: Antonio Kessler MD;  Location: [facility] CATH/EP LAB;  Service: Cardiology;;    INGUINAL HERNIA REPAIR  1960    INSTANTANEOUS WAVE-FREE RATIO (IFR) N/A 10/10/2023    Procedure: Instantaneous Wave-Free Ratio (IFR);  Surgeon: Dieudonne Ryan MD;  Location: [facility] CATH/EP LAB;  Service: Cardiology;  Laterality: N/A;    INTRAMEDULLARY RODDING OF FEMUR Left 3/9/2024    Procedure: INSERTION, INTRAMEDULLARY HUI, FEMUR;  Surgeon: Tarun Hdez MD;  Location: [facility] OR;  Service: Orthopedics;  Laterality: Left;    INTRAVASCULAR ULTRASOUND, CORONARY N/A 5/9/2023    Procedure: Ultrasound-coronary;  Surgeon: Antonio Kessler MD;  Location: [facility] CATH/EP LAB;  Service: Cardiology;  Laterality: N/A;    IVUS, CORONARY  5/11/2023    Procedure: IVUS, Coronary;  Surgeon: Antonio Kessler MD;  Location: [facility] CATH/EP LAB;  Service: Cardiology;;    LEFT HEART CATHETERIZATION Left 5/11/2023    Procedure: Left heart cath;  Surgeon: Antonio Kessler MD;  Location: [facility] CATH/EP LAB;  Service: Cardiology;  Laterality: Left;    PERCUTANEOUS TRANSLUMINAL BALLOON ANGIOPLASTY OF CORONARY ARTERY  5/9/2023    Procedure: Angioplasty-coronary;  Surgeon: Antonio Kessler MD;  Location: [facility] CATH/EP LAB;  Service: Cardiology;;    RHINOPLASTY      STENT, DRUG ELUTING, SINGLE VESSEL, CORONARY  5/9/2023    Procedure: Stent, Drug Eluting, Single Vessel, Coronary;  Surgeon: Antonio Kessler MD;  Location: [facility] CATH/EP LAB;  Service: Cardiology;;    SURGICAL REMOVAL OF NODULE OF VOCAL CORD       Social History[1]     REVIEW OF SYSTEMS    As mentioned in HPI    OBJECTIVE     VITAL SIGNS (Most Recent)  Temp: 98 °F (36.7 °C) (06/27/25 1115)  Pulse: 72 (06/27/25 1115)  Resp: 17 (06/27/25 1115)  BP: (!) 149/93 (06/27/25 1115)  SpO2: (!) 93 % (06/27/25  1115)    VENTILATION STATUS  Resp: 17 (06/27/25 1115)  SpO2: (!) 93 % (06/27/25 1115)  Oxygen Concentration (%):  [28] 28        I & O (Last 24H):  Intake/Output Summary (Last 24 hours) at 6/27/2025 1228  Last data filed at 6/27/2025 1048  Gross per 24 hour   Intake 120 ml   Output 400 ml   Net -280 ml       WEIGHTS  Wt Readings from Last 3 Encounters:   06/26/25 1001 98.4 kg (217 lb)   05/14/25 1308 98.7 kg (217 lb 9.6 oz)   01/07/25 0443 91.2 kg (201 lb)       PHYSICAL EXAM    CONSTITUTIONAL: NAD  HEENT: Normocephalic. No pallor  NECK: no JVD  LUNGS: CTA b/l  HEART: regular rate and rhythm, S1, S2 normal, no murmur   ABDOMEN: soft, non-tender, bowel sounds normal  EXTREMITIES: No edema  SKIN: No rash  NEURO: AAO X 3  PSYCH: normal affect      HOME MEDICATIONS:Medications Ordered Prior to Encounter[2]    SCHEDULED MEDS:   budesonide  0.5 mg Nebulization Q12H    And    arformoteroL  15 mcg Nebulization BID    aspirin  81 mg Oral Daily    atorvastatin  40 mg Oral QHS    carvediloL  6.25 mg Oral BID    clopidogreL  75 mg Oral Daily    folic acid  1 mg Oral Daily    furosemide (LASIX) injection  20 mg Intravenous Once    guaiFENesin  600 mg Oral BID    heparin (porcine)  5,000 Units Subcutaneous Q8H    insulin glargine U-100  20 Units Subcutaneous QHS    losartan  50 mg Oral Daily    multivitamin  1 tablet Oral Daily    nicotine  1 patch Transdermal Daily    tamsulosin  0.4 mg Oral Daily    thiamine  100 mg Oral Daily       CONTINUOUS INFUSIONS:    PRN MEDS:  Current Facility-Administered Medications:     acetaminophen, 650 mg, Oral, Q4H PRN    albuterol-ipratropium, 3 mL, Nebulization, Q6H PRN    aluminum-magnesium hydroxide-simethicone, 30 mL, Oral, QID PRN    dextrose 50%, 12.5 g, Intravenous, PRN    dextrose 50%, 25 g, Intravenous, PRN    glucagon (human recombinant), 1 mg, Intramuscular, PRN    glucose, 16 g, Oral, PRN    glucose, 24 g, Oral, PRN    HYDROcodone-acetaminophen, 1 tablet, Oral, Q6H PRN    insulin  "aspart U-100, 0-10 Units, Subcutaneous, QID (AC + HS) PRN    magnesium oxide, 800 mg, Oral, PRN    magnesium oxide, 800 mg, Oral, PRN    melatonin, 6 mg, Oral, Nightly PRN    naloxone, 0.02 mg, Intravenous, PRN    nitroGLYCERIN, 0.4 mg, Sublingual, Q5 Min PRN    ondansetron, 4 mg, Intravenous, Q6H PRN    potassium bicarbonate, 35 mEq, Oral, PRN    potassium bicarbonate, 50 mEq, Oral, PRN    potassium bicarbonate, 60 mEq, Oral, PRN    potassium, sodium phosphates, 2 packet, Oral, PRN    potassium, sodium phosphates, 2 packet, Oral, PRN    potassium, sodium phosphates, 2 packet, Oral, PRN    sodium chloride 0.9%, 2 mL, Intravenous, PRN    LABS AND DIAGNOSTICS     CBC LAST 3 DAYS  Recent Labs   Lab 06/26/25  1019 06/27/25  0353   WBC 6.79 6.17   RBC 4.79 4.65   HGB 15.2 14.6   HCT 45.0 42.2   MCV 94 91   MCH 31.7* 31.4*   MCHC 33.8 34.6   RDW 13.8 13.9    171   MPV 9.7 9.4   NRBC 0 0       COAGULATION LAST 3 DAYS  Recent Labs   Lab 06/26/25  1019   INR 1.0       CHEMISTRY LAST 3 DAYS  Recent Labs   Lab 06/26/25  1019 06/27/25  0353    135*   K 3.9 4.1   CL 98 99   CO2 25 23   ANIONGAP 13 13   BUN 17 23   CREATININE 1.1 1.1   * 386*   CALCIUM 9.7 9.5   MG 1.9 2.0   ALBUMIN 4.6 4.2   PROT 7.5 7.0   ALKPHOS 74 72   ALT 14 11   AST 13 12   BILITOT 0.8 0.5       CARDIAC PROFILE LAST 3 DAYS  Recent Labs   Lab 06/26/25  1019   BNP 89       ENDOCRINE LAST 3 DAYS  No results for input(s): "TSH", "PROCAL" in the last 168 hours.    LAST ARTERIAL BLOOD GAS  ABG  No results for input(s): "PH", "PO2", "PCO2", "HCO3", "BE" in the last 168 hours.    LAST 7 DAYS MICROBIOLOGY   Microbiology Results (last 7 days)       Procedure Component Value Units Date/Time    Influenza A & B by Molecular [[id number]]  (Normal) Collected: 06/26/25 1019    Order Status: Completed Specimen: Nasal Swab Updated: 06/26/25 1110     INFLUENZA A MOLECULAR Negative     INFLUENZA B MOLECULAR  Negative            MOST RECENT IMAGING  US " Abdomen Complete  Narrative: EXAMINATION:  US ABDOMEN COMPLETE    CLINICAL HISTORY:  Patient reports 10 lb increase in abdominal area;    TECHNIQUE:  Complete abdominal ultrasound (including pancreas, aorta, liver, gallbladder, common bile duct, IVC, kidneys, and spleen) was performed.    COMPARISON:  CT abdomen pelvis from 06/12/2025.    FINDINGS:  Pancreas: The visualized portions of pancreas appear normal.    Aorta: No aneurysm.    Liver: 16.3 cm, normal in size. The liver demonstrates diffuse hyperechogenicity consistent with hepatic steatosis with foci of focal fatty sparing at the gallbladder fossa.  No worrisome focal lesions.  Hepatic cysts are redemonstrated.    Gallbladder: No calculi, wall thickening, or pericholecystic fluid.  Negative sonographic Sherman's sign.    Biliary system: 0.4 mm common bile duct.  No intrahepatic ductal dilatation.    Inferior vena cava: Normal in appearance.    Right kidney: 10.4 x 6.8 x 6.2 cm. No hydronephrosis.    Left kidney: 10.4 x 5.6 x 5.2 cm. No hydronephrosis.    Spleen: 10.3 cm.  Normal in size with homogeneous echotexture.    Miscellaneous: No ascites.  Impression: No significant abnormality involving the upper abdomen.    Electronically signed by: Miguel Jalloh  Date:    06/26/2025  Time:    19:52  CT Head Without Contrast  Narrative: EXAMINATION:  CT HEAD WITHOUT CONTRAST    CLINICAL HISTORY:  Transient ischemic attack (TIA);R arm tingling x1 week;    TECHNIQUE:  Head CT without IV contrast.    COMPARISON:  MRI 08/12/2023, CT 08/11/2023    FINDINGS:  Gray-white differentiation is maintained without hemorrhage, midline shift, or mass effect. 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    The ventricles and cisterns are maintained.    Calvarium is intact. Trace left maxillary sinus mucosal thickening is present.  Impression: 1. No acute  intracranial abnormality.  2. 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    Electronically signed by: Ren Leonard  Date:    06/26/2025  Time:    14:31  X-Ray Chest AP Portable  Narrative: EXAMINATION:  XR CHEST AP PORTABLE    CLINICAL HISTORY:  Chest pain;    FINDINGS:  Portable chest with comparison chest x-ray 01/05/2020.  Normal cardiomediastinal silhouette.Lungs are clear. Pulmonary vasculature is normal. No acute osseous abnormality.  Impression: No acute cardiopulmonary abnormality.    Electronically signed by: Tomas Kan  Date:    06/26/2025  Time:    10:27      ECHOCARDIOGRAM RESULTS (last 5)  Results for orders placed during the hospital encounter of 10/18/24    Echo    Interpretation Summary    Left Ventricle: The left ventricle is normal in size. Mildly increased wall thickness. There is concentric remodeling. There is normal systolic function. Biplane (2D) method of discs ejection fraction is 60%.    Mitral Valve: Mild mitral annular calcification. There is trace regurgitation.    Tricuspid Valve: There is trace regurgitation.    Pulmonic Valve: There is mild regurgitation.    Aorta: Aortic annulus is moderately dilated measuring 4.10 cm.    Pulmonary Artery: The estimated pulmonary artery systolic pressure is 23 mmHg.      Results for orders placed during the hospital encounter of 10/07/23    Echo    Interpretation Summary    Left Ventricle: The left ventricle is normal in size. Mildly increased wall thickness. There is concentric remodeling. Septal motion is normal. There is normal systolic function. Ejection fraction by visual approximation is 60%.    Right Ventricle: Normal right ventricular cavity size. Wall thickness is normal. Right ventricle wall motion  is normal. Systolic function is normal.    Mitral Valve: There is mild posterior mitral annular calcification present.    IVC/SVC: Normal venous pressure  at 3 mmHg.    A limited echo was performed using limited 2D and color flow Doppler      Results for orders placed during the hospital encounter of 08/11/23    Echo    Interpretation Summary    Left Ventricle: The left ventricle is normal in size. Mildly increased wall thickness. Normal wall motion. There is normal systolic function with a visually estimated ejection fraction of 55 - 60%. There is normal diastolic function.    Right Ventricle: Normal right ventricular cavity size. Systolic function is normal.    Aortic Valve: The aortic valve is a trileaflet valve.    Mitral Valve: There is no stenosis. The mean pressure gradient across the mitral valve is 3 mmHg at a heart rate of  bpm.    IVC/SVC: Normal venous pressure at 3 mmHg.      Results for orders placed during the hospital encounter of 05/09/23    Echo    Interpretation Summary  · The left ventricle is normal in size with mild concentric hypertrophy and normal systolic function.  · The estimated ejection fraction is 55%.  · Normal left ventricular diastolic function.  · Normal right ventricular size with normal right ventricular systolic function.  · Normal central venous pressure (3 mmHg).      CURRENT/PREVIOUS VISIT EKG  Results for orders placed or performed during the hospital encounter of 06/26/25   EKG 12-lead    Collection Time: 06/26/25 10:01 AM   Result Value Ref Range    QRS Duration 150 ms    OHS QTC Calculation 530 ms    Narrative    Test Reason : R07.9,    Vent. Rate :  90 BPM     Atrial Rate :  90 BPM     P-R Int : 220 ms          QRS Dur : 150 ms      QT Int : 434 ms       P-R-T Axes : 100 -71  90 degrees    QTcB Int : 530 ms    Sinus rhythm with 1st degree A-V block  Left axis deviation  Left bundle branch block  Abnormal ECG  No previous ECGs available    Referred By: AAAREFERRAL SELF           Confirmed By:            ASSESSMENT/PLAN:     Active Hospital Problems    Diagnosis    *Chest pain    ETOH abuse    Hyperlipidemia    Nicotine  dependence    Benign essential HTN    COPD (chronic obstructive pulmonary disease)    Type 2 diabetes mellitus with circulatory disorder, with long-term current use of insulin       ASSESSMENT & PLAN:     Chest Pain   CAD s/p PCI   HFpEF  HTN  HLD  COPD  DM  Obesity  Fatty Liver disease  Abdominal Pain      RECOMMENDATIONS:    CAD s/p PCI- stent in mid RCA and distal cx in May 2023.   Plan for nuclear stress test today.  PET stress was ordered outpatient, pending [facility] approval.  ECHO pending.  Further recommendations to follow.  Will defer to medicine team for further management of abdominal pain.  Abdomen is TTP and he has had significant abdominal weight gain over the past few months.  He has follow up with GI outpatient. US negative.   Recommend one time dose of IV lasix 20 mg today after stress test.    Strict I's and O's. 2 g salt restriction. 1.5 L fluid restriction. Daily weights.   Thank you for the consult.  Further recommendations pending stress test and per Dr. Kessler.      Michelle Kelsey NP  Department of Cardiology  Date of Service: 06/27/2025           [1]   Social History  Tobacco Use    Smoking status: Every Day     Types: Cigarettes    Smokeless tobacco: Former    Tobacco comments:     Pt ready to quit smoking and states he can on his own. Seen 10/9/23 for inpatient smoking cessation. [employer] Member.     Occasionally   Substance Use Topics    Alcohol use: Yes     Alcohol/week: 14.0 standard drinks of alcohol     Types: 14 Shots of liquor per week    Drug use: Yes     Types: Marijuana   [2]   No current facility-administered medications on file prior to encounter.     Current Outpatient Medications on File Prior to Encounter   Medication Sig Dispense Refill    aspirin (ECOTRIN) 81 MG EC tablet Take 81 mg by mouth once daily.      carvediloL (COREG) 12.5 MG tablet Take 6.25 mg by mouth 2 (two) times daily.      clopidogreL (PLAVIX) 75 mg tablet Take 1 tablet (75 mg total) by mouth once daily. 90  tablet 3    fluticasone-salmeterol diskus inhaler 250-50 mcg Inhale 1 puff into the lungs 2 (two) times a day.      folic acid (FOLVITE) 1 MG tablet Take 1 mg by mouth once daily.      insulin glargine-yfgn 100 unit/mL Soln Inject 45 Units into the skin 2 (two) times a day.      losartan (COZAAR) 100 MG tablet Take 1 tablet (100 mg total) by mouth once daily. (Patient taking differently: Take 50 mg by mouth once daily.) 90 tablet 3    multivitamin Tab Take 1 tablet by mouth once daily.      oxyCODONE-acetaminophen (PERCOCET) 5-325 mg per tablet Take 1 tablet by mouth nightly as needed. pain      predniSONE (DELTASONE) 10 MG tablet Take 10 mg by mouth once daily.      tamsulosin (FLOMAX) 0.4 mg Cap Take 0.4 mg by mouth once daily.      thiamine 100 MG tablet Take 100 mg by mouth once daily.      albuterol (PROVENTIL/VENTOLIN HFA) 90 mcg/actuation inhaler Take 2 puffs by mouth 4 (four) times daily as needed for Wheezing or Shortness of Breath.      furosemide (LASIX) 20 MG tablet Take 1 tablet (20 mg total) by mouth daily as needed (for 2-3 lbs weight gain in 24 hrs, or lower extremity edema). 60 tablet 3    hydrALAZINE (APRESOLINE) 25 MG tablet Take 1 tablet (25 mg total) by mouth every 8 (eight) hours. 90 tablet 11    HYDROcodone-acetaminophen (NORCO) 5-325 mg per tablet Take 1 tablet by mouth every 6 (six) hours as needed for Pain. 9 tablet 0    metoprolol succinate (TOPROL-XL) 25 MG 24 hr tablet Take 25 mg by mouth 2 (two) times daily.      naloxone (NARCAN) 4 mg/actuation Spry 1 spray by Nasal route once.      nicotine (NICODERM CQ) 14 mg/24 hr Place 1 patch onto the skin once daily. 28 patch 0    nitroGLYCERIN (NITROSTAT) 0.4 MG SL tablet Place 1 tablet (0.4 mg total) under the tongue every 5 (five) minutes as needed for Chest pain. 25 tablet 5    ondansetron (ZOFRAN-ODT) 4 MG TbDL Take 1 tablet (4 mg total) by mouth every 8 (eight) hours as needed (nausea). 12 tablet 0    rosuvastatin (CRESTOR) 10 MG tablet  Take 5 mg by mouth once daily.

## 2025-06-27 NOTE — PLAN OF CARE
[facility]  Initial Discharge Assessment       Primary Care Provider: Damian Vergara MD    Admission Diagnosis: Chest pain, rule out acute myocardial infarction [R07.9]    Admission Date: 6/26/2025  Expected Discharge Date: 6/27/2025    SW met with patient bedside. SW verified demographics, insurance, supports, and PCP. Patient reported he has not seen Dr. Vergara in 2 years. Patient reported he lives alone and drives self to appointments. SW assessed patient's needs. Patient is able to complete ADLs independently with equipment. Patient verified at home DME: oxygen, rollator.  Patient verified No HH and No Dialysis. Patient verified he takes Plavix for blood thinner and uses 2L of oxygen through [employer].    Patient verified pharmacy of choice: [facility] [address]  Patient confirmed he will be source of transportation at the time of discharge.     Transition of Care Barriers: None    Payor: [employer] / Plan: [facility] [employer]UM / Product Type: Government /     Extended Emergency Contact Information  Primary Emergency Contact: Angel Klein  Mobile Phone: [phone]  Relation: Friend  Preferred language: English   needed? No  Secondary Emergency Contact: Jonn Kraft  Mobile Phone: [phone]  Relation: Brother  Preferred language: English   needed? No    Discharge Plan A: Home  Discharge Plan B: Home      [facility] [address]  [facility] [address]  Phone: [phone] Fax: [phone]    [id number] - [facility] [address]  [address] [facility] [address] [facility]  [address] [facility] [address]  Phone: [phone] Fax: [phone]      Initial Assessment (most recent)       Adult Discharge Assessment - 06/27/25 1444          Discharge Assessment    Assessment Type Discharge Planning Assessment     Confirmed/corrected address, phone number and insurance Yes     Confirmed Demographics Correct on Facesheet      Source of Information patient     Communicated DAVID with patient/caregiver Date not available/Unable to determine     People in Home alone     Do you expect to return to your current living situation? Yes     Do you have help at home or someone to help you manage your care at home? No     Prior to hospitilization cognitive status: Unable to Assess     Current cognitive status: Alert/Oriented     Walking or Climbing Stairs Difficulty yes     Walking or Climbing Stairs ambulation difficulty, requires equipment     Mobility Management rollator     Dressing/Bathing Difficulty no     Home Accessibility wheelchair accessible     Home Layout Able to live on 1st floor     Equipment Currently Used at Home oxygen;rollator     Readmission within 30 days? No     Patient currently being followed by outpatient case management? No     Do you currently have service(s) that help you manage your care at home? No     Do you take prescription medications? Yes     Do you have prescription coverage? Yes     Do you have any problems affording any of your prescribed medications? No     Is the patient taking medications as prescribed? yes     Who is going to help you get home at discharge? self     How do you get to doctors appointments? car, drives self     Are you on dialysis? No     Do you take coumadin? No   Plavix    Discharge Plan A Home     Discharge Plan B Home     DME Needed Upon Discharge  none     Discharge Plan discussed with: Patient     Transition of Care Barriers None

## 2025-06-27 NOTE — HOSPITAL COURSE
During hospitalization was admitted to [facility] for evaluation and management of chest pain.  Patient admits to history of chest pain with the placement of 2 cardiac stents.  His troponins were normal.  Echo was performed and Cardiology consulted.  Patient underwent stress test, reviewed by Cardiology, within normal limits.  Echocardiogram with minimally reduced systolic function, Cardiology gave him a dose of diuresis to improve his shortness of breath post stress test.  Stress test was unremarkable and recommendation was made to follow up in clinic with Cardiology.  Additionally, patient has appointment with gastroenterologist on Monday.  Ultimately on my assessment, patient is complaining of abdominal pain.  It sounds functional in nature.  Patient also admits to greasy stools.  I appreciate that patient will have quick follow up with gastroenterologist.  He is seen and examined prior to discharge in appropriate condition to do so.

## 2025-06-27 NOTE — PLAN OF CARE
0903: SW unable to complete assessment with patient. Per floor nurse, Diana, patient is in stress test. SW will follow up upon return to room.     1002: SW unable to meet with patient due to patient having ECHO at bedside.    SW will follow up with patient.      06/27/25 0903   Discharge Assessment   Assessment Type Discharge Planning Assessment

## 2025-06-27 NOTE — DISCHARGE SUMMARY
[facility] Medicine  Discharge Summary      Patient Name: Sampson Holt  MRN: [id number]  OSCAR: [id number]  Patient Class: OP- Observation  Admission Date: 6/26/2025  Hospital Length of Stay: 0 days  Discharge Date and Time: 06/27/2025 4:21 PM  Attending Physician: Latisha Cedillo MD   Discharging Provider: Javier Martin NP  Primary Care Provider: Damian Vergara MD    Primary Care Team: Networked reference to record PCT     HPI:   Sampson Holt is a 69 y.o. year-old patient with history of  has a past medical history of Abdominal pain, CHF (congestive heart failure), Cigarette smoker, Diabetes mellitus, Emphysema lung, Endocarditis, Hypertension, and Stroke. who presented to the ED with reports of chest pain, shortness of breath, and productive cough x3 days.  Patient also states he has had a 10 lb weight gain in his abdomen only.  He is also with complaints of multiple areas of abdominal pain where he had insulin injections 2 years prior.  Patient reports he does not drink very much however he endorses 1-2 drinks daily along with 4-5 cigarettes. Patient also reports that he is unfamiliar with all of his medications.  Decision made to admit patient for cardiology follow up in a.m. as LAD with stenosis with last heart catheterization.      Triage vitals with pulse 84, /83, temp 98.5°, SpO2 96% on room air.  Blood work performed in the ED with abnormal glucose of 334.  Initial troponin 11.1, repeat 7.6.  Chest x-ray no acute cardiopulmonary abnormality.  CT head without contrast no acute intracranial abnormality.  Unchanged multifocal encephalomalacia of right frontal and bilateral parietal lobes suggesting sequela of old cortical infarcts.  EKG in sinus rhythm with first-degree AV block and left bundle branch block.      * No surgery found *      Hospital Course:   During hospitalization was admitted to [facility] for evaluation and management of chest pain.  Patient admits to  history of chest pain with the placement of 2 cardiac stents.  His troponins were normal.  Echo was performed and Cardiology consulted.  Echocardiogram with minimally reduced systolic function, Cardiology gave him a dose of diuresis to improve his shortness of breath post stress test.       Goals of Care Treatment Preferences:  Code Status: Full Code         Consults:   Consults (From admission, onward)          Status Ordering Provider     Inpatient consult to Cardiology  Once        Provider:  Antonio Kessler MD Completed FRADY, COLLEEN A            Final Active Diagnoses:    Diagnosis Date Noted POA    ETOH abuse [F10.10] 03/09/2024 Yes    Hyperlipidemia [E78.5] 07/02/2023 Yes    Nicotine dependence [F17.200] 05/10/2023 Yes    Benign essential HTN [I10] 03/27/2020 Yes    COPD (chronic obstructive pulmonary disease) [J44.9] 03/27/2020 Yes      Problems Resolved During this Admission:    Diagnosis Date Noted Date Resolved POA    PRINCIPAL PROBLEM:  Chest pain [R07.9] 10/18/2024 06/27/2025 Yes    Type 2 diabetes mellitus with circulatory disorder, with long-term current use of insulin [E11.59, Z79.4] 03/27/2020 06/27/2025 Not Applicable       Discharged Condition: stable    Disposition:     Follow Up:    Patient Instructions:   No discharge procedures on file.    Significant Diagnostic Studies: N/A    Pending Diagnostic Studies:       Procedure Component Value Units Date/Time    HCV Virus Hold Specimen [[id number]] Collected: 06/26/25 1019    Order Status: Sent Lab Status: In process Updated: 06/26/25 1027    Specimen: Blood     HIV Virus Confirmation Hold Specimen [[id number]] Collected: 06/26/25 1019    Order Status: Sent Lab Status: In process Updated: 06/26/25 1026    Specimen: Blood            Medications:  Reconciled Home Medications:      Medication List        CONTINUE taking these medications      albuterol 90 mcg/actuation inhaler  Commonly known as: PROVENTIL/VENTOLIN HFA  Take 2 puffs by mouth 4  (four) times daily as needed for Wheezing or Shortness of Breath.     aspirin 81 MG EC tablet  Commonly known as: ECOTRIN  Take 81 mg by mouth once daily.     carvediloL 12.5 MG tablet  Commonly known as: COREG  Take 6.25 mg by mouth 2 (two) times daily.     clopidogreL 75 mg tablet  Commonly known as: PLAVIX  Take 1 tablet (75 mg total) by mouth once daily.     fluticasone-salmeterol 250-50 mcg/dose 250-50 mcg/dose diskus inhaler  Commonly known as: ADVAIR  Inhale 1 puff into the lungs 2 (two) times a day.     folic acid 1 MG tablet  Commonly known as: FOLVITE  Take 1 mg by mouth once daily.     furosemide 20 MG tablet  Commonly known as: LASIX  Take 1 tablet (20 mg total) by mouth daily as needed (for 2-3 lbs weight gain in 24 hrs, or lower extremity edema).     hydrALAZINE 25 MG tablet  Commonly known as: APRESOLINE  Take 1 tablet (25 mg total) by mouth every 8 (eight) hours.     HYDROcodone-acetaminophen 5-325 mg per tablet  Commonly known as: NORCO  Take 1 tablet by mouth every 6 (six) hours as needed for Pain.     insulin glargine-yfgn 100 unit/mL injection  Inject 45 Units into the skin 2 (two) times a day.     losartan 100 MG tablet  Commonly known as: COZAAR  Take 1 tablet (100 mg total) by mouth once daily.     metoprolol succinate 25 MG 24 hr tablet  Commonly known as: TOPROL-XL  Take 25 mg by mouth 2 (two) times daily.     multivitamin Tab  Take 1 tablet by mouth once daily.     naloxone 4 mg/actuation Spry  Commonly known as: NARCAN  1 spray by Nasal route once.     nicotine 14 mg/24 hr  Commonly known as: NICODERM CQ  Place 1 patch onto the skin once daily.     nitroGLYCERIN 0.4 MG SL tablet  Commonly known as: NITROSTAT  Place 1 tablet (0.4 mg total) under the tongue every 5 (five) minutes as needed for Chest pain.     ondansetron 4 MG Tbdl  Commonly known as: ZOFRAN-ODT  Take 1 tablet (4 mg total) by mouth every 8 (eight) hours as needed (nausea).     oxyCODONE-acetaminophen 5-325 mg per  tablet  Commonly known as: PERCOCET  Take 1 tablet by mouth nightly as needed. pain     predniSONE 10 MG tablet  Commonly known as: DELTASONE  Take 10 mg by mouth once daily.     rosuvastatin 10 MG tablet  Commonly known as: CRESTOR  Take 5 mg by mouth once daily.     tamsulosin 0.4 mg Cap  Commonly known as: FLOMAX  Take 0.4 mg by mouth once daily.     thiamine 100 MG tablet  Take 100 mg by mouth once daily.              Indwelling Lines/Drains at time of discharge:   Lines/Drains/Airways       None                       Time spent on the discharge of patient: 33 minutes         Javier Martin NP  Department of Hospital Medicine  [facility]

## 2025-06-27 NOTE — PROGRESS NOTES
Automatic Inhaler to Nebulizer Interchange    fluticasone/vilanterol (Breo Ellipta) 100 mcg/25 mcg changed to budesonide 0.5 mg twice daily AND arformoterol 15 mcg twice daily per [facility] Automatic Therapeutic Substitutions Protocol.    Please contact pharmacy at extension [phone] with any questions.     Thank you,   Lety Short

## 2025-06-27 NOTE — ED NOTES
Tele box 2566 on and verified.    Rhofade Counseling: Rhofade is a topical medication which can decrease superficial blood flow where applied. Side effects are uncommon and include stinging, redness and allergic reactions.

## 2025-06-27 NOTE — PLAN OF CARE
VIEIRA signed and scanned into [employer].      06/27/25 1440   VIEIRA Message   Medicare Outpatient and Observation Notification regarding financial responsibility Explained to patient/caregiver;Signed/date by patient/caregiver   Date VIEIRA was signed 06/27/25   Time VIEIRA was signed 1790

## 2025-06-27 NOTE — NURSING
"Post Lasix:    patient reports he is "feeling better" states he has less pressure from his abd pushing up on his lungs he also reports passing more gas   "

## 2025-06-27 NOTE — SUBJECTIVE & OBJECTIVE
Interval History:  Seen and examined at bedside during morning multidisciplinary rounds.  Patient seems to have several chronic issues, including financial issues at home.  He also complains of pinpoint pain in his abdomen at injection sites from insulin.  His troponins have been within normal limits, echo is pending, cardiology consult pending.    Review of Systems   All other systems reviewed and are negative.    Objective:     Vital Signs (Most Recent):  Temp: 98.1 °F (36.7 °C) (06/27/25 0705)  Pulse: 95 (06/27/25 0710)  Resp: 18 (06/27/25 0710)  BP: 134/87 (06/27/25 0941)  SpO2: 95 % (06/27/25 0710) Vital Signs (24h Range):  Temp:  [97.4 °F (36.3 °C)-98.1 °F (36.7 °C)] 98.1 °F (36.7 °C)  Pulse:  [68-95] 95  Resp:  [14-20] 18  SpO2:  [91 %-98 %] 95 %  BP: (114-169)/(69-93) 134/87     Weight: 98.4 kg (217 lb)  Body mass index is 28.63 kg/m².    Intake/Output Summary (Last 24 hours) at 6/27/2025 1006  Last data filed at 6/27/2025 0258  Gross per 24 hour   Intake 120 ml   Output 250 ml   Net -130 ml         Physical Exam  Constitutional:       Appearance: Normal appearance.   HENT:      Head: Normocephalic and atraumatic.      Nose: Nose normal.      Mouth/Throat:      Mouth: Mucous membranes are moist.      Pharynx: Oropharynx is clear.   Eyes:      Pupils: Pupils are equal, round, and reactive to light.   Cardiovascular:      Rate and Rhythm: Normal rate and regular rhythm.   Pulmonary:      Effort: Pulmonary effort is normal.      Breath sounds: Normal breath sounds.   Abdominal:      General: Bowel sounds are normal.      Palpations: Abdomen is soft.      Comments: Diffuse areas of tenderness, patient states this is chronic pain for over a year now.  Describes it as pinpoint at insulin injection sites..   Musculoskeletal:         General: Normal range of motion.      Cervical back: Normal range of motion.   Skin:     General: Skin is warm and dry.   Neurological:      General: No focal deficit present.       Mental Status: He is alert and oriented to person, place, and time.   Psychiatric:         Mood and Affect: Mood normal.         Behavior: Behavior normal.

## 2025-06-28 VITALS
DIASTOLIC BLOOD PRESSURE: 87 MMHG | HEART RATE: 79 BPM | OXYGEN SATURATION: 93 % | WEIGHT: 217 LBS | BODY MASS INDEX: 28.76 KG/M2 | HEIGHT: 73 IN | TEMPERATURE: 98 F | SYSTOLIC BLOOD PRESSURE: 155 MMHG | RESPIRATION RATE: 18 BRPM

## 2025-06-28 LAB
ABSOLUTE EOSINOPHIL (SMH): 0.13 K/UL
ABSOLUTE MONOCYTE (SMH): 0.49 K/UL (ref 0.3–1)
ABSOLUTE NEUTROPHIL COUNT (SMH): 2 K/UL (ref 1.8–7.7)
ALBUMIN SERPL-MCNC: 4 G/DL (ref 3.5–5.2)
ALP SERPL-CCNC: 64 UNIT/L (ref 55–135)
ALT SERPL-CCNC: 11 UNIT/L (ref 10–44)
ANION GAP (SMH): 7 MMOL/L (ref 8–16)
AST SERPL-CCNC: 10 UNIT/L (ref 10–40)
BASOPHILS # BLD AUTO: 0.03 K/UL
BASOPHILS NFR BLD AUTO: 0.7 %
BILIRUB SERPL-MCNC: 0.4 MG/DL (ref 0.1–1)
BUN SERPL-MCNC: 21 MG/DL (ref 8–23)
CALCIUM SERPL-MCNC: 9.2 MG/DL (ref 8.7–10.5)
CHLORIDE SERPL-SCNC: 104 MMOL/L (ref 95–110)
CO2 SERPL-SCNC: 27 MMOL/L (ref 23–29)
CREAT SERPL-MCNC: 1 MG/DL (ref 0.5–1.4)
ERYTHROCYTE [DISTWIDTH] IN BLOOD BY AUTOMATED COUNT: 13.5 % (ref 11.5–14.5)
GFR SERPLBLD CREATININE-BSD FMLA CKD-EPI: >60 ML/MIN/1.73/M2
GLUCOSE SERPL-MCNC: 318 MG/DL (ref 70–110)
HCT VFR BLD AUTO: 38.1 % (ref 40–54)
HGB BLD-MCNC: 13 GM/DL (ref 14–18)
IMM GRANULOCYTES # BLD AUTO: 0.02 K/UL (ref 0–0.04)
IMM GRANULOCYTES NFR BLD AUTO: 0.4 % (ref 0–0.5)
LYMPHOCYTES # BLD AUTO: 1.75 K/UL (ref 1–4.8)
MAGNESIUM SERPL-MCNC: 2.1 MG/DL (ref 1.6–2.6)
MCH RBC QN AUTO: 32.7 PG (ref 27–31)
MCHC RBC AUTO-ENTMCNC: 34.1 G/DL (ref 32–36)
MCV RBC AUTO: 96 FL (ref 82–98)
NUCLEATED RBC (/100WBC) (SMH): 0 /100 WBC
PLATELET # BLD AUTO: 134 K/UL (ref 150–450)
PMV BLD AUTO: 9.6 FL (ref 9.2–12.9)
POCT GLUCOSE: 302 MG/DL (ref 70–110)
POCT GLUCOSE: 328 MG/DL (ref 70–110)
POCT GLUCOSE: 331 MG/DL (ref 70–110)
POTASSIUM SERPL-SCNC: 4.1 MMOL/L (ref 3.5–5.1)
PROT SERPL-MCNC: 6.5 GM/DL (ref 6–8.4)
RBC # BLD AUTO: 3.98 M/UL (ref 4.6–6.2)
RELATIVE EOSINOPHIL (SMH): 2.9 % (ref 0–8)
RELATIVE LYMPHOCYTE (SMH): 39.2 % (ref 18–48)
RELATIVE MONOCYTE (SMH): 11 % (ref 4–15)
RELATIVE NEUTROPHIL (SMH): 45.8 % (ref 38–73)
SODIUM SERPL-SCNC: 138 MMOL/L (ref 136–145)
WBC # BLD AUTO: 4.46 K/UL (ref 3.9–12.7)

## 2025-06-28 PROCEDURE — 36415 COLL VENOUS BLD VENIPUNCTURE: CPT | Performed by: NURSE PRACTITIONER

## 2025-06-28 PROCEDURE — 80053 COMPREHEN METABOLIC PANEL: CPT | Performed by: NURSE PRACTITIONER

## 2025-06-28 PROCEDURE — 99900035 HC TECH TIME PER 15 MIN (STAT)

## 2025-06-28 PROCEDURE — 27000221 HC OXYGEN, UP TO 24 HOURS

## 2025-06-28 PROCEDURE — 25000003 PHARM REV CODE 250: Performed by: NURSE PRACTITIONER

## 2025-06-28 PROCEDURE — S4991 NICOTINE PATCH NONLEGEND: HCPCS | Performed by: STUDENT IN AN ORGANIZED HEALTH CARE EDUCATION/TRAINING PROGRAM

## 2025-06-28 PROCEDURE — G0378 HOSPITAL OBSERVATION PER HR: HCPCS

## 2025-06-28 PROCEDURE — 25000003 PHARM REV CODE 250: Performed by: STUDENT IN AN ORGANIZED HEALTH CARE EDUCATION/TRAINING PROGRAM

## 2025-06-28 PROCEDURE — 85025 COMPLETE CBC W/AUTO DIFF WBC: CPT | Performed by: NURSE PRACTITIONER

## 2025-06-28 PROCEDURE — 83735 ASSAY OF MAGNESIUM: CPT | Performed by: NURSE PRACTITIONER

## 2025-06-28 PROCEDURE — 25000242 PHARM REV CODE 250 ALT 637 W/ HCPCS: Performed by: INTERNAL MEDICINE

## 2025-06-28 PROCEDURE — 94640 AIRWAY INHALATION TREATMENT: CPT | Mod: XB

## 2025-06-28 PROCEDURE — 99900031 HC PATIENT EDUCATION (STAT)

## 2025-06-28 PROCEDURE — 63600175 PHARM REV CODE 636 W HCPCS: Performed by: NURSE PRACTITIONER

## 2025-06-28 PROCEDURE — 96372 THER/PROPH/DIAG INJ SC/IM: CPT | Performed by: NURSE PRACTITIONER

## 2025-06-28 PROCEDURE — 94761 N-INVAS EAR/PLS OXIMETRY MLT: CPT

## 2025-06-28 RX ADMIN — FOLIC ACID 1 MG: 1 TABLET ORAL at 09:06

## 2025-06-28 RX ADMIN — ASPIRIN 81 MG: 81 TABLET ORAL at 09:06

## 2025-06-28 RX ADMIN — CLOPIDOGREL 75 MG: 75 TABLET ORAL at 09:06

## 2025-06-28 RX ADMIN — THERA TABS 1 TABLET: TAB at 09:06

## 2025-06-28 RX ADMIN — GUAIFENESIN 600 MG: 600 TABLET, EXTENDED RELEASE ORAL at 09:06

## 2025-06-28 RX ADMIN — INSULIN ASPART 8 UNITS: 100 INJECTION, SOLUTION INTRAVENOUS; SUBCUTANEOUS at 09:06

## 2025-06-28 RX ADMIN — Medication 100 MG: at 09:06

## 2025-06-28 RX ADMIN — ARFORMOTEROL TARTRATE 15 MCG: 15 SOLUTION RESPIRATORY (INHALATION) at 06:06

## 2025-06-28 RX ADMIN — NICOTINE 1 PATCH: 21 PATCH, EXTENDED RELEASE TRANSDERMAL at 09:06

## 2025-06-28 RX ADMIN — HEPARIN SODIUM 5000 UNITS: 5000 INJECTION INTRAVENOUS; SUBCUTANEOUS at 05:06

## 2025-06-28 RX ADMIN — BUDESONIDE INHALATION 0.5 MG: 0.5 SUSPENSION RESPIRATORY (INHALATION) at 06:06

## 2025-06-28 RX ADMIN — HYDROCODONE BITARTRATE AND ACETAMINOPHEN 1 TABLET: 5; 325 TABLET ORAL at 05:06

## 2025-06-28 RX ADMIN — TAMSULOSIN HYDROCHLORIDE 0.4 MG: 0.4 CAPSULE ORAL at 09:06

## 2025-06-28 RX ADMIN — LOSARTAN POTASSIUM 50 MG: 50 TABLET, FILM COATED ORAL at 09:06

## 2025-06-28 RX ADMIN — CARVEDILOL 6.25 MG: 6.25 TABLET, FILM COATED ORAL at 09:06

## 2025-06-28 NOTE — PLAN OF CARE
06/28/25 1010   Final Note   Assessment Type Final Discharge Note   Anticipated Discharge Disposition Home   Post-Acute Status   Discharge Delays None known at this time     Patient cleared for discharge from case management standpoint.    Chart and discharge orders reviewed.  Patient discharged with no further case management needs.

## 2025-06-28 NOTE — DISCHARGE SUMMARY
[facility] Medicine  Discharge Summary      Patient Name: Sampson Holt  MRN: [id number]  OSCAR: [id number]  Patient Class: OP- Observation  Admission Date: 6/26/2025  Hospital Length of Stay: 0 days  Discharge Date and Time: 06/28/2025 9:48 AM  Attending Physician: Mandie Davey MD   Discharging Provider: Mandie Davey MD  Primary Care Provider: Damian Vergara MD    Primary Care Team: Networked reference to record PCT     HPI:   Sampson Holt is a 69 y.o. year-old patient with history of  has a past medical history of Abdominal pain, CHF (congestive heart failure), Cigarette smoker, Diabetes mellitus, Emphysema lung, Endocarditis, Hypertension, and Stroke. who presented to the ED with reports of chest pain, shortness of breath, and productive cough x3 days.  Patient also states he has had a 10 lb weight gain in his abdomen only.  He is also with complaints of multiple areas of abdominal pain where he had insulin injections 2 years prior.  Patient reports he does not drink very much however he endorses 1-2 drinks daily along with 4-5 cigarettes. Patient also reports that he is unfamiliar with all of his medications.  Decision made to admit patient for cardiology follow up in a.m. as LAD with stenosis with last heart catheterization.      Triage vitals with pulse 84, /83, temp 98.5°, SpO2 96% on room air.  Blood work performed in the ED with abnormal glucose of 334.  Initial troponin 11.1, repeat 7.6.  Chest x-ray no acute cardiopulmonary abnormality.  CT head without contrast no acute intracranial abnormality.  Unchanged multifocal encephalomalacia of right frontal and bilateral parietal lobes suggesting sequela of old cortical infarcts.  EKG in sinus rhythm with first-degree AV block and left bundle branch block.      * No surgery found *      Hospital Course:   During hospitalization was admitted to [facility] for evaluation and management of chest pain.  Patient  admits to history of chest pain with the placement of 2 cardiac stents.  His troponins were normal.  Echo was performed and Cardiology consulted.  Patient underwent stress test, reviewed by Cardiology, within normal limits.  Echocardiogram with minimally reduced systolic function, Cardiology gave him a dose of diuresis to improve his shortness of breath post stress test.  Stress test was unremarkable and recommendation was made to follow up in clinic with Cardiology.  Additionally, patient has appointment with gastroenterologist on Monday.  Ultimately on my assessment, patient is complaining of abdominal pain.  It sounds functional in nature.  Patient also admits to greasy stools.  I appreciate that patient will have quick follow up with gastroenterologist.  He is seen and examined prior to discharge in appropriate condition to do so.     Goals of Care Treatment Preferences:  Code Status: Full Code         Consults:   Consults (From admission, onward)          Status Ordering Provider     Inpatient consult to Cardiology  Once        Provider:  Antonio Kessler MD    Completed ALMA FLOWER            Assessment & Plan  Chest pain (Resolved: 6/27/2025)  Heart score 6   Cardiac have been normal.   Stress negative, cards cleared for DC, f/u in clinic  Benign essential HTN  Patient's blood pressure range in the last 24 hours was: BP  Min: 102/61  Max: 155/87.The patient's inpatient anti-hypertensive regimen is listed below:  Current Antihypertensives  carvediloL tablet 6.25 mg, 2 times daily, Oral  losartan tablet 50 mg, Daily, Oral  nitroGLYCERIN SL tablet 0.4 mg, Every 5 min PRN, Sublingual    Plan  - BP is controlled, no changes needed to their regimen  - monitor  COPD (chronic obstructive pulmonary disease)  Patient's COPD is controlled currently.  Patient is currently off COPD Pathway. Continue scheduled inhalers Supplemental oxygen and monitor respiratory status closely.  Patient is not currently in an  exacerbation.  Guaifenesin q.12 added  Hyperlipidemia   Patient is chronically on statin.will continue for now. Monitor clinically. Last LDL was   Lab Results   Component Value Date    LDLCALC 72.2 05/23/2025      Nicotine dependence  Dangers of cigarette smoking were reviewed with patient in detail. Patient was Counseled for 3-10 minutes. Nicotine replacement options were discussed. Nicotine replacement was discussed- prescribed  ETOH abuse  Monitor for withdrawal.  Final Active Diagnoses:    Diagnosis Date Noted POA    ETOH abuse [F10.10] 03/09/2024 Yes    Hyperlipidemia [E78.5] 07/02/2023 Yes    Nicotine dependence [F17.200] 05/10/2023 Yes    Benign essential HTN [I10] 03/27/2020 Yes    COPD (chronic obstructive pulmonary disease) [J44.9] 03/27/2020 Yes      Problems Resolved During this Admission:    Diagnosis Date Noted Date Resolved POA    PRINCIPAL PROBLEM:  Chest pain [R07.9] 10/18/2024 06/27/2025 Yes    Type 2 diabetes mellitus with circulatory disorder, with long-term current use of insulin [E11.59, Z79.4] 03/27/2020 06/27/2025 Not Applicable       Discharged Condition: stable    Disposition: Home or Self Care    Follow Up:   Follow-up Information       Damian Vergara MD. Schedule an appointment as soon as possible for a visit in 1 week(s).    Specialty: Internal Medicine  Why: Message sent to office requesting appointment. The office will call to schedule. Please contact the office if you do not receive a call within 1-2 days  Contact information:  [address] [place] [address] [address]  [place] [address]  [phone]               Michelle Kelsey NP Follow up in 1 week(s).    Specialty: Cardiology  Why: Message sent to office requesting appointment. The office will call to schedule. Please contact the office if you do not receive a call within 1-2 days  Contact information:  [address] [place] [address]  [address]  [place] [address]  [phone]                           Patient Instructions:   No discharge procedures on  file.    Significant Diagnostic Studies: Labs: BMP:   Recent Labs   Lab 06/26/25  1019 06/27/25  0353 06/28/25  0347   * 386* 318*    135* 138   K 3.9 4.1 4.1   CL 98 99 104   CO2 25 23 27   BUN 17 23 21   CREATININE 1.1 1.1 1.0   CALCIUM 9.7 9.5 9.2   MG 1.9 2.0 2.1    and CMP   Recent Labs   Lab 06/26/25  1019 06/27/25  0353 06/28/25  0347    135* 138   K 3.9 4.1 4.1   CL 98 99 104   CO2 25 23 27   * 386* 318*   BUN 17 23 21   CREATININE 1.1 1.1 1.0   CALCIUM 9.7 9.5 9.2   PROT 7.5 7.0 6.5   ALBUMIN 4.6 4.2 4.0   BILITOT 0.8 0.5 0.4   ALKPHOS 74 72 64   AST 13 12 10   ALT 14 11 11   ANIONGAP 13 13 7*       Pending Diagnostic Studies:       Procedure Component Value Units Date/Time    HCV Virus Hold Specimen [[id number]] Collected: 06/26/25 1019    Order Status: Sent Lab Status: In process Updated: 06/26/25 1027    Specimen: Blood     HIV Virus Confirmation Hold Specimen [[id number]] Collected: 06/26/25 1019    Order Status: Sent Lab Status: In process Updated: 06/26/25 1026    Specimen: Blood            Medications:  Reconciled Home Medications:      Medication List        CONTINUE taking these medications      albuterol 90 mcg/actuation inhaler  Commonly known as: PROVENTIL/VENTOLIN HFA  Take 2 puffs by mouth 4 (four) times daily as needed for Wheezing or Shortness of Breath.     aspirin 81 MG EC tablet  Commonly known as: ECOTRIN  Take 81 mg by mouth once daily.     carvediloL 12.5 MG tablet  Commonly known as: COREG  Take 6.25 mg by mouth 2 (two) times daily.     clopidogreL 75 mg tablet  Commonly known as: PLAVIX  Take 1 tablet (75 mg total) by mouth once daily.     fluticasone-salmeterol 250-50 mcg/dose 250-50 mcg/dose diskus inhaler  Commonly known as: ADVAIR  Inhale 1 puff into the lungs 2 (two) times a day.     folic acid 1 MG tablet  Commonly known as: FOLVITE  Take 1 mg by mouth once daily.     furosemide 20 MG tablet  Commonly known as: LASIX  Take 1 tablet (20 mg total) by  mouth daily as needed (for 2-3 lbs weight gain in 24 hrs, or lower extremity edema).     hydrALAZINE 25 MG tablet  Commonly known as: APRESOLINE  Take 1 tablet (25 mg total) by mouth every 8 (eight) hours.     HYDROcodone-acetaminophen 5-325 mg per tablet  Commonly known as: NORCO  Take 1 tablet by mouth every 6 (six) hours as needed for Pain.     insulin glargine-yfgn 100 unit/mL injection  Inject 45 Units into the skin 2 (two) times a day.     losartan 100 MG tablet  Commonly known as: COZAAR  Take 1 tablet (100 mg total) by mouth once daily.     metoprolol succinate 25 MG 24 hr tablet  Commonly known as: TOPROL-XL  Take 25 mg by mouth 2 (two) times daily.     multivitamin Tab  Take 1 tablet by mouth once daily.     naloxone 4 mg/actuation Spry  Commonly known as: NARCAN  1 spray by Nasal route once.     nicotine 14 mg/24 hr  Commonly known as: NICODERM CQ  Place 1 patch onto the skin once daily.     nitroGLYCERIN 0.4 MG SL tablet  Commonly known as: NITROSTAT  Place 1 tablet (0.4 mg total) under the tongue every 5 (five) minutes as needed for Chest pain.     ondansetron 4 MG Tbdl  Commonly known as: ZOFRAN-ODT  Take 1 tablet (4 mg total) by mouth every 8 (eight) hours as needed (nausea).     oxyCODONE-acetaminophen 5-325 mg per tablet  Commonly known as: PERCOCET  Take 1 tablet by mouth nightly as needed. pain     predniSONE 10 MG tablet  Commonly known as: DELTASONE  Take 10 mg by mouth once daily.     rosuvastatin 10 MG tablet  Commonly known as: CRESTOR  Take 5 mg by mouth once daily.     tamsulosin 0.4 mg Cap  Commonly known as: FLOMAX  Take 0.4 mg by mouth once daily.     thiamine 100 MG tablet  Take 100 mg by mouth once daily.              Indwelling Lines/Drains at time of discharge:   Lines/Drains/Airways       None                       Time spent on the discharge of patient: 35 minutes         Mandie Davey MD  Department of Hospital Medicine  [facility]

## 2025-06-28 NOTE — ASSESSMENT & PLAN NOTE
Patient's blood pressure range in the last 24 hours was: BP  Min: 102/61  Max: 155/87.The patient's inpatient anti-hypertensive regimen is listed below:  Current Antihypertensives  carvediloL tablet 6.25 mg, 2 times daily, Oral  losartan tablet 50 mg, Daily, Oral  nitroGLYCERIN SL tablet 0.4 mg, Every 5 min PRN, Sublingual    Plan  - BP is controlled, no changes needed to their regimen  - monitor

## 2025-06-28 NOTE — NURSING
DISCHARGE INSTRUCTIONS REVIEWED WITH PATIENT.  PATIENT STATES HE WANTS TO GO HOME AND REST.  PATIENT IS DRIVING SELF HOME.   HE IS AMBULATING WITH [employer] AND ACCOMPANIED BY [employer].

## 2025-06-28 NOTE — NURSING
"Patient ambulating halls.  States "Im going to the cafeteria".   No distress.   No SOB.  Currently not wearing his 02 "I dont wear it all the time"  "

## 2025-06-30 ENCOUNTER — OFFICE VISIT (OUTPATIENT)
Dept: GASTROENTEROLOGY | Facility: CLINIC | Age: 70
End: 2025-06-30
Payer: OTHER GOVERNMENT

## 2025-06-30 VITALS — WEIGHT: 216.5 LBS | HEIGHT: 73 IN | BODY MASS INDEX: 28.69 KG/M2

## 2025-06-30 DIAGNOSIS — R10.9 UNSPECIFIED ABDOMINAL PAIN: ICD-10-CM

## 2025-06-30 DIAGNOSIS — K90.9 FATTY STOOL: Primary | ICD-10-CM

## 2025-06-30 PROCEDURE — 99214 OFFICE O/P EST MOD 30 MIN: CPT | Mod: PBBFAC,PN

## 2025-06-30 PROCEDURE — 99204 OFFICE O/P NEW MOD 45 MIN: CPT | Mod: S$PBB,,,

## 2025-06-30 PROCEDURE — 99999 PR PBB SHADOW E&M-EST. PATIENT-LVL IV: CPT | Mod: PBBFAC,,,

## 2025-06-30 RX ORDER — POLYETHYLENE GLYCOL 3350, SODIUM SULFATE ANHYDROUS, SODIUM BICARBONATE, SODIUM CHLORIDE, POTASSIUM CHLORIDE 236; 22.74; 6.74; 5.86; 2.97 G/4L; G/4L; G/4L; G/4L; G/4L
4 POWDER, FOR SOLUTION ORAL ONCE
Qty: 4000 ML | Refills: 0 | Status: SHIPPED | OUTPATIENT
Start: 2025-06-30 | End: 2025-06-30

## 2025-07-04 LAB
OHS QRS DURATION: 150 MS
OHS QTC CALCULATION: 530 MS

## 2025-07-10 ENCOUNTER — CLINICAL SUPPORT (OUTPATIENT)
Dept: SMOKING CESSATION | Facility: CLINIC | Age: 70
End: 2025-07-10
Payer: COMMERCIAL

## 2025-07-10 DIAGNOSIS — F17.200 NICOTINE DEPENDENCE: Primary | ICD-10-CM

## 2025-07-10 PROCEDURE — 99999 PR PBB SHADOW E&M-EST. PATIENT-LVL I: CPT | Mod: PBBFAC,,,

## 2025-07-10 NOTE — PROGRESS NOTES
Spoke with patient today in regard to smoking cessation progress for 3 month telephone follow up, he states not tobacco free. Patient states he has a lot going on with appointments and not ready to schedule at this time. Informed patient of benefit period, future follow ups, and contact information if any further help or support is needed. Will complete smart form for 3 month follow up on Quit attempt #1.

## 2025-07-13 ENCOUNTER — E-CONSULT (OUTPATIENT)
Dept: CARDIOLOGY | Facility: HOSPITAL | Age: 70
End: 2025-07-13
Payer: MEDICARE

## 2025-07-13 DIAGNOSIS — Z01.810 PREOPERATIVE CARDIOVASCULAR EXAMINATION: Primary | ICD-10-CM

## 2025-07-13 DIAGNOSIS — Z95.5 HX OF HEART ARTERY STENT: ICD-10-CM

## 2025-07-13 NOTE — CONSULTS
[facility]  Response for E-Consult     Patient Name: Sampson Holt  MRN: [id number]  Primary Care Provider: Damian Vergara MD   Requesting Provider: Nilsa Armenta NP  E-Consult to General Cardiology  Consult performed by: Antonio Kessler MD  Consult ordered by: Nilsa Armenta NP  Reason for consult: clearance to hold PLAVIX x5 days prior to EGD/COLONOSCOPY on 8/4.          Recommendation: Last PCI was in 5/2023. Can stop hold plavix for 5 days prior to procedure. Resume when adequate hemostasis obtained.     Additional future steps to consider: na     Total time of Consultation: 10 minute    I did not speak to the requesting provider verbally about this.     *This eConsult is based on the clinical data available to me and is furnished without benefit of a physical examination. The eConsult will need to be interpreted in light of any clinical issues or changes in patient status not available to me at the time of filing this eConsults. Significant changes in patient condition or level of acuity should result in immediate formal consultation and reevaluation. Please alert me if you have further questions.    Thank you for this eConsult referral.     Antonio Kessler MD  [facility]

## 2025-07-14 ENCOUNTER — PATIENT MESSAGE (OUTPATIENT)
Dept: GASTROENTEROLOGY | Facility: CLINIC | Age: 70
End: 2025-07-14
Payer: MEDICARE

## 2025-07-14 ENCOUNTER — OFFICE VISIT (OUTPATIENT)
Dept: CARDIOLOGY | Facility: CLINIC | Age: 70
End: 2025-07-14
Payer: MEDICAID

## 2025-07-14 VITALS
DIASTOLIC BLOOD PRESSURE: 82 MMHG | HEART RATE: 88 BPM | OXYGEN SATURATION: 96 % | WEIGHT: 216.38 LBS | HEIGHT: 73 IN | BODY MASS INDEX: 28.68 KG/M2 | SYSTOLIC BLOOD PRESSURE: 118 MMHG

## 2025-07-14 DIAGNOSIS — M06.9 RHEUMATOID ARTHRITIS, INVOLVING UNSPECIFIED SITE, UNSPECIFIED WHETHER RHEUMATOID FACTOR PRESENT: ICD-10-CM

## 2025-07-14 DIAGNOSIS — F17.219 CIGARETTE NICOTINE DEPENDENCE WITH NICOTINE-INDUCED DISORDER: ICD-10-CM

## 2025-07-14 DIAGNOSIS — F10.10 ETOH ABUSE: ICD-10-CM

## 2025-07-14 DIAGNOSIS — I10 BENIGN ESSENTIAL HTN: ICD-10-CM

## 2025-07-14 DIAGNOSIS — I25.10 CORONARY ARTERY DISEASE, UNSPECIFIED VESSEL OR LESION TYPE, UNSPECIFIED WHETHER ANGINA PRESENT, UNSPECIFIED WHETHER NATIVE OR TRANSPLANTED HEART: ICD-10-CM

## 2025-07-14 DIAGNOSIS — E78.2 MIXED HYPERLIPIDEMIA: ICD-10-CM

## 2025-07-14 DIAGNOSIS — R07.9 CHEST PAIN, UNSPECIFIED TYPE: ICD-10-CM

## 2025-07-14 DIAGNOSIS — Z95.5 STATUS POST INSERTION OF DRUG ELUTING CORONARY ARTERY STENT: ICD-10-CM

## 2025-07-14 DIAGNOSIS — J44.9 CHRONIC OBSTRUCTIVE PULMONARY DISEASE, UNSPECIFIED COPD TYPE: Primary | ICD-10-CM

## 2025-07-14 DIAGNOSIS — Q44.6 CYSTIC DISEASE OF LIVER: ICD-10-CM

## 2025-07-14 PROCEDURE — 99215 OFFICE O/P EST HI 40 MIN: CPT | Mod: PBBFAC,PN

## 2025-07-14 PROCEDURE — 99999 PR PBB SHADOW E&M-EST. PATIENT-LVL V: CPT | Mod: PBBFAC,,,

## 2025-07-14 RX ORDER — LOSARTAN POTASSIUM 100 MG/1
50 TABLET ORAL 2 TIMES DAILY
Start: 2025-07-14 | End: 2026-07-14

## 2025-07-14 RX ORDER — SPIRONOLACTONE 25 MG/1
12.5 TABLET ORAL DAILY
Qty: 45 TABLET | Refills: 3 | Status: SHIPPED | OUTPATIENT
Start: 2025-07-14 | End: 2026-07-14

## 2025-07-14 NOTE — ASSESSMENT & PLAN NOTE
On prednisone daily.   Likely contributing to weight gain and hyperglycemia  Start Jardiance for hyperglycemia and HFrEF

## 2025-07-14 NOTE — PROGRESS NOTES
Subjective:    Patient ID:  Sampson Holt is a 69 y.o. male patient here for evaluation Hospital Follow Up (Discuss DME for a scooter)      History of Present Illness    CHIEF COMPLAINT:  Patient presents today for follow up after recent hospitalization for chest pain and dyspnea.    RECENT HOSPITALIZATION:  He was hospitalized on 06/26/2025 for chest pain, dyspnea, and productive cough lasting 3 days. During hospitalization, glucose was poorly controlled with a discharge glucose of 411. Nuclear stress test was negative for reversible ischemia. Echo revealed an EF of 45-50% with no acute valvular abnormalities.    RESPIRATORY:  He continues to experience significant respiratory symptoms including dyspnea with trouble sleeping, experiencing missed breaths and waking up at the point of falling asleep.  Reports [facility].  He reports dyspnea with minimal exertion, such as walking from car to store, requiring sitting down. He has frequent use of inhaler and productive cough with increased phlegm production, occasionally feeling like small hairs are present in sputum. He denies sleep apnea though experiencing dyspnea while awake. He notes feeling slightly improved after recent hospitalization.     NEUROLOGIC:  He describes dizziness.  This occurs both at rest and with exertion.  He reports having history of seizures in the past.  At times he feels like he is about to have a seizure.  He has not had a seizure yet.    WEIGHT AND ABDOMINAL CONCERNS:  He describes a 10-pound weight gain localized to the abdomen with associated abdominal pain and tenderness over the past year. He currently weighs 216 lbs and expresses concern about abdominal weight. He reports excessive sweating, describing profuse perspiration even while sitting in waiting room.    RECENT IMAGING:  CT and US of abdomen demonstrated several hepatic cysts measuring up to 3.4 cm in the left hepatic lobe, a  left renal cyst, and a probable subcm splenic cyst. A lung lesion is also currently being [facility]. He is scheduled for further diagnostic procedures including colonoscopy and EGD, which have been cleared by Dr. Kessler. He is following with GI who also has ordered stool samples, waiting to be collected.    SOCIAL HISTORY:  He continues to consume 1-2 alcoholic drinks daily and smoke 4-5 cigarettes daily. He appears open to potential recommendations regarding substance use cessation. He reports concerns about potential mold in his living environment and expresses belief that the presence of mold is negatively impacting his overall health and well-being.      ROS:  General: -fever, -chills, -fatigue, -weight gain, -weight loss    Cardiovascular: -chest pain, -palpitations, -lower extremity edema  Respiratory: -cough, +shortness of breath, +difficulty breathing, +productive cough, +shortness of breath lying down  Gastrointestinal: +abdominal pain, -nausea, -vomiting, -diarrhea, -constipation, -blood in stool  Genitourinary: -dysuria, -hematuria, -frequency  Musculoskeletal: -joint pain, -muscle pain  Skin: -rash, -lesion, +excessive sweating  Neurological: -headache, +dizziness, -numbness, -tingling, +sleep disturbances, +intermittent breathing while sleeping, +difficulty falling asleep, +near-syncope  Psychiatric: -anxiety, -depression, -sleep difficulty                    Review of patient's allergies indicates:   Allergen Reactions    Amoxicillin Shortness Of Breath and Edema       Past Medical History:   Diagnosis Date    Abdominal pain 2022    CHF (congestive heart failure)     Cigarette smoker     Diabetes mellitus 2018    Emphysema lung     Endocarditis 2011    Hypertension     Stroke 2011    denies residual     Past Surgical History:   Procedure Laterality Date    ANGIOGRAM, CORONARY, WITH LEFT HEART CATHETERIZATION N/A 10/10/2023    Procedure: Angiogram, Coronary, with Left Heart Cath;  Surgeon:  Dieudonne Ryan MD;  Location: [facility] CATH/EP LAB;  Service: Cardiology;  Laterality: N/A;    BACK SURGERY  1981    COLONOSCOPY N/A 2/23/2023    Procedure: COLONOSCOPY;  Surgeon: Jonn Cruz MD;  Location: [facility] ENDO;  Service: Endoscopy;  Laterality: N/A;    CORONARY ANGIOGRAPHY N/A 5/9/2023    Procedure: ANGIOGRAM, CORONARY ARTERY;  Surgeon: Antonio Kessler MD;  Location: [facility] CATH/EP LAB;  Service: Cardiology;  Laterality: N/A;    EXCISION OF HYDROCELE      x2    FRACTIONAL FLOW RESERVE (FFR), CORONARY  5/11/2023    Procedure: Fractional Flow [place] (FFR), Coronary;  Surgeon: Antonio Kessler MD;  Location: [facility] CATH/EP LAB;  Service: Cardiology;;    INGUINAL HERNIA REPAIR  1960    INSTANTANEOUS WAVE-FREE RATIO (IFR) N/A 10/10/2023    Procedure: Instantaneous Wave-Free Ratio (IFR);  Surgeon: Dieudonne Ryan MD;  Location: [facility] CATH/EP LAB;  Service: Cardiology;  Laterality: N/A;    INTRAMEDULLARY RODDING OF FEMUR Left 3/9/2024    Procedure: INSERTION, INTRAMEDULLARY HUI, FEMUR;  Surgeon: Tarun Hdez MD;  Location: [facility] OR;  Service: Orthopedics;  Laterality: Left;    INTRAVASCULAR ULTRASOUND, CORONARY N/A 5/9/2023    Procedure: Ultrasound-coronary;  Surgeon: Antonio Kessler MD;  Location: [facility] CATH/EP LAB;  Service: Cardiology;  Laterality: N/A;    IVUS, CORONARY  5/11/2023    Procedure: IVUS, Coronary;  Surgeon: Antonio Kessler MD;  Location: [facility] CATH/EP LAB;  Service: Cardiology;;    LEFT HEART CATHETERIZATION Left 5/11/2023    Procedure: Left heart cath;  Surgeon: Antonio Kessler MD;  Location: [facility] CATH/EP LAB;  Service: Cardiology;  Laterality: Left;    PERCUTANEOUS TRANSLUMINAL BALLOON ANGIOPLASTY OF CORONARY ARTERY  5/9/2023    Procedure: Angioplasty-coronary;  Surgeon: Antonio Kessler MD;  Location: [facility] CATH/EP LAB;  Service: Cardiology;;    RHINOPLASTY      STENT, DRUG ELUTING, SINGLE VESSEL, CORONARY  5/9/2023    Procedure: Stent, Drug Eluting, Single Vessel, Coronary;  Surgeon:  Antonio Kessler MD;  Location: [facility] CATH/EP LAB;  Service: Cardiology;;    SURGICAL REMOVAL OF NODULE OF VOCAL CORD       Social History[1]                Objective        Vitals:    07/14/25 0811   BP: 118/82   Pulse: 88       LIPIDS - LAST 2   Lab Results   Component Value Date    CHOL 162 05/23/2025    CHOL 169 01/29/2024    HDL 52 05/23/2025    HDL 69 01/29/2024    LDLCALC 72.2 05/23/2025    LDLCALC 75.4 01/29/2024    TRIG 189 (H) 05/23/2025    TRIG 123 01/29/2024    CHOLHDL 32.1 05/23/2025    CHOLHDL 40.8 01/29/2024       CBC - LAST 2  Lab Results   Component Value Date    WBC 4.46 06/28/2025    WBC 6.17 06/27/2025    RBC 3.98 (L) 06/28/2025    RBC 4.65 06/27/2025    HGB 13.0 (L) 06/28/2025    HGB 14.6 06/27/2025    HCT 38.1 (L) 06/28/2025    HCT 42.2 06/27/2025    MCV 96 06/28/2025    MCV 91 06/27/2025    MCH 32.7 (H) 06/28/2025    MCH 31.4 (H) 06/27/2025    MCHC 34.1 06/28/2025    MCHC 34.6 06/27/2025    RDW 13.5 06/28/2025    RDW 13.9 06/27/2025     (L) 06/28/2025     06/27/2025    MPV 9.6 06/28/2025    MPV 9.4 06/27/2025    GRAN 6.4 01/05/2025    GRAN 75.5 (H) 01/05/2025    LYMPH 1.2 01/05/2025    LYMPH 14.7 (L) 01/05/2025    MONO 0.7 01/05/2025    MONO 8.2 01/05/2025    BASO 0.06 01/05/2025    BASO 0.06 12/18/2024    NRBC 0 06/28/2025    NRBC 0 06/27/2025       CHEMISTRY & LIVER FUNCTION - LAST 2  Lab Results   Component Value Date     06/28/2025     (L) 06/27/2025    K 4.1 06/28/2025    K 4.1 06/27/2025     06/28/2025    CL 99 06/27/2025    CO2 27 06/28/2025    CO2 23 06/27/2025    ANIONGAP 7 (L) 06/28/2025    ANIONGAP 13 06/27/2025    BUN 21 06/28/2025    BUN 23 06/27/2025    CREATININE 1.0 06/28/2025    CREATININE 1.1 06/27/2025     (H) 06/28/2025     (H) 06/27/2025    CALCIUM 9.2 06/28/2025    CALCIUM 9.5 06/27/2025    PH 7.422 03/27/2020    MG 2.1 06/28/2025    MG 2.0 06/27/2025    ALBUMIN 4.0 06/28/2025    ALBUMIN 4.2 06/27/2025    PROT 6.5 06/28/2025     PROT 7.0 06/27/2025    ALKPHOS 64 06/28/2025    ALKPHOS 72 06/27/2025    ALT 11 06/28/2025    ALT 11 06/27/2025    AST 10 06/28/2025    AST 12 06/27/2025    BILITOT 0.4 06/28/2025    BILITOT 0.5 06/27/2025        CARDIAC PROFILE - LAST 2  Lab Results   Component Value Date    BNP 89 06/26/2025    BNP 53 05/23/2025    CPK 71 07/06/2022    CPK 75 03/27/2020    CPKMB 1.8 07/06/2022    CPKMB 1.5 03/27/2020     03/27/2020    TROPONINI <0.030 07/06/2022    TROPONINI <0.030 03/28/2020    TROPONINIHS 10.2 01/05/2025    TROPONINIHS 11.2 01/05/2025        COAGULATION - LAST 2  Lab Results   Component Value Date    LABPT 13.3 07/06/2022    INR 1.0 06/26/2025    INR 1.1 03/09/2024    APTT 27.8 03/09/2024    APTT 28.1 10/09/2023       ENDOCRINE & PSA - LAST 2  Lab Results   Component Value Date    HGBA1C 7.0 (H) 10/20/2024    HGBA1C 6.2 09/07/2024    TSH 1.403 09/06/2024    TSH 2.160 05/10/2023    PROCAL 0.05 09/07/2024    PROCAL <0.05 08/11/2023        ECHOCARDIOGRAM RESULTS  Results for orders placed during the hospital encounter of 06/26/25    Echo    Interpretation Summary    Left Ventricle: The left ventricle is normal in size. Mildly increased wall thickness. Moderate septal thickening. Unable to assess wall motion. There is mildly reduced systolic function with a visually estimated ejection fraction of 45 - 50%. Unable to assess diastolic function due to poor image quality.  Difficult to estimate accurate EF.    Right Ventricle: The right ventricle is normal in size measuring 2.7 cm. Systolic function is normal.    IVC/SVC: Normal venous pressure at 3 mmHg.      CURRENT/PREVIOUS VISIT EKG  Results for orders placed or performed during the hospital encounter of 06/26/25   EKG 12-lead    Collection Time: 06/26/25 10:01 AM   Result Value Ref Range    QRS Duration 150 ms    [facility] QTC Calculation 530 ms    Narrative    Test Reason : R07.9,    Vent. Rate :  90 BPM     Atrial Rate :  90 BPM     P-R Int : 220 ms          QRS  Dur : 150 ms      QT Int : 434 ms       P-R-T Axes : 100 -71  90 degrees    QTcB Int : 530 ms    Sinus rhythm with 1st degree A-V block  Left axis deviation  Left bundle branch block  Abnormal ECG  No previous ECGs available  Confirmed by Harris Nielsen ([id number]) on 7/4/2025 5:36:29 PM    Referred By: AAAREFERRAL SELF           Confirmed By: Harris Nielsen     No valid procedures specified.   Results for orders placed during the hospital encounter of 06/26/25    Nuclear Stress Test    Interpretation Summary    The study's ECG is uninterpretable due to left bundle branch block.    The patient reported no chest pain during the stress test.    During stress, occasional PVCs are noted.    The nuclear portion of this study will be reported separately.    No valid procedures specified.    Physical Exam    General: No acute distress. Well-developed. Well-nourished.  HENT: Normocephalic. Atraumatic.   Cardiovascular: Regular rate. Regular rhythm. No murmurs. No rubs. No gallops. Normal S1, S2.  Respiratory: Normal respiratory effort. Clear to auscultation bilaterally. No rales. No rhonchi. No wheezing.  Abdomen: Soft. Non-tender. Non-distended. Normoactive bowel sounds.  Musculoskeletal: No  obvious deformity.  Extremities: No lower extremity edema.  Neurological: Alert & oriented x3. No slurred speech. Normal gait.  Psychiatric: Normal mood. Normal affect. Good insight. Good judgment.  Skin: Warm. Dry. No rash.         I HAVE REVIEWED :    The vital signs, nurses notes, and all the pertinent radiology and labs.        Current Outpatient Medications   Medication Instructions    albuterol (PROVENTIL/VENTOLIN HFA) 90 mcg/actuation inhaler 2 puffs, 4 times daily PRN    aspirin (ECOTRIN) 81 mg, Daily    carvediloL (COREG) 6.25 mg, 2 times daily    clopidogreL (PLAVIX) 75 mg, Oral, Daily    empagliflozin (JARDIANCE) 10 mg, Oral, Daily    fluticasone-salmeterol diskus inhaler 250-50 mcg 1 puff, 2 times daily    folic acid (FOLVITE) 1  mg, Daily    furosemide (LASIX) 20 mg, Oral, Daily PRN    HYDROcodone-acetaminophen (NORCO) 5-325 mg per tablet 1 tablet, Oral, Every 6 hours PRN    insulin glargine-yfgn 45 Units, 2 times daily    losartan (COZAAR) 50 mg, Oral, 2 times daily    multivitamin Tab 1 tablet, Oral, Daily    naloxone (NARCAN) 4 mg/actuation Spry 1 spray, Once    nicotine (NICODERM CQ) 14 mg/24 hr 1 patch, Transdermal, Daily    nitroGLYCERIN (NITROSTAT) 0.4 mg, Sublingual, Every 5 min PRN    ondansetron (ZOFRAN-ODT) 4 mg, Oral, Every 8 hours PRN    oxyCODONE-acetaminophen (PERCOCET) 5-325 mg per tablet 1 tablet, Nightly PRN    predniSONE (DELTASONE) 10 mg, Daily    rosuvastatin (CRESTOR) 5 mg, Daily    spironolactone (ALDACTONE) 12.5 mg, Oral, Daily    tamsulosin (FLOMAX) 0.4 mg, Daily    thiamine 100 mg, Daily          Assessment & Plan   Assessment & Plan    M06.9 Rheumatoid arthritis, involving unspecified site, unspecified whether rheumatoid factor present  J44.9 Chronic obstructive pulmonary disease, unspecified COPD type  R07.9 Chest pain, unspecified type  E78.2 Mixed hyperlipidemia  I10 Benign essential HTN  Z95.5 Status post insertion of drug eluting coronary artery stent  Q44.6 Cystic disease of liver  F17.219 Cigarette nicotine dependence with nicotine-induced disorder  I25.10 Coronary artery disease, unspecified vessel or lesion type, unspecified whether angina present, unspecified whether native or transplanted heart  F10.10 ETOH abuse    PLAN SUMMARY:  - Referred to pulmonologist for evaluation of shortness of breath and respiratory symptoms  - GI ordered stool sample for analysis; plans for EGD/colonoscopy in August   - Discontinue smoking  - Continue Aspirin 81 mg daily  - Continue Plavix daily  - Start Jardiance 10mg tablet daily  - Continue Losartan 100 mg 1/2 tablet twice daily  - Continue Carvedilol 12.5 mg 1/2 tablet twice daily  - Continue Lasix 20 mg daily as needed  - Start spironolactone 12.5mg tablet daily  -  Reduce alcohol intake  - DC hydralazine  - Ordered labs for renal function and electrolytes in 1 week  - Bring all medications to next appointment for review  - Follow up in 3 months    CHRONIC OBSTRUCTIVE PULMONARY DISEASE, UNSPECIFIED COPD TYPE:  - Discussed importance of proper inhaler technique and rinsing mouth after using steroid inhalers   - Referred to pulmonologist for evaluation of shortness of breath and respiratory symptoms.    BENIGN ESSENTIAL HTN:  - Continued Losartan 100 mg: Take 1/2 tablet twice daily.  - Discontinued Hydralazine .    STATUS POST INSERTION OF DRUG ELUTING CORONARY ARTERY STENT:  - Continued Aspirin 81 mg: Take daily.  - Continued Plavix 75 mg: Take daily.  Low-sodium heart healthy diet.  Reduce saturated fats.  Exercise as tolerated.  Continue Crestor 5 mg daily.    CIGARETTE NICOTINE DEPENDENCE WITH NICOTINE-INDUCED DISORDER:  - Patient to discontinue smoking.  Patient has a referral in to smoking cessation program.  He has refused in the past.    CORONARY ARTERY DISEASE, UNSPECIFIED VESSEL OR LESION TYPE, UNSPECIFIED WHETHER ANGINA PRESENT, UNSPECIFIED WHETHER NATIVE OR TRANSPLANTED HEART:  - Optimized heart failure management with addition of Jardiance and spironolactone.  - Started Jardiance: Take 10 mg tablet daily.  - Started spironolactone: Take 12.5 tablet daily.  - Continued Carvedilol 12.5 mg: Take 1/2 tablet twice daily.  - Continued Lasix 20 mg: Take daily as needed, can reduce if experiencing dizziness with position changes.  - Ordered labs: Check renal function and electrolytes in 1 week.    ETOH ABUSE:  - Reduce alcohol intake.    GENERAL MANAGEMENT:  - Bring all medications in a bag to next appointment for review, collect and submit stool sample for analysis, ensuring timely delivery to the lab.  - Follow up in 3 months.  - Noted concerns about prednisone use and explained potential side effects, including increased blood sugar, weight gain, sleep disturbances, and  increased risk of infections.           Cystic disease of liver  Following with GI    Nicotine dependence  Dangers of cigarette smoking were reviewed with patient in detail. Patient was Counseled for 3-10 minutes. Nicotine replacement options were discussed. Nicotine replacement was discussed- not prescribed per patient's request    Rheumatoid arthritis  On prednisone daily.   Likely contributing to weight gain and hyperglycemia  Start Jardiance for hyperglycemia and HFrEF    ETOH abuse  Recommend cessation      3 month follow up    This note was generated with the assistance of ambient listening technology. Verbal consent was obtained by the patient and accompanying visitor(s) for the recording of patient appointment to facilitate this note. I attest to having reviewed and edited the generated note for accuracy, though some syntax or spelling errors may persist. Please contact the author of this note for any clarification.             [1]   Social History  Tobacco Use    Smoking status: Every Day     Types: Cigarettes    Smokeless tobacco: Former    Tobacco comments:     Pt ready to quit smoking and states he can on his own. Seen 10/9/23 for inpatient smoking cessation. [employer] Member.     Occasionally   Substance Use Topics    Alcohol use: Yes     Alcohol/week: 14.0 standard drinks of alcohol     Types: 14 Shots of liquor per week    Drug use: Yes     Types: Marijuana

## 2025-07-22 ENCOUNTER — PATIENT MESSAGE (OUTPATIENT)
Dept: GASTROENTEROLOGY | Facility: CLINIC | Age: 70
End: 2025-07-22
Payer: OTHER GOVERNMENT

## 2025-07-25 ENCOUNTER — TELEPHONE (OUTPATIENT)
Dept: GASTROENTEROLOGY | Facility: CLINIC | Age: 70
End: 2025-07-25
Payer: MEDICARE

## 2025-07-30 DIAGNOSIS — M54.12 RADICULOPATHY, CERVICAL REGION: ICD-10-CM

## 2025-07-30 DIAGNOSIS — M96.1 POSTLAMINECTOMY SYNDROME, NOT ELSEWHERE CLASSIFIED: ICD-10-CM

## 2025-07-30 DIAGNOSIS — M48.062 SPINAL STENOSIS, LUMBAR REGION, WITH NEUROGENIC CLAUDICATION: ICD-10-CM

## 2025-07-30 DIAGNOSIS — G89.4 CHRONIC PAIN SYNDROME: Primary | ICD-10-CM

## 2025-07-30 DIAGNOSIS — M48.02 SPINAL STENOSIS, CERVICAL REGION: ICD-10-CM

## 2025-07-30 DIAGNOSIS — M54.16 RADICULOPATHY, LUMBAR REGION: ICD-10-CM

## 2025-08-04 ENCOUNTER — ANESTHESIA EVENT (OUTPATIENT)
Dept: ENDOSCOPY | Facility: HOSPITAL | Age: 70
End: 2025-08-04
Payer: OTHER GOVERNMENT

## 2025-08-04 ENCOUNTER — HOSPITAL ENCOUNTER (OUTPATIENT)
Facility: HOSPITAL | Age: 70
Discharge: HOME | End: 2025-08-04
Attending: INTERNAL MEDICINE | Admitting: INTERNAL MEDICINE
Payer: OTHER GOVERNMENT

## 2025-08-04 ENCOUNTER — ANESTHESIA (OUTPATIENT)
Dept: ENDOSCOPY | Facility: HOSPITAL | Age: 70
End: 2025-08-04
Payer: OTHER GOVERNMENT

## 2025-08-04 VITALS
DIASTOLIC BLOOD PRESSURE: 74 MMHG | OXYGEN SATURATION: 95 % | TEMPERATURE: 97 F | RESPIRATION RATE: 20 BRPM | HEART RATE: 70 BPM | SYSTOLIC BLOOD PRESSURE: 145 MMHG

## 2025-08-04 DIAGNOSIS — K64.8 INTERNAL HEMORRHOIDS: ICD-10-CM

## 2025-08-04 DIAGNOSIS — Z86.0100 HISTORY OF COLON POLYPS: ICD-10-CM

## 2025-08-04 DIAGNOSIS — K57.90 DIVERTICULOSIS: ICD-10-CM

## 2025-08-04 DIAGNOSIS — R10.9 ABDOMINAL PAIN: ICD-10-CM

## 2025-08-04 DIAGNOSIS — K29.70 GASTRITIS, PRESENCE OF BLEEDING UNSPECIFIED, UNSPECIFIED CHRONICITY, UNSPECIFIED GASTRITIS TYPE: Primary | ICD-10-CM

## 2025-08-04 LAB — POCT GLUCOSE: 163 MG/DL (ref 70–110)

## 2025-08-04 PROCEDURE — 27201012 HC FORCEPS, HOT/COLD, DISP: Performed by: INTERNAL MEDICINE

## 2025-08-04 PROCEDURE — 43239 EGD BIOPSY SINGLE/MULTIPLE: CPT | Mod: 51,,, | Performed by: INTERNAL MEDICINE

## 2025-08-04 PROCEDURE — 37000009 HC ANESTHESIA EA ADD 15 MINS: Performed by: INTERNAL MEDICINE

## 2025-08-04 PROCEDURE — 37000008 HC ANESTHESIA 1ST 15 MINUTES: Performed by: INTERNAL MEDICINE

## 2025-08-04 PROCEDURE — 45380 COLONOSCOPY AND BIOPSY: CPT | Mod: ,,, | Performed by: INTERNAL MEDICINE

## 2025-08-04 PROCEDURE — 88305 TISSUE EXAM BY PATHOLOGIST: CPT | Mod: TC,59 | Performed by: PATHOLOGY

## 2025-08-04 PROCEDURE — 63600175 PHARM REV CODE 636 W HCPCS: Performed by: NURSE ANESTHETIST, CERTIFIED REGISTERED

## 2025-08-04 PROCEDURE — 25000003 PHARM REV CODE 250: Performed by: INTERNAL MEDICINE

## 2025-08-04 PROCEDURE — 43239 EGD BIOPSY SINGLE/MULTIPLE: CPT | Performed by: INTERNAL MEDICINE

## 2025-08-04 PROCEDURE — 45380 COLONOSCOPY AND BIOPSY: CPT | Performed by: INTERNAL MEDICINE

## 2025-08-04 RX ORDER — SODIUM CHLORIDE 9 MG/ML
INJECTION, SOLUTION INTRAVENOUS CONTINUOUS
Status: DISCONTINUED | OUTPATIENT
Start: 2025-08-04 | End: 2025-08-04 | Stop reason: HOSPADM

## 2025-08-04 RX ORDER — PROPOFOL 10 MG/ML
VIAL (ML) INTRAVENOUS
Status: DISCONTINUED | OUTPATIENT
Start: 2025-08-04 | End: 2025-08-04

## 2025-08-04 RX ORDER — LIDOCAINE HYDROCHLORIDE 20 MG/ML
INJECTION INTRAVENOUS
Status: DISCONTINUED | OUTPATIENT
Start: 2025-08-04 | End: 2025-08-04

## 2025-08-04 RX ADMIN — PROPOFOL 50 MG: 10 INJECTION, EMULSION INTRAVENOUS at 10:08

## 2025-08-04 RX ADMIN — LIDOCAINE HYDROCHLORIDE 100 MG: 20 INJECTION, SOLUTION INTRAVENOUS at 10:08

## 2025-08-04 RX ADMIN — SODIUM CHLORIDE: 9 INJECTION, SOLUTION INTRAVENOUS at 09:08

## 2025-08-04 RX ADMIN — PROPOFOL 150 MG: 10 INJECTION, EMULSION INTRAVENOUS at 10:08

## 2025-08-04 NOTE — H&P
CC: Change in bowel habits, epigastric pain    69 year old male with above. States that symptoms are ongoing, no alleviating/exacerbating factors. No bleeding or weight loss.     ROS:  No headache, no fever/chills, no chest pain/SOB, no nausea/vomiting/diarrhea/constipation/GI bleeding/abdominal pain, no dysuria/hematuria except where otherwise indicated above.    VSSAF   Exam:   Alert and oriented x 3; no apparent distress   PERRLA, sclera anicteric  CV: Regular rate/rhythm, normal PMI   Lungs: Clear bilaterally with no wheeze/rales   Abdomen: Soft, NT/ND, normal bowel sounds   Ext: No cyanosis, clubbing     Impression:   As above    Plan:   Proceed with endoscopy. Further recs to follow.

## 2025-08-04 NOTE — ANESTHESIA PREPROCEDURE EVALUATION
08/04/2025  Sampson Holt is a 69 y.o., male.      Pre-op Assessment    I have reviewed the Patient Summary Reports.    I have reviewed the NPO Status.   I have reviewed the Medications.     Review of Systems  Anesthesia Hx:  No problems with previous Anesthesia                Social:  Smoker, Alcohol Use       Cardiovascular:     Hypertension  Past MI CAD   CABG/stent Dysrhythmias   CHF    hyperlipidemia    EF - 50%     Coronary Artery Disease:               Congestive Heart Failure (CHF)                Hypertension     Disorder of Cardiac Rhythm     Pulmonary:   COPD         Chronic Obstructive Pulmonary Disease (COPD):                      Hepatic/GI:      Liver Disease,            Liver Disease        Musculoskeletal:  Arthritis               Neurological:   CVA                       CVA - Cerebrovasular Accident                 Endocrine:  Diabetes    Diabetes                      Psych:  Psychiatric History  depression                Physical Exam  General: Well nourished    Airway:  Mallampati: II   Mouth Opening: Normal  TM Distance: Normal  Neck ROM: Normal ROM        Anesthesia Plan  Type of Anesthesia, risks & benefits discussed:    Anesthesia Type: Gen Natural Airway  Intra-op Monitoring Plan: Standard ASA Monitors  Induction:  IV  Informed Consent: Informed consent signed with the Patient and all parties understand the risks and agree with anesthesia plan.  All questions answered.   ASA Score: 3    Ready For Surgery From Anesthesia Perspective.     .

## 2025-08-04 NOTE — [employer] PATIENT INSTRUCTIONS
Discharge Summary/Instructions after an Endoscopic Procedure  Patient Name: Sampson Holt  Patient MRN: [id number]  Patient YOB: 1955  Monday, August 4, 2025  Andrea Serrano MD  Dear patient,  As a result of recent federal legislation (The Federal Cures Act), you may   receive lab or pathology results from your procedure in your [facility]   account before your physician is able to contact you. Your physician or   their representative will relay the results to you with their   recommendations at their soonest availability.  Thank you,  RESTRICTIONS:  During your procedure today, you received medications for sedation.  These   medications may affect your judgment, balance and coordination.  Therefore,   for 24 hours, you have the following restrictions:   - DO NOT drive a car, operate machinery, make legal/financial decisions,   sign important papers or drink alcohol.    ACTIVITY:  Today: no heavy lifting, straining or running due to procedural   sedation/anesthesia.  The following day: return to full activity including work.  DIET:  Eat and drink normally unless instructed otherwise.     TREATMENT FOR COMMON SIDE EFFECTS:  - Mild abdominal pain, nausea, belching, bloating or excessive gas:  rest,   eat lightly and use a heating pad.  - Sore Throat: treat with throat lozenges and/or gargle with warm salt   water.  - Because air was used during the procedure, expelling large amounts of air   from your rectum or belching is normal.  - If a bowel prep was taken, you may not have a bowel movement for 1-3 days.    This is normal.  SYMPTOMS TO WATCH FOR AND REPORT TO YOUR PHYSICIAN:  1. Abdominal pain or bloating, other than gas cramps.  2. Chest pain.  3. Back pain.  4. Signs of infection such as: chills or fever occurring within 24 hours   after the procedure.  5. Rectal bleeding, which would show as bright red, maroon, or black stools.   (A tablespoon of blood from the rectum is not serious, especially  if   hemorrhoids are present.)  6. Vomiting.  7. Weakness or dizziness.  GO DIRECTLY TO THE NEAREST EMERGENCY ROOM IF YOU HAVE ANY OF THE FOLLOWING:      Difficulty breathing              Chills and/or fever over 101 F   Persistent vomiting and/or vomiting blood   Severe abdominal pain   Severe chest pain   Black, tarry stools   Bleeding- more than one tablespoon   Any other symptom or condition that you feel may need urgent attention  Your doctor recommends these additional instructions:  If any biopsies were taken, your doctors clinic will contact you in 1 to 2   weeks with any results.  - Patient has a contact number available for emergencies.  The signs and   symptoms of potential delayed complications were discussed with the   patient.  Return to normal activities tomorrow.  Written discharge   instructions were provided to the patient.   - High fiber diet.   - Continue present medications.   - Resume Plavix (clopidogrel) at prior dose today.   - Await pathology results.   - Repeat colonoscopy in 5 years for surveillance.   - Discharge patient to home (ambulatory).   - Return to GI office after studies are complete.  For questions, problems or results please call your physician - Andrea Serrano MD at Work:  [phone].  [facility], EMERGENCY ROOM PHONE NUMBER: [phone]  IF A COMPLICATION OR EMERGENCY SITUATION ARISES AND YOU ARE UNABLE TO REACH   YOUR PHYSICIAN - GO DIRECTLY TO THE EMERGENCY ROOM.  Andrea Serrano MD  8/4/2025 10:55:05 AM  This report has been verified and signed electronically.  Dear patient,  As a result of recent federal legislation (The Federal Cures Act), you may   receive lab or pathology results from your procedure in your [facility]   account before your physician is able to contact you. Your physician or   their representative will relay the results to you with their   recommendations at their soonest availability.  Thank you,  PROVATION

## 2025-08-04 NOTE — PROVATION PATIENT INSTRUCTIONS
Discharge Summary/Instructions after an Endoscopic Procedure  Patient Name: Sampson Holt  Patient MRN: [id number]  Patient YOB: 1955  Monday, August 4, 2025  Andrea Serrano MD  Dear patient,  As a result of recent federal legislation (The Federal Cures Act), you may   receive lab or pathology results from your procedure in your [facility]   account before your physician is able to contact you. Your physician or   their representative will relay the results to you with their   recommendations at their soonest availability.  Thank you,  RESTRICTIONS:  During your procedure today, you received medications for sedation.  These   medications may affect your judgment, balance and coordination.  Therefore,   for 24 hours, you have the following restrictions:   - DO NOT drive a car, operate machinery, make legal/financial decisions,   sign important papers or drink alcohol.    ACTIVITY:  Today: no heavy lifting, straining or running due to procedural   sedation/anesthesia.  The following day: return to full activity including work.  DIET:  Eat and drink normally unless instructed otherwise.     TREATMENT FOR COMMON SIDE EFFECTS:  - Mild abdominal pain, nausea, belching, bloating or excessive gas:  rest,   eat lightly and use a heating pad.  - Sore Throat: treat with throat lozenges and/or gargle with warm salt   water.  - Because air was used during the procedure, expelling large amounts of air   from your rectum or belching is normal.  - If a bowel prep was taken, you may not have a bowel movement for 1-3 days.    This is normal.  SYMPTOMS TO WATCH FOR AND REPORT TO YOUR PHYSICIAN:  1. Abdominal pain or bloating, other than gas cramps.  2. Chest pain.  3. Back pain.  4. Signs of infection such as: chills or fever occurring within 24 hours   after the procedure.  5. Rectal bleeding, which would show as bright red, maroon, or black stools.   (A tablespoon of blood from the rectum is not serious, especially  if   hemorrhoids are present.)  6. Vomiting.  7. Weakness or dizziness.  GO DIRECTLY TO THE NEAREST EMERGENCY ROOM IF YOU HAVE ANY OF THE FOLLOWING:      Difficulty breathing              Chills and/or fever over 101 F   Persistent vomiting and/or vomiting blood   Severe abdominal pain   Severe chest pain   Black, tarry stools   Bleeding- more than one tablespoon   Any other symptom or condition that you feel may need urgent attention  Your doctor recommends these additional instructions:  If any biopsies were taken, your doctors clinic will contact you in 1 to 2   weeks with any results.  - Patient has a contact number available for emergencies.  The signs and   symptoms of potential delayed complications were discussed with the   patient.  Return to normal activities tomorrow.  Written discharge   instructions were provided to the patient.   - Resume previous diet.   - Continue present medications.   - No aspirin, ibuprofen, naproxen, or other non-steroidal anti-inflammatory   drugs.   - Await pathology results.   - Discharge patient to home (ambulatory).   - Perform a colonoscopy today.   - Resume Plavix (clopidogrel) at prior dose today.   - Return to GI office after studies are complete.  For questions, problems or results please call your physician - Andrea Serrano MD at Work:  [phone].  [facility], EMERGENCY ROOM PHONE NUMBER: [phone]  IF A COMPLICATION OR EMERGENCY SITUATION ARISES AND YOU ARE UNABLE TO REACH   YOUR PHYSICIAN - GO DIRECTLY TO THE EMERGENCY ROOM.  Andrea Serrano MD  8/4/2025 10:38:17 AM  This report has been verified and signed electronically.  Dear patient,  As a result of recent federal legislation (The Federal Cures Act), you may   receive lab or pathology results from your procedure in your [facility]   account before your physician is able to contact you. Your physician or   their representative will relay the results to you with their   recommendations at their  soonest availability.  Thank you,  PROVATION

## 2025-08-04 NOTE — TRANSFER OF CARE
Anesthesia Transfer of Care Note    Patient: Sampson Holt    Procedure(s) Performed: Procedure(s) (LRB):  EGD (ESOPHAGOGASTRODUODENOSCOPY) (N/A)  COLONOSCOPY (N/A)    Patient location: GI    Anesthesia Type: general    Transport from OR: Transported from OR on room air with adequate spontaneous ventilation    Post pain: adequate analgesia    Post assessment: no apparent anesthetic complications    Post vital signs: stable    Level of consciousness: awake    Nausea/Vomiting: no nausea/vomiting    Complications: none    Transfer of care protocol was followed      Last vitals: Visit Vitals  BP (!) 164/90 (BP Location: Left arm, Patient Position: Lying)   Pulse 73   Temp 36.5 °C (97.7 °F) (Skin)   Resp 16   SpO2 96%

## 2025-08-04 NOTE — ANESTHESIA POSTPROCEDURE EVALUATION
Anesthesia Post Evaluation    Patient: Sampson Holt    Procedure(s) Performed: Procedure(s) (LRB):  EGD (ESOPHAGOGASTRODUODENOSCOPY) (N/A)  COLONOSCOPY (N/A)    Final Anesthesia Type: general      Patient location during evaluation: PACU  Patient participation: Yes- Able to Participate  Level of consciousness: awake  Post-procedure vital signs: reviewed and stable  Pain management: adequate  Airway patency: patent    PONV status at discharge: No PONV  Anesthetic complications: no      Cardiovascular status: blood pressure returned to baseline  Respiratory status: unassisted  Hydration status: euvolemic  Follow-up not needed.              Vitals Value Taken Time   /74 08/04/25 11:20   Temp 36.1 °C (97 °F) 08/04/25 11:00   Pulse 70 08/04/25 11:23   Resp 26 08/04/25 11:23   SpO2 94 % 08/04/25 11:23   Vitals shown include unfiled device data.      Event Time   Out of Recovery 11:42:59         Pain/Tuyet Score: Tuyet Score: 10 (8/4/2025 11:20 AM)

## 2025-08-28 ENCOUNTER — HOSPITAL ENCOUNTER (EMERGENCY)
Facility: HOSPITAL | Age: 70
Discharge: HOME | End: 2025-08-28
Attending: STUDENT IN AN ORGANIZED HEALTH CARE EDUCATION/TRAINING PROGRAM
Payer: OTHER GOVERNMENT

## 2025-08-28 ENCOUNTER — TELEPHONE (OUTPATIENT)
Dept: SURGERY | Facility: CLINIC | Age: 70
End: 2025-08-28
Payer: OTHER GOVERNMENT

## 2025-08-28 VITALS
HEART RATE: 67 BPM | BODY MASS INDEX: 28.49 KG/M2 | TEMPERATURE: 99 F | DIASTOLIC BLOOD PRESSURE: 73 MMHG | SYSTOLIC BLOOD PRESSURE: 116 MMHG | HEIGHT: 73 IN | OXYGEN SATURATION: 96 % | RESPIRATION RATE: 12 BRPM | WEIGHT: 215 LBS

## 2025-08-28 DIAGNOSIS — R10.9 ABDOMINAL PAIN: ICD-10-CM

## 2025-08-28 DIAGNOSIS — K80.20 CALCULUS OF GALLBLADDER WITHOUT CHOLECYSTITIS WITHOUT OBSTRUCTION: Primary | ICD-10-CM

## 2025-08-28 DIAGNOSIS — R10.11 RUQ PAIN: ICD-10-CM

## 2025-08-28 LAB
ABSOLUTE EOSINOPHIL (SMH): 0.13 K/UL
ABSOLUTE MONOCYTE (SMH): 0.64 K/UL (ref 0.3–1)
ABSOLUTE NEUTROPHIL COUNT (SMH): 3.6 K/UL (ref 1.8–7.7)
ALBUMIN SERPL-MCNC: 4.4 G/DL (ref 3.5–5.2)
ALP SERPL-CCNC: 66 UNIT/L (ref 55–135)
ALT SERPL-CCNC: 11 UNIT/L (ref 10–44)
ANION GAP (SMH): 7 MMOL/L (ref 8–16)
AST SERPL-CCNC: 11 UNIT/L (ref 10–40)
BACTERIA #/AREA URNS AUTO: NORMAL /HPF
BASOPHILS # BLD AUTO: 0.07 K/UL
BASOPHILS NFR BLD AUTO: 1.1 %
BILIRUB SERPL-MCNC: 0.4 MG/DL (ref 0.1–1)
BILIRUB UR QL STRIP.AUTO: NEGATIVE
BUN SERPL-MCNC: 18 MG/DL (ref 8–23)
CALCIUM SERPL-MCNC: 9.7 MG/DL (ref 8.7–10.5)
CHLORIDE SERPL-SCNC: 109 MMOL/L (ref 95–110)
CLARITY UR: CLEAR
CO2 SERPL-SCNC: 23 MMOL/L (ref 23–29)
COLOR UR AUTO: YELLOW
CREAT SERPL-MCNC: 0.8 MG/DL (ref 0.5–1.4)
ERYTHROCYTE [DISTWIDTH] IN BLOOD BY AUTOMATED COUNT: 12.4 % (ref 11.5–14.5)
GFR SERPLBLD CREATININE-BSD FMLA CKD-EPI: >60 ML/MIN/1.73/M2
GLUCOSE SERPL-MCNC: 96 MG/DL (ref 70–110)
GLUCOSE UR QL STRIP: ABNORMAL
HCT VFR BLD AUTO: 44.1 % (ref 40–54)
HGB BLD-MCNC: 14.8 GM/DL (ref 14–18)
HGB UR QL STRIP: NEGATIVE
IMM GRANULOCYTES # BLD AUTO: 0.02 K/UL (ref 0–0.04)
IMM GRANULOCYTES NFR BLD AUTO: 0.3 % (ref 0–0.5)
KETONES UR QL STRIP: NEGATIVE
LEUKOCYTE ESTERASE UR QL STRIP: NEGATIVE
LIPASE SERPL-CCNC: 16 U/L (ref 4–60)
LYMPHOCYTES # BLD AUTO: 2.15 K/UL (ref 1–4.8)
MCH RBC QN AUTO: 32 PG (ref 27–31)
MCHC RBC AUTO-ENTMCNC: 33.6 G/DL (ref 32–36)
MCV RBC AUTO: 96 FL (ref 82–98)
MICROSCOPIC COMMENT: NORMAL
NITRITE UR QL STRIP: NEGATIVE
NUCLEATED RBC (/100WBC) (SMH): 0 /100 WBC
PH UR STRIP: 6 [PH]
PLATELET # BLD AUTO: 228 K/UL (ref 150–450)
PMV BLD AUTO: 9.5 FL (ref 9.2–12.9)
POTASSIUM SERPL-SCNC: 3.9 MMOL/L (ref 3.5–5.1)
PROT SERPL-MCNC: 7.2 GM/DL (ref 6–8.4)
PROT UR QL STRIP: NEGATIVE
RBC # BLD AUTO: 4.62 M/UL (ref 4.6–6.2)
RELATIVE EOSINOPHIL (SMH): 2 % (ref 0–8)
RELATIVE LYMPHOCYTE (SMH): 32.6 % (ref 18–48)
RELATIVE MONOCYTE (SMH): 9.7 % (ref 4–15)
RELATIVE NEUTROPHIL (SMH): 54.3 % (ref 38–73)
SODIUM SERPL-SCNC: 139 MMOL/L (ref 136–145)
SP GR UR STRIP: >=1.03
SQUAMOUS #/AREA URNS AUTO: <1 /HPF
TROPONIN HIGH SENSITIVE (SMH): 7.5 PG/ML
UROBILINOGEN UR STRIP-ACNC: NEGATIVE EU/DL
WBC # BLD AUTO: 6.59 K/UL (ref 3.9–12.7)
WBC #/AREA URNS AUTO: 2 /HPF

## 2025-08-28 PROCEDURE — 84484 ASSAY OF TROPONIN QUANT: CPT | Performed by: EMERGENCY MEDICINE

## 2025-08-28 PROCEDURE — 82040 ASSAY OF SERUM ALBUMIN: CPT | Performed by: STUDENT IN AN ORGANIZED HEALTH CARE EDUCATION/TRAINING PROGRAM

## 2025-08-28 PROCEDURE — 96374 THER/PROPH/DIAG INJ IV PUSH: CPT

## 2025-08-28 PROCEDURE — 93010 ELECTROCARDIOGRAM REPORT: CPT | Mod: ,,, | Performed by: GENERAL PRACTICE

## 2025-08-28 PROCEDURE — 85025 COMPLETE CBC W/AUTO DIFF WBC: CPT | Performed by: STUDENT IN AN ORGANIZED HEALTH CARE EDUCATION/TRAINING PROGRAM

## 2025-08-28 PROCEDURE — 81003 URINALYSIS AUTO W/O SCOPE: CPT | Performed by: STUDENT IN AN ORGANIZED HEALTH CARE EDUCATION/TRAINING PROGRAM

## 2025-08-28 PROCEDURE — 63600175 PHARM REV CODE 636 W HCPCS

## 2025-08-28 PROCEDURE — 83690 ASSAY OF LIPASE: CPT | Performed by: STUDENT IN AN ORGANIZED HEALTH CARE EDUCATION/TRAINING PROGRAM

## 2025-08-28 PROCEDURE — 99285 EMERGENCY DEPT VISIT HI MDM: CPT | Mod: 25

## 2025-08-28 PROCEDURE — 25500020 PHARM REV CODE 255: Performed by: STUDENT IN AN ORGANIZED HEALTH CARE EDUCATION/TRAINING PROGRAM

## 2025-08-28 PROCEDURE — 96375 TX/PRO/DX INJ NEW DRUG ADDON: CPT

## 2025-08-28 PROCEDURE — 93005 ELECTROCARDIOGRAM TRACING: CPT | Performed by: GENERAL PRACTICE

## 2025-08-28 RX ORDER — DICYCLOMINE HYDROCHLORIDE 20 MG/1
20 TABLET ORAL 2 TIMES DAILY
Qty: 20 TABLET | Refills: 0 | Status: SHIPPED | OUTPATIENT
Start: 2025-08-28 | End: 2025-09-27

## 2025-08-28 RX ORDER — MORPHINE SULFATE 2 MG/ML
6 INJECTION, SOLUTION INTRAMUSCULAR; INTRAVENOUS
Status: COMPLETED | OUTPATIENT
Start: 2025-08-28 | End: 2025-08-28

## 2025-08-28 RX ORDER — HYDROMORPHONE HYDROCHLORIDE 1 MG/ML
1 INJECTION, SOLUTION INTRAMUSCULAR; INTRAVENOUS; SUBCUTANEOUS
Refills: 0 | Status: COMPLETED | OUTPATIENT
Start: 2025-08-28 | End: 2025-08-28

## 2025-08-28 RX ORDER — HYDROCODONE BITARTRATE AND ACETAMINOPHEN 5; 325 MG/1; MG/1
1 TABLET ORAL EVERY 6 HOURS PRN
Qty: 12 TABLET | Refills: 0 | Status: SHIPPED | OUTPATIENT
Start: 2025-08-28

## 2025-08-28 RX ADMIN — HYDROMORPHONE HYDROCHLORIDE 1 MG: 1 INJECTION, SOLUTION INTRAMUSCULAR; INTRAVENOUS; SUBCUTANEOUS at 04:08

## 2025-08-28 RX ADMIN — IOHEXOL 100 ML: 350 INJECTION, SOLUTION INTRAVENOUS at 08:08

## 2025-08-28 RX ADMIN — MORPHINE SULFATE 6 MG: 2 INJECTION, SOLUTION INTRAMUSCULAR; INTRAVENOUS at 06:08

## 2025-08-29 ENCOUNTER — TELEPHONE (OUTPATIENT)
Dept: CARDIOLOGY | Facility: CLINIC | Age: 70
End: 2025-08-29
Payer: OTHER GOVERNMENT

## 2025-09-01 LAB
OHS QRS DURATION: 158 MS
OHS QTC CALCULATION: 501 MS

## (undated) DEVICE — OMNI WIRE

## (undated) DEVICE — GOWN POLY REINF BRTH SLV XL

## (undated) DEVICE — TR BAND

## (undated) DEVICE — SUT 2/0 18IN COATED VICRYL

## (undated) DEVICE — CATHETER DIAGNOSTIC DXTERITY 5FR JL3.5

## (undated) DEVICE — PACK SIRUS BASIC V SURG STRL

## (undated) DEVICE — WIRE GUIDE 3.2MM 400MM
Type: IMPLANTABLE DEVICE | Site: HIP | Status: NON-FUNCTIONAL
Removed: 2024-03-09

## (undated) DEVICE — PACK CUSTOM UNIV BASIN SLI

## (undated) DEVICE — DRAPE STERI-DRAPE 1000 17X11IN

## (undated) DEVICE — DRESSING XEROFORM FOIL PK 1X8

## (undated) DEVICE — DRAPE STERI U-SHAPED 47X51IN

## (undated) DEVICE — BIT DRILL QCK-CPLG 4.2MM

## (undated) DEVICE — DRAPE C ARM 42 X 120 10/BX

## (undated) DEVICE — ALCOHOL 70% ISOP RUBBING 4OZ

## (undated) DEVICE — CATHETER DIAGNOSTIC DXTERITY 5FR JR4.0

## (undated) DEVICE — GUIDEWIRE RUNTHROUGH 180CM

## (undated) DEVICE — CATHETER GUIDE LAUNCHER JL4 6FX110CM

## (undated) DEVICE — TOWEL OR DISP STRL BLUE 4/PK

## (undated) DEVICE — SPONGE BULKEE II ABSRB 6X6.75

## (undated) DEVICE — SOL NACL IRR 1000ML BTL

## (undated) DEVICE — SHEATH INTRODUCER SLENDER 6FX10CM

## (undated) DEVICE — SUT CTD VICRYL CT-1 27

## (undated) DEVICE — SUT X424H ETHIBOND 0-0

## (undated) DEVICE — GLOVE SENSICARE PI GRN 7.5

## (undated) DEVICE — GLOVE SENSICARE PI GRN 8

## (undated) DEVICE — 16MM FLEXIBLE DRILL BIT

## (undated) DEVICE — CATHETER GUIDE LAUNCHER EBU4 6X110

## (undated) DEVICE — CATHETER GUIDE LAUNCHER JR4 6FX110

## (undated) DEVICE — GUIDEWIRE RUNTHROUGH 180 HYPERCOAT

## (undated) DEVICE — GUIDEWIRE J TIP EXCHANGE FIXED CORE 260

## (undated) DEVICE — BLLN SAPPHIRE NC3.5 X 12

## (undated) DEVICE — SLEEVE SCD EXPRESS KNEE MEDIUM

## (undated) DEVICE — GLOVE SENSICARE PI ALOE 7.5

## (undated) DEVICE — BANDAGE MATRIX HK LOOP 6IN 5YD

## (undated) DEVICE — GOWN POLY REINF X-LONG XL

## (undated) DEVICE — CATHETER DIAGNOSTIC DXTERITY 5FR JL4.0

## (undated) DEVICE — CATHETER BALLOON NC EUPHORA 3.0X8MM

## (undated) DEVICE — APPLICATOR CHLORAPREP ORN 26ML

## (undated) DEVICE — GUIDEWIRE J TIP BMW .014X190

## (undated) DEVICE — BNDG COFLEX FOAM LF2 ST 6X5YD

## (undated) DEVICE — SUT ETHILON 3-0 FS-1 30

## (undated) DEVICE — PADDING CAST SPECIALIST 6X4YD

## (undated) DEVICE — KIT INFLATION MERIT (IN8152, HP9200E)

## (undated) DEVICE — STRAP OR TABLE 5IN X 72IN

## (undated) DEVICE — GLOVE SENSICARE PI GRN 6

## (undated) DEVICE — Device

## (undated) DEVICE — ALCOHOL 70% ANTISEPTIC ISO 4OZ

## (undated) DEVICE — YANKAUER OPEN TIP W/O VENT

## (undated) DEVICE — DRAPE IOBAN 2 STERI

## (undated) DEVICE — LINER SUCTION 3000CC

## (undated) DEVICE — DRAPE C-ARMOR EQUIPMENT COVER

## (undated) DEVICE — CATHETER BALLOON EUPHORA 2.50X12MM

## (undated) DEVICE — BLADE SURG CARBON STEEL #10

## (undated) DEVICE — ELECTRODE REM PLYHSV RETURN 9

## (undated) DEVICE — CATHETER EAGLE EYE 5FR 85900P

## (undated) DEVICE — TAPE MEDIPORE 3 X 10YD

## (undated) DEVICE — GLOVE SENSICARE PI ALOE 6